# Patient Record
Sex: FEMALE | Race: WHITE | NOT HISPANIC OR LATINO | Employment: OTHER | URBAN - METROPOLITAN AREA
[De-identification: names, ages, dates, MRNs, and addresses within clinical notes are randomized per-mention and may not be internally consistent; named-entity substitution may affect disease eponyms.]

---

## 2017-01-16 ENCOUNTER — PATIENT MESSAGE (OUTPATIENT)
Dept: FAMILY MEDICINE | Facility: CLINIC | Age: 67
End: 2017-01-16

## 2017-01-17 ENCOUNTER — PATIENT MESSAGE (OUTPATIENT)
Dept: FAMILY MEDICINE | Facility: CLINIC | Age: 67
End: 2017-01-17

## 2017-01-17 RX ORDER — LOVASTATIN 20 MG/1
20 TABLET ORAL NIGHTLY
Qty: 90 TABLET | Refills: 3 | Status: SHIPPED | OUTPATIENT
Start: 2017-01-17 | End: 2018-01-07 | Stop reason: SDUPTHER

## 2017-02-05 DIAGNOSIS — F41.9 ANXIETY DISORDER: ICD-10-CM

## 2017-02-06 RX ORDER — CITALOPRAM 20 MG/1
TABLET, FILM COATED ORAL
Qty: 30 TABLET | Refills: 5 | Status: SHIPPED | OUTPATIENT
Start: 2017-02-06 | End: 2017-08-01 | Stop reason: SDUPTHER

## 2017-02-24 ENCOUNTER — OFFICE VISIT (OUTPATIENT)
Dept: NEUROLOGY | Facility: CLINIC | Age: 67
End: 2017-02-24
Payer: MEDICARE

## 2017-02-24 VITALS
BODY MASS INDEX: 47.13 KG/M2 | SYSTOLIC BLOOD PRESSURE: 122 MMHG | HEIGHT: 62 IN | HEART RATE: 89 BPM | WEIGHT: 256.13 LBS | DIASTOLIC BLOOD PRESSURE: 79 MMHG

## 2017-02-24 DIAGNOSIS — F41.9 ANXIETY DISORDER, UNSPECIFIED TYPE: ICD-10-CM

## 2017-02-24 DIAGNOSIS — M79.7 FIBROMYALGIA: ICD-10-CM

## 2017-02-24 DIAGNOSIS — G25.0 ESSENTIAL TREMOR: ICD-10-CM

## 2017-02-24 DIAGNOSIS — G25.81 RESTLESS LEG SYNDROME: Primary | ICD-10-CM

## 2017-02-24 PROCEDURE — 99214 OFFICE O/P EST MOD 30 MIN: CPT | Mod: S$PBB,,, | Performed by: PSYCHIATRY & NEUROLOGY

## 2017-02-24 PROCEDURE — 99213 OFFICE O/P EST LOW 20 MIN: CPT | Mod: PBBFAC,PO | Performed by: PSYCHIATRY & NEUROLOGY

## 2017-02-24 PROCEDURE — 99999 PR PBB SHADOW E&M-EST. PATIENT-LVL III: CPT | Mod: PBBFAC,,, | Performed by: PSYCHIATRY & NEUROLOGY

## 2017-02-24 RX ORDER — CYCLOBENZAPRINE HCL 10 MG
10 TABLET ORAL 3 TIMES DAILY PRN
Status: ON HOLD | COMMUNITY
Start: 2017-02-17 | End: 2022-02-16 | Stop reason: SDUPTHER

## 2017-02-24 RX ORDER — GABAPENTIN 600 MG/1
600 TABLET ORAL 3 TIMES DAILY
Qty: 90 TABLET | Refills: 11 | Status: SHIPPED | OUTPATIENT
Start: 2017-02-24 | End: 2018-03-14 | Stop reason: SDUPTHER

## 2017-02-24 RX ORDER — DICLOFENAC SODIUM 30 MG/G
GEL TOPICAL
Refills: 11 | COMMUNITY
Start: 2017-01-30 | End: 2019-07-08 | Stop reason: SDUPTHER

## 2017-02-24 RX ORDER — OXYCODONE AND ACETAMINOPHEN 10; 325 MG/1; MG/1
1 TABLET ORAL EVERY 8 HOURS PRN
Status: ON HOLD | COMMUNITY
Start: 2017-02-21 | End: 2022-02-16 | Stop reason: SDUPTHER

## 2017-02-24 RX ORDER — TIZANIDINE 4 MG/1
4 TABLET ORAL EVERY 8 HOURS
COMMUNITY
Start: 2017-01-24 | End: 2017-03-02

## 2017-02-24 NOTE — PROGRESS NOTES
Subjective:       Patient ID: Hannah Norman is a 67 y.o. female.    Chief Complaint:  Restless leg syndrome      History of Present Illness  HPI  This is a 67-year-old female who returns in follow up.  She was last seen by me 3 months ago.  Since history of chronic back problems and in the past had been seen by orthopedic surgery and neurosurgery at [facility], Dr. Bey, with MRI scans of the neck and back and advised conservative management and referral to pain management.  She is presently being followed by Dr. Wang for pain management.  She has history of restless leg syndrome with improvement with a combination of Neurontin 3 times a day and Mirapex at bedtime.  She also has an essential tremor and was recently seen by the movement disorders clinic.  She takes Xanax as needed and citalopram 20 mg daily for chronic anxiety which has helped.  In addition she is on Effexor for the fibromyalgia symptoms which has helped.       Review of Systems  Review of Systems   Constitutional: Negative.    HENT: Negative.  Negative for hearing loss.    Eyes: Negative.  Negative for visual disturbance.   Respiratory: Negative.    Cardiovascular: Negative.    Gastrointestinal: Negative.    Genitourinary: Negative.    Musculoskeletal: Positive for arthralgias (left knee pain, recent surgery), back pain and myalgias. Negative for gait problem.   Skin: Negative.    Neurological: Positive for tremors. Negative for seizures, speech difficulty and numbness.   Psychiatric/Behavioral: Negative.  Negative for decreased concentration.       Objective:      Neurologic Exam      Physical Exam   Constitutional: She appears well-developed and well-nourished.   HENT:   Head: Normocephalic and atraumatic.   Right Ear: Hearing normal.   Left Ear: Hearing normal.   Neck: Normal range of motion. Neck supple. Muscular tenderness (bilateral paraspinal muscle and trapezius muscle tenderness) present. Carotid bruit is not present.    Musculoskeletal:   Multiple areas of muscle tenderness in the trunk muscles as well as trapezius and proximal muscles of the upper and lower extremities.  Status post left knee replacement surgery.  Left ankle tenderness laterally with mild swelling, no ecchymosis or deformity.   Neurological: She is alert. She has normal reflexes. She displays atrophy (no obvious weakness however she has difficulty getting onto the step and she fatigues easily.) and tremor (a very minimal statokinetic tremor was noted affecting fingers). A cranial nerve deficit (VF at bedside testing essentially normal.  Minimal weakness left face LMN with good eye closure, and sensory exam being normal/symmetrical.  Corneals/gag reflexes normal.  Tongue & palate movements normal.  Shoulder shrug normal.) is present. No sensory deficit (Right Tinel's positive). She exhibits normal muscle tone. Coordination (mild clumsiness of fine motor movements.  Romberg is equivocal with eyes closed) and gait (her gait is slightly broad-based slowly because of clumsiness in the lower extremities and fatigue.) abnormal.   Mental status examination: Patient is fully oriented and able to give an adequate history.  Recall of recent and past information is good.  Immediate recall is normal.  Attention span and concentration was normal.  Judgment and insight is normal.  Language functions are intact with no evidence of aphasia or dysarthria.  Comprehension is unimpaired.  Affect is appropriate, mood was even.  No thought disorder is noted.   Vitals reviewed.        Assessment:        1. Restless leg syndrome    2. Essential tremor    3. Anxiety disorder, unspecified type    4. Fibromyalgia             Plan:       Continue follow-up with her pain management doctor, Dr Lara for the chronic back pain and fibromyalgia.  Continue Xanax 1 mg 3 times a day as needed in addition to citalopram 20 mg daily.  Continue Effexor for the fibromyalgia muscle symptoms.   Continue Neurontin and Mirapex for restless leg.  Follow-up in 6 months if stable.

## 2017-02-24 NOTE — PATIENT INSTRUCTIONS
Continue follow-up with her pain management doctor, Dr Lara for the chronic back pain and fibromyalgia.  Continue Xanax 1 mg 3 times a day as needed in addition to citalopram 20 mg daily.  Continue Effexor for the fibromyalgia muscle symptoms.  Continue Neurontin and Mirapex for restless leg.

## 2017-02-28 DIAGNOSIS — F41.9 ANXIETY DISORDER: ICD-10-CM

## 2017-03-01 RX ORDER — ALPRAZOLAM 1 MG/1
TABLET ORAL
Qty: 90 TABLET | Refills: 3 | OUTPATIENT
Start: 2017-03-01

## 2017-03-02 ENCOUNTER — OFFICE VISIT (OUTPATIENT)
Dept: FAMILY MEDICINE | Facility: CLINIC | Age: 67
End: 2017-03-02
Payer: MEDICARE

## 2017-03-02 ENCOUNTER — TELEPHONE (OUTPATIENT)
Dept: FAMILY MEDICINE | Facility: CLINIC | Age: 67
End: 2017-03-02

## 2017-03-02 ENCOUNTER — HOSPITAL ENCOUNTER (OUTPATIENT)
Dept: RADIOLOGY | Facility: HOSPITAL | Age: 67
Discharge: HOME | End: 2017-03-02
Attending: NURSE PRACTITIONER
Payer: MEDICARE

## 2017-03-02 VITALS
DIASTOLIC BLOOD PRESSURE: 82 MMHG | WEIGHT: 258.38 LBS | HEIGHT: 62 IN | SYSTOLIC BLOOD PRESSURE: 126 MMHG | OXYGEN SATURATION: 96 % | TEMPERATURE: 98 F | BODY MASS INDEX: 47.55 KG/M2 | HEART RATE: 95 BPM

## 2017-03-02 DIAGNOSIS — R06.02 SOB (SHORTNESS OF BREATH): ICD-10-CM

## 2017-03-02 DIAGNOSIS — R05.9 COUGH: ICD-10-CM

## 2017-03-02 DIAGNOSIS — R06.02 SOB (SHORTNESS OF BREATH): Primary | ICD-10-CM

## 2017-03-02 DIAGNOSIS — R06.2 WHEEZING: ICD-10-CM

## 2017-03-02 DIAGNOSIS — F41.9 ANXIETY DISORDER: ICD-10-CM

## 2017-03-02 PROCEDURE — 96372 THER/PROPH/DIAG INJ SC/IM: CPT | Mod: S$GLB,,, | Performed by: NURSE PRACTITIONER

## 2017-03-02 PROCEDURE — 71020 XR CHEST PA AND LATERAL: CPT | Mod: TC,PO

## 2017-03-02 PROCEDURE — 99213 OFFICE O/P EST LOW 20 MIN: CPT | Mod: 25,S$GLB,, | Performed by: NURSE PRACTITIONER

## 2017-03-02 RX ORDER — TRIAMCINOLONE ACETONIDE 40 MG/ML
80 INJECTION, SUSPENSION INTRA-ARTICULAR; INTRAMUSCULAR
Status: COMPLETED | OUTPATIENT
Start: 2017-03-02 | End: 2017-03-02

## 2017-03-02 RX ORDER — ALPRAZOLAM 1 MG/1
TABLET ORAL
Qty: 90 TABLET | Refills: 3 | OUTPATIENT
Start: 2017-03-02

## 2017-03-02 RX ORDER — ALBUTEROL SULFATE 90 UG/1
2 AEROSOL, METERED RESPIRATORY (INHALATION) EVERY 6 HOURS PRN
Qty: 6.7 G | Refills: 0 | Status: SHIPPED | OUTPATIENT
Start: 2017-03-02 | End: 2017-10-23 | Stop reason: SDUPTHER

## 2017-03-02 RX ADMIN — TRIAMCINOLONE ACETONIDE 80 MG: 40 INJECTION, SUSPENSION INTRA-ARTICULAR; INTRAMUSCULAR at 10:03

## 2017-03-02 NOTE — PROGRESS NOTES
Subjective:       Patient ID: Hannah Norman is a 67 y.o. female.    Chief Complaint: Cough and Wheezing    Cough   This is a new problem. The current episode started more than 1 month ago. The problem has been unchanged. The problem occurs every few minutes. The cough is non-productive. Associated symptoms include shortness of breath and wheezing. Pertinent negatives include no chest pain, chills, ear congestion, ear pain, fever, headaches, heartburn, hemoptysis, myalgias, nasal congestion, postnasal drip, rash, rhinorrhea, sore throat, sweats or weight loss. The symptoms are aggravated by lying down. Treatments tried: flonase. There is no history of asthma, bronchiectasis, bronchitis, COPD, emphysema, environmental allergies or pneumonia.     Review of Systems   Constitutional: Negative for chills, diaphoresis, fatigue, fever and weight loss.   HENT: Negative for congestion, ear pain, postnasal drip, rhinorrhea, sinus pressure, sore throat and voice change.    Eyes: Negative for visual disturbance.   Respiratory: Positive for cough, shortness of breath and wheezing. Negative for hemoptysis and chest tightness.    Cardiovascular: Negative for chest pain, palpitations and leg swelling.   Gastrointestinal: Negative for heartburn.   Musculoskeletal: Negative for myalgias.   Skin: Negative for color change, pallor, rash and wound.   Allergic/Immunologic: Negative for environmental allergies.   Neurological: Negative for headaches.       Objective:      Physical Exam   Constitutional: She appears well-developed and well-nourished. No distress.   HENT:   Right Ear: Tympanic membrane and external ear normal.   Left Ear: Tympanic membrane and external ear normal.   Nose: No mucosal edema or rhinorrhea. Right sinus exhibits no maxillary sinus tenderness and no frontal sinus tenderness. Left sinus exhibits no maxillary sinus tenderness and no frontal sinus tenderness.   Mouth/Throat: No oropharyngeal exudate, posterior  oropharyngeal edema or posterior oropharyngeal erythema.   Cardiovascular: Normal rate, regular rhythm and normal heart sounds.    Pulmonary/Chest: Effort normal. She has decreased breath sounds in the right middle field, the right lower field, the left middle field and the left lower field.   Skin: Skin is warm and dry. She is not diaphoretic.   Psychiatric: She has a normal mood and affect. Her behavior is normal. Judgment and thought content normal.   Vitals reviewed.      Assessment:       1. SOB (shortness of breath)    2. Wheezing    3. Cough        Plan:       SOB (shortness of breath)  -     X-Ray Chest PA And Lateral; Future    Wheezing  -     albuterol 90 mcg/actuation inhaler; Inhale 2 puffs into the lungs every 6 (six) hours as needed for Wheezing or Shortness of Breath. Rescue  Dispense: 6.7 g; Refill: 0  -     X-Ray Chest PA And Lateral; Future    Cough  -     albuterol 90 mcg/actuation inhaler; Inhale 2 puffs into the lungs every 6 (six) hours as needed for Wheezing or Shortness of Breath. Rescue  Dispense: 6.7 g; Refill: 0  -     X-Ray Chest PA And Lateral; Future    Other orders  -     triamcinolone acetonide injection 80 mg; Inject 2 mLs (80 mg total) into the muscle one time.      Start mucinex

## 2017-03-02 NOTE — TELEPHONE ENCOUNTER
Tried to call patient no answer left detailed message xray shows that she does not have pneumonia, mucinex albuterol and hopefully the shot will help her feel better if not give me a call

## 2017-03-03 ENCOUNTER — PATIENT MESSAGE (OUTPATIENT)
Dept: NEUROLOGY | Facility: CLINIC | Age: 67
End: 2017-03-03

## 2017-03-06 ENCOUNTER — TELEPHONE (OUTPATIENT)
Dept: NEUROLOGY | Facility: CLINIC | Age: 67
End: 2017-03-06

## 2017-03-06 DIAGNOSIS — F41.9 ANXIETY DISORDER, UNSPECIFIED TYPE: ICD-10-CM

## 2017-03-06 RX ORDER — ALPRAZOLAM 1 MG/1
1 TABLET ORAL 3 TIMES DAILY PRN
Qty: 90 TABLET | Refills: 3 | Status: SHIPPED | OUTPATIENT
Start: 2017-03-06 | End: 2017-07-02 | Stop reason: SDUPTHER

## 2017-03-06 NOTE — TELEPHONE ENCOUNTER
----- Message from Vishal Mukherjee MD sent at 3/1/2017  4:37 PM CST -----  ALPRAZOLAM 1 MG TABLET  In chart as: alprazolam (XANAX) 1 MG tablet  TAKE 1 TABLET BY MOUTH THREE TIMES A DAY AS NEEDED FOR ANXIETY  Disp: 90 tablet Refills: 3   Class: Normal Start: 2/28/2017  For: Anxiety disorder  Documented:4 years ago  Last refill: 2/8/2017  To be filled at: [id number] [address]: [phone]      Medication not be until 3/9/2017

## 2017-03-10 ENCOUNTER — PATIENT MESSAGE (OUTPATIENT)
Dept: FAMILY MEDICINE | Facility: CLINIC | Age: 67
End: 2017-03-10

## 2017-03-13 RX ORDER — LEVOTHYROXINE SODIUM 75 UG/1
75 TABLET ORAL
Qty: 90 TABLET | Refills: 3 | Status: SHIPPED | OUTPATIENT
Start: 2017-03-13 | End: 2018-03-14 | Stop reason: SDUPTHER

## 2017-03-28 ENCOUNTER — HOSPITAL ENCOUNTER (OUTPATIENT)
Dept: RADIOLOGY | Facility: HOSPITAL | Age: 67
Discharge: HOME | End: 2017-03-28
Attending: PHYSICAL MEDICINE & REHABILITATION
Payer: MEDICARE

## 2017-03-28 DIAGNOSIS — M54.2 CERVICALGIA: ICD-10-CM

## 2017-03-28 PROCEDURE — 72040 X-RAY EXAM NECK SPINE 2-3 VW: CPT | Mod: TC,PO

## 2017-03-29 RX ORDER — ROPINIROLE 0.5 MG/1
TABLET, FILM COATED ORAL
Qty: 90 TABLET | Refills: 6 | Status: SHIPPED | OUTPATIENT
Start: 2017-03-29 | End: 2017-06-22 | Stop reason: SDUPTHER

## 2017-05-08 ENCOUNTER — PATIENT MESSAGE (OUTPATIENT)
Dept: FAMILY MEDICINE | Facility: CLINIC | Age: 67
End: 2017-05-08

## 2017-05-10 RX ORDER — FUROSEMIDE 40 MG/1
40 TABLET ORAL DAILY
Qty: 60 TABLET | Refills: 2 | Status: SHIPPED | OUTPATIENT
Start: 2017-05-10 | End: 2017-11-13 | Stop reason: SDUPTHER

## 2017-06-02 DIAGNOSIS — K21.9 GASTROESOPHAGEAL REFLUX DISEASE, ESOPHAGITIS PRESENCE NOT SPECIFIED: ICD-10-CM

## 2017-06-05 RX ORDER — PANTOPRAZOLE SODIUM 40 MG/1
TABLET, DELAYED RELEASE ORAL
Qty: 90 TABLET | Refills: 1 | Status: SHIPPED | OUTPATIENT
Start: 2017-06-05 | End: 2017-06-22 | Stop reason: SDUPTHER

## 2017-06-06 DIAGNOSIS — K21.9 GASTROESOPHAGEAL REFLUX DISEASE, ESOPHAGITIS PRESENCE NOT SPECIFIED: ICD-10-CM

## 2017-06-06 DIAGNOSIS — M79.7 FIBROMYALGIA: ICD-10-CM

## 2017-06-06 RX ORDER — VENLAFAXINE HYDROCHLORIDE 150 MG/1
CAPSULE, EXTENDED RELEASE ORAL
Qty: 30 CAPSULE | Refills: 11 | Status: SHIPPED | OUTPATIENT
Start: 2017-06-06 | End: 2018-10-16 | Stop reason: SDUPTHER

## 2017-06-07 RX ORDER — PANTOPRAZOLE SODIUM 40 MG/1
TABLET, DELAYED RELEASE ORAL
Qty: 90 TABLET | Refills: 1 | Status: SHIPPED | OUTPATIENT
Start: 2017-06-07 | End: 2017-06-22 | Stop reason: SDUPTHER

## 2017-06-22 ENCOUNTER — OFFICE VISIT (OUTPATIENT)
Dept: FAMILY MEDICINE | Facility: CLINIC | Age: 67
End: 2017-06-22
Payer: MEDICARE

## 2017-06-22 VITALS
BODY MASS INDEX: 46.82 KG/M2 | HEIGHT: 62 IN | DIASTOLIC BLOOD PRESSURE: 76 MMHG | TEMPERATURE: 98 F | OXYGEN SATURATION: 97 % | SYSTOLIC BLOOD PRESSURE: 124 MMHG | HEART RATE: 91 BPM | WEIGHT: 254.44 LBS

## 2017-06-22 DIAGNOSIS — R06.02 SOB (SHORTNESS OF BREATH): Primary | ICD-10-CM

## 2017-06-22 DIAGNOSIS — R13.10 DYSPHAGIA, UNSPECIFIED TYPE: ICD-10-CM

## 2017-06-22 DIAGNOSIS — R06.2 WHEEZING: ICD-10-CM

## 2017-06-22 DIAGNOSIS — K21.9 GASTROESOPHAGEAL REFLUX DISEASE, ESOPHAGITIS PRESENCE NOT SPECIFIED: ICD-10-CM

## 2017-06-22 PROCEDURE — 1159F MED LIST DOCD IN RCRD: CPT | Mod: S$GLB,,, | Performed by: FAMILY MEDICINE

## 2017-06-22 PROCEDURE — 1126F AMNT PAIN NOTED NONE PRSNT: CPT | Mod: S$GLB,,, | Performed by: FAMILY MEDICINE

## 2017-06-22 PROCEDURE — 99214 OFFICE O/P EST MOD 30 MIN: CPT | Mod: S$GLB,,, | Performed by: FAMILY MEDICINE

## 2017-06-22 RX ORDER — PANTOPRAZOLE SODIUM 40 MG/1
40 TABLET, DELAYED RELEASE ORAL DAILY
Qty: 90 TABLET | Refills: 1 | Status: SHIPPED | OUTPATIENT
Start: 2017-06-22 | End: 2017-08-18 | Stop reason: SDUPTHER

## 2017-06-22 RX ORDER — FENTANYL 25 UG/1
1 PATCH TRANSDERMAL
COMMUNITY
Start: 2017-06-13 | End: 2017-10-19

## 2017-06-22 NOTE — PROGRESS NOTES
Patient ID: Hannah Norman is a 67 y.o. female.    Chief Complaint: Follow-up (check-up)    HPI       Hannah Norman is a 67 y.o. female here following up on chroninc problems.  Reflux controled with protonix  Wakes up at night with difficulty breathing which is releived with albuterol and cough drop. Lots of wheezing at night.  Snoring at night as well.   Am feels fatigued.  Sleep a lot during the day.  Falls asleep with riding in a car. Sleep apnea neg 2 years ago.      Sob with walking sometimes.  No chest pain with this.  No nausea or diaphoisis with this. At [facility] 4 mo ago and next week.     Swallowing often choking and feels food often goes down the wrong pipe              Review of Symptoms    Constitutional  Neg activity change, No chills/ fever   Resp  Neg hemoptysis, stridor, choking  CVS  Neg chest pain, palpitations    Physical Exam    Constitutional:   Oriented to person, place, and time.appears well-developed and well-nourished.   No distress.     HENT:   Head: Normocephalic and atraumatic.     Right Ear: Tympanic membrane, external ear and ear canal normal     Left Ear: Tympanic membrane, external ear and ear canal normal    Nose: External Normal. Normal turbinates, No rhinorrhea     Mouth/Throat: Uvula is midline, oropharynx is clear and moist and mucous membranes are normal.     Neck: supple no anterior cervical adenopathy    Carotid artery:  Bruit    NEG [x]   POS[]    Eyes:   Conjunctivae are normal. Right eye exhibits no discharge. Left eye exhibits no discharge. No scleral icterus. No periorbital edema       Cardiovascular:  Regular rate, Regular rhythm     No extrasystole  Normal S1-S2    Pulmonary/Chest:   Normal effort and breath sounds.  No wheezing, rhonchi or rales.      Musculoskeletal:  No edema. No obvious deformity No waisting     Neurological:   Alert and oriented to person, place, and time. Coordination normal.     Skin:   Skin is warm and dry.   No diaphoresis.   No rash  noted.    Psychiatric: Normal mood and affect. Behavior is normal. Judgment and thought content normal.       Assessment / Plan:      ICD-10-CM ICD-9-CM   1. SOB (shortness of breath) R06.02 786.05   2. Gastroesophageal reflux disease, esophagitis presence not specified K21.9 530.81   3. Wheezing R06.2 786.07   4. Dysphagia, unspecified type R13.10 787.20     SOB (shortness of breath)    Gastroesophageal reflux disease, esophagitis presence not specified  -     pantoprazole (PROTONIX) 40 MG tablet; Take 1 tablet (40 mg total) by mouth once daily.  Dispense: 90 tablet; Refill: 1    Wheezing  -     Spirometry without Bronchodilator    Dysphagia, unspecified type  -     Fl Modified Barium Swallow Speech; Future; Expected date: 06/22/2017  -     SLP video swallow; Future; Expected date: 06/22/2017    Other orders  -     umeclidinium-vilanterol (ANORO ELLIPTA) 62.5-25 mcg/actuation DsDv; Inhale 1 puff into the lungs once daily.  Dispense: 1 each; Refill: 11

## 2017-07-02 DIAGNOSIS — F41.9 ANXIETY DISORDER, UNSPECIFIED TYPE: ICD-10-CM

## 2017-07-03 RX ORDER — ALPRAZOLAM 1 MG/1
TABLET ORAL
Qty: 90 TABLET | Refills: 3 | Status: SHIPPED | OUTPATIENT
Start: 2017-07-03 | End: 2017-10-25 | Stop reason: SDUPTHER

## 2017-07-06 ENCOUNTER — CLINICAL SUPPORT (OUTPATIENT)
Dept: SPEECH THERAPY | Facility: HOSPITAL | Age: 67
End: 2017-07-06
Attending: FAMILY MEDICINE
Payer: MEDICARE

## 2017-07-06 ENCOUNTER — HOSPITAL ENCOUNTER (OUTPATIENT)
Dept: RADIOLOGY | Facility: HOSPITAL | Age: 67
Discharge: HOME | End: 2017-07-06
Attending: FAMILY MEDICINE
Payer: MEDICARE

## 2017-07-06 DIAGNOSIS — R13.10 DYSPHAGIA, UNSPECIFIED TYPE: ICD-10-CM

## 2017-07-06 PROCEDURE — G8996 SWALLOW CURRENT STATUS: HCPCS | Mod: CH

## 2017-07-06 PROCEDURE — G8998 SWALLOW D/C STATUS: HCPCS | Mod: CH

## 2017-07-06 PROCEDURE — 92611 MOTION FLUOROSCOPY/SWALLOW: CPT

## 2017-07-06 PROCEDURE — 74230 X-RAY XM SWLNG FUNCJ C+: CPT | Mod: TC

## 2017-07-06 PROCEDURE — G8997 SWALLOW GOAL STATUS: HCPCS | Mod: CH

## 2017-07-06 PROCEDURE — 74230 X-RAY XM SWLNG FUNCJ C+: CPT | Mod: 26,,, | Performed by: RADIOLOGY

## 2017-07-06 NOTE — PROGRESS NOTES
"TIME RECORD    Date: 07/06/2017    Start Time:  10:19  Stop Time:  10:39    PROCEDURES:    TIMED  Procedure Time Min.    Start:  Stop:     Start:  Stop:     Start:  Stop:     Start:  Stop:          UNTIMED  Procedure Time Min.   MBS  Start: 10:19  Stop: 10:39 20    Start:  Stop:      Total Timed Minutes:  20  Total Timed Units:  0  Total Untimed Units:  1  Charges Billed/# of units:  1      REHAB SERVICE VIDEO SWALLOW STUDY    HICN:  [id number]  Onset Date:  6/2017  SOC Date:  7/6/17  Certification Period:  7/6/17 to 7/6/17  Primary Diagnosis:  Dysphagia, globus sensation  Treatment Diagnosis:  n/a  Pertinent Medical History:    Past Medical History:   Diagnosis Date    Anxiety disorder     Bell's palsy     Chronic back pain     Chronic osteoarthritis     Essential tremor     Fibromyalgia     Hypertension     Mild acid reflux     Neck pain     Other and unspecified hyperlipidemia      Prior Therapy Dates/Results (same condition):  No history of therapy.   Prior Level of Function:  Pt is independent, referred by primary care MD  Functional Deficits Leading to Referral:  "occasional choking episodes"  Social Cultural:  Pt independent with ambulation  Nutrition:  Consumes regular diet and thin liquids  Signs of Abuse:  No  Medication:    Alertness/Attention:  Alert, attentive, follows commands  Oral Motor:  WFL, no upper dentition in place  Patient Position:  Sitting  View:  Lateral  Consistencies Administered:  5cc, 10cc, 20cc, and self regulated sips of thin liquid barium via cup, straw; tsp bites of barium pudding; tsp bites of diced peaches (drained) coated in barium powder; 1/2"  jing cracker with barium paste frosting.   MBSS completed in lateral view.     Oral Phase:  WFL, no oral residuals  Pharyngeal Phase:  Pt demonstrated timely pharyngeal swallow with adequate base of tongue retraction noted, timely laryngeal excursion/elevation noted, adequate glottic closure noted. Pt with trace vallecular " "residuals on pureed but able to clear on her own with spontaneous dry swallows. No pharyngeal residuals and NO evidence of aspiration or airway penetration noted.Pt with good oral and pharyngeal sensation.   Strategies/Compensatory Techniques Utilized:  Spontaneous dry swallows as needed  Impressions:  WFL oral and pharyngeal phase of the swallow, No pharyngeal residuals and NO evidence of aspiration or airway penetration noted.Pt with good oral and pharyngeal sensation.   Recommendations: Refer to ENT service to evaluate voice  Swallow Precautions:  1. UPRIGHT for all meals and for all PO solid intake   2. Regular diet Tray/ Thin Liquids  3. Straws ok/Cup swallows   4. Alternate sips/bites  5. Eat slowly  6. Whole medications one by one should follow with 3 - 6oz fluid, then STAY upright at least 30min      Plan:   1. SLP reviewed results, MBS video and swallow mechanism anatomy with pt. Educated pt on swallow guidelines and diet recs s/p MBS.   2. Report to be sent via [employer] to referring MD.   3. No additional ST needs indicated.   4. RECOMMEND ENT Consult to evaluate voice on this patient, she demonstrates "creaky voice" and reports change in voice over last few months.     Therapist's Name: ZAIDA Nichols, [facility]-SLP  Speech Pathologist 7/6/2017      Therapist's Signature: ___________________________________  Date: 07/06/2017    I CERTIFY THE NEED FOR THESE SERVICES FURNISHED UNDER THIS PLAN OF TREATMENT AND WHILE UNDER MY CARE    Physician's comments: ________________________________________________________________________________________________________________________________________________      Physician's Name: ___________________________________  "

## 2017-07-17 ENCOUNTER — TELEPHONE (OUTPATIENT)
Dept: FAMILY MEDICINE | Facility: CLINIC | Age: 67
End: 2017-07-17

## 2017-07-17 DIAGNOSIS — R49.0 DYSPHONIA: Primary | ICD-10-CM

## 2017-07-17 NOTE — TELEPHONE ENCOUNTER
Please call patient and refer fo ent as recommended by speech bc of dysphonia  Dr. Kemp-referral written &

## 2017-08-01 DIAGNOSIS — F41.9 ANXIETY DISORDER: ICD-10-CM

## 2017-08-02 RX ORDER — CITALOPRAM 20 MG/1
TABLET, FILM COATED ORAL
Qty: 30 TABLET | Refills: 5 | Status: SHIPPED | OUTPATIENT
Start: 2017-08-02 | End: 2018-02-09 | Stop reason: SDUPTHER

## 2017-08-18 ENCOUNTER — OFFICE VISIT (OUTPATIENT)
Dept: OTOLARYNGOLOGY | Facility: CLINIC | Age: 67
End: 2017-08-18
Payer: MEDICARE

## 2017-08-18 VITALS
DIASTOLIC BLOOD PRESSURE: 86 MMHG | HEIGHT: 62 IN | SYSTOLIC BLOOD PRESSURE: 124 MMHG | TEMPERATURE: 97 F | BODY MASS INDEX: 47.49 KG/M2 | WEIGHT: 258.06 LBS | HEART RATE: 92 BPM

## 2017-08-18 DIAGNOSIS — K21.9 LARYNGOPHARYNGEAL REFLUX (LPR): ICD-10-CM

## 2017-08-18 DIAGNOSIS — K21.9 GASTROESOPHAGEAL REFLUX DISEASE, ESOPHAGITIS PRESENCE NOT SPECIFIED: ICD-10-CM

## 2017-08-18 DIAGNOSIS — R13.10 DYSPHAGIA, UNSPECIFIED TYPE: ICD-10-CM

## 2017-08-18 DIAGNOSIS — J34.2 NASAL SEPTAL DEVIATION: ICD-10-CM

## 2017-08-18 DIAGNOSIS — R49.0 HOARSENESS OF VOICE: Primary | ICD-10-CM

## 2017-08-18 PROCEDURE — 99999 PR PBB SHADOW E&M-EST. PATIENT-LVL V: CPT | Mod: PBBFAC,,, | Performed by: OTOLARYNGOLOGY

## 2017-08-18 PROCEDURE — 99215 OFFICE O/P EST HI 40 MIN: CPT | Mod: PBBFAC | Performed by: OTOLARYNGOLOGY

## 2017-08-18 PROCEDURE — 31575 DIAGNOSTIC LARYNGOSCOPY: CPT | Mod: PBBFAC | Performed by: OTOLARYNGOLOGY

## 2017-08-18 PROCEDURE — 1126F AMNT PAIN NOTED NONE PRSNT: CPT | Mod: ,,, | Performed by: OTOLARYNGOLOGY

## 2017-08-18 PROCEDURE — 3079F DIAST BP 80-89 MM HG: CPT | Mod: ,,, | Performed by: OTOLARYNGOLOGY

## 2017-08-18 PROCEDURE — 99204 OFFICE O/P NEW MOD 45 MIN: CPT | Mod: 25,S$PBB,, | Performed by: OTOLARYNGOLOGY

## 2017-08-18 PROCEDURE — 1159F MED LIST DOCD IN RCRD: CPT | Mod: ,,, | Performed by: OTOLARYNGOLOGY

## 2017-08-18 PROCEDURE — 3074F SYST BP LT 130 MM HG: CPT | Mod: ,,, | Performed by: OTOLARYNGOLOGY

## 2017-08-18 RX ORDER — PANTOPRAZOLE SODIUM 40 MG/1
40 TABLET, DELAYED RELEASE ORAL 2 TIMES DAILY
Qty: 180 TABLET | Refills: 1 | Status: SHIPPED | OUTPATIENT
Start: 2017-08-18 | End: 2018-01-29 | Stop reason: SDUPTHER

## 2017-08-18 NOTE — PATIENT INSTRUCTIONS
Tips to Control Acid Reflux    To control acid reflux, youll need to make some basic diet and lifestyle changes. The simple steps outlined below may be all youll need to ease discomfort.  Watch what you eat  · Avoid fatty foods and spicy foods.  · Eat fewer acidic foods, such as citrus and tomato-based foods. These can increase symptoms.  · Limit drinking alcohol, caffeine, and fizzy beverages. All increase acid reflux.  · Try limiting chocolate, peppermint, and spearmint. These can worsen acid reflux in some people.  Watch when you eat  · Avoid lying down for 3 hours after eating.  · Do not snack before going to bed.  Raise your head  Raising your head and upper body by 4 to 6 inches helps limit reflux when youre lying down. Put blocks under the head of your bed frame to raise it.  Other changes  · Lose weight, if you need to  · Dont exercise near bedtime  · Avoid tight-fitting clothes  · Limit aspirin and ibuprofen  · Stop smoking   Date Last Reviewed: 7/1/2016  © 7734-8435 [employer]. [address] [place] [address], [place] [address]. All rights reserved. This information is not intended as a substitute for professional medical care. Always follow your healthcare professional's instructions.

## 2017-08-18 NOTE — PROCEDURES
Laryngoscopy  Date/Time: 8/18/2017 11:02 AM  Performed by: RENAY SOLIS  Authorized by: RENAY SOLIS     Consent Done?:  Yes (Verbal)    Due to indication in patient's history, presentation or risk factors,  a fiber optic exam was performed.    SEPARATE PROCEDURE NOTE:    ANESTHESIA:  Topical xylocaine with alexander-synephrine    FINDINGS:  Moderate to severe inaterarytenoid erythema and edema    PROCEDURE:  After verbal consent was obtained, the flexible scope was passed through the patient's nasal cavity without difficulty.  The nasopharynx (adenoid pad) and eustachian tube orifices were first visualized and were found to be normal, without masses or irregularity.  The posterior pharyngeal wall and base of tongue were then examined and no mass or irregular tissue was seen.  The scope was then advanced to the larynx, and the epiglottis, valleculae, and piriform sinuses were normal, without masses or mucosal irregularity.  The false vocal folds and true vocal folds were then examined and were found to have normal mobility (full abduction and adduction) and no masses or mucosal irregularity was seen.  The interartyenoid area had moderate to severe edema and erythema consistent with reflux.

## 2017-08-18 NOTE — LETTER
August 18, 2017      Raffi Garcia MD  [address] [facility] [address]           [facility] Otorhinolaryngology  [address] [place] [address], [address]  [facility] [address]  Phone: [phone]  Fax: [phone]          Patient: Hannah Norman   MR Number: [id number]   YOB: 1950   Date of Visit: 8/18/2017       Dear Dr. Raffi Garcia:    Thank you for referring Hannah Norman to me for evaluation. Attached you will find relevant portions of my assessment and plan of care.    If you have questions, please do not hesitate to call me. I look forward to following Hannah Norman along with you.    Sincerely,    Lupe Lopez MD    Enclosure  CC:  No Recipients    If you would like to receive this communication electronically, please contact [email or web address] or [phone] to request more information on [employer] access.    For providers and/or their staff who would like to refer a patient to [facility], please contact us through our one-stop-shop provider referral line, [facility], at [phone].    If you feel you have received this communication in error or would no longer like to receive these types of communications, please e-mail [email or web address]

## 2017-08-18 NOTE — PROGRESS NOTES
Chief Complaint   Patient presents with    Other     pt was referred by Dr Ari Masarse    Dysphagia    Gastroesophageal Reflux    Snoring   .     HPI:Hannah Norman is a 67 y.o. female who has been referred by Dr. Hauser for a one year history of hoarseness. She has been having dysphagia and recently underwent a MBSS which was normal but she was noted by the SLP to have dysphonia and ENT referral was recommended.  Her voice is progressively worsening over this time. There are pitch breaks or cracks. There is vocal fatigue. She admits to odynophagia, throat pain, and otalgia.  There is no hemoptysis or hematemesis. She is breathing well.     She admits to throat clearing and cough. She admits to heartburn and reflux. She is currently on Protonix 40mg daily. She notes that she has difficulty swallowing spicy foods which gives her the sensation that the food is going down the wrong way. She denies food sticking. She denies weight loss.  She does note certain foods trigger her GERD-tomatos.         Past Medical History:   Diagnosis Date    Anxiety disorder     Bell's palsy     Chronic back pain     Chronic osteoarthritis     Essential tremor     Fibromyalgia     Hypertension     Mild acid reflux     Neck pain     Other and unspecified hyperlipidemia      Social History     Social History    Marital status:      Spouse name: N/A    Number of children: N/A    Years of education: N/A     Occupational History    Not on file.     Social History Main Topics    Smoking status: Former Smoker     Quit date: 10/5/1982    Smokeless tobacco: Never Used    Alcohol use No    Drug use: No    Sexual activity: Not on file     Other Topics Concern    Not on file     Social History Narrative    No narrative on file     Past Surgical History:   Procedure Laterality Date    Breast reduction      CARPAL TUNNEL RELEASE      COLONOSCOPY  2008    HYSTERECTOMY      KNEE SURGERY      bilateral     left shoulder      Tonsillectomy       Family History   Problem Relation Age of Onset    Diabetes Mother     Tremor Mother     Liver disease Mother     Diabetes Father     Heart disease Father     Tremor Son     Diabetes Sister     Diabetes Brother            Review of Systems  General: negative for chills, fever or weight loss  Psychological: negative for mood changes or depression  Ophthalmic: negative for blurry vision, photophobia or eye pain  ENT: see HPI  Respiratory: no cough, shortness of breath, or wheezing  Cardiovascular: no chest pain or dyspnea on exertion  Gastrointestinal: no abdominal pain, change in bowel habits, or black/ bloody stools  Musculoskeletal: negative for gait disturbance or muscular weakness  Neurological: no syncope or seizures; no ataxia  Dermatological: negative for puritis,  rash and jaundice  Hematologic/lymphatic: no easy bruising, no new lumps or bumps      Physical Exam:    Vitals:    08/18/17 1042   BP: 124/86   Pulse: 92   Temp: 97.2 °F (36.2 °C)       Constitutional: Well appearing / communicating without difficutly.  NAD.  Eyes: EOM I Bilaterally  Head/Face: Normocephalic.  Negative paranasal sinus pressure/tenderness.  Salivary glands WNL.  House Brackmann I Bilaterally.    Right Ear: Auricle normal appearance. External Auditory Canal within normal limits no lesions or masses,TM w/o masses/lesions/perforations. TM mobility noted.   Left Ear: Auricle normal appearance. External Auditory Canal within normal limits no lesions or masses,TM w/o masses/lesions/perforations. TM mobility noted.  Nose: Mild nasal septal deviation to the right with an inferior spur.  Inferior Turbinates 3+ bilaterally. No septal perforation. No masses/lesions. External nasal skin appears normal without masses/lesions.  Oral Cavity: Gingiva/lips within normal limits.  Dentition/gingiva healthy appearing. Mucus membranes moist. Floor of mouth soft, no masses palpated. Oral Tongue mobile. Hard  Palate appears normal.    Oropharynx: Base of tongue appears normal. No masses/lesions noted. Tonsillar fossa/pharyngeal wall without lesions. Posterior oropharynx WNL.  Soft palate without masses. Midline uvula.   Neck/Lymphatic: No LAD I-VI bilaterally.  No thyromegaly.  No masses noted on exam.    Mirror laryngoscopy/nasopharyngoscopy: Active gag reflex.  Unable to perform.    Neuro/Psychiatric: AOx3.  Normal mood and affect.   Cardiovascular: Normal carotid pulses bilaterally, no increasing jugular venous distention noted at cervical region bilaterally.    Respiratory: Normal respiratory effort, no stridor, no retractions noted.      See separate procedure note for FFL.     Data Reviewed:  7/6/17 Modified barium swallow study shows WFL oral and pharyngeal phase of the swallow, No pharyngeal residuals and NO evidence of aspiration or airway penetration noted.Pt with good oral and pharyngeal sensation.  I have reviewed her MBSS results and agree with theses findings: no signs of laryngeal penetration or aspiration noted.     Assessment:    ICD-10-CM ICD-9-CM    1. Hoarseness of voice R49.0 784.42 Laryngoscopy   2. Laryngopharyngeal reflux (LPR) K21.9 478.79 Laryngoscopy   3. Dysphagia, unspecified type R13.10 787.20      The primary encounter diagnosis was Hoarseness of voice. Diagnoses of Laryngopharyngeal reflux (LPR) and Dysphagia, unspecified type were also pertinent to this visit.      Plan:  Orders Placed This Encounter   Procedures    Laryngoscopy           Mrs. Norman has the physical findings of chronic laryngopharyngeal reflux, which I believe is the cause of the hoarseness. I explained to the patient how it is common to experience throat discomfort, a foreign body sensation, problems swallowing, chronic cough and hoarseness among other things due to reflux, even in the absence of heartburn. Smaller volumes of gastric contents are required to irritate the pharynx than the lower esophagus. Often patients  with significant reflux and heartburn will seek medical treatment earlier.     I recommended that the patient start taking Protonix 40mg BID* and provided a prescription.     I also recommended that the patient refrain from eating within 3 hours of going to bed at night and that the patient place a 2 x 4 underneath the head board of the bed to elevate the head of bed very subtly and optimize the impact of gravity on the potential reflux. I recommended that the patient avoid alcohol, caffeine, tobacco, tomato sauce, spicy foods, fried food, and chocolate. I provided her with written instructions.     We discussed that she has a mild nasal septal deviation that is present. Options for this are observation versus septoplasty if symptomatic. She states that she is not feeling symptoms at this time; therefore, we will observe.      Follow up in 6 weeks to reassess progress with treatment regimen.     Thank you kindly for allowing me to participate in the patient's care.       Lupe Lopez MD

## 2017-08-22 ENCOUNTER — OFFICE VISIT (OUTPATIENT)
Dept: FAMILY MEDICINE | Facility: CLINIC | Age: 67
End: 2017-08-22
Payer: MEDICARE

## 2017-08-22 VITALS
BODY MASS INDEX: 47.55 KG/M2 | HEART RATE: 92 BPM | OXYGEN SATURATION: 97 % | WEIGHT: 258.38 LBS | DIASTOLIC BLOOD PRESSURE: 80 MMHG | HEIGHT: 62 IN | SYSTOLIC BLOOD PRESSURE: 126 MMHG | TEMPERATURE: 99 F

## 2017-08-22 DIAGNOSIS — R49.0 DYSPHONIA: ICD-10-CM

## 2017-08-22 DIAGNOSIS — K21.9 GASTROESOPHAGEAL REFLUX DISEASE, ESOPHAGITIS PRESENCE NOT SPECIFIED: ICD-10-CM

## 2017-08-22 DIAGNOSIS — R06.02 SOB (SHORTNESS OF BREATH): ICD-10-CM

## 2017-08-22 DIAGNOSIS — G25.0 ESSENTIAL TREMOR: Primary | ICD-10-CM

## 2017-08-22 PROCEDURE — 3074F SYST BP LT 130 MM HG: CPT | Mod: S$GLB,,, | Performed by: FAMILY MEDICINE

## 2017-08-22 PROCEDURE — 1159F MED LIST DOCD IN RCRD: CPT | Mod: S$GLB,,, | Performed by: FAMILY MEDICINE

## 2017-08-22 PROCEDURE — 99213 OFFICE O/P EST LOW 20 MIN: CPT | Mod: S$GLB,,, | Performed by: FAMILY MEDICINE

## 2017-08-22 PROCEDURE — 3079F DIAST BP 80-89 MM HG: CPT | Mod: S$GLB,,, | Performed by: FAMILY MEDICINE

## 2017-08-22 PROCEDURE — 1125F AMNT PAIN NOTED PAIN PRSNT: CPT | Mod: S$GLB,,, | Performed by: FAMILY MEDICINE

## 2017-08-22 NOTE — PROGRESS NOTES
Patient ID: Hannah Norman is a 67 y.o. female.    Chief Complaint: Follow-up (2 month f/u)    HPI      Hannah Norman is a 67 y.o. female here for two-month follow-up.  Patient with hypertension-currently under good control  Palpitations-stable  Lipidemia-on M[place]or no current myalgias  Chronic pain/fibromyalgia-stable this time without exacerbation.              Review of Symptoms    Constitutional  Neg activity change, No chills /fever   Resp  Neg hemoptysis, stridor, choking  CVS  Neg chest pain, palpitations    Physical Exam    Constitutional:  Oriented to person, place, and time.appears well-developed and well-nourished.  No distress.      HENT  Head: Normocephalic and atraumatic  Right Ear: External ear normal.   Left Ear: External ear normal.   Nose: External nose normal.   Mouth:  Moist mucus membranes.    Eyes:  Conjunctivae are normal. Right eye exhibits no discharge.  Left eye exhibits no discharge. No scleral icterus.  No periorbital edema    Cardiovascular:  Regular rate, Regular rhythm     No extrasystole  Normal S1-S2      Pulmonary/Chest:   Normal effort and breath sounds.  No wheezing, rhonchi or rales.    Musculoskeletal:  No edema. No obvious deformity No waisting       Neurological:  Alert and oriented to person, place, and time.   Coordination normal.     Skin:   Skin is warm and dry.  No diaphoresis.   No rash noted.     Psychiatric: Normal mood and affect. Behavior is normal.  Judgment and thought content normal.       Assessment / Plan:      ICD-10-CM ICD-9-CM   1. Essential tremor G25.0 333.1   2. Dysphonia R49.0 784.42   3. Gastroesophageal reflux disease, esophagitis presence not specified K21.9 530.81   4. SOB (shortness of breath) R06.02 786.05     Essential tremor    Dysphonia    Gastroesophageal reflux disease, esophagitis presence not specified    SOB (shortness of breath)

## 2017-08-25 ENCOUNTER — OFFICE VISIT (OUTPATIENT)
Dept: NEUROLOGY | Facility: CLINIC | Age: 67
End: 2017-08-25
Payer: MEDICARE

## 2017-08-25 VITALS
HEART RATE: 79 BPM | SYSTOLIC BLOOD PRESSURE: 123 MMHG | WEIGHT: 258 LBS | BODY MASS INDEX: 47.48 KG/M2 | DIASTOLIC BLOOD PRESSURE: 57 MMHG | HEIGHT: 62 IN

## 2017-08-25 DIAGNOSIS — M79.7 FIBROMYALGIA: ICD-10-CM

## 2017-08-25 DIAGNOSIS — I10 ESSENTIAL HYPERTENSION: ICD-10-CM

## 2017-08-25 DIAGNOSIS — G25.81 RESTLESS LEG SYNDROME: Primary | ICD-10-CM

## 2017-08-25 DIAGNOSIS — F41.9 ANXIETY DISORDER, UNSPECIFIED TYPE: ICD-10-CM

## 2017-08-25 DIAGNOSIS — G25.0 ESSENTIAL TREMOR: ICD-10-CM

## 2017-08-25 DIAGNOSIS — M19.90 OTHER TYPE OF OSTEOARTHRITIS, UNSPECIFIED SITE: ICD-10-CM

## 2017-08-25 PROCEDURE — 99214 OFFICE O/P EST MOD 30 MIN: CPT | Mod: S$PBB,,, | Performed by: PSYCHIATRY & NEUROLOGY

## 2017-08-25 PROCEDURE — 3078F DIAST BP <80 MM HG: CPT | Mod: ,,, | Performed by: PSYCHIATRY & NEUROLOGY

## 2017-08-25 PROCEDURE — 3074F SYST BP LT 130 MM HG: CPT | Mod: ,,, | Performed by: PSYCHIATRY & NEUROLOGY

## 2017-08-25 PROCEDURE — 99999 PR PBB SHADOW E&M-EST. PATIENT-LVL III: CPT | Mod: PBBFAC,,, | Performed by: PSYCHIATRY & NEUROLOGY

## 2017-08-25 PROCEDURE — 1125F AMNT PAIN NOTED PAIN PRSNT: CPT | Mod: ,,, | Performed by: PSYCHIATRY & NEUROLOGY

## 2017-08-25 PROCEDURE — 99213 OFFICE O/P EST LOW 20 MIN: CPT | Mod: PBBFAC,PO | Performed by: PSYCHIATRY & NEUROLOGY

## 2017-08-25 PROCEDURE — 1159F MED LIST DOCD IN RCRD: CPT | Mod: ,,, | Performed by: PSYCHIATRY & NEUROLOGY

## 2017-08-25 NOTE — PROGRESS NOTES
Subjective:       Patient ID: Hannah Norman is a 67 y.o. female.    Chief Complaint:  Restless leg      History of Present Illness  HPI  This is a 67-year-old female who returns in follow up.  She was last seen by me 6 months ago.  Since history of chronic back problems and in the past had been seen by orthopedic surgery and neurosurgery at [facility], Dr. Bey, with MRI scans of the neck and back and advised conservative management and referral to pain management.  She is presently being followed by Dr. Wang for pain management.  She has history of restless leg syndrome with improvement with a combination of Neurontin 3 times a day and Mirapex at bedtime.  She also has an essential tremor and was recently seen by the movement disorders clinic.  She takes Xanax as needed and citalopram 20 mg daily for chronic anxiety which has helped.  In addition she is on Effexor for the fibromyalgia symptoms which has helped.  She denies any new medical problems otherwise.       Review of Systems  Review of Systems   Constitutional: Negative.    HENT: Negative.  Negative for hearing loss.    Eyes: Negative.  Negative for visual disturbance.   Respiratory: Negative.    Cardiovascular: Negative.    Gastrointestinal: Negative.    Genitourinary: Negative.    Musculoskeletal: Positive for arthralgias (left knee pain, recent surgery), back pain and myalgias. Negative for gait problem.   Skin: Negative.    Neurological: Positive for tremors. Negative for seizures, speech difficulty and numbness.   Psychiatric/Behavioral: Negative.  Negative for decreased concentration.       Objective:      Neurologic Exam      Physical Exam   Constitutional: She appears well-developed and well-nourished.   HENT:   Head: Normocephalic and atraumatic.   Right Ear: Hearing normal.   Left Ear: Hearing normal.   Neck: Normal range of motion. Neck supple. Muscular tenderness (bilateral paraspinal muscle and trapezius muscle tenderness)  present. Carotid bruit is not present.   Musculoskeletal:   Multiple areas of muscle tenderness in the trunk muscles as well as trapezius and proximal muscles of the upper and lower extremities.  Status post left knee replacement surgery.  Left ankle tenderness laterally with mild swelling, no ecchymosis or deformity.   Neurological: She is alert. She has normal reflexes. She displays atrophy (no obvious weakness however she has difficulty getting onto the step and she fatigues easily.) and tremor (a very minimal statokinetic tremor was noted affecting fingers). A cranial nerve deficit (VF at bedside testing essentially normal.  Minimal weakness left face LMN with good eye closure, and sensory exam being normal/symmetrical.  Corneals/gag reflexes normal.  Tongue & palate movements normal.  Shoulder shrug normal.) is present. No sensory deficit (Right Tinel's positive). She exhibits normal muscle tone. Coordination (mild clumsiness of fine motor movements.  Romberg is equivocal with eyes closed) and gait (her gait is slightly broad-based slowly because of clumsiness in the lower extremities and fatigue.) abnormal.   Mental status examination: Patient is fully oriented and able to give an adequate history.  Recall of recent and past information is good.  Immediate recall is normal.  Attention span and concentration was normal.  Judgment and insight is normal.  Language functions are intact with no evidence of aphasia or dysarthria.  Comprehension is unimpaired.  Affect is appropriate, mood was even.  No thought disorder is noted.   Vitals reviewed.        Assessment:        1. Restless leg syndrome    2. Other type of osteoarthritis, unspecified site    3. Essential hypertension    4. Fibromyalgia    5. Essential tremor    6. Anxiety disorder, unspecified type             Plan:       Continue follow-up with her pain management doctor, Dr Lara for the chronic back pain and fibromyalgia.  Continue Xanax 1 mg 3  times a day as needed in addition to citalopram 20 mg daily.  Continue Effexor for the fibromyalgia muscle symptoms.  Continue Neurontin and Mirapex for restless leg.  Follow-up in 6 months if stable.

## 2017-09-28 ENCOUNTER — OFFICE VISIT (OUTPATIENT)
Dept: OTOLARYNGOLOGY | Facility: CLINIC | Age: 67
End: 2017-09-28
Payer: MEDICARE

## 2017-09-28 VITALS
BODY MASS INDEX: 48.53 KG/M2 | TEMPERATURE: 98 F | HEIGHT: 62 IN | HEART RATE: 85 BPM | SYSTOLIC BLOOD PRESSURE: 126 MMHG | WEIGHT: 263.75 LBS | DIASTOLIC BLOOD PRESSURE: 73 MMHG

## 2017-09-28 DIAGNOSIS — K21.9 LARYNGOPHARYNGEAL REFLUX (LPR): ICD-10-CM

## 2017-09-28 DIAGNOSIS — J38.2 VOCAL CORD NODULE: ICD-10-CM

## 2017-09-28 DIAGNOSIS — R49.0 HOARSENESS OF VOICE: Primary | ICD-10-CM

## 2017-09-28 DIAGNOSIS — K21.9 GASTROESOPHAGEAL REFLUX DISEASE, ESOPHAGITIS PRESENCE NOT SPECIFIED: ICD-10-CM

## 2017-09-28 PROCEDURE — 1159F MED LIST DOCD IN RCRD: CPT | Mod: ,,, | Performed by: OTOLARYNGOLOGY

## 2017-09-28 PROCEDURE — 3078F DIAST BP <80 MM HG: CPT | Mod: ,,, | Performed by: OTOLARYNGOLOGY

## 2017-09-28 PROCEDURE — 99999 PR PBB SHADOW E&M-EST. PATIENT-LVL V: CPT | Mod: PBBFAC,,, | Performed by: OTOLARYNGOLOGY

## 2017-09-28 PROCEDURE — 1125F AMNT PAIN NOTED PAIN PRSNT: CPT | Mod: ,,, | Performed by: OTOLARYNGOLOGY

## 2017-09-28 PROCEDURE — 99215 OFFICE O/P EST HI 40 MIN: CPT | Mod: PBBFAC | Performed by: OTOLARYNGOLOGY

## 2017-09-28 PROCEDURE — 99214 OFFICE O/P EST MOD 30 MIN: CPT | Mod: 25,S$PBB,, | Performed by: OTOLARYNGOLOGY

## 2017-09-28 PROCEDURE — 3074F SYST BP LT 130 MM HG: CPT | Mod: ,,, | Performed by: OTOLARYNGOLOGY

## 2017-09-28 NOTE — PROCEDURES
Procedures  Due to indication in patient's history, presentation or risk factors,  a fiber optic exam was performed.    SEPARATE PROCEDURE NOTE:    ANESTHESIA:  Topical xylocaine with alexander-synephrine    FINDINGS:  Right vocal fold nodule; moderate to severe inaterarytenoid erythema and edema    PROCEDURE:  After verbal consent was obtained, the flexible scope was passed through the patient's nasal cavity without difficulty.  The nasopharynx (adenoid pad) and eustachian tube orifices were first visualized and were found to be normal, without masses or irregularity.  The posterior pharyngeal wall and base of tongue were then examined and no mass or irregular tissue was seen.  The scope was then advanced to the larynx, and the epiglottis, valleculae, and piriform sinuses were normal, without masses or mucosal irregularity.  The false vocal folds and true vocal folds were then examined and were found to have normal mobility (full abduction and adduction). There was a small right vocal cord nodule at the anterior 1/3 portion on the vocal cord.  The interartyenoid area had moderate to severe edema and erythema consistent with reflux.

## 2017-09-28 NOTE — PROGRESS NOTES
Chief Complaint   Patient presents with    Follow-up     LPR, patient report since last visit hoareness is worse   .     HPI:Hannah Norman is a 67 y.o. female who has been referred by Dr. Hauser for a one year history of hoarseness. She has been having dysphagia and recently underwent a MBSS which was normal but she was noted by the SLP to have dysphonia and ENT referral was recommended.  Her voice is progressively worsening over this time. There are pitch breaks or cracks. There is vocal fatigue. She admits to odynophagia, throat pain, and otalgia.  There is no hemoptysis or hematemesis. She is breathing well.     She admits to throat clearing and cough. She admits to heartburn and reflux. She is currently on Protonix 40mg daily. She notes that she has difficulty swallowing spicy foods which gives her the sensation that the food is going down the wrong way. She denies food sticking. She denies weight loss.  She does note certain foods trigger her GERD-tomatos.     Interval HPI:  She reports that she is still having hoarseness and throat clearing and cough.  She states that she is still having heartburn symptoms. She notices the heartburn after certain foods particularly spicy foods. .  She states that she does not think that the reflux medication has helped at all.   She states that she has been noticing a squeaky wheezing nose at the end of exhalation.  She states that she feels short of breath and it is worse on exertion.         Past Medical History:   Diagnosis Date    Anxiety disorder     Bell's palsy     Chronic back pain     Chronic osteoarthritis     Essential tremor     Fibromyalgia     Hypertension     Mild acid reflux     Neck pain     Other and unspecified hyperlipidemia      Social History     Social History    Marital status:      Spouse name: N/A    Number of children: N/A    Years of education: N/A     Occupational History    Not on file.     Social History Main Topics     Smoking status: Former Smoker     Quit date: 10/5/1982    Smokeless tobacco: Never Used    Alcohol use No    Drug use: No    Sexual activity: Not on file     Other Topics Concern    Not on file     Social History Narrative    No narrative on file     Past Surgical History:   Procedure Laterality Date    Breast reduction      CARPAL TUNNEL RELEASE      COLONOSCOPY  2008    HYSTERECTOMY      KNEE SURGERY      bilateral    left shoulder      Tonsillectomy       Family History   Problem Relation Age of Onset    Diabetes Mother     Tremor Mother     Liver disease Mother     Diabetes Father     Heart disease Father     Tremor Son     Diabetes Sister     Diabetes Brother            Review of Systems  General: negative for chills, fever or weight loss  Psychological: negative for mood changes or depression  Ophthalmic: negative for blurry vision, photophobia or eye pain  ENT: see HPI  Respiratory: no cough, shortness of breath, or wheezing  Cardiovascular: no chest pain or dyspnea on exertion  Gastrointestinal: no abdominal pain, change in bowel habits, or black/ bloody stools  Musculoskeletal: negative for gait disturbance or muscular weakness  Neurological: no syncope or seizures; no ataxia  Dermatological: negative for puritis,  rash and jaundice  Hematologic/lymphatic: no easy bruising, no new lumps or bumps      Physical Exam:    Vitals:    09/28/17 1051   BP: 126/73   Pulse: 85   Temp: 97.8 °F (36.6 °C)       Constitutional: Well appearing / communicating without difficutly.  NAD.  Eyes: EOM I Bilaterally  Head/Face: Normocephalic.  Negative paranasal sinus pressure/tenderness.  Salivary glands WNL.  House Brackmann I Bilaterally.    Right Ear: Auricle normal appearance. External Auditory Canal within normal limits no lesions or masses,TM w/o masses/lesions/perforations. TM mobility noted.   Left Ear: Auricle normal appearance. External Auditory Canal within normal limits no lesions or masses,TM  w/o masses/lesions/perforations. TM mobility noted.  Nose: Mild nasal septal deviation to the right with an inferior spur.  Inferior Turbinates 3+ bilaterally. No septal perforation. No masses/lesions. External nasal skin appears normal without masses/lesions.  Oral Cavity: Gingiva/lips within normal limits.  Dentition/gingiva healthy appearing. Mucus membranes moist. Floor of mouth soft, no masses palpated. Oral Tongue mobile. Hard Palate appears normal.    Oropharynx: Base of tongue appears normal. No masses/lesions noted. Tonsillar fossa/pharyngeal wall without lesions. Posterior oropharynx WNL.  Soft palate without masses. Midline uvula.   Neck/Lymphatic: No LAD I-VI bilaterally.  No thyromegaly.  No masses noted on exam.    Mirror laryngoscopy/nasopharyngoscopy: Active gag reflex.  Unable to perform.    Neuro/Psychiatric: AOx3.  Normal mood and affect.   Cardiovascular: Normal carotid pulses bilaterally, no increasing jugular venous distention noted at cervical region bilaterally.    Respiratory: Normal respiratory effort, no stridor, no retractions noted.      See separate procedure note for FFL.     Data Reviewed:  7/6/17 Modified barium swallow study shows WFL oral and pharyngeal phase of the swallow, No pharyngeal residuals and NO evidence of aspiration or airway penetration noted.Pt with good oral and pharyngeal sensation.  I have reviewed her MBSS results and agree with theses findings: no signs of laryngeal penetration or aspiration noted.     Assessment:    ICD-10-CM ICD-9-CM    1. Hoarseness of voice R49.0 784.42 Ambulatory referral to Speech Therapy   2. Laryngopharyngeal reflux (LPR) K21.9 478.79    3. Gastroesophageal reflux disease, esophagitis presence not specified K21.9 530.81 Ambulatory consult to Gastroenterology   4. Vocal cord nodule J38.2 478.5      The primary encounter diagnosis was Hoarseness of voice. Diagnoses of Laryngopharyngeal reflux (LPR), Gastroesophageal reflux disease,  esophagitis presence not specified, and Vocal cord nodule were also pertinent to this visit.      Plan:  Orders Placed This Encounter   Procedures    Ambulatory referral to Speech Therapy    Ambulatory consult to Gastroenterology         LPR:  I recommended that she continue Protonix 40mg BID* and provided a prescription. I also recommended that the patient refrain from eating within 3 hours of going to bed at night and that the patient place a 2 x 4 underneath the head board of the bed to elevate the head of bed very subtly and optimize the impact of gravity on the potential reflux. I recommended that the patient avoid alcohol, caffeine, tobacco, tomato sauce, spicy foods, fried food, and chocolate. I provided her with written instructions.     I will refer to GI as she is having continued symptoms despite BID PPI therapy.     Vocal cord nodule: Since last visit she has developed a vocal cord nodule which I feel is related to the vocal strain secondary to the LPR. I discussed with the patient that their exam today shows a vocal cord nodule on the right vocal cord.  Vocal cord nodules are benign (noncancerous) growths on one or both vocal cords that are caused by vocal abuse. Over time, repeated abuse of the vocal cords results in soft, swollen spots on each vocal cord. These spots develop into harder, callous-like growths called nodules. The nodules will become larger and stiffer the longer the vocal abuse continues.  Nodules and polyps may be treated medically, surgically, and/or behaviorally. Surgical intervention involves removing the nodule or polyp from the vocal cord. This approach only occurs when the nodules or polyps are very large or have existed for a long time.  I would not recommend surgery as first course of treatment, as any surgery on the vocal cords can induce scarring which itself will cause hoarseness.  I would recommend voice therapy, from an SLP. Voice therapy involves teaching good vocal  hygiene, reducing/stopping vocal abusive behaviors, and direct voice treatment to alter pitch, loudness, or breath support for good voicing. Stress reduction techniques and relaxation exercises are often taught as well.  Medical problems may be treated to reduce their impact on the vocal cords. This includes treatment for other issues such as gastroesophageal reflux disease (GERD) and allergies.    SOB/wheeze: I have asked the patient to follow up with Dr. Hauser regarding these symptoms.      Follow up in 6 weeks to reassess progress with treatment regimen.     Thank you kindly for allowing me to participate in the patient's care.       Lupe Lopez MD

## 2017-10-19 ENCOUNTER — OFFICE VISIT (OUTPATIENT)
Dept: GASTROENTEROLOGY | Facility: CLINIC | Age: 67
End: 2017-10-19
Payer: MEDICARE

## 2017-10-19 VITALS
DIASTOLIC BLOOD PRESSURE: 57 MMHG | BODY MASS INDEX: 47.58 KG/M2 | WEIGHT: 260.13 LBS | HEART RATE: 80 BPM | SYSTOLIC BLOOD PRESSURE: 110 MMHG

## 2017-10-19 DIAGNOSIS — Z12.11 SCREENING FOR MALIGNANT NEOPLASM OF COLON: ICD-10-CM

## 2017-10-19 DIAGNOSIS — K21.9 MILD ACID REFLUX: Primary | ICD-10-CM

## 2017-10-19 DIAGNOSIS — K21.9 LARYNGOPHARYNGEAL REFLUX (LPR): ICD-10-CM

## 2017-10-19 PROCEDURE — 99999 PR PBB SHADOW E&M-EST. PATIENT-LVL IV: CPT | Mod: PBBFAC,,, | Performed by: INTERNAL MEDICINE

## 2017-10-19 PROCEDURE — 99204 OFFICE O/P NEW MOD 45 MIN: CPT | Mod: S$PBB,,, | Performed by: INTERNAL MEDICINE

## 2017-10-19 PROCEDURE — 99214 OFFICE O/P EST MOD 30 MIN: CPT | Mod: PBBFAC,PN | Performed by: INTERNAL MEDICINE

## 2017-10-19 RX ORDER — MORPHINE SULFATE 15 MG/1
1 TABLET, FILM COATED, EXTENDED RELEASE ORAL EVERY 12 HOURS
Status: ON HOLD | COMMUNITY
Start: 2017-10-03 | End: 2022-02-16 | Stop reason: HOSPADM

## 2017-10-19 NOTE — PATIENT INSTRUCTIONS
Patient scheduled for COLONOSCOPY with Dr. Powell on 11/10/2017.  Miralax Split Prep instructions explained to patient . Lab will call two days before with arrival time at [facility]. Make sure to have someone to drive you home after procedure.  Verbalized understanding.

## 2017-10-19 NOTE — PROGRESS NOTES
Subjective:       Patient ID: Hannah Norman is a 67 y.o. female.    Chief Complaint: Gastroesophageal Reflux    This is a 67-year-old female here for titration of esophageal reflux.  She has noted some reflux symptoms and some esophageal dysphagia over the past years.  Suspect worsened especially in terms of symptoms of LPR which she describes as a hoarseness and voice changes.  No particular dietary relation she has noted.  It is moderate in intensity and been occurring for months.  It is on a daily basis.  No other exacerbating or relieving factors or other associated symptoms.  She is also due for colon cancer screening.  No personal or family history of colon cancer or polyps.    The following portions of the patient's history were reviewed and updated as appropriate: allergies, current medications, past family history, past medical history, past social history, past surgical history and problem list.    (Portions of this note were dictated using voice recognition software and may contain dictation related errors in spelling/grammar/syntax not found on text review)    HPI  Review of Systems   Constitutional: Positive for fatigue. Negative for appetite change and fever.   HENT: Positive for sore throat, trouble swallowing and voice change.    Eyes: Negative for pain and redness.   Respiratory: Negative for chest tightness and shortness of breath.    Cardiovascular: Negative for chest pain and leg swelling.   Gastrointestinal: Positive for abdominal distention. Negative for abdominal pain, anal bleeding, blood in stool, constipation, diarrhea, nausea, rectal pain and vomiting.   Endocrine: Negative for cold intolerance and heat intolerance.   Genitourinary: Negative for difficulty urinating and hematuria.   Musculoskeletal: Positive for arthralgias and gait problem.   Skin: Negative for color change and pallor.   Allergic/Immunologic: Negative for environmental allergies and food allergies.   Neurological: Negative  for dizziness and light-headedness.   Hematological: Negative for adenopathy. Does not bruise/bleed easily.   Psychiatric/Behavioral: Negative for agitation and behavioral problems.       Objective:      Physical Exam   Constitutional: She is oriented to person, place, and time. She appears well-developed and well-nourished. No distress.   HENT:   Head: Normocephalic and atraumatic.   Eyes: Conjunctivae are normal. No scleral icterus.   Neck: Normal range of motion. Neck supple. No tracheal deviation present. No thyromegaly present.   Cardiovascular: Normal rate and regular rhythm.  Exam reveals no gallop and no friction rub.    No murmur heard.  Pulmonary/Chest: Effort normal and breath sounds normal. No respiratory distress. She has no wheezes.   Abdominal: Soft. Bowel sounds are normal. She exhibits no distension. There is no tenderness.   Musculoskeletal:        Right wrist: She exhibits normal range of motion and no tenderness.        Left wrist: She exhibits normal range of motion and no tenderness.   Lymphadenopathy:        Head (right side): No submental and no submandibular adenopathy present.        Head (left side): No submental and no submandibular adenopathy present.   Neurological: She is alert and oriented to person, place, and time.   Skin: Skin is warm and dry. No rash noted. She is not diaphoretic. No erythema.   Psychiatric: She has a normal mood and affect. Her behavior is normal.   Nursing note and vitals reviewed.      Labs/laryngoscopy reviewed  Assessment:       1. Mild acid reflux    2. Laryngopharyngeal reflux (LPR)        Plan:   1. May be a component of delayed gastric emptying from medications  2. EGD  3. Average risk screening colonoscopy

## 2017-10-19 NOTE — LETTER
October 19, 2017      Lupe Lopez MD  [address]  [address]  [facility] [address]           [facility]  [address] [address]  Phone: [phone]          Patient: Hannah Norman   MR Number: [id number]   YOB: 1950   Date of Visit: 10/19/2017       Dear Dr. Lupe Lopez:    Thank you for referring Hannah Norman to me for evaluation. Attached you will find relevant portions of my assessment and plan of care.    If you have questions, please do not hesitate to call me. I look forward to following Hannah Norman along with you.    Sincerely,    Annetta Powell MD    Enclosure  CC:  No Recipients    If you would like to receive this communication electronically, please contact [email or web address] or [phone] to request more information on [employer] access.    For providers and/or their staff who would like to refer a patient to [facility], please contact us through our one-stop-shop provider referral line, [facility], at [phone].    If you feel you have received this communication in error or would no longer like to receive these types of communications, please e-mail [email or web address]

## 2017-10-23 ENCOUNTER — OFFICE VISIT (OUTPATIENT)
Dept: FAMILY MEDICINE | Facility: CLINIC | Age: 67
End: 2017-10-23
Payer: MEDICARE

## 2017-10-23 VITALS
HEIGHT: 62 IN | HEART RATE: 83 BPM | OXYGEN SATURATION: 91 % | SYSTOLIC BLOOD PRESSURE: 138 MMHG | DIASTOLIC BLOOD PRESSURE: 80 MMHG | BODY MASS INDEX: 48.43 KG/M2 | TEMPERATURE: 98 F | WEIGHT: 263.19 LBS

## 2017-10-23 DIAGNOSIS — F41.9 ANXIETY DISORDER, UNSPECIFIED TYPE: ICD-10-CM

## 2017-10-23 DIAGNOSIS — K21.9 MILD ACID REFLUX: ICD-10-CM

## 2017-10-23 DIAGNOSIS — R06.2 WHEEZING: ICD-10-CM

## 2017-10-23 DIAGNOSIS — Z23 NEED FOR INFLUENZA VACCINATION: ICD-10-CM

## 2017-10-23 DIAGNOSIS — I10 ESSENTIAL HYPERTENSION: Primary | ICD-10-CM

## 2017-10-23 DIAGNOSIS — Z23 NEED FOR PNEUMOCOCCAL VACCINATION: ICD-10-CM

## 2017-10-23 DIAGNOSIS — R05.9 COUGH: ICD-10-CM

## 2017-10-23 DIAGNOSIS — M79.7 FIBROMYALGIA: ICD-10-CM

## 2017-10-23 DIAGNOSIS — M19.90 OTHER TYPE OF OSTEOARTHRITIS, UNSPECIFIED SITE: ICD-10-CM

## 2017-10-23 PROCEDURE — 90732 PPSV23 VACC 2 YRS+ SUBQ/IM: CPT | Mod: S$GLB,,, | Performed by: FAMILY MEDICINE

## 2017-10-23 PROCEDURE — G0008 ADMIN INFLUENZA VIRUS VAC: HCPCS | Mod: S$GLB,,, | Performed by: FAMILY MEDICINE

## 2017-10-23 PROCEDURE — G0009 ADMIN PNEUMOCOCCAL VACCINE: HCPCS | Mod: S$GLB,,, | Performed by: FAMILY MEDICINE

## 2017-10-23 PROCEDURE — 99214 OFFICE O/P EST MOD 30 MIN: CPT | Mod: S$GLB,,, | Performed by: FAMILY MEDICINE

## 2017-10-23 PROCEDURE — 90662 IIV NO PRSV INCREASED AG IM: CPT | Mod: S$GLB,,, | Performed by: FAMILY MEDICINE

## 2017-10-23 RX ORDER — ALBUTEROL SULFATE 90 UG/1
2 AEROSOL, METERED RESPIRATORY (INHALATION) EVERY 6 HOURS PRN
Qty: 18 G | Refills: 1 | Status: SHIPPED | OUTPATIENT
Start: 2017-10-23 | End: 2018-03-29 | Stop reason: SDUPTHER

## 2017-10-23 RX ORDER — ALBUTEROL SULFATE 90 UG/1
2 AEROSOL, METERED RESPIRATORY (INHALATION)
Status: DISCONTINUED | OUTPATIENT
Start: 2017-10-23 | End: 2017-10-23

## 2017-10-23 NOTE — PROGRESS NOTES
Patient ID: Hannah Norman is a 67 y.o. female.    Chief Complaint: Follow-up (2 month f/u )    HPI      Hannah Norman is a 67 y.o. female pt with co of tired all the time. Has been tested for sleep apnea. Gained wt since then.      ent - being sent to Dr. Johnson- for gi  Suspected reflux causing hoarsness.    Depression stable    Htn stable on meds        Review of Symptoms    Constitutional  Neg activity change, No chills /fever   Resp  Neg hemoptysis, stridor, choking  CVS  Neg chest pain, palpitations    Physical Exam    Constitutional:  Oriented to person, place, and time.appears well-developed and well-nourished.  No distress.      HENT  Head: Normocephalic and atraumatic  Right Ear: External ear normal.   Left Ear: External ear normal.   Nose: External nose normal.   Mouth:  Moist mucus membranes.    Eyes:  Conjunctivae are normal. Right eye exhibits no discharge.  Left eye exhibits no discharge. No scleral icterus.  No periorbital edema    Cardiovascular:  Regular rate, Regular rhythm     No extrasystole  Normal S1-S2      Pulmonary/Chest:   Normal effort and breath sounds.  No wheezing, rhonchi or rales.    Musculoskeletal:  No edema. No obvious deformity No wasting       Neurological:  Alert and oriented to person, place, and time.   Coordination normal.     Skin:   Skin is warm and dry.  No diaphoresis.   No rash noted.     Psychiatric: Normal mood and affect. Behavior is normal.  Judgment and thought content normal.       Assessment / Plan:      ICD-10-CM ICD-9-CM   1. Essential hypertension I10 401.9   2. Mild acid reflux K21.9 530.81   3. Fibromyalgia M79.7 729.1   4. Anxiety disorder, unspecified type F41.9 300.00   5. Other type of osteoarthritis, unspecified site M19.90 715.90   6. Need for influenza vaccination Z23 V04.81   7. Need for pneumococcal vaccination Z23 V03.82     Essential hypertension    Mild acid reflux    Fibromyalgia    Anxiety disorder, unspecified type    Other type of  osteoarthritis, unspecified site    Need for influenza vaccination    Need for pneumococcal vaccination    fatigue - wt loss and exercis    wheexing abluterol hfa prn

## 2017-10-25 DIAGNOSIS — F41.9 ANXIETY DISORDER, UNSPECIFIED TYPE: ICD-10-CM

## 2017-10-26 RX ORDER — ROPINIROLE 0.5 MG/1
TABLET, FILM COATED ORAL
Qty: 90 TABLET | Refills: 6 | Status: SHIPPED | OUTPATIENT
Start: 2017-10-26 | End: 2018-01-16 | Stop reason: SDUPTHER

## 2017-10-26 RX ORDER — ALPRAZOLAM 1 MG/1
TABLET ORAL
Qty: 90 TABLET | Refills: 3 | Status: SHIPPED | OUTPATIENT
Start: 2017-10-26 | End: 2018-02-20 | Stop reason: SDUPTHER

## 2017-11-01 RX ORDER — FLUTICASONE PROPIONATE 50 MCG
SPRAY, SUSPENSION (ML) NASAL
Qty: 16 G | Refills: 11 | Status: SHIPPED | OUTPATIENT
Start: 2017-11-01 | End: 2019-01-15

## 2017-11-10 ENCOUNTER — ANESTHESIA EVENT (OUTPATIENT)
Dept: ENDOSCOPY | Facility: HOSPITAL | Age: 67
End: 2017-11-10
Payer: MEDICARE

## 2017-11-10 ENCOUNTER — HOSPITAL ENCOUNTER (OUTPATIENT)
Facility: HOSPITAL | Age: 67
Discharge: STILL ADMITTED OR ADMINISTRATIVE | End: 2017-11-10
Attending: INTERNAL MEDICINE | Admitting: INTERNAL MEDICINE
Payer: MEDICARE

## 2017-11-10 ENCOUNTER — ANESTHESIA (OUTPATIENT)
Dept: ENDOSCOPY | Facility: HOSPITAL | Age: 67
End: 2017-11-10
Payer: MEDICARE

## 2017-11-10 VITALS
SYSTOLIC BLOOD PRESSURE: 137 MMHG | HEART RATE: 74 BPM | OXYGEN SATURATION: 99 % | DIASTOLIC BLOOD PRESSURE: 60 MMHG | BODY MASS INDEX: 48.4 KG/M2 | RESPIRATION RATE: 16 BRPM | TEMPERATURE: 98 F | HEIGHT: 62 IN | WEIGHT: 263 LBS

## 2017-11-10 DIAGNOSIS — Z12.11 SCREENING FOR MALIGNANT NEOPLASM OF COLON: ICD-10-CM

## 2017-11-10 PROCEDURE — 63600175 PHARM REV CODE 636 W HCPCS: Performed by: NURSE ANESTHETIST, CERTIFIED REGISTERED

## 2017-11-10 PROCEDURE — 37000008 HC ANESTHESIA 1ST 15 MINUTES: Performed by: INTERNAL MEDICINE

## 2017-11-10 PROCEDURE — G0121 COLON CA SCRN NOT HI RSK IND: HCPCS | Mod: ,,, | Performed by: INTERNAL MEDICINE

## 2017-11-10 PROCEDURE — 88305 TISSUE EXAM BY PATHOLOGIST: CPT | Mod: 26,,, | Performed by: PATHOLOGY

## 2017-11-10 PROCEDURE — 37000009 HC ANESTHESIA EA ADD 15 MINS: Performed by: INTERNAL MEDICINE

## 2017-11-10 PROCEDURE — 88305 TISSUE EXAM BY PATHOLOGIST: CPT | Performed by: PATHOLOGY

## 2017-11-10 PROCEDURE — 43239 EGD BIOPSY SINGLE/MULTIPLE: CPT | Mod: 51,,, | Performed by: INTERNAL MEDICINE

## 2017-11-10 PROCEDURE — 25000003 PHARM REV CODE 250: Performed by: NURSE ANESTHETIST, CERTIFIED REGISTERED

## 2017-11-10 PROCEDURE — 25000003 PHARM REV CODE 250: Performed by: INTERNAL MEDICINE

## 2017-11-10 PROCEDURE — 27201012 HC FORCEPS, HOT/COLD, DISP: Performed by: INTERNAL MEDICINE

## 2017-11-10 PROCEDURE — 43239 EGD BIOPSY SINGLE/MULTIPLE: CPT | Performed by: INTERNAL MEDICINE

## 2017-11-10 PROCEDURE — G0121 COLON CA SCRN NOT HI RSK IND: HCPCS

## 2017-11-10 RX ORDER — PROPOFOL 10 MG/ML
VIAL (ML) INTRAVENOUS CONTINUOUS PRN
Status: DISCONTINUED | OUTPATIENT
Start: 2017-11-10 | End: 2017-11-10

## 2017-11-10 RX ORDER — PHENYLEPHRINE HYDROCHLORIDE 10 MG/ML
INJECTION INTRAVENOUS
Status: DISCONTINUED | OUTPATIENT
Start: 2017-11-10 | End: 2017-11-10

## 2017-11-10 RX ORDER — LIDOCAINE HCL/PF 100 MG/5ML
SYRINGE (ML) INTRAVENOUS
Status: DISCONTINUED | OUTPATIENT
Start: 2017-11-10 | End: 2017-11-10

## 2017-11-10 RX ORDER — PROPOFOL 10 MG/ML
VIAL (ML) INTRAVENOUS
Status: DISCONTINUED | OUTPATIENT
Start: 2017-11-10 | End: 2017-11-10

## 2017-11-10 RX ORDER — SODIUM CHLORIDE 9 MG/ML
INJECTION, SOLUTION INTRAVENOUS CONTINUOUS
Status: DISCONTINUED | OUTPATIENT
Start: 2017-11-10 | End: 2017-11-10 | Stop reason: HOSPADM

## 2017-11-10 RX ADMIN — TOPICAL ANESTHETIC 1 EACH: 200 SPRAY DENTAL; PERIODONTAL at 09:11

## 2017-11-10 RX ADMIN — PROPOFOL 30 MG: 10 INJECTION, EMULSION INTRAVENOUS at 09:11

## 2017-11-10 RX ADMIN — SODIUM CHLORIDE: 0.9 INJECTION, SOLUTION INTRAVENOUS at 09:11

## 2017-11-10 RX ADMIN — LIDOCAINE HYDROCHLORIDE 60 MG: 20 INJECTION, SOLUTION INTRAVENOUS at 09:11

## 2017-11-10 RX ADMIN — PROPOFOL 150 MCG/KG/MIN: 10 INJECTION, EMULSION INTRAVENOUS at 09:11

## 2017-11-10 RX ADMIN — PHENYLEPHRINE HYDROCHLORIDE 100 MCG: 10 INJECTION INTRAVENOUS at 10:11

## 2017-11-10 NOTE — INTERVAL H&P NOTE
The patient has been examined and the H&P has been reviewed:    I concur with the findings and no changes have occurred since H&P was written.    Anesthesia/Surgery risks, benefits and alternative options discussed and understood by patient/family.          Active Hospital Problems    Diagnosis  POA    Screening for malignant neoplasm of colon [Z12.11]  Not Applicable      Resolved Hospital Problems    Diagnosis Date Resolved POA   No resolved problems to display.

## 2017-11-10 NOTE — TRANSFER OF CARE
"Anesthesia Transfer of Care Note    Patient: Hannah Norman    Procedure(s) Performed: Procedure(s) (LRB):  COLONOSCOPY - Miralax split prep (N/A)  ESOPHAGOGASTRODUODENOSCOPY (EGD) (N/A)    Patient location: GI    Anesthesia Type: MAC    Transport from OR: Transported from OR on room air with adequate spontaneous ventilation    Post pain: adequate analgesia    Post assessment: no apparent anesthetic complications and tolerated procedure well    Post vital signs: stable    Level of consciousness: awake, alert and oriented    Nausea/Vomiting: no nausea/vomiting    Complications: none    Transfer of care protocol was followed      Last vitals:   Visit Vitals  /68   Pulse 79   Temp 36.7 °C (98 °F) (Oral)   Resp 20   Ht 5' 2" (1.575 m)   Wt 119.3 kg (263 lb)   SpO2 97%   Breastfeeding? No   BMI 48.10 kg/m²     "

## 2017-11-10 NOTE — ANESTHESIA PREPROCEDURE EVALUATION
11/10/2017  Hannah Norman is a 67 y.o., female for EGD and colonoscopy under MAC. BMI 48.2    Anesthesia Evaluation     I have reviewed the Nursing Notes.   I have reviewed the Medications.     Review of Systems  Anesthesia Hx:  Personal Hx of Anesthesia complications Unintended Unpleasent Intraoperative Awareness and during MAC / sedation only   Social:  Former Smoker    Cardiovascular:   Exercise tolerance: poor Hypertension hyperlipidemia    Pulmonary:   Denies Sleep Apnea.    Hepatic/GI:   Bowel Prep. GERD    Musculoskeletal:   Arthritis   Spine Disorders: lumbar    Endocrine:   Hypothyroidism    Psych:   Psychiatric History anxiety fibromyalgis         Physical Exam  General:  Morbid Obesity       Chest/Lungs:  Chest/Lungs Clear    Heart/Vascular:  Heart Findings: Normal            Anesthesia Plan  Type of Anesthesia, risks & benefits discussed:  Anesthesia Type:  MAC  Patient's Preference:   Intra-op Monitoring Plan:   Intra-op Monitoring Plan Comments:   Post Op Pain Control Plan:   Post Op Pain Control Plan Comments:   Induction:    Beta Blocker:  Patient is not currently on a Beta-Blocker (No further documentation required).       Informed Consent: Patient understands risks and agrees with Anesthesia plan.  Questions answered. Anesthesia consent signed with patient.  ASA Score: 3     Day of Surgery Review of History & Physical:            Ready For Surgery From Anesthesia Perspective.

## 2017-11-10 NOTE — ANESTHESIA POSTPROCEDURE EVALUATION
"Anesthesia Post Evaluation    Patient: Hannah Norman    Procedure(s) Performed: Procedure(s) (LRB):  COLONOSCOPY - Miralax split prep (N/A)  ESOPHAGOGASTRODUODENOSCOPY (EGD) (N/A)    Final Anesthesia Type: MAC  Patient location during evaluation: GI PACU  Patient participation: Yes- Able to Participate  Level of consciousness: awake and alert and oriented  Post-procedure vital signs: reviewed and stable  Pain management: adequate  Airway patency: patent  PONV status at discharge: No PONV  Anesthetic complications: no      Cardiovascular status: blood pressure returned to baseline and hemodynamically stable  Respiratory status: unassisted, spontaneous ventilation and room air  Hydration status: euvolemic  Follow-up not needed.        Visit Vitals  /78 (BP Location: Left arm, Patient Position: Lying)   Pulse 85   Temp 36.6 °C (97.8 °F) (Oral)   Resp 16   Ht 5' 2" (1.575 m)   Wt 119.3 kg (263 lb)   SpO2 (!) 93%   Breastfeeding? No   BMI 48.10 kg/m²       Pain/Haim Score: Pain Assessment Performed: Yes (11/10/2017  9:01 AM)  Presence of Pain: complains of pain/discomfort (11/10/2017  9:01 AM)      "

## 2017-11-10 NOTE — DISCHARGE INSTRUCTIONS
Discharge Summary/Instructions for EGD and Colonoscopy  Hannah Norman  11/10/2017  Annetta Powell MD    Restrictions on Activity:    - Do not drive car or operate machinery, make critical decisions, or do activities that   require coordination or balance for 24 hours.  - The following day: return to full activity including work.  - For 3 days: No heavy lifting, straining or running.  - Diet: Eat and drink normally unless instructed otherwise.    Treatment for Common Side Effects:  - Mild abdominal pain and bloating or excessive gas: rest, eat lightly and use a heating pad.   -Sore Throat - treat with throat lozenges, gargle with warm salt water.    Symptoms to watch for and report to your physician:  1. Severe abdominal pain.  2. Fever within 24 hours after a procedure.  3. A large amount of rectal bleeding. (A small amount of blood from the rectum is not serious, especially if hemorrhoids are present.)  4. Because air was put into your colon during the procedure, expelling large amount of air from your rectum is normal.  5. You may not have a bowel movement for 1-3 days because of the colonoscopy prep. This is normal.  6. Go directly to the emergency room if you notice any of the following:     Chills and/orfever over 101   Persistent vomiting   Severe abdominal pain, other than gas cramps   Severe chest pain   Black, tarry stools   Any bleeding - exceeding one tablespoon    If you have any questions or problems, please call your Physician:    Annetta Powell MD    Lab Results: Contact Physician's Office    If a complication or emergency situation arises and you are unable to reach your Physician - GO TO THE EMERGENCY ROOM.

## 2017-11-13 ENCOUNTER — TELEPHONE (OUTPATIENT)
Dept: ENDOSCOPY | Facility: HOSPITAL | Age: 67
End: 2017-11-13

## 2017-11-13 RX ORDER — FUROSEMIDE 40 MG/1
TABLET ORAL
Qty: 60 TABLET | Refills: 2 | Status: SHIPPED | OUTPATIENT
Start: 2017-11-13 | End: 2018-05-14 | Stop reason: SDUPTHER

## 2017-11-16 ENCOUNTER — OFFICE VISIT (OUTPATIENT)
Dept: OTOLARYNGOLOGY | Facility: CLINIC | Age: 67
End: 2017-11-16
Payer: MEDICARE

## 2017-11-16 VITALS
HEART RATE: 70 BPM | TEMPERATURE: 97 F | BODY MASS INDEX: 50.53 KG/M2 | WEIGHT: 274.56 LBS | HEIGHT: 62 IN | DIASTOLIC BLOOD PRESSURE: 76 MMHG | SYSTOLIC BLOOD PRESSURE: 137 MMHG

## 2017-11-16 DIAGNOSIS — K21.9 LARYNGOPHARYNGEAL REFLUX (LPR): ICD-10-CM

## 2017-11-16 DIAGNOSIS — K21.9 GASTROESOPHAGEAL REFLUX DISEASE, ESOPHAGITIS PRESENCE NOT SPECIFIED: ICD-10-CM

## 2017-11-16 DIAGNOSIS — J34.2 NASAL SEPTAL DEVIATION: ICD-10-CM

## 2017-11-16 DIAGNOSIS — J38.2 VOCAL CORD NODULE: ICD-10-CM

## 2017-11-16 DIAGNOSIS — R49.0 HOARSENESS OF VOICE: Primary | ICD-10-CM

## 2017-11-16 PROCEDURE — 99999 PR PBB SHADOW E&M-EST. PATIENT-LVL V: CPT | Mod: PBBFAC,,, | Performed by: OTOLARYNGOLOGY

## 2017-11-16 PROCEDURE — 31575 DIAGNOSTIC LARYNGOSCOPY: CPT | Mod: PBBFAC | Performed by: OTOLARYNGOLOGY

## 2017-11-16 PROCEDURE — 99215 OFFICE O/P EST HI 40 MIN: CPT | Mod: PBBFAC | Performed by: OTOLARYNGOLOGY

## 2017-11-16 PROCEDURE — 99214 OFFICE O/P EST MOD 30 MIN: CPT | Mod: 25,S$PBB,, | Performed by: OTOLARYNGOLOGY

## 2017-11-16 NOTE — PROCEDURES
Laryngoscopy  Date/Time: 11/16/2017 2:38 PM  Performed by: RENAY SOLIS  Authorized by: RENAY SOLIS     Consent Done?:  Yes (Verbal)  Due to indication in patient's history, presentation or risk factors,  a fiber optic exam was performed.    SEPARATE PROCEDURE NOTE:    ANESTHESIA:  Topical xylocaine with alexander-synephrine    FINDINGS:  Right vocal fold nodule resolved; moderate to severe inaterarytenoid erythema and edema    PROCEDURE:  After verbal consent was obtained, the flexible scope was passed through the patient's nasal cavity without difficulty.  The nasopharynx (adenoid pad) and eustachian tube orifices were first visualized and were found to be normal, without masses or irregularity.  The posterior pharyngeal wall and base of tongue were then examined and no mass or irregular tissue was seen.  The scope was then advanced to the larynx, and the epiglottis, valleculae, and piriform sinuses were normal, without masses or mucosal irregularity.  The false vocal folds and true vocal folds were then examined and were found to have normal mobility (full abduction and adduction). There was a small right vocal cord nodule at the anterior 1/3 portion on the vocal cord. It is now resolved.   The interartyenoid area had moderate to severe edema and erythema consistent with reflux.

## 2017-11-16 NOTE — PROGRESS NOTES
"  Chief Complaint   Patient presents with    Follow-up     pt reports since last visit hoarseness comes and goes.     Cough    Heartburn    Otalgia     started a week ago   .     HPI:Hannah Norman is a 67 y.o. female who has been referred by Dr. Hauser for a one year history of hoarseness. She has been having dysphagia and recently underwent a MBSS which was normal but she was noted by the SLP to have dysphonia and ENT referral was recommended.  Her voice is progressively worsening over this time. There are pitch breaks or cracks. There is vocal fatigue. She admits to odynophagia, throat pain, and otalgia.  There is no hemoptysis or hematemesis. She is breathing well.     She admits to throat clearing and cough. She admits to heartburn and reflux. She is currently on Protonix 40mg daily. She notes that she has difficulty swallowing spicy foods which gives her the sensation that the food is going down the wrong way. She denies food sticking. She denies weight loss.  She does note certain foods trigger her GERD-tomatos.     Interval HPI:  She reports that the hoarseness and throat clearing and cough have been intermittent.  She states that she is still having heartburn symptoms that she describes a gas and chest burning. She notices the heartburn after certain foods particularly spicy foods.  She reports cough with a "sqeaky sound at the end." She also notes this same noise at the end of each breath.     Past Medical History:   Diagnosis Date    Anxiety disorder     Bell's palsy     Chronic back pain     Chronic osteoarthritis     Essential tremor     Fibromyalgia     Hypertension     Mild acid reflux     Neck pain     Other and unspecified hyperlipidemia      Social History     Social History    Marital status:      Spouse name: N/A    Number of children: N/A    Years of education: N/A     Occupational History    Not on file.     Social History Main Topics    Smoking status: Former Smoker "     Quit date: 10/5/1982    Smokeless tobacco: Never Used    Alcohol use No    Drug use: No    Sexual activity: Not on file     Other Topics Concern    Not on file     Social History Narrative    No narrative on file     Past Surgical History:   Procedure Laterality Date    Breast reduction      CARPAL TUNNEL RELEASE      COLONOSCOPY  2008    COLONOSCOPY N/A 11/10/2017    Procedure: COLONOSCOPY - Miralax split prep;  Surgeon: Annetta Powell MD;  Location: [facility];  Service: Endoscopy;  Laterality: N/A;    HYSTERECTOMY      KNEE SURGERY      bilateral    left shoulder      Tonsillectomy       Family History   Problem Relation Age of Onset    Diabetes Mother     Tremor Mother     Liver disease Mother     Diabetes Father     Heart disease Father     Tremor Son     Diabetes Sister     Diabetes Brother            Review of Systems  General: negative for chills, fever or weight loss  Psychological: negative for mood changes or depression  Ophthalmic: negative for blurry vision, photophobia or eye pain  ENT: see HPI  Respiratory: no cough, shortness of breath, or wheezing  Cardiovascular: no chest pain or dyspnea on exertion  Gastrointestinal: no abdominal pain, change in bowel habits, or black/ bloody stools  Musculoskeletal: negative for gait disturbance or muscular weakness  Neurological: no syncope or seizures; no ataxia  Dermatological: negative for puritis,  rash and jaundice  Hematologic/lymphatic: no easy bruising, no new lumps or bumps      Physical Exam:    Vitals:    11/16/17 1347   BP: 137/76   Pulse: 70   Temp: 97.3 °F (36.3 °C)       Constitutional: Well appearing / communicating without difficutly.  NAD.  Eyes: EOM I Bilaterally  Head/Face: Normocephalic.  Negative paranasal sinus pressure/tenderness.  Salivary glands WNL.  House Brackmann I Bilaterally.    Right Ear: Auricle normal appearance. External Auditory Canal within normal limits no lesions or masses,TM w/o  masses/lesions/perforations. TM mobility noted.   Left Ear: Auricle normal appearance. External Auditory Canal within normal limits no lesions or masses,TM w/o masses/lesions/perforations. TM mobility noted.  Nose: Mild nasal septal deviation to the right with an inferior spur.  Inferior Turbinates 3+ bilaterally. No septal perforation. No masses/lesions. External nasal skin appears normal without masses/lesions.  Oral Cavity: Gingiva/lips within normal limits.  Dentition/gingiva healthy appearing. Mucus membranes moist. Floor of mouth soft, no masses palpated. Oral Tongue mobile. Hard Palate appears normal.    Oropharynx: Base of tongue appears normal. No masses/lesions noted. Tonsillar fossa/pharyngeal wall without lesions. Posterior oropharynx WNL.  Soft palate without masses. Midline uvula.   Neck/Lymphatic: No LAD I-VI bilaterally.  No thyromegaly.  No masses noted on exam.    Mirror laryngoscopy/nasopharyngoscopy: Active gag reflex.  Unable to perform.    Neuro/Psychiatric: AOx3.  Normal mood and affect.   Cardiovascular: Normal carotid pulses bilaterally, no increasing jugular venous distention noted at cervical region bilaterally.    Respiratory: Normal respiratory effort, no stridor, no retractions noted.      See separate procedure note for FFL.     Assessment:    ICD-10-CM ICD-9-CM    1. Hoarseness of voice R49.0 784.42    2. Laryngopharyngeal reflux (LPR) K21.9 478.79    3. Gastroesophageal reflux disease, esophagitis presence not specified K21.9 530.81    4. Vocal cord nodule J38.2 478.5    5. Nasal septal deviation J34.2 470      The primary encounter diagnosis was Hoarseness of voice. Diagnoses of Laryngopharyngeal reflux (LPR), Gastroesophageal reflux disease, esophagitis presence not specified, Vocal cord nodule, and Nasal septal deviation were also pertinent to this visit.      Plan:  No orders of the defined types were placed in this encounter.      LPR:  Continue Protonix 40mg BID* and provided a  prescription. I also recommended that the patient refrain from eating within 3 hours of going to bed at night and that the patient place a 2 x 4 underneath the head board of the bed to elevate the head of bed very subtly and optimize the impact of gravity on the potential reflux. I recommended that the patient avoid alcohol, caffeine, tobacco, tomato sauce, spicy foods, fried food, and chocolate. I provided her with written instructions.     Vocal cord nodule: improved.     SOB/wheeze: I have asked the patient to follow up with Dr. Hauser regarding these symptoms.      Follow up in 6 weeks to reassess progress with treatment regimen.     Thank you kindly for allowing me to participate in the patient's care.       Lupe Lopez MD

## 2017-11-21 ENCOUNTER — TELEPHONE (OUTPATIENT)
Dept: GASTROENTEROLOGY | Facility: CLINIC | Age: 67
End: 2017-11-21

## 2017-11-21 NOTE — TELEPHONE ENCOUNTER
----- Message from Annetta Powell MD sent at 11/21/2017  9:29 AM CST -----  Esophageal bx with mild chronic inflammation, continue meds. Ok to f/u at her convenience in clinic if still with symptoms

## 2018-01-07 RX ORDER — LOVASTATIN 20 MG/1
TABLET ORAL
Qty: 90 TABLET | Refills: 3 | Status: SHIPPED | OUTPATIENT
Start: 2018-01-07 | End: 2019-01-17 | Stop reason: SDUPTHER

## 2018-01-16 ENCOUNTER — OFFICE VISIT (OUTPATIENT)
Dept: OTOLARYNGOLOGY | Facility: CLINIC | Age: 68
End: 2018-01-16
Payer: MEDICARE

## 2018-01-16 VITALS
HEIGHT: 62 IN | TEMPERATURE: 97 F | HEART RATE: 74 BPM | WEIGHT: 256.06 LBS | SYSTOLIC BLOOD PRESSURE: 114 MMHG | BODY MASS INDEX: 47.12 KG/M2 | DIASTOLIC BLOOD PRESSURE: 76 MMHG

## 2018-01-16 DIAGNOSIS — K21.9 GASTROESOPHAGEAL REFLUX DISEASE, ESOPHAGITIS PRESENCE NOT SPECIFIED: ICD-10-CM

## 2018-01-16 DIAGNOSIS — J30.0 CHRONIC VASOMOTOR RHINITIS: ICD-10-CM

## 2018-01-16 DIAGNOSIS — K21.9 LARYNGOPHARYNGEAL REFLUX (LPR): ICD-10-CM

## 2018-01-16 DIAGNOSIS — R49.0 HOARSENESS OF VOICE: ICD-10-CM

## 2018-01-16 DIAGNOSIS — G47.33 OSA (OBSTRUCTIVE SLEEP APNEA): Primary | ICD-10-CM

## 2018-01-16 PROCEDURE — 99214 OFFICE O/P EST MOD 30 MIN: CPT | Mod: PBBFAC,PO | Performed by: OTOLARYNGOLOGY

## 2018-01-16 PROCEDURE — 31575 DIAGNOSTIC LARYNGOSCOPY: CPT | Mod: S$PBB,,, | Performed by: OTOLARYNGOLOGY

## 2018-01-16 PROCEDURE — 99999 PR PBB SHADOW E&M-EST. PATIENT-LVL IV: CPT | Mod: PBBFAC,,, | Performed by: OTOLARYNGOLOGY

## 2018-01-16 PROCEDURE — 99214 OFFICE O/P EST MOD 30 MIN: CPT | Mod: 25,S$PBB,, | Performed by: OTOLARYNGOLOGY

## 2018-01-16 PROCEDURE — 31575 DIAGNOSTIC LARYNGOSCOPY: CPT | Mod: PBBFAC,PO | Performed by: OTOLARYNGOLOGY

## 2018-01-16 RX ORDER — IPRATROPIUM BROMIDE 21 UG/1
2 SPRAY, METERED NASAL 3 TIMES DAILY
Qty: 30 ML | Refills: 0 | Status: SHIPPED | OUTPATIENT
Start: 2018-01-16 | End: 2018-11-12

## 2018-01-16 NOTE — PROGRESS NOTES
"  Chief Complaint   Patient presents with    Follow-up     hoarseness has not improved since last visit, pt reports two days ago started with runny nose, sore throat, right ear pain and a cough   .     HPI:Hannah Norman is a 67 y.o. female who has been referred by Dr. Hauser for a one year history of hoarseness. She has been having dysphagia and recently underwent a MBSS which was normal but she was noted by the SLP to have dysphonia and ENT referral was recommended.  Her voice is progressively worsening over this time. There are pitch breaks or cracks. There is vocal fatigue. She admits to odynophagia, throat pain, and otalgia.  There is no hemoptysis or hematemesis. She is breathing well.     She admits to throat clearing and cough. She admits to heartburn and reflux. She is currently on Protonix 40mg daily. She notes that she has difficulty swallowing spicy foods which gives her the sensation that the food is going down the wrong way. She denies food sticking. She denies weight loss.  She does note certain foods trigger her GERD-tomatos.     Interval HPI: Since last visit she has had an EGD which showed mild inflammation and she was instructed to continue Protonix.  She states that her voice is still raspy.  She states that she is having "noisy breathing with her sleep." She states that she has restless sleep, minimal sleep latency, and frequent awakenings at nigh.  She notes daytime somnolence and difficulty awakening in the morning.  She notes that she has had rhinorrhea with eating certain foods.     Past Medical History:   Diagnosis Date    Anxiety disorder     Bell's palsy     Chronic back pain     Chronic osteoarthritis     Essential tremor     Fibromyalgia     Hypertension     Mild acid reflux     Neck pain     Other and unspecified hyperlipidemia      Social History     Social History    Marital status:      Spouse name: N/A    Number of children: N/A    Years of education: N/A "     Occupational History    Not on file.     Social History Main Topics    Smoking status: Former Smoker     Quit date: 10/5/1982    Smokeless tobacco: Never Used    Alcohol use No    Drug use: No    Sexual activity: Not on file     Other Topics Concern    Not on file     Social History Narrative    No narrative on file     Past Surgical History:   Procedure Laterality Date    Breast reduction      CARPAL TUNNEL RELEASE      COLONOSCOPY  2008    COLONOSCOPY N/A 11/10/2017    Procedure: COLONOSCOPY - Miralax split prep;  Surgeon: Annetta Powell MD;  Location: [facility];  Service: Endoscopy;  Laterality: N/A;    HYSTERECTOMY      KNEE SURGERY      bilateral    left shoulder      Tonsillectomy       Family History   Problem Relation Age of Onset    Diabetes Mother     Tremor Mother     Liver disease Mother     Diabetes Father     Heart disease Father     Tremor Son     Diabetes Sister     Diabetes Brother            Review of Systems  General: negative for chills, fever or weight loss  Psychological: negative for mood changes or depression  Ophthalmic: negative for blurry vision, photophobia or eye pain  ENT: see HPI  Respiratory: no cough, shortness of breath, or wheezing  Cardiovascular: no chest pain or dyspnea on exertion  Gastrointestinal: no abdominal pain, change in bowel habits, or black/ bloody stools  Musculoskeletal: negative for gait disturbance or muscular weakness  Neurological: no syncope or seizures; no ataxia  Dermatological: negative for puritis,  rash and jaundice  Hematologic/lymphatic: no easy bruising, no new lumps or bumps      Physical Exam:    Vitals:    01/16/18 1024   BP: 114/76   Pulse: 74   Temp: 97.4 °F (36.3 °C)       Constitutional: Well appearing / communicating without difficutly.  NAD.  Eyes: EOM I Bilaterally  Head/Face: Normocephalic.  Negative paranasal sinus pressure/tenderness.  Salivary glands WNL.  House Brackmann I Bilaterally.    Right Ear: Auricle  normal appearance. External Auditory Canal within normal limits no lesions or masses,TM w/o masses/lesions/perforations. TM mobility noted.   Left Ear: Auricle normal appearance. External Auditory Canal within normal limits no lesions or masses,TM w/o masses/lesions/perforations. TM mobility noted.  Nose: Mild nasal septal deviation to the right with an inferior spur.  Inferior Turbinates 3+ bilaterally. No septal perforation. No masses/lesions. External nasal skin appears normal without masses/lesions.  Oral Cavity: Gingiva/lips within normal limits.  Dentition/gingiva healthy appearing. Mucus membranes moist. Floor of mouth soft, no masses palpated. Oral Tongue mobile. Hard Palate appears normal.    Oropharynx: Base of tongue appears normal. No masses/lesions noted. Tonsillar fossa/pharyngeal wall without lesions. Posterior oropharynx WNL.  Soft palate without masses. Midline uvula.   Neck/Lymphatic: No LAD I-VI bilaterally.  No thyromegaly.  No masses noted on exam.    Mirror laryngoscopy/nasopharyngoscopy: Active gag reflex.  Unable to perform.    Neuro/Psychiatric: AOx3.  Normal mood and affect.   Cardiovascular: Normal carotid pulses bilaterally, no increasing jugular venous distention noted at cervical region bilaterally.    Respiratory: Normal respiratory effort, no stridor, no retractions noted.      See separate procedure note for FFL.     Assessment:    ICD-10-CM ICD-9-CM    1. MARY (obstructive sleep apnea) G47.33 327.23 Polysomnogram (CPAP will be added if patient meets diagnostic criteria.)   2. Laryngopharyngeal reflux (LPR) K21.9 478.79    3. Gastroesophageal reflux disease, esophagitis presence not specified K21.9 530.81    4. Hoarseness of voice R49.0 784.42    5. Chronic vasomotor rhinitis J30.0 477.9      The primary encounter diagnosis was MARY (obstructive sleep apnea). Diagnoses of Laryngopharyngeal reflux (LPR), Gastroesophageal reflux disease, esophagitis presence not specified, Hoarseness of  voice, and Chronic vasomotor rhinitis were also pertinent to this visit.      Plan:  Orders Placed This Encounter   Procedures    Polysomnogram (CPAP will be added if patient meets diagnostic criteria.)       LPR:  Continue Protonix 40mg BID* . Continue reflux precautions. Written handout given.     Possible MARY- Will obtain PSG to further evaluate.     I believe she has vasomotor rhinitis, which is characterized by intermittent symptoms of congestion (stuffiness) and/or watery nasal discharge, and an exaggerated reaction to nonspecific irritants, such as air pollution or temperature changes, especially exposure to cold, dry air and gustatory rhinitis, which is an episodic condition with prominent watery rhinorrhea triggered most often by hot or spicy foods and is caused by a vagally-mediated reflex.  Treatment options primarily involve intranasal medications.  Will start the patient on atrovent nasal.     Follow up in 6 weeks to reassess progress with treatment regimen.       Lupe Lopez MD

## 2018-01-17 NOTE — PROCEDURES
Procedures  Due to indication in patient's history, presentation or risk factors,  a fiber optic exam was performed.    SEPARATE PROCEDURE NOTE:    ANESTHESIA:  Topical xylocaine with alexander-synephrine    FINDINGS:  moderate  inaterarytenoid erythema and edema    PROCEDURE:  After verbal consent was obtained, the flexible scope was passed through the patient's nasal cavity without difficulty.  The nasopharynx (adenoid pad) and eustachian tube orifices were first visualized and were found to be normal, without masses or irregularity.  The posterior pharyngeal wall and base of tongue were then examined and no mass or irregular tissue was seen.  The scope was then advanced to the larynx, and the epiglottis, valleculae, and piriform sinuses were normal, without masses or mucosal irregularity.  The false vocal folds and true vocal folds were then examined and were found to have normal mobility (full abduction and adduction).  The interartyenoid area had moderateedema and erythema consistent with reflux.

## 2018-01-26 DIAGNOSIS — K21.9 GASTROESOPHAGEAL REFLUX DISEASE, ESOPHAGITIS PRESENCE NOT SPECIFIED: ICD-10-CM

## 2018-01-29 RX ORDER — PANTOPRAZOLE SODIUM 40 MG/1
40 TABLET, DELAYED RELEASE ORAL 2 TIMES DAILY
Qty: 180 TABLET | Refills: 1 | Status: SHIPPED | OUTPATIENT
Start: 2018-01-29 | End: 2018-03-16 | Stop reason: SDUPTHER

## 2018-02-05 ENCOUNTER — TELEPHONE (OUTPATIENT)
Dept: SLEEP MEDICINE | Facility: OTHER | Age: 68
End: 2018-02-05

## 2018-02-09 DIAGNOSIS — F41.9 ANXIETY DISORDER: ICD-10-CM

## 2018-02-09 RX ORDER — CITALOPRAM 20 MG/1
TABLET, FILM COATED ORAL
Qty: 30 TABLET | Refills: 5 | Status: SHIPPED | OUTPATIENT
Start: 2018-02-09 | End: 2018-08-16 | Stop reason: SDUPTHER

## 2018-02-16 ENCOUNTER — OFFICE VISIT (OUTPATIENT)
Dept: NEUROLOGY | Facility: CLINIC | Age: 68
End: 2018-02-16
Payer: MEDICARE

## 2018-02-16 VITALS
HEIGHT: 62 IN | BODY MASS INDEX: 46.13 KG/M2 | WEIGHT: 250.69 LBS | DIASTOLIC BLOOD PRESSURE: 70 MMHG | HEART RATE: 76 BPM | SYSTOLIC BLOOD PRESSURE: 115 MMHG

## 2018-02-16 DIAGNOSIS — G25.0 ESSENTIAL TREMOR: ICD-10-CM

## 2018-02-16 DIAGNOSIS — G25.81 RESTLESS LEG SYNDROME: Primary | ICD-10-CM

## 2018-02-16 DIAGNOSIS — F41.9 ANXIETY DISORDER, UNSPECIFIED TYPE: ICD-10-CM

## 2018-02-16 DIAGNOSIS — M79.7 FIBROMYALGIA: ICD-10-CM

## 2018-02-16 DIAGNOSIS — I10 ESSENTIAL HYPERTENSION: ICD-10-CM

## 2018-02-16 PROCEDURE — 1126F AMNT PAIN NOTED NONE PRSNT: CPT | Mod: ,,, | Performed by: PSYCHIATRY & NEUROLOGY

## 2018-02-16 PROCEDURE — 1159F MED LIST DOCD IN RCRD: CPT | Mod: ,,, | Performed by: PSYCHIATRY & NEUROLOGY

## 2018-02-16 PROCEDURE — 99999 PR PBB SHADOW E&M-EST. PATIENT-LVL III: CPT | Mod: PBBFAC,,, | Performed by: PSYCHIATRY & NEUROLOGY

## 2018-02-16 PROCEDURE — 99214 OFFICE O/P EST MOD 30 MIN: CPT | Mod: S$PBB,,, | Performed by: PSYCHIATRY & NEUROLOGY

## 2018-02-16 PROCEDURE — 99213 OFFICE O/P EST LOW 20 MIN: CPT | Mod: PBBFAC,PO | Performed by: PSYCHIATRY & NEUROLOGY

## 2018-02-16 NOTE — PROGRESS NOTES
Subjective:       Patient ID: Hannah Norman is a 68 y.o. female.    Chief Complaint:  Restless leg syndrome      History of Present Illness  HPI  This is a 68-year-old female who returns in follow up.  She was last seen by me 6 months ago.  Since history of chronic back problems and in the past had been seen by orthopedic surgery and neurosurgery at [facility], Dr. Bey, with MRI scans of the neck and back and advised conservative management and referral to pain management.  She is presently being followed by Dr. Wang for pain management.  She has history of restless leg syndrome with improvement with a combination of Neurontin 3 times a day and Mirapex at bedtime.  She also has an essential tremor and has been evaluated at the movement disorders clinic in the past.  Symptoms are minimal and do respond to Xanax.  She takes Xanax as needed and citalopram 20 mg daily for chronic anxiety which has helped.  In addition she is on Effexor for the fibromyalgia symptoms which has helped.  She denies any new medical problems otherwise.       Review of Systems  Review of Systems   Constitutional: Negative.    HENT: Negative.  Negative for hearing loss.    Eyes: Negative.  Negative for visual disturbance.   Respiratory: Negative.    Cardiovascular: Negative.    Gastrointestinal: Negative.    Genitourinary: Negative.    Musculoskeletal: Positive for arthralgias (left knee pain, recent surgery), back pain and myalgias. Negative for gait problem.   Skin: Negative.    Neurological: Positive for tremors. Negative for seizures, speech difficulty and numbness.   Psychiatric/Behavioral: Negative.  Negative for decreased concentration.       Objective:      Neurologic Exam      Physical Exam   Constitutional: She appears well-developed and well-nourished.   HENT:   Head: Normocephalic and atraumatic.   Right Ear: Hearing normal.   Left Ear: Hearing normal.   Neck: Normal range of motion. Neck supple. Muscular  tenderness (bilateral paraspinal muscle and trapezius muscle tenderness) present. Carotid bruit is not present.   Musculoskeletal:   Multiple areas of muscle tenderness in the trunk muscles as well as trapezius and proximal muscles of the upper and lower extremities.  Status post left knee replacement surgery.  Left ankle tenderness laterally with mild swelling, no ecchymosis or deformity.   Neurological: She is alert. She has normal reflexes. She displays atrophy (no obvious weakness however she has difficulty getting onto the step and she fatigues easily.) and tremor (a very minimal statokinetic tremor was noted affecting fingers). A cranial nerve deficit (VF at bedside testing essentially normal.  Minimal weakness left face LMN with good eye closure, and sensory exam being normal/symmetrical.  Corneals/gag reflexes normal.  Tongue & palate movements normal.  Shoulder shrug normal.) is present. No sensory deficit (Right Tinel's positive). She exhibits normal muscle tone. Coordination (mild clumsiness of fine motor movements.  Romberg is equivocal with eyes closed) and gait (her gait is slightly broad-based slowly because of clumsiness in the lower extremities and fatigue.) abnormal.   Mental status examination: Patient is fully oriented and able to give an adequate history.  Recall of recent and past information is good.  Immediate recall is normal.  Attention span and concentration was normal.  Judgment and insight is normal.  Language functions are intact with no evidence of aphasia or dysarthria.  Comprehension is unimpaired.  Affect is appropriate, mood was even.  No thought disorder is noted.   Vitals reviewed.        Assessment:        1. Restless leg syndrome    2. Essential tremor    3. Fibromyalgia    4. Anxiety disorder, unspecified type    5. Essential hypertension             Plan:       Continue follow-up with her pain management doctor, Dr Lara for the chronic back pain and fibromyalgia.   Continue Xanax 1 mg 3 times a day as needed in addition to citalopram 20 mg daily.  Continue Effexor for the fibromyalgia muscle symptoms.  Continue Neurontin and Mirapex for restless leg.  Follow-up in 6 months if stable.

## 2018-02-16 NOTE — PROGRESS NOTES
Patient, Hannah Norman (MRN #[id number]), presented with a recorded BMI of 45.85 kg/m^2 consistent with the definition of morbid obesity (ICD-10 E66.01). The patient's morbid obesity was monitored, evaluated, addressed and/or treated.  Discussed with patient regarding talked to her primary care physician about getting on to a weight management program.  This addendum to the medical record is made on 02/16/2018.

## 2018-02-19 ENCOUNTER — HOSPITAL ENCOUNTER (OUTPATIENT)
Dept: SLEEP MEDICINE | Facility: HOSPITAL | Age: 68
Discharge: HOME | End: 2018-02-19
Attending: OTOLARYNGOLOGY
Payer: MEDICARE

## 2018-02-19 DIAGNOSIS — G47.33 OSA (OBSTRUCTIVE SLEEP APNEA): ICD-10-CM

## 2018-02-19 PROCEDURE — 95810 POLYSOM 6/> YRS 4/> PARAM: CPT | Mod: 26,,, | Performed by: PSYCHIATRY & NEUROLOGY

## 2018-02-19 PROCEDURE — 95810 POLYSOM 6/> YRS 4/> PARAM: CPT

## 2018-02-19 PROCEDURE — 95810 PR POLYSOMNOGRAPHY, 4 OR MORE: ICD-10-PCS | Mod: 26,,, | Performed by: PSYCHIATRY & NEUROLOGY

## 2018-02-19 PROCEDURE — 95782 POLYSOM <6 YRS 4/> PARAMTRS: CPT

## 2018-02-20 DIAGNOSIS — F41.9 ANXIETY DISORDER, UNSPECIFIED TYPE: ICD-10-CM

## 2018-02-20 RX ORDER — ALPRAZOLAM 1 MG/1
TABLET ORAL
Qty: 90 TABLET | Refills: 3 | Status: SHIPPED | OUTPATIENT
Start: 2018-02-20 | End: 2018-06-06 | Stop reason: SDUPTHER

## 2018-02-20 NOTE — PROGRESS NOTES
Education was done via explanation of sleep study process and procedure. All questions were answered.    Pt. did not meet criteria for CPAP. Respiratory events were observed. Some events were observed without required. QuestionableREM sleep was obtained.    Low sat of 85% was observed in study. EKG revealed rare PAC. Soft to moderate snoring was heard.  Thank you letter was given in a.m.

## 2018-03-05 ENCOUNTER — OFFICE VISIT (OUTPATIENT)
Dept: FAMILY MEDICINE | Facility: CLINIC | Age: 68
End: 2018-03-05
Payer: MEDICARE

## 2018-03-05 VITALS
TEMPERATURE: 99 F | BODY MASS INDEX: 46.67 KG/M2 | HEIGHT: 62 IN | OXYGEN SATURATION: 97 % | HEART RATE: 96 BPM | DIASTOLIC BLOOD PRESSURE: 82 MMHG | WEIGHT: 253.63 LBS | SYSTOLIC BLOOD PRESSURE: 118 MMHG

## 2018-03-05 DIAGNOSIS — Z00.00 ROUTINE HEALTH MAINTENANCE: Primary | ICD-10-CM

## 2018-03-05 DIAGNOSIS — R26.9 GAIT DISTURBANCE: ICD-10-CM

## 2018-03-05 DIAGNOSIS — G47.33 OSA (OBSTRUCTIVE SLEEP APNEA): ICD-10-CM

## 2018-03-05 DIAGNOSIS — K21.9 MILD ACID REFLUX: ICD-10-CM

## 2018-03-05 DIAGNOSIS — F41.9 ANXIETY DISORDER, UNSPECIFIED TYPE: ICD-10-CM

## 2018-03-05 DIAGNOSIS — I10 ESSENTIAL HYPERTENSION: ICD-10-CM

## 2018-03-05 DIAGNOSIS — G25.81 RESTLESS LEG SYNDROME: ICD-10-CM

## 2018-03-05 DIAGNOSIS — G25.0 ESSENTIAL TREMOR: ICD-10-CM

## 2018-03-05 PROCEDURE — 99397 PER PM REEVAL EST PAT 65+ YR: CPT | Mod: S$GLB,,, | Performed by: FAMILY MEDICINE

## 2018-03-05 NOTE — PROGRESS NOTES
Patient ID: Hannah Norman is a 68 y.o. female.    Chief Complaint: Annual Exam    HPI     Hannah Norman is a 68 y.o. female. here for annual exam. Doing very well at this point. Concerned about wt and inquires about wt loss meds- can I use-.    Htn stable no co meds.  Mood / anxiety stable no new prob   Chronic back pain - stable   Behzad stable      Review of Symptoms    Constitutional: Negative.    HENT: Negative.    Eyes: Negative.    Respiratory: Negative.    Cardiovascular: Negative.    Gastrointestinal: Negative.    Endocrine: Negative.    Genitourinary: Negative.    Musculoskeletal: Negative.    Skin: Negative.    Allergic/Immunologic: Negative.    Neurological: Negative.    Hematological: Negative.    Psychiatric/Behavioral: Negative.      Except as above in HPI        Physical  Exam    Constitutional:  Oriented to person, place, and time. Appears well-developed and well-nourished.     HENT:   Head: Normocephalic and atraumatic.     Right Ear: Tympanic membrane, external ear and ear canal normal.     Left Ear: Tympanic membrane, external ear and ear canal normal.     Nose: Nose normal. No rhinorrhea or nasal deformity.     Mouth/Throat: Uvula is midline, oropharynx is clear and moist and mucous membranes are normal.     Eyes: Conjunctivae are normal. Right eye exhibits no discharge. Left eye exhibits no   discharge. No scleral icterus.     Neck:  No JVD present. No tracheal deviation present.  [x]  Neck supple.   Carotid Arteries  []  No Bruit    Cardiovascular: Normal rate, regular rhythm and normal heart sounds.      Pulmonary/Chest: Effort normal and breath sounds normal. No stridor. No respiratory distress. No wheezes. No rales.          Musculoskeletal: Normal range of motion. No edema or tenderness.   No deformity     Lymphadenopathy:  No cervical adenopathy.  []  No inguinal adenopathy    Neurological:  Alert and oriented to person, place, and time. Coordination normal.     Skin: Skin is warm and dry. No  rash noted. No bruising     Psychiatric: Normal mood and affect. Speech is normal and behavior is normal. Judgment and thought content normal.       Assessment / Plan:      ICD-10-CM ICD-9-CM   1. Routine health maintenance Z00.00 V70.0   2. MARY (obstructive sleep apnea) G47.33 327.23   3. Gait disturbance R26.9 781.2   4. Restless leg syndrome G25.81 333.94   5. Mild acid reflux K21.9 530.81   6. Essential hypertension I10 401.9   7. Essential tremor G25.0 333.1   8. Anxiety disorder, unspecified type F41.9 300.00     Routine health maintenance  -     Comprehensive metabolic panel; Future  -     CBC auto differential; Future  -     Lipid panel; Future  -     TSH; Future  -     T4, free; Future; Expected date: 03/05/2018    MARY (obstructive sleep apnea)  -     Comprehensive metabolic panel; Future  -     CBC auto differential; Future  -     Lipid panel; Future  -     TSH; Future  -     T4, free; Future; Expected date: 03/05/2018    Gait disturbance  -     Comprehensive metabolic panel; Future  -     CBC auto differential; Future  -     Lipid panel; Future  -     TSH; Future  -     T4, free; Future; Expected date: 03/05/2018    Restless leg syndrome  -     Comprehensive metabolic panel; Future  -     CBC auto differential; Future  -     Lipid panel; Future  -     TSH; Future  -     T4, free; Future; Expected date: 03/05/2018    Mild acid reflux  -     Comprehensive metabolic panel; Future  -     CBC auto differential; Future  -     Lipid panel; Future  -     TSH; Future  -     T4, free; Future; Expected date: 03/05/2018    Essential hypertension  -     Comprehensive metabolic panel; Future  -     CBC auto differential; Future  -     Lipid panel; Future  -     TSH; Future  -     T4, free; Future; Expected date: 03/05/2018    Essential tremor  -     Comprehensive metabolic panel; Future  -     CBC auto differential; Future  -     Lipid panel; Future  -     TSH; Future  -     T4, free; Future; Expected date:  03/05/2018    Anxiety disorder, unspecified type  -     Comprehensive metabolic panel; Future  -     CBC auto differential; Future  -     Lipid panel; Future  -     TSH; Future  -     T4, free; Future; Expected date: 03/05/2018        Wt discussed weight watchers      Discussed how to stay healthy including: diet, exercise, refraining from smoking and discussed screening exams / tests needed for age, sex and family Hx.

## 2018-03-06 ENCOUNTER — LAB VISIT (OUTPATIENT)
Dept: LAB | Facility: HOSPITAL | Age: 68
End: 2018-03-06
Attending: FAMILY MEDICINE
Payer: MEDICARE

## 2018-03-06 DIAGNOSIS — K21.9 MILD ACID REFLUX: ICD-10-CM

## 2018-03-06 DIAGNOSIS — G25.81 RESTLESS LEG SYNDROME: ICD-10-CM

## 2018-03-06 DIAGNOSIS — Z00.00 ROUTINE HEALTH MAINTENANCE: ICD-10-CM

## 2018-03-06 DIAGNOSIS — F41.9 ANXIETY DISORDER, UNSPECIFIED TYPE: ICD-10-CM

## 2018-03-06 DIAGNOSIS — G25.0 ESSENTIAL TREMOR: ICD-10-CM

## 2018-03-06 DIAGNOSIS — R26.9 GAIT DISTURBANCE: ICD-10-CM

## 2018-03-06 DIAGNOSIS — G47.33 OSA (OBSTRUCTIVE SLEEP APNEA): ICD-10-CM

## 2018-03-06 DIAGNOSIS — I10 ESSENTIAL HYPERTENSION: ICD-10-CM

## 2018-03-06 LAB
ALBUMIN SERPL BCP-MCNC: 4.1 G/DL
ALP SERPL-CCNC: 78 U/L
ALT SERPL W/O P-5'-P-CCNC: 29 U/L
ANION GAP SERPL CALC-SCNC: 10 MMOL/L
AST SERPL-CCNC: 21 U/L
BASOPHILS # BLD AUTO: 0.02 K/UL
BASOPHILS NFR BLD: 0.4 %
BILIRUB SERPL-MCNC: 0.3 MG/DL
BUN SERPL-MCNC: 17 MG/DL
CALCIUM SERPL-MCNC: 9.2 MG/DL
CHLORIDE SERPL-SCNC: 102 MMOL/L
CO2 SERPL-SCNC: 33 MMOL/L
CREAT SERPL-MCNC: 0.73 MG/DL
DIFFERENTIAL METHOD: ABNORMAL
EOSINOPHIL # BLD AUTO: 0.2 K/UL
EOSINOPHIL NFR BLD: 3.1 %
ERYTHROCYTE [DISTWIDTH] IN BLOOD BY AUTOMATED COUNT: 13.2 %
EST. GFR  (AFRICAN AMERICAN): >60 ML/MIN/1.73 M^2
EST. GFR  (NON AFRICAN AMERICAN): >60 ML/MIN/1.73 M^2
GLUCOSE SERPL-MCNC: 127 MG/DL
HCT VFR BLD AUTO: 44.8 %
HGB BLD-MCNC: 13.5 G/DL
LYMPHOCYTES # BLD AUTO: 2.4 K/UL
LYMPHOCYTES NFR BLD: 46 %
MCH RBC QN AUTO: 30.1 PG
MCHC RBC AUTO-ENTMCNC: 30.1 G/DL
MCV RBC AUTO: 100 FL
MONOCYTES # BLD AUTO: 0.5 K/UL
MONOCYTES NFR BLD: 9.9 %
NEUTROPHILS # BLD AUTO: 2.1 K/UL
NEUTROPHILS NFR BLD: 40.4 %
PLATELET # BLD AUTO: 267 K/UL
PMV BLD AUTO: 10.2 FL
POTASSIUM SERPL-SCNC: 4.9 MMOL/L
PROT SERPL-MCNC: 6.8 G/DL
RBC # BLD AUTO: 4.49 M/UL
SODIUM SERPL-SCNC: 145 MMOL/L
T4 FREE SERPL-MCNC: 1.07 NG/DL
TSH SERPL DL<=0.005 MIU/L-ACNC: 1.08 UIU/ML
WBC # BLD AUTO: 5.17 K/UL

## 2018-03-06 PROCEDURE — 84443 ASSAY THYROID STIM HORMONE: CPT | Mod: PO

## 2018-03-06 PROCEDURE — 36415 COLL VENOUS BLD VENIPUNCTURE: CPT | Mod: PO

## 2018-03-06 PROCEDURE — 84439 ASSAY OF FREE THYROXINE: CPT

## 2018-03-06 PROCEDURE — 85025 COMPLETE CBC W/AUTO DIFF WBC: CPT | Mod: PO

## 2018-03-06 PROCEDURE — 80053 COMPREHEN METABOLIC PANEL: CPT | Mod: PO

## 2018-03-06 PROCEDURE — 80061 LIPID PANEL: CPT

## 2018-03-07 LAB
CHOLEST SERPL-MCNC: 179 MG/DL
CHOLEST/HDLC SERPL: 3.6 {RATIO}
HDLC SERPL-MCNC: 50 MG/DL
HDLC SERPL: 27.9 %
LDLC SERPL CALC-MCNC: 90 MG/DL
NONHDLC SERPL-MCNC: 129 MG/DL
TRIGL SERPL-MCNC: 195 MG/DL

## 2018-03-08 ENCOUNTER — TELEPHONE (OUTPATIENT)
Dept: FAMILY MEDICINE | Facility: CLINIC | Age: 68
End: 2018-03-08

## 2018-03-08 NOTE — TELEPHONE ENCOUNTER
Patient was advised     ----- Message from Raffi Garcia MD sent at 3/7/2018 11:13 PM CST -----  Results are excellent.  No changes to any medicine regimen.  Keep up the great work

## 2018-03-11 ENCOUNTER — PATIENT MESSAGE (OUTPATIENT)
Dept: OTOLARYNGOLOGY | Facility: CLINIC | Age: 68
End: 2018-03-11

## 2018-03-14 DIAGNOSIS — G25.81 RESTLESS LEG SYNDROME: ICD-10-CM

## 2018-03-14 DIAGNOSIS — M79.7 FIBROMYALGIA: ICD-10-CM

## 2018-03-14 RX ORDER — LEVOTHYROXINE SODIUM 75 UG/1
TABLET ORAL
Qty: 90 TABLET | Refills: 3 | Status: SHIPPED | OUTPATIENT
Start: 2018-03-14 | End: 2018-03-16

## 2018-03-14 RX ORDER — GABAPENTIN 600 MG/1
TABLET ORAL
Qty: 90 TABLET | Refills: 11 | Status: SHIPPED | OUTPATIENT
Start: 2018-03-14 | End: 2019-03-21 | Stop reason: SDUPTHER

## 2018-03-16 ENCOUNTER — OFFICE VISIT (OUTPATIENT)
Dept: OTOLARYNGOLOGY | Facility: CLINIC | Age: 68
End: 2018-03-16
Payer: MEDICARE

## 2018-03-16 VITALS
SYSTOLIC BLOOD PRESSURE: 133 MMHG | HEART RATE: 67 BPM | DIASTOLIC BLOOD PRESSURE: 82 MMHG | BODY MASS INDEX: 45.1 KG/M2 | WEIGHT: 246.56 LBS | TEMPERATURE: 98 F

## 2018-03-16 DIAGNOSIS — K21.9 GASTROESOPHAGEAL REFLUX DISEASE, ESOPHAGITIS PRESENCE NOT SPECIFIED: ICD-10-CM

## 2018-03-16 DIAGNOSIS — G47.33 OSA (OBSTRUCTIVE SLEEP APNEA): Primary | ICD-10-CM

## 2018-03-16 DIAGNOSIS — J30.0 CHRONIC VASOMOTOR RHINITIS: ICD-10-CM

## 2018-03-16 DIAGNOSIS — K21.9 LARYNGOPHARYNGEAL REFLUX (LPR): ICD-10-CM

## 2018-03-16 PROCEDURE — 99215 OFFICE O/P EST HI 40 MIN: CPT | Mod: PBBFAC,PO,25 | Performed by: OTOLARYNGOLOGY

## 2018-03-16 PROCEDURE — 99213 OFFICE O/P EST LOW 20 MIN: CPT | Mod: 25,S$PBB,, | Performed by: OTOLARYNGOLOGY

## 2018-03-16 PROCEDURE — 31575 DIAGNOSTIC LARYNGOSCOPY: CPT | Mod: S$PBB,,, | Performed by: OTOLARYNGOLOGY

## 2018-03-16 PROCEDURE — 31575 DIAGNOSTIC LARYNGOSCOPY: CPT | Mod: PBBFAC,PO | Performed by: OTOLARYNGOLOGY

## 2018-03-16 PROCEDURE — 99999 PR PBB SHADOW E&M-EST. PATIENT-LVL V: CPT | Mod: PBBFAC,,, | Performed by: OTOLARYNGOLOGY

## 2018-03-16 RX ORDER — PANTOPRAZOLE SODIUM 40 MG/1
40 TABLET, DELAYED RELEASE ORAL DAILY
Qty: 90 TABLET | Refills: 1 | Status: SHIPPED | OUTPATIENT
Start: 2018-03-16 | End: 2018-06-19 | Stop reason: SDUPTHER

## 2018-03-16 RX ORDER — LEVOTHYROXINE SODIUM 75 UG/1
TABLET ORAL
Qty: 90 TABLET | Refills: 3 | Status: SHIPPED | OUTPATIENT
Start: 2018-03-16 | End: 2018-03-21 | Stop reason: SDUPTHER

## 2018-03-16 NOTE — PROCEDURES
Procedures  Due to indication in patient's history, presentation or risk factors,  a fiber optic exam was performed.    SEPARATE PROCEDURE NOTE:    ANESTHESIA:  Topical xylocaine with alexander-synephrine    FINDINGS:  Mild to moderate  inaterarytenoid erythema and edema    PROCEDURE:  After verbal consent was obtained, the flexible scope was passed through the patient's nasal cavity without difficulty.  The nasopharynx (adenoid pad) and eustachian tube orifices were first visualized and were found to be normal, without masses or irregularity.  The posterior pharyngeal wall and base of tongue were then examined and no mass or irregular tissue was seen.  The scope was then advanced to the larynx, and the epiglottis, valleculae, and piriform sinuses were normal, without masses or mucosal irregularity.  The false vocal folds and true vocal folds were then examined and were found to have normal mobility (full abduction and adduction).  The interartyenoid area had mild to moderate edema and erythema consistent with reflux.

## 2018-03-16 NOTE — PROGRESS NOTES
Chief Complaint   Patient presents with    Other     hoarseness   .     HPI:Hannah Nomran is a 68 y.o. female who has been referred by Dr. Hauser for a one year history of hoarseness. She has been having dysphagia and recently underwent a MBSS which was normal but she was noted by the SLP to have dysphonia and ENT referral was recommended.  Her voice is progressively worsening over this time. There are pitch breaks or cracks. There is vocal fatigue. She admits to odynophagia, throat pain, and otalgia.  There is no hemoptysis or hematemesis. She is breathing well.     She admits to throat clearing and cough. She admits to heartburn and reflux. She is currently on Protonix 40mg daily. She notes that she has difficulty swallowing spicy foods which gives her the sensation that the food is going down the wrong way. She denies food sticking. She denies weight loss.  She does note certain foods trigger her GERD-tomatos.     Interval HPI: Follow up LPR, hoarseness, and mild MARY. She states that she has had her sleep study and is here for results. She feels that her swallowing and hoarseness are improving.  She reports that she is noting less heartburn and reflux episodes.  She denies food sticking or weight loss.  She has been using atrovent nasal for nasal rhinorrhea on an as needed basis and feels this helps.     Past Medical History:   Diagnosis Date    Anxiety disorder     Bell's palsy     Chronic back pain     Chronic osteoarthritis     Essential tremor     Fibromyalgia     Hypertension     Mild acid reflux     Neck pain     Other and unspecified hyperlipidemia      Social History     Social History    Marital status:      Spouse name: N/A    Number of children: N/A    Years of education: N/A     Occupational History    Not on file.     Social History Main Topics    Smoking status: Former Smoker     Quit date: 10/5/1982    Smokeless tobacco: Never Used    Alcohol use No    Drug use: No     Sexual activity: Not on file     Other Topics Concern    Not on file     Social History Narrative    No narrative on file     Past Surgical History:   Procedure Laterality Date    Breast reduction      CARPAL TUNNEL RELEASE      COLONOSCOPY  2008    COLONOSCOPY N/A 11/10/2017    Procedure: COLONOSCOPY - Miralax split prep;  Surgeon: Annetta Powell MD;  Location: [facility];  Service: Endoscopy;  Laterality: N/A;    HYSTERECTOMY      KNEE SURGERY      bilateral    left shoulder      Tonsillectomy       Family History   Problem Relation Age of Onset    Diabetes Mother     Tremor Mother     Liver disease Mother     Diabetes Father     Heart disease Father     Tremor Son     Diabetes Sister     Diabetes Brother            Review of Systems  General: negative for chills, fever or weight loss  Psychological: negative for mood changes or depression  Ophthalmic: negative for blurry vision, photophobia or eye pain  ENT: see HPI  Respiratory: no cough, shortness of breath, or wheezing  Cardiovascular: no chest pain or dyspnea on exertion  Gastrointestinal: no abdominal pain, change in bowel habits, or black/ bloody stools  Musculoskeletal: negative for gait disturbance or muscular weakness  Neurological: no syncope or seizures; no ataxia  Dermatological: negative for puritis,  rash and jaundice  Hematologic/lymphatic: no easy bruising, no new lumps or bumps      Physical Exam:    Vitals:    03/16/18 1139   BP: 133/82   Pulse: 67   Temp: 98.3 °F (36.8 °C)       Constitutional: Well appearing / communicating without difficutly.  NAD.  Eyes: EOM I Bilaterally  Head/Face: Normocephalic.  Negative paranasal sinus pressure/tenderness.  Salivary glands WNL.  House Brackmann I Bilaterally.    Right Ear: Auricle normal appearance. External Auditory Canal within normal limits no lesions or masses,TM w/o masses/lesions/perforations. TM mobility noted.   Left Ear: Auricle normal appearance. External Auditory Canal  within normal limits no lesions or masses,TM w/o masses/lesions/perforations. TM mobility noted.  Nose: Mild nasal septal deviation to the right with an inferior spur.  Inferior Turbinates 3+ bilaterally. No septal perforation. No masses/lesions. External nasal skin appears normal without masses/lesions.  Oral Cavity: Gingiva/lips within normal limits.  Dentition/gingiva healthy appearing. Mucus membranes moist. Floor of mouth soft, no masses palpated. Oral Tongue mobile. Hard Palate appears normal.    Oropharynx: Base of tongue appears normal. No masses/lesions noted. Tonsillar fossa/pharyngeal wall without lesions. Posterior oropharynx WNL.  Soft palate without masses. Midline uvula.   Neck/Lymphatic: No LAD I-VI bilaterally.  No thyromegaly.  No masses noted on exam.    Mirror laryngoscopy/nasopharyngoscopy: Active gag reflex.  Unable to perform.    Neuro/Psychiatric: AOx3.  Normal mood and affect.   Cardiovascular: Normal carotid pulses bilaterally, no increasing jugular venous distention noted at cervical region bilaterally.    Respiratory: Normal respiratory effort, no stridor, no retractions noted.      See separate procedure note for FFL.     Assessment:    ICD-10-CM ICD-9-CM    1. MARY (obstructive sleep apnea) G47.33 327.23 Ambulatory consult to Sleep Disorders      CPAP titration (Must have diagnosis of MARY from previous sleep study.)   2. Laryngopharyngeal reflux (LPR) K21.9 478.79    3. Gastroesophageal reflux disease, esophagitis presence not specified K21.9 530.81 pantoprazole (PROTONIX) 40 MG tablet   4. Chronic vasomotor rhinitis J30.0 477.9      The primary encounter diagnosis was MARY (obstructive sleep apnea). Diagnoses of Laryngopharyngeal reflux (LPR), Gastroesophageal reflux disease, esophagitis presence not specified, and Chronic vasomotor rhinitis were also pertinent to this visit.      Plan:  Orders Placed This Encounter   Procedures    CPAP titration (Must have diagnosis of MARY from  previous sleep study.)    Ambulatory consult to Sleep Disorders       LPR:  Continue Protonix 40mg BID* . Continue reflux precautions. Written handout given.     Mild MARY: Arrange for CPAP titration. Referral to sleep medicine.     Vasomotor rhinitis: Continue atrovent nasal.     Follow up in 6 weeks to reassess progress with treatment regimen.       Lupe Lopez MD

## 2018-03-17 DIAGNOSIS — M79.7 FIBROMYALGIA: ICD-10-CM

## 2018-03-17 DIAGNOSIS — G25.81 RESTLESS LEG SYNDROME: ICD-10-CM

## 2018-03-19 RX ORDER — LEVOTHYROXINE SODIUM 75 UG/1
TABLET ORAL
Qty: 90 TABLET | Refills: 3 | Status: SHIPPED | OUTPATIENT
Start: 2018-03-19 | End: 2018-03-21

## 2018-03-19 RX ORDER — GABAPENTIN 600 MG/1
TABLET ORAL
Qty: 90 TABLET | Refills: 11 | Status: SHIPPED | OUTPATIENT
Start: 2018-03-19 | End: 2018-03-21

## 2018-03-20 ENCOUNTER — TELEPHONE (OUTPATIENT)
Dept: SLEEP MEDICINE | Facility: OTHER | Age: 68
End: 2018-03-20

## 2018-03-21 ENCOUNTER — OFFICE VISIT (OUTPATIENT)
Dept: FAMILY MEDICINE | Facility: CLINIC | Age: 68
End: 2018-03-21
Payer: MEDICARE

## 2018-03-21 VITALS
WEIGHT: 245.38 LBS | BODY MASS INDEX: 45.15 KG/M2 | DIASTOLIC BLOOD PRESSURE: 80 MMHG | HEART RATE: 75 BPM | SYSTOLIC BLOOD PRESSURE: 132 MMHG | TEMPERATURE: 99 F | HEIGHT: 62 IN | OXYGEN SATURATION: 97 %

## 2018-03-21 DIAGNOSIS — H65.93 FLUID LEVEL BEHIND TYMPANIC MEMBRANE OF BOTH EARS: ICD-10-CM

## 2018-03-21 DIAGNOSIS — J30.89 ACUTE NONSEASONAL ALLERGIC RHINITIS DUE TO OTHER ALLERGEN: Primary | ICD-10-CM

## 2018-03-21 DIAGNOSIS — E03.9 HYPOTHYROIDISM, UNSPECIFIED TYPE: ICD-10-CM

## 2018-03-21 PROCEDURE — 99213 OFFICE O/P EST LOW 20 MIN: CPT | Mod: S$GLB,,, | Performed by: NURSE PRACTITIONER

## 2018-03-21 RX ORDER — BENZONATATE 200 MG/1
200 CAPSULE ORAL 3 TIMES DAILY PRN
Qty: 30 CAPSULE | Refills: 0 | Status: SHIPPED | OUTPATIENT
Start: 2018-03-21 | End: 2018-03-31

## 2018-03-21 RX ORDER — FLUTICASONE PROPIONATE 50 MCG
1 SPRAY, SUSPENSION (ML) NASAL DAILY
Qty: 16 G | Refills: 0 | Status: SHIPPED | OUTPATIENT
Start: 2018-03-21 | End: 2020-10-02 | Stop reason: SDUPTHER

## 2018-03-21 RX ORDER — PREDNISONE 20 MG/1
20 TABLET ORAL DAILY
Qty: 5 TABLET | Refills: 0 | Status: SHIPPED | OUTPATIENT
Start: 2018-03-21 | End: 2018-03-29

## 2018-03-21 RX ORDER — LORATADINE 10 MG/1
10 TABLET ORAL DAILY
Qty: 30 TABLET | Refills: 0 | Status: SHIPPED | OUTPATIENT
Start: 2018-03-21 | End: 2018-08-03

## 2018-03-21 RX ORDER — LEVOTHYROXINE SODIUM 75 UG/1
75 TABLET ORAL
Qty: 90 TABLET | Refills: 0 | Status: SHIPPED | OUTPATIENT
Start: 2018-03-21 | End: 2019-03-07

## 2018-03-21 NOTE — PROGRESS NOTES
Subjective:       Patient ID: Hannah Norman is a 68 y.o. female.    Chief Complaint: Cough; Shortness of Breath; and Medication Refill    Cough   This is a new problem. The current episode started in the past 7 days. The problem has been unchanged. The problem occurs every few minutes. The cough is non-productive. Associated symptoms include ear congestion, ear pain, postnasal drip, a sore throat, shortness of breath and wheezing. Pertinent negatives include no chest pain, chills, fever, headaches, heartburn, hemoptysis, myalgias, nasal congestion, rash, rhinorrhea, sweats or weight loss. Nothing aggravates the symptoms. She has tried nothing for the symptoms. There is no history of asthma, bronchiectasis, bronchitis, COPD, emphysema, environmental allergies or pneumonia.     Review of Systems   Constitutional: Negative for chills, diaphoresis, fatigue, fever and weight loss.   HENT: Positive for congestion, ear pain, postnasal drip, sore throat and voice change. Negative for rhinorrhea.    Respiratory: Positive for cough, shortness of breath and wheezing. Negative for hemoptysis and chest tightness.    Cardiovascular: Negative for chest pain, palpitations and leg swelling.   Gastrointestinal: Negative for heartburn.   Musculoskeletal: Negative for myalgias.   Skin: Negative for rash.   Allergic/Immunologic: Negative for environmental allergies.   Neurological: Negative for headaches.       Objective:      Physical Exam   Constitutional: She is oriented to person, place, and time. She appears well-developed and well-nourished. No distress.   HENT:   Right Ear: External ear normal. Tympanic membrane is erythematous. A middle ear effusion is present.   Left Ear: External ear normal. A middle ear effusion is present.   Nose: No mucosal edema or rhinorrhea. Right sinus exhibits no maxillary sinus tenderness and no frontal sinus tenderness. Left sinus exhibits no maxillary sinus tenderness and no frontal sinus tenderness.    Mouth/Throat: No oropharyngeal exudate, posterior oropharyngeal edema or posterior oropharyngeal erythema.   Cardiovascular: Normal rate, regular rhythm and normal heart sounds.    Pulmonary/Chest: Effort normal and breath sounds normal.   Neurological: She is alert and oriented to person, place, and time.   Skin: Skin is warm and dry. She is not diaphoretic.   Psychiatric: She has a normal mood and affect. Her behavior is normal. Judgment and thought content normal.   Vitals reviewed.      Assessment:       1. Acute nonseasonal allergic rhinitis due to other allergen    2. Fluid level behind tympanic membrane of both ears    3. Hypothyroidism, unspecified type        Plan:       Acute nonseasonal allergic rhinitis due to other allergen  -     predniSONE (DELTASONE) 20 MG tablet; Take 1 tablet (20 mg total) by mouth once daily.  Dispense: 5 tablet; Refill: 0  -     loratadine (CLARITIN) 10 mg tablet; Take 1 tablet (10 mg total) by mouth once daily.  Dispense: 30 tablet; Refill: 0    Fluid level behind tympanic membrane of both ears  -     predniSONE (DELTASONE) 20 MG tablet; Take 1 tablet (20 mg total) by mouth once daily.  Dispense: 5 tablet; Refill: 0  -     loratadine (CLARITIN) 10 mg tablet; Take 1 tablet (10 mg total) by mouth once daily.  Dispense: 30 tablet; Refill: 0    Hypothyroidism, unspecified type    Other orders  -     levothyroxine (SYNTHROID) 75 MCG tablet; Take 1 tablet (75 mcg total) by mouth before breakfast.  Dispense: 90 tablet; Refill: 0  -     benzonatate (TESSALON) 200 MG capsule; Take 1 capsule (200 mg total) by mouth 3 (three) times daily as needed for Cough.  Dispense: 30 capsule; Refill: 0  -     fluticasone (FLONASE) 50 mcg/actuation nasal spray; 1 spray (50 mcg total) by Each Nare route once daily.  Dispense: 16 g; Refill: 0

## 2018-03-22 ENCOUNTER — HOSPITAL ENCOUNTER (OUTPATIENT)
Dept: SLEEP MEDICINE | Facility: HOSPITAL | Age: 68
Discharge: HOME | End: 2018-03-22
Attending: OTOLARYNGOLOGY
Payer: MEDICARE

## 2018-03-22 DIAGNOSIS — G47.33 OSA (OBSTRUCTIVE SLEEP APNEA): ICD-10-CM

## 2018-03-22 PROCEDURE — 95811 PR POLYSOMNOGRAPHY W/CPAP: ICD-10-PCS | Mod: 26,,, | Performed by: PSYCHIATRY & NEUROLOGY

## 2018-03-22 PROCEDURE — 95811 POLYSOM 6/>YRS CPAP 4/> PARM: CPT

## 2018-03-22 PROCEDURE — 95811 POLYSOM 6/>YRS CPAP 4/> PARM: CPT | Mod: 26,,, | Performed by: PSYCHIATRY & NEUROLOGY

## 2018-03-23 NOTE — PROGRESS NOTES
"Education was done via explanation of sleep study process and procedure. All questions were answered.    Pt. tolerated CPAP well. Insufficient REM sleep was obtained. REM not obtained until late in study with respiratory events still present. Unable to obtain an optimal pressure.     Low sat of 86% was observed in study. EKG revealed NSR. Small Simplus Full Face Mask was used during CPAP titration. Pt. responds to titration in a.m. "It was ok"     Thank you letter was given in a.m.  "

## 2018-03-28 ENCOUNTER — OFFICE VISIT (OUTPATIENT)
Dept: SLEEP MEDICINE | Facility: CLINIC | Age: 68
End: 2018-03-28
Payer: MEDICARE

## 2018-03-28 VITALS
BODY MASS INDEX: 45.08 KG/M2 | HEIGHT: 62 IN | SYSTOLIC BLOOD PRESSURE: 93 MMHG | WEIGHT: 245 LBS | HEART RATE: 72 BPM | DIASTOLIC BLOOD PRESSURE: 58 MMHG

## 2018-03-28 DIAGNOSIS — G47.33 OSA (OBSTRUCTIVE SLEEP APNEA): Primary | ICD-10-CM

## 2018-03-28 PROCEDURE — 99999 PR PBB SHADOW E&M-EST. PATIENT-LVL II: CPT | Mod: PBBFAC,,, | Performed by: PSYCHIATRY & NEUROLOGY

## 2018-03-28 PROCEDURE — 99215 OFFICE O/P EST HI 40 MIN: CPT | Mod: S$PBB,ICN,, | Performed by: PSYCHIATRY & NEUROLOGY

## 2018-03-28 PROCEDURE — 99212 OFFICE O/P EST SF 10 MIN: CPT | Mod: PBBFAC,PO | Performed by: PSYCHIATRY & NEUROLOGY

## 2018-03-28 NOTE — LETTER
March 28, 2018      Lupe Lopez MD  [address]  [address]  [facility] [address]           [facility]  [address] [facility] [address]  Phone: [phone]  Fax: [phone]          Patient: Hannah Norman   MR Number: [id number]   YOB: 1950   Date of Visit: 3/28/2018       Dear Dr. Lupe Lopez:    Thank you for referring Hannah Norman to me for evaluation. Attached you will find relevant portions of my assessment and plan of care.    If you have questions, please do not hesitate to call me. I look forward to following Hannah Norman along with you.    Sincerely,    Jaylyn Blackwell MD    Enclosure  CC:  No Recipients    If you would like to receive this communication electronically, please contact [email or web address] or [phone] to request more information on [facility] access.    For providers and/or their staff who would like to refer a patient to [facility], please contact us through our one-stop-shop provider referral line, [facility], at [phone].    If you feel you have received this communication in error or would no longer like to receive these types of communications, please e-mail [email or web address]

## 2018-03-28 NOTE — PROGRESS NOTES
Hannah Norman  was seen at the request of  Jay Chandra for sleep evaluation.    03/28/2018 INITIAL HISTORY OF PRESENT ILLNESS:  Hannah Norman is a 68 y.o. female is here to be evaluated for a sleep disorder.       CHIEF COMPLAINT:      The patient's complaints include excessive daytime sleepiness, excessive daytime fatigue, snoring,  witnessed breathing pauses,  gasping for air in sleep and interrupted sleep since  Several years ago.    She has had more nightmares lately.    Diagnosed with mild MARY in 2  Reports  dry mouth and sore throat  Reports occasional nasal congestion   Reports  morning headaches  Denies  interrupted sleep  Denies frequent leg movements  Denies symptoms concerning for parasomnia    The ESS ([place] Sleepiness Score) taken on initial visit is 14 /24    The patient never had tonsillectomy, adenoidectomy or UPPP      SLEEP ROUTINE AND LIFESTYLE 03/28/2018 :    Occupation:    Bed partner:      Time to bed - wake up time on a workday : 9 pm to 8 AM  Time to bed - wake up time on a day off: 9 pm to  8 Am  Sleep onset latency: 30 min  Disruptions or awakenings: 3-4  Time to fall back into sleep: 10-15 min   Perceived sleep quality: 2/5  Perceived total sleep time:  6  hours.  Daytime naps: 1    Exercise routine: no  Caffeine:       PREVIOUS SLEEP STUDIES:     PSG study  In 2/19/18 showed significant MARY with the AHI of 7.9- /hour and SaO2 minimum of 85%.  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     Screening for malignant neoplasm of colon 11/10/2017    MARY (obstructive sleep apnea)      Resolved Ambulatory Problems     Diagnosis Date Noted    No Resolved Ambulatory Problems     Past Medical History:   Diagnosis Date    Anxiety disorder     Bell's palsy     Chronic back pain     Chronic osteoarthritis     Essential tremor     Fibromyalgia     Hypertension     Mild acid reflux     Neck pain     Other and unspecified hyperlipidemia                 PAST SURGICAL HISTORY:    Past Surgical History:   Procedure Laterality Date    Breast reduction      CARPAL TUNNEL RELEASE      COLONOSCOPY  2008    COLONOSCOPY N/A 11/10/2017    Procedure: COLONOSCOPY - Miralax split prep;  Surgeon: Annetta Powell MD;  Location: [facility];  Service: Endoscopy;  Laterality: N/A;    HYSTERECTOMY      KNEE SURGERY      bilateral    left shoulder      Tonsillectomy           FAMILY HISTORY:                Family History   Problem Relation Age of Onset    Diabetes Mother     Tremor Mother     Liver disease Mother     Diabetes Father     Heart disease Father     Tremor Son     Diabetes Sister     Diabetes Brother        SOCIAL HISTORY:          Tobacco:   History   Smoking Status    Former Smoker    Quit date: 10/5/1982   Smokeless Tobacco    Never Used       alcohol use:    History   Alcohol Use No                   ALLERGIES:    Review of patient's allergies indicates:   Allergen Reactions    Hyoscyamine Rash       CURRENT MEDICATIONS:    Current Outpatient Prescriptions   Medication Sig Dispense Refill    albuterol 90 mcg/actuation inhaler Inhale 2 puffs into the lungs every 6 (six) hours as needed for Wheezing or Shortness of Breath. Rescue 18 g 1    ALPRAZolam (XANAX) 1 MG tablet TAKE 1 TABLET BY MOUTH THREE TIMES A DAY AS NEEDED FOR ANXIETY 90 tablet 3    benzonatate (TESSALON) 200 MG capsule Take 1 capsule (200 mg total) by mouth 3 (three) times daily as needed for Cough. 30 capsule 0     carvedilol (COREG) 25 MG tablet Take 25 mg by mouth 2 (two) times daily.       citalopram (CELEXA) 20 MG tablet TAKE 1 TABLET BY MOUTH EVERY DAY 30 tablet 5    cyclobenzaprine (FLEXERIL) 10 MG tablet Take 10 mg by mouth 3 (three) times daily as needed.       diclofenac sodium (SOLARAZE) 3 % gel APPLY 1-2 GRAMS TOPICALLY TO AFFECTED AREA 2-3 TIMES PER DAY. PRN  11    estradiol (ESTRACE) 1 MG tablet Take 1 mg by mouth once daily.       fluticasone (FLONASE) 50 mcg/actuation nasal spray USE 1 SPRAY EACH NOSTRIL EVERY DAY 16 g 11    fluticasone (FLONASE) 50 mcg/actuation nasal spray 1 spray (50 mcg total) by Each Nare route once daily. 16 g 0    furosemide (LASIX) 40 MG tablet TAKE 1 TABLET BY MOUTH TWO TIMES A DAY IF SWELLING OCCURS 60 tablet 2    gabapentin (NEURONTIN) 600 MG tablet TAKE 1 TABLET BY MOUTH THREE TIMES A DAY 90 tablet 11    hydrocortisone (WESTCORT) 0.2 % cream Apply topically daily as needed.       ipratropium (ATROVENT) 0.03 % nasal spray 2 sprays by Nasal route 3 (three) times daily. 30 mL 0    levothyroxine (SYNTHROID) 75 MCG tablet Take 1 tablet (75 mcg total) by mouth before breakfast. 90 tablet 0    loratadine (CLARITIN) 10 mg tablet Take 1 tablet (10 mg total) by mouth once daily. 30 tablet 0    losartan (COZAAR) 50 MG tablet Take 50 mg by mouth once daily.       lovastatin (MEVACOR) 20 MG tablet TAKE 1 TABLET BY MOUTH EVERY EVENING 90 tablet 3    metronidazole 1% (METROGEL) 1 % Gel Apply topically once daily. 50 g 0    morphine (MS CONTIN) 15 MG 12 hr tablet Take 1 tablet by mouth every 12 (twelve) hours.      oxycodone-acetaminophen (PERCOCET)  mg per tablet Take 1 tablet by mouth every 8 (eight) hours as needed.       pantoprazole (PROTONIX) 40 MG tablet Take 1 tablet (40 mg total) by mouth once daily. 90 tablet 1    potassium chloride SA (K-DUR,KLOR-CON) 20 MEQ tablet Take 20 mEq by mouth once daily.       predniSONE (DELTASONE) 20 MG tablet Take 1 tablet (20 mg  "total) by mouth once daily. 5 tablet 0    ropinirole (REQUIP) 0.5 MG tablet Take one tablet by mouth in the morning and 2 tablets at bedtime 90 tablet 6    venlafaxine (EFFEXOR-XR) 150 MG Cp24 TAKE 1 CAPSULE BY MOUTH TWO TIMES A DAY 30 capsule 11     No current facility-administered medications for this visit.                       REVIEW OF SYSTEMS:   Sleep related symptoms as per HPI    denies weight gain  Reports occasional dyspnea  Reports occasional palpitations  Reports occasional acid reflux   Reports polyuria x 2  Denies  mood diturbance  Denies  anemia  Denies  muscle pain  Denies  Gait imbalance    Otherwise, a balance of 10 systems reviewed is negative.    PHYSICAL EXAM:  BP (!) 93/58 (BP Location: Left arm, Patient Position: Sitting, BP Method: Large (Automatic))   Pulse 72   Ht 5' 2" (1.575 m)   Wt 111.1 kg (245 lb)   BMI 44.81 kg/m²   GENERAL: Overweight body habitus, well groomed.  HEENT:   HEENT:  Conjunctivae are non-erythematous; Pupils equal, round, and reactive to light; Nose is symmetrical; Nasal mucosa is pink and moist; Septum is midline; Inferior turbinates are normal; Nasal airflow is normal; Posterior pharynx is pink; Modified Mallampati:II; Posterior palate is low; Tonsils not visualized; Uvula is wide and elongated;Tongue is enlarged; Dentition is fair; No TMJ tenderness; Jaw opening and protrusion without click and without discomfort.  NECK: Supple. Neck circumference is 16 inches. No thyromegaly. No palpable nodes.     SKIN: On face and neck: No abrasions, no rashes, no lesions.  No subcutaneous nodules are palpable.  RESPIRATORY: Chest is clear to auscultation.  Normal chest expansion and non-labored breathing at rest.  CARDIOVASCULAR: Normal S1, S2.  No murmurs, gallops or rubs. No carotid bruits bilaterally.  No edema. No clubbing. No cyanosis.    NEURO: Oriented to time, place and person. Normal attention span and concentration. Gait normal.    PSYCH: Affect is full. Mood is " "normal  MUSCULOSKELETAL: Moves 4 extremities. Gait normal.         Using [facility]:         ASSESSMENT:    1. MARY (obstructive sleep apnea). The patient symptomatically has  excessive daytime sleepiness, snoring,  witnessed breathing pauses, excessive daytime fatigue, gasping for air in sleep and interrupted sleep  with exam findings of "a crowded oral airway and elevated body mass index. The patient has medical co-morbidities of hyperlipidemia, fibromyalgia and nightmares  which can be worsened by MARY. This warrants further investigation for possible obstructive sleep apnea.    2. RLS - controlled on Requip; recommended to switch her morning pill to 6 PM and take 2 pills 1 hr before bed.    3. Nightmare disorder      PLAN:    Will await for CPAP  Titration results -> order the machine-> will  Order follow up then   Will use [facility]  For RLS -> recommended to switch her morning pill to 6 PM and take 2 pills 1 hr before bed -> if no help-> consider Requip.     More than 25 minutes of this 45 minutes visit was spent in counseling: during our discussion today, we talked about the etiology of  MARY as well as the potential ramifications of untreated sleep apnea, which could include daytime sleepiness, hypertension, heart disease and/or stroke.  We discussed potential treatment options, which could include weight loss, body positioning, continuous positive airway pressure (CPAP), or referral for surgical consideration. Meanwhile, she  is urged to avoid supine sleep, weight gain and alcoholic beverages since all of these can worsen MARY.     Precautions: The patient was advised to abstain from driving should he feel sleepy or drowsy.    Follow up: MD/NP  after the sleep study has been completed.     Thank you for allowing me the opportunity to participate in the care of your patient.    This visit summary will be sent to referring provider via inbasket    "

## 2018-03-29 ENCOUNTER — OFFICE VISIT (OUTPATIENT)
Dept: FAMILY MEDICINE | Facility: CLINIC | Age: 68
End: 2018-03-29
Payer: MEDICARE

## 2018-03-29 VITALS
HEART RATE: 90 BPM | TEMPERATURE: 99 F | DIASTOLIC BLOOD PRESSURE: 72 MMHG | HEIGHT: 62 IN | BODY MASS INDEX: 44.38 KG/M2 | WEIGHT: 241.19 LBS | OXYGEN SATURATION: 96 % | SYSTOLIC BLOOD PRESSURE: 110 MMHG

## 2018-03-29 DIAGNOSIS — J40 BRONCHITIS: Primary | ICD-10-CM

## 2018-03-29 DIAGNOSIS — R05.9 COUGH: ICD-10-CM

## 2018-03-29 DIAGNOSIS — R06.2 WHEEZING: ICD-10-CM

## 2018-03-29 PROCEDURE — 99213 OFFICE O/P EST LOW 20 MIN: CPT | Mod: 25,S$GLB,, | Performed by: NURSE PRACTITIONER

## 2018-03-29 PROCEDURE — 96372 THER/PROPH/DIAG INJ SC/IM: CPT | Mod: S$GLB,,, | Performed by: FAMILY MEDICINE

## 2018-03-29 RX ORDER — PREDNISONE 20 MG/1
20 TABLET ORAL DAILY
Qty: 5 TABLET | Refills: 0 | Status: SHIPPED | OUTPATIENT
Start: 2018-03-29 | End: 2018-04-08

## 2018-03-29 RX ORDER — TRIAMCINOLONE ACETONIDE 40 MG/ML
80 INJECTION, SUSPENSION INTRA-ARTICULAR; INTRAMUSCULAR
Status: COMPLETED | OUTPATIENT
Start: 2018-03-29 | End: 2018-03-29

## 2018-03-29 RX ORDER — ALBUTEROL SULFATE 90 UG/1
2 AEROSOL, METERED RESPIRATORY (INHALATION) EVERY 6 HOURS PRN
Qty: 18 G | Refills: 0 | Status: SHIPPED | OUTPATIENT
Start: 2018-03-29 | End: 2021-04-06 | Stop reason: SDUPTHER

## 2018-03-29 RX ORDER — AZITHROMYCIN 250 MG/1
TABLET, FILM COATED ORAL
Qty: 6 TABLET | Refills: 0 | Status: SHIPPED | OUTPATIENT
Start: 2018-03-29 | End: 2018-04-03

## 2018-03-29 RX ADMIN — TRIAMCINOLONE ACETONIDE 80 MG: 40 INJECTION, SUSPENSION INTRA-ARTICULAR; INTRAMUSCULAR at 11:03

## 2018-03-29 NOTE — PROGRESS NOTES
Subjective:       Patient ID: Hannah Norman is a 68 y.o. female.    Chief Complaint: URI    Cough   This is a new problem. The current episode started in the past 7 days. The problem has been unchanged. The problem occurs every few minutes. The cough is productive of sputum. Associated symptoms include shortness of breath and wheezing. Pertinent negatives include no chest pain, chills, ear congestion, ear pain, fever, headaches, heartburn, hemoptysis, myalgias, nasal congestion, postnasal drip, rash, rhinorrhea, sore throat, sweats or weight loss. The symptoms are aggravated by lying down. Treatments tried: predinsone and claratin. The treatment provided no relief. There is no history of asthma, bronchiectasis, bronchitis, COPD, emphysema, environmental allergies or pneumonia.     Review of Systems   Constitutional: Negative for chills, diaphoresis, fatigue, fever and weight loss.   HENT: Negative for congestion, ear pain, postnasal drip, rhinorrhea and sore throat.    Respiratory: Positive for cough, shortness of breath and wheezing. Negative for hemoptysis and chest tightness.    Cardiovascular: Negative for chest pain, palpitations and leg swelling.   Gastrointestinal: Negative for heartburn.   Musculoskeletal: Negative for myalgias.   Skin: Negative for rash.   Allergic/Immunologic: Negative for environmental allergies.   Neurological: Negative for headaches.       Objective:      Physical Exam   Constitutional: She is oriented to person, place, and time. She appears well-developed and well-nourished. No distress.   HENT:   Right Ear: Tympanic membrane and external ear normal.   Left Ear: Tympanic membrane and external ear normal.   Nose: No mucosal edema or rhinorrhea. Right sinus exhibits no maxillary sinus tenderness and no frontal sinus tenderness. Left sinus exhibits no maxillary sinus tenderness and no frontal sinus tenderness.   Mouth/Throat: No oropharyngeal exudate, posterior oropharyngeal edema or  posterior oropharyngeal erythema.   Cardiovascular: Normal rate, regular rhythm and normal heart sounds.    Pulmonary/Chest: Effort normal. She has wheezes in the right upper field, the right middle field, the right lower field, the left upper field, the left middle field and the left lower field.   SOB   Neurological: She is alert and oriented to person, place, and time.   Skin: Skin is warm and dry. She is not diaphoretic.   Psychiatric: She has a normal mood and affect. Her behavior is normal. Judgment and thought content normal.   Vitals reviewed.      Assessment:       1. Bronchitis    2. Wheezing    3. Cough        Plan:       Bronchitis  -     triamcinolone acetonide injection 80 mg; Inject 2 mLs (80 mg total) into the muscle one time.  -     azithromycin (Z-GINNY) 250 MG tablet; Take 2 tablets by mouth on day 1; Take 1 tablet by mouth on days 2-5  Dispense: 6 tablet; Refill: 0  -     albuterol 90 mcg/actuation inhaler; Inhale 2 puffs into the lungs every 6 (six) hours as needed for Wheezing or Shortness of Breath. Rescue  Dispense: 18 g; Refill: 0  -     predniSONE (DELTASONE) 20 MG tablet; Take 1 tablet (20 mg total) by mouth once daily.  Dispense: 5 tablet; Refill: 0    Wheezing  -     albuterol 90 mcg/actuation inhaler; Inhale 2 puffs into the lungs every 6 (six) hours as needed for Wheezing or Shortness of Breath. Rescue  Dispense: 18 g; Refill: 0    Cough  -     albuterol 90 mcg/actuation inhaler; Inhale 2 puffs into the lungs every 6 (six) hours as needed for Wheezing or Shortness of Breath. Rescue  Dispense: 18 g; Refill: 0

## 2018-04-05 ENCOUNTER — TELEPHONE (OUTPATIENT)
Dept: SLEEP MEDICINE | Facility: CLINIC | Age: 68
End: 2018-04-05

## 2018-04-05 DIAGNOSIS — G47.33 OSA (OBSTRUCTIVE SLEEP APNEA): Primary | ICD-10-CM

## 2018-04-06 ENCOUNTER — PATIENT MESSAGE (OUTPATIENT)
Dept: OTOLARYNGOLOGY | Facility: CLINIC | Age: 68
End: 2018-04-06

## 2018-05-14 RX ORDER — FUROSEMIDE 40 MG/1
TABLET ORAL
Qty: 60 TABLET | Refills: 2 | Status: SHIPPED | OUTPATIENT
Start: 2018-05-14 | End: 2018-11-02 | Stop reason: SDUPTHER

## 2018-05-21 ENCOUNTER — TELEPHONE (OUTPATIENT)
Dept: SLEEP MEDICINE | Facility: CLINIC | Age: 68
End: 2018-05-21

## 2018-05-21 ENCOUNTER — PATIENT MESSAGE (OUTPATIENT)
Dept: SLEEP MEDICINE | Facility: CLINIC | Age: 68
End: 2018-05-21

## 2018-05-21 NOTE — TELEPHONE ENCOUNTER
----- Message from Tyra SEARS Ari sent at 5/19/2018  2:17 PM CDT -----  Contact: PT Portal Request  Appointment Request From: Hannah Norman    With Provider: Jaylyn Blackwell MD [[facility]]    Would Accept With:Only the person I've selected    Preferred Date Range: From 5/19/2018 To 6/14/2018    Preferred Times: Monday Morning, Tuesday Morning, Wednesday Morning, Thursday Morning, Friday Morning    Reason for visit: Request an Appt    Comments:  Received my Sleep Apnea Machine and Dr Blackwell, wanted to go over my case after a month on machine. I prefer morning appointments , however, I can do early afternoon.   Thanks for the help,  Hannah Norman

## 2018-05-21 NOTE — TELEPHONE ENCOUNTER
Spoke with patient in reference to having a follow up appt scheduled with Dr. Blackwell 30 days after she received her machine.   Left vm and Advised patient that her appointment had been scheduled for 07/18/18 @ 9am.  Also sent message thru [facility] account as well as mailed out a letter with appt. Date and time

## 2018-05-24 RX ORDER — ROPINIROLE 0.5 MG/1
TABLET, FILM COATED ORAL
Qty: 90 TABLET | Refills: 6 | Status: SHIPPED | OUTPATIENT
Start: 2018-05-24 | End: 2018-07-18 | Stop reason: ALTCHOICE

## 2018-06-06 ENCOUNTER — PATIENT MESSAGE (OUTPATIENT)
Dept: NEUROLOGY | Facility: CLINIC | Age: 68
End: 2018-06-06

## 2018-06-06 DIAGNOSIS — F41.9 ANXIETY DISORDER, UNSPECIFIED TYPE: ICD-10-CM

## 2018-06-06 RX ORDER — ALPRAZOLAM 1 MG/1
TABLET ORAL
Qty: 90 TABLET | Refills: 3 | Status: SHIPPED | OUTPATIENT
Start: 2018-06-06 | End: 2018-08-03

## 2018-06-06 RX ORDER — ALPRAZOLAM 1 MG/1
TABLET ORAL
Qty: 90 TABLET | Refills: 3 | Status: CANCELLED | OUTPATIENT
Start: 2018-06-06

## 2018-06-19 ENCOUNTER — OFFICE VISIT (OUTPATIENT)
Dept: OTOLARYNGOLOGY | Facility: CLINIC | Age: 68
End: 2018-06-19
Payer: MEDICARE

## 2018-06-19 VITALS
DIASTOLIC BLOOD PRESSURE: 63 MMHG | SYSTOLIC BLOOD PRESSURE: 112 MMHG | HEART RATE: 75 BPM | WEIGHT: 226.63 LBS | TEMPERATURE: 97 F | BODY MASS INDEX: 41.46 KG/M2

## 2018-06-19 DIAGNOSIS — G47.33 OSA (OBSTRUCTIVE SLEEP APNEA): ICD-10-CM

## 2018-06-19 DIAGNOSIS — K21.9 LARYNGOPHARYNGEAL REFLUX (LPR): Primary | ICD-10-CM

## 2018-06-19 DIAGNOSIS — K21.9 GASTROESOPHAGEAL REFLUX DISEASE, ESOPHAGITIS PRESENCE NOT SPECIFIED: ICD-10-CM

## 2018-06-19 DIAGNOSIS — J30.0 CHRONIC VASOMOTOR RHINITIS: ICD-10-CM

## 2018-06-19 PROCEDURE — 99214 OFFICE O/P EST MOD 30 MIN: CPT | Mod: PBBFAC,PO,25 | Performed by: OTOLARYNGOLOGY

## 2018-06-19 PROCEDURE — 99999 PR PBB SHADOW E&M-EST. PATIENT-LVL IV: CPT | Mod: PBBFAC,,, | Performed by: OTOLARYNGOLOGY

## 2018-06-19 PROCEDURE — 99213 OFFICE O/P EST LOW 20 MIN: CPT | Mod: 25,S$PBB,, | Performed by: OTOLARYNGOLOGY

## 2018-06-19 PROCEDURE — 31575 DIAGNOSTIC LARYNGOSCOPY: CPT | Mod: PBBFAC,PO | Performed by: OTOLARYNGOLOGY

## 2018-06-19 PROCEDURE — 31575 DIAGNOSTIC LARYNGOSCOPY: CPT | Mod: S$PBB,,, | Performed by: OTOLARYNGOLOGY

## 2018-06-19 RX ORDER — PANTOPRAZOLE SODIUM 40 MG/1
40 TABLET, DELAYED RELEASE ORAL 2 TIMES DAILY
Qty: 60 TABLET | Refills: 1 | Status: SHIPPED | OUTPATIENT
Start: 2018-06-19 | End: 2018-09-19

## 2018-06-19 NOTE — PROCEDURES
Procedures  Due to indication in patient's history, presentation or risk factors,  a fiber optic exam was performed.    SEPARATE PROCEDURE NOTE:    ANESTHESIA:  Topical xylocaine with alexander-synephrine    FINDINGS: moderate  inaterarytenoid erythema and edema    PROCEDURE:  After verbal consent was obtained, the flexible scope was passed through the patient's nasal cavity without difficulty.  The nasopharynx (adenoid pad) and eustachian tube orifices were first visualized and were found to be normal, without masses or irregularity.  The posterior pharyngeal wall and base of tongue were then examined and no mass or irregular tissue was seen.  The scope was then advanced to the larynx, and the epiglottis, valleculae, and piriform sinuses were normal, without masses or mucosal irregularity.  The false vocal folds and true vocal folds were then examined and were found to have normal mobility (full abduction and adduction).  The interartyenoid area had  moderate edema and erythema consistent with reflux.

## 2018-06-19 NOTE — PROGRESS NOTES
"  Chief Complaint   Patient presents with    Other     MARY follow up    Other     hoarseness   .     HPI:Hannah Norman is a 68 y.o. female who has been referred by Dr. Hauser for a one year history of hoarseness. She has been having dysphagia and recently underwent a MBSS which was normal but she was noted by the SLP to have dysphonia and ENT referral was recommended.  Her voice is progressively worsening over this time. There are pitch breaks or cracks. There is vocal fatigue. She admits to odynophagia, throat pain, and otalgia.  There is no hemoptysis or hematemesis. She is breathing well.     She admits to throat clearing and cough. She admits to heartburn and reflux. She is currently on Protonix 40mg daily. She notes that she has difficulty swallowing spicy foods which gives her the sensation that the food is going down the wrong way. She denies food sticking. She denies weight loss.  She does note certain foods trigger her GERD-tomatos.     Interval HPI: Follow up LPR, hoarseness, and MARY. She has see Dr. Blackwell and has been prescribed CPAP.  She states she has been using it nightly.  She feels that she is getting used to the machine. She states that she was unable to tolerate protonix 40mg daily and went back up to 40mg BID.  She feels that her swallowing and hoarseness are worsening.  She reports that she has had heartburn sensation even on the Protonix 40mg BID.  She notes the symptoms are worse with "red sauces/barbecue sauce."She denies food sticking or weight loss.  She has been using atrovent nasal for nasal rhinorrhea on an as needed basis and feels this helps. She states that she has intentionally lost weight since last visit.     Past Medical History:   Diagnosis Date    Anxiety disorder     Bell's palsy     Chronic back pain     Chronic osteoarthritis     Essential tremor     Fibromyalgia     Hypertension     Mild acid reflux     Neck pain     Other and unspecified hyperlipidemia  "     Social History     Social History    Marital status:      Spouse name: N/A    Number of children: N/A    Years of education: N/A     Occupational History    Not on file.     Social History Main Topics    Smoking status: Former Smoker     Quit date: 10/5/1982    Smokeless tobacco: Never Used    Alcohol use No    Drug use: No    Sexual activity: Not on file     Other Topics Concern    Not on file     Social History Narrative    No narrative on file     Past Surgical History:   Procedure Laterality Date    Breast reduction      CARPAL TUNNEL RELEASE      COLONOSCOPY  2008    COLONOSCOPY N/A 11/10/2017    Procedure: COLONOSCOPY - Miralax split prep;  Surgeon: Annetta Powell MD;  Location: [facility];  Service: Endoscopy;  Laterality: N/A;    HYSTERECTOMY      KNEE SURGERY      bilateral    left shoulder      Tonsillectomy       Family History   Problem Relation Age of Onset    Diabetes Mother     Tremor Mother     Liver disease Mother     Diabetes Father     Heart disease Father     Tremor Son     Diabetes Sister     Diabetes Brother            Review of Systems  General: negative for chills, fever or weight loss  Psychological: negative for mood changes or depression  Ophthalmic: negative for blurry vision, photophobia or eye pain  ENT: see HPI  Respiratory: no cough, shortness of breath, or wheezing  Cardiovascular: no chest pain or dyspnea on exertion  Gastrointestinal: no abdominal pain, change in bowel habits, or black/ bloody stools  Musculoskeletal: negative for gait disturbance or muscular weakness  Neurological: no syncope or seizures; no ataxia  Dermatological: negative for puritis,  rash and jaundice  Hematologic/lymphatic: no easy bruising, no new lumps or bumps      Physical Exam:    Vitals:    06/19/18 0958   BP: 112/63   Pulse: 75   Temp: 97.4 °F (36.3 °C)       Constitutional: Well appearing / communicating without difficutly.  NAD.  Eyes: EOM I  Bilaterally  Head/Face: Normocephalic.  Negative paranasal sinus pressure/tenderness.  Salivary glands WNL.  House Brackmann I Bilaterally.    Right Ear: Auricle normal appearance. External Auditory Canal within normal limits no lesions or masses,TM w/o masses/lesions/perforations. TM mobility noted.   Left Ear: Auricle normal appearance. External Auditory Canal within normal limits no lesions or masses,TM w/o masses/lesions/perforations. TM mobility noted.  Nose: Mild nasal septal deviation to the right with an inferior spur.  Inferior Turbinates 3+ bilaterally. No septal perforation. No masses/lesions. External nasal skin appears normal without masses/lesions.  Oral Cavity: Gingiva/lips within normal limits.  Dentition/gingiva healthy appearing. Mucus membranes moist. Floor of mouth soft, no masses palpated. Oral Tongue mobile. Hard Palate appears normal.    Oropharynx: Base of tongue appears normal. No masses/lesions noted. Tonsillar fossa/pharyngeal wall without lesions. Posterior oropharynx WNL.  Soft palate without masses. Midline uvula.   Neck/Lymphatic: No LAD I-VI bilaterally.  No thyromegaly.  No masses noted on exam.    Mirror laryngoscopy/nasopharyngoscopy: Active gag reflex.  Unable to perform.    Neuro/Psychiatric: AOx3.  Normal mood and affect.   Cardiovascular: Normal carotid pulses bilaterally, no increasing jugular venous distention noted at cervical region bilaterally.    Respiratory: Normal respiratory effort, no stridor, no retractions noted.      See separate procedure note for FFL.     Assessment:    ICD-10-CM ICD-9-CM    1. Laryngopharyngeal reflux (LPR) K21.9 478.79    2. Gastroesophageal reflux disease, esophagitis presence not specified K21.9 530.81    3. Chronic vasomotor rhinitis J30.0 477.9    4. MARY (obstructive sleep apnea) G47.33 327.23      The primary encounter diagnosis was Laryngopharyngeal reflux (LPR). Diagnoses of Gastroesophageal reflux disease, esophagitis presence not  specified, Chronic vasomotor rhinitis, and MARY (obstructive sleep apnea) were also pertinent to this visit.      Plan:  No orders of the defined types were placed in this encounter.      LPR:  Continue Protonix 40mg BID* . Continue reflux precautions. Written handout given. Refer to GI.     Mild MARY: Continue CPAP as prescribed. Continue follow up with Dr. Blackwell.     Vasomotor rhinitis: Continue atrovent nasal.     Follow up in 4 months to reassess progress with treatment regimen.     Lupe Lopez MD

## 2018-06-20 ENCOUNTER — OFFICE VISIT (OUTPATIENT)
Dept: GASTROENTEROLOGY | Facility: CLINIC | Age: 68
End: 2018-06-20
Payer: MEDICARE

## 2018-06-20 VITALS — BODY MASS INDEX: 40.6 KG/M2 | WEIGHT: 222 LBS

## 2018-06-20 DIAGNOSIS — K21.9 LARYNGOPHARYNGEAL REFLUX (LPR): ICD-10-CM

## 2018-06-20 DIAGNOSIS — K21.9 MILD ACID REFLUX: Primary | ICD-10-CM

## 2018-06-20 PROCEDURE — 99999 PR PBB SHADOW E&M-EST. PATIENT-LVL II: CPT | Mod: PBBFAC,,, | Performed by: INTERNAL MEDICINE

## 2018-06-20 PROCEDURE — 99214 OFFICE O/P EST MOD 30 MIN: CPT | Mod: S$PBB,,, | Performed by: INTERNAL MEDICINE

## 2018-06-20 PROCEDURE — 99212 OFFICE O/P EST SF 10 MIN: CPT | Mod: PBBFAC,PO | Performed by: INTERNAL MEDICINE

## 2018-06-20 NOTE — LETTER
June 28, 2018      Lupe Lopez MD  [address]  [address]  [place] [address]           [facility]  [address] [address]  Phone: [phone]          Patient: Hannah Norman   MR Number: [id number]   YOB: 1950   Date of Visit: 6/20/2018       Dear Dr. Lupe Lopez:    Thank you for referring Hannah Norman to me for evaluation. Attached you will find relevant portions of my assessment and plan of care.    If you have questions, please do not hesitate to call me. I look forward to following Hannah Norman along with you.    Sincerely,    Annetta Powell MD    Enclosure  CC:  No Recipients    If you would like to receive this communication electronically, please contact [email or web address] or [phone] to request more information on [employer] access.    For providers and/or their staff who would like to refer a patient to [facility], please contact us through our one-stop-shop provider referral line, [facility], at [phone].    If you feel you have received this communication in error or would no longer like to receive these types of communications, please e-mail [email or web address]

## 2018-06-20 NOTE — PROGRESS NOTES
Subjective:       Patient ID: Hannah Norman is a 68 y.o. female.    Chief Complaint: Gastroesophageal Reflux    This is a 67-year-old female here for a f/u regarding esophageal reflux.  She has noted reflux symptoms described as burning/bloating in addtiion to LPR in which she feels hoarseness and voice changes.  No particular dietary relation she has noted.  It is moderate in intensity and been occurring for over 6 months on a daily basis.  No other exacerbating or relieving factors or other associated symptoms.   EGD with mild esophagitis. Symptoms persist depsite medical therapy.      The following portions of the patient's history were reviewed and updated as appropriate: allergies, current medications, past family history, past medical history, past social history, past surgical history and problem list.     (Portions of this note were dictated using voice recognition software and may contain dictation related errors in spelling/grammar/syntax not found on text review)     HPI  Review of Systems   Constitutional: Negative for appetite change and unexpected weight change.   Cardiovascular: Negative for chest pain and palpitations.   Gastrointestinal: Positive for abdominal distention. Negative for anal bleeding.       Objective:      Physical Exam   Constitutional: She is oriented to person, place, and time. She appears well-developed and well-nourished. No distress.   HENT:   Head: Normocephalic and atraumatic.   Eyes: Conjunctivae are normal. No scleral icterus.   Neck: Normal range of motion. Neck supple.   Cardiovascular: Normal rate and regular rhythm.    Pulmonary/Chest: Effort normal. No respiratory distress.   Abdominal: Soft. Bowel sounds are normal. She exhibits no distension. There is no tenderness.   Neurological: She is alert and oriented to person, place, and time.   Skin: Skin is warm and dry. No rash noted. She is not diaphoretic. No erythema.   Psychiatric: She has a normal mood and affect. Her  behavior is normal.   Nursing note and vitals reviewed.      Assessment:       1. Mild acid reflux    2. Laryngopharyngeal reflux (LPR)        Plan:   1. Check GES

## 2018-06-29 ENCOUNTER — HOSPITAL ENCOUNTER (OUTPATIENT)
Dept: RADIOLOGY | Facility: HOSPITAL | Age: 68
Discharge: HOME | End: 2018-06-29
Attending: INTERNAL MEDICINE
Payer: MEDICARE

## 2018-06-29 DIAGNOSIS — K21.9 MILD ACID REFLUX: ICD-10-CM

## 2018-06-29 PROCEDURE — 78264 GASTRIC EMPTYING IMG STUDY: CPT | Mod: 26,,, | Performed by: RADIOLOGY

## 2018-06-29 PROCEDURE — A9541 TC99M SULFUR COLLOID: HCPCS

## 2018-06-29 PROCEDURE — 78264 GASTRIC EMPTYING IMG STUDY: CPT | Mod: TC

## 2018-07-06 ENCOUNTER — TELEPHONE (OUTPATIENT)
Dept: GASTROENTEROLOGY | Facility: CLINIC | Age: 68
End: 2018-07-06

## 2018-07-06 NOTE — TELEPHONE ENCOUNTER
----- Message from Annetta Powell MD sent at 7/5/2018  8:00 AM CDT -----  Gastric emptying time normal, ok to f/u with me

## 2018-07-18 ENCOUNTER — TELEPHONE (OUTPATIENT)
Dept: NEUROLOGY | Facility: CLINIC | Age: 68
End: 2018-07-18

## 2018-07-18 ENCOUNTER — OFFICE VISIT (OUTPATIENT)
Dept: SLEEP MEDICINE | Facility: CLINIC | Age: 68
End: 2018-07-18
Payer: MEDICARE

## 2018-07-18 VITALS
SYSTOLIC BLOOD PRESSURE: 106 MMHG | WEIGHT: 222 LBS | DIASTOLIC BLOOD PRESSURE: 66 MMHG | HEIGHT: 62 IN | BODY MASS INDEX: 40.85 KG/M2 | HEART RATE: 78 BPM

## 2018-07-18 DIAGNOSIS — G25.81 RLS (RESTLESS LEGS SYNDROME): ICD-10-CM

## 2018-07-18 DIAGNOSIS — G47.33 OSA (OBSTRUCTIVE SLEEP APNEA): Primary | ICD-10-CM

## 2018-07-18 PROCEDURE — 99213 OFFICE O/P EST LOW 20 MIN: CPT | Mod: PBBFAC,PO | Performed by: PSYCHIATRY & NEUROLOGY

## 2018-07-18 PROCEDURE — 99214 OFFICE O/P EST MOD 30 MIN: CPT | Mod: S$PBB,,, | Performed by: PSYCHIATRY & NEUROLOGY

## 2018-07-18 PROCEDURE — 99999 PR PBB SHADOW E&M-EST. PATIENT-LVL III: CPT | Mod: PBBFAC,,, | Performed by: PSYCHIATRY & NEUROLOGY

## 2018-07-18 RX ORDER — PRAMIPEXOLE DIHYDROCHLORIDE 0.12 MG/1
TABLET ORAL
Qty: 120 TABLET | Refills: 11 | Status: SHIPPED | OUTPATIENT
Start: 2018-07-18 | End: 2018-07-20 | Stop reason: SDUPTHER

## 2018-07-18 NOTE — PATIENT INSTRUCTIONS
Will order Pramipexol - 2 pills in the late afternoon to the evening, and 2 more pills at bedtime. Total: 4 pills a day for RLS. Stop Requip.  CPAP compliance reinforced - can use it for a nap or to watch TV  Continue 8-18 - same settings  I put your mask's hose in the back - will Recommend your supplier using [employer] mask next time.       [facility] ([facility]) - Medicare [address]  [place] [address]  [phone] ()  [phone] (fax)

## 2018-07-18 NOTE — PROGRESS NOTES
"   Hannah Norman  was seen at the request of  No ref. provider found for sleep evaluation.    03/28/2018 INITIAL HISTORY OF PRESENT ILLNESS:  Hannah Norman is a 68 y.o. female is here to be evaluated for a sleep disorder.       CHIEF COMPLAINT:      The patient's complaints include excessive daytime sleepiness, excessive daytime fatigue, snoring,  witnessed breathing pauses,  gasping for air in sleep and interrupted sleep since  Several years ago.    She has had more nightmares lately.    Diagnosed with mild MARY in 2  Reports  dry mouth and sore throat  Reports occasional nasal congestion   Reports  morning headaches  Denies  interrupted sleep  Denies frequent leg movements  Denies symptoms concerning for parasomnia    The ESS ([place] Sleepiness Score) taken on initial visit is 14 /24    The patient never had tonsillectomy, adenoidectomy or UPPP       INTERVAL HISTORY:    07/18/2018:  The patient has not presented any new complaints since the previous visit.   Still reports nightmares - at least once a week; at times enacting her dreams - was fighting with someone in her sleep, and was pushing her  away.  At night - taking Celexa, Xanax, Flexeril and Gabapentin 600 mg. She's been on this scheme for years.   APAP 8-18  - did not quite meet compliance - hose is "too long, gets in the way", so she removes it in the middle of the night.    90% - 9 cm H2O.    RLS - not quite well controlled with Requip.    Therapy Event Summary Date Range: 6/18/2018 - 7/17/2018     Hide      Compliance Summary  Apnea Indices  Ventilator Statistics    Days with Device Usage:  30 days  Average AHI:  7.1  Average Breath Rate:  9.8 bpm    Percentage of Days >=4 Hours:  66.7%  Average OA Index:  3.6  Average % Patient Triggered Breaths:  N/A    Average Usage (Days Used):  4 hrs. 22 mins. 16 secs.  Average CA Index:  0.8  Average Tidal Volume:  326.2 ml    Average Usage (All Days):  4 hrs. 22 mins. 16 secs.    Average Minute Vent:  N/A     "    Large Leak  Periodic Breathing     Average Time in Large Leak:  1 mins. 10 secs.  Average % of Night in PB:  0.2%     Average % of Night in Large Leak:  0.4%                        SLEEP ROUTINE AND LIFESTYLE 07/18/2018 :    Occupation:    Bed partner:      Time to bed - wake up time on a workday : 9 pm to 8 AM  Time to bed - wake up time on a day off: 9 pm to  8 Am  Sleep onset latency: 30 min  Disruptions or awakenings: 3-4  Time to fall back into sleep: 10-15 min   Perceived sleep quality: 2/5  Perceived total sleep time:  6  hours.  Daytime naps: 1    Exercise routine: no  Caffeine:       PREVIOUS SLEEP STUDIES:     PSG study  In 2/19/18 showed significant MARY with the AHI of 7.9- /hour and SaO2 minimum of 85%.  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    Screening for malignant neoplasm of colon 11/10/2017    MARY (obstructive sleep apnea)      Resolved Ambulatory Problems     Diagnosis Date Noted    No Resolved Ambulatory Problems     Past Medical History:   Diagnosis Date    Anxiety disorder     Bell's palsy     Chronic back pain     Chronic osteoarthritis     Essential tremor     Fibromyalgia     Hypertension     Mild acid reflux     Neck pain     Other and unspecified hyperlipidemia                 PAST SURGICAL HISTORY:    Past Surgical History:   Procedure Laterality Date    Breast reduction      CARPAL TUNNEL RELEASE      COLONOSCOPY  2008    COLONOSCOPY N/A 11/10/2017    Procedure: COLONOSCOPY -  Miralax split prep;  Surgeon: Annetta Pwoell MD;  Location: [facility];  Service: Endoscopy;  Laterality: N/A;    HYSTERECTOMY      KNEE SURGERY      bilateral    left shoulder      Tonsillectomy           FAMILY HISTORY:                Family History   Problem Relation Age of Onset    Diabetes Mother     Tremor Mother     Liver disease Mother     Diabetes Father     Heart disease Father     Tremor Son     Diabetes Sister     Diabetes Brother        SOCIAL HISTORY:          Tobacco:   History   Smoking Status    Former Smoker    Quit date: 10/5/1982   Smokeless Tobacco    Never Used       alcohol use:    History   Alcohol Use No                   ALLERGIES:    Review of patient's allergies indicates:   Allergen Reactions    Hyoscyamine Rash       CURRENT MEDICATIONS:    Current Outpatient Prescriptions   Medication Sig Dispense Refill    albuterol 90 mcg/actuation inhaler Inhale 2 puffs into the lungs every 6 (six) hours as needed for Wheezing or Shortness of Breath. Rescue 18 g 0    ALPRAZolam (XANAX) 1 MG tablet TAKE 1 TABLET BY MOUTH THREE TIMES A DAY AS NEEDED FOR ANXIETY 90 tablet 3    carvedilol (COREG) 25 MG tablet Take 25 mg by mouth 2 (two) times daily.       citalopram (CELEXA) 20 MG tablet TAKE 1 TABLET BY MOUTH EVERY DAY 30 tablet 5    cyclobenzaprine (FLEXERIL) 10 MG tablet Take 10 mg by mouth 3 (three) times daily as needed.       diclofenac sodium (SOLARAZE) 3 % gel APPLY 1-2 GRAMS TOPICALLY TO AFFECTED AREA 2-3 TIMES PER DAY. PRN  11    estradiol (ESTRACE) 1 MG tablet Take 1 mg by mouth once daily.       fluticasone (FLONASE) 50 mcg/actuation nasal spray USE 1 SPRAY EACH NOSTRIL EVERY DAY 16 g 11    fluticasone (FLONASE) 50 mcg/actuation nasal spray 1 spray (50 mcg total) by Each Nare route once daily. 16 g 0    furosemide (LASIX) 40 MG tablet TAKE 1 TABLET BY MOUTH TWO TIMES A DAY IF SWELLING OCCURS 60 tablet 2    gabapentin (NEURONTIN) 600 MG tablet TAKE 1 TABLET BY  MOUTH THREE TIMES A DAY 90 tablet 11    hydrocortisone (WESTCORT) 0.2 % cream Apply topically daily as needed.       ipratropium (ATROVENT) 0.03 % nasal spray 2 sprays by Nasal route 3 (three) times daily. 30 mL 0    levothyroxine (SYNTHROID) 75 MCG tablet Take 1 tablet (75 mcg total) by mouth before breakfast. 90 tablet 0    loratadine (CLARITIN) 10 mg tablet Take 1 tablet (10 mg total) by mouth once daily. 30 tablet 0    losartan (COZAAR) 50 MG tablet Take 50 mg by mouth once daily.       lovastatin (MEVACOR) 20 MG tablet TAKE 1 TABLET BY MOUTH EVERY EVENING 90 tablet 3    morphine (MS CONTIN) 15 MG 12 hr tablet Take 1 tablet by mouth every 12 (twelve) hours.      oxycodone-acetaminophen (PERCOCET)  mg per tablet Take 1 tablet by mouth every 8 (eight) hours as needed.       pantoprazole (PROTONIX) 40 MG tablet Take 1 tablet (40 mg total) by mouth 2 (two) times daily. 60 tablet 1    potassium chloride SA (K-DUR,KLOR-CON) 20 MEQ tablet Take 20 mEq by mouth once daily.       ropinirole (REQUIP) 0.5 MG tablet Take one tablet by mouth in the morning and 2 tablets at bedtime 90 tablet 6    rOPINIRole (REQUIP) 0.5 MG tablet TAKE 1 TABLET BY MOUTH IN THE MORNING AND TAKE 2 TABLETS AT BEDTIME 90 tablet 6    venlafaxine (EFFEXOR-XR) 150 MG Cp24 TAKE 1 CAPSULE BY MOUTH TWO TIMES A DAY 30 capsule 11    metronidazole 1% (METROGEL) 1 % Gel Apply topically once daily. 50 g 0     No current facility-administered medications for this visit.                       REVIEW OF SYSTEMS:   Sleep related symptoms as per HPI    denies weight gain  Reports occasional dyspnea  Reports occasional palpitations  Reports occasional acid reflux   Reports polyuria x 2  Denies  mood diturbance  Denies  anemia  Denies  muscle pain  Denies  Gait imbalance    Otherwise, a balance of 10 systems reviewed is negative.    PHYSICAL EXAM:  /66 (BP Location: Left arm, Patient Position: Sitting, BP Method: Large (Automatic))   Pulse  "78   Ht 5' 2" (1.575 m)   Wt 100.7 kg (222 lb)   BMI 40.60 kg/m²   GENERAL: Overweight body habitus, well groomed.  HEENT:   HEENT:  Conjunctivae are non-erythematous; Pupils equal, round, and reactive to light; Nose is symmetrical; Nasal mucosa is pink and moist; Septum is midline; Inferior turbinates are normal; Nasal airflow is normal; Posterior pharynx is pink; Modified Mallampati:II; Posterior palate is low; Tonsils not visualized; Uvula is wide and elongated;Tongue is enlarged; Dentition is fair; No TMJ tenderness; Jaw opening and protrusion without click and without discomfort.  NECK: Supple. Neck circumference is 16 inches. No thyromegaly. No palpable nodes.     SKIN: On face and neck: No abrasions, no rashes, no lesions.  No subcutaneous nodules are palpable.  RESPIRATORY: Chest is clear to auscultation.  Normal chest expansion and non-labored breathing at rest.  CARDIOVASCULAR: Normal S1, S2.  No murmurs, gallops or rubs. No carotid bruits bilaterally.  No edema. No clubbing. No cyanosis.    NEURO: Oriented to time, place and person. Normal attention span and concentration. Gait normal.    PSYCH: Affect is full. Mood is normal  MUSCULOSKELETAL: Moves 4 extremities. Gait normal.         Using [facility]:         ASSESSMENT:    1. MARY (obstructive sleep apnea). The patient symptomatically has  excessive daytime sleepiness, snoring,  witnessed breathing pauses, excessive daytime fatigue, gasping for air in sleep and interrupted sleep  with exam findings of "a crowded oral airway and elevated body mass index. The patient has medical co-morbidities of hyperlipidemia, fibromyalgia and nightmares  which can be worsened by MARY. This warrants treatment. Benefiting from CPAP use in terms of sleep continuity and daytime sleepiness. Has not quite met compliance due to discomfort.       2. RLS - not well controlled on Requip; switch to Pramipexole     3. Nightmare disorder; some dream enacting - will revisit her " sleep studies.      PLAN:    Will order Mirapex  CPAP compliance reinforced - can use it for a nap  Continue 8-18  I put her hose in the back  Recommend using Dreamwear mask next time.     More than 25 minutes of this 45 minutes visit was spent in counseling: during our discussion today, we talked about the etiology of  MARY as well as the potential ramifications of untreated sleep apnea, which could include daytime sleepiness, hypertension, heart disease and/or stroke.  We discussed potential treatment options, which could include weight loss, body positioning, continuous positive airway pressure (CPAP), or referral for surgical consideration. Meanwhile, she  is urged to avoid supine sleep, weight gain and alcoholic beverages since all of these can worsen MARY.     Precautions: The patient was advised to abstain from driving should he feel sleepy or drowsy.    Follow up: MD/NP  after the sleep study has been completed.     Thank you for allowing me the opportunity to participate in the care of your patient.    This visit summary will be sent to referring provider via inbasket

## 2018-07-20 ENCOUNTER — TELEPHONE (OUTPATIENT)
Dept: NEUROLOGY | Facility: CLINIC | Age: 68
End: 2018-07-20

## 2018-07-20 DIAGNOSIS — G25.81 RLS (RESTLESS LEGS SYNDROME): ICD-10-CM

## 2018-07-20 RX ORDER — PRAMIPEXOLE DIHYDROCHLORIDE 0.12 MG/1
TABLET ORAL
Qty: 120 TABLET | Refills: 11 | Status: SHIPPED | OUTPATIENT
Start: 2018-07-20 | End: 2018-09-19 | Stop reason: SDUPTHER

## 2018-07-20 NOTE — TELEPHONE ENCOUNTER
----- Message from Lori Glasgow sent at 7/20/2018 10:22 AM CDT -----  Patient Returning Call from [facility]    Who Left Message for Patient:Meli  Communication Preference:[email or web address]@ [phone]  Additional Information:

## 2018-07-20 NOTE — TELEPHONE ENCOUNTER
----- Message from Ale Chou sent at 7/20/2018 11:31 AM CDT -----  Contact: pt  Can the clinic reply in [facility]: no      Please refill the medication(s) listed below. The patient can be reached at this phone number (_[phone]____) once it is called into the pharmacy.      Medication #1pramipexole (MIRAPEX) 0.125 MG tablet       Medication #2      Preferred Pharmacy:[facility]

## 2018-07-20 NOTE — TELEPHONE ENCOUNTER
Patient would like to have a refill for the following medications:  'Medication #1pramipexole (MIRAPEX) 0.125 MG tablet

## 2018-08-03 ENCOUNTER — OFFICE VISIT (OUTPATIENT)
Dept: NEUROLOGY | Facility: CLINIC | Age: 68
End: 2018-08-03
Payer: MEDICARE

## 2018-08-03 VITALS
BODY MASS INDEX: 40.33 KG/M2 | WEIGHT: 219.19 LBS | DIASTOLIC BLOOD PRESSURE: 62 MMHG | HEART RATE: 74 BPM | SYSTOLIC BLOOD PRESSURE: 98 MMHG | HEIGHT: 62 IN

## 2018-08-03 DIAGNOSIS — G25.0 ESSENTIAL TREMOR: ICD-10-CM

## 2018-08-03 DIAGNOSIS — I10 ESSENTIAL HYPERTENSION: ICD-10-CM

## 2018-08-03 DIAGNOSIS — F41.9 ANXIETY DISORDER, UNSPECIFIED TYPE: Primary | ICD-10-CM

## 2018-08-03 DIAGNOSIS — M79.7 FIBROMYALGIA: ICD-10-CM

## 2018-08-03 DIAGNOSIS — G25.81 RESTLESS LEG SYNDROME: ICD-10-CM

## 2018-08-03 PROCEDURE — 99214 OFFICE O/P EST MOD 30 MIN: CPT | Mod: S$PBB,,, | Performed by: PSYCHIATRY & NEUROLOGY

## 2018-08-03 PROCEDURE — 99213 OFFICE O/P EST LOW 20 MIN: CPT | Mod: PBBFAC,PO | Performed by: PSYCHIATRY & NEUROLOGY

## 2018-08-03 PROCEDURE — 99999 PR PBB SHADOW E&M-EST. PATIENT-LVL III: CPT | Mod: PBBFAC,,, | Performed by: PSYCHIATRY & NEUROLOGY

## 2018-08-03 RX ORDER — BUSPIRONE HYDROCHLORIDE 7.5 MG/1
7.5 TABLET ORAL 2 TIMES DAILY
Qty: 60 TABLET | Refills: 5 | Status: SHIPPED | OUTPATIENT
Start: 2018-08-03 | End: 2018-10-12 | Stop reason: CLARIF

## 2018-08-03 NOTE — PATIENT INSTRUCTIONS
Continue follow-up with her pain management doctor, Dr Lara for the chronic back pain and fibromyalgia.  Gradually taper off the Xanax over a month.  Will initiate BuSpar 7.5 mg twice a day for anxiety symptoms.  Continue citalopram 20 mg daily.  Continue Effexor for the fibromyalgia muscle symptoms.  Continue Neurontin and Mirapex for restless leg and continue follow-up with the sleep clinic.

## 2018-08-03 NOTE — PROGRESS NOTES
Subjective:       Patient ID: Hannah Norman is a 68 y.o. female.    Chief Complaint:  Restless leg syndrome      History of Present Illness  HPI  This is a 68-year-old female who returns in follow up.  She was last seen by me 6 months ago.  Since history of chronic back problems and in the past had been seen by orthopedic surgery and neurosurgery at [facility], Dr. Bey, with MRI scans of the neck and back and advised conservative management and referral to pain management.  She is presently being followed by Dr. Wang for pain management.      She has history of restless leg syndrome with improvement with a combination of Neurontin 3 times a day and Mirapex which is now being prescribed by the Sleep Clinic.  She also has an essential tremor and has been evaluated at the movement disorders clinic in the past.  Symptoms are minimal.  She takes Xanax as needed however it was advised that she get off the Xanax because of its long-term use.  She is on citalopram 20 mg daily for chronic anxiety which has helped.  In addition she is on Effexor for the fibromyalgia symptoms which has helped.  She denies any new medical problems otherwise.       Review of Systems  Review of Systems   Constitutional: Negative.    HENT: Negative.  Negative for hearing loss.    Eyes: Negative.  Negative for visual disturbance.   Respiratory: Negative.    Cardiovascular: Negative.    Gastrointestinal: Negative.    Genitourinary: Negative.    Musculoskeletal: Positive for arthralgias (left knee pain, recent surgery), back pain and myalgias. Negative for gait problem.   Skin: Negative.    Neurological: Positive for tremors. Negative for seizures, speech difficulty and numbness.   Psychiatric/Behavioral: Negative.  Negative for decreased concentration.       Objective:      Neurologic Exam      Physical Exam   Constitutional: She appears well-developed and well-nourished.   HENT:   Head: Normocephalic and atraumatic.   Right  Ear: Hearing normal.   Left Ear: Hearing normal.   Neck: Normal range of motion. Neck supple. Muscular tenderness (bilateral paraspinal muscle and trapezius muscle tenderness) present. Carotid bruit is not present.   Musculoskeletal:   Multiple areas of muscle tenderness in the trunk muscles as well as trapezius and proximal muscles of the upper and lower extremities.  Status post left knee replacement surgery.  Left ankle tenderness laterally with mild swelling, no ecchymosis or deformity.   Neurological: She is alert. She has normal reflexes. She displays atrophy (no obvious weakness however she has difficulty getting onto the step and she fatigues easily.) and tremor (a very minimal statokinetic tremor was noted affecting fingers). A cranial nerve deficit (VF at bedside testing essentially normal.  Minimal weakness left face LMN with good eye closure, and sensory exam being normal/symmetrical.  Corneals/gag reflexes normal.  Tongue & palate movements normal.  Shoulder shrug normal.) is present. No sensory deficit (Right Tinel's positive). She exhibits normal muscle tone. Coordination (mild clumsiness of fine motor movements.  Romberg is equivocal with eyes closed) and gait (her gait is slightly broad-based slowly because of clumsiness in the lower extremities and fatigue.) abnormal.   Mental status examination: Patient is fully oriented and able to give an adequate history.  Recall of recent and past information is good.  Immediate recall is normal.  Attention span and concentration was normal.  Judgment and insight is normal.  Language functions are intact with no evidence of aphasia or dysarthria.  Comprehension is unimpaired.  Affect is appropriate, mood was even.  No thought disorder is noted.   Vitals reviewed.        Assessment:        1. Anxiety disorder, unspecified type    2. Restless leg syndrome    3. Essential tremor    4. Fibromyalgia    5. Essential hypertension             Plan:       Continue  follow-up with her pain management doctor, Dr Lara for the chronic back pain and fibromyalgia.  Gradually taper off the Xanax over a month.  Will initiate BuSpar 7.5 mg twice a day for anxiety symptoms.  Continue citalopram 20 mg daily.  Continue Effexor for the fibromyalgia muscle symptoms.  Continue Neurontin and Mirapex for restless leg and continue follow-up with the sleep clinic.  Follow-up in 6 months if stable.

## 2018-08-08 NOTE — PROGRESS NOTES
CHIEF COMPLAINT:    Mrs. Norman is a 68 y.o. female presenting as a self referred patient for urge incontinence.    PRESENTING ILLNESS:    Hannah Norman is a 68 y.o. female who is a dear friend of Deb Lucero who presents with a several year history of urgency and urge incontinence with large volume losses.  She states she has to wear 3-4 pull ups a day and goes through one at night.  She has frequency x 10, seldom has to wake up at night.  She has been tried on imipramine in the past but it did not work.  She has contributing factors of back pain with sciatica, neck pain and shoulder pain.  She has chronic constipation from narcotic use and was prescribed Relistor but could not afford it.  She denies any gross hematuria or recurrent UTI.      , hysterectomy for fibroids, not sexually active (male factor), constipation.     REVIEW OF SYSTEMS:    Review of Systems   Constitutional: Negative.    HENT: Negative.    Eyes: Negative.    Respiratory: Negative.    Cardiovascular: Negative.    Gastrointestinal: Positive for constipation.   Genitourinary: Positive for frequency and urgency.   Musculoskeletal: Positive for back pain, joint pain and neck pain.   Skin: Negative.    Neurological: Negative.    Endo/Heme/Allergies: Negative.    Psychiatric/Behavioral: Negative.          PATIENT HISTORY:    Past Medical History:   Diagnosis Date    Anxiety disorder     Bell's palsy     Chronic back pain     Chronic osteoarthritis     Essential tremor     Fibromyalgia     Hypertension     Mild acid reflux     Neck pain     Other and unspecified hyperlipidemia        Past Surgical History:   Procedure Laterality Date    Breast reduction      CARPAL TUNNEL RELEASE      COLONOSCOPY      COLONOSCOPY N/A 11/10/2017    Procedure: COLONOSCOPY - Miralax split prep;  Surgeon: Annetta Powell MD;  Location: [facility];  Service: Endoscopy;  Laterality: N/A;    HYSTERECTOMY      KNEE SURGERY      bilateral    left  shoulder      Tonsillectomy         Family History   Problem Relation Age of Onset    Diabetes Mother     Tremor Mother     Liver disease Mother     Diabetes Father     Heart disease Father     Tremor Son     Diabetes Sister     Diabetes Brother      Social History    Marital status:      Social History Main Topics    Smoking status: Former Smoker     Quit date: 10/5/1982    Smokeless tobacco: Never Used    Alcohol use No    Drug use: No    Sexual activity: Not on file       Allergies:  Hyoscyamine    Medications:  Outpatient Encounter Prescriptions as of 8/9/2018   Medication Sig Dispense Refill    albuterol 90 mcg/actuation inhaler Inhale 2 puffs into the lungs every 6 (six) hours as needed for Wheezing or Shortness of Breath. Rescue 18 g 0    busPIRone (BUSPAR) 7.5 MG tablet Take 1 tablet (7.5 mg total) by mouth 2 (two) times daily. 60 tablet 5    carvedilol (COREG) 25 MG tablet Take 25 mg by mouth 2 (two) times daily.       citalopram (CELEXA) 20 MG tablet TAKE 1 TABLET BY MOUTH EVERY DAY 30 tablet 5    cyclobenzaprine (FLEXERIL) 10 MG tablet Take 10 mg by mouth 3 (three) times daily as needed.       diclofenac sodium (SOLARAZE) 3 % gel APPLY 1-2 GRAMS TOPICALLY TO AFFECTED AREA 2-3 TIMES PER DAY. PRN  11    estradiol (ESTRACE) 1 MG tablet Take 1 mg by mouth once daily.       fluticasone (FLONASE) 50 mcg/actuation nasal spray USE 1 SPRAY EACH NOSTRIL EVERY DAY 16 g 11    fluticasone (FLONASE) 50 mcg/actuation nasal spray 1 spray (50 mcg total) by Each Nare route once daily. 16 g 0    furosemide (LASIX) 40 MG tablet TAKE 1 TABLET BY MOUTH TWO TIMES A DAY IF SWELLING OCCURS 60 tablet 2    gabapentin (NEURONTIN) 600 MG tablet TAKE 1 TABLET BY MOUTH THREE TIMES A DAY 90 tablet 11    hydrocortisone (WESTCORT) 0.2 % cream Apply topically daily as needed.       levothyroxine (SYNTHROID) 75 MCG tablet Take 1 tablet (75 mcg total) by mouth before breakfast. 90 tablet 0    losartan  (COZAAR) 50 MG tablet Take 50 mg by mouth once daily.       lovastatin (MEVACOR) 20 MG tablet TAKE 1 TABLET BY MOUTH EVERY EVENING 90 tablet 3    morphine (MS CONTIN) 15 MG 12 hr tablet Take 1 tablet by mouth every 12 (twelve) hours.      oxycodone-acetaminophen (PERCOCET)  mg per tablet Take 1 tablet by mouth every 8 (eight) hours as needed.       pantoprazole (PROTONIX) 40 MG tablet Take 1 tablet (40 mg total) by mouth 2 (two) times daily. 60 tablet 1    pramipexole (MIRAPEX) 0.125 MG tablet 2 pills PO in the late afternoon and 2 pills PO at bedtime 120 tablet 11    venlafaxine (EFFEXOR-XR) 150 MG Cp24 TAKE 1 CAPSULE BY MOUTH TWO TIMES A DAY 30 capsule 11    ipratropium (ATROVENT) 0.03 % nasal spray 2 sprays by Nasal route 3 (three) times daily. 30 mL 0    metronidazole 1% (METROGEL) 1 % Gel Apply topically once daily. 50 g 0     No facility-administered encounter medications on file as of 8/9/2018.          PHYSICAL EXAMINATION:    The patient generally appears in good health, is appropriately interactive, and is in no apparent distress.    Skin: No lesions.    Mental: Cooperative with normal affect.    Neuro: Grossly intact.    HEENT: Normal. No evidence of lymphadenopathy.    Chest:  normal inspiratory effort.    Abdomen:  Soft, non-tender. No masses or organomegaly. Bladder is not palpable. No evidence of flank discomfort. No evidence of inguinal hernia.    Extremities: No clubbing, cyanosis, or edema    Normal external female genitalia  Urethral meatus is normal  Urethra and bladder are nontender to bimanual exam  Well supported anteriorly and posteriorly   Uterus and cervix are surgically absent  No adnexal masses  PVR by catheterization was 110 ml  Urethral mobility from 0-80 degrees    LABS:    Lab Results   Component Value Date    BUN 17 03/06/2018    CREATININE 0.73 03/06/2018     UA 1.010, pH 7, otherwise, negative    IMPRESSION:    Encounter Diagnoses   Name Primary?    Mixed stress and  urge urinary incontinence Yes    Incomplete emptying of bladder        PLAN:    1.  The catheterized specimen was sent for culture  2.  Suds/cysto  3.  Bladder Matters book given for behavioral therapy.

## 2018-08-09 ENCOUNTER — OFFICE VISIT (OUTPATIENT)
Dept: UROLOGY | Facility: CLINIC | Age: 68
End: 2018-08-09
Payer: MEDICARE

## 2018-08-09 VITALS
WEIGHT: 222.25 LBS | BODY MASS INDEX: 40.9 KG/M2 | SYSTOLIC BLOOD PRESSURE: 113 MMHG | DIASTOLIC BLOOD PRESSURE: 68 MMHG | HEART RATE: 73 BPM | HEIGHT: 62 IN

## 2018-08-09 DIAGNOSIS — R33.9 INCOMPLETE EMPTYING OF BLADDER: ICD-10-CM

## 2018-08-09 DIAGNOSIS — N39.46 MIXED STRESS AND URGE URINARY INCONTINENCE: Primary | ICD-10-CM

## 2018-08-09 PROCEDURE — 87186 SC STD MICRODIL/AGAR DIL: CPT

## 2018-08-09 PROCEDURE — 99999 PR PBB SHADOW E&M-EST. PATIENT-LVL V: CPT | Mod: PBBFAC,,, | Performed by: UROLOGY

## 2018-08-09 PROCEDURE — 87086 URINE CULTURE/COLONY COUNT: CPT

## 2018-08-09 PROCEDURE — 51701 INSERT BLADDER CATHETER: CPT | Mod: PBBFAC | Performed by: UROLOGY

## 2018-08-09 PROCEDURE — 87077 CULTURE AEROBIC IDENTIFY: CPT

## 2018-08-09 PROCEDURE — 81002 URINALYSIS NONAUTO W/O SCOPE: CPT | Mod: PBBFAC | Performed by: UROLOGY

## 2018-08-09 PROCEDURE — 99215 OFFICE O/P EST HI 40 MIN: CPT | Mod: PBBFAC,25 | Performed by: UROLOGY

## 2018-08-09 PROCEDURE — 99205 OFFICE O/P NEW HI 60 MIN: CPT | Mod: S$PBB,,, | Performed by: UROLOGY

## 2018-08-09 PROCEDURE — 87088 URINE BACTERIA CULTURE: CPT

## 2018-08-09 RX ORDER — CIPROFLOXACIN 250 MG/1
500 TABLET, FILM COATED ORAL ONCE
Status: CANCELLED | OUTPATIENT
Start: 2018-08-09 | End: 2018-08-09

## 2018-08-09 RX ORDER — LIDOCAINE HYDROCHLORIDE 20 MG/ML
JELLY TOPICAL ONCE
Status: CANCELLED | OUTPATIENT
Start: 2018-08-09 | End: 2018-08-09

## 2018-08-09 NOTE — PATIENT INSTRUCTIONS
Urodynamics Studies     The bladder holds urine until it leaves the body through the urethra.     Urodynamics studies are a series of tests that give your doctor a close look at the working of your bladder and urethra. The tests can help your doctor learn about any problems storing urine or voiding (eliminating) urine from your body.  Understanding the lower urinary tract  The lower part of the urinary tract has several parts.  · The bladder stores urine until youre ready to release it.  · The urethra is the tube that carries urine from the bladder out of the body.  · The sphincter is made up of muscles around the opening of the bladder. The sphincter muscles tighten to hold urine in the bladder. They relax to let urine flow. Signals from the brain tell the sphincter when to tighten and relax. These signals also tell the bladder when to contract to let urine flow out of the body.  Why you need a urodynamics study  This test may be ordered if you:  · Are incontinent (leak urine)  · Have a bladder that does not empty all the way.  · Have symptoms such as the need to urinate often or a constant strong need to urinate  · Have intermittent or weak urine stream  · Have persistent urinary tract infections  Preparing for the study  · Tell your doctor about any medicine youre taking. Ask if you should stop them before the study.  · Keep a diary of your bathroom habits. Do this for a few days before the study. This diary can be a helpful part of the evaluation.  · Ask if you need to arrive for the study with a full bladder.  Date Last Reviewed: 1/1/2017  © 7006-9132 [employer]. [address] [place] [address], [place] [address]. All rights reserved. This information is not intended as a substitute for professional medical care. Always follow your healthcare professional's instructions.          Urodynamic Studies     The equipment used for the study varies depending upon the facility and what tests are done.      Urodynamic studies may be done in your doctors office, a clinic, or a hospital. The studies may take up to an hour or more. This depends on which tests your doctor does. The tests are generally painless. You wont need sedating medicine.  Tests that may be done  Uroflowmetry. This measures the amount and speed of urine you void from your bladder. You urinate into a funnel. Its attached to a computer that records your urine flow over time. The amount of urine left in your bladder after you void may also be measured right after this test.  Cystometry. This test evaluates how much your bladder can hold. It also measures how strong your bladder muscle is and how well the signals work that tell you when your bladder is full. Your healthcare provider fills your bladder with sterile water or saline solution, through a catheter. Your doctor will instruct you to report any sensations you feel. Mention if theyre similar to symptoms youve felt at home. Your doctor may ask you to cough, stand and walk, or bear down during this test.  Electromyogram. This helps evaluate the muscle contractions that control urination, such as sphincter muscle contractions. Your healthcare provider may place electrode patches or wires near your rectum or urethra to make the recording. He or she may ask you to try to tighten or relax your sphincter muscles during this test.  Pressure flow study. This test measures your detrusor, urethral, and abdominal pressures. Detrusor is the muscle surrounding the bladder walls that relaxes to allow your bladder to fill, and and contracts to squeeze out urine. A pressure flow study is often done after cystometry. Youre asked to urinate while a probe in your urethra measures pressures.  Video cystourethrography. This takes video pictures of urine flow through your urinary tract. It can help identify blockages or other problems. The bladder is filled with an X-ray contrast fluid. Then X-ray video  pictures are taken as the fluid is urinated out. Ultrasound imaging may also be combined with routine urodynamic studies.  Ambulatory urodynamics. This test can be used to evaluate you while doing usual activities.  Getting your results  After the study, youll get dressed and return to the consultation room. Test results may be ready soon after the study is finished. Or, you may return to your doctors office in a few days for your results. Your doctor can talk with you about the study report and your options.   Date Last Reviewed: 1/1/2017 © 2000-2017 [employer]. [address] [place] [address]. All rights reserved. This information is not intended as a substitute for professional medical care. Always follow your healthcare professional's instructions.          Cystoscopy    Cystoscopy is a procedure that lets your doctor look directly inside your urethra and bladder. It can be used to:  · Help diagnose a problem with your urethra, bladder, or kidneys.  · Take a sample (biopsy) of bladder or urethral tissue.  · Treat certain problems (such as removing kidney stones).  · Place a stent to bypass an obstruction.  · Take special X-rays of the kidneys.  Based on the findings, your doctor may recommend other tests or treatments.  What is a cystoscope?  A cystoscope is a telescope-like instrument that contains lenses and fiberoptics (small glass wires that make bright light). The cystoscope may be straight and rigid, or flexible to bend around curves in the urethra. The doctor may look directly into the cystoscope, or project the image onto a monitor.  Getting ready  · Ask your doctor if you should stop taking any medicines before the procedure.  · Ask whether you should avoid eating or drinking anything after midnight before the procedure.  · Follow any other instructions your doctor gives you.  Tell your doctor before the exam if you:  · Take any medicines, such as aspirin or blood  thinners  · Have allergies to any medicines  · Are pregnant   The procedure  Cystoscopy is done in the doctors office, surgery center, or hospital. The doctor and a nurse are present during the procedure. It takes only a few minutes, longer if a biopsy, X-ray, or treatment needs to be done.  During the procedure:  · You lie on an exam table on your back, knees bent and legs apart. You are covered with a drape.  · Your urethra and the area around it are washed. Anesthetic jelly may be applied to numb the urethra. Other pain medicine is usually not needed. In some cases, you may be offered a mild sedative to help you relax. If a more extensive procedure is to be done, such as a biopsy or kidney stone removal, general anesthesia may be needed.  · The cystoscope is inserted. A sterile fluid is put into the bladder to expand it. You may feel pressure from this fluid.  · When the procedure is done, the cystoscope is removed.  After the procedure  If you had a sedative, general anesthesia, or spinal anesthesia, you must have someone drive you home. Once youre home:  · Drink plenty of fluids.  · You may have burning or light bleeding when you urinate--this is normal.  · Medicines may be prescribed to ease any discomfort or prevent infection. Take these as directed.  · Call your doctor if you have heavy bleeding or blood clots, burning that lasts more than a day, a fever over 100°F  (38° C), or trouble urinating.  Date Last Reviewed: 1/1/2017  © 0210-4325 [employer]. [address] [place] [address], [place] [address]. All rights reserved. This information is not intended as a substitute for professional medical care. Always follow your healthcare professional's instructions.

## 2018-08-12 LAB — BACTERIA UR CULT: NORMAL

## 2018-08-13 ENCOUNTER — TELEPHONE (OUTPATIENT)
Dept: UROLOGY | Facility: CLINIC | Age: 68
End: 2018-08-13

## 2018-08-13 DIAGNOSIS — N30.90 BLADDER INFECTION: Primary | ICD-10-CM

## 2018-08-13 RX ORDER — AMOXICILLIN AND CLAVULANATE POTASSIUM 500; 125 MG/1; MG/1
1 TABLET, FILM COATED ORAL 3 TIMES DAILY
Qty: 21 TABLET | Refills: 0 | Status: SHIPPED | OUTPATIENT
Start: 2018-08-13 | End: 2018-08-20

## 2018-08-16 DIAGNOSIS — F41.9 ANXIETY DISORDER: ICD-10-CM

## 2018-08-16 RX ORDER — CITALOPRAM 20 MG/1
TABLET, FILM COATED ORAL
Qty: 30 TABLET | Refills: 5 | Status: SHIPPED | OUTPATIENT
Start: 2018-08-16 | End: 2019-02-24 | Stop reason: SDUPTHER

## 2018-09-05 ENCOUNTER — PROCEDURE VISIT (OUTPATIENT)
Dept: UROLOGY | Facility: CLINIC | Age: 68
End: 2018-09-05
Payer: MEDICARE

## 2018-09-05 VITALS
TEMPERATURE: 98 F | DIASTOLIC BLOOD PRESSURE: 80 MMHG | SYSTOLIC BLOOD PRESSURE: 140 MMHG | WEIGHT: 214.75 LBS | BODY MASS INDEX: 39.52 KG/M2 | HEART RATE: 98 BPM | HEIGHT: 62 IN | RESPIRATION RATE: 18 BRPM

## 2018-09-05 DIAGNOSIS — R33.9 INCOMPLETE EMPTYING OF BLADDER: ICD-10-CM

## 2018-09-05 DIAGNOSIS — N39.46 MIXED STRESS AND URGE URINARY INCONTINENCE: ICD-10-CM

## 2018-09-05 PROCEDURE — 51741 ELECTRO-UROFLOWMETRY FIRST: CPT | Mod: 26,S$PBB,51, | Performed by: UROLOGY

## 2018-09-05 PROCEDURE — 52000 CYSTOURETHROSCOPY: CPT | Mod: PBBFAC | Performed by: UROLOGY

## 2018-09-05 PROCEDURE — 52000 CYSTOURETHROSCOPY: CPT | Mod: S$PBB,59,, | Performed by: UROLOGY

## 2018-09-05 PROCEDURE — 51736 URINE FLOW MEASUREMENT: CPT | Mod: PBBFAC | Performed by: UROLOGY

## 2018-09-05 PROCEDURE — 51797 INTRAABDOMINAL PRESSURE TEST: CPT | Mod: PBBFAC | Performed by: UROLOGY

## 2018-09-05 PROCEDURE — 51784 ANAL/URINARY MUSCLE STUDY: CPT | Mod: PBBFAC | Performed by: UROLOGY

## 2018-09-05 PROCEDURE — 51728 CYSTOMETROGRAM W/VP: CPT | Mod: 26,S$PBB,, | Performed by: UROLOGY

## 2018-09-05 PROCEDURE — 51741 ELECTRO-UROFLOWMETRY FIRST: CPT | Mod: PBBFAC | Performed by: UROLOGY

## 2018-09-05 PROCEDURE — 51797 INTRAABDOMINAL PRESSURE TEST: CPT | Mod: 26,S$PBB,, | Performed by: UROLOGY

## 2018-09-05 PROCEDURE — 51728 CYSTOMETROGRAM W/VP: CPT | Mod: PBBFAC | Performed by: UROLOGY

## 2018-09-05 PROCEDURE — 51784 ANAL/URINARY MUSCLE STUDY: CPT | Mod: 26,S$PBB,51, | Performed by: UROLOGY

## 2018-09-05 RX ORDER — LIDOCAINE HYDROCHLORIDE 20 MG/ML
JELLY TOPICAL
Status: COMPLETED | OUTPATIENT
Start: 2018-09-05 | End: 2018-09-05

## 2018-09-05 RX ADMIN — LIDOCAINE HYDROCHLORIDE: 20 JELLY TOPICAL at 01:09

## 2018-09-05 NOTE — PATIENT INSTRUCTIONS
SIMPLE URODYNAMIC STUDY (SUDS) & CYSTOSCOPY  UROLOGY CLINIC DISCHARGE INSTRUCTIONS    You have had a procedure that will require time to properly heal. Follow the instructions you have been given on how to care for yourself once you are home. Below is additional information to help in your recovery.    ACTIVITY  · There are no restrictions in activity. Start doing again the things you did before the procedure.  · You may experience a slight burning sensation. You may notice a small amount of blood in your urine. This will clear up within a day. Call the clinic if this continues beyond 48 hours.    DIET  · Continue your normal diet. You may eat the same foods you ate before your procedure.  · Drink plenty of fluids during the first 24-48 hours following your procedure.    MEDICATIONS  · Resume all other previous medications from your prescribing physician.  · Continue any pre=procedure antibiotics until they are all gone.    SIGNS AND SYMPTOMS TO REPORT TO THE DOCTOR  · Chills or fever greater than 101° F within 24 hours of procedure.  · Changes in urination, such as increased bleeding, foul smell, cloudy urine, or painful urination.  · Call your doctor with any questions or concerns.    For any emergency situation, call [phone] immediately or go to your nearest emergency room.    [facility]  [phone]  After hours or on the weekends call [phone] and ask to speak with an urology resident on- call with any questions or concerns

## 2018-09-05 NOTE — PROCEDURES
Urodynamic Report    Indication:  Mixed urinary incontinence    Patient was taken to the Urodynamic Suite with a comfortably full bladder and asked to perform a free uroflow.  Next, the patient was prepped and the urinary residual was drained with a 14 Fr catheter.  A 7 Fr dual lumen catheter was placed to measure intravesical pressures.  A 10 Fr balloon manometer was placed into the rectum for abdominal pressure measurements.  Patch EMG electrodes were placed on the perineum.  The patient was connected to the [employer] Urodynamic machine, using a multichannel technique, the data were interpreted.  The bladder was filled with sterile water at room temperature at a rate of 30 ml/min.  Patient is filled to urgency.  Filling is performed with the patient in the seated position.  Abdominal leak point pressures are checked at 1st desire, then serially at 50cc increments first with Valsalva then with coughing.  The patient was then asked to sit and void for a pressure flow study.    The following are the results of the study:  1.  Uroflow       Q max:  39.2 ml/sec       Voided volume:  115.3 ml       Pattern of the curve:  continuous    2.  PVR:  5 ml    3.  CMG       Sensation:         First Desire:  115 ml         Normal Desire:  167.8 ml         Strong Desire:  249 ml         Urgency:  300 ml       Capacity:  372 ml       Abnormal Contractions:  none       Compliance:  normal    4.  Abdominal Leak Point Pressure:       Valsalva:  106.4 cm H2O at 250 ml infused       Cough:  148 cm H2O at 168 ml infused    5.  EMG:  Normal guarding reflex, increased with strain pattern during voiding    6.  Voiding phase       Q max:  62.3 ml/sec       P det at Q max:  40.3 cm H2O       Pattern of the curve:  continuous       Voided volume:  312.5 ml       PVR:  60 ml    7.  Analysis:  Normal sensation and capacity, stress incontinence demonstrated, empties by Valsalva well.      8.  Recommendations:       A.  Because she empties by  Valsalva, recommended TOS.  Risks and benefits were discussed including a trade off of being able empty and decreased efficacy in 5 years with the transobturator approach.  Card given to schedule with Philip.  When she is on the schedule will have her return for pre op s and to sign consent.  Discussed that the sling will treat the stress component but not the urge incontinence, may need to use medication or tertiary therapies.        B.  See cystoscopy          CYSTOSCOPY REPORT    Pre Procedure Diagnosis:  Mixed incontinence    Post Procedure Diagnosis: nodules consistent with recent history of UTI    Anesthesia: 10 cc 2% lidocaine jelly applied per urethra.    14 FR Flexible Olympus cystoscope used.    FINDINGS:  Dome, anterior, posterior, lateral walls and bladder base free of papillary abnormalities. There were smooth nodules mostly at the bladder neck, consistent with recent history of UTI.  Right and left ureteral orifices in the normal postion and configuration, both effluxed clear urine.  Bladder neck and urethra were normal.    Specimen:  none    The patient was taken to the cystoscopy suite and placed in dorsal lithotomy position.  The genitalia was prepped and draped  in the usual sterile fashion.  Two percent lidocaine jelly was inserted in the urethra.  After sufficent time had passed to allow good local anesthesia, the cystoscope was inserted in the urethra and passed into the bladder visualizing the urethra along its entire course.  The dome, anterior, posterior and lateral walls were examined systematically.  The ureteral orifices were in their usual position and configuration.  The cystoscope was turned upon itself 180 degrees to visualize the bladder neck.  The cystoscope was then brought to the level of the bladder neck, the water was turned on and the urethra was visualized.  The cystoscope was removed and the patient was instructed to urinate prior to leaving the office.     ASSESSMENT/PLAN:   68 year old woman status post flexible cystoscopy.  1. Push fluids for 24 hours.  2. May see blood in the urine, this should gradually improve over the next 2-3 days.  3. The patient was instructed to return to the office or go to the emergency should fever, chills, cloudy urine, or inability to urinate develop.  4. Follow up for transobturator sling.

## 2018-09-19 ENCOUNTER — OFFICE VISIT (OUTPATIENT)
Dept: SLEEP MEDICINE | Facility: CLINIC | Age: 68
End: 2018-09-19
Payer: MEDICARE

## 2018-09-19 ENCOUNTER — OFFICE VISIT (OUTPATIENT)
Dept: OTOLARYNGOLOGY | Facility: CLINIC | Age: 68
End: 2018-09-19
Payer: MEDICARE

## 2018-09-19 ENCOUNTER — LAB VISIT (OUTPATIENT)
Dept: LAB | Facility: HOSPITAL | Age: 68
End: 2018-09-19
Attending: PSYCHIATRY & NEUROLOGY
Payer: MEDICARE

## 2018-09-19 VITALS
HEIGHT: 62 IN | DIASTOLIC BLOOD PRESSURE: 66 MMHG | BODY MASS INDEX: 39.88 KG/M2 | HEART RATE: 71 BPM | WEIGHT: 216.69 LBS | SYSTOLIC BLOOD PRESSURE: 103 MMHG

## 2018-09-19 VITALS — WEIGHT: 217 LBS | BODY MASS INDEX: 39.93 KG/M2 | HEIGHT: 62 IN

## 2018-09-19 DIAGNOSIS — G25.81 RLS (RESTLESS LEGS SYNDROME): ICD-10-CM

## 2018-09-19 DIAGNOSIS — G47.33 OSA (OBSTRUCTIVE SLEEP APNEA): ICD-10-CM

## 2018-09-19 DIAGNOSIS — E61.1 IRON DEFICIENCY: ICD-10-CM

## 2018-09-19 DIAGNOSIS — J30.0 CHRONIC VASOMOTOR RHINITIS: ICD-10-CM

## 2018-09-19 DIAGNOSIS — G47.33 OSA (OBSTRUCTIVE SLEEP APNEA): Primary | ICD-10-CM

## 2018-09-19 DIAGNOSIS — F41.9 ANXIETY: ICD-10-CM

## 2018-09-19 DIAGNOSIS — K21.9 LARYNGOPHARYNGEAL REFLUX (LPR): Primary | ICD-10-CM

## 2018-09-19 DIAGNOSIS — K21.9 GASTROESOPHAGEAL REFLUX DISEASE, ESOPHAGITIS PRESENCE NOT SPECIFIED: ICD-10-CM

## 2018-09-19 DIAGNOSIS — G47.52 RBD (REM BEHAVIORAL DISORDER): ICD-10-CM

## 2018-09-19 DIAGNOSIS — G47.00 INSOMNIA, UNSPECIFIED TYPE: ICD-10-CM

## 2018-09-19 LAB
FERRITIN SERPL-MCNC: 139 NG/ML
IRON SERPL-MCNC: 60 UG/DL
SATURATED IRON: 17 %
TOTAL IRON BINDING CAPACITY: 351 UG/DL
TRANSFERRIN SERPL-MCNC: 237 MG/DL

## 2018-09-19 PROCEDURE — 36415 COLL VENOUS BLD VENIPUNCTURE: CPT | Mod: PO

## 2018-09-19 PROCEDURE — 31575 DIAGNOSTIC LARYNGOSCOPY: CPT | Mod: PBBFAC,PO | Performed by: OTOLARYNGOLOGY

## 2018-09-19 PROCEDURE — 99213 OFFICE O/P EST LOW 20 MIN: CPT | Mod: PBBFAC,25,27,PO | Performed by: PSYCHIATRY & NEUROLOGY

## 2018-09-19 PROCEDURE — 99214 OFFICE O/P EST MOD 30 MIN: CPT | Mod: 25,S$PBB,, | Performed by: OTOLARYNGOLOGY

## 2018-09-19 PROCEDURE — 99999 PR PBB SHADOW E&M-EST. PATIENT-LVL IV: CPT | Mod: PBBFAC,,, | Performed by: OTOLARYNGOLOGY

## 2018-09-19 PROCEDURE — 99214 OFFICE O/P EST MOD 30 MIN: CPT | Mod: PBBFAC,PO | Performed by: OTOLARYNGOLOGY

## 2018-09-19 PROCEDURE — 83540 ASSAY OF IRON: CPT

## 2018-09-19 PROCEDURE — 31575 DIAGNOSTIC LARYNGOSCOPY: CPT | Mod: S$PBB,,, | Performed by: OTOLARYNGOLOGY

## 2018-09-19 PROCEDURE — 99215 OFFICE O/P EST HI 40 MIN: CPT | Mod: S$PBB,,, | Performed by: PSYCHIATRY & NEUROLOGY

## 2018-09-19 PROCEDURE — 99999 PR PBB SHADOW E&M-EST. PATIENT-LVL III: CPT | Mod: PBBFAC,,, | Performed by: PSYCHIATRY & NEUROLOGY

## 2018-09-19 PROCEDURE — 82728 ASSAY OF FERRITIN: CPT

## 2018-09-19 RX ORDER — CLONAZEPAM 0.5 MG/1
TABLET ORAL
Qty: 30 TABLET | Refills: 1 | Status: SHIPPED | OUTPATIENT
Start: 2018-09-19 | End: 2018-11-06 | Stop reason: SDUPTHER

## 2018-09-19 RX ORDER — PANTOPRAZOLE SODIUM 40 MG/1
40 TABLET, DELAYED RELEASE ORAL DAILY
Qty: 30 TABLET | Refills: 3 | Status: SHIPPED | OUTPATIENT
Start: 2018-09-19 | End: 2018-12-19

## 2018-09-19 RX ORDER — ALPRAZOLAM 1 MG/1
TABLET ORAL
COMMUNITY
Start: 2018-08-23 | End: 2018-10-12 | Stop reason: CLARIF

## 2018-09-19 RX ORDER — METHYLNALTREXONE BROMIDE 150 MG/1
TABLET ORAL
COMMUNITY
Start: 2018-08-13 | End: 2018-11-12

## 2018-09-19 RX ORDER — PRAMIPEXOLE DIHYDROCHLORIDE 0.12 MG/1
TABLET ORAL
Qty: 150 TABLET | Refills: 11 | Status: SHIPPED | OUTPATIENT
Start: 2018-09-19 | End: 2018-11-28 | Stop reason: SDUPTHER

## 2018-09-19 NOTE — PROGRESS NOTES
Chief Complaint   Patient presents with    Follow-up    Cough     pt reports slightly improved    Hoarse     comes and goes   .     HPI:Hannah Norman is a 68 y.o. female who has been referred by Dr. Hauser for a one year history of hoarseness. She has been having dysphagia and recently underwent a MBSS which was normal but she was noted by the SLP to have dysphonia and ENT referral was recommended.  Her voice is progressively worsening over this time. There are pitch breaks or cracks. There is vocal fatigue. She admits to odynophagia, throat pain, and otalgia.  There is no hemoptysis or hematemesis. She is breathing well.     She admits to throat clearing and cough. She admits to heartburn and reflux. She is currently on Protonix 40mg daily. She notes that she has difficulty swallowing spicy foods which gives her the sensation that the food is going down the wrong way. She denies food sticking. She denies weight loss.  She does note certain foods trigger her GERD-tomatos.     Interval HPI: Follow up LPR, hoarseness, and MARY.   She reports that she has had been on Protonix 40mg BID.  She reports that she is having intermittent hoarseness. She reports that her cough is somewhat improved.  She notes that she has still a globus sensation and intermittent throat clearing. She feels the Protonix helps. She denies food sticking or weight loss.  She has been using atrovent nasal for nasal rhinorrhea on an as needed basis and feels this helps.     Past Medical History:   Diagnosis Date    Anxiety disorder     Bell's palsy     Chronic back pain     Chronic osteoarthritis     Essential tremor     Fibromyalgia     Hypertension     Mild acid reflux     Neck pain     Other and unspecified hyperlipidemia      Social History     Socioeconomic History    Marital status:      Spouse name: Not on file    Number of children: Not on file    Years of education: Not on file    Highest education level: Not on  file   Social Needs    Financial resource strain: Not on file    Food insecurity - worry: Not on file    Food insecurity - inability: Not on file    Transportation needs - medical: Not on file    Transportation needs - non-medical: Not on file   Occupational History    Not on file   Tobacco Use    Smoking status: Former Smoker     Last attempt to quit: 10/5/1982     Years since quittin.9    Smokeless tobacco: Never Used   Substance and Sexual Activity    Alcohol use: No    Drug use: No    Sexual activity: Not on file   Other Topics Concern    Not on file   Social History Narrative    Not on file     Past Surgical History:   Procedure Laterality Date    Breast reduction      CARPAL TUNNEL RELEASE      COLONOSCOPY      COLONOSCOPY N/A 11/10/2017    Procedure: COLONOSCOPY - Miralax split prep;  Surgeon: Annetta Powell MD;  Location: [facility] END[facility];  Service: Endoscopy;  Laterality: N/A;    COLONOSCOPY - Miralax split prep N/A 11/10/2017    Performed by Annetta Powell MD at [facility]    ESOPHAGOGASTRODUODENOSCOPY (EGD) N/A 11/10/2017    Performed by [facility] Powell [facility] at [facility]    HYSTERECTOMY      KNEE SURGERY      bilateral    left shoulder      Tonsillectomy       Family History   Problem Relation Age of Onset    Diabetes Mother     Tremor Mother     Liver disease Mother     Diabetes Father     Heart disease Father     Tremor Son     Diabetes Sister     Diabetes Brother            Review of Systems  General: negative for chills, fever or weight loss  Psychological: negative for mood changes or depression  Ophthalmic: negative for blurry vision, photophobia or eye pain  ENT: see HPI  Respiratory: no cough, shortness of breath, or wheezing  Cardiovascular: no chest pain or dyspnea on exertion  Gastrointestinal: no abdominal pain, change in bowel habits, or black/ bloody stools  Musculoskeletal: negative for gait disturbance or muscular weakness  Neurological: no syncope or  seizures; no ataxia  Dermatological: negative for puritis,  rash and jaundice  Hematologic/lymphatic: no easy bruising, no new lumps or bumps      Physical Exam:    Vitals:    09/19/18 1049   BP: 103/66   Pulse: 71       Constitutional: Well appearing / communicating without difficutly.  NAD.  Eyes: EOM I Bilaterally  Head/Face: Normocephalic.  Negative paranasal sinus pressure/tenderness.  Salivary glands WNL.  House Brackmann I Bilaterally.    Right Ear: Auricle normal appearance. External Auditory Canal within normal limits no lesions or masses,TM w/o masses/lesions/perforations. TM mobility noted.   Left Ear: Auricle normal appearance. External Auditory Canal within normal limits no lesions or masses,TM w/o masses/lesions/perforations. TM mobility noted.  Nose: Mild nasal septal deviation to the right with an inferior spur.  Inferior Turbinates 3+ bilaterally. No septal perforation. No masses/lesions. External nasal skin appears normal without masses/lesions.  Oral Cavity: Gingiva/lips within normal limits.  Dentition/gingiva healthy appearing. Mucus membranes moist. Floor of mouth soft, no masses palpated. Oral Tongue mobile. Hard Palate appears normal.    Oropharynx: Base of tongue appears normal. No masses/lesions noted. Tonsillar fossa/pharyngeal wall without lesions. Posterior oropharynx WNL.  Soft palate without masses. Midline uvula.   Neck/Lymphatic: No LAD I-VI bilaterally.  No thyromegaly.  No masses noted on exam.    Mirror laryngoscopy/nasopharyngoscopy: Active gag reflex.  Unable to perform.    Neuro/Psychiatric: AOx3.  Normal mood and affect.   Cardiovascular: Normal carotid pulses bilaterally, no increasing jugular venous distention noted at cervical region bilaterally.    Respiratory: Normal respiratory effort, no stridor, no retractions noted.      See separate procedure note for FFL.     Assessment:    ICD-10-CM ICD-9-CM    1. Laryngopharyngeal reflux (LPR) K21.9 478.79    2. Chronic vasomotor  rhinitis J30.0 477.9    3. Gastroesophageal reflux disease, esophagitis presence not specified K21.9 530.81 pantoprazole (PROTONIX) 40 MG tablet   4. MARY (obstructive sleep apnea) G47.33 327.23      The primary encounter diagnosis was Laryngopharyngeal reflux (LPR). Diagnoses of Chronic vasomotor rhinitis, Gastroesophageal reflux disease, esophagitis presence not specified, and MARY (obstructive sleep apnea) were also pertinent to this visit.      Plan:  No orders of the defined types were placed in this encounter.      LPR:  Decrease Protonix to 40mg daily; add ranitidine 300mg PO daily.  Continue reflux precautions. Written handout given. Continue GI follow up as directed.     Mild MARY: Continue CPAP as prescribed. Continue follow up with Dr. Blackwell.     Vasomotor rhinitis: Continue atrovent nasal.     Follow up in 4 months to reassess progress with treatment regimen.     Lupe Lopez MD

## 2018-09-19 NOTE — PROGRESS NOTES
"   Hannah Norman  was seen at the request of  No ref. provider found for sleep evaluation.    03/28/2018 INITIAL HISTORY OF PRESENT ILLNESS:  Hannah Norman is a 68 y.o. female is here to be evaluated for a sleep disorder.       CHIEF COMPLAINT:      The patient's complaints include excessive daytime sleepiness, excessive daytime fatigue, snoring,  witnessed breathing pauses,  gasping for air in sleep and interrupted sleep since  Several years ago.    She has had more nightmares lately.    Diagnosed with mild MARY in 2  Reports  dry mouth and sore throat  Reports occasional nasal congestion   Reports  morning headaches  Denies  interrupted sleep  Denies frequent leg movements  Denies symptoms concerning for parasomnia    The ESS ([place] Sleepiness Score) taken on initial visit is 14 /24    The patient never had tonsillectomy, adenoidectomy or UPPP       INTERVAL HISTORY:    07/18/2018:  The patient has not presented any new complaints since the previous visit.   Still reports nightmares - at least once a week; at times enacting her dreams - was fighting with someone in her sleep, and was pushing her  away.  At night - taking Celexa, Xanax, Flexeril and Gabapentin 600 mg. She's been on this scheme for years.   APAP 8-18  - did not quite meet compliance - hose is "too long, gets in the way", so she removes it in the middle of the night.    90% - 9 cm H2O.    RLS - not quite well controlled with Requip.    Therapy Event Summary Date Range: 6/18/2018 - 7/17/2018     Hide      Compliance Summary  Apnea Indices  Ventilator Statistics    Days with Device Usage:  30 days  Average AHI:  7.1  Average Breath Rate:  9.8 bpm    Percentage of Days >=4 Hours:  66.7%  Average OA Index:  3.6  Average % Patient Triggered Breaths:  N/A    Average Usage (Days Used):  4 hrs. 22 mins. 16 secs.  Average CA Index:  0.8  Average Tidal Volume:  326.2 ml    Average Usage (All Days):  4 hrs. 22 mins. 16 secs.    Average Minute Vent:  N/A     "    Large Leak  Periodic Breathing     Average Time in Large Leak:  1 mins. 10 secs.  Average % of Night in PB:  0.2%     Average % of Night in Large Leak:  0.4%                09/19/2018:     Benefiting from CPAP use in terms of sleep continuity and daytime sleepiness.   Still some dream enacting events - does not remember associated dreams; some times dreams of fight and fough actually fight in her sleep  APAP 8-18 cm H2O.  90% -14 (but sometimes 10)  Wisp - want to try Dreamwear  RLS 0.5 mg split in 2 doses - working no better than Requip.  Therapy Event Summary Date Range: 8/20/2018 - 9/18/2018     Hide      Compliance Summary  Apnea Indices  Ventilator Statistics    Days with Device Usage:  30 days  Average AHI:  7.5  Average Breath Rate:  10.6 bpm    Percentage of Days >=4 Hours:  100.0%  Average OA Index:  3.4  Average % Patient Triggered Breaths:  N/A    Average Usage (Days Used):  6 hrs. 55 mins. 52 secs.  Average CA Index:  0.7  Average Tidal Volume:  324.9 ml    Average Usage (All Days):  6 hrs. 55 mins. 52 secs.    Average Minute Vent:  N/A        Large Leak  Periodic Breathing     Average Time in Large Leak:  12 mins. 28 secs.  Average % of Night in PB:  0.0%     Average % of Night in Large Leak:  3.0%                    SLEEP ROUTINE AND LIFESTYLE 09/19/2018 :    Occupation:    Bed partner:      Time to bed - wake up time on a workday : 9 pm to 8 AM  Time to bed - wake up time on a day off: 9 pm to  8 Am  Sleep onset latency: 30 min  Disruptions or awakenings: 3-4  Time to fall back into sleep: 10-15 min   Perceived sleep quality: 2/5  Perceived total sleep time:  6  hours.  Daytime naps: 1    Exercise routine: no  Caffeine:       PREVIOUS SLEEP STUDIES:     PSG study  In 2/19/18 showed significant MARY with the AHI of 7.9- /hour and SaO2 minimum of 85%.  CPAP titration  - pending      DME:       PAST MEDICAL HISTORY:    Active Ambulatory Problems     Diagnosis Date Noted    Mild acid reflux      Essential hypertension     Essential tremor     Neck pain     Chronic back pain     Fibromyalgia     Anxiety disorder     Other and unspecified hyperlipidemia     Restless leg syndrome 10/22/2012    Osteoarthritis 2013    Gait disturbance 2013    Left-sided Bell's palsy 2015    CTS (carpal tunnel syndrome) 2015    Disorder of lumbar spine 2016    Screening for malignant neoplasm of colon 11/10/2017    MARY (obstructive sleep apnea)      Resolved Ambulatory Problems     Diagnosis Date Noted    No Resolved Ambulatory Problems     Past Medical History:   Diagnosis Date    Anxiety disorder     Bell's palsy     Chronic back pain     Chronic osteoarthritis     Essential tremor     Fibromyalgia     Hypertension     Mild acid reflux     Neck pain     Other and unspecified hyperlipidemia                 PAST SURGICAL HISTORY:    Past Surgical History:   Procedure Laterality Date    Breast reduction      CARPAL TUNNEL RELEASE      COLONOSCOPY      COLONOSCOPY N/A 11/10/2017    Procedure: COLONOSCOPY - Miralax split prep;  Surgeon: Annetta Powell MD;  Location: [facility];  Service: Endoscopy;  Laterality: N/A;    COLONOSCOPY - Miralax split prep N/A 11/10/2017    [facility]    ESOPHAGOGASTRODUODENOSCOPY (EGD) N/A 11/10/2017    Per[facility] at [facility] ENDO    HYSTERECTOMY      KNEE SURGERY      bilateral    left shoulder      Tonsillectomy           FAMILY HISTORY:                Family History   Problem Relation Age of Onset    Diabetes Mother     Tremor Mother     Liver disease Mother     Diabetes Father     Heart disease Father     Tremor Son     Diabetes Sister     Diabetes Brother        SOCIAL HISTORY:          Tobacco:   Social History     Tobacco Use   Smoking Status Former Smoker    Last attempt to quit: 10/5/1982    Years since quittin.9   Smokeless Tobacco Never Used       alcohol  use:    Social History     Substance and Sexual Activity   Alcohol Use No                   ALLERGIES:    Review of patient's allergies indicates:   Allergen Reactions    Hyoscyamine Rash       CURRENT MEDICATIONS:    Current Outpatient Medications   Medication Sig Dispense Refill    albuterol 90 mcg/actuation inhaler Inhale 2 puffs into the lungs every 6 (six) hours as needed for Wheezing or Shortness of Breath. Rescue 18 g 0    ALPRAZolam (XANAX) 1 MG tablet       busPIRone (BUSPAR) 7.5 MG tablet Take 1 tablet (7.5 mg total) by mouth 2 (two) times daily. 60 tablet 5    carvedilol (COREG) 25 MG tablet Take 25 mg by mouth 2 (two) times daily.       citalopram (CELEXA) 20 MG tablet TAKE 1 TABLET BY MOUTH EVERY DAY 30 tablet 5    cyclobenzaprine (FLEXERIL) 10 MG tablet Take 10 mg by mouth 3 (three) times daily as needed.       diclofenac sodium (SOLARAZE) 3 % gel APPLY 1-2 GRAMS TOPICALLY TO AFFECTED AREA 2-3 TIMES PER DAY. PRN  11    estradiol (ESTRACE) 1 MG tablet Take 1 mg by mouth once daily.       fluticasone (FLONASE) 50 mcg/actuation nasal spray USE 1 SPRAY EACH NOSTRIL EVERY DAY 16 g 11    fluticasone (FLONASE) 50 mcg/actuation nasal spray 1 spray (50 mcg total) by Each Nare route once daily. 16 g 0    furosemide (LASIX) 40 MG tablet TAKE 1 TABLET BY MOUTH TWO TIMES A DAY IF SWELLING OCCURS 60 tablet 2    gabapentin (NEURONTIN) 600 MG tablet TAKE 1 TABLET BY MOUTH THREE TIMES A DAY 90 tablet 11    hydrocortisone (WESTCORT) 0.2 % cream Apply topically daily as needed.       ipratropium (ATROVENT) 0.03 % nasal spray 2 sprays by Nasal route 3 (three) times daily. 30 mL 0    levothyroxine (SYNTHROID) 75 MCG tablet Take 1 tablet (75 mcg total) by mouth before breakfast. 90 tablet 0    losartan (COZAAR) 50 MG tablet Take 50 mg by mouth once daily.       lovastatin (MEVACOR) 20 MG tablet TAKE 1 TABLET BY MOUTH EVERY EVENING 90 tablet 3    metronidazole 1% (METROGEL) 1 % Gel Apply topically once  "daily. 50 g 0    morphine (MS CONTIN) 15 MG 12 hr tablet Take 1 tablet by mouth every 12 (twelve) hours.      oxycodone-acetaminophen (PERCOCET)  mg per tablet Take 1 tablet by mouth every 8 (eight) hours as needed.       pantoprazole (PROTONIX) 40 MG tablet Take 1 tablet (40 mg total) by mouth once daily. 30 tablet 3    pramipexole (MIRAPEX) 0.125 MG tablet 2 pills PO in the late afternoon and 2 pills PO at bedtime 120 tablet 11    ranitidine (ZANTAC) 300 MG tablet Take 1 tablet (300 mg total) by mouth every evening. 30 tablet 11    RELISTOR 150 mg Tab       venlafaxine (EFFEXOR-XR) 150 MG Cp24 TAKE 1 CAPSULE BY MOUTH TWO TIMES A DAY 30 capsule 11     No current facility-administered medications for this visit.                       REVIEW OF SYSTEMS:   Sleep related symptoms as per HPI    denies weight gain  Reports occasional dyspnea  Reports occasional palpitations  Reports occasional acid reflux   Reports polyuria x 2  Denies  mood diturbance  Denies  anemia  Denies  muscle pain  Denies  Gait imbalance    Otherwise, a balance of 10 systems reviewed is negative.    PHYSICAL EXAM:  Ht 5' 2" (1.575 m)   Wt 98.4 kg (217 lb)   BMI 39.69 kg/m²   GENERAL: Overweight body habitus, well groomed.  HEENT:   HEENT:  Conjunctivae are non-erythematous; Pupils equal, round, and reactive to light; Nose is symmetrical; Nasal mucosa is pink and moist; Septum is midline; Inferior turbinates are normal; Nasal airflow is normal; Posterior pharynx is pink; Modified Mallampati:II; Posterior palate is low; Tonsils not visualized; Uvula is wide and elongated;Tongue is enlarged; Dentition is fair; No TMJ tenderness; Jaw opening and protrusion without click and without discomfort.  NECK: Supple. Neck circumference is 16 inches. No thyromegaly. No palpable nodes.     SKIN: On face and neck: No abrasions, no rashes, no lesions.  No subcutaneous nodules are palpable.  RESPIRATORY: Chest is clear to auscultation.  Normal chest " "expansion and non-labored breathing at rest.  CARDIOVASCULAR: Normal S1, S2.  No murmurs, gallops or rubs. No carotid bruits bilaterally.  No edema. No clubbing. No cyanosis.    NEURO: Oriented to time, place and person. Normal attention span and concentration. Gait normal.    PSYCH: Affect is full. Mood is normal  MUSCULOSKELETAL: Moves 4 extremities. Gait normal.         Using [facility]:         ASSESSMENT:    1. MARY (obstructive sleep apnea). The patient symptomatically has  excessive daytime sleepiness, snoring,  witnessed breathing pauses, excessive daytime fatigue, gasping for air in sleep and interrupted sleep  with exam findings of "a crowded oral airway and elevated body mass index. The patient has medical co-morbidities of hyperlipidemia, fibromyalgia and nightmares  which can be worsened by MARY. This warrants treatment. Benefiting from CPAP use in terms of sleep continuity and daytime sleepiness. Has not quite met compliance due to discomfort.       2. RLS - not well controlled on Requip; switch to Pramipexole     3. RBD      PLAN:    Will increase Mirapex 0.125 TID  Drop morning dose Gabapentin (dizziness)  A week after if RLS no better -> add Clonazepam 0.5 mg at bedtime once a day (anxiety, insomnia, RLS and RBD)  Change 8-18-> 10-20 cm   Recommend using Dreamwear mask next time - I put it on rx for [employer].     More than 25 minutes of this 45 minutes visit was spent in counseling: during our discussion today, we talked about the etiology of  MARY as well as the potential ramifications of untreated sleep apnea, which could include daytime sleepiness, hypertension, heart disease and/or stroke.  We discussed potential treatment options, which could include weight loss, body positioning, continuous positive airway pressure (CPAP), or referral for surgical consideration. Meanwhile, she  is urged to avoid supine sleep, weight gain and alcoholic beverages since all of these can worsen MARY.     Precautions: " The patient was advised to abstain from driving should he feel sleepy or drowsy.    Follow up: MD/NP  after the sleep study has been completed.     Thank you for allowing me the opportunity to participate in the care of your patient.    This visit summary will be sent to referring provider via [employer]

## 2018-09-19 NOTE — PATIENT INSTRUCTIONS
PLAN:    Will increase Mirapex 0.125 TID  Drop morning dose Gabapentin (dizziness)  A week after if RLS no better -> add Clonazepam 0.5 mg at bedtime once a day (anxiety, insomnia, RLS and RBD)  Change 8-18-> 10-20 cm   Recommend using Dreamwear mask next time - I put it on rx for [employer].

## 2018-09-21 DIAGNOSIS — Z12.39 BREAST CANCER SCREENING: ICD-10-CM

## 2018-09-25 ENCOUNTER — TELEPHONE (OUTPATIENT)
Dept: UROLOGY | Facility: CLINIC | Age: 68
End: 2018-09-25

## 2018-09-25 DIAGNOSIS — N39.46 MIXED STRESS AND URGE URINARY INCONTINENCE: Primary | ICD-10-CM

## 2018-09-27 ENCOUNTER — HOSPITAL ENCOUNTER (OUTPATIENT)
Dept: RADIOLOGY | Facility: HOSPITAL | Age: 68
Discharge: HOME | End: 2018-09-27
Attending: FAMILY MEDICINE
Payer: MEDICARE

## 2018-09-27 DIAGNOSIS — Z12.39 BREAST CANCER SCREENING: ICD-10-CM

## 2018-09-27 PROCEDURE — 77067 SCR MAMMO BI INCL CAD: CPT | Mod: TC,PO

## 2018-09-28 ENCOUNTER — TELEPHONE (OUTPATIENT)
Dept: FAMILY MEDICINE | Facility: CLINIC | Age: 68
End: 2018-09-28

## 2018-09-28 NOTE — TELEPHONE ENCOUNTER
----- Message from Raffi Garcia MD sent at 9/28/2018 12:45 AM CDT -----  I notified the pt that here mole that was removed is benign follow-up in 1 year

## 2018-10-01 ENCOUNTER — PATIENT MESSAGE (OUTPATIENT)
Dept: SLEEP MEDICINE | Facility: CLINIC | Age: 68
End: 2018-10-01

## 2018-10-01 ENCOUNTER — PATIENT MESSAGE (OUTPATIENT)
Dept: NEUROLOGY | Facility: CLINIC | Age: 68
End: 2018-10-01

## 2018-10-01 DIAGNOSIS — F41.9 ANXIETY DISORDER, UNSPECIFIED TYPE: ICD-10-CM

## 2018-10-01 RX ORDER — ALPRAZOLAM 1 MG/1
TABLET ORAL
Qty: 90 TABLET | Refills: 3 | OUTPATIENT
Start: 2018-10-01

## 2018-10-08 ENCOUNTER — PATIENT MESSAGE (OUTPATIENT)
Dept: ADMINISTRATIVE | Facility: OTHER | Age: 68
End: 2018-10-08

## 2018-10-12 ENCOUNTER — ANESTHESIA EVENT (OUTPATIENT)
Dept: SURGERY | Facility: HOSPITAL | Age: 68
End: 2018-10-12
Payer: MEDICARE

## 2018-10-12 ENCOUNTER — TELEPHONE (OUTPATIENT)
Dept: PREADMISSION TESTING | Facility: HOSPITAL | Age: 68
End: 2018-10-12

## 2018-10-12 DIAGNOSIS — Z01.818 PREOPERATIVE TESTING: Primary | ICD-10-CM

## 2018-10-12 NOTE — ANESTHESIA PREPROCEDURE EVALUATION
Shanelle Gan, RN   Registered Nurse      Pre Admission Screening   Signed                             []Hide copied text    []Hover for details      Anesthesia Assessment: Preoperative EQUATION     Planned Procedure: Procedure(s) (LRB):  PLACEMENT, TRANSOBTURATOR TAPE (N/A)  CYSTOSCOPY (N/A)  Requested Anesthesia Type:General  Surgeon: Feli Garza MD  Service: Urology  Known or anticipated Date of Surgery:10/23/2018     Surgeon notes: reviewed     Electronic QUestionnaire Assessment completed via nurse interview with patient.         NO AQ           Triage considerations:      The patient has no apparent active cardiac condition (No unstable coronary Syndrome such as severe unstable angina or recent [<1 month] myocardial infarction, decompensated CHF, severe valvular   disease or significant arrhythmia)     Previous anesthesia records:MAC and No problems   11/10/2017 Colonoscopy/ EGD        Last PCP note: 6-12 months ago , within [facility]   Subspecialty notes: ENT, Gastroenterology, Neurology, Urology     Other important co-morbidities: MARY with CPAP, HTN/HLP, Fibromyalgia, Restless leg syndrome     Tests already available:  Available tests,  6-12 months ago , within [facility] .  3/6/18  TSH, Lipid, CBC, CMP                            Instructions given. (See in Nurse's note)     Optimization:  Anesthesia Preop Clinic Assessment  Indicated: Not required for this procedure                Plan:    Testing:  BMP and EKG                           Patient  has previously scheduled Medical Appointment: 10/17/2018     Navigation: Tests Scheduled.                         Results will be tracked by Preop Clinic.                                    10/17/2018 Lab and EKG results noted.                                                                                                                  10/12/2018  Hannah Norman is a 68 y.o., female.    Anesthesia Evaluation    I have reviewed the Patient Summary Reports.      I have reviewed the Medications.     Review of Systems  Anesthesia Hx:  No problems with previous Anesthesia  History of prior surgery of interest to airway management or planning: Previous anesthesia: MAC  11/10/2017 EGD, Colonoscopy with MAC.  Denies Family Hx of Anesthesia complications.   Denies Personal Hx of Anesthesia complications.   Social:  Non-Smoker, No Alcohol Use    Hematology/Oncology:  Hematology Normal   Oncology Normal     EENT/Dental:EENT/Dental Normal   Cardiovascular:   Hypertension  Denies Angina. hyperlipidemia  Functional Capacity 3.5 METS  Hypertension , Well Controlled on Rx , Recent typical home B/P of 115/70   Pulmonary:   Denies Recent URI. Sleep Apnea  Pulmonary Symptoms:  are shortness of breath with activity.  Obstructive Sleep Apnea (MARY), CPAP used.   Renal/:  Renal/ Normal   Renal Symptoms/Infections/Stones: urgency, incontinence.    Hepatic/GI:   GERD  Esophageal / Stomach Disorders Gerd Controlled by chronic antireflux medication.    Musculoskeletal:   Arthritis     Neurological:   Neuromuscular Disease, Munith Palsy    Fibromyalgia Neuro Symptoms Left sided Bell's PalsyPain Etiology/Diagnosis, Arthritis, Fibromayalgia  Movement Disorder Dx, Restless Leg Syndrome   Endocrine:   Hypothyroidism Stable synthroid dose Metabolic Disorders, Obesity / BMI > 30  Dermatological:  Skin Normal    Psych:   anxiety          Physical Exam  General:  Well nourished, Obesity    Airway/Jaw/Neck:  Airway Findings: Mouth Opening: Normal Tongue: Normal  General Airway Assessment: Adult  Mallampati: III  TM Distance: Normal, at least 6 cm  Jaw/Neck Findings:  Neck ROM: Normal ROM     Eyes/Ears/Nose:  EYES/EARS/NOSE FINDINGS: Normal   Dental:  Dental Findings: Upper Dentures, Lower Dentures   Chest/Lungs:  Chest/Lungs Findings: Normal Respiratory Rate     Heart/Vascular:  Heart Findings: Rate: Normal  Rhythm: Regular Rhythm     Abdomen:  Abdomen Findings: Normal     Musculoskeletal:  Musculoskeletal Findings: Normal   Skin:  Skin Findings: Normal    Mental Status:  Mental Status Findings:  Cooperative, Alert and Oriented         Anesthesia Plan  Type of Anesthesia, risks & benefits discussed:  Anesthesia Type:  general  Patient's Preference: General  Intra-op Monitoring Plan: standard ASA monitors  Intra-op Monitoring Plan Comments:   Post Op Pain Control Plan: per primary service following discharge from PACU and IV/PO Opioids PRN  Post Op Pain Control Plan Comments:   Induction:   IV  Beta Blocker:  Patient is on a Beta-Blocker and has received one dose within the past 24 hours (No further documentation required).       Informed Consent: Patient understands risks and agrees with Anesthesia plan.  Questions answered. Anesthesia consent signed with patient.  ASA Score: 3     Day of Surgery Review of History & Physical:    H&P update referred to the surgeon.         Ready For Surgery From Anesthesia Perspective.

## 2018-10-12 NOTE — PRE ADMISSION SCREENING
Anesthesia Assessment: Preoperative EQUATION    Planned Procedure: Procedure(s) (LRB):  PLACEMENT, TRANSOBTURATOR TAPE (N/A)  CYSTOSCOPY (N/A)  Requested Anesthesia Type:General  Surgeon: Feli Garza MD  Service: Urology  Known or anticipated Date of Surgery:10/23/2018    Surgeon notes: reviewed    Electronic QUestionnaire Assessment completed via nurse interview with patient.        NO AQ        Triage considerations:     The patient has no apparent active cardiac condition (No unstable coronary Syndrome such as severe unstable angina or recent [<1 month] myocardial infarction, decompensated CHF, severe valvular   disease or significant arrhythmia)    Previous anesthesia records:MAC and No problems   11/10/2017 Colonoscopy/ EGD      Last PCP note: 6-12 months ago , within [facility]   Subspecialty notes: ENT, Gastroenterology, Neurology, Urology    Other important co-morbidities: MARY with CPAP, HTN/HLP, Fibromyalgia, Restless leg syndrome     Tests already available:  Available tests,  6-12 months ago , within [facility] .  3/6/18  TSH, Lipid, CBC, CMP            Instructions given. (See in Nurse's note)    Optimization:  Anesthesia Preop Clinic Assessment  Indicated: Not required for this procedure        Plan:    Testing:  BMP and EKG      Patient  has previously scheduled Medical Appointment: 10/17/2018    Navigation: Tests Scheduled.              Results will be tracked by Preop Clinic.

## 2018-10-12 NOTE — PRE-PROCEDURE INSTRUCTIONS
Preop instructions given and reviewed.  Patient verbalized understanding.  Patient instructed to call POC with any questions or changes.

## 2018-10-16 DIAGNOSIS — M79.7 FIBROMYALGIA: ICD-10-CM

## 2018-10-16 RX ORDER — VENLAFAXINE HYDROCHLORIDE 150 MG/1
CAPSULE, EXTENDED RELEASE ORAL
Qty: 30 CAPSULE | Refills: 12 | Status: SHIPPED | OUTPATIENT
Start: 2018-10-16 | End: 2019-03-14 | Stop reason: SDUPTHER

## 2018-10-17 ENCOUNTER — OFFICE VISIT (OUTPATIENT)
Dept: UROLOGY | Facility: CLINIC | Age: 68
End: 2018-10-17
Payer: MEDICARE

## 2018-10-17 ENCOUNTER — HOSPITAL ENCOUNTER (OUTPATIENT)
Dept: CARDIOLOGY | Facility: CLINIC | Age: 68
Discharge: HOME | End: 2018-10-17
Attending: ANESTHESIOLOGY
Payer: MEDICARE

## 2018-10-17 VITALS
HEIGHT: 62 IN | DIASTOLIC BLOOD PRESSURE: 75 MMHG | BODY MASS INDEX: 39.07 KG/M2 | HEART RATE: 81 BPM | SYSTOLIC BLOOD PRESSURE: 121 MMHG | WEIGHT: 212.31 LBS

## 2018-10-17 DIAGNOSIS — N39.46 MIXED STRESS AND URGE URINARY INCONTINENCE: ICD-10-CM

## 2018-10-17 DIAGNOSIS — Z01.818 PREOPERATIVE TESTING: ICD-10-CM

## 2018-10-17 PROCEDURE — 99214 OFFICE O/P EST MOD 30 MIN: CPT | Mod: S$PBB,,, | Performed by: UROLOGY

## 2018-10-17 PROCEDURE — 99214 OFFICE O/P EST MOD 30 MIN: CPT | Mod: PBBFAC | Performed by: UROLOGY

## 2018-10-17 PROCEDURE — 99999 PR PBB SHADOW E&M-EST. PATIENT-LVL IV: CPT | Mod: PBBFAC,,, | Performed by: UROLOGY

## 2018-10-17 PROCEDURE — 93005 ELECTROCARDIOGRAM TRACING: CPT | Mod: PBBFAC | Performed by: INTERNAL MEDICINE

## 2018-10-17 PROCEDURE — 81002 URINALYSIS NONAUTO W/O SCOPE: CPT | Mod: PBBFAC | Performed by: UROLOGY

## 2018-10-17 PROCEDURE — 93010 ELECTROCARDIOGRAM REPORT: CPT | Mod: S$PBB,,, | Performed by: INTERNAL MEDICINE

## 2018-10-17 NOTE — PROGRESS NOTES
CHIEF COMPLAINT:    Mrs. Norman is a 68 y.o. female presenting to sign consents for transobturator sling    PRESENTING ILLNESS:    Hannah Norman is a 68 y.o. female who has a history of mixed urinary incontinence.  She goes through 3-4 pull ups a day and one at night, frequency, x 10.  She has tried imipramine in the past.  She has both stress and urge symptoms.  She underwent urodynamics and was found to have a stable bladder but stress incontinence and void by Valsalva.    , hysterectomy for fibroids, not sexually active (male factor), constipation.     REVIEW OF SYSTEMS:    Review of Systems   Constitutional: Negative.    HENT: Negative.    Eyes: Negative.    Respiratory: Negative.    Cardiovascular: Negative.    Gastrointestinal: Positive for constipation.   Genitourinary: Positive for frequency and urgency.        Mixed incontinence   Musculoskeletal: Positive for back pain and neck pain.   Skin: Negative.    Neurological: Negative.    Endo/Heme/Allergies: Negative.    Psychiatric/Behavioral: Negative.        PATIENT HISTORY:    Past Medical History:   Diagnosis Date    Anxiety disorder     Bell's palsy     Chronic back pain     Chronic osteoarthritis     Essential tremor     Fibromyalgia     Hypertension     Mild acid reflux     Neck pain     Other and unspecified hyperlipidemia        Past Surgical History:   Procedure Laterality Date    BREAST BIOPSY Left       negative    Breast reduction      CARPAL TUNNEL RELEASE      COLONOSCOPY      COLONOSCOPY N/A 11/10/2017    Procedure: COLONOSCOPY - Miralax split prep;  Surgeon: Annetta Powell MD;  Location: [facility];  Service: Endoscopy;  Laterality: N/A;    COLONOSCOPY - Miralax split prep N/A 11/10/2017    Performed by [facility] at [facility]O    ESOPHAGOGASTRODUODENOSCOPY (EGD) N/A 11/10/2017    Performed by An[facility] Powell [facility] at [facility]    HYSTERECTOMY      KNEE SURGERY      bilateral    left shoulder       Tonsillectomy         Family History   Problem Relation Age of Onset    Diabetes Mother     Tremor Mother     Liver disease Mother     Diabetes Father     Heart disease Father     Tremor Son     Diabetes Sister     Diabetes Brother      Socioeconomic History    Marital status:    Tobacco Use    Smoking status: Former Smoker     Last attempt to quit: 10/5/1982     Years since quittin.0    Smokeless tobacco: Never Used   Substance and Sexual Activity    Alcohol use: No    Drug use: No    Sexual activity: Not on file       Allergies:  Hyoscyamine    Medications:  Outpatient Encounter Medications as of 10/17/2018   Medication Sig Dispense Refill    albuterol 90 mcg/actuation inhaler Inhale 2 puffs into the lungs every 6 (six) hours as needed for Wheezing or Shortness of Breath. Rescue 18 g 0    carvedilol (COREG) 25 MG tablet Take 25 mg by mouth 2 (two) times daily.       citalopram (CELEXA) 20 MG tablet TAKE 1 TABLET BY MOUTH EVERY DAY 30 tablet 5    clonazePAM (KLONOPIN) 0.5 MG tablet 1 pill PO QHS bedtime 30 tablet 1    cyclobenzaprine (FLEXERIL) 10 MG tablet Take 10 mg by mouth 3 (three) times daily as needed.       diclofenac sodium (SOLARAZE) 3 % gel APPLY 1-2 GRAMS TOPICALLY TO AFFECTED AREA 2-3 TIMES PER DAY. PRN  11    estradiol (ESTRACE) 1 MG tablet Take 1 mg by mouth once daily.       fluticasone (FLONASE) 50 mcg/actuation nasal spray USE 1 SPRAY EACH NOSTRIL EVERY DAY 16 g 11    fluticasone (FLONASE) 50 mcg/actuation nasal spray 1 spray (50 mcg total) by Each Nare route once daily. 16 g 0    furosemide (LASIX) 40 MG tablet TAKE 1 TABLET BY MOUTH TWO TIMES A DAY IF SWELLING OCCURS 60 tablet 2    gabapentin (NEURONTIN) 600 MG tablet TAKE 1 TABLET BY MOUTH THREE TIMES A DAY 90 tablet 11    hydrocortisone (WESTCORT) 0.2 % cream Apply topically daily as needed.       levothyroxine (SYNTHROID) 75 MCG tablet Take 1 tablet (75 mcg total) by mouth before breakfast. 90 tablet 0     losartan (COZAAR) 50 MG tablet Take 50 mg by mouth once daily.       lovastatin (MEVACOR) 20 MG tablet TAKE 1 TABLET BY MOUTH EVERY EVENING 90 tablet 3    morphine (MS CONTIN) 15 MG 12 hr tablet Take 1 tablet by mouth every 12 (twelve) hours.      oxycodone-acetaminophen (PERCOCET)  mg per tablet Take 1 tablet by mouth every 8 (eight) hours as needed.       pantoprazole (PROTONIX) 40 MG tablet Take 1 tablet (40 mg total) by mouth once daily. 30 tablet 3    pramipexole (MIRAPEX) 0.125 MG tablet 2 pills PO in the late afternoon and 3 pills PO at bedtime 150 tablet 11    ranitidine (ZANTAC) 300 MG tablet Take 1 tablet (300 mg total) by mouth every evening. 30 tablet 11    RELISTOR 150 mg Tab       venlafaxine (EFFEXOR-XR) 150 MG Cp24 TAKE 1 CAPSULE BY MOUTH TWO TIMES A DAY 30 capsule 12    ipratropium (ATROVENT) 0.03 % nasal spray 2 sprays by Nasal route 3 (three) times daily. 30 mL 0    metronidazole 1% (METROGEL) 1 % Gel Apply topically once daily. 50 g 0     No facility-administered encounter medications on file as of 10/17/2018.          PHYSICAL EXAMINATION:    The patient generally appears in good health, is appropriately interactive, and is in no apparent distress.    Skin: No lesions.    Mental: Cooperative with normal affect.    Neuro: Grossly intact.    HEENT: Normal. No evidence of lymphadenopathy.    Chest:  normal inspiratory effort.    Abdomen:  Soft, non-tender. No masses or organomegaly. Bladder is not palpable. No evidence of flank discomfort. No evidence of inguinal hernia.    Extremities: No clubbing, cyanosis, or edema    From initial constultation:    Normal external female genitalia  Urethral meatus is normal  Urethra and bladder are nontender to bimanual exam  Well supported anteriorly and posteriorly   Uterus and cervix are surgically absent  No adnexal masses  Urethral mobility from 0-80 degrees    LABS:    Lab Results   Component Value Date    BUN 17 03/06/2018     CREATININE 0.73 03/06/2018     UA 1.020, pH 5, otherwise, negative    IMPRESSION:    Encounter Diagnoses   Name Primary?    Mixed stress and urge urinary incontinence        PLAN:    1.  For transobturator sling.  Discussed that this treats the stress component not the urge and we may need to treat with other therapies after surgery  2.  Consent signed

## 2018-10-17 NOTE — H&P (VIEW-ONLY)
CHIEF COMPLAINT:    Mrs. Norman is a 68 y.o. female presenting to sign consents for transobturator sling    PRESENTING ILLNESS:    Hannah Norman is a 68 y.o. female who has a history of mixed urinary incontinence.  She goes through 3-4 pull ups a day and one at night, frequency, x 10.  She has tried imipramine in the past.  She has both stress and urge symptoms.  She underwent urodynamics and was found to have a stable bladder but stress incontinence and void by Valsalva.    , hysterectomy for fibroids, not sexually active (male factor), constipation.     REVIEW OF SYSTEMS:    Review of Systems   Constitutional: Negative.    HENT: Negative.    Eyes: Negative.    Respiratory: Negative.    Cardiovascular: Negative.    Gastrointestinal: Positive for constipation.   Genitourinary: Positive for frequency and urgency.        Mixed incontinence   Musculoskeletal: Positive for back pain and neck pain.   Skin: Negative.    Neurological: Negative.    Endo/Heme/Allergies: Negative.    Psychiatric/Behavioral: Negative.        PATIENT HISTORY:    Past Medical History:   Diagnosis Date    Anxiety disorder     Bell's palsy     Chronic back pain     Chronic osteoarthritis     Essential tremor     Fibromyalgia     Hypertension     Mild acid reflux     Neck pain     Other and unspecified hyperlipidemia        Past Surgical History:   Procedure Laterality Date    BREAST BIOPSY Left       negative    Breast reduction      CARPAL TUNNEL RELEASE      COLONOSCOPY      COLONOSCOPY N/A 11/10/2017    Procedure: COLONOSCOPY - Miralax split prep;  Surgeon: Annetta Powell MD;  Location: [facility];  Service: Endoscopy;  Laterality: N/A;    COLONOSCOPY - Miralax split prep N/A 11/10/2017    Performed by [facility] MD at [facility] ENDO    ESOPHAGOGASTRODUODENOSCOPY (EGD) N/A 11/10/2017    [facility] at [facility]    HYSTERECTOMY      KNEE SURGERY      bilateral    left shoulder       Tonsillectomy         Family History   Problem Relation Age of Onset    Diabetes Mother     Tremor Mother     Liver disease Mother     Diabetes Father     Heart disease Father     Tremor Son     Diabetes Sister     Diabetes Brother      Socioeconomic History    Marital status:    Tobacco Use    Smoking status: Former Smoker     Last attempt to quit: 10/5/1982     Years since quittin.0    Smokeless tobacco: Never Used   Substance and Sexual Activity    Alcohol use: No    Drug use: No    Sexual activity: Not on file       Allergies:  Hyoscyamine    Medications:  Outpatient Encounter Medications as of 10/17/2018   Medication Sig Dispense Refill    albuterol 90 mcg/actuation inhaler Inhale 2 puffs into the lungs every 6 (six) hours as needed for Wheezing or Shortness of Breath. Rescue 18 g 0    carvedilol (COREG) 25 MG tablet Take 25 mg by mouth 2 (two) times daily.       citalopram (CELEXA) 20 MG tablet TAKE 1 TABLET BY MOUTH EVERY DAY 30 tablet 5    clonazePAM (KLONOPIN) 0.5 MG tablet 1 pill PO QHS bedtime 30 tablet 1    cyclobenzaprine (FLEXERIL) 10 MG tablet Take 10 mg by mouth 3 (three) times daily as needed.       diclofenac sodium (SOLARAZE) 3 % gel APPLY 1-2 GRAMS TOPICALLY TO AFFECTED AREA 2-3 TIMES PER DAY. PRN  11    estradiol (ESTRACE) 1 MG tablet Take 1 mg by mouth once daily.       fluticasone (FLONASE) 50 mcg/actuation nasal spray USE 1 SPRAY EACH NOSTRIL EVERY DAY 16 g 11    fluticasone (FLONASE) 50 mcg/actuation nasal spray 1 spray (50 mcg total) by Each Nare route once daily. 16 g 0    furosemide (LASIX) 40 MG tablet TAKE 1 TABLET BY MOUTH TWO TIMES A DAY IF SWELLING OCCURS 60 tablet 2    gabapentin (NEURONTIN) 600 MG tablet TAKE 1 TABLET BY MOUTH THREE TIMES A DAY 90 tablet 11    hydrocortisone (WESTCORT) 0.2 % cream Apply topically daily as needed.       levothyroxine (SYNTHROID) 75 MCG tablet Take 1 tablet (75 mcg total) by mouth before breakfast. 90 tablet 0     losartan (COZAAR) 50 MG tablet Take 50 mg by mouth once daily.       lovastatin (MEVACOR) 20 MG tablet TAKE 1 TABLET BY MOUTH EVERY EVENING 90 tablet 3    morphine (MS CONTIN) 15 MG 12 hr tablet Take 1 tablet by mouth every 12 (twelve) hours.      oxycodone-acetaminophen (PERCOCET)  mg per tablet Take 1 tablet by mouth every 8 (eight) hours as needed.       pantoprazole (PROTONIX) 40 MG tablet Take 1 tablet (40 mg total) by mouth once daily. 30 tablet 3    pramipexole (MIRAPEX) 0.125 MG tablet 2 pills PO in the late afternoon and 3 pills PO at bedtime 150 tablet 11    ranitidine (ZANTAC) 300 MG tablet Take 1 tablet (300 mg total) by mouth every evening. 30 tablet 11    RELISTOR 150 mg Tab       venlafaxine (EFFEXOR-XR) 150 MG Cp24 TAKE 1 CAPSULE BY MOUTH TWO TIMES A DAY 30 capsule 12    ipratropium (ATROVENT) 0.03 % nasal spray 2 sprays by Nasal route 3 (three) times daily. 30 mL 0    metronidazole 1% (METROGEL) 1 % Gel Apply topically once daily. 50 g 0     No facility-administered encounter medications on file as of 10/17/2018.          PHYSICAL EXAMINATION:    The patient generally appears in good health, is appropriately interactive, and is in no apparent distress.    Skin: No lesions.    Mental: Cooperative with normal affect.    Neuro: Grossly intact.    HEENT: Normal. No evidence of lymphadenopathy.    Chest:  normal inspiratory effort.    Abdomen:  Soft, non-tender. No masses or organomegaly. Bladder is not palpable. No evidence of flank discomfort. No evidence of inguinal hernia.    Extremities: No clubbing, cyanosis, or edema    From initial constultation:    Normal external female genitalia  Urethral meatus is normal  Urethra and bladder are nontender to bimanual exam  Well supported anteriorly and posteriorly   Uterus and cervix are surgically absent  No adnexal masses  Urethral mobility from 0-80 degrees    LABS:    Lab Results   Component Value Date    BUN 17 03/06/2018     CREATININE 0.73 03/06/2018     UA 1.020, pH 5, otherwise, negative    IMPRESSION:    Encounter Diagnoses   Name Primary?    Mixed stress and urge urinary incontinence        PLAN:    1.  For transobturator sling.  Discussed that this treats the stress component not the urge and we may need to treat with other therapies after surgery  2.  Consent signed

## 2018-10-22 ENCOUNTER — TELEPHONE (OUTPATIENT)
Dept: UROLOGY | Facility: CLINIC | Age: 68
End: 2018-10-22

## 2018-10-22 ENCOUNTER — PATIENT MESSAGE (OUTPATIENT)
Dept: ADMINISTRATIVE | Facility: OTHER | Age: 68
End: 2018-10-22

## 2018-10-23 ENCOUNTER — ANESTHESIA (OUTPATIENT)
Dept: SURGERY | Facility: HOSPITAL | Age: 68
End: 2018-10-23
Payer: MEDICARE

## 2018-10-23 ENCOUNTER — HOSPITAL ENCOUNTER (OUTPATIENT)
Facility: HOSPITAL | Age: 68
Discharge: HOME | End: 2018-10-24
Attending: UROLOGY | Admitting: UROLOGY
Payer: MEDICARE

## 2018-10-23 DIAGNOSIS — R33.9 INCOMPLETE EMPTYING OF BLADDER: ICD-10-CM

## 2018-10-23 DIAGNOSIS — N39.3 STRESS INCONTINENCE, FEMALE: ICD-10-CM

## 2018-10-23 DIAGNOSIS — N39.46 MIXED STRESS AND URGE URINARY INCONTINENCE: ICD-10-CM

## 2018-10-23 PROCEDURE — 25000003 PHARM REV CODE 250: Performed by: STUDENT IN AN ORGANIZED HEALTH CARE EDUCATION/TRAINING PROGRAM

## 2018-10-23 PROCEDURE — 63600175 PHARM REV CODE 636 W HCPCS: Performed by: NURSE ANESTHETIST, CERTIFIED REGISTERED

## 2018-10-23 PROCEDURE — 25000003 PHARM REV CODE 250: Performed by: UROLOGY

## 2018-10-23 PROCEDURE — 71000033 HC RECOVERY, INTIAL HOUR: Performed by: UROLOGY

## 2018-10-23 PROCEDURE — 37000008 HC ANESTHESIA 1ST 15 MINUTES: Performed by: UROLOGY

## 2018-10-23 PROCEDURE — C1771 REP DEV, URINARY, W/SLING: HCPCS | Performed by: UROLOGY

## 2018-10-23 PROCEDURE — 25000003 PHARM REV CODE 250: Performed by: NURSE ANESTHETIST, CERTIFIED REGISTERED

## 2018-10-23 PROCEDURE — 71000039 HC RECOVERY, EACH ADD'L HOUR: Performed by: UROLOGY

## 2018-10-23 PROCEDURE — D9220A PRA ANESTHESIA: Mod: ANES,,, | Performed by: ANESTHESIOLOGY

## 2018-10-23 PROCEDURE — 57288 REPAIR BLADDER DEFECT: CPT | Mod: GC,,, | Performed by: UROLOGY

## 2018-10-23 PROCEDURE — D9220A PRA ANESTHESIA: Mod: CRNA,,, | Performed by: NURSE ANESTHETIST, CERTIFIED REGISTERED

## 2018-10-23 PROCEDURE — 36000707: Performed by: UROLOGY

## 2018-10-23 PROCEDURE — 37000009 HC ANESTHESIA EA ADD 15 MINS: Performed by: UROLOGY

## 2018-10-23 PROCEDURE — 36000706: Performed by: UROLOGY

## 2018-10-23 PROCEDURE — 94761 N-INVAS EAR/PLS OXIMETRY MLT: CPT

## 2018-10-23 DEVICE — SLING HALO SHAPE OBTRYX II: Type: IMPLANTABLE DEVICE | Site: VAGINA | Status: FUNCTIONAL

## 2018-10-23 RX ORDER — CYCLOBENZAPRINE HCL 10 MG
10 TABLET ORAL 3 TIMES DAILY PRN
Status: DISCONTINUED | OUTPATIENT
Start: 2018-10-23 | End: 2018-10-24 | Stop reason: HOSPADM

## 2018-10-23 RX ORDER — CIPROFLOXACIN 500 MG/1
500 TABLET ORAL ONCE
Status: DISCONTINUED | OUTPATIENT
Start: 2018-10-23 | End: 2018-11-12

## 2018-10-23 RX ORDER — SODIUM CHLORIDE 9 MG/ML
INJECTION, SOLUTION INTRAVENOUS CONTINUOUS
Status: DISCONTINUED | OUTPATIENT
Start: 2018-10-23 | End: 2018-10-24

## 2018-10-23 RX ORDER — SODIUM CHLORIDE 9 MG/ML
INJECTION, SOLUTION INTRAVENOUS CONTINUOUS
Status: DISCONTINUED | OUTPATIENT
Start: 2018-10-23 | End: 2018-10-23

## 2018-10-23 RX ORDER — MORPHINE SULFATE 15 MG/1
15 TABLET, FILM COATED, EXTENDED RELEASE ORAL EVERY 12 HOURS
Status: DISCONTINUED | OUTPATIENT
Start: 2018-10-23 | End: 2018-10-24 | Stop reason: HOSPADM

## 2018-10-23 RX ORDER — CLONAZEPAM 0.5 MG/1
0.5 TABLET ORAL NIGHTLY
Status: DISCONTINUED | OUTPATIENT
Start: 2018-10-23 | End: 2018-10-24 | Stop reason: HOSPADM

## 2018-10-23 RX ORDER — ALBUTEROL SULFATE 90 UG/1
2 AEROSOL, METERED RESPIRATORY (INHALATION) EVERY 6 HOURS PRN
Status: DISCONTINUED | OUTPATIENT
Start: 2018-10-23 | End: 2018-10-24 | Stop reason: HOSPADM

## 2018-10-23 RX ORDER — GABAPENTIN 300 MG/1
600 CAPSULE ORAL 2 TIMES DAILY
Status: DISCONTINUED | OUTPATIENT
Start: 2018-10-23 | End: 2018-10-24 | Stop reason: HOSPADM

## 2018-10-23 RX ORDER — LOSARTAN POTASSIUM 50 MG/1
50 TABLET ORAL DAILY
Status: DISCONTINUED | OUTPATIENT
Start: 2018-10-23 | End: 2018-10-24 | Stop reason: HOSPADM

## 2018-10-23 RX ORDER — VENLAFAXINE HYDROCHLORIDE 75 MG/1
150 CAPSULE, EXTENDED RELEASE ORAL 2 TIMES DAILY
Status: DISCONTINUED | OUTPATIENT
Start: 2018-10-23 | End: 2018-10-24 | Stop reason: HOSPADM

## 2018-10-23 RX ORDER — NEOSTIGMINE METHYLSULFATE 1 MG/ML
INJECTION, SOLUTION INTRAVENOUS
Status: DISCONTINUED | OUTPATIENT
Start: 2018-10-23 | End: 2018-10-23

## 2018-10-23 RX ORDER — CEFAZOLIN SODIUM 1 G/3ML
INJECTION, POWDER, FOR SOLUTION INTRAMUSCULAR; INTRAVENOUS
Status: DISCONTINUED | OUTPATIENT
Start: 2018-10-23 | End: 2018-10-23

## 2018-10-23 RX ORDER — FENTANYL CITRATE 50 UG/ML
INJECTION, SOLUTION INTRAMUSCULAR; INTRAVENOUS
Status: DISCONTINUED | OUTPATIENT
Start: 2018-10-23 | End: 2018-10-23

## 2018-10-23 RX ORDER — LOVASTATIN 20 MG/1
20 TABLET ORAL NIGHTLY
Status: DISCONTINUED | OUTPATIENT
Start: 2018-10-23 | End: 2018-10-24 | Stop reason: HOSPADM

## 2018-10-23 RX ORDER — ROCURONIUM BROMIDE 10 MG/ML
INJECTION, SOLUTION INTRAVENOUS
Status: DISCONTINUED | OUTPATIENT
Start: 2018-10-23 | End: 2018-10-23

## 2018-10-23 RX ORDER — OXYCODONE AND ACETAMINOPHEN 5; 325 MG/1; MG/1
1 TABLET ORAL EVERY 4 HOURS PRN
Status: DISCONTINUED | OUTPATIENT
Start: 2018-10-23 | End: 2018-10-24 | Stop reason: HOSPADM

## 2018-10-23 RX ORDER — LIDOCAINE HYDROCHLORIDE 10 MG/ML
INJECTION INFILTRATION; PERINEURAL
Status: DISCONTINUED | OUTPATIENT
Start: 2018-10-23 | End: 2018-10-23 | Stop reason: HOSPADM

## 2018-10-23 RX ORDER — HYDROMORPHONE HYDROCHLORIDE 1 MG/ML
0.2 INJECTION, SOLUTION INTRAMUSCULAR; INTRAVENOUS; SUBCUTANEOUS EVERY 5 MIN PRN
Status: DISCONTINUED | OUTPATIENT
Start: 2018-10-23 | End: 2018-10-23 | Stop reason: HOSPADM

## 2018-10-23 RX ORDER — EPHEDRINE SULFATE 50 MG/ML
INJECTION, SOLUTION INTRAVENOUS
Status: DISCONTINUED | OUTPATIENT
Start: 2018-10-23 | End: 2018-10-23

## 2018-10-23 RX ORDER — LIDOCAINE HYDROCHLORIDE 10 MG/ML
1 INJECTION, SOLUTION EPIDURAL; INFILTRATION; INTRACAUDAL; PERINEURAL ONCE
Status: COMPLETED | OUTPATIENT
Start: 2018-10-23 | End: 2018-10-23

## 2018-10-23 RX ORDER — LIDOCAINE HCL/PF 100 MG/5ML
SYRINGE (ML) INTRAVENOUS
Status: DISCONTINUED | OUTPATIENT
Start: 2018-10-23 | End: 2018-10-23

## 2018-10-23 RX ORDER — PHENYLEPHRINE HYDROCHLORIDE 10 MG/ML
INJECTION INTRAVENOUS
Status: DISCONTINUED | OUTPATIENT
Start: 2018-10-23 | End: 2018-10-23

## 2018-10-23 RX ORDER — CITALOPRAM 20 MG/1
20 TABLET, FILM COATED ORAL NIGHTLY
Status: DISCONTINUED | OUTPATIENT
Start: 2018-10-23 | End: 2018-10-24 | Stop reason: HOSPADM

## 2018-10-23 RX ORDER — BACITRACIN 50000 [IU]/1
INJECTION, POWDER, FOR SOLUTION INTRAMUSCULAR
Status: DISCONTINUED | OUTPATIENT
Start: 2018-10-23 | End: 2018-10-23

## 2018-10-23 RX ORDER — BUPIVACAINE HYDROCHLORIDE 2.5 MG/ML
INJECTION, SOLUTION EPIDURAL; INFILTRATION; INTRACAUDAL
Status: DISCONTINUED | OUTPATIENT
Start: 2018-10-23 | End: 2018-10-23

## 2018-10-23 RX ORDER — CITALOPRAM 20 MG/1
20 TABLET, FILM COATED ORAL DAILY
Status: DISCONTINUED | OUTPATIENT
Start: 2018-10-23 | End: 2018-10-23

## 2018-10-23 RX ORDER — OXYCODONE AND ACETAMINOPHEN 10; 325 MG/1; MG/1
1 TABLET ORAL EVERY 4 HOURS PRN
Status: DISCONTINUED | OUTPATIENT
Start: 2018-10-23 | End: 2018-10-24 | Stop reason: HOSPADM

## 2018-10-23 RX ORDER — PANTOPRAZOLE SODIUM 40 MG/1
40 TABLET, DELAYED RELEASE ORAL DAILY
Status: DISCONTINUED | OUTPATIENT
Start: 2018-10-23 | End: 2018-10-24 | Stop reason: HOSPADM

## 2018-10-23 RX ORDER — SODIUM CHLORIDE 0.9 % (FLUSH) 0.9 %
3 SYRINGE (ML) INJECTION
Status: DISCONTINUED | OUTPATIENT
Start: 2018-10-23 | End: 2018-10-24 | Stop reason: HOSPADM

## 2018-10-23 RX ORDER — GLYCOPYRROLATE 0.2 MG/ML
INJECTION INTRAMUSCULAR; INTRAVENOUS
Status: DISCONTINUED | OUTPATIENT
Start: 2018-10-23 | End: 2018-10-23

## 2018-10-23 RX ORDER — CARVEDILOL 25 MG/1
25 TABLET ORAL 2 TIMES DAILY
Status: DISCONTINUED | OUTPATIENT
Start: 2018-10-23 | End: 2018-10-24 | Stop reason: HOSPADM

## 2018-10-23 RX ORDER — LEVOTHYROXINE SODIUM 75 UG/1
75 TABLET ORAL
Status: DISCONTINUED | OUTPATIENT
Start: 2018-10-24 | End: 2018-10-24 | Stop reason: HOSPADM

## 2018-10-23 RX ORDER — PROPOFOL 10 MG/ML
VIAL (ML) INTRAVENOUS
Status: DISCONTINUED | OUTPATIENT
Start: 2018-10-23 | End: 2018-10-23

## 2018-10-23 RX ORDER — ESTRADIOL 0.5 MG/1
1 TABLET ORAL DAILY
Status: DISCONTINUED | OUTPATIENT
Start: 2018-10-23 | End: 2018-10-24 | Stop reason: HOSPADM

## 2018-10-23 RX ORDER — MIDAZOLAM HYDROCHLORIDE 1 MG/ML
INJECTION, SOLUTION INTRAMUSCULAR; INTRAVENOUS
Status: DISCONTINUED | OUTPATIENT
Start: 2018-10-23 | End: 2018-10-23

## 2018-10-23 RX ORDER — ONDANSETRON 2 MG/ML
8 INJECTION INTRAMUSCULAR; INTRAVENOUS EVERY 6 HOURS PRN
Status: DISCONTINUED | OUTPATIENT
Start: 2018-10-23 | End: 2018-10-24 | Stop reason: HOSPADM

## 2018-10-23 RX ADMIN — CEFAZOLIN 2 G: 330 INJECTION, POWDER, FOR SOLUTION INTRAMUSCULAR; INTRAVENOUS at 11:10

## 2018-10-23 RX ADMIN — PHENYLEPHRINE HYDROCHLORIDE 100 MCG: 10 INJECTION INTRAVENOUS at 12:10

## 2018-10-23 RX ADMIN — LIDOCAINE HYDROCHLORIDE 50 MG: 10 INJECTION, SOLUTION EPIDURAL; INFILTRATION; INTRACAUDAL at 10:10

## 2018-10-23 RX ADMIN — SODIUM CHLORIDE, SODIUM GLUCONATE, SODIUM ACETATE, POTASSIUM CHLORIDE, MAGNESIUM CHLORIDE, SODIUM PHOSPHATE, DIBASIC, AND POTASSIUM PHOSPHATE: .53; .5; .37; .037; .03; .012; .00082 INJECTION, SOLUTION INTRAVENOUS at 12:10

## 2018-10-23 RX ADMIN — NEOSTIGMINE METHYLSULFATE 5 MG: 1 INJECTION INTRAVENOUS at 12:10

## 2018-10-23 RX ADMIN — EPHEDRINE SULFATE 15 MG: 50 INJECTION, SOLUTION INTRAMUSCULAR; INTRAVENOUS; SUBCUTANEOUS at 12:10

## 2018-10-23 RX ADMIN — INDIGO CARMINE 5 ML: 8 INJECTION, SOLUTION INTRAMUSCULAR; INTRAVENOUS at 12:10

## 2018-10-23 RX ADMIN — CITALOPRAM HYDROBROMIDE 20 MG: 20 TABLET ORAL at 09:10

## 2018-10-23 RX ADMIN — FENTANYL CITRATE 50 MCG: 50 INJECTION, SOLUTION INTRAMUSCULAR; INTRAVENOUS at 11:10

## 2018-10-23 RX ADMIN — PROPOFOL 170 MG: 10 INJECTION, EMULSION INTRAVENOUS at 11:10

## 2018-10-23 RX ADMIN — EPHEDRINE SULFATE 10 MG: 50 INJECTION, SOLUTION INTRAMUSCULAR; INTRAVENOUS; SUBCUTANEOUS at 12:10

## 2018-10-23 RX ADMIN — LIDOCAINE HYDROCHLORIDE 75 MG: 20 INJECTION, SOLUTION INTRAVENOUS at 11:10

## 2018-10-23 RX ADMIN — ROCURONIUM BROMIDE 40 MG: 10 INJECTION, SOLUTION INTRAVENOUS at 11:10

## 2018-10-23 RX ADMIN — SODIUM CHLORIDE: 0.9 INJECTION, SOLUTION INTRAVENOUS at 10:10

## 2018-10-23 RX ADMIN — GABAPENTIN 600 MG: 300 CAPSULE ORAL at 09:10

## 2018-10-23 RX ADMIN — LOSARTAN POTASSIUM 50 MG: 50 TABLET, FILM COATED ORAL at 01:10

## 2018-10-23 RX ADMIN — SODIUM CHLORIDE: 0.9 INJECTION, SOLUTION INTRAVENOUS at 09:10

## 2018-10-23 RX ADMIN — SODIUM CHLORIDE: 0.9 INJECTION, SOLUTION INTRAVENOUS at 01:10

## 2018-10-23 RX ADMIN — OXYCODONE HYDROCHLORIDE AND ACETAMINOPHEN 1 TABLET: 10; 325 TABLET ORAL at 01:10

## 2018-10-23 RX ADMIN — ESTRADIOL 1 MG: 0.5 TABLET ORAL at 02:10

## 2018-10-23 RX ADMIN — CARVEDILOL 25 MG: 25 TABLET, FILM COATED ORAL at 09:10

## 2018-10-23 RX ADMIN — GLYCOPYRROLATE 0.4 MG: 0.2 INJECTION, SOLUTION INTRAMUSCULAR; INTRAVENOUS at 12:10

## 2018-10-23 RX ADMIN — PROPOFOL 30 MG: 10 INJECTION, EMULSION INTRAVENOUS at 12:10

## 2018-10-23 RX ADMIN — MIDAZOLAM HYDROCHLORIDE 2 MG: 1 INJECTION, SOLUTION INTRAMUSCULAR; INTRAVENOUS at 11:10

## 2018-10-23 RX ADMIN — GLYCOPYRROLATE 0.2 MG: 0.2 INJECTION, SOLUTION INTRAMUSCULAR; INTRAVENOUS at 12:10

## 2018-10-23 RX ADMIN — VENLAFAXINE HYDROCHLORIDE 150 MG: 75 CAPSULE, EXTENDED RELEASE ORAL at 09:10

## 2018-10-23 RX ADMIN — LOVASTATIN 20 MG: 20 TABLET ORAL at 09:10

## 2018-10-23 RX ADMIN — CLONAZEPAM 0.5 MG: 0.5 TABLET ORAL at 09:10

## 2018-10-23 RX ADMIN — MORPHINE SULFATE 15 MG: 15 TABLET, EXTENDED RELEASE ORAL at 09:10

## 2018-10-23 RX ADMIN — OXYCODONE HYDROCHLORIDE AND ACETAMINOPHEN 1 TABLET: 10; 325 TABLET ORAL at 06:10

## 2018-10-23 NOTE — PLAN OF CARE
Pt AAOx4 Pt remained stable during pacu stay. VSS. Patient states pain is tolerable. Received po pain medication. No N&V. Tolerated sips of water. Vaginal packing with guaze in place. Vila in place. Teds/SCD's in place throughout duration in PACU. Transferred to the next Phase of Care.

## 2018-10-23 NOTE — PROGRESS NOTES
Dr Moreno called, ordering an in and out cath to be done to prior to patient going back to surgery.     In and out cath done by ANEUDY Mary.

## 2018-10-23 NOTE — INTERVAL H&P NOTE
The patient has been examined and the H&P has been reviewed:    I concur with the findings and no changes have occurred since H&P was written.    Anesthesia/Surgery risks, benefits and alternative options discussed and understood by patient/family.    UA today negative for leuks, nitrite, blood      Active Hospital Problems    Diagnosis  POA    Stress incontinence, female [N39.3]  Yes      Resolved Hospital Problems   No resolved problems to display.       Patient seen in holding.  No changes in clinical condition.  Proceed with planned procedure.

## 2018-10-23 NOTE — PLAN OF CARE
Problem: Patient Care Overview  Goal: Plan of Care Review  Outcome: Ongoing (interventions implemented as appropriate)  Pt Arrived to room AAO x 4 ambulated to bed. SCD's on Is instruction given. Pt agreed she would walk in the lee tonight. Whiteboard updated, Q 2 monitoring in place for safety and pain. Call light in reach

## 2018-10-23 NOTE — NURSING TRANSFER
Nursing Transfer Note      10/23/2018     Transfer To: 540A    Transfer via stretcher    Transfer with     Transported by PCT    Medicines sent: IVF's    Chart send with patient: Yes    Notified: spouse    Patient reassessed at: 10/23/18    Upon arrival to floor:

## 2018-10-23 NOTE — ANESTHESIA POSTPROCEDURE EVALUATION
"Anesthesia Post Evaluation    Patient: Hannah Norman    Procedure(s) Performed: Procedure(s) (LRB):  PLACEMENT, TRANSOBTURATOR TAPE (N/A)  CYSTOSCOPY (N/A)    Final Anesthesia Type: general  Patient location during evaluation: PACU  Patient participation: Yes- Able to Participate  Level of consciousness: awake and alert and oriented  Post-procedure vital signs: reviewed and stable  Pain management: adequate  Airway patency: patent  PONV status at discharge: No PONV  Anesthetic complications: no      Cardiovascular status: blood pressure returned to baseline and hemodynamically stable  Respiratory status: unassisted, spontaneous ventilation and room air  Hydration status: euvolemic  Follow-up not needed.        Visit Vitals  BP (!) 113/59 (BP Location: Left arm, Patient Position: Lying)   Pulse 63   Temp 36.5 °C (97.7 °F) (Oral)   Resp 15   Ht 5' 2" (1.575 m)   Wt 95.3 kg (210 lb)   SpO2 97%   Breastfeeding? No   BMI 38.41 kg/m²       Pain/Haim Score: Pain Assessment Performed: Yes (10/23/2018  1:15 PM)  Presence of Pain: complains of pain/discomfort (10/23/2018  1:15 PM)  Pain Rating Prior to Med Admin: 5 (10/23/2018  1:27 PM)  Haim Score: 10 (10/23/2018  1:15 PM)        "

## 2018-10-23 NOTE — TRANSFER OF CARE
"Anesthesia Transfer of Care Note    Patient: Hannah Norman    Procedure(s) Performed: Procedure(s) (LRB):  PLACEMENT, TRANSOBTURATOR TAPE (N/A)  CYSTOSCOPY (N/A)    Patient location: PACU    Anesthesia Type: general    Transport from OR: Transported from OR on 6-10 L/min O2 by face mask with adequate spontaneous ventilation    Post pain: adequate analgesia    Post assessment: no apparent anesthetic complications    Post vital signs: stable    Level of consciousness: awake and sedated    Nausea/Vomiting: no nausea/vomiting    Complications: none    Transfer of care protocol was followed      Last vitals:   Visit Vitals  BP (!) 120/55 (BP Location: Left arm, Patient Position: Lying)   Pulse 62   Temp 36.7 °C (98 °F) (Oral)   Resp 18   Ht 5' 2" (1.575 m)   Wt 95.3 kg (210 lb)   SpO2 95%   Breastfeeding? No   BMI 38.41 kg/m²     "

## 2018-10-23 NOTE — OP NOTE
[facility]  Operative Note    Date: 10/23/2018    Pre-Op Diagnosis:   1. Stress urinary incontinence  2. Urinary frequency and urgency    Patient Active Problem List   Diagnosis    Mild acid reflux    Essential hypertension    Essential tremor    Neck pain    Chronic back pain    Fibromyalgia    Anxiety disorder    Other and unspecified hyperlipidemia    Restless leg syndrome    Osteoarthritis    Gait disturbance    Left-sided Bell's palsy    CTS (carpal tunnel syndrome)    Disorder of lumbar spine    Screening for malignant neoplasm of colon    MARY (obstructive sleep apnea)    Mixed stress and urge urinary incontinence    Stress incontinence, female       Post-Op Diagnosis: same    Procedure(s) Performed:   1.  Trans-obturator mid-urethral sling placement  2.  Cystoscopy    Specimen(s): none    Staff Surgeon: Feli Garza MD    Assistant Surgeon: Aruna Moreno MD    Anesthesia: General endotracheal anesthesia    Indications:  Hannah Norman is a 68 y.o. female with stress urinary incontinence.  She presents for mid-urethral sling placement today.      Findings:   1.  No injury to the bladder or urethra with trocar placement  2.  No sling material seen in bladder or urethra  3.  The lateral sulci bilaterally were intact.  (no button holing)  4.  Straight Lauren scissors easily passed between urethra and sling after final placement    Estimated Blood Loss: min    Drains: 16 Fr gresham catheter    Procedure in Detail:  After informed consent was obtained the patient was brought to the operating suite and placed in the dorsal lithotomy position.  SCD's were placed and turned on prior to induction.   Anesthesia was administered.  The patient was then prepped and draped in the usual sterile fashion.  Time out was performed and pre-procedural antibiotics were confirmed.      An Allis clamp was placed on the vaginal epithelium just behind the urethral meatus.  The mid-urethra was  palpated with the gresham balloon in place in the bladder. Hydro-dissection was then performed with 1% lidocaine and 0.25%marcaine in a 1:1 solution.  An incision was made on vaginal epithelium using a 15 blade extending about 1.5 cm. Tenotomy scissors were used to lift flaps on the right and left side extending to the endopelvic fascia, so that the inner aspect of the inferior pubic ramus was palpable.  The bladder was then drained.        Next, stab incisions were made below the adductus longus tendon and over the obturator fascia at the level of the clitoris. We then placed the mesh into antibiotic irrigation. The trocar was placed into the obturator incision and the handle of the trocar was placed at a 45 degree angle. The trocar was advanced and rotated until the tip of the trocar was palpated at the level of the endopelvic fascia. The trocar point was passed through the vaginal incision. The lateral sulcus was inspected to be assured there was no button holing.  This was confirmed.  The mesh was attached and the trocar brought out.  The same was repeated for the contralateral side.    We then placed a single 3-0 vicryl suture to reapproximate the pubocervical fascia and correct a minor cystocele.     The gresham was removed and cystoscopy was performed. Using a 70 degree lens, inspection of the bladder, bladder neck and urethra showed no injury. Strong blue urine effluxed from each ureteral orifice. The scope was removed and the gresham was replaced. With Lauren scissors behind the mesh over the mid-urethra,  The mesh was tensioned appropriately.  The plastic sleeves of each mesh arm were cut and removed. The mesh was inspected and confirmed to be over the mid-urethra.The scissors were used to cut the excess mesh just below the skin of each obturator incision.     The vaginal epithelium was closed with 2-0 vicryl vertical mattress interrupted sutures. Vaginal packing was placed.   (four inch Kerlix soaked in  antibiotic solution, lined with water soluble jelly.)  Dermabond was used to close both obturator incisions.  The patient tolerated the procedure well and was transferred back to recovery in stable condition.     Disposition:    1.  Observe overnight   2.  Remove vaginal pack and catheter in the morning  3.  Voiding trial in the morning.        Aruna Moreno MD    I was present for the entire case and agree with the above note.

## 2018-10-23 NOTE — PLAN OF CARE
Pre-op questions answered. Pt oriented to room, call light within reach. Family at bedside. Informed of projected surgery start time. Verbalization of understanding. Instructed to call with questions or concerns. Will continue to monitor.

## 2018-10-24 VITALS
RESPIRATION RATE: 16 BRPM | BODY MASS INDEX: 38.64 KG/M2 | HEIGHT: 62 IN | WEIGHT: 210 LBS | SYSTOLIC BLOOD PRESSURE: 114 MMHG | DIASTOLIC BLOOD PRESSURE: 53 MMHG | OXYGEN SATURATION: 94 % | TEMPERATURE: 99 F | HEART RATE: 75 BPM

## 2018-10-24 PROCEDURE — 25000003 PHARM REV CODE 250: Performed by: STUDENT IN AN ORGANIZED HEALTH CARE EDUCATION/TRAINING PROGRAM

## 2018-10-24 RX ORDER — OXYCODONE AND ACETAMINOPHEN 5; 325 MG/1; MG/1
1 TABLET ORAL EVERY 4 HOURS PRN
Qty: 11 TABLET | Refills: 0 | Status: SHIPPED | OUTPATIENT
Start: 2018-10-24 | End: 2018-11-12

## 2018-10-24 RX ORDER — POLYETHYLENE GLYCOL 3350 17 G/17G
17 POWDER, FOR SOLUTION ORAL DAILY
Qty: 30 PACKET | Refills: 0 | Status: SHIPPED | OUTPATIENT
Start: 2018-10-24 | End: 2019-04-24

## 2018-10-24 RX ADMIN — LOSARTAN POTASSIUM 50 MG: 50 TABLET, FILM COATED ORAL at 08:10

## 2018-10-24 RX ADMIN — PANTOPRAZOLE SODIUM 40 MG: 40 TABLET, DELAYED RELEASE ORAL at 08:10

## 2018-10-24 RX ADMIN — ESTRADIOL 1 MG: 0.5 TABLET ORAL at 08:10

## 2018-10-24 RX ADMIN — OXYCODONE HYDROCHLORIDE AND ACETAMINOPHEN 1 TABLET: 10; 325 TABLET ORAL at 12:10

## 2018-10-24 RX ADMIN — SODIUM CHLORIDE: 0.9 INJECTION, SOLUTION INTRAVENOUS at 05:10

## 2018-10-24 RX ADMIN — OXYCODONE HYDROCHLORIDE AND ACETAMINOPHEN 1 TABLET: 10; 325 TABLET ORAL at 08:10

## 2018-10-24 RX ADMIN — VENLAFAXINE HYDROCHLORIDE 150 MG: 75 CAPSULE, EXTENDED RELEASE ORAL at 08:10

## 2018-10-24 RX ADMIN — CARVEDILOL 25 MG: 25 TABLET, FILM COATED ORAL at 08:10

## 2018-10-24 RX ADMIN — GABAPENTIN 600 MG: 300 CAPSULE ORAL at 08:10

## 2018-10-24 RX ADMIN — MORPHINE SULFATE 15 MG: 15 TABLET, EXTENDED RELEASE ORAL at 08:10

## 2018-10-24 RX ADMIN — LEVOTHYROXINE SODIUM 75 MCG: 0.07 TABLET ORAL at 05:10

## 2018-10-24 NOTE — SUBJECTIVE & OBJECTIVE
Interval History:   No acute events overnight  Pain is well controlled  Ambulating well  Tolerating diet  No nausea    Review of Systems  Objective:     Temp:  [96.3 °F (35.7 °C)-98 °F (36.7 °C)] 96.5 °F (35.8 °C)  Pulse:  [56-79] 75  Resp:  [11-20] 20  SpO2:  [94 %-99 %] 95 %  BP: (101-130)/(48-65) 119/56     Body mass index is 38.41 kg/m².            Drains     Drain                 Urethral Catheter 10/23/18 1210 Non-latex;Straight-tip 16 Fr. less than 1 day                Physical Exam   Constitutional: She is oriented to person, place, and time. She appears well-developed and well-nourished. No distress.   HENT:   Head: Normocephalic and atraumatic.   Eyes: Conjunctivae are normal.   Neck: Normal range of motion. No JVD present.   Cardiovascular: Normal rate and regular rhythm.    Pulmonary/Chest: Effort normal. No respiratory distress.   Abdominal: Soft. She exhibits no distension and no mass. There is no tenderness. There is no rebound and no guarding.   Genitourinary:   Genitourinary Comments: Vila draining clear yellow  Vaginal packing in place   Musculoskeletal: Normal range of motion.   SCDs in place   Neurological: She is alert and oriented to person, place, and time.   Skin: Skin is warm and dry. She is not diaphoretic.     Psychiatric: She has a normal mood and affect. Her behavior is normal.       Significant Labs:    BMP:  Recent Labs   Lab 10/17/18  1043      K 5.0      CO2 32*   BUN 19   CREATININE 0.8   CALCIUM 9.4       CBC:   No results for input(s): WBC, HGB, HCT, PLT in the last 168 hours.    All pertinent labs results from the past 24 hours have been reviewed.    Significant Imaging:  All pertinent imaging results/findings from the past 24 hours have been reviewed.

## 2018-10-24 NOTE — DISCHARGE SUMMARY
[facility]  Urology  Discharge Summary      Patient Name: Hannah Norman  MRN: [id number]  Admission Date: 10/23/2018  Hospital Length of Stay: 0 days  Discharge Date and Time: 10/24/2018 10:25 AM  Attending Physician: Feli Garza MD  Discharging Provider: Ralph Story MD  Primary Care Physician: Raffi Garcia MD    HPI:     Hannah Norman is a 68 y.o. female who has a history of mixed urinary incontinence.  She goes through 3-4 pull ups a day and one at night, frequency, x 10.  She has tried imipramine in the past.  She has both stress and urge symptoms.  She underwent urodynamics and was found to have a stable bladder but stress incontinence and void by Valsalva.     , hysterectomy for fibroids, not sexually active (male factor), constipation.     Procedure(s) (LRB):  PLACEMENT, TRANSOBTURATOR TAPE (N/A)  CYSTOSCOPY (N/A)     Indwelling Lines/Drains at time of discharge:   Lines/Drains/Airways          None          Hospital Course     Patient underwent the above described procedures (placement of transobturator tape and cystoscopy) and tolerated the surgery well. She ambulated on POD 0 and tolerated a regular diet. Her vaginal packing and gresham catheter were removed the morning on POD 1. She was stable for discharge on POD 1 ambulating in the halls, tolerating regular diet, passed a voiding trial with good urine output and pain well controlled on oral pain medications. She was discharged home with oral pain medications and stool softeners. She will follow up with Dr. Garza in 2 weeks for a post operative visit.    Consults: none    Significant Diagnostic Studies: none    Pending Diagnostic Studies:     None          Final Active Diagnoses:    Diagnosis Date Noted POA    Stress incontinence, female [N39.3] 10/23/2018 Yes      Problems Resolved During this Admission:       Discharged Condition: good    Disposition: Home or Self Care    Follow Up:  Follow-up Information     Feli CHAPMAN  MD Greg In 2 weeks.    Specialty:  Urology  Why:  post op  Contact information:  [address]  [facility] [address]  [phone]                 Patient Instructions:      Lifting restrictions     Notify your health care provider if you experience any of the following:  temperature >100.4     Notify your health care provider if you experience any of the following:  persistent nausea and vomiting or diarrhea     Notify your health care provider if you experience any of the following:  severe uncontrolled pain     Notify your health care provider if you experience any of the following:  redness, tenderness, or signs of infection (pain, swelling, redness, odor or green/yellow discharge around incision site)     Notify your health care provider if you experience any of the following:  difficulty breathing or increased cough     Notify your health care provider if you experience any of the following:  severe persistent headache     Notify your health care provider if you experience any of the following:  worsening rash     Notify your health care provider if you experience any of the following:  persistent dizziness, light-headedness, or visual disturbances     Notify your health care provider if you experience any of the following:  increased confusion or weakness     Activity as tolerated     Weight bearing restrictions (specify):     Medications:  Reconciled Home Medications:      Medication List      START taking these medications    polyethylene glycol 17 gram Pwpk  Commonly known as:  GLYCOLAX  Take 17 g by mouth once daily.        CHANGE how you take these medications    * oxyCODONE-acetaminophen  mg per tablet  Commonly known as:  PERCOCET  Take 1 tablet by mouth every 8 (eight) hours as needed.  What changed:  Another medication with the same name was added. Make sure you understand how and when to take each.     * oxyCODONE-acetaminophen 5-325 mg per tablet  Commonly known as:  PERCOCET  Take 1  tablet by mouth every 4 (four) hours as needed.  What changed:  You were already taking a medication with the same name, and this prescription was added. Make sure you understand how and when to take each.         * This list has 2 medication(s) that are the same as other medications prescribed for you. Read the directions carefully, and ask your doctor or other care provider to review them with you.            CONTINUE taking these medications    albuterol 90 mcg/actuation inhaler  Commonly known as:  PROVENTIL/VENTOLIN HFA  Inhale 2 puffs into the lungs every 6 (six) hours as needed for Wheezing or Shortness of Breath. Rescue     carvedilol 25 MG tablet  Commonly known as:  COREG  Take 25 mg by mouth 2 (two) times daily.     citalopram 20 MG tablet  Commonly known as:  CELEXA  TAKE 1 TABLET BY MOUTH EVERY DAY     clonazePAM 0.5 MG tablet  Commonly known as:  KLONOPIN  1 pill PO QHS bedtime     cyclobenzaprine 10 MG tablet  Commonly known as:  FLEXERIL  Take 10 mg by mouth 3 (three) times daily as needed.     diclofenac sodium 3 % gel  Commonly known as:  SOLARAZE  APPLY 1-2 GRAMS TOPICALLY TO AFFECTED AREA 2-3 TIMES PER DAY. PRN     estradiol 1 MG tablet  Commonly known as:  ESTRACE  Take 1 mg by mouth once daily.     * fluticasone 50 mcg/actuation nasal spray  Commonly known as:  FLONASE  USE 1 SPRAY EACH NOSTRIL EVERY DAY     * fluticasone 50 mcg/actuation nasal spray  Commonly known as:  FLONASE  1 spray (50 mcg total) by Each Nare route once daily.     furosemide 40 MG tablet  Commonly known as:  LASIX  TAKE 1 TABLET BY MOUTH TWO TIMES A DAY IF SWELLING OCCURS     gabapentin 600 MG tablet  Commonly known as:  NEURONTIN  TAKE 1 TABLET BY MOUTH THREE TIMES A DAY     hydrocortisone 0.2 % cream  Commonly known as:  WESTCORT  Apply topically daily as needed.     ipratropium 0.03 % nasal spray  Commonly known as:  ATROVENT  2 sprays by Nasal route 3 (three) times daily.     levothyroxine 75 MCG tablet  Commonly  known as:  SYNTHROID  Take 1 tablet (75 mcg total) by mouth before breakfast.     losartan 50 MG tablet  Commonly known as:  COZAAR  Take 50 mg by mouth once daily.     lovastatin 20 MG tablet  Commonly known as:  MEVACOR  TAKE 1 TABLET BY MOUTH EVERY EVENING     metronidazole 1% 1 % Gel  Commonly known as:  METROGEL  Apply topically once daily.     morphine 15 MG 12 hr tablet  Commonly known as:  MS CONTIN  Take 1 tablet by mouth every 12 (twelve) hours.     pantoprazole 40 MG tablet  Commonly known as:  PROTONIX  Take 1 tablet (40 mg total) by mouth once daily.     pramipexole 0.125 MG tablet  Commonly known as:  MIRAPEX  2 pills PO in the late afternoon and 3 pills PO at bedtime     ranitidine 300 MG tablet  Commonly known as:  ZANTAC  Take 1 tablet (300 mg total) by mouth every evening.     RELISTOR 150 mg Tab  Generic drug:  methylnaltrexone     venlafaxine 150 MG Cp24  Commonly known as:  EFFEXOR-XR  TAKE 1 CAPSULE BY MOUTH TWO TIMES A DAY         * This list has 2 medication(s) that are the same as other medications prescribed for you. Read the directions carefully, and ask your doctor or other care provider to review them with you.                Time spent on the discharge of patient: 25 minutes    Ralph Story MD  Urology  [facility]    Agree with the above.  Patient looked well on rounds.  She was comfortable. Incentive spirometer was encouraged.

## 2018-10-24 NOTE — PLAN OF CARE
Problem: Patient Care Overview  Goal: Plan of Care Review  Outcome: Ongoing (interventions implemented as appropriate)  Pt was able to void this am ready for DC White board update. Q 2 hour monitoring for pain and safety.

## 2018-10-24 NOTE — DISCHARGE INSTRUCTIONS
Post Op Instructions for Pelvic Surgery      ACTIVITY   The first six weeks is a critical time period for wound healing.  We depend on the tissue scarring to provide the necessary vaginal support.     After receiving an anesthetic you may feel sleepy or nauseated.  It is best to have little activity for the first 24-48 hours.  Gradually increase activity.   No heavy lifting (nothing greater than 10 pounds or a gallon of milk) for 6 weeks.   Pelvic rest (no sexual intercourse, douching or tampons) for 6 weeks.   It is OK to walk around the house.  Avoid riding a bicycle or putting similar pressure over this area.  No strenuous workouts.     Do not drive for the first 48 hours or if you are taking narcotic pain medication.  Before driving, be sure you can press the brakes without difficulty or pain.    WOUND CARE INSTRUCTIONS   You may have small incisions that were closed with a medical grade superglue.  You may wash these and pat them dry after 24 hours   Do not immerse yourself in water, (i.e. no tub baths, swimming or hot tubbing) for 6 weeks.  You may shower.     It is normal to have some light bleeding which should gradually resolve over the next week.  This will look like a light period.    DIET   In the first 24 hours, it is common to experience some nausea, so take clear liquids.  Once the nausea has resolved, a soft diet is recommended with gradual increase to your normal diet.    MEDICATIONS   Pain medication should be taken on an as needed basis.  If the narcotic is too much, over-the-counter pain relievers may be taken.  However, do not take additional Tylenol/acetaminophen while taking narcotic pain medication as this can lead to an overdose of the Tylenol/acetaminophen.   Antibiotics, if ordered, should be taken as directed until the prescription has been completed.     A stool softener (Colace or docusate sodium) is recommended to maintain soft stools after surgery.   If you do not have a  bowel movement within 36-48 hours of surgery, take 2 tablespoons of Milk of Magnesia.  If there is still no bowel movement by the next day, take ½ bottle of Magnesium Citrate (refrigerate and drink with a straw.)    WHEN TO CALL THE DOCTOR:   For heavy bleeding (vaginal discharge like a light period is expected for the 3rd week)   Redness or swelling around the incision   Fever over 101oF (38.5oC)   Significant vaginal drainage    Persistent pain unrelieved by the pain medication   Leg swelling   Shortness of breath   Burning with urination   Inability to urinate   Abdominal pain not improving day by day    FOR EMERGENCIES   If any unusual problems, questions or concerns, please contact your physician or the resident on call at [phone] after hours or during office hours, [phone]

## 2018-10-24 NOTE — PROGRESS NOTES
[facility]  Urology  Progress Note    Patient Name: Hannah Norman  MRN: [id number]  Admission Date: 10/23/2018  Hospital Length of Stay: 0 days  Code Status: Full Code   Attending Provider: Feli Garza MD   Primary Care Physician: Raffi Garcia MD    Subjective:     HPI:  Hannah Norman is a 68 y.o. female s/p TOT sling placement on 10/23/18    Interval History:   No acute events overnight  Pain is well controlled  Ambulating well  Tolerating diet  No nausea    Review of Systems  Objective:     Temp:  [96.3 °F (35.7 °C)-98 °F (36.7 °C)] 96.5 °F (35.8 °C)  Pulse:  [56-79] 75  Resp:  [11-20] 20  SpO2:  [94 %-99 %] 95 %  BP: (101-130)/(48-65) 119/56     Body mass index is 38.41 kg/m².            Drains     Drain                 Urethral Catheter 10/23/18 1210 Non-latex;Straight-tip 16 Fr. less than 1 day                Physical Exam   Constitutional: She is oriented to person, place, and time. She appears well-developed and well-nourished. No distress.   HENT:   Head: Normocephalic and atraumatic.   Eyes: Conjunctivae are normal.   Neck: Normal range of motion. No JVD present.   Cardiovascular: Normal rate and regular rhythm.    Pulmonary/Chest: Effort normal. No respiratory distress.   Abdominal: Soft. She exhibits no distension and no mass. There is no tenderness. There is no rebound and no guarding.   Genitourinary:   Genitourinary Comments: Vila draining clear yellow  Vaginal packing in place   Musculoskeletal: Normal range of motion.   SCDs in place   Neurological: She is alert and oriented to person, place, and time.   Skin: Skin is warm and dry. She is not diaphoretic.     Psychiatric: She has a normal mood and affect. Her behavior is normal.       Significant Labs:    BMP:  Recent Labs   Lab 10/17/18  1043      K 5.0      CO2 32*   BUN 19   CREATININE 0.8   CALCIUM 9.4       CBC:   No results for input(s): WBC, HGB, HCT, PLT in the last 168 hours.    All pertinent labs  results from the past 24 hours have been reviewed.    Significant Imaging:  All pertinent imaging results/findings from the past 24 hours have been reviewed.      Assessment/Plan:     Stress incontinence, female    Hannah Norman is a 68 y.o. female s/p TOT sling placement on 10/23/18    - regular diet  - sliv  - gresham removed  - voiding trial  - vaginal packing removed  - ambulate/oob  - restarted home meds  - percocet PRN  - KATERIN/SCDs    Plan for discharge this am         VTE Risk Mitigation (From admission, onward)        Ordered     Place KATERIN hose  Until discontinued      10/23/18 1012     Place sequential compression device  Until discontinued      10/23/18 1012          Aruna Moreno MD  Urology  [facility]

## 2018-10-30 ENCOUNTER — PATIENT OUTREACH (OUTPATIENT)
Dept: OTHER | Facility: OTHER | Age: 68
End: 2018-10-30

## 2018-10-30 NOTE — PROGRESS NOTES
Last 5 Patient Entered Readings                                      Current 30 Day Average: 123/72     Recent Readings 10/29/2018 10/28/2018 10/27/2018 10/27/2018 10/26/2018    SBP (mmHg) 104 114 124 113 125    DBP (mmHg) 57 63 73 75 67    Pulse 64 65 79 66 70          Digital Medicine: Health  Introduction    Introduced Mrs. Hannah Norman to Digital Medicine. Discussed health  role and recommended lifestyle modifications.    Lifestyle Assessment:  Current Dietary Habits(i.e. low sodium, food labels, dining out): Patient has been aware of her sodium intake and that she should be following a low sodium diet but she was unaware of what the restrictions are. I reviewed the sodium guidelines with her and emailed her some resources to get her started. Patient has also been doing Weight Watchers for the past few months and she has been able to loose about 50 lbs. She has no intentions of stopping that program anytime soon because of how successful she has been with it.   Exercise: Patient is limited when it comes to physical activity due to her fibromyalgia and feet issues. She does, however, like to do chair exercises that she finds online. I will send her some new chair exercises and encouraged her to try and do about 30 minutes, twice a week. She feels like this is achievable.   Medication Adherence: has been compliant with the medicaiton regimen    Patients cardiologist is at [facility]     Patient had surgery last week and so far, is doing well.     Reviewed AHA recommendations:  Limit sodium intake to <2000mg/day  Perform 150 minutes of physical activity per week    Reviewed the importance of self-monitoring, medication adherence, and that the health  can be used as a resource for lifestyle modifications to help reduce or maintain a healthy lifestyle.  Reviewed that the Digital Medicine team is not available for emergencies and instructed the patient to call 911 or [facility] On Call ([phone]  or [phone]) if one arises.

## 2018-11-02 RX ORDER — FUROSEMIDE 40 MG/1
TABLET ORAL
Qty: 60 TABLET | Refills: 2 | Status: SHIPPED | OUTPATIENT
Start: 2018-11-02 | End: 2019-03-14 | Stop reason: SDUPTHER

## 2018-11-05 ENCOUNTER — PATIENT MESSAGE (OUTPATIENT)
Dept: SLEEP MEDICINE | Facility: CLINIC | Age: 68
End: 2018-11-05

## 2018-11-05 DIAGNOSIS — G25.81 RLS (RESTLESS LEGS SYNDROME): ICD-10-CM

## 2018-11-05 NOTE — PROGRESS NOTES
CHIEF COMPLAINT:    Mrs. Norman is a 68 y.o. female presenting for a post op follow up after TOS and cystoscopy on 10/23/2018.    PRESENTING ILLNESS:    Hannah Norman is a 68 y.o. female who returns for for follow up.  She states that the stress component is markedly better, however, she still has urgency and urge incontinence.  She did not fill the pain medication prescription and has very little in the way of discharge but she wears the pad due to the urge incontinence.  She feels like she empties to completion.     Allergies:  Hyoscyamine    Medications:  Outpatient Encounter Medications as of 11/7/2018   Medication Sig Dispense Refill    albuterol 90 mcg/actuation inhaler Inhale 2 puffs into the lungs every 6 (six) hours as needed for Wheezing or Shortness of Breath. Rescue 18 g 0    carvedilol (COREG) 25 MG tablet Take 25 mg by mouth 2 (two) times daily.       citalopram (CELEXA) 20 MG tablet TAKE 1 TABLET BY MOUTH EVERY DAY 30 tablet 5    clonazePAM (KLONOPIN) 0.5 MG tablet 2 pills PO QHS bedtime 60 tablet 5    cyclobenzaprine (FLEXERIL) 10 MG tablet Take 10 mg by mouth 3 (three) times daily as needed.       diclofenac sodium (SOLARAZE) 3 % gel APPLY 1-2 GRAMS TOPICALLY TO AFFECTED AREA 2-3 TIMES PER DAY. PRN  11    estradiol (ESTRACE) 1 MG tablet Take 1 mg by mouth once daily.       fluticasone (FLONASE) 50 mcg/actuation nasal spray USE 1 SPRAY EACH NOSTRIL EVERY DAY 16 g 11    fluticasone (FLONASE) 50 mcg/actuation nasal spray 1 spray (50 mcg total) by Each Nare route once daily. 16 g 0    furosemide (LASIX) 40 MG tablet Up to 2 times a day if swelling occurs 60 tablet 2    gabapentin (NEURONTIN) 600 MG tablet TAKE 1 TABLET BY MOUTH THREE TIMES A DAY 90 tablet 11    hydrocortisone (WESTCORT) 0.2 % cream Apply topically daily as needed.       ipratropium (ATROVENT) 0.03 % nasal spray 2 sprays by Nasal route 3 (three) times daily. 30 mL 0    levothyroxine (SYNTHROID) 75 MCG tablet Take 1 tablet (75  mcg total) by mouth before breakfast. 90 tablet 0    losartan (COZAAR) 50 MG tablet Take 50 mg by mouth once daily.       lovastatin (MEVACOR) 20 MG tablet TAKE 1 TABLET BY MOUTH EVERY EVENING 90 tablet 3    morphine (MS CONTIN) 15 MG 12 hr tablet Take 1 tablet by mouth every 12 (twelve) hours.      oxycodone-acetaminophen (PERCOCET)  mg per tablet Take 1 tablet by mouth every 8 (eight) hours as needed.       pantoprazole (PROTONIX) 40 MG tablet Take 1 tablet (40 mg total) by mouth once daily. 30 tablet 3    polyethylene glycol (GLYCOLAX) 17 gram PwPk Take 17 g by mouth once daily. 30 packet 0    pramipexole (MIRAPEX) 0.125 MG tablet 2 pills PO in the late afternoon and 3 pills PO at bedtime 150 tablet 11    ranitidine (ZANTAC) 300 MG tablet Take 1 tablet (300 mg total) by mouth every evening. 30 tablet 11    RELISTOR 150 mg Tab       venlafaxine (EFFEXOR-XR) 150 MG Cp24 TAKE 1 CAPSULE BY MOUTH TWO TIMES A DAY 30 capsule 12    metronidazole 1% (METROGEL) 1 % Gel Apply topically once daily. 50 g 0    oxybutynin (DITROPAN-XL) 10 MG 24 hr tablet Take 1 tablet (10 mg total) by mouth once daily. 30 tablet 11    oxyCODONE-acetaminophen (PERCOCET) 5-325 mg per tablet Take 1 tablet by mouth every 4 (four) hours as needed. 11 tablet 0    [DISCONTINUED] clonazePAM (KLONOPIN) 0.5 MG tablet 1 pill PO QHS bedtime 30 tablet 1     Facility-Administered Encounter Medications as of 11/7/2018   Medication Dose Route Frequency Provider Last Rate Last Dose    ciprofloxacin HCl tablet 500 mg  500 mg Oral Once Feli Garza MD             PHYSICAL EXAMINATION:    The patient generally appears in good health, is appropriately interactive, and is in no apparent distress.    Skin: No lesions.    Mental: Cooperative with normal affect.    Neuro: Grossly intact.    HEENT: Normal. No evidence of lymphadenopathy.    Chest:  normal inspiratory effort.    Abdomen:  Soft, non-tender. No masses or organomegaly. Bladder is  not palpable. No evidence of flank discomfort. No evidence of inguinal hernia.    Extremities: No clubbing, cyanosis, or edema      LABS:    Lab Results   Component Value Date    BUN 19 10/17/2018    CREATININE 0.8 10/17/2018       IMPRESSION:    Encounter Diagnoses   Name Primary?    Urinary urgency Yes    Urge incontinence        PLAN:    1.  Oxybutynin   2.  Reiterated the post op limitations  3.  Return in 4 weeks.

## 2018-11-06 ENCOUNTER — PATIENT MESSAGE (OUTPATIENT)
Dept: ADMINISTRATIVE | Facility: OTHER | Age: 68
End: 2018-11-06

## 2018-11-06 RX ORDER — CLONAZEPAM 0.5 MG/1
TABLET ORAL
Qty: 60 TABLET | Refills: 5 | Status: SHIPPED | OUTPATIENT
Start: 2018-11-06 | End: 2019-04-23 | Stop reason: SDUPTHER

## 2018-11-06 NOTE — TELEPHONE ENCOUNTER
Will increase Clonazepam to 1 mg    ----------------------------    I need a new prescription for clonazepam my script reads 0.5 mg but I have taken 2 and it seems to work better. Can you raise the mg to a whole 1 mg I will be completely out by tomorrow. Thank you   Hannah Norman    This encounter is not signed. The conversation

## 2018-11-07 ENCOUNTER — OFFICE VISIT (OUTPATIENT)
Dept: UROLOGY | Facility: CLINIC | Age: 68
End: 2018-11-07
Payer: MEDICARE

## 2018-11-07 ENCOUNTER — PATIENT MESSAGE (OUTPATIENT)
Dept: ADMINISTRATIVE | Facility: OTHER | Age: 68
End: 2018-11-07

## 2018-11-07 VITALS
HEIGHT: 62 IN | BODY MASS INDEX: 38.7 KG/M2 | DIASTOLIC BLOOD PRESSURE: 62 MMHG | WEIGHT: 210.31 LBS | HEART RATE: 65 BPM | SYSTOLIC BLOOD PRESSURE: 97 MMHG

## 2018-11-07 DIAGNOSIS — N39.41 URGE INCONTINENCE: ICD-10-CM

## 2018-11-07 DIAGNOSIS — R39.15 URINARY URGENCY: Primary | ICD-10-CM

## 2018-11-07 PROCEDURE — 99024 POSTOP FOLLOW-UP VISIT: CPT | Mod: POP,,, | Performed by: UROLOGY

## 2018-11-07 PROCEDURE — 99999 PR PBB SHADOW E&M-EST. PATIENT-LVL V: CPT | Mod: PBBFAC,,, | Performed by: UROLOGY

## 2018-11-07 PROCEDURE — 99215 OFFICE O/P EST HI 40 MIN: CPT | Mod: PBBFAC | Performed by: UROLOGY

## 2018-11-07 RX ORDER — OXYBUTYNIN CHLORIDE 10 MG/1
10 TABLET, EXTENDED RELEASE ORAL DAILY
Qty: 30 TABLET | Refills: 11 | Status: SHIPPED | OUTPATIENT
Start: 2018-11-07 | End: 2019-11-25 | Stop reason: SDUPTHER

## 2018-11-08 ENCOUNTER — PATIENT MESSAGE (OUTPATIENT)
Dept: ADMINISTRATIVE | Facility: OTHER | Age: 68
End: 2018-11-08

## 2018-11-08 PROBLEM — N39.3 STRESS INCONTINENCE, FEMALE: Status: RESOLVED | Noted: 2018-10-23 | Resolved: 2018-11-08

## 2018-11-12 ENCOUNTER — PATIENT OUTREACH (OUTPATIENT)
Dept: OTHER | Facility: OTHER | Age: 68
End: 2018-11-12

## 2018-11-12 RX ORDER — ACETAMINOPHEN 500 MG
500 TABLET ORAL EVERY 6 HOURS PRN
COMMUNITY

## 2018-11-12 NOTE — PROGRESS NOTES
Last 5 Patient Entered Readings                                      Current 30 Day Average: 124/73     Recent Readings 11/11/2018 11/10/2018 11/10/2018 11/9/2018 11/8/2018    SBP (mmHg) 118 126 157 110 141    DBP (mmHg) 72 75 86 63 83    Pulse 91 69 59 86 72        Mrs. Hannah Norman is a 68 y.o. female who is newly enrolled in the [facility].     The following information was reviewed/updated:  Preferred pharmacy   [id number] - [place], [place] - [address] [place] [address]  Phone: [phone] Fax: [phone]      Patient prefers a 90 days supply    Review of patient's allergies indicates:   Allergen Reactions    Hyoscyamine Rash     Current Outpatient Medications on File Prior to Visit   Medication Sig Dispense Refill    acetaminophen (TYLENOL) 500 MG tablet Take 500 mg by mouth every 6 (six) hours as needed.      albuterol 90 mcg/actuation inhaler Inhale 2 puffs into the lungs every 6 (six) hours as needed for Wheezing or Shortness of Breath. Rescue 18 g 0    carvedilol (COREG) 25 MG tablet Take 25 mg by mouth 2 (two) times daily.       citalopram (CELEXA) 20 MG tablet TAKE 1 TABLET BY MOUTH EVERY DAY 30 tablet 5    clonazePAM (KLONOPIN) 0.5 MG tablet 2 pills PO QHS bedtime 60 tablet 5    cyclobenzaprine (FLEXERIL) 10 MG tablet Take 10 mg by mouth 3 (three) times daily as needed.       diclofenac sodium (SOLARAZE) 3 % gel APPLY 1-2 GRAMS TOPICALLY TO AFFECTED AREA 2-3 TIMES PER DAY. PRN  11    estradiol (ESTRACE) 1 MG tablet Take 1 mg by mouth once daily.       fluticasone (FLONASE) 50 mcg/actuation nasal spray USE 1 SPRAY EACH NOSTRIL EVERY DAY 16 g 11    fluticasone (FLONASE) 50 mcg/actuation nasal spray 1 spray (50 mcg total) by Each Nare route once daily. 16 g 0    furosemide (LASIX) 40 MG tablet Up to 2 times a day if swelling occurs 60 tablet 2    gabapentin (NEURONTIN) 600 MG tablet TAKE 1 TABLET BY MOUTH  THREE TIMES A DAY 90 tablet 11    hydrocortisone (WESTCORT) 0.2 % cream Apply topically daily as needed.       levothyroxine (SYNTHROID) 75 MCG tablet Take 1 tablet (75 mcg total) by mouth before breakfast. 90 tablet 0    losartan (COZAAR) 50 MG tablet Take 50 mg by mouth once daily.       lovastatin (MEVACOR) 20 MG tablet TAKE 1 TABLET BY MOUTH EVERY EVENING 90 tablet 3    morphine (MS CONTIN) 15 MG 12 hr tablet Take 1 tablet by mouth every 12 (twelve) hours.      oxybutynin (DITROPAN-XL) 10 MG 24 hr tablet Take 1 tablet (10 mg total) by mouth once daily. 30 tablet 11    oxycodone-acetaminophen (PERCOCET)  mg per tablet Take 1 tablet by mouth every 8 (eight) hours as needed.       pantoprazole (PROTONIX) 40 MG tablet Take 1 tablet (40 mg total) by mouth once daily. 30 tablet 3    polyethylene glycol (GLYCOLAX) 17 gram PwPk Take 17 g by mouth once daily. 30 packet 0    pramipexole (MIRAPEX) 0.125 MG tablet 2 pills PO in the late afternoon and 3 pills PO at bedtime 150 tablet 11    ranitidine (ZANTAC) 300 MG tablet Take 1 tablet (300 mg total) by mouth every evening. 30 tablet 11    venlafaxine (EFFEXOR-XR) 150 MG Cp24 TAKE 1 CAPSULE BY MOUTH TWO TIMES A DAY 30 capsule 12    [DISCONTINUED] ipratropium (ATROVENT) 0.03 % nasal spray 2 sprays by Nasal route 3 (three) times daily. 30 mL 0    [DISCONTINUED] metronidazole 1% (METROGEL) 1 % Gel Apply topically once daily. 50 g 0    [DISCONTINUED] oxyCODONE-acetaminophen (PERCOCET) 5-325 mg per tablet Take 1 tablet by mouth every 4 (four) hours as needed. 11 tablet 0    [DISCONTINUED] RELISTOR 150 mg Tab        Current Facility-Administered Medications on File Prior to Visit   Medication Dose Route Frequency Provider Last Rate Last Dose    [DISCONTINUED] ciprofloxacin HCl tablet 500 mg  500 mg Oral Once Feli Garza MD           IF MARY screen positive  Patient is diagnosed with sleep apnea and reports using CPAP every night for 4 - 5  hours.      HYPERTENSION  Explained that we expect patient to obtain several blood pressures per week at random times of day.   Our goal is to get  BP to consistently below 130/80mmHg and make the process convenient so patient can avoid extra trips to the office. Getting your blood pressure below 130/80mmHg (definition of control) will reduce your risk for heart attack, kidney failure, stroke and death (as well as kidney failure, eye disease, & dementia).     Patient is meeting the goal already.     Patient denies s/s of hypotension (lightheadedness, dizziness, nausea, fatigue) associated with low readings. Instructed patient to inform me if this occurs, patient confirms understanding. When asked, patient reported she does sometimes feel like fainting or dizzy and thought those were symptoms of high blood pressure instead of low blood pressure. She mentioned she has had low BP reading in the past at office visits but did not feel symptoms, even when the SBP was in the 90s.     Patient sees a cardiologist, who started her on current BP medications. Mentioned she previously had irregular heartbeats, but was never diagnosed with AFib when I asked.      Patient reported having issues with paying for medications; thinks the most expensive BP medication is the Coreg (may cost $30 for 90 day supply), although unsure of actual price breakdown between medications. Both she and  are on many medications, and patient reports paying $200 - $300 per month for both of their medications. Patient also reports being unable to pay for medications sometimes, but has not had issues with paying for her BP medications specifically.    Also spoke with patient regarding proper BP measuring techniques. Patient voiced issue with putting on large adult cuff on her own left arm and asked if she could place cuff on lower arm or crease of elbow. Recommended patient only use upper arm for BP readings.     Discussed appropriate BP measuring  technique:  Before taking your blood pressure  Find a quiet place. You will need to listen for your heartbeat.  Roll up the sleeve on your left arm or remove any tight-sleeved clothing, if needed. (It's best to take your blood pressure from your left arm if you are right-handed.You can use the other arm if you have been told by your health care provider to do so.)  Rest in a chair next to a table for 5 to 10 minutes. (Your left arm should rest comfortably at heart level.)  Sit up straight with your back against the chair, legs uncrossed and on the ground.  Rest your forearm on the table with the palm of your hand facing up.  You should not talk, read the newspaper, or watch television during this process.    Last 5 Patient Entered Readings                                      Current 30 Day Average: 124/73     Recent Readings 11/11/2018 11/10/2018 11/10/2018 11/9/2018 11/8/2018    SBP (mmHg) 118 126 157 110 141    DBP (mmHg) 72 75 86 63 83    Pulse 91 69 59 86 72     Instructed patient not to allow anyone else to use phone and BP cuff.   I'm not available for emergencies. Patient will call [facility] on-call ([phone] or [phone]) or [phone] if needed.     Patient and I agreed that she will continue to monitor blood pressure and sodium intake and continue to remain adherent to medications.     I will plan to follow-up with the patient in 1 week. If patient still having symptoms of hypotension, plan to reduce dose of carvedilol as there is no clear indication for its use. Will need to double check with patient indication for Lasix.

## 2018-11-12 NOTE — PROGRESS NOTES
Last 5 Patient Entered Readings                                      Current 30 Day Average: 124/73     Recent Readings 11/11/2018 11/10/2018 11/10/2018 11/9/2018 11/8/2018    SBP (mmHg) 118 126 157 110 141    DBP (mmHg) 72 75 86 63 83    Pulse 91 69 59 86 72      I agree with the plan of care given by VIVIAN Boyce.

## 2018-11-21 ENCOUNTER — PATIENT OUTREACH (OUTPATIENT)
Dept: OTHER | Facility: OTHER | Age: 68
End: 2018-11-21

## 2018-11-21 ENCOUNTER — CLINICAL SUPPORT (OUTPATIENT)
Dept: FAMILY MEDICINE | Facility: CLINIC | Age: 68
End: 2018-11-21
Payer: MEDICARE

## 2018-11-21 DIAGNOSIS — Z23 FLU VACCINE NEED: Primary | ICD-10-CM

## 2018-11-21 PROCEDURE — 90662 IIV NO PRSV INCREASED AG IM: CPT | Mod: S$GLB,,, | Performed by: FAMILY MEDICINE

## 2018-11-21 PROCEDURE — G0008 ADMIN INFLUENZA VIRUS VAC: HCPCS | Mod: S$GLB,,, | Performed by: FAMILY MEDICINE

## 2018-11-21 NOTE — PROGRESS NOTES
Last 5 Patient Entered Readings                                      Current 30 Day Average: 123/74     Recent Readings 11/20/2018 11/19/2018 11/19/2018 11/18/2018 11/17/2018    SBP (mmHg) 138 124 149 132 121    DBP (mmHg) 72 83 90 66 71    Pulse 64 58 61 72 78          Patient's BP average is controlled based on 2017 ACC/AHA HTN guidelines of goal BP <130/80.      Patient denies s/s of hypotension (lightheadedness, dizziness, nausea, fatigue) associated with low readings. Patient did note that BP is low at times but stated she feels fine at lower BP readings. No need to adjust medications at this time. Instructed patient to inform me if this occurs; patient confirms understanding.    Confirmed Lasix indication: patient stated it is for fluid control. Says she has been on it for a very long time, maybe since she was 18.     Patient denies s/s of hypertension (SOB, CP, severe headaches, changes in vision) associated with high readings. Instructed patient to go to the ED if BP > 180/110 and accompanied by hypertensive s/s, patient confirms understanding.     Patient's health , Fariba Jay, will be following up every 3-4 weeks. I will continue to monitor regularly and will follow up in 3-4 weeks, sooner if BP begins to trend upward or downward.    Patient has my contact information and knows to call with any concerns or clinical changes.     Current HTN regimen:  Hypertension Medications             carvedilol (COREG) 25 MG tablet Take 25 mg by mouth 2 (two) times daily.     furosemide (LASIX) 40 MG tablet Up to 2 times a day if swelling occurs    losartan (COZAAR) 50 MG tablet Take 50 mg by mouth once daily.

## 2018-11-21 NOTE — PROGRESS NOTES
Last 5 Patient Entered Readings                                      Current 30 Day Average: 123/74     Recent Readings 11/20/2018 11/19/2018 11/19/2018 11/18/2018 11/17/2018    SBP (mmHg) 138 124 149 132 121    DBP (mmHg) 72 83 90 66 71    Pulse 64 58 61 72 78        Patient answered but in the middle of an appointment; asked to call back later in the afternoon. Checking in the patient to see whether she is still having hypotensive symptoms. Average BP at goal, but last few readings slightly above. Will double check whether patient made any self-adjustments to medications.

## 2018-11-21 NOTE — PROGRESS NOTES
Patient here for flu vaccine, no pain noted. Injection given, tolerated well. Advised to wait in lobby 15 minutes. Report any adverse reactions. Given LD

## 2018-11-23 NOTE — PROGRESS NOTES
Last 5 Patient Entered Readings                                      Current 30 Day Average: 122/74     Recent Readings 11/20/2018 11/19/2018 11/19/2018 11/18/2018 11/17/2018    SBP (mmHg) 138 124 149 132 121    DBP (mmHg) 72 83 90 66 71    Pulse 64 58 61 72 78      I agree with the plan of care given by VIVIAN Boyce.

## 2018-11-26 ENCOUNTER — PATIENT OUTREACH (OUTPATIENT)
Dept: OTHER | Facility: OTHER | Age: 68
End: 2018-11-26

## 2018-11-26 NOTE — PROGRESS NOTES
Last 5 Patient Entered Readings                                      Current 30 Day Average: 122/75     Recent Readings 11/25/2018 11/24/2018 11/23/2018 11/20/2018 11/19/2018    SBP (mmHg) 112 118 121 138 124    DBP (mmHg) 62 82 74 72 83    Pulse 63 74 66 64 58            Digital Medicine: Health  Follow Up    Left voicemail to follow up with Rashida Hannah [facility].  Current BP average 122/75 mmHg is at goal, <130/80.         Statement Selected

## 2018-11-28 ENCOUNTER — OFFICE VISIT (OUTPATIENT)
Dept: SLEEP MEDICINE | Facility: CLINIC | Age: 68
End: 2018-11-28
Payer: MEDICARE

## 2018-11-28 DIAGNOSIS — G47.33 OSA (OBSTRUCTIVE SLEEP APNEA): Primary | ICD-10-CM

## 2018-11-28 DIAGNOSIS — G25.81 RLS (RESTLESS LEGS SYNDROME): ICD-10-CM

## 2018-11-28 PROCEDURE — 99999 PR PBB SHADOW E&M-EST. PATIENT-LVL II: CPT | Mod: PBBFAC,,, | Performed by: PSYCHIATRY & NEUROLOGY

## 2018-11-28 PROCEDURE — 99215 OFFICE O/P EST HI 40 MIN: CPT | Mod: S$PBB,,, | Performed by: PSYCHIATRY & NEUROLOGY

## 2018-11-28 PROCEDURE — 99212 OFFICE O/P EST SF 10 MIN: CPT | Mod: PBBFAC,PO | Performed by: PSYCHIATRY & NEUROLOGY

## 2018-11-28 RX ORDER — PRAMIPEXOLE DIHYDROCHLORIDE 0.12 MG/1
TABLET ORAL
Qty: 150 TABLET | Refills: 11 | Status: SHIPPED | OUTPATIENT
Start: 2018-11-28 | End: 2019-12-20 | Stop reason: SDUPTHER

## 2018-11-28 NOTE — PATIENT INSTRUCTIONS
Stop CYCLOBENZAPRINE  mORPHINE - AVOID TAKING AT NIGHT - GIVE IT 2-3 HRS HEAD START    eFFEXOR - DECREASE TO ONCE A DAY, IN THE MORNING  cONTINUE CELEXA AT NIGHT ONLY  KEEP CLONAZEPAM 0.5 MG AT NIGHT (DO NOT TAKE SAME TIME AS mORPHINE - THEY SUPPRESS  BREATHING!)    pRAMIPEXOL - 3 PILLS AT 3-4 pm AND 2 PILLS AT 8 PM

## 2018-11-28 NOTE — PROGRESS NOTES
"Last 5 Patient Entered Readings                                      Current 30 Day Average: 122/75     Recent Readings 11/26/2018 11/25/2018 11/24/2018 11/23/2018 11/20/2018    SBP (mmHg) 113 112 118 121 138    DBP (mmHg) 66 62 82 74 72    Pulse 73 63 74 66 64          Digital Medicine: Health  Follow Up    Lifestyle Modifications:    1.Dietary Modifications (Sodium intake <2,000mg/day, food labels, dining out): Patient continues monitoring her sodium intake. She denies any changes that would cause an increase in salt intake. She is reading food labels and she avoids extra salt/salted seasonings.     2.Physical Activity: Deferred    3.Medication Therapy: Patient has been compliant with the medication regimen. Patient is doing well on her current regimen. She denies symptoms/side effects.     4.Patient has the following medication side effects/concerns:   (Frequency/Alleviating factors/Precipitating factors, etc.)     Patient informed me that, since loosing weight, her upper arm is "shaped funny". This causes her to have trouble getting the cuff in the right spot unless she has help. She usually does receive help from someone in the house hold but on days when she does not have help, she would just put the cuff on the bottom part of her arm. I advised her against doing this because it will not provide her with accurate readings. I explained that I would rather her skip a day of readings over her having inaccurate readings. Patient expressed understanding.     Follow up with Mrs. Hannah Norman completed. No further questions or concerns. Will continue to follow up to achieve health goals.  "

## 2018-11-28 NOTE — PROGRESS NOTES
"   Hannah Norman  was seen at the request of  No ref. provider found for sleep evaluation.    03/28/2018 INITIAL HISTORY OF PRESENT ILLNESS:  Hannah Norman is a 68 y.o. female is here to be evaluated for a sleep disorder.       CHIEF COMPLAINT:      The patient's complaints include excessive daytime sleepiness, excessive daytime fatigue, snoring,  witnessed breathing pauses,  gasping for air in sleep and interrupted sleep since  Several years ago.    She has had more nightmares lately.    Diagnosed with mild MARY in 2  Reports  dry mouth and sore throat  Reports occasional nasal congestion   Reports  morning headaches  Denies  interrupted sleep  Denies frequent leg movements  Denies symptoms concerning for parasomnia    The ESS ([place] Sleepiness Score) taken on initial visit is 14 /24    The patient never had tonsillectomy, adenoidectomy or UPPP       INTERVAL HISTORY:    07/18/2018:  The patient has not presented any new complaints since the previous visit.   Still reports nightmares - at least once a week; at times enacting her dreams - was fighting with someone in her sleep, and was pushing her  away.  At night - taking Celexa, Xanax, Flexeril and Gabapentin 600 mg. She's been on this scheme for years.   APAP 8-18  - did not quite meet compliance - hose is "too long, gets in the way", so she removes it in the middle of the night.    90% - 9 cm H2O.    RLS - not quite well controlled with Requip.    Therapy Event Summary Date Range: 6/18/2018 - 7/17/2018     Hide      Compliance Summary  Apnea Indices  Ventilator Statistics    Days with Device Usage:  30 days  Average AHI:  7.1  Average Breath Rate:  9.8 bpm    Percentage of Days >=4 Hours:  66.7%  Average OA Index:  3.6  Average % Patient Triggered Breaths:  N/A    Average Usage (Days Used):  4 hrs. 22 mins. 16 secs.  Average CA Index:  0.8  Average Tidal Volume:  326.2 ml    Average Usage (All Days):  4 hrs. 22 mins. 16 secs.    Average Minute Vent:  N/A     "    Large Leak  Periodic Breathing     Average Time in Large Leak:  1 mins. 10 secs.  Average % of Night in PB:  0.2%     Average % of Night in Large Leak:  0.4%                09/19/2018:     Benefiting from CPAP use in terms of sleep continuity and daytime sleepiness.   Still some dream enacting events - does not remember associated dreams; some times dreams of fight and fough actually fight in her sleep  APAP 8-18 cm H2O.  90% -14 (but sometimes 10)  Wisp - want to try Dreamwear  RLS 0.5 mg split in 2 doses - working no better than Requip.  Therapy Event Summary Date Range: 8/20/2018 - 9/18/2018     Hide      Compliance Summary  Apnea Indices  Ventilator Statistics    Days with Device Usage:  30 days  Average AHI:  7.5  Average Breath Rate:  10.6 bpm    Percentage of Days >=4 Hours:  100.0%  Average OA Index:  3.4  Average % Patient Triggered Breaths:  N/A    Average Usage (Days Used):  6 hrs. 55 mins. 52 secs.  Average CA Index:  0.7  Average Tidal Volume:  324.9 ml    Average Usage (All Days):  6 hrs. 55 mins. 52 secs.    Average Minute Vent:  N/A        Large Leak  Periodic Breathing     Average Time in Large Leak:  12 mins. 28 secs.  Average % of Night in PB:  0.0%     Average % of Night in Large Leak:  3.0%              11/28/2018:    Still reports significant RLS - still poorly controlled. Worst early evening (6-7 PM)    Clonazepam 0.5 - two pills at night;  Celexa at night  Morphine 15 mg ER - BID  Flexeril 10 mg  Gabapentin - 600 mg TID  ()  Mirapex (3 PM and 8 PM)              SLEEP ROUTINE AND LIFESTYLE 11/28/2018 :    Occupation:    Bed partner:      Time to bed - wake up time on a workday : 9 pm to 8 AM  Time to bed - wake up time on a day off: 9 pm to  8 Am  Sleep onset latency: 30 min  Disruptions or awakenings: 3-4  Time to fall back into sleep: 10-15 min   Perceived sleep quality: 2/5  Perceived total sleep time:  6  hours.  Daytime naps: 1    Exercise routine: no  Caffeine:       PREVIOUS SLEEP  STUDIES:     PSG study  In 2/19/18 showed significant MARY with the AHI of 7.9- /hour and SaO2 minimum of 85%.  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    Screening for malignant neoplasm of colon 11/10/2017    MARY (obstructive sleep apnea)     Mixed stress and urge urinary incontinence 10/17/2018     Resolved Ambulatory Problems     Diagnosis Date Noted    Stress incontinence, female 10/23/2018     Past Medical History:   Diagnosis Date    Anxiety disorder     Bell's palsy     Chronic back pain     Chronic osteoarthritis     Essential tremor     Fibromyalgia     Hypertension     Mild acid reflux     Neck pain     Other and unspecified hyperlipidemia     Sleep apnea                 PAST SURGICAL HISTORY:    Past Surgical History:   Procedure Laterality Date    BREAST BIOPSY Left     1995  negative    Breast reduction      CARPAL TUNNEL RELEASE      COLONOSCOPY  2008    COLONOSCOPY N/A 11/10/2017    Procedure: COLONOSCOPY - Miralax split prep;  Surgeon: Annetta Powell MD;  Location: [facility];  Service: Endoscopy;  Laterality: N/A;    COLONOSCOPY - Miralax split prep N/A 11/10/2017    Performed by Annetta Powell MD at [facility]    CYSTOSCOPY N/A 10/23/2018    Procedure: CYSTOSCOPY;  Surgeon: Feli Garza MD;  Location: [facility] OR [address] [place] [address];  Service: Urology;  Laterality: N/A;    CYSTOSCOPY N/A 10/23/2018    Performed by Feli Garza MD at [facility] OR [address] [place] [address]    ESOPHAGOGASTRODUODENOSCOPY (EGD) N/A  11/10/2017    Performed by [facility] at [facility]    HYSTERECTOMY      JOINT REPLACEMENT      Left knee    KNEE SURGERY      bilateral    left shoulder      PLACEMENT OF TRANSOBTURATOR TAPE N/A 10/23/2018    Procedure: PLACEMENT, TRANSOBTURATOR TAPE;  Surgeon: Feli Garza MD;  Location: [facility] OR [address] [place] [address];  Service: Urology;  Laterality: N/A;  1.5 hours    PLACEMENT, TRANSOBTURATOR TAPE N/A 10/23/2018    Performed by Feli Garza MD at [facility] OR [address] [place] [address]    Tonsillectomy           FAMILY HISTORY:                Family History   Problem Relation Age of Onset    Diabetes Mother     Tremor Mother     Liver disease Mother     Diabetes Father     Heart disease Father     Tremor Son     Diabetes Sister     Diabetes Brother        SOCIAL HISTORY:          Tobacco:   Social History     Tobacco Use   Smoking Status Former Smoker    Last attempt to quit: 10/5/1982    Years since quittin.1   Smokeless Tobacco Never Used       alcohol use:    Social History     Substance and Sexual Activity   Alcohol Use No                   ALLERGIES:    Review of patient's allergies indicates:   Allergen Reactions    Hyoscyamine Rash       CURRENT MEDICATIONS:    Current Outpatient Medications   Medication Sig Dispense Refill    acetaminophen (TYLENOL) 500 MG tablet Take 500 mg by mouth every 6 (six) hours as needed.      albuterol 90 mcg/actuation inhaler Inhale 2 puffs into the lungs every 6 (six) hours as needed for Wheezing or Shortness of Breath. Rescue 18 g 0    carvedilol (COREG) 25 MG tablet Take 25 mg by mouth 2 (two) times daily.       citalopram (CELEXA) 20 MG tablet TAKE 1 TABLET BY MOUTH EVERY DAY 30 tablet 5    clonazePAM (KLONOPIN) 0.5 MG tablet 2 pills PO QHS bedtime 60 tablet 5    cyclobenzaprine (FLEXERIL) 10 MG tablet Take 10 mg by mouth 3 (three) times daily as needed.       diclofenac sodium (SOLARAZE) 3 % gel APPLY 1-2 GRAMS TOPICALLY TO AFFECTED AREA 2-3 TIMES PER DAY. PRN   11    estradiol (ESTRACE) 1 MG tablet Take 1 mg by mouth once daily.       fluticasone (FLONASE) 50 mcg/actuation nasal spray USE 1 SPRAY EACH NOSTRIL EVERY DAY 16 g 11    fluticasone (FLONASE) 50 mcg/actuation nasal spray 1 spray (50 mcg total) by Each Nare route once daily. 16 g 0    furosemide (LASIX) 40 MG tablet Up to 2 times a day if swelling occurs 60 tablet 2    gabapentin (NEURONTIN) 600 MG tablet TAKE 1 TABLET BY MOUTH THREE TIMES A DAY 90 tablet 11    hydrocortisone (WESTCORT) 0.2 % cream Apply topically daily as needed.       levothyroxine (SYNTHROID) 75 MCG tablet Take 1 tablet (75 mcg total) by mouth before breakfast. 90 tablet 0    losartan (COZAAR) 50 MG tablet Take 50 mg by mouth once daily.       lovastatin (MEVACOR) 20 MG tablet TAKE 1 TABLET BY MOUTH EVERY EVENING 90 tablet 3    morphine (MS CONTIN) 15 MG 12 hr tablet Take 1 tablet by mouth every 12 (twelve) hours.      oxybutynin (DITROPAN-XL) 10 MG 24 hr tablet Take 1 tablet (10 mg total) by mouth once daily. 30 tablet 11    oxycodone-acetaminophen (PERCOCET)  mg per tablet Take 1 tablet by mouth every 8 (eight) hours as needed.       pantoprazole (PROTONIX) 40 MG tablet Take 1 tablet (40 mg total) by mouth once daily. 30 tablet 3    polyethylene glycol (GLYCOLAX) 17 gram PwPk Take 17 g by mouth once daily. 30 packet 0    pramipexole (MIRAPEX) 0.125 MG tablet 2 pills PO in the late afternoon and 3 pills PO at bedtime 150 tablet 11    ranitidine (ZANTAC) 300 MG tablet Take 1 tablet (300 mg total) by mouth every evening. 30 tablet 11    venlafaxine (EFFEXOR-XR) 150 MG Cp24 TAKE 1 CAPSULE BY MOUTH TWO TIMES A DAY 30 capsule 12     No current facility-administered medications for this visit.                       REVIEW OF SYSTEMS:   Sleep related symptoms as per HPI    denies weight gain  Reports occasional dyspnea  Reports occasional palpitations  Reports occasional acid reflux   Reports polyuria x 2  Denies  mood  "diturbance  Denies  anemia  Denies  muscle pain  Denies  Gait imbalance    Otherwise, a balance of 10 systems reviewed is negative.    PHYSICAL EXAM:  There were no vitals taken for this visit.  GENERAL: Overweight body habitus, well groomed.  HEENT:   HEENT:  Conjunctivae are non-erythematous; Pupils equal, round, and reactive to light; Nose is symmetrical; Nasal mucosa is pink and moist; Septum is midline; Inferior turbinates are normal; Nasal airflow is normal; Posterior pharynx is pink; Modified Mallampati:II; Posterior palate is low; Tonsils not visualized; Uvula is wide and elongated;Tongue is enlarged; Dentition is fair; No TMJ tenderness; Jaw opening and protrusion without click and without discomfort.  NECK: Supple. Neck circumference is 16 inches. No thyromegaly. No palpable nodes.     SKIN: On face and neck: No abrasions, no rashes, no lesions.  No subcutaneous nodules are palpable.  RESPIRATORY: Chest is clear to auscultation.  Normal chest expansion and non-labored breathing at rest.  CARDIOVASCULAR: Normal S1, S2.  No murmurs, gallops or rubs. No carotid bruits bilaterally.  No edema. No clubbing. No cyanosis.    NEURO: Oriented to time, place and person. Normal attention span and concentration. Gait normal.    PSYCH: Affect is full. Mood is normal  MUSCULOSKELETAL: Moves 4 extremities. Gait normal.         Using [facility]:         ASSESSMENT:    1. MARY (obstructive sleep apnea). The patient symptomatically has  excessive daytime sleepiness, snoring,  witnessed breathing pauses, excessive daytime fatigue, gasping for air in sleep and interrupted sleep  with exam findings of "a crowded oral airway and elevated body mass index. The patient has medical co-morbidities of hyperlipidemia, fibromyalgia and nightmares  which can be worsened by MARY. This warrants treatment. Benefiting from CPAP use in terms of sleep continuity and daytime sleepiness. Has not quite met compliance due to discomfort.       2. RLS " - not well controlled on Requip; switch to Pramipexole     3. RBD      PLAN:    Stop CYCLOBENZAPRINE  mORPHINE - AVOID TAKING AT NIGHT - GIVE IT 2-3 HRS HEAD START    eFFEXOR - DECREASE TO ONCE A DAY, IN THE MORNING  cONTINUE CELEXA AT NIGHT ONLY  KEEP CLONAZEPAM 0.5 MG AT NIGHT (DO NOT TAKE SAME TIME AS mORPHINE - THEY SUPPRESS  BREATHING!)    pRAMIPEXOL - 3 PILLS AT 3-4 pm AND 2 PILLS AT 8 PM    Will refax rx to [employer] - I wont increase the pressure since it looks to me that mask leak is playing a big kareen in AHI.    Recommend using Dreamwear mask next time - I put it on rx for [employer].     More than 25 minutes of this 45 minutes visit was spent in counseling: during our discussion today, we talked about the etiology of  MARY as well as the potential ramifications of untreated sleep apnea, which could include daytime sleepiness, hypertension, heart disease and/or stroke.  We discussed potential treatment options, which could include weight loss, body positioning, continuous positive airway pressure (CPAP), or referral for surgical consideration. Meanwhile, she  is urged to avoid supine sleep, weight gain and alcoholic beverages since all of these can worsen MARY.     Precautions: The patient was advised to abstain from driving should he feel sleepy or drowsy.    Follow up: MD/NP  after the sleep study has been completed.     Thank you for allowing me the opportunity to participate in the care of your patient.    This visit summary will be sent to referring provider via inbasket

## 2018-12-07 ENCOUNTER — OFFICE VISIT (OUTPATIENT)
Dept: UROLOGY | Facility: CLINIC | Age: 68
End: 2018-12-07
Payer: MEDICARE

## 2018-12-07 VITALS
HEIGHT: 62 IN | WEIGHT: 211 LBS | SYSTOLIC BLOOD PRESSURE: 113 MMHG | HEART RATE: 60 BPM | DIASTOLIC BLOOD PRESSURE: 66 MMHG | BODY MASS INDEX: 38.83 KG/M2

## 2018-12-07 DIAGNOSIS — N39.41 URGE INCONTINENCE: ICD-10-CM

## 2018-12-07 DIAGNOSIS — Z98.890 POST-OPERATIVE STATE: Primary | ICD-10-CM

## 2018-12-07 PROBLEM — N39.46 MIXED STRESS AND URGE URINARY INCONTINENCE: Status: RESOLVED | Noted: 2018-10-17 | Resolved: 2018-12-07

## 2018-12-07 PROCEDURE — 99024 POSTOP FOLLOW-UP VISIT: CPT | Mod: POP,,, | Performed by: UROLOGY

## 2018-12-07 PROCEDURE — 99214 OFFICE O/P EST MOD 30 MIN: CPT | Mod: PBBFAC | Performed by: UROLOGY

## 2018-12-07 PROCEDURE — 99999 PR PBB SHADOW E&M-EST. PATIENT-LVL IV: CPT | Mod: PBBFAC,,, | Performed by: UROLOGY

## 2018-12-07 NOTE — PROGRESS NOTES
CHIEF COMPLAINT:    Mrs. Norman is a 68 y.o. female presenting for a follow up after TOS on 10/23/2018    PRESENTING ILLNESS:    Hannah Norman is a 68 y.o. female who returns for follow up .  She states that the only time she has incontinence is after she has taken her fluid pill and she diureses.  She then has urge incontinence and she has to wear a pad.  She denies any stress incontinence.  She has no pain, no vaginal discharge.  Last night she ate something that had a fair amount of cheese and she has ankle edema today.     Allergies:  Hyoscyamine    Medications:  Outpatient Encounter Medications as of 12/7/2018   Medication Sig Dispense Refill    acetaminophen (TYLENOL) 500 MG tablet Take 500 mg by mouth every 6 (six) hours as needed.      albuterol 90 mcg/actuation inhaler Inhale 2 puffs into the lungs every 6 (six) hours as needed for Wheezing or Shortness of Breath. Rescue 18 g 0    carvedilol (COREG) 25 MG tablet Take 25 mg by mouth 2 (two) times daily.       citalopram (CELEXA) 20 MG tablet TAKE 1 TABLET BY MOUTH EVERY DAY 30 tablet 5    clonazePAM (KLONOPIN) 0.5 MG tablet 2 pills PO QHS bedtime 60 tablet 5    cyclobenzaprine (FLEXERIL) 10 MG tablet Take 10 mg by mouth 3 (three) times daily as needed.       diclofenac sodium (SOLARAZE) 3 % gel APPLY 1-2 GRAMS TOPICALLY TO AFFECTED AREA 2-3 TIMES PER DAY. PRN  11    estradiol (ESTRACE) 1 MG tablet Take 1 mg by mouth once daily.       fluticasone (FLONASE) 50 mcg/actuation nasal spray USE 1 SPRAY EACH NOSTRIL EVERY DAY 16 g 11    fluticasone (FLONASE) 50 mcg/actuation nasal spray 1 spray (50 mcg total) by Each Nare route once daily. 16 g 0    furosemide (LASIX) 40 MG tablet Up to 2 times a day if swelling occurs 60 tablet 2    gabapentin (NEURONTIN) 600 MG tablet TAKE 1 TABLET BY MOUTH THREE TIMES A DAY 90 tablet 11    hydrocortisone (WESTCORT) 0.2 % cream Apply topically daily as needed.       levothyroxine (SYNTHROID) 75 MCG tablet Take 1 tablet  (75 mcg total) by mouth before breakfast. 90 tablet 0    losartan (COZAAR) 50 MG tablet Take 50 mg by mouth once daily.       lovastatin (MEVACOR) 20 MG tablet TAKE 1 TABLET BY MOUTH EVERY EVENING 90 tablet 3    morphine (MS CONTIN) 15 MG 12 hr tablet Take 1 tablet by mouth every 12 (twelve) hours.      oxybutynin (DITROPAN-XL) 10 MG 24 hr tablet Take 1 tablet (10 mg total) by mouth once daily. 30 tablet 11    oxycodone-acetaminophen (PERCOCET)  mg per tablet Take 1 tablet by mouth every 8 (eight) hours as needed.       pantoprazole (PROTONIX) 40 MG tablet Take 1 tablet (40 mg total) by mouth once daily. 30 tablet 3    polyethylene glycol (GLYCOLAX) 17 gram PwPk Take 17 g by mouth once daily. 30 packet 0    pramipexole (MIRAPEX) 0.125 MG tablet 2 pills PO in the late afternoon and 3 pills PO at bedtime 150 tablet 11    ranitidine (ZANTAC) 300 MG tablet Take 1 tablet (300 mg total) by mouth every evening. 30 tablet 11    venlafaxine (EFFEXOR-XR) 150 MG Cp24 TAKE 1 CAPSULE BY MOUTH TWO TIMES A DAY 30 capsule 12     No facility-administered encounter medications on file as of 12/7/2018.          PHYSICAL EXAMINATION:    The patient generally appears in good health, is appropriately interactive, and is in no apparent distress.    Skin: No lesions.    Mental: Cooperative with normal affect.    Neuro: Grossly intact.    HEENT: Normal. No evidence of lymphadenopathy.    Chest:  normal inspiratory effort.    Abdomen:  Soft, non-tender. No masses or organomegaly. Bladder is not palpable. No evidence of flank discomfort. No evidence of inguinal hernia.    Extremities: No clubbing, cyanosis.  Bilateral ankle edema 2+    Normal external female genitalia  Urethral meatus is normal  Urethra and bladder are nontender to bimanual exam  Well supported anteriorly and posteriorly   Uterus and cervix are surgically absent  No adnexal masses  PVR by catheterization was 40 ml      LABS:    Lab Results   Component Value  Date    BUN 19 10/17/2018    CREATININE 0.8 10/17/2018     UA 1.005, pH7, otherwise, negative.      IMPRESSION:    Encounter Diagnoses   Name Primary?    Post-operative state Yes    Urge incontinence        PLAN:    1.  OK to discontinue the oxybutynin to see how she does.  If she wants to resume it is OK to start and stop   2.  The catheterized specimen was sent for culture

## 2018-12-11 NOTE — PROGRESS NOTES
Last 5 Patient Entered Readings                                      Current 30 Day Average: 122/72     Recent Readings 12/11/2018 12/10/2018 12/6/2018 12/5/2018 12/4/2018    SBP (mmHg) 135 133 122 104 130    DBP (mmHg) 68 75 79 77 60    Pulse 60 56 67 68 59      Chart reviewed and no BP medications require adjustments at this time. She saw Dr. Blackewll for MARY and will be trying a new mask.

## 2018-12-19 ENCOUNTER — OFFICE VISIT (OUTPATIENT)
Dept: OTOLARYNGOLOGY | Facility: CLINIC | Age: 68
End: 2018-12-19
Payer: MEDICARE

## 2018-12-19 VITALS
DIASTOLIC BLOOD PRESSURE: 69 MMHG | BODY MASS INDEX: 38.14 KG/M2 | WEIGHT: 207.25 LBS | HEIGHT: 62 IN | TEMPERATURE: 98 F | HEART RATE: 71 BPM | SYSTOLIC BLOOD PRESSURE: 106 MMHG

## 2018-12-19 DIAGNOSIS — R49.0 HOARSENESS OF VOICE: ICD-10-CM

## 2018-12-19 DIAGNOSIS — J30.0 CHRONIC VASOMOTOR RHINITIS: ICD-10-CM

## 2018-12-19 DIAGNOSIS — K21.9 GASTROESOPHAGEAL REFLUX DISEASE, ESOPHAGITIS PRESENCE NOT SPECIFIED: ICD-10-CM

## 2018-12-19 DIAGNOSIS — G47.33 OSA (OBSTRUCTIVE SLEEP APNEA): ICD-10-CM

## 2018-12-19 DIAGNOSIS — K21.9 LARYNGOPHARYNGEAL REFLUX (LPR): Primary | ICD-10-CM

## 2018-12-19 PROCEDURE — 99999 PR PBB SHADOW E&M-EST. PATIENT-LVL IV: CPT | Mod: PBBFAC,,, | Performed by: OTOLARYNGOLOGY

## 2018-12-19 PROCEDURE — 31575 DIAGNOSTIC LARYNGOSCOPY: CPT | Mod: PBBFAC,PN | Performed by: OTOLARYNGOLOGY

## 2018-12-19 PROCEDURE — 99214 OFFICE O/P EST MOD 30 MIN: CPT | Mod: PBBFAC,PN,25 | Performed by: OTOLARYNGOLOGY

## 2018-12-19 PROCEDURE — 99214 OFFICE O/P EST MOD 30 MIN: CPT | Mod: 25,S$PBB,, | Performed by: OTOLARYNGOLOGY

## 2018-12-19 PROCEDURE — 31575 DIAGNOSTIC LARYNGOSCOPY: CPT | Mod: S$PBB,,, | Performed by: OTOLARYNGOLOGY

## 2018-12-19 RX ORDER — PANTOPRAZOLE SODIUM 40 MG/1
40 TABLET, DELAYED RELEASE ORAL 2 TIMES DAILY
Qty: 60 TABLET | Refills: 3 | Status: SHIPPED | OUTPATIENT
Start: 2018-12-19 | End: 2019-01-14

## 2018-12-19 NOTE — PROGRESS NOTES
Chief Complaint   Patient presents with    Follow-up    Gastroesophageal Reflux    Sore Throat     comes and goes   .     HPI:Hannah Norman is a 68 y.o. female who has been referred by Dr. Hauser for a one year history of hoarseness. She has been having dysphagia and recently underwent a MBSS which was normal but she was noted by the SLP to have dysphonia and ENT referral was recommended.  Her voice is progressively worsening over this time. There are pitch breaks or cracks. There is vocal fatigue. She admits to odynophagia, throat pain, and otalgia.  There is no hemoptysis or hematemesis. She is breathing well.     She admits to throat clearing and cough. She admits to heartburn and reflux. She is currently on Protonix 40mg daily. She notes that she has difficulty swallowing spicy foods which gives her the sensation that the food is going down the wrong way. She denies food sticking. She denies weight loss.  She does note certain foods trigger her GERD-tomatos.     Interval HPI 12/19/2018: Follow up LPR, hoarseness, and MARY.   She reports that she has had been on Protonix 40mg in AM and Ranitidine 300mg in PM.  She reports that she is feeling symptomatic heartburn/indigestion on this regimen.  She notes certain foods trigger this(ie marinara sauce).  She reports that she is having intermittent hoarseness.  She feels that her cough is improved.  She notes that she has still a globus sensation and intermittent throat clearing. She denies food sticking. She notes intentional weight loss with dieting.  She has been using atrovent nasal for nasal rhinorrhea on an as needed basis and feels this helps.     Past Medical History:   Diagnosis Date    Anxiety disorder     Bell's palsy     Chronic back pain     Chronic osteoarthritis     Essential tremor     Fibromyalgia     Hypertension     Mild acid reflux     Neck pain     Other and unspecified hyperlipidemia     Sleep apnea     wear c-pap at night      Social History     Socioeconomic History    Marital status:      Spouse name: Not on file    Number of children: Not on file    Years of education: Not on file    Highest education level: Not on file   Social Needs    Financial resource strain: Not on file    Food insecurity - worry: Not on file    Food insecurity - inability: Not on file    Transportation needs - medical: Not on file    Transportation needs - non-medical: Not on file   Occupational History    Not on file   Tobacco Use    Smoking status: Former Smoker     Last attempt to quit: 10/5/1982     Years since quittin.2    Smokeless tobacco: Never Used   Substance and Sexual Activity    Alcohol use: No    Drug use: No    Sexual activity: Not on file   Other Topics Concern    Not on file   Social History Narrative    Not on file     Past Surgical History:   Procedure Laterality Date    BREAST BIOPSY Left       negative    Breast reduction      CARPAL TUNNEL RELEASE      COLONOSCOPY      COLONOSCOPY - Miralax split prep N/A 11/10/2017    [facility] at [facility]    CYSTOSCOPY N/A 10/23/2018    Performed by [facility] at [facility] OR 2ND FLR    ESOPHAGOGASTRODUODENOSCOPY (EGD) N/A 11/10/2017    Performed by [facility] at [facility] ENDO    HYSTERECTOMY      JOINT REPLACEMENT      Left knee    KNEE SURGERY      bilateral    left shoulder      PLACEMENT, TRANSOBTURATOR TAPE N/A 10/23/2018    Performed by [facility] at [facility] OR 2ND FLR    Tonsillectomy       Family History   Problem Relation Age of Onset    Diabetes Mother     Tremor Mother     Liver disease Mother     Diabetes Father     Heart disease Father     Tremor Son     Diabetes Sister     Diabetes Brother            Review of Systems  General: negative for chills, fever or weight loss  Psychological: negative for mood changes or depression  Ophthalmic: negative for blurry vision, photophobia or eye  pain  ENT: see HPI  Respiratory: no cough, shortness of breath, or wheezing  Cardiovascular: no chest pain or dyspnea on exertion  Gastrointestinal: no abdominal pain, change in bowel habits, or black/ bloody stools  Musculoskeletal: negative for gait disturbance or muscular weakness  Neurological: no syncope or seizures; no ataxia  Dermatological: negative for puritis,  rash and jaundice  Hematologic/lymphatic: no easy bruising, no new lumps or bumps      Physical Exam:    Vitals:    12/19/18 1046   BP: 106/69   Pulse: 71   Temp: 98.3 °F (36.8 °C)       Constitutional: Well appearing / communicating without difficutly.  NAD.  Eyes: EOM I Bilaterally  Head/Face: Normocephalic.  Negative paranasal sinus pressure/tenderness.  Salivary glands WNL.  House Brackmann I Bilaterally.    Right Ear: Auricle normal appearance. External Auditory Canal within normal limits no lesions or masses,TM w/o masses/lesions/perforations. TM mobility noted.   Left Ear: Auricle normal appearance. External Auditory Canal within normal limits no lesions or masses,TM w/o masses/lesions/perforations. TM mobility noted.  Nose: Mild nasal septal deviation to the right with an inferior spur.  Inferior Turbinates 3+ bilaterally. No septal perforation. No masses/lesions. External nasal skin appears normal without masses/lesions.  Oral Cavity: Gingiva/lips within normal limits.  Dentition/gingiva healthy appearing. Mucus membranes moist. Floor of mouth soft, no masses palpated. Oral Tongue mobile. Hard Palate appears normal.    Oropharynx: Base of tongue appears normal. No masses/lesions noted. Tonsillar fossa/pharyngeal wall without lesions. Posterior oropharynx WNL.  Soft palate without masses. Midline uvula.   Neck/Lymphatic: No LAD I-VI bilaterally.  No thyromegaly.  No masses noted on exam.    Mirror laryngoscopy/nasopharyngoscopy: Active gag reflex.  Unable to perform.    Neuro/Psychiatric: AOx3.  Normal mood and affect.   Cardiovascular:  Normal carotid pulses bilaterally, no increasing jugular venous distention noted at cervical region bilaterally.    Respiratory: Normal respiratory effort, no stridor, no retractions noted.      See separate procedure note for FFL.     Assessment:    ICD-10-CM ICD-9-CM    1. Laryngopharyngeal reflux (LPR) K21.9 478.79    2. Gastroesophageal reflux disease, esophagitis presence not specified K21.9 530.81 pantoprazole (PROTONIX) 40 MG tablet   3. Hoarseness of voice R49.0 784.42    4. MARY (obstructive sleep apnea) G47.33 327.23    5. Chronic vasomotor rhinitis J30.0 477.9      The primary encounter diagnosis was Laryngopharyngeal reflux (LPR). Diagnoses of Gastroesophageal reflux disease, esophagitis presence not specified, Hoarseness of voice, MARY (obstructive sleep apnea), and Chronic vasomotor rhinitis were also pertinent to this visit.      Plan:  No orders of the defined types were placed in this encounter.      LPR:  Increase Protonix to 40mg BID; discontinue ranitidine 300mg PO daily.  Continue reflux precautions. Written handout given. Re-refer to GI for evaluation.      Mild MARY: Continue CPAP as prescribed. Continue follow up with Dr. Blackwell.     Vasomotor rhinitis: Continue atrovent nasal.     Follow up in 4 months to reassess progress with treatment regimen.     Lupe Lopez MD

## 2019-01-10 ENCOUNTER — PATIENT OUTREACH (OUTPATIENT)
Dept: OTHER | Facility: OTHER | Age: 69
End: 2019-01-10

## 2019-01-10 NOTE — PROGRESS NOTES
Last 5 Patient Entered Readings                                      Current 30 Day Average: 119/69     Recent Readings 1/7/2019 1/7/2019 1/5/2019 1/3/2019 1/2/2019    SBP (mmHg) 113 184 123 96 90    DBP (mmHg) 83 84 86 55 53    Pulse 66 62 59 62 59          Digital Medicine: Health  Follow Up    Lifestyle Modifications:    1.Dietary Modifications (Sodium intake <2,000mg/day, food labels, dining out): Deferred    2.Physical Activity: Deferred    3.Medication Therapy: Patient has been compliant with the medication regimen. Patient is doing well on her current regimen.     4.Patient has the following medication side effects/concerns:   (Frequency/Alleviating factors/Precipitating factors, etc.)     Patient and I did not get to speak too long because she was dealing with family issues at her home at the time. She will call me back with questions or concerns.     Follow up with Mrs. Hannah Norman completed. No further questions or concerns. Will continue to follow up to achieve health goals.

## 2019-01-14 ENCOUNTER — OFFICE VISIT (OUTPATIENT)
Dept: GASTROENTEROLOGY | Facility: CLINIC | Age: 69
End: 2019-01-14
Payer: MEDICARE

## 2019-01-14 VITALS — WEIGHT: 203.25 LBS | BODY MASS INDEX: 37.18 KG/M2

## 2019-01-14 DIAGNOSIS — K21.9 MILD ACID REFLUX: Primary | ICD-10-CM

## 2019-01-14 DIAGNOSIS — K21.9 LARYNGOPHARYNGEAL REFLUX (LPR): ICD-10-CM

## 2019-01-14 PROCEDURE — 99214 OFFICE O/P EST MOD 30 MIN: CPT | Mod: S$PBB,,, | Performed by: INTERNAL MEDICINE

## 2019-01-14 PROCEDURE — 99999 PR PBB SHADOW E&M-EST. PATIENT-LVL IV: CPT | Mod: PBBFAC,,, | Performed by: INTERNAL MEDICINE

## 2019-01-14 PROCEDURE — 99214 OFFICE O/P EST MOD 30 MIN: CPT | Mod: PBBFAC,PO | Performed by: INTERNAL MEDICINE

## 2019-01-14 PROCEDURE — 99214 PR OFFICE/OUTPT VISIT, EST, LEVL IV, 30-39 MIN: ICD-10-PCS | Mod: S$PBB,,, | Performed by: INTERNAL MEDICINE

## 2019-01-14 PROCEDURE — 99999 PR PBB SHADOW E&M-EST. PATIENT-LVL IV: ICD-10-PCS | Mod: PBBFAC,,, | Performed by: INTERNAL MEDICINE

## 2019-01-14 RX ORDER — DEXLANSOPRAZOLE 60 MG/1
60 CAPSULE, DELAYED RELEASE ORAL DAILY
Qty: 30 CAPSULE | Refills: 1 | Status: SHIPPED | OUTPATIENT
Start: 2019-01-14 | End: 2019-05-27

## 2019-01-14 NOTE — PROGRESS NOTES
Subjective:       Patient ID: Hannah Norman is a 68 y.o. female.    Chief Complaint: Dysphagia and Gastroesophageal Reflux    This is a 68-year-old female here for a follow-up visit.  She notes continued hoarseness and voice fatigue.  Follow-up laryngoscopy noted persistent evidence of LPR.  Symptoms have been occurring for about 9 months.  Upper endoscopy noted mild esophagitis.  She has tried a number of PPIs including pantoprazole and omeprazole with the addition of ranitidine and b.i.d. therapy.  Mild response.  Gastric emptying study was negative.  No other exacerbating or relieving factors or other associated symptoms.  It occurs mostly on a daily basis.  She notes continued globus sensation as well.    The following portions of the patient's history were reviewed and updated as appropriate: allergies, current medications, past family history, past medical history, past social history, past surgical history and problem list.    (Portions of this note were dictated using voice recognition software and may contain dictation related errors in spelling/grammar/syntax not found on text review)    HPI  Review of Systems   Constitutional: Negative for appetite change and unexpected weight change.   Cardiovascular: Negative for chest pain and palpitations.   Gastrointestinal: Positive for abdominal distention. Negative for anal bleeding.       Objective:      Physical Exam   Constitutional: She is oriented to person, place, and time. She appears well-developed and well-nourished. No distress.   HENT:   Head: Normocephalic and atraumatic.   Eyes: Conjunctivae are normal. No scleral icterus.   Neck: Normal range of motion. Neck supple.   Cardiovascular: Normal rate and regular rhythm.   Pulmonary/Chest: Effort normal. No respiratory distress.   Abdominal: Soft. Bowel sounds are normal. She exhibits no distension. There is no tenderness.   Neurological: She is alert and oriented to person, place, and time.   Skin: Skin is  warm and dry. No rash noted. She is not diaphoretic. No erythema.   Psychiatric: She has a normal mood and affect. Her behavior is normal.   Nursing note and vitals reviewed.      Assessment:       1. Mild acid reflux    2. Laryngopharyngeal reflux (LPR)        Plan:   1. Trial of alternative PPI  2. F/u in 6 weeks  3. If no considerable improvement we discussed EGD/Bravo, possible manometry

## 2019-01-15 ENCOUNTER — PATIENT MESSAGE (OUTPATIENT)
Dept: NEUROLOGY | Facility: CLINIC | Age: 69
End: 2019-01-15

## 2019-01-16 ENCOUNTER — PES CALL (OUTPATIENT)
Dept: ADMINISTRATIVE | Facility: CLINIC | Age: 69
End: 2019-01-16

## 2019-01-17 ENCOUNTER — LAB VISIT (OUTPATIENT)
Dept: LAB | Facility: HOSPITAL | Age: 69
End: 2019-01-17
Attending: FAMILY MEDICINE
Payer: MEDICARE

## 2019-01-17 ENCOUNTER — OFFICE VISIT (OUTPATIENT)
Dept: FAMILY MEDICINE | Facility: CLINIC | Age: 69
End: 2019-01-17
Payer: MEDICARE

## 2019-01-17 VITALS
HEIGHT: 62 IN | TEMPERATURE: 99 F | SYSTOLIC BLOOD PRESSURE: 100 MMHG | HEART RATE: 73 BPM | DIASTOLIC BLOOD PRESSURE: 62 MMHG | WEIGHT: 205.25 LBS | OXYGEN SATURATION: 96 % | BODY MASS INDEX: 37.77 KG/M2

## 2019-01-17 DIAGNOSIS — R26.9 GAIT DISTURBANCE: ICD-10-CM

## 2019-01-17 DIAGNOSIS — R73.9 HYPERGLYCEMIA: ICD-10-CM

## 2019-01-17 DIAGNOSIS — M79.7 FIBROMYALGIA: ICD-10-CM

## 2019-01-17 DIAGNOSIS — E03.9 HYPOTHYROIDISM, UNSPECIFIED TYPE: ICD-10-CM

## 2019-01-17 DIAGNOSIS — G51.0 LEFT-SIDED BELL'S PALSY: ICD-10-CM

## 2019-01-17 DIAGNOSIS — I10 ESSENTIAL HYPERTENSION: Primary | ICD-10-CM

## 2019-01-17 DIAGNOSIS — G25.0 ESSENTIAL TREMOR: ICD-10-CM

## 2019-01-17 DIAGNOSIS — G47.33 OSA (OBSTRUCTIVE SLEEP APNEA): ICD-10-CM

## 2019-01-17 DIAGNOSIS — I10 ESSENTIAL HYPERTENSION: ICD-10-CM

## 2019-01-17 LAB
ALBUMIN SERPL BCP-MCNC: 4.3 G/DL
ALP SERPL-CCNC: 62 U/L
ALT SERPL W/O P-5'-P-CCNC: 13 U/L
ANION GAP SERPL CALC-SCNC: 6 MMOL/L
AST SERPL-CCNC: 16 U/L
BASOPHILS # BLD AUTO: 0.02 K/UL
BASOPHILS NFR BLD: 0.3 %
BILIRUB SERPL-MCNC: 0.3 MG/DL
BUN SERPL-MCNC: 27 MG/DL
CALCIUM SERPL-MCNC: 9.4 MG/DL
CHLORIDE SERPL-SCNC: 101 MMOL/L
CHOLEST SERPL-MCNC: 163 MG/DL
CHOLEST/HDLC SERPL: 3.1 {RATIO}
CO2 SERPL-SCNC: 35 MMOL/L
CREAT SERPL-MCNC: 0.92 MG/DL
DIFFERENTIAL METHOD: ABNORMAL
EOSINOPHIL # BLD AUTO: 0.2 K/UL
EOSINOPHIL NFR BLD: 3.2 %
ERYTHROCYTE [DISTWIDTH] IN BLOOD BY AUTOMATED COUNT: 13.1 %
EST. GFR  (AFRICAN AMERICAN): >60 ML/MIN/1.73 M^2
EST. GFR  (NON AFRICAN AMERICAN): >60 ML/MIN/1.73 M^2
ESTIMATED AVG GLUCOSE: 117 MG/DL
GLUCOSE SERPL-MCNC: 116 MG/DL
HBA1C MFR BLD HPLC: 5.7 %
HCT VFR BLD AUTO: 44.1 %
HDLC SERPL-MCNC: 52 MG/DL
HDLC SERPL: 31.9 %
HGB BLD-MCNC: 13.6 G/DL
LDLC SERPL CALC-MCNC: 88.6 MG/DL
LYMPHOCYTES # BLD AUTO: 1.9 K/UL
LYMPHOCYTES NFR BLD: 31.4 %
MCH RBC QN AUTO: 30.2 PG
MCHC RBC AUTO-ENTMCNC: 30.8 G/DL
MCV RBC AUTO: 98 FL
MONOCYTES # BLD AUTO: 0.5 K/UL
MONOCYTES NFR BLD: 8.7 %
NEUTROPHILS # BLD AUTO: 3.4 K/UL
NEUTROPHILS NFR BLD: 56.2 %
NONHDLC SERPL-MCNC: 111 MG/DL
PLATELET # BLD AUTO: 268 K/UL
PMV BLD AUTO: 10.7 FL
POTASSIUM SERPL-SCNC: 4.7 MMOL/L
PROT SERPL-MCNC: 7.1 G/DL
RBC # BLD AUTO: 4.51 M/UL
SODIUM SERPL-SCNC: 142 MMOL/L
T4 FREE SERPL-MCNC: 0.99 NG/DL
TRIGL SERPL-MCNC: 112 MG/DL
TSH SERPL DL<=0.005 MIU/L-ACNC: 1.84 UIU/ML
WBC # BLD AUTO: 5.99 K/UL

## 2019-01-17 PROCEDURE — 99214 OFFICE O/P EST MOD 30 MIN: CPT | Mod: S$GLB,,, | Performed by: FAMILY MEDICINE

## 2019-01-17 PROCEDURE — 80061 LIPID PANEL: CPT

## 2019-01-17 PROCEDURE — 84443 ASSAY THYROID STIM HORMONE: CPT | Mod: PO,ER

## 2019-01-17 PROCEDURE — 36415 COLL VENOUS BLD VENIPUNCTURE: CPT | Mod: PO,ER

## 2019-01-17 PROCEDURE — 84439 ASSAY OF FREE THYROXINE: CPT

## 2019-01-17 PROCEDURE — 83036 HEMOGLOBIN GLYCOSYLATED A1C: CPT

## 2019-01-17 PROCEDURE — 99214 PR OFFICE/OUTPT VISIT, EST, LEVL IV, 30-39 MIN: ICD-10-PCS | Mod: S$GLB,,, | Performed by: FAMILY MEDICINE

## 2019-01-17 PROCEDURE — 85025 COMPLETE CBC W/AUTO DIFF WBC: CPT | Mod: PO,ER

## 2019-01-17 PROCEDURE — 80053 COMPREHEN METABOLIC PANEL: CPT | Mod: PO,ER

## 2019-01-17 RX ORDER — ESTRADIOL 1 MG/1
1 TABLET ORAL DAILY
Qty: 90 TABLET | Refills: 3 | Status: CANCELLED | OUTPATIENT
Start: 2019-01-17

## 2019-01-17 RX ORDER — LOVASTATIN 20 MG/1
20 TABLET ORAL NIGHTLY
Qty: 90 TABLET | Refills: 3 | Status: SHIPPED | OUTPATIENT
Start: 2019-01-17 | End: 2019-04-23 | Stop reason: SDUPTHER

## 2019-01-17 NOTE — PROGRESS NOTES
" Patient ID: Hannah Norman is a 68 y.o. female.    Chief Complaint: checkup; Shoulder Pain (right); and Medication Refill    HPI      Hannah Norman is a 68 y.o. female. here for annual exam.   No current complaints except right shoulder pain. Pain is anterior chest near insertion of pectoralis and biceps.  Pain especially with extension and abduction of arm.        Review of Symptoms    Constitutional: Negative.    HENT: Negative.    Eyes: Negative.    Respiratory: Negative.    Cardiovascular: Negative.    Gastrointestinal: Negative.    Endocrine: Negative.    Genitourinary: Negative.    Musculoskeletal: Negative.    Skin: Negative.    Allergic/Immunologic: Negative.    Neurological: Negative.    Hematological: Negative.    Psychiatric/Behavioral: Negative.      Except as above in HPI      /62   Pulse 73   Temp 98.6 °F (37 °C)   Ht 5' 2" (1.575 m)   Wt 93.1 kg (205 lb 4 oz)   SpO2 96%   BMI 37.54 kg/m²     Physical  Exam    Constitutional:  Oriented to person, place, and time. Appears well-developed and well-nourished.     HENT:   Head: Normocephalic and atraumatic.     Right Ear: Tympanic membrane, ear canal and External ear normal     Left Ear: Tympanic membrane, ear canal and External ear normal     Nose: Nose normal. No rhinorrhea or nasal deformity.     Mouth/Throat: Uvula is midline, oropharynx is clear and moist and mucous membranes are normal.      Eyes: Conjunctivae are normal. Right eye exhibits no discharge. Left eye exhibits no discharge. No scleral icterus.     Neck:  No JVD present. No tracheal deviation  []  Neck supple.   []  No Carotid bruit    Cardiovascular:  Regular rate and rhythm with normal S1 and S2     Pulmonary/Chest:   Clear to auscultation bilaterally without wheezes, rhonchi or rales    Musculoskeletal: Normal range of motion. No edema or tenderness.   No deformity   Testing of the rotator cuff revealed no injury.  Mild tenderness palpation along insertion of pectoralis and " biceps tendon.      Lymphadenopathy:  No cervical adenopathy.     Neurological:  Alert and oriented to person, place, and time. Coordination normal.     Skin: Skin is warm and dry. No rash noted.     Psychiatric: Normal mood and affect. Speech is normal and behavior is normal. Judgment and thought content normal.     Complete Blood Count  Lab Results   Component Value Date    RBC 4.51 01/17/2019    HGB 13.6 01/17/2019    HCT 44.1 01/17/2019    MCV 98 01/17/2019    MCH 30.2 01/17/2019    MCHC 30.8 (L) 01/17/2019    RDW 13.1 01/17/2019     01/17/2019    MPV 10.7 01/17/2019    GRAN 3.4 01/17/2019    GRAN 56.2 01/17/2019    LYMPH 1.9 01/17/2019    LYMPH 31.4 01/17/2019    MONO 0.5 01/17/2019    MONO 8.7 01/17/2019    EOS 0.2 01/17/2019    BASO 0.02 01/17/2019    EOSINOPHIL 3.2 01/17/2019    BASOPHIL 0.3 01/17/2019    DIFFMETHOD Automated 01/17/2019       Comprehensive Metabolic Panel  Lab Results   Component Value Date     (H) 01/17/2019    BUN 27 (H) 01/17/2019    CREATININE 0.92 01/17/2019     01/17/2019    K 4.7 01/17/2019     01/17/2019    PROT 7.1 01/17/2019    ALBUMIN 4.3 01/17/2019    BILITOT 0.3 01/17/2019    AST 16 01/17/2019    ALKPHOS 62 01/17/2019    CO2 35 (H) 01/17/2019    ALT 13 01/17/2019    ANIONGAP 6 (L) 01/17/2019    EGFRNONAA >60.0 01/17/2019    ESTGFRAFRICA >60.0 01/17/2019       TSH  Lab Results   Component Value Date    TSH 1.840 01/17/2019       Assessment / Plan:      ICD-10-CM ICD-9-CM   1. Essential hypertension I10 401.9   2. Hypothyroidism, unspecified type E03.9 244.9   3. Left-sided Bell's palsy G51.0 351.0   4. MARY (obstructive sleep apnea) G47.33 327.23   5. Gait disturbance R26.9 781.2   6. Essential tremor G25.0 333.1   7. Fibromyalgia M79.7 729.1   8. Hyperglycemia R73.9 790.29     Essential hypertension  -     Comprehensive metabolic panel; Future  -     CBC auto differential; Future  -     Lipid panel; Future  -     TSH; Future  -     Hemoglobin A1c;  Future  -     T4, free; Future; Expected date: 01/17/2019  -     Urinalysis; Future; Expected date: 01/17/2019    Hypothyroidism, unspecified type  -     Comprehensive metabolic panel; Future  -     CBC auto differential; Future  -     Lipid panel; Future  -     TSH; Future  -     Hemoglobin A1c; Future  -     T4, free; Future; Expected date: 01/17/2019  -     Urinalysis; Future; Expected date: 01/17/2019    Left-sided Bell's palsy  -     Comprehensive metabolic panel; Future  -     CBC auto differential; Future  -     Lipid panel; Future  -     TSH; Future  -     Hemoglobin A1c; Future  -     T4, free; Future; Expected date: 01/17/2019  -     Urinalysis; Future; Expected date: 01/17/2019    MARY (obstructive sleep apnea)  -     Comprehensive metabolic panel; Future  -     CBC auto differential; Future  -     Lipid panel; Future  -     TSH; Future  -     Hemoglobin A1c; Future  -     T4, free; Future; Expected date: 01/17/2019  -     Urinalysis; Future; Expected date: 01/17/2019    Gait disturbance  -     Comprehensive metabolic panel; Future  -     CBC auto differential; Future  -     Lipid panel; Future  -     TSH; Future  -     Hemoglobin A1c; Future  -     T4, free; Future; Expected date: 01/17/2019  -     Urinalysis; Future; Expected date: 01/17/2019    Essential tremor  -     Comprehensive metabolic panel; Future  -     CBC auto differential; Future  -     Lipid panel; Future  -     TSH; Future  -     Hemoglobin A1c; Future  -     T4, free; Future; Expected date: 01/17/2019  -     Urinalysis; Future; Expected date: 01/17/2019    Fibromyalgia  -     Comprehensive metabolic panel; Future  -     CBC auto differential; Future  -     Lipid panel; Future  -     TSH; Future  -     Hemoglobin A1c; Future  -     T4, free; Future; Expected date: 01/17/2019  -     Urinalysis; Future; Expected date: 01/17/2019    Hyperglycemia  -     Hemoglobin A1c; Future    Other orders  -     lovastatin (MEVACOR) 20 MG tablet; Take 1  tablet (20 mg total) by mouth every evening.  Dispense: 90 tablet; Refill: 3  -     Cancel: estradiol (ESTRACE) 1 MG tablet; Take 1 tablet (1 mg total) by mouth once daily.  Dispense: 90 tablet; Refill: 3          Discussed how to stay healthy including: diet, exercise, refraining from smoking and discussed screening exams / tests needed for age, sex and family Hx.  Answers for HPI/ROS submitted by the patient on 1/16/2019   activity change: No  unexpected weight change: No  neck pain: Yes  hearing loss: No  rhinorrhea: No  trouble swallowing: No  eye discharge: No  visual disturbance: Yes  chest tightness: Yes  wheezing: Yes  chest pain: Yes  palpitations: No  blood in stool: No  constipation: Yes  vomiting: No  diarrhea: No  polydipsia: No  polyuria: No  difficulty urinating: No  hematuria: No  menstrual problem: No  dysuria: No  joint swelling: Yes  arthralgias: Yes  headaches: Yes  weakness: Yes  confusion: No  dysphoric mood: No

## 2019-01-28 ENCOUNTER — TELEPHONE (OUTPATIENT)
Dept: GASTROENTEROLOGY | Facility: CLINIC | Age: 69
End: 2019-01-28

## 2019-01-28 NOTE — TELEPHONE ENCOUNTER
----- Message from Aura Alarcon sent at 1/28/2019  1:22 PM CST -----  Contact: [phone]  Patient requested to speak with the nurse about her medication. Please call.

## 2019-02-06 ENCOUNTER — PATIENT OUTREACH (OUTPATIENT)
Dept: OTHER | Facility: OTHER | Age: 69
End: 2019-02-06

## 2019-02-06 NOTE — PROGRESS NOTES
Last 5 Patient Entered Readings                                      Current 30 Day Average: 115/66     Recent Readings 2/2/2019 2/2/2019 1/29/2019 1/26/2019 1/24/2019    SBP (mmHg) 117 86 115 127 110    DBP (mmHg) 54 41 83 62 50    Pulse 59 62 61 58 66        HPI:  Called patient to follow up on her lower readings. Patient endorses adherence to medication regimen. Patient has hypotensive s/sx of fatigue. Patient denies lightheadedness, dizziness and nausea. Patient denies hypertensive s/sx (SOB, CP, severe headaches, changes in vision, dizziness, fatigue, confusion, anxiety, nosebleeds). Instructed patient to seek medical care if BP > 180/110 and is accompanied by hypertensive s/sx associated, patient confirms understanding.     Assessment:  Reviewed recent readings. Per 2017 ACC/ AHA HTN guidelines (goal of BP < 130/80), current 30-day average is well controlled. Patient has lost 68 pounds over the past year. Patient reduced carvedilol from 25mg BID to 12.5mg BID three days ago.    Plan:  Hold losartan until BP >120/80. I will continue to monitor regularly and will follow-up in 1-2 weeks, sooner if blood pressure begins to trend upward or downward.     Current medication regimen:  Hypertension Medications             carvedilol (COREG) 25 MG tablet Take 12.5 mg by mouth 2 (two) times daily.     furosemide (LASIX) 40 MG tablet Up to 2 times a day if swelling occurs    losartan (COZAAR) 50 MG tablet Take 50 mg by mouth once daily.         Patient denies having questions or concerns. Patient has my contact information and knows to call with any concerns or clinical changes.

## 2019-02-07 ENCOUNTER — PATIENT MESSAGE (OUTPATIENT)
Dept: NEUROLOGY | Facility: CLINIC | Age: 69
End: 2019-02-07

## 2019-02-08 ENCOUNTER — OFFICE VISIT (OUTPATIENT)
Dept: NEUROLOGY | Facility: CLINIC | Age: 69
End: 2019-02-08
Payer: MEDICARE

## 2019-02-08 VITALS
BODY MASS INDEX: 37.14 KG/M2 | HEART RATE: 79 BPM | SYSTOLIC BLOOD PRESSURE: 103 MMHG | DIASTOLIC BLOOD PRESSURE: 69 MMHG | WEIGHT: 201.81 LBS | HEIGHT: 62 IN

## 2019-02-08 DIAGNOSIS — M79.7 FIBROMYALGIA: ICD-10-CM

## 2019-02-08 DIAGNOSIS — G25.0 ESSENTIAL TREMOR: ICD-10-CM

## 2019-02-08 DIAGNOSIS — G25.81 RESTLESS LEG SYNDROME: Primary | ICD-10-CM

## 2019-02-08 DIAGNOSIS — F41.9 ANXIETY DISORDER, UNSPECIFIED TYPE: ICD-10-CM

## 2019-02-08 DIAGNOSIS — I10 ESSENTIAL HYPERTENSION: ICD-10-CM

## 2019-02-08 PROCEDURE — 99214 PR OFFICE/OUTPT VISIT, EST, LEVL IV, 30-39 MIN: ICD-10-PCS | Mod: S$PBB,,, | Performed by: PSYCHIATRY & NEUROLOGY

## 2019-02-08 PROCEDURE — 99999 PR PBB SHADOW E&M-EST. PATIENT-LVL III: CPT | Mod: PBBFAC,,, | Performed by: PSYCHIATRY & NEUROLOGY

## 2019-02-08 PROCEDURE — 99214 OFFICE O/P EST MOD 30 MIN: CPT | Mod: S$PBB,,, | Performed by: PSYCHIATRY & NEUROLOGY

## 2019-02-08 PROCEDURE — 99999 PR PBB SHADOW E&M-EST. PATIENT-LVL III: ICD-10-PCS | Mod: PBBFAC,,, | Performed by: PSYCHIATRY & NEUROLOGY

## 2019-02-08 PROCEDURE — 99213 OFFICE O/P EST LOW 20 MIN: CPT | Mod: PBBFAC,PO | Performed by: PSYCHIATRY & NEUROLOGY

## 2019-02-08 RX ORDER — LANOLIN ALCOHOL/MO/W.PET/CERES
400 CREAM (GRAM) TOPICAL DAILY
Refills: 0 | COMMUNITY
Start: 2019-02-08 | End: 2020-11-10

## 2019-02-08 NOTE — PROGRESS NOTES
Subjective:       Patient ID: Hannah Norman is a 68 y.o. female.    Chief Complaint:  Restless leg syndrome      History of Present Illness  HPI  This is a 68-year-old female who returns in follow up.  She was last seen by me 6 months ago.  Since history of chronic back problems and in the past had been seen by orthopedic surgery and neurosurgery at [facility] [place], Dr. Bey, with MRI scans of the neck and back and advised conservative management and referral to pain management.  She is presently being followed by Dr. Wang for pain management.      She has history of restless leg syndrome with improvement with a combination of Neurontin 600 mg 3 times a day and Mirapex which is now being prescribed by the [facility].  In addition she is also on clonazepam at bedtime for the restless leg prescribed by the [facility].  She also has an essential tremor and has been evaluated at the movement disorders clinic in the past.  Symptoms are minimal.  .  She is on citalopram 20 mg daily for chronic anxiety which has helped.  In addition she is on Effexor for the fibromyalgia symptoms which has helped.  She denies any new medical problems otherwise.       Review of Systems  Review of Systems   Constitutional: Negative.    HENT: Negative.  Negative for hearing loss.    Eyes: Negative.  Negative for visual disturbance.   Respiratory: Negative.    Cardiovascular: Negative.    Gastrointestinal: Negative.    Genitourinary: Negative.    Musculoskeletal: Positive for arthralgias (left knee pain, recent surgery), back pain and myalgias. Negative for gait problem.   Skin: Negative.    Neurological: Positive for tremors. Negative for seizures, speech difficulty and numbness.   Psychiatric/Behavioral: Negative.  Negative for decreased concentration.       Objective:      Neurologic Exam      Physical Exam   Constitutional: She appears well-developed and well-nourished.   HENT:   Head: Normocephalic and atraumatic.    Right Ear: Hearing normal.   Left Ear: Hearing normal.   Neck: Normal range of motion. Neck supple. Muscular tenderness (bilateral paraspinal muscle and trapezius muscle tenderness) present. Carotid bruit is not present.   Musculoskeletal:   Multiple areas of muscle tenderness in the trunk muscles as well as trapezius and proximal muscles of the upper and lower extremities.  Status post left knee replacement surgery.  Left ankle tenderness laterally with mild swelling, no ecchymosis or deformity.   Neurological: She is alert. She has normal reflexes. She displays atrophy (no obvious weakness however she has difficulty getting onto the step and she fatigues easily.) and tremor (a very minimal statokinetic tremor was noted affecting fingers). A cranial nerve deficit (VF at bedside testing essentially normal.  Minimal weakness left face LMN with good eye closure, and sensory exam being normal/symmetrical.  Corneals/gag reflexes normal.  Tongue & palate movements normal.  Shoulder shrug normal.) is present. No sensory deficit (Right Tinel's positive). She exhibits normal muscle tone. Coordination (mild clumsiness of fine motor movements.  Romberg is equivocal with eyes closed) and gait (her gait is slightly broad-based slowly because of clumsiness in the lower extremities and fatigue.) abnormal.   Mental status examination: Patient is fully oriented and able to give an adequate history.  Recall of recent and past information is good.  Immediate recall is normal.  Attention span and concentration was normal.  Judgment and insight is normal.  Language functions are intact with no evidence of aphasia or dysarthria.  Comprehension is unimpaired.  Affect is appropriate, mood was even.  No thought disorder is noted.   Vitals reviewed.        Assessment:        1. Restless leg syndrome    2. Anxiety disorder, unspecified type    3. Essential tremor    4. Fibromyalgia    5. Essential hypertension             Plan:        Continue follow-up with her pain management doctor, Dr Lara for the chronic back pain and fibromyalgia.  Continue citalopram 20 mg daily.  Continue Effexor for the fibromyalgia muscle symptoms.  Continue Neurontin and Mirapex for restless leg and continue follow-up with the sleep clinic.  Advised magnesium oxide supplementation 400 mg at bedtime daily as it will help her depression/anxiety.  Follow-up in 6 months if stable.

## 2019-02-08 NOTE — PATIENT INSTRUCTIONS
Continue follow-up with her pain management doctor, Dr Lara for the chronic back pain and fibromyalgia.  Continue citalopram 20 mg daily.  Continue Effexor for the fibromyalgia muscle symptoms.  Continue Neurontin and Mirapex for restless leg and continue follow-up with the sleep clinic.  Advised magnesium oxide supplementation 400 mg at bedtime daily as it will help her depression/anxiety.

## 2019-02-09 ENCOUNTER — HOSPITAL ENCOUNTER (EMERGENCY)
Facility: HOSPITAL | Age: 69
Discharge: HOME | End: 2019-02-09
Attending: EMERGENCY MEDICINE
Payer: MEDICARE

## 2019-02-09 VITALS
BODY MASS INDEX: 36.8 KG/M2 | DIASTOLIC BLOOD PRESSURE: 77 MMHG | TEMPERATURE: 98 F | WEIGHT: 200 LBS | OXYGEN SATURATION: 99 % | SYSTOLIC BLOOD PRESSURE: 139 MMHG | HEIGHT: 62 IN | RESPIRATION RATE: 18 BRPM | HEART RATE: 73 BPM

## 2019-02-09 DIAGNOSIS — S01.81XA FACIAL LACERATION, INITIAL ENCOUNTER: Primary | ICD-10-CM

## 2019-02-09 DIAGNOSIS — N30.00 ACUTE CYSTITIS WITHOUT HEMATURIA: ICD-10-CM

## 2019-02-09 DIAGNOSIS — R42 DIZZINESS: ICD-10-CM

## 2019-02-09 LAB
ALBUMIN SERPL BCP-MCNC: 4.3 G/DL
ALP SERPL-CCNC: 66 U/L
ALT SERPL W/O P-5'-P-CCNC: 14 U/L
ANION GAP SERPL CALC-SCNC: 6 MMOL/L
AST SERPL-CCNC: 17 U/L
BACTERIA #/AREA URNS AUTO: ABNORMAL /HPF
BASOPHILS # BLD AUTO: 0.01 K/UL
BASOPHILS NFR BLD: 0.2 %
BILIRUB SERPL-MCNC: 0.3 MG/DL
BILIRUB UR QL STRIP: NEGATIVE
BUN SERPL-MCNC: 18 MG/DL
CALCIUM SERPL-MCNC: 9 MG/DL
CHLORIDE SERPL-SCNC: 98 MMOL/L
CLARITY UR REFRACT.AUTO: CLEAR
CO2 SERPL-SCNC: 35 MMOL/L
COLOR UR AUTO: YELLOW
CREAT SERPL-MCNC: 0.65 MG/DL
DIFFERENTIAL METHOD: NORMAL
EOSINOPHIL # BLD AUTO: 0.2 K/UL
EOSINOPHIL NFR BLD: 2.5 %
ERYTHROCYTE [DISTWIDTH] IN BLOOD BY AUTOMATED COUNT: 12.7 %
EST. GFR  (AFRICAN AMERICAN): >60 ML/MIN/1.73 M^2
EST. GFR  (NON AFRICAN AMERICAN): >60 ML/MIN/1.73 M^2
GLUCOSE SERPL-MCNC: 126 MG/DL
GLUCOSE UR QL STRIP: NEGATIVE
HCT VFR BLD AUTO: 42.8 %
HGB BLD-MCNC: 13.8 G/DL
HGB UR QL STRIP: ABNORMAL
KETONES UR QL STRIP: NEGATIVE
LEUKOCYTE ESTERASE UR QL STRIP: ABNORMAL
LYMPHOCYTES # BLD AUTO: 1.8 K/UL
LYMPHOCYTES NFR BLD: 30.4 %
MCH RBC QN AUTO: 30.4 PG
MCHC RBC AUTO-ENTMCNC: 32.2 G/DL
MCV RBC AUTO: 94 FL
MICROSCOPIC COMMENT: ABNORMAL
MONOCYTES # BLD AUTO: 0.5 K/UL
MONOCYTES NFR BLD: 9.1 %
NEUTROPHILS # BLD AUTO: 3.4 K/UL
NEUTROPHILS NFR BLD: 57.6 %
NITRITE UR QL STRIP: POSITIVE
PH UR STRIP: 5 [PH] (ref 5–8)
PLATELET # BLD AUTO: 264 K/UL
PMV BLD AUTO: 10.4 FL
POCT GLUCOSE: 113 MG/DL (ref 70–110)
POTASSIUM SERPL-SCNC: 3.4 MMOL/L
PROT SERPL-MCNC: 6.9 G/DL
PROT UR QL STRIP: NEGATIVE
RBC # BLD AUTO: 4.54 M/UL
RBC #/AREA URNS AUTO: 1 /HPF (ref 0–4)
SODIUM SERPL-SCNC: 139 MMOL/L
SP GR UR STRIP: 1.02 (ref 1–1.03)
SQUAMOUS #/AREA URNS AUTO: 2 /HPF
TROPONIN I SERPL DL<=0.01 NG/ML-MCNC: <0.012 NG/ML
URN SPEC COLLECT METH UR: ABNORMAL
UROBILINOGEN UR STRIP-ACNC: NEGATIVE EU/DL
WBC # BLD AUTO: 5.96 K/UL
WBC #/AREA URNS AUTO: 20 /HPF (ref 0–5)

## 2019-02-09 PROCEDURE — 12052 INTMD RPR FACE/MM 2.6-5.0 CM: CPT

## 2019-02-09 PROCEDURE — 87186 SC STD MICRODIL/AGAR DIL: CPT | Mod: ER

## 2019-02-09 PROCEDURE — 87086 URINE CULTURE/COLONY COUNT: CPT | Mod: ER

## 2019-02-09 PROCEDURE — 87077 CULTURE AEROBIC IDENTIFY: CPT | Mod: ER

## 2019-02-09 PROCEDURE — 87088 URINE BACTERIA CULTURE: CPT | Mod: ER

## 2019-02-09 PROCEDURE — 81000 URINALYSIS NONAUTO W/SCOPE: CPT | Mod: ER

## 2019-02-09 PROCEDURE — 93010 EKG 12-LEAD: ICD-10-PCS | Mod: ,,, | Performed by: INTERNAL MEDICINE

## 2019-02-09 PROCEDURE — 12031 INTMD RPR S/A/T/EXT 2.5 CM/<: CPT

## 2019-02-09 PROCEDURE — 93005 ELECTROCARDIOGRAM TRACING: CPT | Mod: ER,59

## 2019-02-09 PROCEDURE — 63600175 PHARM REV CODE 636 W HCPCS: Mod: ER | Performed by: EMERGENCY MEDICINE

## 2019-02-09 PROCEDURE — 25000003 PHARM REV CODE 250: Mod: ER | Performed by: EMERGENCY MEDICINE

## 2019-02-09 PROCEDURE — 80053 COMPREHEN METABOLIC PANEL: CPT | Mod: ER

## 2019-02-09 PROCEDURE — 84484 ASSAY OF TROPONIN QUANT: CPT | Mod: ER

## 2019-02-09 PROCEDURE — 25000003 PHARM REV CODE 250: Mod: ER | Performed by: PHYSICIAN ASSISTANT

## 2019-02-09 PROCEDURE — 12013 RPR F/E/E/N/L/M 2.6-5.0 CM: CPT

## 2019-02-09 PROCEDURE — 93010 ELECTROCARDIOGRAM REPORT: CPT | Mod: ,,, | Performed by: INTERNAL MEDICINE

## 2019-02-09 PROCEDURE — 82962 GLUCOSE BLOOD TEST: CPT | Mod: ER

## 2019-02-09 PROCEDURE — 12053 INTMD RPR FACE/MM 5.1-7.5 CM: CPT | Mod: ER

## 2019-02-09 PROCEDURE — 96374 THER/PROPH/DIAG INJ IV PUSH: CPT | Mod: ER,59

## 2019-02-09 PROCEDURE — 99284 EMERGENCY DEPT VISIT MOD MDM: CPT | Mod: 25,ER

## 2019-02-09 PROCEDURE — 85025 COMPLETE CBC W/AUTO DIFF WBC: CPT | Mod: ER

## 2019-02-09 RX ORDER — POTASSIUM CHLORIDE 20 MEQ/1
40 TABLET, EXTENDED RELEASE ORAL
Status: COMPLETED | OUTPATIENT
Start: 2019-02-09 | End: 2019-02-09

## 2019-02-09 RX ORDER — SULFAMETHOXAZOLE AND TRIMETHOPRIM 800; 160 MG/1; MG/1
1 TABLET ORAL 2 TIMES DAILY
Qty: 10 TABLET | Refills: 0 | Status: SHIPPED | OUTPATIENT
Start: 2019-02-09 | End: 2019-02-14

## 2019-02-09 RX ORDER — ONDANSETRON 2 MG/ML
4 INJECTION INTRAMUSCULAR; INTRAVENOUS
Status: COMPLETED | OUTPATIENT
Start: 2019-02-09 | End: 2019-02-09

## 2019-02-09 RX ORDER — LIDOCAINE HYDROCHLORIDE 10 MG/ML
10 INJECTION, SOLUTION EPIDURAL; INFILTRATION; INTRACAUDAL; PERINEURAL
Status: COMPLETED | OUTPATIENT
Start: 2019-02-09 | End: 2019-02-09

## 2019-02-09 RX ADMIN — ONDANSETRON 4 MG: 2 INJECTION INTRAMUSCULAR; INTRAVENOUS at 03:02

## 2019-02-09 RX ADMIN — POTASSIUM CHLORIDE 40 MEQ: 1500 TABLET, EXTENDED RELEASE ORAL at 05:02

## 2019-02-09 RX ADMIN — BACITRACIN ZINC, NEOMYCIN SULFATE, POLYMYXIN B SULFATE 1 EACH: 3.5; 5000; 4 OINTMENT TOPICAL at 05:02

## 2019-02-09 RX ADMIN — LIDOCAINE HYDROCHLORIDE 100 MG: 10 INJECTION, SOLUTION EPIDURAL; INFILTRATION; INTRACAUDAL; PERINEURAL at 03:02

## 2019-02-09 NOTE — ED NOTES
Daughter assisted patient to the restroom, patient reported feeling nauseated. Medication ordered and given. Patient and family members are aware of pending results.

## 2019-02-09 NOTE — ED PROVIDER NOTES
"Encounter Date: 2/9/2019       History     Chief Complaint   Patient presents with    Fall     "I was walking into my house, realized her door was open and fell throught the door hit my head on the side" denies LOC    Laceration     left lateral of facial, above the left eyelid lac, #2 to the left lateral of eyelid side & abrasion to the left upper facial cheek      68-year-old female presents to the emergency department via EMS for evaluation of facial lacerations, head injury and face pain status post fall.  She reports that she was walking into her house this after an approximately 1:00 a.m. when she became slightly dizzy and fell forward hitting her face on the hardwood floor.  She reports that she did not lose consciousness when she hit her head.  She reports that she has a constant, throbbing generalized headache as well as face pain.  She reports that she has 3 lacerations on her forehead and face.  She denies any epistaxis.  She reports that she has mild associated nausea.  She she denies any vision changes, neck pain, chest pain, shortness of breath, abdominal pain, vomiting or dysuria.  No treatment was attempted prior to arrival.  She reports that she took her morphine this morning, which she takes for chronic pain.          Review of patient's allergies indicates:   Allergen Reactions    Hyoscyamine Rash     Past Medical History:   Diagnosis Date    Anxiety disorder     Bell's palsy     Chronic back pain     Chronic osteoarthritis     Essential tremor     Fibromyalgia     Hypertension     Mild acid reflux     Neck pain     Other and unspecified hyperlipidemia     Sleep apnea     wear c-pap at night     Past Surgical History:   Procedure Laterality Date    BREAST BIOPSY Left     1995  negative    Breast reduction      CARPAL TUNNEL RELEASE      COLONOSCOPY  2008    COLONOSCOPY - Miralax split prep N/A 11/10/2017    [facility]    CYSTOSCOPY N/A " 10/23/2018    Performed by [facility] at [facility] OR 2ND FLR    ESOPHAGOGASTRODUODENOSCOPY (EGD) N/A 11/10/2017    [facility] at [facility]    HYSTERECTOMY      JOINT REPLACEMENT      Left knee    KNEE SURGERY      bilateral    left shoulder      PLACEMENT, TRANSOBTURATOR TAPE N/A 10/23/2018    Performed by [facility] at [facility] OR 2ND FLR    Tonsillectomy       Family History   Problem Relation Age of Onset    Diabetes Mother     Tremor Mother     Liver disease Mother     Diabetes Father     Heart disease Father     Tremor Son     Diabetes Sister     Diabetes Brother      Social History     Tobacco Use    Smoking status: Former Smoker     Last attempt to quit: 10/5/1982     Years since quittin.3    Smokeless tobacco: Never Used   Substance Use Topics    Alcohol use: No    Drug use: No     Review of Systems   Constitutional: Negative for activity change, appetite change and fever.   HENT: Negative for congestion, postnasal drip, rhinorrhea, sinus pressure, sinus pain and voice change.    Eyes: Negative for photophobia and visual disturbance.   Respiratory: Negative for cough, chest tightness, shortness of breath and wheezing.    Cardiovascular: Negative for chest pain.   Gastrointestinal: Negative for abdominal pain, constipation, diarrhea, nausea and vomiting.   Genitourinary: Negative for decreased urine volume, dysuria and flank pain.   Musculoskeletal: Negative for back pain, joint swelling, neck pain and neck stiffness.   Skin: Positive for wound. Negative for rash.   Neurological: Positive for dizziness, light-headedness and headaches. Negative for syncope, weakness and numbness.       Physical Exam     Initial Vitals [19 1342]   BP Pulse Resp Temp SpO2   (!) 156/71 77 19 97.9 °F (36.6 °C) 95 %      MAP       --         Physical Exam    Nursing note and vitals reviewed.  Constitutional: She appears well-developed and well-nourished. She is not  diaphoretic. No distress.   HENT:   Head: Normocephalic. Head is with contusion and with laceration. Head is without raccoon's eyes and without Ramos's sign.       Right Ear: External ear normal.   Left Ear: External ear normal.   Nose: Nose normal.   Mouth/Throat: Oropharynx is clear and moist.   Eyes: Conjunctivae and EOM are normal. Pupils are equal, round, and reactive to light.   Neck: Normal range of motion. Neck supple.   Cardiovascular: Normal rate, regular rhythm and normal heart sounds.   Pulmonary/Chest: Breath sounds normal. No respiratory distress. She has no wheezes. She has no rhonchi. She has no rales. She exhibits no tenderness.   Abdominal: Soft. Bowel sounds are normal. She exhibits no distension. There is no tenderness. There is no rebound.   Lymphadenopathy:     She has no cervical adenopathy.   Neurological: She is alert and oriented to person, place, and time. No cranial nerve deficit.   Skin: Skin is warm and dry.   Psychiatric: She has a normal mood and affect.         ED Course   Lac Repair  Date/Time: 2/9/2019 4:10 PM  Performed by: Bronwyn Prasad PA-C  Authorized by: Wild Morales MD   Body area: head/neck  Location details: left eyebrow  Laceration length: 2 cm  Foreign bodies: no foreign bodies  Tendon involvement: none  Nerve involvement: none  Anesthesia: local infiltration    Anesthesia:  Local Anesthetic: lidocaine 1% without epinephrine  Anesthetic total: 2 mL  Preparation: Patient was prepped and draped in the usual sterile fashion.  Irrigation solution: saline  Irrigation method: syringe  Amount of cleaning: extensive  Skin closure: 5-0 nylon  Number of sutures: 4  Technique: simple  Approximation: close  Approximation difficulty: simple  Dressing: antibiotic ointment and dressing applied  Patient tolerance: Patient tolerated the procedure well with no immediate complications    Lac Repair  Date/Time: 2/9/2019 4:10 PM  Performed by: Bronwyn Prasad  REYNA  Authorized by: Wild Morales MD   Body area: head/neck  Location details: left eyebrow  Laceration length: 1 cm  Foreign bodies: no foreign bodies  Tendon involvement: none  Nerve involvement: none  Anesthesia: local infiltration    Anesthesia:  Local Anesthetic: lidocaine 1% without epinephrine  Anesthetic total: 1 mL  Preparation: Patient was prepped and draped in the usual sterile fashion.  Irrigation solution: saline  Irrigation method: syringe  Amount of cleaning: extensive  Skin closure: 5-0 nylon  Number of sutures: 1  Technique: simple  Approximation: close  Approximation difficulty: simple  Dressing: antibiotic ointment and dressing applied  Patient tolerance: Patient tolerated the procedure well with no immediate complications    Lac Repair  Date/Time: 2/9/2019 4:10 PM  Performed by: Bronwyn Prasad PA-C  Authorized by: Wild Morales MD   Body area: head/neck (left temple )  Laceration length: 2 cm  Foreign bodies: no foreign bodies  Tendon involvement: none  Nerve involvement: none  Anesthesia: local infiltration    Anesthesia:  Local Anesthetic: lidocaine 1% without epinephrine  Anesthetic total: 2 mL  Preparation: Patient was prepped and draped in the usual sterile fashion.  Irrigation solution: saline  Irrigation method: syringe  Amount of cleaning: extensive  Skin closure: 5-0 nylon  Number of sutures: 3  Technique: simple  Approximation: close  Approximation difficulty: simple  Dressing: antibiotic ointment and dressing applied  Patient tolerance: Patient tolerated the procedure well with no immediate complications    Lac Repair  Date/Time: 2/9/2019 4:10 PM  Performed by: Bronwyn Prasad PA-C  Authorized by: Wild Morales MD   Body area: head/neck  Location details: left cheek  Laceration length: 3 cm  Foreign bodies: no foreign bodies  Tendon involvement: none  Nerve involvement: none  Anesthesia: local infiltration    Anesthesia:  Local Anesthetic: lidocaine  1% without epinephrine  Anesthetic total: 3 mL  Preparation: Patient was prepped and draped in the usual sterile fashion.  Irrigation solution: saline  Irrigation method: syringe  Skin closure: 5-0 nylon  Number of sutures: 6  Technique: simple  Approximation: close  Approximation difficulty: complex  Dressing: antibiotic ointment and dressing applied  Patient tolerance: Patient tolerated the procedure well with no immediate complications        Labs Reviewed   CBC W/ AUTO DIFFERENTIAL   COMPREHENSIVE METABOLIC PANEL   TROPONIN I   URINALYSIS, REFLEX TO URINE CULTURE   POCT GLUCOSE MONITORING CONTINUOUS     EKG Readings: (Independently Interpreted)   Initial Reading: No STEMI. Rhythm: Normal Sinus Rhythm. Heart Rate: 71.       Imaging Results    None          Medical Decision Making:   Initial Assessment:   68-year-old female presents for evaluation of facial laceration mild headache status post dizziness and fall.  Physical exam reveals a nontoxic-appearing female in no acute distress. Patient is afebrile vital signs within normal limits.  Neurological exam reveals an alert and oriented patient.  No cranial nerve deficits noted. Four facial lacerations are noted with mild active bleeding.  No bony instability or crepitus noted.  APPLE and EOM intact. No Ramos signs noted. Contusion is and hematoma noted to the left forehead and periorbital region.  No tenderness to palpation noted over the paraspinal musculature of the spinous processes of the cervical, thoracic or lumbar spine.  Lungs clear to auscultation bilaterally.  No respiratory distress or accessory muscle use noted.  Abdominal exam reveals soft abdomen, nontender to palpation. No CVA tenderness noted.  Differential Diagnosis:   CT of the head ordered to assess for possible serious etiology including stroke, skull fracture or hemorrhage  CT of the maxillofacial region ordered to assess possible osseous injury or radiopaque foreign body.  CT of the cervical  spine ordered to assess possible fracture dislocation.  ED Management:  EKG revealed no acute ST changes.  Urinalysis reveals evidence of UTI.  CBC reveals no acute leukocytosis or anemia.  CMP results reveal potassium 3.4, glucose 126 and BUN 18.  Troponin less than 0.012.  CT of the maxillofacial region reveals a small amount of gas in the soft tissue of the left side of the face.  Characteristic of puncture wound.  No radiopaque foreign body noted. Small amount of left periorbital edema noted. There is a large ria bullosa in the left middle nasal terminates.  Optical globes are intact. CT of the neck reveals bony fusion between C5 and C6 vertebraes.  CT of the head reveals a small hematoma overlying the left side of the frontal bone.  No skull fracture.  These findings were discussed at length with the patient verbalizes understanding and agreement course of treatment.  Wounds were irrigated and sutured in the emergency department without immediate complication.  Instructed the patient to follow up with her primary care provider for re-evaluation and suture removal.  Instructed the patient to return to the emergency department immediately for any new or worsening symptoms.  evaluated this patient and is in agreement course of treatment.                      Clinical Impression:   The primary encounter diagnosis was Facial laceration, initial encounter. Diagnoses of Dizziness and Acute cystitis without hematuria were also pertinent to this visit.                             Bronwyn Prasad PA-C  02/10/19 [id number]

## 2019-02-09 NOTE — DISCHARGE INSTRUCTIONS
Your CT did not reveal any evidence of no fractures or severe intracranial etiology.  Your found to have a urinary tract infection.  You are advised to follow up with your primary care provider for re-evaluation  and suture removal in 5-7 days.  You are instructed to return to the emergency department immediately for any new or worsening symptoms.

## 2019-02-11 ENCOUNTER — TELEPHONE (OUTPATIENT)
Dept: GASTROENTEROLOGY | Facility: CLINIC | Age: 69
End: 2019-02-11

## 2019-02-11 ENCOUNTER — TELEPHONE (OUTPATIENT)
Dept: FAMILY MEDICINE | Facility: CLINIC | Age: 69
End: 2019-02-11

## 2019-02-11 NOTE — TELEPHONE ENCOUNTER
----- Message from Nisha Guthrie sent at 2/11/2019 11:50 AM CST -----  Contact: [phone]/jupv  Pt would like a callback concerning medication for  (UNKNOWN)  Needing a PA

## 2019-02-11 NOTE — TELEPHONE ENCOUNTER
I spoke with the pt and scheduled her to see Dr Garcia on Thursday for suture removal  She went to the Er Saturday for fall  Sutures placed 5 different places  Do you think you need to see her sooner?

## 2019-02-11 NOTE — TELEPHONE ENCOUNTER
----- Message from Etta Strauss sent at 2/11/2019  8:32 AM CST -----  Contact: [phone]/tjnr  Patient would like to be seen sooner for suture removal. Please advise.

## 2019-02-12 LAB — BACTERIA UR CULT: NORMAL

## 2019-02-13 ENCOUNTER — PATIENT OUTREACH (OUTPATIENT)
Dept: OTHER | Facility: OTHER | Age: 69
End: 2019-02-13

## 2019-02-13 DIAGNOSIS — I10 ESSENTIAL HYPERTENSION: Primary | ICD-10-CM

## 2019-02-13 RX ORDER — CARVEDILOL 25 MG/1
12.5 TABLET ORAL 2 TIMES DAILY
Start: 2019-02-13 | End: 2019-03-07

## 2019-02-13 NOTE — PROGRESS NOTES
Last 5 Patient Entered Readings                                      Current 30 Day Average: 117/63     Recent Readings 2/13/2019 2/12/2019 2/11/2019 2/10/2019 2/10/2019    SBP (mmHg) 128 132 111 99 114    DBP (mmHg) 74 74 56 67 46    Pulse 76 66 70 65 64      HPI:  Called patient to follow up on her low blood pressure symptoms. Patient fell and went to the ED for a laceration. She was found to have UTI and discharged home. She had low potassium found in the ED. She stated she has a history of hypokalemia and stopped her potassium supplements on her own. She is experiencing depression symptoms today and did not want a referral to psychiatry. She will be seen tomorrow by Dr. Hauser and will discuss this with him. Patient endorses adherence to medication regimen. Patient denies hypotensive s/sx (lightheadedness, dizziness, nausea, fatigue); patient denies hypertensive s/sx (SOB, CP, severe headaches, changes in vision). Instructed patient to seek medical care if BP > 180/110 and is accompanied by hypertensive s/sx associated, patient confirms understanding.     Assessment:  Reviewed recent readings. Per 2017 ACC/ AHA HTN guidelines (goal of BP < 130/80), current 30-day average is well controlled.     Plan:  Continue current medication regimen. I will continue to monitor regularly and will follow-up in 2 to 3 weeks, sooner if blood pressure begins to trend upward or downward.     Current medication regimen:  Hypertension Medications             carvedilol (COREG) 25 MG tablet Take 0.5 tablets (12.5 mg total) by mouth 2 (two) times daily.    furosemide (LASIX) 40 MG tablet Up to 2 times a day if swelling occurs    losartan (COZAAR) 50 MG tablet Take 50 mg by mouth once daily.           Patient denies having questions or concerns. Patient has my contact information and knows to call with any concerns or clinical changes.

## 2019-02-14 ENCOUNTER — OFFICE VISIT (OUTPATIENT)
Dept: FAMILY MEDICINE | Facility: CLINIC | Age: 69
End: 2019-02-14
Payer: MEDICARE

## 2019-02-14 VITALS
SYSTOLIC BLOOD PRESSURE: 100 MMHG | BODY MASS INDEX: 37.75 KG/M2 | WEIGHT: 205.13 LBS | TEMPERATURE: 99 F | OXYGEN SATURATION: 95 % | HEIGHT: 62 IN | HEART RATE: 95 BPM | DIASTOLIC BLOOD PRESSURE: 66 MMHG

## 2019-02-14 DIAGNOSIS — S01.81XD FACIAL LACERATION, SUBSEQUENT ENCOUNTER: Primary | ICD-10-CM

## 2019-02-14 DIAGNOSIS — N30.00 ACUTE CYSTITIS WITHOUT HEMATURIA: ICD-10-CM

## 2019-02-14 PROCEDURE — 99213 PR OFFICE/OUTPT VISIT, EST, LEVL III, 20-29 MIN: ICD-10-PCS | Mod: S$GLB,,, | Performed by: FAMILY MEDICINE

## 2019-02-14 PROCEDURE — 99213 OFFICE O/P EST LOW 20 MIN: CPT | Mod: S$GLB,,, | Performed by: FAMILY MEDICINE

## 2019-02-15 ENCOUNTER — PATIENT MESSAGE (OUTPATIENT)
Dept: NEUROLOGY | Facility: CLINIC | Age: 69
End: 2019-02-15

## 2019-02-15 DIAGNOSIS — G89.29 CHRONIC BILATERAL LOW BACK PAIN WITHOUT SCIATICA: ICD-10-CM

## 2019-02-15 DIAGNOSIS — M79.7 FIBROMYALGIA: ICD-10-CM

## 2019-02-15 DIAGNOSIS — M54.50 CHRONIC BILATERAL LOW BACK PAIN WITHOUT SCIATICA: ICD-10-CM

## 2019-02-15 DIAGNOSIS — M53.86 DISORDER OF LUMBAR SPINE: Primary | ICD-10-CM

## 2019-02-18 NOTE — PROGRESS NOTES
" Patient ID: Hannah Norman is a 69 y.o. female.    Chief Complaint: Fall (Lacerations to face )    HPI       Hannah Norman is a 69 y.o. female here for evaluation after fall lacerations on face and bruising.  No syncope fell hit the left side of her face CT scans were negative.  Multiple sutures upper eyelid lower eyelid.      Review of Symptoms    Constitutional  No change in activity, No chills fever   Resp  Neg hemoptysis, stridor, choking  CVS  Neg chest pain, palpitations    /66   Pulse 95   Temp 98.8 °F (37.1 °C)   Ht 5' 2" (1.575 m)   Wt 93.1 kg (205 lb 2.2 oz)   SpO2 95%   BMI 37.52 kg/m²     Physical Exam    Constitutional:   Oriented to person, place, and time.appears well-developed and well-nourished.   No distress.  he    HENT  Head: Normocephalic and atraumatic  Right Ear: External ear normal.   Left Ear: External ear normal.   Nose: External nose normal.   Mouth: Moist mucous membranes    Eyes:   Conjunctivae are normal. Right eye exhibits no discharge. Left eye exhibits no discharge. No scleral icterus. No periorbital edema    Musculoskeletal:  No edema. No obvious deformity No wasting     Neurological:  Alert and oriented to person, place, and time. Coordination normal.     Skin:   Skin is warm and dry.  No diaphoresis.   No rash noted.   Healing lacerations with about 20 interrupted sutures-removed easily from three wounds  Lower lid laceration some dehiscence-slight used Steri-Strips to reapproximate    Psychiatric: Normal mood and affect. Behavior is normal. Judgment and thought content normal.     Complete Blood Count  Lab Results   Component Value Date    RBC 4.54 02/09/2019    HGB 13.8 02/09/2019    HCT 42.8 02/09/2019    MCV 94 02/09/2019    MCH 30.4 02/09/2019    MCHC 32.2 02/09/2019    RDW 12.7 02/09/2019     02/09/2019    MPV 10.4 02/09/2019    GRAN 3.4 02/09/2019    GRAN 57.6 02/09/2019    LYMPH 1.8 02/09/2019    LYMPH 30.4 02/09/2019    MONO 0.5 02/09/2019    MONO 9.1 " 02/09/2019    EOS 0.2 02/09/2019    BASO 0.01 02/09/2019    EOSINOPHIL 2.5 02/09/2019    BASOPHIL 0.2 02/09/2019    DIFFMETHOD Automated 02/09/2019       Comprehensive Metabolic Panel  Lab Results   Component Value Date     (H) 02/09/2019    BUN 18 (H) 02/09/2019    CREATININE 0.65 02/09/2019     02/09/2019    K 3.4 (L) 02/09/2019    CL 98 02/09/2019    PROT 6.9 02/09/2019    ALBUMIN 4.3 02/09/2019    BILITOT 0.3 02/09/2019    AST 17 02/09/2019    ALKPHOS 66 02/09/2019    CO2 35 (H) 02/09/2019    ALT 14 02/09/2019    ANIONGAP 6 (L) 02/09/2019    EGFRNONAA >60.0 02/09/2019    ESTGFRAFRICA >60.0 02/09/2019       TSH  Lab Results   Component Value Date    TSH 1.840 01/17/2019       Assessment / Plan:      ICD-10-CM ICD-9-CM   1. Facial laceration, subsequent encounter S01.81XD V58.89     873.40   2. Acute cystitis without hematuria N30.00 595.0     Facial laceration, subsequent encounter    Acute cystitis without hematuria  -     Urinalysis; Future; Expected date: 02/14/2019      Recent cystitis-urinalysis ordered for the future to check to make sure UTI cleared

## 2019-02-24 DIAGNOSIS — F41.9 ANXIETY DISORDER: ICD-10-CM

## 2019-02-25 ENCOUNTER — OFFICE VISIT (OUTPATIENT)
Dept: GASTROENTEROLOGY | Facility: CLINIC | Age: 69
End: 2019-02-25
Payer: MEDICARE

## 2019-02-25 ENCOUNTER — PATIENT OUTREACH (OUTPATIENT)
Dept: OTHER | Facility: OTHER | Age: 69
End: 2019-02-25

## 2019-02-25 VITALS — WEIGHT: 201.94 LBS | BODY MASS INDEX: 36.94 KG/M2

## 2019-02-25 DIAGNOSIS — K21.9 MILD ACID REFLUX: Primary | ICD-10-CM

## 2019-02-25 DIAGNOSIS — K21.9 LARYNGOPHARYNGEAL REFLUX (LPR): ICD-10-CM

## 2019-02-25 PROCEDURE — 99999 PR PBB SHADOW E&M-EST. PATIENT-LVL III: CPT | Mod: PBBFAC,,, | Performed by: INTERNAL MEDICINE

## 2019-02-25 PROCEDURE — 99214 PR OFFICE/OUTPT VISIT, EST, LEVL IV, 30-39 MIN: ICD-10-PCS | Mod: S$PBB,,, | Performed by: INTERNAL MEDICINE

## 2019-02-25 PROCEDURE — 99214 OFFICE O/P EST MOD 30 MIN: CPT | Mod: S$PBB,,, | Performed by: INTERNAL MEDICINE

## 2019-02-25 PROCEDURE — 99213 OFFICE O/P EST LOW 20 MIN: CPT | Mod: PBBFAC,PO | Performed by: INTERNAL MEDICINE

## 2019-02-25 PROCEDURE — 99999 PR PBB SHADOW E&M-EST. PATIENT-LVL III: ICD-10-PCS | Mod: PBBFAC,,, | Performed by: INTERNAL MEDICINE

## 2019-02-25 RX ORDER — OMEPRAZOLE/SODIUM BICARBONATE 40; 1680 MG/1; MG/1
1 POWDER, FOR SUSPENSION ORAL DAILY
Qty: 30 EACH | Refills: 1 | Status: SHIPPED | OUTPATIENT
Start: 2019-02-25 | End: 2019-05-27

## 2019-02-25 RX ORDER — CITALOPRAM 20 MG/1
TABLET, FILM COATED ORAL
Qty: 30 TABLET | Refills: 5 | Status: SHIPPED | OUTPATIENT
Start: 2019-02-25 | End: 2019-10-08 | Stop reason: ALTCHOICE

## 2019-02-25 NOTE — PROGRESS NOTES
Subjective:       Patient ID: Hannah Norman is a 69 y.o. female.    Chief Complaint: Follow-up    This is a 69-year-old female here for a follow-up visit regarding hoarseness and voice fatigue. As previously noted a follow-up laryngoscopy noted persistent evidence of LPR with symptoms occurring for over 2 years now. Prior upper endoscopy noted mild esophagitis and she has tried a number of PPIs including pantoprazole and omeprazole with the addition of ranitidine and b.i.d. Therapy with mild response. We requested dexilant but it was denied.  Gastric emptying study was negative.  No other exacerbating or relieving factors or other associated symptoms.  It occurs mostly on a daily basis.  +globus     The following portions of the patient's history were reviewed and updated as appropriate: allergies, current medications, past family history, past medical history, past social history, past surgical history and problem list.     (Portions of this note were dictated using voice recognition software and may contain dictation related errors in spelling/grammar/syntax not found on text review)    HPI  Review of Systems   Constitutional: Negative for appetite change and unexpected weight change.   Cardiovascular: Negative for chest pain and palpitations.   Gastrointestinal: Positive for abdominal distention. Negative for anal bleeding.       Objective:      Physical Exam   Constitutional: She is oriented to person, place, and time. She appears well-developed and well-nourished. No distress.   HENT:   Head: Normocephalic and atraumatic.   Eyes: Conjunctivae are normal. No scleral icterus.   Neck: Normal range of motion. Neck supple.   Cardiovascular: Normal rate and regular rhythm.   Pulmonary/Chest: Effort normal. No respiratory distress.   Abdominal: Soft. Bowel sounds are normal. She exhibits no distension. There is no tenderness.   Neurological: She is alert and oriented to person, place, and time.   Skin: Skin is warm and  dry. No rash noted. She is not diaphoretic. No erythema.   Psychiatric: She has a normal mood and affect. Her behavior is normal.   Nursing note and vitals reviewed.      Labs; reviewed  Assessment:       1. Mild acid reflux    2. Laryngopharyngeal reflux (LPR)        Plan:   1. Trial of zegerid  2. Pt to message with symptom update through [facility] in 1 month

## 2019-02-25 NOTE — PROGRESS NOTES
Last 5 Patient Entered Readings                                      Current 30 Day Average: 122/67     Recent Readings 2/23/2019 2/22/2019 2/22/2019 2/19/2019 2/17/2019    SBP (mmHg) 115 124 162 116 123    DBP (mmHg) 58 73 80 74 73    Pulse 71 76 83 82 83          Digital Medicine: Health  Follow Up    Lifestyle Modifications:    1.Dietary Modifications (Sodium intake <2,000mg/day, food labels, dining out): Patient continues on the Weight Watcher diet. She has lost 58 lbs since starting it again. She hopes to maintain her weight this time around.     2.Physical Activity: Deferred    3.Medication Therapy: Patient has been compliant with the medication regimen. Her Coreg was recently decreased so she takes 6.25mg twice a day instead of 12.5mg twice a day.     4.Patient has the following medication side effects/concerns:   (Frequency/Alleviating factors/Precipitating factors, etc.)     Follow up with Mrs. Hannah SEARS Emerson completed. No further questions or concerns. Will continue to follow up to achieve health goals.

## 2019-02-26 ENCOUNTER — PATIENT MESSAGE (OUTPATIENT)
Dept: GASTROENTEROLOGY | Facility: CLINIC | Age: 69
End: 2019-02-26

## 2019-02-27 ENCOUNTER — PATIENT OUTREACH (OUTPATIENT)
Dept: OTHER | Facility: OTHER | Age: 69
End: 2019-02-27

## 2019-02-27 DIAGNOSIS — I10 ESSENTIAL HYPERTENSION: Primary | ICD-10-CM

## 2019-02-27 NOTE — PROGRESS NOTES
Last 5 Patient Entered Readings                                      Current 30 Day Average: 122/67     Recent Readings 2/23/2019 2/22/2019 2/22/2019 2/19/2019 2/17/2019    SBP (mmHg) 115 124 162 116 123    DBP (mmHg) 58 73 80 74 73    Pulse 71 76 83 82 83        HPI:  Called patient to follow up to see if she resumed her potassium supplement. Patient endorses adherence to medication regimen. Patient denies hypotensive s/sx (lightheadedness, dizziness, nausea, fatigue); patient denies hypertensive s/sx (SOB, CP, severe headaches, changes in vision). Instructed patient to seek medical care if BP > 180/110 and is accompanied by hypertensive s/sx associated, patient confirms understanding.     Assessment:  Reviewed recent readings. Per 2017 ACC/ AHA HTN guidelines (goal of BP < 130/80), current 30-day average is at goal and controlled.     Plan:  Continue current medication regimen and resume your potassium supplement. She will repeat a BMP 3/14 to assess if her hypokalemia resumed. I will continue to monitor regularly and will follow-up in 2 to 3 weeks, sooner if blood pressure begins to trend upward or downward.     Current medication regimen:  Hypertension Medications             carvedilol (COREG) 25 MG tablet Take 0.5 tablets (12.5 mg total) by mouth 2 (two) times daily.    furosemide (LASIX) 40 MG tablet Up to 2 times a day if swelling occurs    losartan (COZAAR) 50 MG tablet Take 50 mg by mouth once daily.         Patient denies having questions or concerns. Patient has my contact information and knows to call with any concerns or clinical changes.

## 2019-02-28 ENCOUNTER — CLINICAL SUPPORT (OUTPATIENT)
Dept: FAMILY MEDICINE | Facility: CLINIC | Age: 69
End: 2019-02-28
Payer: MEDICARE

## 2019-02-28 DIAGNOSIS — N39.0 URINARY TRACT INFECTION WITH HEMATURIA, SITE UNSPECIFIED: Primary | ICD-10-CM

## 2019-02-28 DIAGNOSIS — R31.9 URINARY TRACT INFECTION WITH HEMATURIA, SITE UNSPECIFIED: Primary | ICD-10-CM

## 2019-02-28 LAB
BACTERIA #/AREA URNS AUTO: ABNORMAL /HPF
BILIRUB UR QL STRIP: NEGATIVE
CLARITY UR REFRACT.AUTO: ABNORMAL
COLOR UR AUTO: YELLOW
GLUCOSE UR QL STRIP: NEGATIVE
HGB UR QL STRIP: NEGATIVE
KETONES UR QL STRIP: NEGATIVE
LEUKOCYTE ESTERASE UR QL STRIP: ABNORMAL
MICROSCOPIC COMMENT: ABNORMAL
NITRITE UR QL STRIP: NEGATIVE
PH UR STRIP: 5 [PH] (ref 5–8)
PROT UR QL STRIP: NEGATIVE
RBC #/AREA URNS AUTO: 1 /HPF (ref 0–4)
SP GR UR STRIP: 1 (ref 1–1.03)
SQUAMOUS #/AREA URNS AUTO: 1 /HPF
URN SPEC COLLECT METH UR: ABNORMAL
WBC #/AREA URNS AUTO: 10 /HPF (ref 0–5)
WBC CLUMPS UR QL AUTO: ABNORMAL

## 2019-02-28 PROCEDURE — 87186 SC STD MICRODIL/AGAR DIL: CPT

## 2019-02-28 PROCEDURE — 87086 URINE CULTURE/COLONY COUNT: CPT

## 2019-02-28 PROCEDURE — 87077 CULTURE AEROBIC IDENTIFY: CPT

## 2019-02-28 PROCEDURE — 87088 URINE BACTERIA CULTURE: CPT

## 2019-02-28 PROCEDURE — 81001 URINALYSIS AUTO W/SCOPE: CPT

## 2019-02-28 NOTE — PROGRESS NOTES
Pt here for ua and cns f/u abx treatment per dr deluna  Pt still c/o problems/ pt will see urology next week  Pt notiifed

## 2019-03-03 LAB — BACTERIA UR CULT: NORMAL

## 2019-03-04 ENCOUNTER — OFFICE VISIT (OUTPATIENT)
Dept: SLEEP MEDICINE | Facility: CLINIC | Age: 69
End: 2019-03-04
Payer: MEDICARE

## 2019-03-04 ENCOUNTER — TELEPHONE (OUTPATIENT)
Dept: FAMILY MEDICINE | Facility: CLINIC | Age: 69
End: 2019-03-04

## 2019-03-04 VITALS
HEIGHT: 62 IN | DIASTOLIC BLOOD PRESSURE: 65 MMHG | HEART RATE: 64 BPM | BODY MASS INDEX: 37.48 KG/M2 | WEIGHT: 203.69 LBS | SYSTOLIC BLOOD PRESSURE: 118 MMHG

## 2019-03-04 DIAGNOSIS — G47.33 OSA (OBSTRUCTIVE SLEEP APNEA): Primary | ICD-10-CM

## 2019-03-04 PROCEDURE — 99215 PR OFFICE/OUTPT VISIT, EST, LEVL V, 40-54 MIN: ICD-10-PCS | Mod: S$PBB,,, | Performed by: PSYCHIATRY & NEUROLOGY

## 2019-03-04 PROCEDURE — 99215 OFFICE O/P EST HI 40 MIN: CPT | Mod: S$PBB,,, | Performed by: PSYCHIATRY & NEUROLOGY

## 2019-03-04 PROCEDURE — 99999 PR PBB SHADOW E&M-EST. PATIENT-LVL III: ICD-10-PCS | Mod: PBBFAC,,, | Performed by: PSYCHIATRY & NEUROLOGY

## 2019-03-04 PROCEDURE — 99213 OFFICE O/P EST LOW 20 MIN: CPT | Mod: PBBFAC,PO | Performed by: PSYCHIATRY & NEUROLOGY

## 2019-03-04 PROCEDURE — 99999 PR PBB SHADOW E&M-EST. PATIENT-LVL III: CPT | Mod: PBBFAC,,, | Performed by: PSYCHIATRY & NEUROLOGY

## 2019-03-04 RX ORDER — AMOXICILLIN AND CLAVULANATE POTASSIUM 875; 125 MG/1; MG/1
1 TABLET, FILM COATED ORAL 2 TIMES DAILY
Qty: 14 TABLET | Refills: 0 | Status: SHIPPED | OUTPATIENT
Start: 2019-03-04 | End: 2019-03-14

## 2019-03-04 NOTE — PROGRESS NOTES
"   Hannah Norman  was seen at the request of  No ref. provider found for sleep evaluation.    03/28/2018 INITIAL HISTORY OF PRESENT ILLNESS:  Hannah Norman is a 69 y.o. female is here to be evaluated for a sleep disorder.       CHIEF COMPLAINT:      The patient's complaints include excessive daytime sleepiness, excessive daytime fatigue, snoring,  witnessed breathing pauses,  gasping for air in sleep and interrupted sleep since  Several years ago.    She has had more nightmares lately.    Diagnosed with mild MARY in 2  Reports  dry mouth and sore throat  Reports occasional nasal congestion   Reports  morning headaches  Denies  interrupted sleep  Denies frequent leg movements  Denies symptoms concerning for parasomnia    The ESS ([place] Sleepiness Score) taken on initial visit is 14 /24    The patient never had tonsillectomy, adenoidectomy or UPPP       INTERVAL HISTORY:    07/18/2018:  The patient has not presented any new complaints since the previous visit.   Still reports nightmares - at least once a week; at times enacting her dreams - was fighting with someone in her sleep, and was pushing her  away.  At night - taking Celexa, Xanax, Flexeril and Gabapentin 600 mg. She's been on this scheme for years.   APAP 8-18  - did not quite meet compliance - hose is "too long, gets in the way", so she removes it in the middle of the night.    90% - 9 cm H2O.    RLS - not quite well controlled with Requip.    Therapy Event Summary Date Range: 6/18/2018 - 7/17/2018     Hide      Compliance Summary  Apnea Indices  Ventilator Statistics    Days with Device Usage:  30 days  Average AHI:  7.1  Average Breath Rate:  9.8 bpm    Percentage of Days >=4 Hours:  66.7%  Average OA Index:  3.6  Average % Patient Triggered Breaths:  N/A    Average Usage (Days Used):  4 hrs. 22 mins. 16 secs.  Average CA Index:  0.8  Average Tidal Volume:  326.2 ml    Average Usage (All Days):  4 hrs. 22 mins. 16 secs.    Average Minute Vent:  N/A     "    Large Leak  Periodic Breathing     Average Time in Large Leak:  1 mins. 10 secs.  Average % of Night in PB:  0.2%     Average % of Night in Large Leak:  0.4%                09/19/2018:     Benefiting from CPAP use in terms of sleep continuity and daytime sleepiness.   Still some dream enacting events - does not remember associated dreams; some times dreams of fight and fough actually fight in her sleep  APAP 8-18 cm H2O.  90% -14 (but sometimes 10)  Wisp - want to try Dreamwear  RLS 0.5 mg split in 2 doses - working no better than Requip.  Therapy Event Summary Date Range: 8/20/2018 - 9/18/2018     Hide      Compliance Summary  Apnea Indices  Ventilator Statistics    Days with Device Usage:  30 days  Average AHI:  7.5  Average Breath Rate:  10.6 bpm    Percentage of Days >=4 Hours:  100.0%  Average OA Index:  3.4  Average % Patient Triggered Breaths:  N/A    Average Usage (Days Used):  6 hrs. 55 mins. 52 secs.  Average CA Index:  0.7  Average Tidal Volume:  324.9 ml    Average Usage (All Days):  6 hrs. 55 mins. 52 secs.    Average Minute Vent:  N/A        Large Leak  Periodic Breathing     Average Time in Large Leak:  12 mins. 28 secs.  Average % of Night in PB:  0.0%     Average % of Night in Large Leak:  3.0%              11/28/2018:      mORPHINE - at 8 AM and 8 PM    eFFEXOR - 150 mg MG - taking BID (did not decrease to QD)    RLS - Mirapex - 3 PM - 2 pills  And 3 at 8 PM - when she goes to bed to relax and watch TV - that's when symptoms are worse; better by 10 PM.  Turns light off at 10 PM    Clonazepam 0.5 - 2 pills 8 PM. Looks like dream enacting has significantly improved.    Had a fall since last time - required stiches. Reports no loss of balance, she did not have a cane that day, believe it was a pure accident.    Stopped using CPAP due to heavy condensation.     APAP 10-20 cm H2O  Significant condensation reported.  Likes her Wisp.         Compliance Summary  Apnea Indices  Ventilator Statistics     Days with Device Usage:  29 days  Average AHI:  7.4  Average Breath Rate:  10.4 bpm    Percentage of Days >=4 Hours:  66.7%  Average OA Index:  3.6  Average % Patient Triggered Breaths:  N/A    Average Usage (Days Used):  4 hrs. 6 mins. 1 secs.  Average CA Index:  0.8  Average Tidal Volume:  356.8 ml    Average Usage (All Days):  3 hrs. 57 mins. 49 secs.    Average Minute Vent:  N/A        Large Leak  Periodic Breathing     Average Time in Large Leak:  10 mins. 37 secs.  Average % of Night in PB:  0.2%    Average % of Night in Large Leak:  4.3%                      SLEEP ROUTINE AND LIFESTYLE 03/04/2019 :    Occupation:    Bed partner:      Time to bed - wake up time on a workday : 9 pm to 8 AM  Time to bed - wake up time on a day off: 9 pm to  8 Am  Sleep onset latency: 30 min  Disruptions or awakenings: 3-4  Time to fall back into sleep: 10-15 min   Perceived sleep quality: 2/5  Perceived total sleep time:  6  hours.  Daytime naps: 1    Exercise routine: no  Caffeine:       PREVIOUS SLEEP STUDIES:     PSG study  In 2/19/18 showed significant MARY with the AHI of 7.9- /hour and SaO2 minimum of 85%.  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    Screening for malignant neoplasm of colon 11/10/2017    MARY (obstructive sleep apnea)     Urge incontinence 12/07/2018    Laryngopharyngeal reflux (LPR) 02/25/2019     Resolved Ambulatory Problems     Diagnosis Date Noted    Mixed stress and urge urinary incontinence 10/17/2018    Stress  incontinence, female 10/23/2018     Past Medical History:   Diagnosis Date    Anxiety disorder     Bell's palsy     Chronic back pain     Chronic osteoarthritis     Essential tremor     Fibromyalgia     Hypertension     Mild acid reflux     Neck pain     Other and unspecified hyperlipidemia     Sleep apnea                 PAST SURGICAL HISTORY:    Past Surgical History:   Procedure Laterality Date    BREAST BIOPSY Left       negative    Breast reduction      CARPAL TUNNEL RELEASE      COLONOSCOPY      COLONOSCOPY - Miralax split prep N/A 11/10/2017    [facility]    CYSTOSCOPY N/A 10/23/2018    [facility]    ESOPHAGOGASTRODUODENOSCOPY (EGD) N/A 11/10/2017    [facility]    HYSTERECTOMY      JOINT REPLACEMENT      Left knee    KNEE SURGERY      bilateral    left shoulder      PLACEMENT, TRANSOBTURATOR TAPE N/A 10/23/2018    [facility]    Tonsillectomy           FAMILY HISTORY:                Family History   Problem Relation Age of Onset    Diabetes Mother     Tremor Mother     Liver disease Mother     Diabetes Father     Heart disease Father     Tremor Son     Diabetes Sister     Diabetes Brother        SOCIAL HISTORY:          Tobacco:   Social History     Tobacco Use   Smoking Status Former Smoker    Last attempt to quit: 10/5/1982    Years since quittin.4   Smokeless Tobacco Never Used       alcohol use:    Social History     Substance and Sexual Activity   Alcohol Use No                   ALLERGIES:    Review of patient's allergies indicates:   Allergen Reactions    Hyoscyamine Rash       CURRENT MEDICATIONS:    Current Outpatient Medications   Medication Sig Dispense Refill    acetaminophen (TYLENOL) 500 MG tablet Take 500 mg by mouth every 6 (six) hours as needed.      albuterol 90 mcg/actuation inhaler Inhale 2  puffs into the lungs every 6 (six) hours as needed for Wheezing or Shortness of Breath. Rescue 18 g 0    citalopram (CELEXA) 20 MG tablet TAKE 1 TABLET BY MOUTH EVERY DAY 30 tablet 5    clonazePAM (KLONOPIN) 0.5 MG tablet 2 pills PO QHS bedtime 60 tablet 5    cyclobenzaprine (FLEXERIL) 10 MG tablet Take 10 mg by mouth 3 (three) times daily as needed.       dexlansoprazole (DEXILANT) 60 mg capsule Take 1 capsule (60 mg total) by mouth once daily. 30 capsule 1    diclofenac sodium (SOLARAZE) 3 % gel APPLY 1-2 GRAMS TOPICALLY TO AFFECTED AREA 2-3 TIMES PER DAY. PRN  11    fluticasone (FLONASE) 50 mcg/actuation nasal spray 1 spray (50 mcg total) by Each Nare route once daily. 16 g 0    furosemide (LASIX) 40 MG tablet Up to 2 times a day if swelling occurs 60 tablet 2    gabapentin (NEURONTIN) 600 MG tablet TAKE 1 TABLET BY MOUTH THREE TIMES A DAY 90 tablet 11    losartan (COZAAR) 50 MG tablet Take 50 mg by mouth once daily.       lovastatin (MEVACOR) 20 MG tablet Take 1 tablet (20 mg total) by mouth every evening. 90 tablet 3    magnesium oxide (MAG-OX) 400 mg (241.3 mg magnesium) tablet Take 1 tablet (400 mg total) by mouth once daily.  0    morphine (MS CONTIN) 15 MG 12 hr tablet Take 1 tablet by mouth every 12 (twelve) hours.      oxybutynin (DITROPAN-XL) 10 MG 24 hr tablet Take 1 tablet (10 mg total) by mouth once daily. (Patient taking differently: Take 10 mg by mouth once daily. ) 30 tablet 11    oxycodone-acetaminophen (PERCOCET)  mg per tablet Take 1 tablet by mouth every 8 (eight) hours as needed.       pramipexole (MIRAPEX) 0.125 MG tablet 2 pills PO in the late afternoon and 3 pills PO at bedtime 150 tablet 11    venlafaxine (EFFEXOR-XR) 150 MG Cp24 TAKE 1 CAPSULE BY MOUTH TWO TIMES A DAY 30 capsule 12    amoxicillin-clavulanate 875-125mg (AUGMENTIN) 875-125 mg per tablet Take 1 tablet by mouth 2 (two) times daily. 14 tablet 0    carvedilol (COREG) 25 MG tablet Take 0.5 tablets (12.5  "mg total) by mouth 2 (two) times daily.      levothyroxine (SYNTHROID) 75 MCG tablet Take 1 tablet (75 mcg total) by mouth before breakfast. 90 tablet 0    omeprazole-sodium bicarbonate 40-1,680 mg Pack Take 1 tablet by mouth once daily. 30 each 1    polyethylene glycol (GLYCOLAX) 17 gram PwPk Take 17 g by mouth once daily. 30 packet 0     No current facility-administered medications for this visit.                       REVIEW OF SYSTEMS:   Sleep related symptoms as per HPI    denies weight gain  Reports occasional dyspnea  Reports occasional palpitations  Reports occasional acid reflux   Reports polyuria x 2  Denies  mood diturbance  Denies  anemia  Denies  muscle pain  Denies  Gait imbalance    Otherwise, a balance of 10 systems reviewed is negative.    PHYSICAL EXAM:  /65   Pulse 64   Ht 5' 2" (1.575 m)   Wt 92.4 kg (203 lb 11.2 oz)   BMI 37.26 kg/m²   GENERAL: Overweight body habitus, well groomed.  HEENT:   HEENT:  Conjunctivae are non-erythematous; Pupils equal, round, and reactive to light; Nose is symmetrical; Nasal mucosa is pink and moist; Septum is midline; Inferior turbinates are normal; Nasal airflow is normal; Posterior pharynx is pink; Modified Mallampati:II; Posterior palate is low; Tonsils not visualized; Uvula is wide and elongated;Tongue is enlarged; Dentition is fair; No TMJ tenderness; Jaw opening and protrusion without click and without discomfort.  NECK: Supple. Neck circumference is 16 inches. No thyromegaly. No palpable nodes.     SKIN: On face and neck: No abrasions, no rashes, no lesions.  No subcutaneous nodules are palpable.  RESPIRATORY: Chest is clear to auscultation.  Normal chest expansion and non-labored breathing at rest.  CARDIOVASCULAR: Normal S1, S2.  No murmurs, gallops or rubs. No carotid bruits bilaterally.  No edema. No clubbing. No cyanosis.    NEURO: Oriented to time, place and person. Normal attention span and concentration. Gait normal.    PSYCH: Affect is " "full. Mood is normal  MUSCULOSKELETAL: Moves 4 extremities. Gait normal.         Using [facility]:         ASSESSMENT:    1. MARY (obstructive sleep apnea). The patient symptomatically has  excessive daytime sleepiness, snoring,  witnessed breathing pauses, excessive daytime fatigue, gasping for air in sleep and interrupted sleep  with exam findings of "a crowded oral airway and elevated body mass index. The patient has medical co-morbidities of hyperlipidemia, fibromyalgia and nightmares  which can be worsened by MARY. This warrants treatment. Benefiting from CPAP use in terms of sleep continuity and daytime sleepiness. Has not quite met compliance due to discomfort.       2. RLS - not well controlled on Requip; switch to Pramipexole     3. RBD      PLAN:    CYCLOBENZAPRINE - decrease further  mORPHINE - try to take all pain medications earlier, ie 6 PM    eFFEXOR - DECREASE TO ONCE A DAY if possible, IN THE MORNING  cONTINUE CELEXA AT NIGHT ONLY - will check with Dr. Mukherjee if still needed,    KEEP CLONAZEPAM 0.5 MG AT NIGHT for RBD (DO NOT TAKE SAME TIME AS mORPHINE - THEY SUPPRESS  BREATHING!)    pRAMIPEXOL - - take 3 pills at 8 PM, 2 more pills of needed at bedtime.    Will order heated hose.   Showed how to change her humidity settings.    More than 25 minutes of this 45 minutes visit was spent in counseling: during our discussion today, we talked about the etiology of  MARY as well as the potential ramifications of untreated sleep apnea, which could include daytime sleepiness, hypertension, heart disease and/or stroke.  We discussed potential treatment options, which could include weight loss, body positioning, continuous positive airway pressure (CPAP), or referral for surgical consideration. Meanwhile, she  is urged to avoid supine sleep, weight gain and alcoholic beverages since all of these can worsen MARY.     Precautions: The patient was advised to abstain from driving should he feel sleepy or " drowsy.    Follow up: MD/NP  after the sleep study has been completed.     Thank you for allowing me the opportunity to participate in the care of your patient.    This visit summary will be sent to referring provider via inbasket

## 2019-03-04 NOTE — PATIENT INSTRUCTIONS
CYCLOBENZAPRINE - decrease further  mORPHINE - try to take all pain medications earlier, ie 6 PM    eFFEXOR - DECREASE TO ONCE A DAY if possible, IN THE MORNING  cONTINUE CELEXA AT NIGHT ONLY - will check with Dr. Mukherjee if still needed,    KEEP CLONAZEPAM 0.5 MG AT NIGHT for RBD (DO NOT TAKE SAME TIME AS mORPHINE - THEY SUPPRESS  BREATHING!)    pRAMIPEXOL - - take 3 pills at 8 PM, 2 more pills of needed at bedtime.    Will order heated hose.   Showed how to change her humidity settings.    ---------------------------------------------    Try calling [facility]:  [phone] - [place]:  or [phone] (ext [phone])- [place]

## 2019-03-04 NOTE — Clinical Note
Sergio Jackson,I'm concerned with her recent fall (required stitches to the left side of her face).She is now taking Klonopin (RBD 1 mg), Mirapex, Percocet, Morphine, Flexeril PRn.Also Celexa and Effexor.Most medications fall on 8 PM.Is there any way we could cut something down?Does she need Celexa and Effexor  at Night?

## 2019-03-04 NOTE — TELEPHONE ENCOUNTER
Your urine showed bacteria in it and we should treat you with medication    [facility]

## 2019-03-07 ENCOUNTER — OFFICE VISIT (OUTPATIENT)
Dept: UROLOGY | Facility: CLINIC | Age: 69
End: 2019-03-07
Payer: MEDICARE

## 2019-03-07 VITALS
HEART RATE: 79 BPM | DIASTOLIC BLOOD PRESSURE: 76 MMHG | WEIGHT: 202.38 LBS | SYSTOLIC BLOOD PRESSURE: 132 MMHG | BODY MASS INDEX: 37.24 KG/M2 | HEIGHT: 62 IN

## 2019-03-07 DIAGNOSIS — N39.41 URGE INCONTINENCE: ICD-10-CM

## 2019-03-07 DIAGNOSIS — Z09 FOLLOW-UP EXAMINATION AFTER UROLOGICAL SURGERY: Primary | ICD-10-CM

## 2019-03-07 PROCEDURE — 99214 OFFICE O/P EST MOD 30 MIN: CPT | Mod: PBBFAC | Performed by: UROLOGY

## 2019-03-07 PROCEDURE — 99213 PR OFFICE/OUTPT VISIT, EST, LEVL III, 20-29 MIN: ICD-10-PCS | Mod: S$PBB,,, | Performed by: UROLOGY

## 2019-03-07 PROCEDURE — 81002 URINALYSIS NONAUTO W/O SCOPE: CPT | Mod: PBBFAC | Performed by: UROLOGY

## 2019-03-07 PROCEDURE — 99999 PR PBB SHADOW E&M-EST. PATIENT-LVL IV: CPT | Mod: PBBFAC,,, | Performed by: UROLOGY

## 2019-03-07 PROCEDURE — 99213 OFFICE O/P EST LOW 20 MIN: CPT | Mod: S$PBB,,, | Performed by: UROLOGY

## 2019-03-07 PROCEDURE — 99999 PR PBB SHADOW E&M-EST. PATIENT-LVL IV: ICD-10-PCS | Mod: PBBFAC,,, | Performed by: UROLOGY

## 2019-03-07 RX ORDER — POTASSIUM CHLORIDE 20 MEQ/1
TABLET, EXTENDED RELEASE ORAL
COMMUNITY
Start: 2019-03-01 | End: 2020-11-10 | Stop reason: SDUPTHER

## 2019-03-07 RX ORDER — PANTOPRAZOLE SODIUM 40 MG/1
TABLET, DELAYED RELEASE ORAL
COMMUNITY
Start: 2019-02-27 | End: 2019-05-06 | Stop reason: SDUPTHER

## 2019-03-07 NOTE — PROGRESS NOTES
CHIEF COMPLAINT:    Mrs. Norman is a 69 y.o. female presenting for a follow up after TOS on 10/23/2018.    PRESENTING ILLNESS:    Hannah Norman is a 69 y.o. female who returns for follow up.  She states that she fell several weeks ago after she lost her balance when the door to her house gave way and she fell injuring her face.  She is much better.  Was found to have a UTI, but was treated with Bactrim, to which it was resistant.  She had another culture of the same organism, and just picked up a prescription for Augmentin which the organism is sensitive.      She has no stress incontinence, does have some urge incontinence.  She started taking oxybutynin again and has some relief of the urge component.  No vaginal discharge or pain.     She has lost 68 lbs on weight watchers and is feeling better.     REVIEW OF SYSTEMS:    Review of Systems   Constitutional: Positive for weight loss (on weight watchers).   HENT: Negative.    Eyes: Negative.    Respiratory: Negative.    Cardiovascular: Negative.    Gastrointestinal: Negative.    Genitourinary: Positive for urgency.   Musculoskeletal: Positive for falls.   Skin: Negative.    Neurological:        Balance issues, using a cane   Endo/Heme/Allergies: Negative.    Psychiatric/Behavioral: Negative.        PATIENT HISTORY:    Past Medical History:   Diagnosis Date    Anxiety disorder     Bell's palsy     Chronic back pain     Chronic osteoarthritis     Essential tremor     Fibromyalgia     Hypertension     Mild acid reflux     Neck pain     Other and unspecified hyperlipidemia     Sleep apnea     wear c-pap at night       Past Surgical History:   Procedure Laterality Date    BREAST BIOPSY Left     1995  negative    Breast reduction      CARPAL TUNNEL RELEASE      COLONOSCOPY  2008    COLONOSCOPY - Miralax split prep N/A 11/10/2017    [facility] ENDO    CYSTOSCOPY N/A 10/23/2018    [facility]    ESOPHAGOGASTRODUODENOSCOPY (EGD) N/A 11/10/2017    [facility]    HYSTERECTOMY      JOINT REPLACEMENT      Left knee    KNEE SURGERY      bilateral    left shoulder      PLACEMENT, TRANSOBTURATOR TAPE N/A 10/23/2018    [facility]    Tonsillectomy         Family History   Problem Relation Age of Onset    Diabetes Mother     Tremor Mother     Liver disease Mother     Diabetes Father     Heart disease Father     Tremor Son     Diabetes Sister     Diabetes Brother      Socioeconomic History    Marital status:    Tobacco Use    Smoking status: Former Smoker     Last attempt to quit: 10/5/1982     Years since quittin.4    Smokeless tobacco: Never Used   Substance and Sexual Activity    Alcohol use: No    Drug use: No    Sexual activity: Not on file       Allergies:  Hyoscyamine    Medications:  Outpatient Encounter Medications as of 3/7/2019   Medication Sig Dispense Refill    acetaminophen (TYLENOL) 500 MG tablet Take 500 mg by mouth every 6 (six) hours as needed.      albuterol 90 mcg/actuation inhaler Inhale 2 puffs into the lungs every 6 (six) hours as needed for Wheezing or Shortness of Breath. Rescue 18 g 0    amoxicillin-clavulanate 875-125mg (AUGMENTIN) 875-125 mg per tablet Take 1 tablet by mouth 2 (two) times daily. 14 tablet 0    citalopram (CELEXA) 20 MG tablet TAKE 1 TABLET BY MOUTH EVERY DAY 30 tablet 5    clonazePAM (KLONOPIN) 0.5 MG tablet 2 pills PO QHS bedtime 60 tablet 5    cyclobenzaprine (FLEXERIL) 10 MG tablet Take 10 mg by mouth 3 (three) times daily as needed.       dexlansoprazole (DEXILANT) 60 mg capsule Take 1 capsule (60 mg total) by mouth once daily. 30 capsule 1    diclofenac sodium (SOLARAZE) 3 % gel APPLY 1-2 GRAMS TOPICALLY TO AFFECTED AREA 2-3 TIMES PER DAY. PRN  11    fluticasone (FLONASE) 50 mcg/actuation nasal spray 1 spray (50 mcg total) by Each Nare route once  daily. 16 g 0    furosemide (LASIX) 40 MG tablet Up to 2 times a day if swelling occurs 60 tablet 2    gabapentin (NEURONTIN) 600 MG tablet TAKE 1 TABLET BY MOUTH THREE TIMES A DAY 90 tablet 11    losartan (COZAAR) 50 MG tablet Take 50 mg by mouth once daily.       lovastatin (MEVACOR) 20 MG tablet Take 1 tablet (20 mg total) by mouth every evening. 90 tablet 3    magnesium oxide (MAG-OX) 400 mg (241.3 mg magnesium) tablet Take 1 tablet (400 mg total) by mouth once daily.  0    morphine (MS CONTIN) 15 MG 12 hr tablet Take 1 tablet by mouth every 12 (twelve) hours.      omeprazole-sodium bicarbonate 40-1,680 mg Pack Take 1 tablet by mouth once daily. 30 each 1    oxybutynin (DITROPAN-XL) 10 MG 24 hr tablet Take 1 tablet (10 mg total) by mouth once daily. (Patient taking differently: Take 10 mg by mouth once daily. ) 30 tablet 11    oxycodone-acetaminophen (PERCOCET)  mg per tablet Take 1 tablet by mouth every 8 (eight) hours as needed.       polyethylene glycol (GLYCOLAX) 17 gram PwPk Take 17 g by mouth once daily. 30 packet 0    pramipexole (MIRAPEX) 0.125 MG tablet 2 pills PO in the late afternoon and 3 pills PO at bedtime 150 tablet 11    venlafaxine (EFFEXOR-XR) 150 MG Cp24 TAKE 1 CAPSULE BY MOUTH TWO TIMES A DAY 30 capsule 12    [DISCONTINUED] carvedilol (COREG) 25 MG tablet Take 0.5 tablets (12.5 mg total) by mouth 2 (two) times daily.      [DISCONTINUED] levothyroxine (SYNTHROID) 75 MCG tablet Take 1 tablet (75 mcg total) by mouth before breakfast. 90 tablet 0    pantoprazole (PROTONIX) 40 MG tablet       potassium chloride SA (K-DUR,KLOR-CON) 20 MEQ tablet        No facility-administered encounter medications on file as of 3/7/2019.          PHYSICAL EXAMINATION:    The patient generally appears in good health, is appropriately interactive, and is in no apparent distress.    Skin: No lesions.    Mental: Cooperative with normal affect.    Neuro: Grossly intact.    HEENT: Normal. No  evidence of lymphadenopathy.  The lacerations above and below her left eye have healed well.  Some residual edema.     Chest:  normal inspiratory effort.    Abdomen:  Soft, non-tender. No masses or organomegaly. Bladder is not palpable. No evidence of flank discomfort. No evidence of inguinal hernia.    Extremities: No clubbing, cyanosis, or edema    Normal external female genitalia  Urethral meatus is normal  Urethra and bladder are nontender to bimanual exam.  No tenderness or palpable mesh.   Well supported anteriorly and posteriorly   Uterus and cervix are surgically absent  No adnexal masses    LABS:    Lab Results   Component Value Date    BUN 18 (H) 02/09/2019    CREATININE 0.65 02/09/2019     UA 1.010, pH 5, + leuk, otherwise, negative    Urine culture from 2/9/2019 and 2/28/2019 same E coli when looking at the resistance pattern.  Reason she did not clear it is likely because it was resistant to the Bactrim she was originally prescribed.     IMPRESSION:    Encounter Diagnoses   Name Primary?    Follow-up examination after urological surgery Yes    Urge incontinence        PLAN:    1.  OK to continue oxybutynin  2.  Advised to take the Augmentin as the organism is sensitive  3.  Follow up in 6 months.

## 2019-03-13 ENCOUNTER — TELEPHONE (OUTPATIENT)
Dept: FAMILY MEDICINE | Facility: CLINIC | Age: 69
End: 2019-03-13

## 2019-03-13 ENCOUNTER — PATIENT MESSAGE (OUTPATIENT)
Dept: NEUROLOGY | Facility: CLINIC | Age: 69
End: 2019-03-13

## 2019-03-13 ENCOUNTER — PATIENT MESSAGE (OUTPATIENT)
Dept: GASTROENTEROLOGY | Facility: CLINIC | Age: 69
End: 2019-03-13

## 2019-03-14 ENCOUNTER — LAB VISIT (OUTPATIENT)
Dept: LAB | Facility: HOSPITAL | Age: 69
End: 2019-03-14
Attending: FAMILY MEDICINE
Payer: MEDICARE

## 2019-03-14 ENCOUNTER — OFFICE VISIT (OUTPATIENT)
Dept: FAMILY MEDICINE | Facility: CLINIC | Age: 69
End: 2019-03-14
Payer: MEDICARE

## 2019-03-14 VITALS
TEMPERATURE: 99 F | HEART RATE: 118 BPM | SYSTOLIC BLOOD PRESSURE: 130 MMHG | HEIGHT: 62 IN | DIASTOLIC BLOOD PRESSURE: 70 MMHG | BODY MASS INDEX: 37.08 KG/M2 | WEIGHT: 201.5 LBS | OXYGEN SATURATION: 94 %

## 2019-03-14 DIAGNOSIS — M19.91 PRIMARY OSTEOARTHRITIS, UNSPECIFIED SITE: ICD-10-CM

## 2019-03-14 DIAGNOSIS — M75.41 IMPINGEMENT SYNDROME OF RIGHT SHOULDER: ICD-10-CM

## 2019-03-14 DIAGNOSIS — M19.072 PRIMARY OSTEOARTHRITIS OF LEFT ANKLE: ICD-10-CM

## 2019-03-14 DIAGNOSIS — M19.071 PRIMARY OSTEOARTHRITIS OF RIGHT ANKLE: ICD-10-CM

## 2019-03-14 DIAGNOSIS — M54.30 SCIATICA, UNSPECIFIED LATERALITY: ICD-10-CM

## 2019-03-14 DIAGNOSIS — M25.372 ANKLE INSTABILITY, LEFT: ICD-10-CM

## 2019-03-14 DIAGNOSIS — I10 ESSENTIAL HYPERTENSION: ICD-10-CM

## 2019-03-14 DIAGNOSIS — M79.7 FIBROMYALGIA: ICD-10-CM

## 2019-03-14 DIAGNOSIS — M51.36 DEGENERATIVE DISC DISEASE, LUMBAR: ICD-10-CM

## 2019-03-14 DIAGNOSIS — M79.7 FIBROMYALGIA: Primary | ICD-10-CM

## 2019-03-14 DIAGNOSIS — M25.371 ANKLE INSTABILITY, RIGHT: ICD-10-CM

## 2019-03-14 LAB
ANION GAP SERPL CALC-SCNC: 6 MMOL/L
BUN SERPL-MCNC: 21 MG/DL
CALCIUM SERPL-MCNC: 9.3 MG/DL
CHLORIDE SERPL-SCNC: 100 MMOL/L
CO2 SERPL-SCNC: 33 MMOL/L
CREAT SERPL-MCNC: 0.82 MG/DL
EST. GFR  (AFRICAN AMERICAN): >60 ML/MIN/1.73 M^2
EST. GFR  (NON AFRICAN AMERICAN): >60 ML/MIN/1.73 M^2
GLUCOSE SERPL-MCNC: 124 MG/DL
POTASSIUM SERPL-SCNC: 4.5 MMOL/L
SODIUM SERPL-SCNC: 139 MMOL/L

## 2019-03-14 PROCEDURE — 80048 BASIC METABOLIC PNL TOTAL CA: CPT | Mod: PO

## 2019-03-14 PROCEDURE — 99214 PR OFFICE/OUTPT VISIT, EST, LEVL IV, 30-39 MIN: ICD-10-PCS | Mod: S$GLB,,, | Performed by: FAMILY MEDICINE

## 2019-03-14 PROCEDURE — 99214 OFFICE O/P EST MOD 30 MIN: CPT | Mod: S$GLB,,, | Performed by: FAMILY MEDICINE

## 2019-03-14 PROCEDURE — 36415 COLL VENOUS BLD VENIPUNCTURE: CPT | Mod: PO

## 2019-03-14 RX ORDER — VENLAFAXINE HYDROCHLORIDE 150 MG/1
150 CAPSULE, EXTENDED RELEASE ORAL DAILY
Qty: 30 CAPSULE | Refills: 5 | Status: SHIPPED | OUTPATIENT
Start: 2019-03-14 | End: 2019-05-27 | Stop reason: SDUPTHER

## 2019-03-14 RX ORDER — FUROSEMIDE 40 MG/1
TABLET ORAL
Qty: 60 TABLET | Refills: 2 | Status: SHIPPED | OUTPATIENT
Start: 2019-03-14 | End: 2019-03-15 | Stop reason: SDUPTHER

## 2019-03-14 NOTE — PROGRESS NOTES
Subjective:       Patient ID: Hannah Norman is a 69 y.o. female.    Chief Complaint:   Chief Complaint   Patient presents with    Low-back Pain    Shoulder Pain     rt shoulder pain     Ankle Pain     bilt ankle pain        Patient received a call offering her braces for her ankles, shoulder and back. She is here for documentation for this.       Ankle Pain    Incident onset: Frequently turns her ankles.  Ankles are weak. The injury mechanism was an inversion injury. The pain is present in the right ankle and left ankle. The pain has been constant since onset. Associated symptoms include a loss of motion and muscle weakness. Pertinent negatives include no inability to bear weight, loss of sensation, numbness or tingling. The symptoms are aggravated by movement and weight bearing.   Shoulder Pain    The pain is present in the right shoulder. This is a chronic (Patient was diagnosed with right shoulder impingment syndrome) problem. The current episode started more than 1 month ago. There has been no history of extremity trauma. The problem occurs intermittently. The problem has been unchanged. The pain is moderate. Associated symptoms include joint locking, a limited range of motion and stiffness. Pertinent negatives include no fever, headaches, inability to bear weight, itching, joint swelling, numbness or tingling. Exacerbated by: abduction of the shoulder. She has tried oral narcotics for the symptoms. The treatment provided mild relief. Family history includes arthritis.   Back Pain   This is a chronic (Long history of back pain.  Currently receiving steroid injections through pain management. ) problem. The current episode started more than 1 year ago (Last MRI showed degeneration of the discs and multiple areas of arthritis. ). The problem occurs constantly. The pain is present in the lumbar spine. The quality of the pain is described as shooting and aching. The pain radiates to the right foot and left foot.  The pain is moderate. The pain is the same all the time. The symptoms are aggravated by bending, position, standing and twisting. Pertinent negatives include no abdominal pain, bladder incontinence, bowel incontinence, chest pain, dysuria, fever, headaches, leg pain, numbness, pelvic pain, tingling or weakness. She has tried analgesics for the symptoms. The treatment provided significant relief.     Review of Systems   Constitutional: Negative for activity change, appetite change, chills and fever.   HENT: Negative for congestion, ear discharge, ear pain, rhinorrhea, sinus pain and trouble swallowing.    Eyes: Negative for photophobia, pain, redness, itching and visual disturbance.   Respiratory: Negative for chest tightness.    Cardiovascular: Negative for chest pain and leg swelling.   Gastrointestinal: Negative for abdominal distention, abdominal pain, blood in stool, bowel incontinence, diarrhea and vomiting.   Genitourinary: Negative for bladder incontinence, dysuria, pelvic pain, vaginal bleeding, vaginal discharge and vaginal pain.   Musculoskeletal: Positive for stiffness. Negative for arthralgias, back pain and gait problem.   Skin: Negative for color change, itching, pallor and rash.   Neurological: Negative for tingling, tremors, weakness, light-headedness, numbness and headaches.   Psychiatric/Behavioral: Negative for agitation, behavioral problems and sleep disturbance.       Past Medical History:   Diagnosis Date    Anxiety disorder     Bell's palsy     Chronic back pain     Chronic osteoarthritis     Essential tremor     Fibromyalgia     Hypertension     Mild acid reflux     Neck pain     Other and unspecified hyperlipidemia     Sleep apnea     wear c-pap at night     Social History     Socioeconomic History    Marital status:      Spouse name: Not on file    Number of children: Not on file    Years of education: Not on file    Highest education level: Not on file   Social Needs  "   Financial resource strain: Not on file    Food insecurity - worry: Not on file    Food insecurity - inability: Not on file    Transportation needs - medical: Not on file    Transportation needs - non-medical: Not on file   Occupational History    Not on file   Tobacco Use    Smoking status: Former Smoker     Last attempt to quit: 10/5/1982     Years since quittin.4    Smokeless tobacco: Never Used   Substance and Sexual Activity    Alcohol use: No    Drug use: No    Sexual activity: Not on file   Other Topics Concern    Not on file   Social History Narrative    Not on file       Objective:     /70 (BP Location: Left arm, Patient Position: Sitting)   Pulse (!) 118   Temp 98.6 °F (37 °C) (Oral)   Ht 5' 2" (1.575 m)   Wt 91.4 kg (201 lb 8 oz)   SpO2 (!) 94%   BMI 36.85 kg/m²     Physical Exam   Constitutional: She appears well-developed and well-nourished.   HENT:   Head: Normocephalic.   Eyes: Conjunctivae are normal.   Neck: Normal range of motion. Neck supple.   Cardiovascular: Normal rate, regular rhythm and normal heart sounds.   Pulmonary/Chest: Effort normal and breath sounds normal.   Musculoskeletal:        Right ankle: She exhibits decreased range of motion and swelling. She exhibits no deformity and no laceration. Tenderness. Lateral malleolus and posterior TFL tenderness found. Achilles tendon normal.        Left ankle: She exhibits decreased range of motion and swelling. She exhibits no ecchymosis. Tenderness. Posterior TFL tenderness found. Achilles tendon normal.        Lumbar back: She exhibits decreased range of motion, tenderness, pain and spasm. She exhibits no swelling, no edema and no deformity.   Tenderness in the paraspinal muscle at the lumbar spine.  Pain with flexion and sidebending.     Laxity of the tendons at the lateral right and left ankles.    Neurological: She is alert.   Skin: Skin is warm and dry.   Psychiatric: Her behavior is normal.     "   Assessment:       Fibromyalgia    Primary osteoarthritis, unspecified site    Primary osteoarthritis of left ankle    Primary osteoarthritis of right ankle    Degenerative disc disease, lumbar    Sciatica, unspecified laterality    Impingement syndrome of right shoulder    Ankle instability, left    Ankle instability, right       I prefer to use a local company for braces where the rep can make sure the braces fit and are appropriate for the patient.  Orders sent to [facility].

## 2019-03-15 RX ORDER — FUROSEMIDE 40 MG/1
TABLET ORAL
Qty: 60 TABLET | Refills: 2 | Status: SHIPPED | OUTPATIENT
Start: 2019-03-15 | End: 2019-07-08 | Stop reason: SDUPTHER

## 2019-03-15 NOTE — PROGRESS NOTES
Last 5 Patient Entered Readings                                      Current 30 Day Average: 119/73     Recent Readings 3/14/2019 3/14/2019 3/12/2019 3/8/2019 3/6/2019    SBP (mmHg) 105 148 124 105 120    DBP (mmHg) 67 93 64 56 84    Pulse 74 81 70 78 72      Labs returned WNL and her potassium improved. I messaged patient via [facility] regarding these results.

## 2019-03-18 ENCOUNTER — PATIENT MESSAGE (OUTPATIENT)
Dept: SLEEP MEDICINE | Facility: CLINIC | Age: 69
End: 2019-03-18

## 2019-03-21 DIAGNOSIS — G25.81 RESTLESS LEG SYNDROME: ICD-10-CM

## 2019-03-21 DIAGNOSIS — M79.7 FIBROMYALGIA: ICD-10-CM

## 2019-03-21 RX ORDER — GABAPENTIN 600 MG/1
TABLET ORAL
Qty: 90 TABLET | Refills: 11 | Status: SHIPPED | OUTPATIENT
Start: 2019-03-21 | End: 2020-04-13 | Stop reason: SDUPTHER

## 2019-03-28 ENCOUNTER — PATIENT OUTREACH (OUTPATIENT)
Dept: OTHER | Facility: OTHER | Age: 69
End: 2019-03-28

## 2019-03-28 NOTE — PROGRESS NOTES
Last 5 Patient Entered Readings                                      Current 30 Day Average: 117/74     Recent Readings 3/26/2019 3/23/2019 3/21/2019 3/19/2019 3/17/2019    SBP (mmHg) 114 117 130 143 100    DBP (mmHg) 74 67 81 87 70    Pulse 90 66 94 102 92          Digital Medicine: Health  Follow Up    Lifestyle Modifications:    1.Dietary Modifications (Sodium intake <2,000mg/day, food labels, dining out): Patient continues monitoring her sodium intake. She is still doing the weight watchers program and is still loosing weight slowly but is not putting any on. She is very pleased with her progress.     2.Physical Activity: Patient has been doing more stretching and walking more. She is limited due to back pain.     3.Medication Therapy: Patient has been compliant with the medication regimen. Patient is doing well on her current BP medication regimen. She denies symptoms or side effects.   Patients doctor informed her to stop taking the Coreg so she is no longer taking it.     4.Patient has the following medication side effects/concerns:   (Frequency/Alleviating factors/Precipitating factors, etc.)     Follow up with Mrs. Hnanah SEARS Emerson completed. No further questions or concerns. Will continue to follow up to achieve health goals.

## 2019-04-03 ENCOUNTER — TELEPHONE (OUTPATIENT)
Dept: NEUROLOGY | Facility: CLINIC | Age: 69
End: 2019-04-03

## 2019-04-17 ENCOUNTER — PATIENT MESSAGE (OUTPATIENT)
Dept: GASTROENTEROLOGY | Facility: CLINIC | Age: 69
End: 2019-04-17

## 2019-04-23 DIAGNOSIS — G25.81 RLS (RESTLESS LEGS SYNDROME): ICD-10-CM

## 2019-04-23 RX ORDER — CLONAZEPAM 0.5 MG/1
TABLET ORAL
Qty: 60 TABLET | Refills: 5 | Status: SHIPPED | OUTPATIENT
Start: 2019-04-23 | End: 2019-10-14 | Stop reason: SDUPTHER

## 2019-04-23 RX ORDER — LOVASTATIN 20 MG/1
20 TABLET ORAL NIGHTLY
Qty: 90 TABLET | Refills: 3 | Status: SHIPPED | OUTPATIENT
Start: 2019-04-23 | End: 2020-01-23

## 2019-04-24 ENCOUNTER — OFFICE VISIT (OUTPATIENT)
Dept: GASTROENTEROLOGY | Facility: CLINIC | Age: 69
End: 2019-04-24
Payer: MEDICARE

## 2019-04-24 VITALS
BODY MASS INDEX: 39.48 KG/M2 | HEART RATE: 86 BPM | WEIGHT: 215.81 LBS | DIASTOLIC BLOOD PRESSURE: 65 MMHG | SYSTOLIC BLOOD PRESSURE: 137 MMHG

## 2019-04-24 DIAGNOSIS — K21.9 LARYNGOPHARYNGEAL REFLUX (LPR): ICD-10-CM

## 2019-04-24 DIAGNOSIS — R49.0 HOARSENESS: ICD-10-CM

## 2019-04-24 DIAGNOSIS — K21.9 GASTROESOPHAGEAL REFLUX DISEASE, ESOPHAGITIS PRESENCE NOT SPECIFIED: Primary | ICD-10-CM

## 2019-04-24 PROCEDURE — 99999 PR PBB SHADOW E&M-EST. PATIENT-LVL III: CPT | Mod: PBBFAC,,, | Performed by: INTERNAL MEDICINE

## 2019-04-24 PROCEDURE — 99999 PR PBB SHADOW E&M-EST. PATIENT-LVL III: ICD-10-PCS | Mod: PBBFAC,,, | Performed by: INTERNAL MEDICINE

## 2019-04-24 PROCEDURE — 99214 OFFICE O/P EST MOD 30 MIN: CPT | Mod: S$PBB,,, | Performed by: INTERNAL MEDICINE

## 2019-04-24 PROCEDURE — 99214 PR OFFICE/OUTPT VISIT, EST, LEVL IV, 30-39 MIN: ICD-10-PCS | Mod: S$PBB,,, | Performed by: INTERNAL MEDICINE

## 2019-04-24 PROCEDURE — 99213 OFFICE O/P EST LOW 20 MIN: CPT | Mod: PBBFAC,PO | Performed by: INTERNAL MEDICINE

## 2019-04-24 NOTE — PROGRESS NOTES
Subjective:       Patient ID: Hannah Norman is a 69 y.o. female.    Chief Complaint: Follow-up    This is a 69-year-old female here for a follow-up visit regarding suspected reflux manifesting with hoarseness and voice fatigue.Multiple prior laryngoscopies noted persistent evidence of LPR with symptoms occurring for over 2 years. She has also had an EGD noted mild esophagitis. She has tried a number of PPIs including pantoprazole and omeprazole with the addition of ranitidine and b.i.d. therapy with incomplete response. We requested dexilant but it was denied as was zegerid. Gastric emptying study was negative.  No other exacerbating or relieving factors or other associated symptoms.  It occurs mostly on a daily basis.  +globus     The following portions of the patient's history were reviewed and updated as appropriate: allergies, current medications, past family history, past medical history, past social history, past surgical history and problem list.     (Portions of this note were dictated using voice recognition software and may contain dictation related errors in spelling/grammar/syntax not found on text review)    HPI  Review of Systems   Constitutional: Negative for appetite change and unexpected weight change.   Cardiovascular: Negative for chest pain and palpitations.   Gastrointestinal: Positive for abdominal distention. Negative for anal bleeding.       Objective:      Physical Exam   Constitutional: She is oriented to person, place, and time. She appears well-developed and well-nourished. No distress.   HENT:   Head: Normocephalic and atraumatic.   Eyes: Conjunctivae are normal. No scleral icterus.   Neck: Normal range of motion. Neck supple.   Cardiovascular: Normal rate and regular rhythm.   Pulmonary/Chest: Effort normal. No respiratory distress.   Abdominal: Soft. Bowel sounds are normal. She exhibits no distension. There is no tenderness.   Neurological: She is alert and oriented to person, place,  and time.   Skin: Skin is warm and dry. No rash noted. She is not diaphoretic. No erythema.   Psychiatric: She has a normal mood and affect. Her behavior is normal.   Nursing note and vitals reviewed.      Labs; reviewed  Assessment:       1. Gastroesophageal reflux disease, esophagitis presence not specified    2. Hoarseness    3. Laryngopharyngeal reflux (LPR)        Plan:   1. EGD with bravo   - ok to do off PPI x 7 days  2. Consider surgical referral if symptoms correlate to reflux given severity and lack of improvement with PPI

## 2019-04-30 ENCOUNTER — PATIENT OUTREACH (OUTPATIENT)
Dept: OTHER | Facility: OTHER | Age: 69
End: 2019-04-30

## 2019-04-30 NOTE — PROGRESS NOTES
Last 5 Patient Entered Readings                                      Current 30 Day Average: 122/65     Recent Readings 4/28/2019 4/25/2019 4/23/2019 4/20/2019 4/18/2019    SBP (mmHg) 110 132 120 122 129    DBP (mmHg) 53 70 59 61 74    Pulse 70 81 74 89 82          Digital Medicine: Health  Follow Up    Lifestyle Modifications:    1.Dietary Modifications (Sodium intake <2,000mg/day, food labels, dining out): Patient is still on weight watchers but she admits to dietary indiscretions over the past week or so (pizza, chips, etc). She is working on getting back on track.     2.Physical Activity: Patient walks in the store and around her home but she needs assistance due to being unsteady (uses a cane as well).     3.Medication Therapy: Patient has been compliant with the medication regimen. Patient is doing well on her current BP medication regimen. She denies symptoms/side effects.     4.Patient has the following medication side effects/concerns:   (Frequency/Alleviating factors/Precipitating factors, etc.)     Follow up with Mrs. Hannah SEARS Emerson completed. No further questions or concerns. Will continue to follow up to achieve health goals.

## 2019-05-01 ENCOUNTER — OFFICE VISIT (OUTPATIENT)
Dept: OTOLARYNGOLOGY | Facility: CLINIC | Age: 69
End: 2019-05-01
Payer: MEDICARE

## 2019-05-01 ENCOUNTER — PATIENT MESSAGE (OUTPATIENT)
Dept: OTOLARYNGOLOGY | Facility: CLINIC | Age: 69
End: 2019-05-01

## 2019-05-01 VITALS
DIASTOLIC BLOOD PRESSURE: 72 MMHG | TEMPERATURE: 98 F | WEIGHT: 199.88 LBS | HEART RATE: 67 BPM | SYSTOLIC BLOOD PRESSURE: 116 MMHG | BODY MASS INDEX: 36.55 KG/M2

## 2019-05-01 DIAGNOSIS — R49.0 HOARSENESS OF VOICE: ICD-10-CM

## 2019-05-01 DIAGNOSIS — K21.9 LARYNGOPHARYNGEAL REFLUX (LPR): Primary | ICD-10-CM

## 2019-05-01 DIAGNOSIS — J30.0 CHRONIC VASOMOTOR RHINITIS: ICD-10-CM

## 2019-05-01 DIAGNOSIS — G47.33 OSA (OBSTRUCTIVE SLEEP APNEA): ICD-10-CM

## 2019-05-01 PROCEDURE — 99213 OFFICE O/P EST LOW 20 MIN: CPT | Mod: 25,S$PBB,, | Performed by: OTOLARYNGOLOGY

## 2019-05-01 PROCEDURE — 99999 PR PBB SHADOW E&M-EST. PATIENT-LVL IV: CPT | Mod: PBBFAC,,, | Performed by: OTOLARYNGOLOGY

## 2019-05-01 PROCEDURE — 31575 DIAGNOSTIC LARYNGOSCOPY: CPT | Mod: S$PBB,,, | Performed by: OTOLARYNGOLOGY

## 2019-05-01 PROCEDURE — 99213 PR OFFICE/OUTPT VISIT, EST, LEVL III, 20-29 MIN: ICD-10-PCS | Mod: 25,S$PBB,, | Performed by: OTOLARYNGOLOGY

## 2019-05-01 PROCEDURE — 31575 DIAGNOSTIC LARYNGOSCOPY: CPT | Mod: PBBFAC,PN | Performed by: OTOLARYNGOLOGY

## 2019-05-01 PROCEDURE — 99999 PR PBB SHADOW E&M-EST. PATIENT-LVL IV: ICD-10-PCS | Mod: PBBFAC,,, | Performed by: OTOLARYNGOLOGY

## 2019-05-01 PROCEDURE — 99214 OFFICE O/P EST MOD 30 MIN: CPT | Mod: PBBFAC,PN | Performed by: OTOLARYNGOLOGY

## 2019-05-01 PROCEDURE — 31575 PR LARYNGOSCOPY, FLEXIBLE; DIAGNOSTIC: ICD-10-PCS | Mod: S$PBB,,, | Performed by: OTOLARYNGOLOGY

## 2019-05-01 NOTE — PROGRESS NOTES
Chief Complaint   Patient presents with    Follow-up     feels worse, tightness in the chest, acid reflux, hoarseness    .     HPI:Hannah Norman is a 69 y.o. female who has been referred by Dr. Hauser for a one year history of hoarseness. She has been having dysphagia and recently underwent a MBSS which was normal but she was noted by the SLP to have dysphonia and ENT referral was recommended.  Her voice is progressively worsening over this time. There are pitch breaks or cracks. There is vocal fatigue. She admits to odynophagia, throat pain, and otalgia.  There is no hemoptysis or hematemesis. She is breathing well.     She admits to throat clearing and cough. She admits to heartburn and reflux. She is currently on Protonix 40mg daily. She notes that she has difficulty swallowing spicy foods which gives her the sensation that the food is going down the wrong way. She denies food sticking. She denies weight loss.  She does note certain foods trigger her GERD-tomatos.     Interval HPI 5/1/2019: Follow up LPR, hoarseness, and MARY.   She reports that she has had been on Omeprazole 40mg PO BID without improvement of her symptoms.   She reports that she is feeling symptomatic heartburn/indigestion on this regimen. She has also had an EGD noted mild esophagitis. She has tried a number of PPIs including pantoprazole and omeprazole with the addition of ranitidine and b.i.d. therapy with incomplete response. Requests for Dexilant and Zegerid formulary exception by GI has been denied.  She is scheduled for Salazar Ph Probe at the end of the month.    She notes certain foods trigger this(ie marinara sauce) and has made dietary changes.  She reports that she is having intermittent hoarseness.  She feels that her cough is present intermittently. She notes that she has still a globus sensation and intermittent throat clearing. She denies food sticking.   She has been using atrovent nasal for nasal rhinorrhea on an as needed  basis and feels this helps.     Past Medical History:   Diagnosis Date    Anxiety disorder     Bell's palsy     Chronic back pain     Chronic osteoarthritis     Essential tremor     Fibromyalgia     Hypertension     Mild acid reflux     Neck pain     Other and unspecified hyperlipidemia     Sleep apnea     wear c-pap at night     Social History     Socioeconomic History    Marital status:      Spouse name: Not on file    Number of children: Not on file    Years of education: Not on file    Highest education level: Not on file   Occupational History    Not on file   Social Needs    Financial resource strain: Not on file    Food insecurity:     Worry: Not on file     Inability: Not on file    Transportation needs:     Medical: Not on file     Non-medical: Not on file   Tobacco Use    Smoking status: Former Smoker     Last attempt to quit: 10/5/1982     Years since quittin.5    Smokeless tobacco: Never Used   Substance and Sexual Activity    Alcohol use: No    Drug use: No    Sexual activity: Not on file   Lifestyle    Physical activity:     Days per week: Not on file     Minutes per session: Not on file    Stress: Not on file   Relationships    Social connections:     Talks on phone: Not on file     Gets together: Not on file     Attends Shinto service: Not on file     Active member of club or organization: Not on file     Attends meetings of clubs or organizations: Not on file     Relationship status: Not on file   Other Topics Concern    Not on file   Social History Narrative    Not on file     Past Surgical History:   Procedure Laterality Date    BREAST BIOPSY Left       negative    Breast reduction      CARPAL TUNNEL RELEASE      COLONOSCOPY  2008    COLONOSCOPY - Miralax split prep N/A 11/10/2017    [facility]    CYSTOSCOPY N/A 10/23/2018    [facility]    ESOPHAGOGASTRODUODENOSCOPY  (EGD) N/A 11/10/2017    [facility]    HYSTERECTOMY      JOINT REPLACEMENT      Left knee    KNEE SURGERY      bilateral    left shoulder      PLACEMENT, TRANSOBTURATOR TAPE N/A 10/23/2018    [facility]    Tonsillectomy       Family History   Problem Relation Age of Onset    Diabetes Mother     Tremor Mother     Liver disease Mother     Diabetes Father     Heart disease Father     Tremor Son     Diabetes Sister     Diabetes Brother            Review of Systems  General: negative for chills, fever or weight loss  Psychological: negative for mood changes or depression  Ophthalmic: negative for blurry vision, photophobia or eye pain  ENT: see HPI  Respiratory: no cough, shortness of breath, or wheezing  Cardiovascular: no chest pain or dyspnea on exertion  Gastrointestinal: no abdominal pain, change in bowel habits, or black/ bloody stools  Musculoskeletal: negative for gait disturbance or muscular weakness  Neurological: no syncope or seizures; no ataxia  Dermatological: negative for puritis,  rash and jaundice  Hematologic/lymphatic: no easy bruising, no new lumps or bumps      Physical Exam:    Vitals:    05/01/19 1429   BP: 116/72   Pulse: 67   Temp: 98.1 °F (36.7 °C)       Constitutional: Well appearing / communicating without difficutly.  NAD.  Eyes: EOM I Bilaterally  Head/Face: Normocephalic.  Negative paranasal sinus pressure/tenderness.  Salivary glands WNL.  House Brackmann I Bilaterally.    Right Ear: Auricle normal appearance. External Auditory Canal within normal limits no lesions or masses,TM w/o masses/lesions/perforations. TM mobility noted.   Left Ear: Auricle normal appearance. External Auditory Canal within normal limits no lesions or masses,TM w/o masses/lesions/perforations. TM mobility noted.  Nose: Mild nasal septal deviation to the right with an inferior spur.  Inferior Turbinates 3+ bilaterally. No septal  perforation. No masses/lesions. External nasal skin appears normal without masses/lesions.  Oral Cavity: Gingiva/lips within normal limits.  Dentition/gingiva healthy appearing. Mucus membranes moist. Floor of mouth soft, no masses palpated. Oral Tongue mobile. Hard Palate appears normal.    Oropharynx: Base of tongue appears normal. No masses/lesions noted. Tonsillar fossa/pharyngeal wall without lesions. Posterior oropharynx WNL.  Soft palate without masses. Midline uvula.   Neck/Lymphatic: No LAD I-VI bilaterally.  No thyromegaly.  No masses noted on exam.    Mirror laryngoscopy/nasopharyngoscopy: Active gag reflex.  Unable to perform.    Neuro/Psychiatric: AOx3.  Normal mood and affect.   Cardiovascular: Normal carotid pulses bilaterally, no increasing jugular venous distention noted at cervical region bilaterally.    Respiratory: Normal respiratory effort, no stridor, no retractions noted.      See separate procedure note for FFL.     Assessment:    ICD-10-CM ICD-9-CM    1. Laryngopharyngeal reflux (LPR) K21.9 478.79    2. Hoarseness of voice R49.0 784.42    3. MARY (obstructive sleep apnea) G47.33 327.23    4. Chronic vasomotor rhinitis J30.0 477.9      The primary encounter diagnosis was Laryngopharyngeal reflux (LPR). Diagnoses of Hoarseness of voice, MARY (obstructive sleep apnea), and Chronic vasomotor rhinitis were also pertinent to this visit.      Plan:  No orders of the defined types were placed in this encounter.      LPR:  Continue Omeprazole 40mg PO BID per GI.  Salazar Ph probe scheduled by GI.   Continue reflux precautions. Written handout given.  Keep GI follow up.      Mild MARY: Continue CPAP as prescribed. Continue follow up with Dr. Blackwell.     Vasomotor rhinitis: Continue atrovent nasal.     Follow up in 4 months to reassess progress with treatment regimen.     Lupe Lopez MD

## 2019-05-02 ENCOUNTER — PATIENT MESSAGE (OUTPATIENT)
Dept: ADMINISTRATIVE | Facility: OTHER | Age: 69
End: 2019-05-02

## 2019-05-06 ENCOUNTER — TELEPHONE (OUTPATIENT)
Dept: OTOLARYNGOLOGY | Facility: CLINIC | Age: 69
End: 2019-05-06

## 2019-05-06 NOTE — TELEPHONE ENCOUNTER
----- Message from Lyudmila Diaz sent at 5/6/2019  4:18 PM CDT -----  Contact: self, [phone] (M)  Patient called in returning your call. Please advise.

## 2019-05-06 NOTE — TELEPHONE ENCOUNTER
----- Message from Bebeto Conley sent at 5/6/2019 11:46 AM CDT -----  Contact: [phone]/self  Patient requesting to speak with you about medication.   Please call back to assist at [phone]

## 2019-05-10 RX ORDER — PANTOPRAZOLE SODIUM 40 MG/1
40 TABLET, DELAYED RELEASE ORAL DAILY
Qty: 30 TABLET | Refills: 11 | Status: SHIPPED | OUTPATIENT
Start: 2019-05-10 | End: 2019-05-27

## 2019-05-21 ENCOUNTER — TELEPHONE (OUTPATIENT)
Dept: NEUROLOGY | Facility: CLINIC | Age: 69
End: 2019-05-21

## 2019-05-21 NOTE — TELEPHONE ENCOUNTER
----- Message from Cassidy Briggs sent at 5/21/2019  9:56 AM CDT -----  Contact: Lu     Name of Who is Calling:Lu     What is the request in detail:  Lu Vargas is requesting a chart notes , states she already sent a formal request over by fax but no one from the staff have not return information. Fax [phone]    Can the clinic reply by [facility]: no    What Number [facility] Call Back [facility]: [phone]  Ext:[phone]

## 2019-05-27 ENCOUNTER — OFFICE VISIT (OUTPATIENT)
Dept: FAMILY MEDICINE | Facility: CLINIC | Age: 69
End: 2019-05-27
Payer: MEDICARE

## 2019-05-27 VITALS
WEIGHT: 201.75 LBS | TEMPERATURE: 98 F | DIASTOLIC BLOOD PRESSURE: 76 MMHG | OXYGEN SATURATION: 95 % | HEIGHT: 62 IN | HEART RATE: 84 BPM | SYSTOLIC BLOOD PRESSURE: 126 MMHG | BODY MASS INDEX: 37.13 KG/M2

## 2019-05-27 DIAGNOSIS — M54.50 CHRONIC BILATERAL LOW BACK PAIN WITHOUT SCIATICA: ICD-10-CM

## 2019-05-27 DIAGNOSIS — I10 ESSENTIAL HYPERTENSION: ICD-10-CM

## 2019-05-27 DIAGNOSIS — M79.7 FIBROMYALGIA: ICD-10-CM

## 2019-05-27 DIAGNOSIS — N39.0 URINARY TRACT INFECTION WITHOUT HEMATURIA, SITE UNSPECIFIED: Primary | ICD-10-CM

## 2019-05-27 DIAGNOSIS — G89.29 CHRONIC BILATERAL LOW BACK PAIN WITHOUT SCIATICA: ICD-10-CM

## 2019-05-27 LAB
BILIRUB SERPL-MCNC: ABNORMAL MG/DL
BLOOD URINE, POC: ABNORMAL
COLOR, POC UA: ABNORMAL
GLUCOSE UR QL STRIP: ABNORMAL
KETONES UR QL STRIP: ABNORMAL
LEUKOCYTE ESTERASE URINE, POC: ABNORMAL
NITRITE, POC UA: POSITIVE
PH, POC UA: 7
PROTEIN, POC: ABNORMAL
SPECIFIC GRAVITY, POC UA: 1
UROBILINOGEN, POC UA: ABNORMAL

## 2019-05-27 PROCEDURE — 99214 PR OFFICE/OUTPT VISIT, EST, LEVL IV, 30-39 MIN: ICD-10-PCS | Mod: 25,S$GLB,, | Performed by: FAMILY MEDICINE

## 2019-05-27 PROCEDURE — 81002 POCT URINE DIPSTICK WITHOUT MICROSCOPE: ICD-10-PCS | Mod: S$GLB,,, | Performed by: FAMILY MEDICINE

## 2019-05-27 PROCEDURE — 81002 URINALYSIS NONAUTO W/O SCOPE: CPT | Mod: S$GLB,,, | Performed by: FAMILY MEDICINE

## 2019-05-27 PROCEDURE — 99214 OFFICE O/P EST MOD 30 MIN: CPT | Mod: 25,S$GLB,, | Performed by: FAMILY MEDICINE

## 2019-05-27 RX ORDER — LUBIPROSTONE 24 UG/1
CAPSULE, GELATIN COATED ORAL
COMMUNITY
Start: 2019-05-07 | End: 2019-11-25

## 2019-05-27 RX ORDER — CIPROFLOXACIN 250 MG/1
250 TABLET, FILM COATED ORAL 2 TIMES DAILY
Qty: 10 TABLET | Refills: 0 | Status: SHIPPED | OUTPATIENT
Start: 2019-05-27 | End: 2019-11-25

## 2019-05-27 RX ORDER — VENLAFAXINE HYDROCHLORIDE 150 MG/1
300 CAPSULE, EXTENDED RELEASE ORAL DAILY
Qty: 60 CAPSULE | Refills: 5 | Status: SHIPPED | OUTPATIENT
Start: 2019-05-27 | End: 2019-05-28 | Stop reason: SDUPTHER

## 2019-05-27 NOTE — PROGRESS NOTES
Patient ID: Hannah Norman is a 69 y.o. female.    Chief Complaint: Urinary Tract Infection    HPI      Hannah Norman is a 69 y.o. female   uti symptoms with some pain change in color and odor of urine.  No fever chills nausea vomiting diarrhea  Ha daily and pain near where she hit head from fall  Mood - uses both still with problems  Did better with two effexor      Review of Symptoms    Constitutional  minor activity change, No chills /fever   Resp  Neg hemoptysis, stridor, choking  CVS  Neg chest pain, palpitations    Physical Exam    Constitutional:  Oriented to person, place, and time.appears well-developed and well-nourished.  No distress.      HENT  Head: Normocephalic and atraumatic  Right Ear: External ear normal.   Left Ear: External ear normal.   Nose: External nose normal.   Mouth:  Moist mucus membranes.  Palpation of forehead periorbital area-no fluid collections-no erythema-mild tenderness-no step offs      Eyes:  Conjunctivae are normal. Right eye exhibits no discharge.  Left eye exhibits no discharge. No scleral icterus.  No periorbital edema    Cardiovascular:  Regular rate and rhythm with normal S1 and S2     Pulmonary/Chest:   Clear to auscultation bilaterally without wheezes, rhonchi or rales      Musculoskeletal:  No edema. No obvious deformity No wasting       Neurological:  Alert and oriented to person, place, and time.   Coordination normal.     Skin:   Skin is warm and dry.  No diaphoresis.   No rash noted.     Psychiatric: Normal mood and affect. Behavior is normal.  Judgment and thought content normal.     Complete Blood Count  Lab Results   Component Value Date    RBC 4.54 02/09/2019    HGB 13.8 02/09/2019    HCT 42.8 02/09/2019    MCV 94 02/09/2019    MCH 30.4 02/09/2019    MCHC 32.2 02/09/2019    RDW 12.7 02/09/2019     02/09/2019    MPV 10.4 02/09/2019    GRAN 3.4 02/09/2019    GRAN 57.6 02/09/2019    LYMPH 1.8 02/09/2019    LYMPH 30.4 02/09/2019    MONO 0.5 02/09/2019    MONO  9.1 02/09/2019    EOS 0.2 02/09/2019    BASO 0.01 02/09/2019    EOSINOPHIL 2.5 02/09/2019    BASOPHIL 0.2 02/09/2019    DIFFMETHOD Automated 02/09/2019       Comprehensive Metabolic Panel  Lab Results   Component Value Date     (H) 03/14/2019    BUN 21 (H) 03/14/2019    CREATININE 0.82 03/14/2019     03/14/2019    K 4.5 03/14/2019     03/14/2019    PROT 6.9 02/09/2019    ALBUMIN 4.3 02/09/2019    BILITOT 0.3 02/09/2019    AST 17 02/09/2019    ALKPHOS 66 02/09/2019    CO2 33 (H) 03/14/2019    ALT 14 02/09/2019    ANIONGAP 6 (L) 03/14/2019    EGFRNONAA >60.0 03/14/2019    ESTGFRAFRICA >60.0 03/14/2019       TSH  Lab Results   Component Value Date    TSH 1.840 01/17/2019       Assessment / Plan:      ICD-10-CM ICD-9-CM   1. Urinary tract infection without hematuria, site unspecified N39.0 599.0   2. Fibromyalgia M79.7 729.1   3. Chronic bilateral low back pain without sciatica M54.5 724.2    G89.29 338.29   4. Essential hypertension I10 401.9     Urinary tract infection without hematuria, site unspecified  -     POCT URINE DIPSTICK WITHOUT MICROSCOPE    Fibromyalgia  -     venlafaxine (EFFEXOR-XR) 150 MG Cp24; Take 2 capsules (300 mg total) by mouth once daily.  Dispense: 60 capsule; Refill: 5    Chronic bilateral low back pain without sciatica    Essential hypertension    Other orders  -     ciprofloxacin HCl (CIPRO) 250 MG tablet; Take 1 tablet (250 mg total) by mouth 2 (two) times daily.  Dispense: 10 tablet; Refill: 0     UTI-Cipro  Fibromyalgia-increase Effexor to 150 mg 2 times daily-continue Celexa discussed that it is a larger dose    Insurance denied 300 mg dose-will use 225 mg dose

## 2019-05-28 RX ORDER — VENLAFAXINE HYDROCHLORIDE 75 MG/1
225 CAPSULE, EXTENDED RELEASE ORAL DAILY
Qty: 270 CAPSULE | Refills: 5 | Status: SHIPPED | OUTPATIENT
Start: 2019-05-28 | End: 2019-10-08 | Stop reason: SDUPTHER

## 2019-05-30 ENCOUNTER — ANESTHESIA (OUTPATIENT)
Dept: ENDOSCOPY | Facility: HOSPITAL | Age: 69
End: 2019-05-30
Payer: MEDICARE

## 2019-05-30 ENCOUNTER — HOSPITAL ENCOUNTER (OUTPATIENT)
Facility: HOSPITAL | Age: 69
Discharge: HOME | End: 2019-05-30
Attending: INTERNAL MEDICINE | Admitting: INTERNAL MEDICINE
Payer: MEDICARE

## 2019-05-30 ENCOUNTER — ANESTHESIA EVENT (OUTPATIENT)
Dept: ENDOSCOPY | Facility: HOSPITAL | Age: 69
End: 2019-05-30
Payer: MEDICARE

## 2019-05-30 VITALS
WEIGHT: 198 LBS | RESPIRATION RATE: 16 BRPM | OXYGEN SATURATION: 97 % | HEIGHT: 62 IN | BODY MASS INDEX: 36.44 KG/M2 | HEART RATE: 69 BPM | TEMPERATURE: 98 F | DIASTOLIC BLOOD PRESSURE: 55 MMHG | SYSTOLIC BLOOD PRESSURE: 109 MMHG

## 2019-05-30 DIAGNOSIS — K21.9 MILD ACID REFLUX: ICD-10-CM

## 2019-05-30 DIAGNOSIS — K21.9 LARYNGOPHARYNGEAL REFLUX (LPR): Primary | ICD-10-CM

## 2019-05-30 PROCEDURE — 91035 PR GERD TST W/ MUCOS PH ELECTROD: ICD-10-PCS | Mod: 26,GC,, | Performed by: INTERNAL MEDICINE

## 2019-05-30 PROCEDURE — 37000009 HC ANESTHESIA EA ADD 15 MINS: Performed by: INTERNAL MEDICINE

## 2019-05-30 PROCEDURE — 91035 G-ESOPH REFLX TST W/ELECTROD: CPT | Performed by: INTERNAL MEDICINE

## 2019-05-30 PROCEDURE — 25000003 PHARM REV CODE 250: Performed by: INTERNAL MEDICINE

## 2019-05-30 PROCEDURE — 27200942: Performed by: INTERNAL MEDICINE

## 2019-05-30 PROCEDURE — 63600175 PHARM REV CODE 636 W HCPCS: Performed by: NURSE ANESTHETIST, CERTIFIED REGISTERED

## 2019-05-30 PROCEDURE — E9220 PRA ENDO ANESTHESIA: ICD-10-PCS | Mod: ,,, | Performed by: NURSE ANESTHETIST, CERTIFIED REGISTERED

## 2019-05-30 PROCEDURE — 43235 PR EGD, FLEX, DIAGNOSTIC: ICD-10-PCS | Mod: ,,, | Performed by: INTERNAL MEDICINE

## 2019-05-30 PROCEDURE — 25000003 PHARM REV CODE 250: Performed by: NURSE ANESTHETIST, CERTIFIED REGISTERED

## 2019-05-30 PROCEDURE — E9220 PRA ENDO ANESTHESIA: HCPCS | Mod: ,,, | Performed by: NURSE ANESTHETIST, CERTIFIED REGISTERED

## 2019-05-30 PROCEDURE — 37000008 HC ANESTHESIA 1ST 15 MINUTES: Performed by: INTERNAL MEDICINE

## 2019-05-30 PROCEDURE — 43235 EGD DIAGNOSTIC BRUSH WASH: CPT | Performed by: INTERNAL MEDICINE

## 2019-05-30 PROCEDURE — 91035 G-ESOPH REFLX TST W/ELECTROD: CPT | Mod: 26,GC,, | Performed by: INTERNAL MEDICINE

## 2019-05-30 PROCEDURE — 43235 EGD DIAGNOSTIC BRUSH WASH: CPT | Mod: ,,, | Performed by: INTERNAL MEDICINE

## 2019-05-30 RX ORDER — LIDOCAINE HYDROCHLORIDE 20 MG/ML
INJECTION, SOLUTION INFILTRATION; PERINEURAL
Status: DISCONTINUED | OUTPATIENT
Start: 2019-05-30 | End: 2019-05-30

## 2019-05-30 RX ORDER — SODIUM CHLORIDE 9 MG/ML
INJECTION, SOLUTION INTRAVENOUS CONTINUOUS
Status: DISCONTINUED | OUTPATIENT
Start: 2019-05-30 | End: 2019-05-30 | Stop reason: HOSPADM

## 2019-05-30 RX ORDER — PROPOFOL 10 MG/ML
VIAL (ML) INTRAVENOUS CONTINUOUS PRN
Status: DISCONTINUED | OUTPATIENT
Start: 2019-05-30 | End: 2019-05-30

## 2019-05-30 RX ORDER — PROPOFOL 10 MG/ML
VIAL (ML) INTRAVENOUS
Status: DISCONTINUED | OUTPATIENT
Start: 2019-05-30 | End: 2019-05-30

## 2019-05-30 RX ORDER — SODIUM CHLORIDE 0.9 % (FLUSH) 0.9 %
10 SYRINGE (ML) INJECTION
Status: DISCONTINUED | OUTPATIENT
Start: 2019-05-30 | End: 2019-05-30 | Stop reason: HOSPADM

## 2019-05-30 RX ADMIN — PROPOFOL 20 MG: 10 INJECTION, EMULSION INTRAVENOUS at 07:05

## 2019-05-30 RX ADMIN — PROPOFOL 50 MG: 10 INJECTION, EMULSION INTRAVENOUS at 07:05

## 2019-05-30 RX ADMIN — LIDOCAINE HYDROCHLORIDE 60 MG: 20 INJECTION, SOLUTION INFILTRATION; PERINEURAL at 07:05

## 2019-05-30 RX ADMIN — SODIUM CHLORIDE: 0.9 INJECTION, SOLUTION INTRAVENOUS at 07:05

## 2019-05-30 RX ADMIN — PROPOFOL 200 MCG/KG/MIN: 10 INJECTION, EMULSION INTRAVENOUS at 07:05

## 2019-05-30 NOTE — TRANSFER OF CARE
"Anesthesia Transfer of Care Note    Patient: Hannah Norman    Procedure(s) Performed: Procedure(s) (LRB):  ESOPHAGOGASTRODUODENOSCOPY (EGD) (N/A)  PH MONITORING, ESOPHAGUS, WIRELESS, (OFF REFLUX MEDS) (N/A)    Patient location: PACU    Anesthesia Type: general    Transport from OR: Transported from OR on room air with adequate spontaneous ventilation    Post pain: adequate analgesia    Post assessment: no apparent anesthetic complications and tolerated procedure well    Post vital signs: stable    Level of consciousness: awake, alert and oriented    Nausea/Vomiting: no nausea/vomiting    Complications: none    Transfer of care protocol was followed      Last vitals:   Visit Vitals  /60 (BP Location: Left arm, Patient Position: Lying)   Pulse 70   Temp 36.9 °C (98.4 °F) (Temporal)   Resp 18   Ht 5' 2" (1.575 m)   Wt 89.8 kg (198 lb)   SpO2 97%   Breastfeeding? No   BMI 36.21 kg/m²     "

## 2019-05-30 NOTE — PROVATION PATIENT INSTRUCTIONS
Discharge Summary/Instructions after an Endoscopic Procedure  Patient Name: Hannah Norman  Patient MRN: [id number]  Patient YOB: 1950  Thursday, May 30, 2019  Oscar Wylie MD  RESTRICTIONS:  During your procedure today, you received medications for sedation.  These   medications may affect your judgment, balance and coordination.  Therefore,   for 24 hours, you have the following restrictions:   - DO NOT drive a car, operate machinery, make legal/financial decisions,   sign important papers or drink alcohol.    ACTIVITY:  Today: no heavy lifting, straining or running due to procedural   sedation/anesthesia.  The following day: return to full activity including work.  DIET:  Eat and drink normally unless instructed otherwise.     TREATMENT FOR COMMON SIDE EFFECTS:  - Mild abdominal pain, nausea, belching, bloating or excessive gas:  rest,   eat lightly and use a heating pad.  - Sore Throat: treat with throat lozenges and/or gargle with warm salt   water.  - Because air was used during the procedure, expelling large amounts of air   from your rectum or belching is normal.  - If a bowel prep was taken, you may not have a bowel movement for 1-3 days.    This is normal.  SYMPTOMS TO WATCH FOR AND REPORT TO YOUR PHYSICIAN:  1. Abdominal pain or bloating, other than gas cramps.  2. Chest pain.  3. Back pain.  4. Signs of infection such as: chills or fever occurring within 24 hours   after the procedure.  5. Rectal bleeding, which would show as bright red, maroon, or black stools.   (A tablespoon of blood from the rectum is not serious, especially if   hemorrhoids are present.)  6. Vomiting.  7. Weakness or dizziness.  GO DIRECTLY TO THE NEAREST EMERGENCY ROOM IF YOU HAVE ANY OF THE FOLLOWING:      Difficulty breathing              Chills and/or fever over 101 F   Persistent vomiting and/or vomiting blood   Severe abdominal pain   Severe chest pain   Black, tarry stools   Bleeding- more than one tablespoon   Any  other symptom or condition that you feel may need urgent attention  Your doctor recommends these additional instructions:  If any biopsies were taken, your doctors clinic will contact you in 1 to 2   weeks with any results.  - Patient has a contact number available for emergencies.  The signs and   symptoms of potential delayed complications were discussed with the   patient.  Return to normal activities tomorrow.  Written discharge   instructions were provided to the patient.   - Discharge patient to home (ambulatory).   - Resume previous diet.   - Continue present medications.   - Return to GI clinic in 2 days.  For questions, problems or results please call your physician - Oscar Wylie MD at Work:  [phone].  [facility], EMERGENCY ROOM PHONE NUMBER: [phone]  IF A COMPLICATION OR EMERGENCY SITUATION ARISES AND YOU ARE UNABLE TO REACH   YOUR PHYSICIAN - GO DIRECTLY TO THE EMERGENCY ROOM.  Oscar Wylie MD  5/30/2019 7:45:25 AM  This report has been verified and signed electronically.  [employer]

## 2019-05-30 NOTE — DISCHARGE INSTRUCTIONS

## 2019-05-30 NOTE — ANESTHESIA PREPROCEDURE EVALUATION
05/30/2019  Hannah Norman is a 69 y.o., female.    Anesthesia Evaluation    I have reviewed the Patient Summary Reports.        Review of Systems  Anesthesia Hx:  No problems with previous Anesthesia    Social:  Non-Smoker    Hematology/Oncology:  Hematology Normal   Oncology Normal     EENT/Dental:EENT/Dental Normal   Cardiovascular:   Hypertension    Pulmonary:   Sleep Apnea    Renal/:  Renal/ Normal     Hepatic/GI:   GERD    Musculoskeletal:  Musculoskeletal Normal    Neurological:   Neuromuscular Disease, (Fibromyalgia)    Endocrine:  Endocrine Normal    Dermatological:  Skin Normal    Psych:   anxiety          Physical Exam  General:  Well nourished    Airway/Jaw/Neck:  Airway Findings: Mouth Opening: Normal Tongue: Normal  General Airway Assessment: Adult  Mallampati: II  Improves to II with phonation.  TM Distance: Normal, at least 6 cm  Jaw/Neck Findings:  Neck ROM: Normal ROM      Dental:  Dental Findings: In tact   Chest/Lungs:  Chest/Lungs Findings: Clear to auscultation, Normal Respiratory Rate     Heart/Vascular:  Heart Findings: Rate: Normal  Rhythm: Regular Rhythm  Sounds: Normal             Anesthesia Plan  Type of Anesthesia, risks & benefits discussed:  Anesthesia Type:  general  Patient's Preference: General  Intra-op Monitoring Plan:   Intra-op Monitoring Plan Comments:   Post Op Pain Control Plan:   Post Op Pain Control Plan Comments:   Induction:   IV  Beta Blocker:  Patient is not currently on a Beta-Blocker (No further documentation required).       Informed Consent: Patient understands risks and agrees with Anesthesia plan.  Questions answered. Anesthesia consent signed with patient.  ASA Score: 3     Day of Surgery Review of History & Physical: I have interviewed and examined the patient. I have reviewed the patient's H&P dated:  There are no significant changes.          Ready  For Surgery From Anesthesia Perspective.

## 2019-05-30 NOTE — ANESTHESIA POSTPROCEDURE EVALUATION
Anesthesia Post Evaluation    Patient: Hannah Norman    Procedure(s) Performed: Procedure(s) (LRB):  ESOPHAGOGASTRODUODENOSCOPY (EGD) (N/A)  PH MONITORING, ESOPHAGUS, WIRELESS, (OFF REFLUX MEDS) (N/A)    Final Anesthesia Type: general  Patient location during evaluation: PACU  Patient participation: Yes- Able to Participate  Level of consciousness: awake and alert  Post-procedure vital signs: reviewed and stable  Pain management: adequate  Airway patency: patent  PONV status at discharge: No PONV  Anesthetic complications: no      Cardiovascular status: blood pressure returned to baseline and stable  Respiratory status: unassisted  Hydration status: euvolemic  Follow-up not needed.          Vitals Value Taken Time   /55 5/30/2019  8:11 AM   Temp 36.6 °C (97.9 °F) 5/30/2019  7:51 AM   Pulse 69 5/30/2019  8:11 AM   Resp 16 5/30/2019  8:11 AM   SpO2 97 % 5/30/2019  8:11 AM         Event Time     Out of Recovery 08:11:47          Pain/Haim Score: Haim Score: 10 (5/30/2019  8:01 AM)

## 2019-06-04 NOTE — PROVATION PATIENT INSTRUCTIONS
Discharge Summary/Instructions after an Endoscopic Procedure  Patient Name: Hannah Norman  Patient MRN: [id number]  Patient YOB: 1950  Thursday, May 30, 2019  Oscar Wylie MD  RESTRICTIONS:  During your procedure today, you received medications for sedation.  These   medications may affect your judgment, balance and coordination.  Therefore,   for 24 hours, you have the following restrictions:   - DO NOT drive a car, operate machinery, make legal/financial decisions,   sign important papers or drink alcohol.    ACTIVITY:  Today: no heavy lifting, straining or running due to procedural   sedation/anesthesia.  The following day: return to full activity including work.  DIET:  Eat and drink normally unless instructed otherwise.     TREATMENT FOR COMMON SIDE EFFECTS:  - Mild abdominal pain, nausea, belching, bloating or excessive gas:  rest,   eat lightly and use a heating pad.  - Sore Throat: treat with throat lozenges and/or gargle with warm salt   water.  - Because air was used during the procedure, expelling large amounts of air   from your rectum or belching is normal.  - If a bowel prep was taken, you may not have a bowel movement for 1-3 days.    This is normal.  SYMPTOMS TO WATCH FOR AND REPORT TO YOUR PHYSICIAN:  1. Abdominal pain or bloating, other than gas cramps.  2. Chest pain.  3. Back pain.  4. Signs of infection such as: chills or fever occurring within 24 hours   after the procedure.  5. Rectal bleeding, which would show as bright red, maroon, or black stools.   (A tablespoon of blood from the rectum is not serious, especially if   hemorrhoids are present.)  6. Vomiting.  7. Weakness or dizziness.  GO DIRECTLY TO THE NEAREST EMERGENCY ROOM IF YOU HAVE ANY OF THE FOLLOWING:      Difficulty breathing              Chills and/or fever over 101 F   Persistent vomiting and/or vomiting blood   Severe abdominal pain   Severe chest pain   Black, tarry stools   Bleeding- more than one tablespoon   Any  other symptom or condition that you feel may need urgent attention  Your doctor recommends these additional instructions:  If any biopsies were taken, your doctors clinic will contact you in 1 to 2   weeks with any results.  - Discharge patient to home.   - Patient has a contact number available for emergencies.  The signs and   symptoms of potential delayed complications were discussed with the   patient.  Return to normal activities tomorrow.  Written discharge   instructions were provided to the patient.   - Continue present medications.   - Return to referring physician as previously scheduled.  For questions, problems or results please call your physician - Oscar Wylie MD at Work:  [phone].  [facility], EMERGENCY ROOM PHONE NUMBER: [phone]  IF A COMPLICATION OR EMERGENCY SITUATION ARISES AND YOU ARE UNABLE TO REACH   YOUR PHYSICIAN - GO DIRECTLY TO THE EMERGENCY ROOM.  Oscar Wylie MD  6/4/2019 12:16:54 PM  This report has been verified and signed electronically.  [employer]

## 2019-06-06 ENCOUNTER — TELEPHONE (OUTPATIENT)
Dept: ENDOSCOPY | Facility: HOSPITAL | Age: 69
End: 2019-06-06

## 2019-06-10 ENCOUNTER — TELEPHONE (OUTPATIENT)
Dept: GASTROENTEROLOGY | Facility: CLINIC | Age: 69
End: 2019-06-10

## 2019-06-10 PROBLEM — Z09 FOLLOW-UP EXAMINATION AFTER UROLOGICAL SURGERY: Status: RESOLVED | Noted: 2019-03-07 | Resolved: 2019-06-10

## 2019-06-10 NOTE — TELEPHONE ENCOUNTER
----- Message from Annetta Powell MD sent at 6/7/2019 12:23 PM CDT -----  No evidence of significant acid reflux as the etiology of her symptoms, happy to f/u with her in clinic as well

## 2019-06-17 ENCOUNTER — CLINICAL SUPPORT (OUTPATIENT)
Dept: FAMILY MEDICINE | Facility: CLINIC | Age: 69
End: 2019-06-17
Payer: MEDICARE

## 2019-06-17 ENCOUNTER — TELEPHONE (OUTPATIENT)
Dept: FAMILY MEDICINE | Facility: CLINIC | Age: 69
End: 2019-06-17

## 2019-06-17 DIAGNOSIS — R30.0 DYSURIA: Primary | ICD-10-CM

## 2019-06-17 LAB
BILIRUB SERPL-MCNC: ABNORMAL MG/DL
BLOOD URINE, POC: ABNORMAL
COLOR, POC UA: YELLOW
GLUCOSE UR QL STRIP: ABNORMAL
KETONES UR QL STRIP: ABNORMAL
LEUKOCYTE ESTERASE URINE, POC: ABNORMAL
NITRITE, POC UA: ABNORMAL
PH, POC UA: 5
PROTEIN, POC: ABNORMAL
SPECIFIC GRAVITY, POC UA: 1.01
UROBILINOGEN, POC UA: ABNORMAL

## 2019-06-17 PROCEDURE — 81002 POCT URINE DIPSTICK WITHOUT MICROSCOPE: ICD-10-PCS | Mod: S$GLB,,, | Performed by: FAMILY MEDICINE

## 2019-06-17 PROCEDURE — 81002 URINALYSIS NONAUTO W/O SCOPE: CPT | Mod: S$GLB,,, | Performed by: FAMILY MEDICINE

## 2019-06-17 PROCEDURE — 87086 URINE CULTURE/COLONY COUNT: CPT

## 2019-06-17 RX ORDER — CEPHALEXIN 500 MG/1
1000 CAPSULE ORAL EVERY 12 HOURS
Qty: 28 CAPSULE | Refills: 0 | Status: SHIPPED | OUTPATIENT
Start: 2019-06-17 | End: 2019-07-08

## 2019-06-17 NOTE — TELEPHONE ENCOUNTER
----- Message from Dede Briggs sent at 6/17/2019  9:23 AM CDT -----  Contact: [phone] /self  Type:  Needs Medical Advice    Who Called: self  Symptoms (please be specific): Another UTI   How long has patient had these symptoms:  same  Pharmacy name and phone #:  [id number] - [place] - [address]  Would the patient rather a call back or a response via [facility]? call  Best Call Back Number:  Additional Information: It is back again.     Back pain  Lower abd pain started today  Painful urination started Friday    Urine poct shows  Moderate leukocytes only - urine is clear in clarity  Please advise   Treat? Culture?

## 2019-06-19 ENCOUNTER — TELEPHONE (OUTPATIENT)
Dept: FAMILY MEDICINE | Facility: CLINIC | Age: 69
End: 2019-06-19

## 2019-06-19 LAB — BACTERIA UR CULT: NORMAL

## 2019-06-19 NOTE — TELEPHONE ENCOUNTER
----- Message from Raffi Garcia MD sent at 6/19/2019  7:03 AM CDT -----  Culture does not show bacteria in your urine-no further treatment needed

## 2019-06-20 ENCOUNTER — PATIENT MESSAGE (OUTPATIENT)
Dept: NEUROLOGY | Facility: CLINIC | Age: 69
End: 2019-06-20

## 2019-06-21 ENCOUNTER — OFFICE VISIT (OUTPATIENT)
Dept: NEUROLOGY | Facility: CLINIC | Age: 69
End: 2019-06-21
Payer: MEDICARE

## 2019-06-21 VITALS
HEART RATE: 76 BPM | DIASTOLIC BLOOD PRESSURE: 70 MMHG | BODY MASS INDEX: 36.12 KG/M2 | HEIGHT: 62 IN | SYSTOLIC BLOOD PRESSURE: 107 MMHG | WEIGHT: 196.31 LBS

## 2019-06-21 DIAGNOSIS — F41.9 ANXIETY DISORDER, UNSPECIFIED TYPE: ICD-10-CM

## 2019-06-21 DIAGNOSIS — I10 ESSENTIAL HYPERTENSION: ICD-10-CM

## 2019-06-21 DIAGNOSIS — G89.29 CHRONIC BILATERAL LOW BACK PAIN WITHOUT SCIATICA: ICD-10-CM

## 2019-06-21 DIAGNOSIS — R26.9 GAIT DISTURBANCE: Primary | ICD-10-CM

## 2019-06-21 DIAGNOSIS — G25.81 RESTLESS LEG SYNDROME: ICD-10-CM

## 2019-06-21 DIAGNOSIS — M53.86 DISORDER OF LUMBAR SPINE: ICD-10-CM

## 2019-06-21 DIAGNOSIS — M54.50 CHRONIC BILATERAL LOW BACK PAIN WITHOUT SCIATICA: ICD-10-CM

## 2019-06-21 DIAGNOSIS — M79.7 FIBROMYALGIA: ICD-10-CM

## 2019-06-21 DIAGNOSIS — G25.0 ESSENTIAL TREMOR: ICD-10-CM

## 2019-06-21 PROCEDURE — 99214 OFFICE O/P EST MOD 30 MIN: CPT | Mod: S$PBB,,, | Performed by: PSYCHIATRY & NEUROLOGY

## 2019-06-21 PROCEDURE — 99213 OFFICE O/P EST LOW 20 MIN: CPT | Mod: PBBFAC,PO | Performed by: PSYCHIATRY & NEUROLOGY

## 2019-06-21 PROCEDURE — 99999 PR PBB SHADOW E&M-EST. PATIENT-LVL III: ICD-10-PCS | Mod: PBBFAC,,, | Performed by: PSYCHIATRY & NEUROLOGY

## 2019-06-21 PROCEDURE — 99999 PR PBB SHADOW E&M-EST. PATIENT-LVL III: CPT | Mod: PBBFAC,,, | Performed by: PSYCHIATRY & NEUROLOGY

## 2019-06-21 PROCEDURE — 99214 PR OFFICE/OUTPT VISIT, EST, LEVL IV, 30-39 MIN: ICD-10-PCS | Mod: S$PBB,,, | Performed by: PSYCHIATRY & NEUROLOGY

## 2019-06-21 NOTE — PATIENT INSTRUCTIONS
Continue follow-up with her pain management doctor, Dr Lara for the chronic back pain and fibromyalgia.  Taper off the Citalopram.  Continue Effexor for the fibromyalgia muscle symptoms.  Continue Neurontin and Mirapex for restless leg and continue follow-up with the sleep clinic.  Advised magnesium oxide supplementation 400 mg at bedtime daily as it will help her depression/anxiety.  Patient has high fall risk and uses a scooter for help with her mobility.  She has been using her scooter that has been having problems and this needs to be fixed or replaced.

## 2019-06-21 NOTE — PROGRESS NOTES
Subjective:       Patient ID: Hannah Norman is a 69 y.o. female.    Chief Complaint:  Gait Problem      History of Present Illness  HPI  This is a 69-year-old female who returns in follow up.  She has a history of chronic back problems and in the past had been seen by orthopedic surgery and neurosurgery at [facility], Dr. Bey, with MRI scans of the neck and back and advised conservative management and referral to pain management.  She is presently being followed by Dr. Wang for pain management.      She has history of restless leg syndrome with improvement with a combination of Neurontin 600 mg 3 times a day and Mirapex which is now being prescribed by the Sleep Clinic.  In addition she is also on clonazepam at bedtime for the restless leg prescribed by the Sleep Clinic.  She also has an essential tremor and has been evaluated at the movement disorders clinic in the past.  Symptoms are minimal.  .  She is on citalopram 20 mg daily for chronic anxiety which has helped.  In addition she is on Effexor for the fibromyalgia symptoms which has helped.  She denies any new medical problems otherwise.  She continues to have gait instability and presently uses a motorized scooter because of the high fall risk, however her scooter has been having mechanical problems.       Review of Systems  Review of Systems   Constitutional: Negative.    HENT: Negative.  Negative for hearing loss.    Eyes: Negative.  Negative for visual disturbance.   Respiratory: Negative.    Cardiovascular: Negative.    Gastrointestinal: Negative.    Genitourinary: Negative.    Musculoskeletal: Positive for arthralgias (left knee pain, recent surgery), back pain and myalgias. Negative for gait problem.   Skin: Negative.    Neurological: Positive for tremors. Negative for seizures, speech difficulty and numbness.   Psychiatric/Behavioral: Negative.  Negative for decreased concentration.       Objective:      Neurologic  Exam      Physical Exam   Constitutional: She appears well-developed and well-nourished.   HENT:   Head: Normocephalic and atraumatic.   Right Ear: Hearing normal.   Left Ear: Hearing normal.   Neck: Normal range of motion. Neck supple. Muscular tenderness (bilateral paraspinal muscle and trapezius muscle tenderness) present. Carotid bruit is not present.   Musculoskeletal:   Multiple areas of muscle tenderness in the trunk muscles as well as trapezius and proximal muscles of the upper and lower extremities.  Status post left knee replacement surgery.  Left ankle tenderness laterally with mild swelling, no ecchymosis or deformity.   Neurological: She is alert. She has normal reflexes. She displays atrophy (no obvious weakness however she has difficulty getting onto the step and she fatigues easily.) and tremor (a very minimal statokinetic tremor was noted affecting fingers). A cranial nerve deficit (VF at bedside testing essentially normal.  Minimal weakness left face LMN with good eye closure, and sensory exam being normal/symmetrical.  Corneals/gag reflexes normal.  Tongue & palate movements normal.  Shoulder shrug normal.) is present. No sensory deficit (Right Tinel's positive). She exhibits normal muscle tone. Coordination (mild clumsiness of fine motor movements.  Romberg is equivocal with eyes closed) and gait (her gait is slightly broad-based slowly because of clumsiness in the lower extremities and fatigue.) abnormal.   Mental status examination: Patient is fully oriented and able to give an adequate history.  Recall of recent and past information is good.  Immediate recall is normal.  Attention span and concentration was normal.  Judgment and insight is normal.  Language functions are intact with no evidence of aphasia or dysarthria.  Comprehension is unimpaired.  Affect is appropriate, mood was even.  No thought disorder is noted.   Vitals reviewed.        Assessment:        1. Gait disturbance    2.  Chronic bilateral low back pain without sciatica    3. Anxiety disorder, unspecified type    4. Restless leg syndrome    5. Disorder of lumbar spine    6. Essential tremor    7. Fibromyalgia    8. Essential hypertension             Plan:       Continue follow-up with her pain management doctor, Dr Lara for the chronic back pain and fibromyalgia.  Taper off the Citalopram.  Continue Effexor for the fibromyalgia muscle symptoms.  Continue Neurontin and Mirapex for restless leg and continue follow-up with the sleep clinic.  Advised magnesium oxide supplementation 400 mg at bedtime daily as it will help her depression/anxiety.  Patient has high fall risk and uses a scooter for help with her mobility.  She has been using her scooter that has been having problems and this needs to be fixed or replaced.   Follow-up in 6 months if stable.

## 2019-06-24 ENCOUNTER — TELEPHONE (OUTPATIENT)
Dept: NEUROLOGY | Facility: CLINIC | Age: 69
End: 2019-06-24

## 2019-06-24 NOTE — TELEPHONE ENCOUNTER
Patient seen in the office on 6-, and [employer] paper work has been filled out, and office notes of today has been faxed to them.

## 2019-07-02 ENCOUNTER — PATIENT OUTREACH (OUTPATIENT)
Dept: OTHER | Facility: OTHER | Age: 69
End: 2019-07-02

## 2019-07-02 NOTE — PROGRESS NOTES
Last 5 Patient Entered Readings                                      Current 30 Day Average: 110/59     Recent Readings 6/25/2019 6/22/2019 6/19/2019 6/17/2019 6/11/2019    SBP (mmHg) 94 116 102 122 110    DBP (mmHg) 64 50 49 69 62    Pulse 66 103 78 72 70          Digital Medicine: Health  Follow Up    Lifestyle Modifications:    1.Dietary Modifications (Sodium intake <2,000mg/day, food labels, dining out): Patient continues monitoring her sodium intake. She denies any major changes that would cause an increase in salt intake.     2.Physical Activity: Deferred    3.Medication Therapy: Patient has been compliant with the medication regimen. Patient is doing well on her current BP medication regimen. She denies symptoms/side effects.     4.Patient has the following medication side effects/concerns: None  (Frequency/Alleviating factors/Precipitating factors, etc.)     Patient did mention some episodes of fatigue, dizziness and lightheadedness. This happens a few times per week and its worse when she is about to stand up, etc. She stated that this has been happening for some time now. She is going to start taking more readings especially on days when she is having these symptoms so we can see how her BP is running during those episodes. If symptoms continue to happen, I will consult with her PharmD to see if they think any adjustments to her BP medication need to be made.     Follow up with Mrs. Hannah SEARS Emerson completed. No further questions or concerns. Will continue to follow up to achieve health goals.

## 2019-07-07 ENCOUNTER — PATIENT MESSAGE (OUTPATIENT)
Dept: NEUROLOGY | Facility: CLINIC | Age: 69
End: 2019-07-07

## 2019-07-08 DIAGNOSIS — M19.90 OTHER TYPE OF OSTEOARTHRITIS, UNSPECIFIED SITE: Primary | ICD-10-CM

## 2019-07-08 RX ORDER — FUROSEMIDE 40 MG/1
TABLET ORAL
Qty: 60 TABLET | Refills: 2 | Status: SHIPPED | OUTPATIENT
Start: 2019-07-08 | End: 2020-04-07

## 2019-07-08 RX ORDER — DICLOFENAC SODIUM 30 MG/G
GEL TOPICAL
Qty: 100 G | Refills: 1 | Status: SHIPPED | OUTPATIENT
Start: 2019-07-08 | End: 2019-11-14 | Stop reason: SDUPTHER

## 2019-07-23 ENCOUNTER — PES CALL (OUTPATIENT)
Dept: ADMINISTRATIVE | Facility: CLINIC | Age: 69
End: 2019-07-23

## 2019-08-09 ENCOUNTER — OFFICE VISIT (OUTPATIENT)
Dept: INTERNAL MEDICINE | Facility: CLINIC | Age: 69
End: 2019-08-09
Payer: MEDICARE

## 2019-08-09 VITALS
WEIGHT: 200.19 LBS | DIASTOLIC BLOOD PRESSURE: 68 MMHG | BODY MASS INDEX: 36.84 KG/M2 | HEIGHT: 62 IN | TEMPERATURE: 99 F | SYSTOLIC BLOOD PRESSURE: 122 MMHG | OXYGEN SATURATION: 98 % | HEART RATE: 98 BPM

## 2019-08-09 DIAGNOSIS — M79.7 FIBROMYALGIA: ICD-10-CM

## 2019-08-09 DIAGNOSIS — G25.0 ESSENTIAL TREMOR: ICD-10-CM

## 2019-08-09 DIAGNOSIS — M19.90 OTHER TYPE OF OSTEOARTHRITIS, UNSPECIFIED SITE: ICD-10-CM

## 2019-08-09 DIAGNOSIS — R26.9 GAIT DISTURBANCE: ICD-10-CM

## 2019-08-09 DIAGNOSIS — G47.33 OSA (OBSTRUCTIVE SLEEP APNEA): ICD-10-CM

## 2019-08-09 DIAGNOSIS — E78.49 OTHER HYPERLIPIDEMIA: ICD-10-CM

## 2019-08-09 DIAGNOSIS — G89.29 CHRONIC BILATERAL LOW BACK PAIN WITHOUT SCIATICA: ICD-10-CM

## 2019-08-09 DIAGNOSIS — R73.03 PREDIABETES: ICD-10-CM

## 2019-08-09 DIAGNOSIS — G25.81 RESTLESS LEG SYNDROME: ICD-10-CM

## 2019-08-09 DIAGNOSIS — E66.01 SEVERE OBESITY: ICD-10-CM

## 2019-08-09 DIAGNOSIS — F41.9 ANXIETY DISORDER, UNSPECIFIED TYPE: ICD-10-CM

## 2019-08-09 DIAGNOSIS — M53.86 DISORDER OF LUMBAR SPINE: ICD-10-CM

## 2019-08-09 DIAGNOSIS — Z00.00 ENCOUNTER FOR PREVENTIVE HEALTH EXAMINATION: Primary | ICD-10-CM

## 2019-08-09 DIAGNOSIS — M54.50 CHRONIC BILATERAL LOW BACK PAIN WITHOUT SCIATICA: ICD-10-CM

## 2019-08-09 DIAGNOSIS — K21.9 LARYNGOPHARYNGEAL REFLUX (LPR): ICD-10-CM

## 2019-08-09 DIAGNOSIS — I10 ESSENTIAL HYPERTENSION: ICD-10-CM

## 2019-08-09 DIAGNOSIS — N39.41 URGE INCONTINENCE: ICD-10-CM

## 2019-08-09 DIAGNOSIS — I77.1 TORTUOUS AORTA: ICD-10-CM

## 2019-08-09 PROCEDURE — G0439 PPPS, SUBSEQ VISIT: HCPCS | Mod: S$GLB,,, | Performed by: NURSE PRACTITIONER

## 2019-08-09 PROCEDURE — 99215 OFFICE O/P EST HI 40 MIN: CPT | Mod: PBBFAC,PN | Performed by: NURSE PRACTITIONER

## 2019-08-09 PROCEDURE — 99999 PR PBB SHADOW E&M-EST. PATIENT-LVL V: CPT | Mod: PBBFAC,,, | Performed by: NURSE PRACTITIONER

## 2019-08-09 PROCEDURE — 99999 PR PBB SHADOW E&M-EST. PATIENT-LVL V: ICD-10-PCS | Mod: PBBFAC,,, | Performed by: NURSE PRACTITIONER

## 2019-08-09 PROCEDURE — G0439 PR MEDICARE ANNUAL WELLNESS SUBSEQUENT VISIT: ICD-10-PCS | Mod: S$GLB,,, | Performed by: NURSE PRACTITIONER

## 2019-08-09 NOTE — PATIENT INSTRUCTIONS
Counseling and Referral of Other Preventative  (Italic type indicates deductible and co-insurance are waived)    Patient Name: Hannah Norman  Today's Date: 8/9/2019    Health Maintenance       Date Due Completion Date    Shingles Vaccine (1 of 2) 02/13/1999 ---    Influenza Vaccine (1) 08/01/2019 11/21/2018    DEXA SCAN 08/29/2019 8/29/2016    Mammogram 09/27/2020 9/27/2018    Lipid Panel 01/17/2024 1/17/2019    TETANUS VACCINE 08/23/2026 8/23/2016    Colonoscopy 11/10/2027 11/10/2017    Override on 1/1/2008: Done        No orders of the defined types were placed in this encounter.    The following information is provided to all patients.  This information is to help you find resources for any of the problems found today that may be affecting your health:                Living healthy guide: www.[facility].[place].gov      Understanding Diabetes: www.diabetes.org      Eating healthy: www.cdc.gov/healthyweight      [facility] home safety checklist: www.cdc.gov/steadi/patient.html      Agency on Aging: www.goea.[place].[facility]      Alcoholics anonymous (AA): www.aa.org      Physical Activity: www.yari.nih.gov/jy5sber      Tobacco use: www.quitwithusla.org

## 2019-08-09 NOTE — PROGRESS NOTES
"Hannah Norman presented for a  Medicare AWV and comprehensive Health Risk Assessment today. Patient's granddaughter Bella present during HRA visit. The following components were reviewed and updated:    · Medical history  · Family History  · Social history  · Allergies and Current Medications  · Health Risk Assessment  · Health Maintenance  · Care Team     ** See Completed Assessments for Annual Wellness Visit within the encounter summary.**       The following assessments were completed:  · Living Situation  · CAGE  · Depression Screening  · Timed Get Up and Go  · Whisper Test  · Cognitive Function Screening  · Nutrition Screening  · ADL Screening  · PAQ Screening    Vitals:    08/09/19 1424   BP: 122/68   BP Location: Right arm   Patient Position: Sitting   BP Method: Large (Manual)   Pulse: 98   Temp: 98.7 °F (37.1 °C)   TempSrc: Oral   SpO2: 98%   Weight: 90.8 kg (200 lb 2.8 oz)   Height: 5' 2" (1.575 m)     Body mass index is 36.61 kg/m².  Physical Exam   Constitutional: She is oriented to person, place, and time. No distress.   Obese   HENT:   Head: Normocephalic and atraumatic.   Eyes: Pupils are equal, round, and reactive to light. EOM are normal.   Neck: Normal range of motion.   Cardiovascular: Normal rate, regular rhythm and normal heart sounds.   Pulmonary/Chest: Effort normal and breath sounds normal. No respiratory distress.   Musculoskeletal: Normal range of motion. She exhibits edema (BLE).   Neurological: She is alert and oriented to person, place, and time. Coordination normal.   Skin: Skin is warm and dry.   Psychiatric: She has a normal mood and affect. Her behavior is normal. Judgment and thought content normal.   Nursing note and vitals reviewed.        Diagnoses and health risks identified today and associated recommendations/orders:    1. Encounter for preventive health examination    2. Severe obesity  Current BMI 36.61. Lifestyle modifications discussed with patient.     3. Tortuous " aorta  Noted on chest xray dated 5/8/2015. Patient followed by PCP.     4. Essential hypertension  Chronic; stable. Continue current treatment plan as previously prescribed by PCP.     5. Other hyperlipidemia  Chronic; stable. Continue current treatment plan as previously prescribed by PCP.    6. Prediabetes  Patient's hgba1c was 5.7 on most recent a1c dated 1/17/2019. Patient followed by PCP.     7. Restless leg syndrome  Chronic; stable. Continue current treatment plan as previously prescribed by PCP.    8. Essential tremor  Chronic; stable. Patient was followed by Neurology (Dr. Mukherjee).     9. Disorder of lumbar spine  Chronic; stable. Patient followed by PCP.    10. Other type of osteoarthritis, unspecified site  Chronic; stable. Patient followed by PCP.    11. Chronic bilateral low back pain without sciatica  Chronic; stable. Patient followed by PCP.    12. Fibromyalgia  Chronic; stable. Continue current treatment plan as previously prescribed by PCP.    13. Gait disturbance  Chronic; stable. Patient followed by PCP.    14. Urge incontinence  Chronic; stable. Continue current treatment plan as previously prescribed by Urology (Dr. Garza).     15. MARY (obstructive sleep apnea)  Chronic; stable. CPatient followed by PCP.    16. Laryngopharyngeal reflux (LPR)  Chronic; stable. Continue current treatment plan as previously prescribed by Gastro (Dr. Powell).     17. Anxiety disorder, unspecified type  Chronic; stable. Continue current treatment plan as previously prescribed by PCP.      Provided Hannah with a 5-10 year written screening schedule and personal prevention plan. Recommendations were developed using the USPSTF age appropriate recommendations. Education, counseling, and referrals were provided as needed. After Visit Summary printed and given to patient which includes a list of additional screenings\tests needed.    Follow up for HRA visit in 1 year.    Ese Toscano NP  I offered to discuss end of  life issues, including information on how to make advance directives that the patient could use to name someone who would make medical decisions on their behalf if they became too ill to make themselves.    ___Patient declined  _X_Patient is interested, I provided paper work and offered to discuss.

## 2019-08-20 ENCOUNTER — PATIENT OUTREACH (OUTPATIENT)
Dept: OTHER | Facility: OTHER | Age: 69
End: 2019-08-20

## 2019-08-20 NOTE — PROGRESS NOTES
Last 5 Patient Entered Readings                                      Current 30 Day Average: 124/69     Recent Readings 8/18/2019 8/14/2019 8/9/2019 8/8/2019 7/31/2019    SBP (mmHg) 137 114 123 118 122    DBP (mmHg) 86 61 65 60 66    Pulse 79 66 91 78 75        HPI:  Called patient to follow up on her at goal readings.   Patient endorses adherence to medication regimen.   Patient reports pain in her right side coming from her back and she has a heavy right leg. She stated it's like the flu. She said she's experienced this past for several weeks without relief.   Patient denies hypotensive s/sx (lightheadedness, dizziness, nausea, fatigue); patient denies hypertensive s/sx (SOB, CP, severe headaches, changes in vision). Instructed patient to seek medical care if BP > 180/110 and is accompanied by hypertensive s/sx associated, patient confirms understanding.     Assessment:  Reviewed recent readings. Per 2017 ACC/ AHA HTN guidelines (goal of BP < 130/80), current 30-day average is well controlled.     Plan:  Continue current medication regimen.   Encouraged patient to reach out her her PCP to assess her pain. She has a pain management appointment in a couple weeks.  I will continue to monitor regularly and will follow-up in 12 weeks, sooner if blood pressure begins to trend upward or downward.     Current medication regimen:  Hypertension Medications             furosemide (LASIX) 40 MG tablet TAKE 1 TABLET BY MOUTH UP TO TWO TIMES A DAY IF SWELLING OCCURS    losartan (COZAAR) 50 MG tablet Take 50 mg by mouth once daily.         Patient denies having questions or concerns. Patient has my contact information and knows to call with any concerns or clinical changes.

## 2019-08-29 ENCOUNTER — PATIENT MESSAGE (OUTPATIENT)
Dept: FAMILY MEDICINE | Facility: CLINIC | Age: 69
End: 2019-08-29

## 2019-08-30 RX ORDER — CIPROFLOXACIN 250 MG/1
250 TABLET, FILM COATED ORAL 2 TIMES DAILY
Qty: 10 TABLET | Refills: 0 | Status: SHIPPED | OUTPATIENT
Start: 2019-08-30 | End: 2019-11-25

## 2019-09-09 ENCOUNTER — TELEPHONE (OUTPATIENT)
Dept: FAMILY MEDICINE | Facility: CLINIC | Age: 69
End: 2019-09-09

## 2019-09-09 NOTE — TELEPHONE ENCOUNTER
----- Message from Nisha Guthrie sent at 9/9/2019 11:08 AM CDT -----  Contact: [phone]  Patient is requesting a callback concerning a referral to Orthopedics. Please call and advise.

## 2019-09-09 NOTE — TELEPHONE ENCOUNTER
C/o right knee/hip/ankle pain - joint pain  Ongoing pain  For about over 6 months. Getting worse recently   Do you think she can come in and see you and you could injection them or should she see othro  She is already    taking tylenol, morphine and percocet  This only takes the edge off    Please advise

## 2019-09-10 ENCOUNTER — IMMUNIZATION (OUTPATIENT)
Dept: FAMILY MEDICINE | Facility: CLINIC | Age: 69
End: 2019-09-10
Payer: MEDICARE

## 2019-09-10 PROCEDURE — G0008 ADMIN INFLUENZA VIRUS VAC: HCPCS | Mod: S$GLB,,, | Performed by: FAMILY MEDICINE

## 2019-09-10 PROCEDURE — G0008 FLU VACCINE - HIGH DOSE (65+) PRESERVATIVE FREE IM: ICD-10-PCS | Mod: S$GLB,,, | Performed by: FAMILY MEDICINE

## 2019-09-10 PROCEDURE — 90662 IIV NO PRSV INCREASED AG IM: CPT | Mod: S$GLB,,, | Performed by: FAMILY MEDICINE

## 2019-09-10 PROCEDURE — 90662 FLU VACCINE - HIGH DOSE (65+) PRESERVATIVE FREE IM: ICD-10-PCS | Mod: S$GLB,,, | Performed by: FAMILY MEDICINE

## 2019-09-12 ENCOUNTER — PATIENT OUTREACH (OUTPATIENT)
Dept: OTHER | Facility: OTHER | Age: 69
End: 2019-09-12

## 2019-09-12 NOTE — PROGRESS NOTES
[facility]: Health  Follow-Up    The history is provided by the patient.     Follow Up  Follow-up reason(s): goal follow-up    Patient stated that she is doing well overall but that she has been experiencing more lightheadedness lately. It usually happens almost everyday but it does not last the whole day. She did mention that she continues cutting back on the sodium intake but that she is also not drinking as much water/fluids as she likely should. We discussed having her increase her water intake little by little to see if this helps prevent the symptoms. I also requested that she focus more (and make notes) on what she is doing when the dizzy symptoms occur so we can see if we can pin point a cause. She will reach out if symptoms worsen.       Assessment/Plan    SDOH    Intervention/Plan    There are no preventive care reminders to display for this patient.    Last 5 Patient Entered Readings                                      Current 30 Day Average: 116/69     Recent Readings 9/7/2019 9/1/2019 8/27/2019 8/20/2019 8/18/2019    SBP (mmHg) 105 113 107 118 137    DBP (mmHg) 70 60 64 75 86    Pulse 73 72 80 87 79

## 2019-09-20 ENCOUNTER — HOSPITAL ENCOUNTER (OUTPATIENT)
Dept: RADIOLOGY | Facility: HOSPITAL | Age: 69
Discharge: HOME | End: 2019-09-20
Attending: ORTHOPAEDIC SURGERY
Payer: MEDICARE

## 2019-09-20 ENCOUNTER — TELEPHONE (OUTPATIENT)
Dept: ORTHOPEDICS | Facility: CLINIC | Age: 69
End: 2019-09-20

## 2019-09-20 ENCOUNTER — OFFICE VISIT (OUTPATIENT)
Dept: ORTHOPEDICS | Facility: CLINIC | Age: 69
End: 2019-09-20
Payer: MEDICARE

## 2019-09-20 VITALS — HEIGHT: 62 IN | BODY MASS INDEX: 36.8 KG/M2 | WEIGHT: 200 LBS

## 2019-09-20 DIAGNOSIS — M25.551 BILATERAL HIP PAIN: ICD-10-CM

## 2019-09-20 DIAGNOSIS — M25.552 BILATERAL HIP PAIN: Primary | ICD-10-CM

## 2019-09-20 DIAGNOSIS — M25.552 BILATERAL HIP PAIN: ICD-10-CM

## 2019-09-20 DIAGNOSIS — M54.30 SCIATICA, UNSPECIFIED LATERALITY: Primary | ICD-10-CM

## 2019-09-20 DIAGNOSIS — M25.551 BILATERAL HIP PAIN: Primary | ICD-10-CM

## 2019-09-20 PROCEDURE — 73521 XR HIPS BILATERAL 2 VIEW INCL AP PELVIS: ICD-10-PCS | Mod: 26,,, | Performed by: RADIOLOGY

## 2019-09-20 PROCEDURE — 99999 PR PBB SHADOW E&M-EST. PATIENT-LVL III: ICD-10-PCS | Mod: PBBFAC,,, | Performed by: ORTHOPAEDIC SURGERY

## 2019-09-20 PROCEDURE — 99999 PR PBB SHADOW E&M-EST. PATIENT-LVL III: CPT | Mod: PBBFAC,,, | Performed by: ORTHOPAEDIC SURGERY

## 2019-09-20 PROCEDURE — 99202 OFFICE O/P NEW SF 15 MIN: CPT | Mod: S$PBB,,, | Performed by: ORTHOPAEDIC SURGERY

## 2019-09-20 PROCEDURE — 99213 OFFICE O/P EST LOW 20 MIN: CPT | Mod: PBBFAC,25,PN | Performed by: ORTHOPAEDIC SURGERY

## 2019-09-20 PROCEDURE — 99202 PR OFFICE/OUTPT VISIT, NEW, LEVL II, 15-29 MIN: ICD-10-PCS | Mod: S$PBB,,, | Performed by: ORTHOPAEDIC SURGERY

## 2019-09-20 PROCEDURE — 73521 X-RAY EXAM HIPS BI 2 VIEWS: CPT | Mod: 26,,, | Performed by: RADIOLOGY

## 2019-09-20 PROCEDURE — 73521 X-RAY EXAM HIPS BI 2 VIEWS: CPT | Mod: TC,PN

## 2019-09-20 NOTE — PROGRESS NOTES
Subjective:      Patient ID: Hannah Norman is a 69 y.o. female.    Chief Complaint:  Right hip pain  HPI      Hannah Norman is seen for evaluation and treatment of hip pain.  They have experienced problems with their right hip over the past 3 months Pain is located In the lower back radiating down the sciatic distribution of the right lower extremity.. They have been treated with NA and cane/walker.   Symptoms have recently stayed the same. Ambulation reportedly has been impaired. Self care ADLs are painful.  The patient denies focal neurologic complaints.  Her history is relevant for fibromyalgia according to her report.    Review of Systems   Constitution: Negative for fever and weight loss.   HENT: Negative for congestion.    Eyes: Negative for visual disturbance.   Cardiovascular: Negative for chest pain.   Respiratory: Negative for shortness of breath.    Hematologic/Lymphatic: Negative for bleeding problem. Does not bruise/bleed easily.   Skin: Negative for poor wound healing.   Musculoskeletal: Positive for arthritis, back pain and joint pain.   Gastrointestinal: Negative for abdominal pain.   Genitourinary: Negative for dysuria.   Neurological: Negative for seizures.   Psychiatric/Behavioral: Negative for altered mental status.   Allergic/Immunologic: Negative for persistent infections.         Objective:            Ortho/SPM Exam    Right hip    The patient is not in acute distress.   Body habitus is:normal.   Sclerae normal  The patient walks with a limp.   Respiratory distress:  none  The skin over the hip is:intact.   There is:no local tenderness.   Range of motion- Flexion 90, External rotation 30, internal rotation 25.  Resisted SLR negative.  Pain with rotation positive  Sciatic tension findings positive.  Shortening/lengthening compared to the contralateral side absent.  Pulses DP present, PT present.  Motor normal 5/5 strength in all tested muscle groups.   Sensory normal.      I reviewed the relevant  radiographic images for the patient's condition:  Recent films of the pelvis of both hips show minimal degenerative change.  Bone quality is relatively preserved      Assessment:       Encounter Diagnosis   Name Primary?    Sciatica, unspecified laterality Yes          The patient has sciatica which is likely due to spinal stenosis and/or degenerative disc disease. The presentation is subacute.  Although I cannot rule out some degree of intra-articular hip pathology, the patient is objective findings do not really supported strongly.  Plan:       Hannah was seen today for pain and pain.    Diagnoses and all orders for this visit:    Sciatica, unspecified laterality      I explained my diagnostic impression and the reasoning behind it in detail, using layman's terms.  Models and/or pictures were used to help in the explanation.    The patient already has pain clinic injection scheduled I encouraged her to follow through with this.    Activity modification discussed    Physical therapy    Would consider further imaging, probably lumbar MRI should she fail to improve by follow-up.

## 2019-09-20 NOTE — LETTER
September 20, 2019      Raffi Garcia MD  [address] [facility] [address]           [facility]  [address]  [facility] [address]  Phone: [phone]          Patient: Hannah Norman   MR Number: [id number]   YOB: 1950   Date of Visit: 9/20/2019       Dear Dr. Raffi Garcia:    Thank you for referring Hannah Norman to me for evaluation. Attached you will find relevant portions of my assessment and plan of care.    If you have questions, please do not hesitate to call me. I look forward to following Hannah Norman along with you.    Sincerely,    Eliezer Parmar MD    Enclosure  CC:  No Recipients    If you would like to receive this communication electronically, please contact [email or web address] or [phone] to request more information on [employer] access.    For providers and/or their staff who would like to refer a patient to [facility], please contact us through our one-stop-shop provider referral line, [facility], at [phone].    If you feel you have received this communication in error or would no longer like to receive these types of communications, please e-mail [email or web address]

## 2019-10-02 ENCOUNTER — OFFICE VISIT (OUTPATIENT)
Dept: OTOLARYNGOLOGY | Facility: CLINIC | Age: 69
End: 2019-10-02
Payer: MEDICARE

## 2019-10-02 VITALS
WEIGHT: 196.19 LBS | DIASTOLIC BLOOD PRESSURE: 70 MMHG | BODY MASS INDEX: 36.1 KG/M2 | HEIGHT: 62 IN | TEMPERATURE: 98 F | SYSTOLIC BLOOD PRESSURE: 112 MMHG | HEART RATE: 78 BPM

## 2019-10-02 DIAGNOSIS — G47.33 OSA (OBSTRUCTIVE SLEEP APNEA): ICD-10-CM

## 2019-10-02 DIAGNOSIS — R49.0 MUSCLE TENSION DYSPHONIA: Primary | ICD-10-CM

## 2019-10-02 DIAGNOSIS — J30.0 CHRONIC VASOMOTOR RHINITIS: ICD-10-CM

## 2019-10-02 PROCEDURE — 99215 OFFICE O/P EST HI 40 MIN: CPT | Mod: PBBFAC,PN,25 | Performed by: OTOLARYNGOLOGY

## 2019-10-02 PROCEDURE — 99999 PR PBB SHADOW E&M-EST. PATIENT-LVL V: CPT | Mod: PBBFAC,,, | Performed by: OTOLARYNGOLOGY

## 2019-10-02 PROCEDURE — 31575 DIAGNOSTIC LARYNGOSCOPY: CPT | Mod: S$PBB,,, | Performed by: OTOLARYNGOLOGY

## 2019-10-02 PROCEDURE — 31575 PR LARYNGOSCOPY, FLEXIBLE; DIAGNOSTIC: ICD-10-PCS | Mod: S$PBB,,, | Performed by: OTOLARYNGOLOGY

## 2019-10-02 PROCEDURE — 99214 PR OFFICE/OUTPT VISIT, EST, LEVL IV, 30-39 MIN: ICD-10-PCS | Mod: 25,S$PBB,, | Performed by: OTOLARYNGOLOGY

## 2019-10-02 PROCEDURE — 31575 DIAGNOSTIC LARYNGOSCOPY: CPT | Mod: PBBFAC,PN | Performed by: OTOLARYNGOLOGY

## 2019-10-02 PROCEDURE — 99214 OFFICE O/P EST MOD 30 MIN: CPT | Mod: 25,S$PBB,, | Performed by: OTOLARYNGOLOGY

## 2019-10-02 PROCEDURE — 99999 PR PBB SHADOW E&M-EST. PATIENT-LVL V: ICD-10-PCS | Mod: PBBFAC,,, | Performed by: OTOLARYNGOLOGY

## 2019-10-02 RX ORDER — DUREZOL 0.5 MG/ML
EMULSION OPHTHALMIC
COMMUNITY
Start: 2019-10-01 | End: 2020-07-08

## 2019-10-02 RX ORDER — NEPAFENAC 3 MG/ML
SUSPENSION/ DROPS OPHTHALMIC
COMMUNITY
Start: 2019-10-01 | End: 2020-07-08

## 2019-10-02 RX ORDER — PANTOPRAZOLE SODIUM 40 MG/1
TABLET, DELAYED RELEASE ORAL
COMMUNITY
Start: 2019-09-25 | End: 2020-04-13 | Stop reason: SDUPTHER

## 2019-10-02 RX ORDER — METHYLNALTREXONE BROMIDE 150 MG/1
150 TABLET ORAL DAILY PRN
COMMUNITY
Start: 2019-10-01 | End: 2021-05-10

## 2019-10-02 RX ORDER — OFLOXACIN 3 MG/ML
SOLUTION/ DROPS OPHTHALMIC
COMMUNITY
Start: 2019-10-01 | End: 2019-11-25

## 2019-10-02 NOTE — PATIENT INSTRUCTIONS
VOCAL HYGIENE AND HYDRATION RECOMMENDATIONS     DO   · Drink plenty of waterabout  oz a day! If your urine is pale, you should be well-hydrated.  Try some of these tips to increase hydration as well.  · Eat wet snacks such as grapes, melon, cucumbers, apple slices   · Reduce intake coffee and other caffeinated and carbonated beverages   · Suck on pastille lozenges or sugar free candies (not mentholated lozenges)   · Use a room or personal humidifier   · Use sinus rinses/irrigations or nasal saline spray   · Inhale steam for a few minutes before speaking or singing   · Pay attention to how, and how much, you use your voice.   · Give yourself vocal breaks throughout the day.   · Breathe when talking, take frequent pauses for breath.   · Use a microphone when speaking in front of a group of 15 or more to reduce voice strain.   · Learn how to use your voice correctly to get loud with voice therapy techniques.  · Stay healthy. Eat right and get plenty of rest. Follow a reflux precaution diet if indicated.   ____________________________________________________________________    REDUCE   · Loud talking, yelling, cheering, or screaming. Voice injury can take place over a long time, or all at once.  If you need to talk loud or yell, be sure you are doing it properly.   · Clearing your throat and forceful coughing. The vocal folds collect mucus when irritated or inflamed and this can cause the urge to clear your throat. The trauma of clearing the throat creates more inflammation and mucus. See tips above for keeping hydrated to assist with cutting back on throat clearing.  Instead of clearing your throat, try these behaviors until the sensation passes:   · Take a sip of water   · Silently clear with a hard exhale making an hhh sound   · Swallow hard   · Drying agents such as anti-histamines or decongestant medications. You should always take medications as prescribed, but keep in mind these will dry you  out, so increase your hydration!   ____________________________________________________________________    AVOID   · Inhalant irritants such as smoke, strong fumes, and allergens. Take advantage of 1-800-QUIT-NOW if you are ready to stop smoking.   · Unnecessary non-speech noises, such as grunting during exercise, loud noises, or excessively high- or low-pitched sounds. Save your voice for talking and singing!   · Whispering. Whispering places your vocal folds in a tenser position than usual.

## 2019-10-02 NOTE — PROGRESS NOTES
Chief Complaint   Patient presents with    Other     Patient states no improvement with LPR.   .     HPI:Hannah Norman is a 69 y.o. female who has been referred by Dr. Hauser for a one year history of hoarseness. She has been having dysphagia and recently underwent a MBSS which was normal but she was noted by the SLP to have dysphonia and ENT referral was recommended.  Her voice is progressively worsening over this time. There are pitch breaks or cracks. There is vocal fatigue. She admits to odynophagia, throat pain, and otalgia.  There is no hemoptysis or hematemesis. She is breathing well.     She admits to throat clearing and cough. She admits to heartburn and reflux. She is currently on Protonix 40mg daily. She notes that she has difficulty swallowing spicy foods which gives her the sensation that the food is going down the wrong way. She denies food sticking. She denies weight loss.  She does note certain foods trigger her GERD-tomatos.     Interval HPI 10/2/2019: Follow up LPR, hoarseness, and MARY.   She reports that she has had been on PPI as prescribed by GI.    She has also had an EGD noted mild esophagitis but most recent  Bravo ph probe study in May was normal. She reports that she is feeling symptomatic heartburn/indigestion on this regimen.    She describes continued hoarseness. She feels her voice has a rough quality.  She notes a new symptoms of vocal fatigue and feels that her voice is tired with slight pain in the laryngeal region if she has been speaking for 30 minutes or more.  She feels that her cough is present intermittently. She notes intermittent throat clearing. She denies food sticking.   She has been using atrovent nasal for nasal rhinorrhea on an as needed basis and feels this helps.     Past Medical History:   Diagnosis Date    Anxiety disorder     Bell's palsy     Chronic back pain     Chronic osteoarthritis     Essential tremor     Fibromyalgia     Hypertension     Mild  acid reflux     Neck pain     Other and unspecified hyperlipidemia     Sleep apnea     wear c-pap at night; does not use regularly     Social History     Socioeconomic History    Marital status:      Spouse name: Not on file    Number of children: Not on file    Years of education: Not on file    Highest education level: Not on file   Occupational History    Not on file   Social Needs    Financial resource strain: Not on file    Food insecurity:     Worry: Not on file     Inability: Not on file    Transportation needs:     Medical: Not on file     Non-medical: Not on file   Tobacco Use    Smoking status: Former Smoker     Years: 5.00     Last attempt to quit: 10/5/1982     Years since quittin.0    Smokeless tobacco: Never Used   Substance and Sexual Activity    Alcohol use: No    Drug use: No    Sexual activity: Not Currently     Partners: Male   Lifestyle    Physical activity:     Days per week: Not on file     Minutes per session: Not on file    Stress: Not on file   Relationships    Social connections:     Talks on phone: Not on file     Gets together: Not on file     Attends Buddhism service: Not on file     Active member of club or organization: Not on file     Attends meetings of clubs or organizations: Not on file     Relationship status: Not on file   Other Topics Concern    Not on file   Social History Narrative    Not on file     Past Surgical History:   Procedure Laterality Date    ADENOIDECTOMY      APPENDECTOMY      BREAST BIOPSY Left       negative    Breast reduction      BREAST SURGERY Bilateral     breast reduction    CARPAL TUNNEL RELEASE Right     COLONOSCOPY      COLONOSCOPY N/A 11/10/2017    Procedure: COLONOSCOPY - Miralax split prep;  Surgeon: Annetta Powell MD;  Location: [facility];  Service: Endoscopy;  Laterality: N/A;    COSMETIC SURGERY Bilateral     breast reduction    CYSTOSCOPY N/A 10/23/2018    Procedure: CYSTOSCOPY;  Surgeon:  Feli Garza MD;  [facility];  Service: Urology;  Laterality: N/A;    ESOPHAGOGASTRODUODENOSCOPY N/A 5/30/2019    Procedure: ESOPHAGOGASTRODUODENOSCOPY (EGD);  Surgeon: Oscar Wylie MD;  Location: [facility] (4TH FLR);  Service: Endoscopy;  Laterality: N/A;  Off PPI/H2 x 7 days prior- ERW    HYSTERECTOMY      JOINT REPLACEMENT Left     knee    KNEE SURGERY      bilateral    left shoulder Left     rotator cuff    PLACEMENT OF TRANSOBTURATOR TAPE N/A 10/23/2018    Procedure: PLACEMENT, TRANSOBTURATOR TAPE;  Surgeon: Feli Garza MD;  [facility];  Service: Urology;  Laterality: N/A;  1.5 hours    Tonsillectomy      TONSILLECTOMY      TUBAL LIGATION       Family History   Problem Relation Age of Onset    Diabetes Mother     Tremor Mother     Liver disease Mother     Diabetes Father     Heart disease Father     Tremor Son     Diabetes Sister     Diabetes Brother     Depression Sister            Review of Systems  General: negative for chills, fever or weight loss  Psychological: negative for mood changes or depression  Ophthalmic: negative for blurry vision, photophobia or eye pain  ENT: see HPI  Respiratory: no cough, shortness of breath, or wheezing  Cardiovascular: no chest pain or dyspnea on exertion  Gastrointestinal: no abdominal pain, change in bowel habits, or black/ bloody stools  Musculoskeletal: negative for gait disturbance or muscular weakness  Neurological: no syncope or seizures; no ataxia  Dermatological: negative for puritis,  rash and jaundice  Hematologic/lymphatic: no easy bruising, no new lumps or bumps      Physical Exam:    Vitals:    10/02/19 1432   BP: 112/70   Pulse: 78   Temp: 98.1 °F (36.7 °C)       Constitutional: Well appearing / communicating without difficutly.  NAD.  Eyes: EOM I Bilaterally  Head/Face: Normocephalic.  Negative paranasal sinus pressure/tenderness.  Salivary glands WNL.  House Brackmann I Bilaterally.    Right Ear:  Auricle normal appearance. External Auditory Canal within normal limits no lesions or masses,TM w/o masses/lesions/perforations. TM mobility noted.   Left Ear: Auricle normal appearance. External Auditory Canal within normal limits no lesions or masses,TM w/o masses/lesions/perforations. TM mobility noted.  Nose: Mild nasal septal deviation to the right with an inferior spur.  Inferior Turbinates 3+ bilaterally. No septal perforation. No masses/lesions. External nasal skin appears normal without masses/lesions.  Oral Cavity: Gingiva/lips within normal limits.  Dentition/gingiva healthy appearing. Mucus membranes moist. Floor of mouth soft, no masses palpated. Oral Tongue mobile. Hard Palate appears normal.    Oropharynx: Base of tongue appears normal. No masses/lesions noted. Tonsillar fossa/pharyngeal wall without lesions. Posterior oropharynx WNL.  Soft palate without masses. Midline uvula.   Neck/Lymphatic: No LAD I-VI bilaterally.  No thyromegaly.  No masses noted on exam.    Mirror laryngoscopy/nasopharyngoscopy: Active gag reflex.  Unable to perform.    Neuro/Psychiatric: AOx3.  Normal mood and affect.   Cardiovascular: Normal carotid pulses bilaterally, no increasing jugular venous distention noted at cervical region bilaterally.    Respiratory: Normal respiratory effort, no stridor, no retractions noted.      See separate procedure note for FFL.     Assessment:    ICD-10-CM ICD-9-CM    1. Muscle tension dysphonia R49.0 784.42 Ambulatory referral to Speech Therapy   2. Chronic vasomotor rhinitis J30.0 477.9    3. MARY (obstructive sleep apnea) G47.33 327.23      The primary encounter diagnosis was Muscle tension dysphonia. Diagnoses of Chronic vasomotor rhinitis and MARY (obstructive sleep apnea) were also pertinent to this visit.      Plan:  Orders Placed This Encounter   Procedures    Ambulatory referral to Speech Therapy       LPR:  Continue Omeprazole 40mg PO daily per GI.     The larynx can be affected by  abnormal hyperfunctional mechanisms and imbalance of tension in muscles involved in voice production leading to dysphonia.  It is unclear whether MTD represents a disorder primarily of incoordination (mis-timing) of muscles, or excessive muscle contraction. In either case, it can result in impaired vocal fold vibration and the sensation of extra effort when talking.  The most common treatment for MTD is voice therapy.  I would recommend a course of speech therapy, and the patient is interested in pursuing at this time.  A referral will be sent to speech if the patient is interested.    Mild MARY: Continue CPAP as prescribed. Continue follow up with Dr. Blackwell.     Vasomotor rhinitis: Continue atrovent nasal.     Follow up in 3-4 months to reassess progress with treatment regimen.     Lupe Lopez MD

## 2019-10-08 ENCOUNTER — OFFICE VISIT (OUTPATIENT)
Dept: NEUROLOGY | Facility: CLINIC | Age: 69
End: 2019-10-08
Payer: MEDICARE

## 2019-10-08 VITALS
HEART RATE: 77 BPM | WEIGHT: 197.06 LBS | DIASTOLIC BLOOD PRESSURE: 75 MMHG | BODY MASS INDEX: 36.26 KG/M2 | HEIGHT: 62 IN | SYSTOLIC BLOOD PRESSURE: 136 MMHG

## 2019-10-08 DIAGNOSIS — M54.42 CHRONIC LOW BACK PAIN WITH BILATERAL SCIATICA, UNSPECIFIED BACK PAIN LATERALITY: ICD-10-CM

## 2019-10-08 DIAGNOSIS — G89.29 CHRONIC LOW BACK PAIN WITH BILATERAL SCIATICA, UNSPECIFIED BACK PAIN LATERALITY: ICD-10-CM

## 2019-10-08 DIAGNOSIS — F32.A ANXIETY AND DEPRESSION: Primary | ICD-10-CM

## 2019-10-08 DIAGNOSIS — F41.9 ANXIETY AND DEPRESSION: Primary | ICD-10-CM

## 2019-10-08 DIAGNOSIS — M79.7 FIBROMYALGIA: ICD-10-CM

## 2019-10-08 DIAGNOSIS — M54.41 CHRONIC LOW BACK PAIN WITH BILATERAL SCIATICA, UNSPECIFIED BACK PAIN LATERALITY: ICD-10-CM

## 2019-10-08 DIAGNOSIS — R26.81 UNSTEADY GAIT: ICD-10-CM

## 2019-10-08 DIAGNOSIS — G25.81 RESTLESS LEG SYNDROME: ICD-10-CM

## 2019-10-08 PROCEDURE — 99999 PR PBB SHADOW E&M-EST. PATIENT-LVL IV: ICD-10-PCS | Mod: PBBFAC,,, | Performed by: PSYCHIATRY & NEUROLOGY

## 2019-10-08 PROCEDURE — 99999 PR PBB SHADOW E&M-EST. PATIENT-LVL IV: CPT | Mod: PBBFAC,,, | Performed by: PSYCHIATRY & NEUROLOGY

## 2019-10-08 PROCEDURE — 99214 OFFICE O/P EST MOD 30 MIN: CPT | Mod: PBBFAC,PO | Performed by: PSYCHIATRY & NEUROLOGY

## 2019-10-08 PROCEDURE — 99214 PR OFFICE/OUTPT VISIT, EST, LEVL IV, 30-39 MIN: ICD-10-PCS | Mod: S$PBB,,, | Performed by: PSYCHIATRY & NEUROLOGY

## 2019-10-08 PROCEDURE — 99214 OFFICE O/P EST MOD 30 MIN: CPT | Mod: S$PBB,,, | Performed by: PSYCHIATRY & NEUROLOGY

## 2019-10-08 RX ORDER — VENLAFAXINE HYDROCHLORIDE 75 MG/1
CAPSULE, EXTENDED RELEASE ORAL
Qty: 270 CAPSULE | Refills: 1 | Status: SHIPPED | OUTPATIENT
Start: 2019-10-08 | End: 2020-05-29 | Stop reason: SDUPTHER

## 2019-10-08 NOTE — PROGRESS NOTES
"Neurology Clinic Visit  Primary Care Provider: Raffi Garcia MD   Referring Provider: Self, Aaareferral   Date of Visit: 10/08/2019       chief complaint:   Chief Complaint   Patient presents with    Peripheral Neuropathy       History of Present Illness  This is a 69-year-old lady who is here to establish care for chronic lower back pain with sciatica, fibromyalgia, unsteady gait, and restless legs syndrome.  She was previously seen by Dr. yost who is the who was managing her care.  She states she was diagnosed with fibromyalgia in the late 1990s.  She has also had symptoms of restless leg since the early 2000s.  She is currently on Mirapex 0.125 mg tablets 2 in the afternoon and 3 at bedtime as well as gabapentin 600 mg t.i.d..  The gabapentin also helps with her chronic lower back pain and fibromyalgia.  She also sees pain management for which prescribed Percocet morphine and Flexeril.  She also has anxiety and depression for which she states she was originally on 150 mg of Effexor twice a day but she had decreased the dose to 150 in the evening and 75 at night because she felt sedated.  She was also on Celexa 20 mg daily for anxiety and depression but she has not been taking the medication.  Overall she feels that her symptoms of pain and restless leg or control but she feels a little sedated at times.  She requests if any medications could be decreased.  She does recall being on Lyrica in the past but unsure why this medication was switched to gabapentin.  She also uses a cane for her unsteady gait.  She reports sometimes when she is on her feet, she loses her balance because her legs give out and because she sometimes gets tremor in her legs.  She also has limited gait due to her chronic pain as well as sciatica.    Per D per'yung's note 6/21/2019  "HPI  This is a 69-year-old female who returns in follow up.  She has a history of chronic back problems and in the past had been seen by orthopedic surgery " "and neurosurgery at [facility], Dr. Bey, with MRI scans of the neck and back and advised conservative management and referral to pain management.  She is presently being followed by Dr. Wang for pain management.       She has history of restless leg syndrome with improvement with a combination of Neurontin 600 mg 3 times a day and Mirapex which is now being prescribed by the [facility].  In addition she is also on clonazepam at bedtime for the restless leg prescribed by the [facility].  She also has an essential tremor and has been evaluated at the movement disorders clinic in the past.  Symptoms are minimal.  .  She is on citalopram 20 mg daily for chronic anxiety which has helped.  In addition she is on Effexor for the fibromyalgia symptoms which has helped.  She denies any new medical problems otherwise.  She continues to have gait instability and presently uses a motorized scooter because of the high fall risk, however her scooter has been having mechanical problems."       Patient Active Problem List    Diagnosis Date Noted    Severe obesity 08/09/2019    Tortuous aorta 08/09/2019    Prediabetes 08/09/2019    Laryngopharyngeal reflux (LPR) 02/25/2019    Urge incontinence 12/07/2018    MARY (obstructive sleep apnea)     Screening for malignant neoplasm of colon 11/10/2017    Disorder of lumbar spine 05/20/2016    CTS (carpal tunnel syndrome) 11/20/2015    Left-sided Bell's palsy 05/29/2015    Osteoarthritis 12/27/2013    Gait disturbance 12/27/2013    Restless leg syndrome 10/22/2012    Mild acid reflux     Essential hypertension     Essential tremor     Neck pain     Chronic back pain     Fibromyalgia     Anxiety disorder     Other hyperlipidemia      Past Medical History:   Diagnosis Date    Anxiety disorder     Bell's palsy     Chronic back pain     Chronic osteoarthritis     Essential tremor     Fibromyalgia     Hypertension     Mild acid reflux     " Neck pain     Other and unspecified hyperlipidemia     Sleep apnea     wear c-pap at night; does not use regularly     Past Surgical History:   Procedure Laterality Date    ADENOIDECTOMY      APPENDECTOMY      BREAST BIOPSY Left     1995  negative    Breast reduction      BREAST SURGERY Bilateral     breast reduction    CARPAL TUNNEL RELEASE Right     COLONOSCOPY  2008    COLONOSCOPY N/A 11/10/2017    Procedure: COLONOSCOPY - Miralax split prep;  Surgeon: Annetta Powell MD;  Location: [facility];  Service: Endoscopy;  Laterality: N/A;    COSMETIC SURGERY Bilateral     breast reduction    CYSTOSCOPY N/A 10/23/2018    Procedure: CYSTOSCOPY;  Surgeon: Feli Garza MD;  Location: [facility] OR [address] [place] [address];  Service: Urology;  Laterality: N/A;    ESOPHAGOGASTRODUODENOSCOPY N/A 5/30/2019    Procedure: ESOPHAGOGASTRODUODENOSCOPY (EGD);  Surgeon: Oscar Wylie MD;  Location: [facility] ([facility]);  Service: Endoscopy;  Laterality: N/A;  Off PPI/H2 x 7 days prior- ERW    HYSTERECTOMY      JOINT REPLACEMENT Left     knee    KNEE SURGERY      bilateral    left shoulder Left     rotator cuff    PLACEMENT OF TRANSOBTURATOR TAPE N/A 10/23/2018    Procedure: PLACEMENT, TRANSOBTURATOR TAPE;  Surgeon: Feli Garza MD;  Location: [facility] OR [address] [place] [address];  Service: Urology;  Laterality: N/A;  1.5 hours    Tonsillectomy      TONSILLECTOMY      TUBAL LIGATION       Family History   Problem Relation Age of Onset    Diabetes Mother     Tremor Mother     Liver disease Mother     Diabetes Father     Heart disease Father     Tremor Son     Diabetes Sister     Diabetes Brother     Depression Sister          Current Outpatient Medications   Medication Sig    acetaminophen (TYLENOL) 500 MG tablet Take 500 mg by mouth every 6 (six) hours as needed.    albuterol 90 mcg/actuation inhaler Inhale 2 puffs into the lungs every 6 (six) hours as needed for Wheezing or Shortness of Breath. Rescue    AMITIZA 24 mcg Cap      ciprofloxacin HCl (CIPRO) 250 MG tablet Take 1 tablet (250 mg total) by mouth 2 (two) times daily.    ciprofloxacin HCl (CIPRO) 250 MG tablet Take 1 tablet (250 mg total) by mouth 2 (two) times daily.    clonazePAM (KLONOPIN) 0.5 MG tablet 2 pills PO QHS bedtime    cyclobenzaprine (FLEXERIL) 10 MG tablet Take 10 mg by mouth 3 (three) times daily as needed.     diclofenac sodium (SOLARAZE) 3 % gel APPLY 1-2 GRAMS TOPICALLY TO AFFECTED AREA 2-3 TIMES PER DAY. PRN    DUREZOL 0.05 % Drop ophthalmic solution     fluticasone (FLONASE) 50 mcg/actuation nasal spray 1 spray (50 mcg total) by Each Nare route once daily.    furosemide (LASIX) 40 MG tablet TAKE 1 TABLET BY MOUTH UP TO TWO TIMES A DAY IF SWELLING OCCURS    gabapentin (NEURONTIN) 600 MG tablet TAKE 1 TABLET BY MOUTH THREE TIMES A DAY    ILEVRO 0.3 % DrpS     losartan (COZAAR) 50 MG tablet Take 50 mg by mouth once daily.     lovastatin (MEVACOR) 20 MG tablet Take 1 tablet (20 mg total) by mouth every evening.    magnesium oxide (MAG-OX) 400 mg (241.3 mg magnesium) tablet Take 1 tablet (400 mg total) by mouth once daily.    morphine (MS CONTIN) 15 MG 12 hr tablet Take 1 tablet by mouth every 12 (twelve) hours.    ofloxacin (OCUFLOX) 0.3 % ophthalmic solution     oxybutynin (DITROPAN-XL) 10 MG 24 hr tablet Take 1 tablet (10 mg total) by mouth once daily. (Patient taking differently: Take 10 mg by mouth once daily. )    oxycodone-acetaminophen (PERCOCET)  mg per tablet Take 1 tablet by mouth every 8 (eight) hours as needed.     pantoprazole (PROTONIX) 40 MG tablet     potassium chloride SA (K-DUR,KLOR-CON) 20 MEQ tablet     pramipexole (MIRAPEX) 0.125 MG tablet 2 pills PO in the late afternoon and 3 pills PO at bedtime    RELISTOR 150 mg Tab     venlafaxine (EFFEXOR-XR) 75 MG 24 hr capsule Take 1 tablet by mouth in morning and 2 tablets by mouth at night     No current facility-administered medications for this visit.        Review of  "patient's allergies indicates:   Allergen Reactions    Hyoscyamine Rash     Social History     Socioeconomic History    Marital status:      Spouse name: Not on file    Number of children: Not on file    Years of education: Not on file    Highest education level: Not on file   Occupational History    Not on file   Social Needs    Financial resource strain: Not on file    Food insecurity:     Worry: Not on file     Inability: Not on file    Transportation needs:     Medical: Not on file     Non-medical: Not on file   Tobacco Use    Smoking status: Former Smoker     Years: 5.00     Last attempt to quit: 10/5/1982     Years since quittin.0    Smokeless tobacco: Never Used   Substance and Sexual Activity    Alcohol use: No    Drug use: No    Sexual activity: Not Currently     Partners: Male   Lifestyle    Physical activity:     Days per week: Not on file     Minutes per session: Not on file    Stress: Not on file   Relationships    Social connections:     Talks on phone: Not on file     Gets together: Not on file     Attends Anabaptism service: Not on file     Active member of club or organization: Not on file     Attends meetings of clubs or organizations: Not on file     Relationship status: Not on file   Other Topics Concern    Not on file   Social History Narrative    Not on file       Review of Systems      Constitutional: negative  Eyes: negative  Ears, nose, mouth, throat, and face: negative  Respiratory: negative  Cardiovascular: negative  Gastrointestinal: negative  Genitourinary:negative  Integument/breast: negative  Hematologic/lymphatic: negative  Musculoskeletal:negative  Neurological: negative  Behavioral/Psych: negative  Endocrine: negative  Allergic/Immunologic: negative    Objective:  Vital signs in last 24 hours:    Vitals:    10/08/19 1425   BP: 136/75   Pulse: 77   Weight: 89.4 kg (197 lb 1.5 oz)   Height: 5' 2" (1.575 m)       Body mass index is 36.05 kg/m². "     General: no acute distress, well nourished, well-groomed  CVS: RRR, no murmur, gallops or rubs  Respiratory: Clear to ausculation  Extremities:  Positive edema in lower extremities    Neurological Examination:    HIGHER INTEGRATIVE FUNCTIONS:  -Normal attention span and concentration; immediately responds to questions and commands  -Oriented to time, place and person  -Recent and remote memory intact  -Language normal (no aphasia or dysarthria)  -Normal fund of knowledge    CN:  -right eye reactive, left eye dilated about 6 mm and nonreactive (she states she had cataract surgery today)  , visual fields full, unable to visualize optic discs due to small pupils on fundus exam   -EOMI with normal saccades and smooth pursuit  -Facial sensation intact bilaterally  -Facial strength/movement intact bilaterally  -Hearing intact to voice  -Palate elevates symmetrically  -Normal shoulder shrug and head turn  -Tongue protrudes midline    MOTOR: (left/right graded 1-5)  -UE: 5/5 deltoids; 5/5 biceps, triceps; 5/5 wrist flexors, extensors; 5/5 interosseous; 5/5   -LEs: 5/5 hip flexion, extension; 5/5 knee flexion, extension; 5/5 ankle flexion, extension  -Tone: normal  -No pronator drift, no orbiting    SENSORY:  -Light touch sensation intact bilaterally    REFLEXES:  -2+ upper and lower bilaterally  -Flexor plantar reflex bilaterally  -No clonus    COORDINATION:  -FNF, HKS, SARAH intact bilaterally    GAIT:  -uses a cane in, unsteady.  Cautious gait.      Assessment/Plan:    1.  Chronic lower back pain with sciatica  2.  Restless leg syndrome  3.  Fibromyalgia  4.  Unsteady gait  5.  Anxiety and depression    Plan:  Continue Effexor 75 mg in the morning and 150 at night as she decreased herself because of increased sedation.  She is no longer taking Celexa therefore we will discontinue this from her medication list.  We discussed possibly decreasing gabapentin to 300 in the morning 300 afternoon and 600 at night if her  symptoms of fibromyalgia, sciatica with lower back pain and restless leg do not worsen with reducing gabapentin.  We also discussed the possibility of transitioning to Lyrica.  She will provide paperwork that Dr. Mukherjee was working on to get her a new scooter.  Continue follow-up with sleep medicine for restless leg and pain management for chronic pain syndrome.    Side effects discussed with patient. SHe understood.  I discussed assessment and plan with patient and answered the questions that she had.     Part of patient note might have been created using Dragon Dictation.  Any errors in syntax or even information may not have been identified and edited on initial review prior to signing this note.

## 2019-10-08 NOTE — PROCEDURES
Procedures  Due to indication in patient's history, presentation or risk factors,  a fiber optic exam was performed.    SEPARATE PROCEDURE NOTE:    ANESTHESIA:  Topical xylocaine with alexander-synephrine    FINDINGS:  Increased FVC squeeze on adduction; Mild  inaterarytenoid erythema and edema    PROCEDURE:  After verbal consent was obtained, the flexible scope was passed through the patient's nasal cavity without difficulty.  The nasopharynx (adenoid pad) and eustachian tube orifices were first visualized and were found to be normal, without masses or irregularity.  The posterior pharyngeal wall and base of tongue were then examined and no mass or irregular tissue was seen.  The scope was then advanced to the larynx, and the epiglottis, valleculae, and piriform sinuses were normal, without masses or mucosal irregularity.  The false vocal folds and true vocal folds were then examined and were found to have normal mobility (full abduction and adduction).   Increased FVC squeeze on adduction.The interartyenoid area had  mild edema and erythema.

## 2019-10-14 DIAGNOSIS — G25.81 RLS (RESTLESS LEGS SYNDROME): ICD-10-CM

## 2019-10-14 RX ORDER — CLONAZEPAM 0.5 MG/1
TABLET ORAL
Qty: 60 TABLET | Refills: 5 | Status: SHIPPED | OUTPATIENT
Start: 2019-10-14 | End: 2020-03-18 | Stop reason: SDUPTHER

## 2019-10-14 NOTE — TELEPHONE ENCOUNTER
LOV 03/04/19    Last filled clonazePAM (KLONOPIN) 0.5 MG tablet 60 tablet w/ 5 refills on 4/23/2019.

## 2019-10-18 ENCOUNTER — PATIENT MESSAGE (OUTPATIENT)
Dept: NEUROLOGY | Facility: CLINIC | Age: 69
End: 2019-10-18

## 2019-10-22 ENCOUNTER — TELEPHONE (OUTPATIENT)
Dept: NEUROLOGY | Facility: CLINIC | Age: 69
End: 2019-10-22

## 2019-10-22 NOTE — TELEPHONE ENCOUNTER
I returned a call to Yovani Lopez in regards to Dr. Goss being faxed over on [facility]. I informed her that we need a sign medical release from the patient to send records. Israel understood

## 2019-10-22 NOTE — TELEPHONE ENCOUNTER
----- Message from Ale Alexandre sent at 10/22/2019 11:00 AM CDT -----  Contact: Hugo Lopez - [phone]  Needs chart notes back. Please advise     Fax # [phone]

## 2019-10-28 ENCOUNTER — TELEPHONE (OUTPATIENT)
Dept: NEUROLOGY | Facility: CLINIC | Age: 69
End: 2019-10-28

## 2019-10-28 NOTE — TELEPHONE ENCOUNTER
Israel with [employer] called in regards to medical release. She says the form we received is the release they use. I will show this to Jennifer, my lead and give Israel a call back.

## 2019-10-28 NOTE — TELEPHONE ENCOUNTER
I returned Israel at [facility] [place], in regards to a signed medical release from patient to send Dr. Goss from patient's last visit with Dr. Stringer. I received a check list of the thing needed for the patient.  says she was on the other line. And I left a message on her voicemail

## 2019-10-28 NOTE — TELEPHONE ENCOUNTER
----- Message from Carmela Torres sent at 10/28/2019  8:20 AM CDT -----  Contact: Israel ahumada/ [employer] [phone]  Israel is requesting to speak with you regarding getting chart notes from October 8. She states patient already sign the medical release form to them. Please advise.

## 2019-10-29 ENCOUNTER — PATIENT OUTREACH (OUTPATIENT)
Dept: OTHER | Facility: OTHER | Age: 69
End: 2019-10-29

## 2019-10-29 ENCOUNTER — TELEPHONE (OUTPATIENT)
Dept: NEUROLOGY | Facility: CLINIC | Age: 69
End: 2019-10-29

## 2019-10-29 NOTE — PROGRESS NOTES
[facility]: Health  Follow-Up    The history is provided by the patient.     Follow Up  Follow-up reason(s): reading review      Readings are missing.   [facility] support needed.Patient has not made it to the [facility] yet because she was recovering from surgery. She hopes to make it there within the next 2-4 weeks. She is hoping that she will not have to purchase a new cuff but I did explain that it could be a possibility to prepare her for that. Patient expressed understanding.       Intervention/Plan    There are no preventive care reminders to display for this patient.    Last 5 Patient Entered Readings                                      Current 30 Day Average: 116/70     Recent Readings 10/3/2019 9/30/2019 9/23/2019 9/19/2019 9/18/2019    SBP (mmHg) 103 129 121 103 120    DBP (mmHg) 62 77 64 56 63    Pulse 71 100 64 72 64                  Screenings    SDOH

## 2019-10-29 NOTE — TELEPHONE ENCOUNTER
----- Message from Dahlia Velázquez sent at 10/29/2019 10:28 AM CDT -----  Contact: self  Patient is requesting a call back concerning some forms she needs. Please call

## 2019-10-29 NOTE — TELEPHONE ENCOUNTER
I returned patient's call in regards to some forms she needs. I spoke to the patient and she called to find out what is needed to send medical records to [facility]. I stated to her that I was familiar with the form that was faxed over. It was not the normal medical release form that I was uses to seeing. The physician name was not on it etc. And I told this to Israel at [facility] and she stated that this is the standard form that they send for release of records. I told her that I did not feel comfortable with sending the records and Per my lead we decided to send the form to the medical records department to let them handle it. Patient understood.

## 2019-10-31 ENCOUNTER — CLINICAL SUPPORT (OUTPATIENT)
Dept: REHABILITATION | Facility: HOSPITAL | Age: 69
End: 2019-10-31
Attending: OTOLARYNGOLOGY
Payer: MEDICARE

## 2019-10-31 ENCOUNTER — CLINICAL SUPPORT (OUTPATIENT)
Dept: REHABILITATION | Facility: HOSPITAL | Age: 69
End: 2019-10-31
Attending: ORTHOPAEDIC SURGERY
Payer: MEDICARE

## 2019-10-31 DIAGNOSIS — M53.3 SI (SACROILIAC) PAIN: ICD-10-CM

## 2019-10-31 DIAGNOSIS — R26.89 ANTALGIC GAIT: ICD-10-CM

## 2019-10-31 DIAGNOSIS — R49.0 DYSPHONIA: ICD-10-CM

## 2019-10-31 DIAGNOSIS — M62.9 HAMSTRING TIGHTNESS OF BOTH LOWER EXTREMITIES: ICD-10-CM

## 2019-10-31 DIAGNOSIS — R29.898 WEAKNESS OF BOTH LOWER EXTREMITIES: ICD-10-CM

## 2019-10-31 PROCEDURE — 97110 THERAPEUTIC EXERCISES: CPT | Mod: PO,59

## 2019-10-31 PROCEDURE — 97162 PT EVAL MOD COMPLEX 30 MIN: CPT | Mod: PO

## 2019-10-31 PROCEDURE — 92524 BEHAVRAL QUALIT ANALYS VOICE: CPT | Mod: PO | Performed by: SPEECH-LANGUAGE PATHOLOGIST

## 2019-10-31 PROCEDURE — 92507 TX SP LANG VOICE COMM INDIV: CPT | Mod: PO | Performed by: SPEECH-LANGUAGE PATHOLOGIST

## 2019-10-31 NOTE — PLAN OF CARE
"Outpatient Neurological Rehabilitation   Voice Evaluation    Date: 10/31/2019     Name: Hannah Norman   MRN: [id number]    Therapy Diagnosis:   Encounter Diagnosis   Name Primary?   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   Qualitative Analysis of Voice and Resonance  25 min   Total Untimed Units: 1  Charges Billed/# of units: 1    Precautions: Fall  Subjective    Date of Onset: over 3 years ago  Current Medical History: Hannah Norman is a 69 y.o. female referred for voice evaluation (CPT 85101) by Dr. Viridiana Lopez.  She presents with complaints of hoarseness which began over three years ago.  The patient also reported the following complaints: voice worse in afternoon/evening, fatigue with use, difficulty with singing and pain with use.  Pt stated "I seem to be having a lot of problems with my vocals, they are red and flamed and they've been like that for three years". Pt reports that she does have reflux. Pt reports that her voice sounds fine during the day but it becomes very hoarse and raspy at night to the point where her  cannot understand her. Pt stated she notices a problem with her voice after she yells or is very stressed. Pt believes her voice problem is the result of a case of bronchitis 3 years ago.   Past Medical History:   Past Medical History:   Diagnosis Date    Anxiety disorder     Bell's palsy     Chronic back pain     Chronic osteoarthritis     Essential tremor     Fibromyalgia     Hypertension     Mild acid reflux     Neck pain     Other and unspecified hyperlipidemia     Sleep " "apnea     wear c-pap at night; does not use regularly    Hannah Norman  has a past surgical history that includes Hysterectomy; Tonsillectomy; Breast reduction; Carpal tunnel release (Right); Knee surgery; Colonoscopy (2008); left shoulder (Left); Colonoscopy (N/A, 11/10/2017); Breast biopsy (Left); Placement of transobturator tape (N/A, 10/23/2018); Cystoscopy (N/A, 10/23/2018); Esophagogastroduodenoscopy (N/A, 5/30/2019); Tonsillectomy; Adenoidectomy; Appendectomy; Breast surgery (Bilateral); Cosmetic surgery (Bilateral); Tubal ligation; and Joint replacement (Left).  Medical Hx and Allergies: Hannah has a current medication list which includes the following prescription(s): acetaminophen, albuterol, amitiza, ciprofloxacin hcl, ciprofloxacin hcl, clonazepam, cyclobenzaprine, diclofenac sodium, durezol, fluticasone propionate, furosemide, gabapentin, ilevro, losartan, lovastatin, magnesium oxide, morphine, ofloxacin, oxybutynin, oxycodone-acetaminophen, pantoprazole, potassium chloride sa, pramipexole, relistor, and venlafaxine.   Review of patient's allergies indicates:   Allergen Reactions    Hyoscyamine Rash   Prior Level of Function: No difficulty with voice production    Current Level of Function:  Mild dysphonia which worsens throughout the day   Prior Therapy: Home Health speech therapy when pt had [place] Palsy about 3-4 years ago   Pain:   0/10  Pain Location / Description: n/a  Nutrition: Soft foods/thin liquids   Social History: Pt lives with her . She is a retired . Pt's daughter lives behind her and her .   Patient Therapy Goals: "to fix my voice"     Swallowing:  "When I swallow I feel like theres a lot of air in my chest." Pt reported no changes in swallowing since 2017 MBSS.   MBSS completed 7/6/17, revealed the following: "Impressions: WFL oral and pharyngeal phase of the swallow, No pharyngeal residuals and NO evidence of aspiration or airway penetration noted. Pt with " "good oral and pharyngeal sensation."   "When I swallow I feel like theres a lot of air in my chest."   Breathing: throat clearing    Smokin packs/day   EtOH: 0 drinks/week   Caffeine: 3 cups/day   Reflux: yes  Water: 48 oz/day     Laryngeal endoscopy OR stroboscopy findings per Dr. Kemp on 10/7/19:  "FINDINGS:  Increased FVC squeeze on adduction; Mild  inaterarytenoid erythema and edema  PROCEDURE:  After verbal consent was obtained, the flexible scope was passed through the patient's nasal cavity without difficulty.  The nasopharynx (adenoid pad) and eustachian tube orifices were first visualized and were found to be normal, without masses or irregularity.  The posterior pharyngeal wall and base of tongue were then examined and no mass or irregular tissue was seen.  The scope was then advanced to the larynx, and the epiglottis, valleculae, and piriform sinuses were normal, without masses or mucosal irregularity.  The false vocal folds and true vocal folds were then examined and were found to have normal mobility (full abduction and adduction).   Increased FVC squeeze on adduction.The interartyenoid area had  mild edema and erythema."  Objective      Perceptual assessment:    -Quality: rough, yen/pulse mode phonation and tremulous  -Volume: appropriate for age and gender  -Pitch: appropriate for age and gender  -Flexibility: appropriate for age and gender    Habitual respiratory pattern: diaphragmatic.  CAPE-V Overall Score: 11  MPT (ah > 18s WFL): 17 seconds  S/Z ratio (ratio of 1.4+ may indicate a degree of vocal fold dysfunction): 1:1    VHI-10 (completed to assess self-perceived handicap associated with dysphonia; >11 considered abnormal): 68; severe   RSI (completed to assess the possible presence and/or severity of LPR symptoms and any relationship between this condition and the pt's dysphagia; score of ~15 may indicate LPR): 20    Education / Stimulability Trials  Discussed importance of vocal hygiene " "including: hydration, conservation, reducing caffeine / drying agents and reducing throat clearing, coughing, or other phonotraumatic behaviors.  Patient was stimulable for improved voice using straw phonation with cup bubbles.  Improvement also noted with resonant /m/ ("mhm") in addition to cues to bring voice more forward.  Encouraged practicing exercises several times daily in isolation and into short phrases to solidify muscle memory patterns and reduce extralaryngeal tension during speech tasks.     JANEL NOMS (National Outcome Measure System):  Voice  LEVEL 6: Voice sounds normal most of the time across all settings and situations. Selfmonitoring is consistent when needed. The individual¢s ability to participate in vocational, avocational, and social activities requiring voice is not affected in low vocal demand activities, but is rarely affected in high-vocal demand activities.     Treatment   Treatment Time In: 3:45  Treatment Time Out: 4:00  Total billable time: 15 minutes    Education: Plan of Care, role of SLP in care, vocal hygeine, scheduling/ cancellation policy and insurance limitations / visit limit  was discussed with pt. Patient verbalized understanding of all discussed.     Home Program: provided pt with home program   Assessment     Patient presents with muscle tension dysphonia secondary to "increased FVC squeeze on adduction; Mild inaterarytenoid erythema and edema" as diagnosed by Dr. Kemp. Demonstrates impairments including limitations as described in the problem list. Positive prognostic factors include pt motivation. Negative prognostic factors include time since onset. She presents with mild dysphonia c/b hoarse vocal quality, pitch breaks, and vocal yen. No barriers to therapy identified.  Prognosis for continued improvement is good.     Rehab Potential: good  Pt's spiritual, cultural and educational needs considered and pt agreeable to plan of care and goals.    Short Term Goals: (4 " weeks)    1. Pt will complete neck relaxation exercises 10x each per session/at home ind'ly.             2. Patient will identify the sensations associated with muscle relaxation in the abdominal, thoracic, neck and facial areas during efficient phonation with minimal clinician cue.     3. Patient will discriminate between easy and strained phonation with 80% accuracy.     4.  Patient will complete SOVT exercises and/or resonant-focused exercises 3-5x daily to strengthen and balance the intrinsic laryngeal musculature and maximize glottic closure without medial hyperfunction.      5.  Patient will improve the quality, efficiency, and ease of phonation through resonant and/or airflow exercises from the syllable to conversation level with 80% accuracy.    6. Pt will reduce/eliminate/substitute throat clearing ind'ly.       Long Term Goals (8 weeks):   1. Patient and clinician will facilitate changes in vocal function in order to restore functional use of voice for daily occupational, social, and emotional demands.  2. Patient will re-establish phonation with adequate balance of airflow and resonance with decreased muscle tension.   3. Pt will demonstrate improved vocal quality/loudness/intonantion for sustained vocalization/speech at the word/phrase/sentence/conversational level to communicate basic medical and social needs in functional living environment.      Plan     Plan of Care Certification Period: 10/31/2019  to 12/31/19    Recommended Treatment Plan:  Patient will participate in the [facility] neurological rehabilitation program for 3-5 sessopns of voice/speech therapy over 4-8 weeks with a speech-language pathologist to optimize glottal postures for improved vocal function, vocal efficiency, and ease of phonation. She should continue the exercises as discussed in session and should contact me with any further questions.      Other Recommendations:   -Continue exercises as discussed in session  -Contact  clinician with any further questions      ZAIDA Bolaños, CF-SLP  Speech Language Pathologist   10/31/2019

## 2019-10-31 NOTE — PATIENT INSTRUCTIONS
SI joint Muscle energy for L Posterior/R anterior rotation    Bring both knees up towards your chest as shown in the picture.  Place your Left hand on top of your Left thigh, and your Right hand on the back of your Right thigh.  Push your legs into each hand with about 50% effort.  Hold for 5 seconds. Repeat 3 times. Can be done in a seated position, pushing R foot into the floor.        Si Joint Muscle Energy Symphysis Pubis Reset    Bring both knees up towards your chest as shown in the picture.  Place your hands between your knees.  Squeeze your legs together and hold for 5 seconds.  Relax and then repeat 3 times.

## 2019-10-31 NOTE — PATIENT INSTRUCTIONS
"Straw Phonation: create light seal around straw with lips. Place straw in front of teeth not between teeth. Maintain a low, open vowel sound. Complete 5 times. Try to maintain this "buzzing resonance" while speaking immediately after using straw.      Maintaining a Healthy Voice    Protecting Your Voice:  Consistent misuse and abuse of your voice can lead to changes of your vocal mechanism, such as swelling of the vocal cords and growths (i.e. Polyps, nodes, nodules, and contact ulcers). These physical changes often lead to unpleasant changes in vocal quality, such as persistent hoarseness.     Sources of Vocal Misuse:  · Speaking too loudly  · Speaking at improper pitch, either too high or too low  · Speaking with tension in throat    Sources of Vocal Abuse:  · Shouting, cheering, and screaming  · Talking too much  · Constant coughing / throat clearing  · Speaking with excessive force, tension, or "pushing"  · Smoking and alcohol consumption     Tips for Healthy Vocal Hygeine:  · Speak only to people who are within a reasonable speaking distance so you are heard easily and avoid vocal strain. Do not try to carry on a conversation between rooms or over loud noises  · Use a megaphone, bullhorn, or microphone if you have to be heard. A cordless microphone gives you mobility.   · Speak at sufficient loudness, at a comfortable pitch level and with adequate breath supply.  · Talk without tension. Do not use a forced whisper. Keep your throat and neck relaxed.   · Avoid shouting, screaming, cheering, and excessive amounts of loud laughing.   · Avoid excessive throat clearing and coughing. Use a "silent cough" or "sniff then swallow" to clears mucous. Try taking 6 continuous sips of water if other strategies are ineffective.  · Drink several (8-10) glasses of water a day, especially when taking decongestants.   · Avoid talking in noisy situations where you must raise the loudness of your voice to be heard.  · Do not smoke. "   · Drink alcohol only in moderation.  · Avoid smoky or chuyita places.       Neck Relaxation Exercise:  · Chin to ceiling: inhale through nose, look up to count of 5 while exhaling through mouth  · Elevated head turn: sitting up straight, inhale through nose, turn head towards shoulder and lift chin up and stretch neck while exhaling through the mouth. Repeat on other side.   · Ear to shoulder: inhale through nose, tilt head/ ear toward shoulder while exhaling through mouth. Keep shoulders relaxed.   · Half Council neck rolls: drop head to chest, inhale through nose, rotate head to a count of 4 in a half Council while exhaling through mouth. Rotate side to side.  · Neck extension: inhale through nose, extend chin and head forward while exhaling through mouth. Relax. Inhale through nose and pull head back as if pushing against an imaginary stiff collar while exhaling through mouth.

## 2019-10-31 NOTE — PLAN OF CARE
[facility]  Physical Therapy Initial Evaluation    Name: Hannah Norman  Clinic Number: [id number]    Therapy Diagnosis:   Encounter Diagnoses   Name Primary?    Weakness of both lower extremities     Hamstring tightness of both lower extremities     SI (sacroiliac) pain     Antalgic gait      Physician: Eliezer Parmar MD    Physician Orders: PT Eval and Treat   Medical Diagnosis from Referral: M54.30 (ICD-10-CM) - Sciatica, unspecified laterality  Evaluation Date: 10/31/2019  Authorization Period Expiration: 09/19/2020  Plan of Care Expiration: 12/31/2019  Visit # / Visits authorized: 1/ 1    Time In: 3:00 pm  Time Out: 4:00 pm  Total Billable Time: 60 minutes    Precautions: Standard and Fall    Subjective   Date of onset: exacerbation of symptoms 9/20/2019  History of current condition - Hannah reports: having back pain for several years, but now her sciatic is constant and she has to get injections on a regular basis. Due to her pain she now has trouble with everything and the pain goes down to her ankle on the left. She can stand about 10 miuntes before increase pain so has difficulty doing her dishes, can walk 1 lap around her yard(1 acre) before back pain increases, she is unable to work in her garden, difficulty with sweeping, difficulty with stepping over the side of her clawfoot tub, transfers in/out of a high vehicle are difficult, and ascending her stairs is difficulty.      Medical History:   Past Medical History:   Diagnosis Date    Anxiety disorder     Bell's palsy     Chronic back pain     Chronic osteoarthritis     Essential tremor     Fibromyalgia     Hypertension     Mild acid reflux     Neck pain     Other and unspecified hyperlipidemia     Sleep apnea     wear c-pap at night; does not use regularly       Surgical History:   Hannah Norman  has a past surgical history that includes Hysterectomy; Tonsillectomy; Breast reduction; Carpal tunnel release  (Right); Knee surgery; Colonoscopy (2008); left shoulder (Left); Colonoscopy (N/A, 11/10/2017); Breast biopsy (Left); Placement of transobturator tape (N/A, 10/23/2018); Cystoscopy (N/A, 10/23/2018); Esophagogastroduodenoscopy (N/A, 5/30/2019); Tonsillectomy; Adenoidectomy; Appendectomy; Breast surgery (Bilateral); Cosmetic surgery (Bilateral); Tubal ligation; and Joint replacement (Left).    Medications:   Hannah has a current medication list which includes the following prescription(s): acetaminophen, albuterol, amitiza, ciprofloxacin hcl, ciprofloxacin hcl, clonazepam, cyclobenzaprine, diclofenac sodium, durezol, fluticasone propionate, furosemide, gabapentin, ilevro, losartan, lovastatin, magnesium oxide, morphine, ofloxacin, oxybutynin, oxycodone-acetaminophen, pantoprazole, potassium chloride sa, pramipexole, relistor, and venlafaxine.    Allergies:   Review of patient's allergies indicates:   Allergen Reactions    Hyoscyamine Rash        Imaging, x-rays: B hips 9/20/2019  FINDINGS:  There is no evidence for acute fracture or dislocation of the hips bilaterally.  Bony pelvis is grossly intact.  No focal bony erosion.  Several scattered probable calcifications project over the right lateral gluteal soft tissues.  Further evaluation as warranted clinically.      Impression       Please see above     Prior Therapy: PT previously for TKA, RTC   Social History: single story home, 2 steps into the [facility] with rails, lives with their family  Occupation: retired  Prior Level of Function: independent  Current Level of Function: mod I    Pain:  Current 4/10, worst 9/10, best 3/10   Location: bilateral back and L LE to ankle  Description: Aching  Aggravating Factors: Standing, Bending, Walking and Lifting  Easing Factors: pain medication, ice, heating pad, injection, hot bath and rest    Pts goals: To deal with the pain better when it flares up.    Objective     Observation: Patient is a 69 year old female, who presents to  the clinic in no apparent distress. She is ambulating with a slight antalgic gait.     Posture:  sitting legs crossed Right over Left, RLE longer in supine    Lumbar Range of Motion:    Degrees Pain   Flexion 60 Pain across sacrum   Extension 30    Left Side Bending 20    Right Side Bending 20         Lower Extremity Strength  Right LE  Left LE    Knee extension: 5/5 Knee extension: 4/5   Knee flexion: 5/5 Knee flexion: 5/5   Hip flexion: 4-/5 Hip flexion: 4/5   Hip extension:  4/5 Hip extension: 3+/5   Hip abduction: 4/5 Hip abduction: 4-/5   Hip adduction: 5/5 Hip adduction 5/5   Ankle dorsiflexion: 4+/5 Ankle dorsiflexion: 4+/5   Ankle plantarflexion: 5/5 Ankle plantarflexion: 5/5       Special Tests:  -Repeated Flexion: no change  -Repeated Ext: no change  - JESSICA positive R    DTR:   Right Left Comment   Patellar (L3-4) 1+ 1+    Achilles (S1) 1+ 1+        Neuro Dynamic Testing:    Sciatic nerve:      SLR: R = 45 degrees     L = 45 degrees     Joint Mobility: Lumbar PIVM WNL    Palpation: tender to palpation over R SI joint, R piriformis    Sensation: LT intact reports decreased over medial L calf    Flexibility:    Popliteal Angle: R = 40 degrees ; L = 45 degrees       CMS Impairment/Limitation/Restriction for FOTO Lumbar Spine Survey    Therapist reviewed FOTO scores for Hannah Norman on 10/31/2019.   FOTO documents entered into [employer] - see Media section.    Limitation Score: 60%  Category: Carrying    Current : CL = least 60% but < 80% impaired, limited or restricted  Goal: CK = at least 40% but < 60% impaired, limited or restricted  Discharge:N/A at this time         TREATMENT   Treatment Time In: 3:50 pm  Treatment Time Out: 4:00 pm  Total Treatment time separate from Evaluation: 10 minutes    Hannah received therapeutic exercises to develop ROM for 10 minutes including:  Push/pull for left posterior rotation of her pelvis    Home Exercises and Patient Education Provided    Education provided:   - compliance  with HEP  - role of PT and goals for PT     Written Home Exercises Provided: yes.  Exercises were reviewed and Hannah was able to demonstrate them prior to the end of the session.  Hannah demonstrated good  understanding of the education provided.     See EMR under Patient Instructions for exercises provided 10/31/2019.    Assessment   Hannah is a 69 y.o. female referred to outpatient Physical Therapy with a medical diagnosis of Sciatica, unspecified laterality. Pt presents with weakness of both lower extremities, hamstring tightness of both lower extremities, SI pain, and antalgic gait. Due to her weakness and muscle tightness she has difficulty using her correct body mechanics with ADLs, has difficulty with prolonged standing, prolonged walking, transfers, and recreational activities. She tested positive for SI pain with FABERS and her R LE was longer in supine. Her current score on FOTO Lumbar Spine places her in the 60%<80% impaired, limited, or restricted category.     Pt prognosis is Fair.   Pt will benefit from skilled outpatient Physical Therapy to address the deficits stated above and in the chart below, provide pt/family education, and to maximize pt's level of independence.     Plan of care discussed with patient: Yes  Pt's spiritual, cultural and educational needs considered and patient is agreeable to the plan of care and goals as stated below:     Anticipated Barriers for therapy: Fibromyalgia    Medical Necessity is demonstrated by the following  History  Co-morbidities and personal factors that may impact the plan of care Co-morbidities:   anxiety, high BMI and Fibromyalgia, OA    Personal Factors:   no deficits     high   Examination  Body Structures and Functions, activity limitations and participation restrictions that may impact the plan of care Body Regions:   back  lower extremities    Body Systems:    strength  gait  transfers  motor control  flexibility    Participation Restrictions:    Difficulty with prolonged standing, prolonged walking, performing transfers, performing her usual household duties, performing yard work, and ascending stairs.     Activity limitations:   Learning and applying knowledge  no deficits    General Tasks and Commands  no deficits    Communication  no deficits    Mobility  lifting and carrying objects  walking  using transportation (bus, train, plane, car)  ascending stairs    Self care  no deficits    Domestic Life  doing house work (cleaning house, washing dishes, laundry)  yard work    Interactions/Relationships  no deficits    Life Areas  no deficits    Community and Social Life  community life  recreation and leisure         high   Clinical Presentation evolving clinical presentation with changing clinical characteristics moderate   Decision Making/ Complexity Score: moderate     Goals:  Short Term Goals: 4 weeks   1. This patient will be independent with a basic HEP.  2. This patient will have lumbar AROM WFL and pain free in order to be able to perform vehicle transfers with no increase in symptoms.   3. This patient will increase B LE strength by 1 grade in order to wash her dishes with no increase in symptoms.   4. This patient will have a pain rating of 4/10 at worst with ADLs.   5. Patient able to score greater than or equal to 50 on the FOTO Lumbar Spine Survey placing patient in 40%<60% impaired, limited, or restricted category demonstrating overall decreased low back pain with functional activities.     Long Term Goals: 8 weeks   1. This patient will be independent with an updated HEP.  2. This patient will increase B LE strength to 5/5 in order to ascend/descend stairs with no increase in symptoms.   3. This patient will have a pain rating of 2/10 at worst with ADLs.   4. Patient able to score greater than or equal to 60 on the FOTO Lumbar Spine Survey placing patient in 40%<60% impaired, limited, or restricted category demonstrating overall decreased low  back pain with functional activities.     Plan   Plan of care Certification: 10/31/2019 to 12/31/2019.    Outpatient Physical Therapy 2 times weekly for 8 weeks to include the following interventions: Gait Training, Manual Therapy, Moist Heat/ Ice, Neuromuscular Re-ed, Patient Education, Therapeutic Activites, Therapeutic Exercise and IASTM. Dry needling with manual therapy techniques to decrease pain, inflammation and swelling, increase circulation and promote healing process.      Saida Herrera, PT

## 2019-11-06 PROBLEM — R29.898 WEAKNESS OF BOTH LOWER EXTREMITIES: Status: ACTIVE | Noted: 2019-11-06

## 2019-11-06 PROBLEM — M62.9 HAMSTRING TIGHTNESS OF BOTH LOWER EXTREMITIES: Status: ACTIVE | Noted: 2019-11-06

## 2019-11-06 PROBLEM — M53.3 SI (SACROILIAC) PAIN: Status: ACTIVE | Noted: 2019-11-06

## 2019-11-06 PROBLEM — R26.89 ANTALGIC GAIT: Status: ACTIVE | Noted: 2019-11-06

## 2019-11-07 ENCOUNTER — CLINICAL SUPPORT (OUTPATIENT)
Dept: REHABILITATION | Facility: HOSPITAL | Age: 69
End: 2019-11-07
Attending: ORTHOPAEDIC SURGERY
Payer: MEDICARE

## 2019-11-07 DIAGNOSIS — M62.9 HAMSTRING TIGHTNESS OF BOTH LOWER EXTREMITIES: ICD-10-CM

## 2019-11-07 DIAGNOSIS — R49.0 DYSPHONIA: ICD-10-CM

## 2019-11-07 DIAGNOSIS — R26.89 ANTALGIC GAIT: ICD-10-CM

## 2019-11-07 DIAGNOSIS — M53.3 SI (SACROILIAC) PAIN: ICD-10-CM

## 2019-11-07 DIAGNOSIS — R29.898 WEAKNESS OF BOTH LOWER EXTREMITIES: ICD-10-CM

## 2019-11-07 PROCEDURE — 97110 THERAPEUTIC EXERCISES: CPT | Mod: PO,59

## 2019-11-07 PROCEDURE — 92507 TX SP LANG VOICE COMM INDIV: CPT | Mod: PO | Performed by: SPEECH-LANGUAGE PATHOLOGIST

## 2019-11-07 NOTE — PROGRESS NOTES
Outpatient Neurological Rehabilitation   Speech and Language Therapy Daily Note  Date:  11/7/2019     Name: Hannah Norman   MRN: [id number]   Therapy Diagnosis:   Encounter Diagnosis   Name Primary?   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 She states that she has not been compliant with exercise program. Pt wishes to discontinue skilled speech therapy services as her voice is unpredictable and she is unsure when she will experience problems with her voice. She states that she will complete exercise program daily to solidify muscle memory patterns.   Response to previous treatment: positive   Pain Scale:  8/10 on VAS currently.   Pain Location: lower back and legs   Objective:        UNTIMED  Procedure Min.   Speech- Language- Voice Therapy  40   Total Timed Units: 0  Total Untimed Units: 1  Charges Billed/# of units: 1     Short Term Goals: (4 weeks)     1. Pt will complete neck relaxation exercises 10x each per session/at home ind'ly.           Progressing/ Not Met 11/7/2019  Patient completed the following given a model:  -neck rolls x 10   -shoulder rolls x 10  -yawn signs x 10   2. Patient will identify the sensations associated with muscle relaxation in the abdominal, thoracic, neck and facial areas during efficient phonation with minimal clinician cue.   Progressing/ Not Met 11/7/2019   Not formally addressed     3. Patient will  "discriminate between easy and strained phonation with 80% accuracy.   Progressing/ Not Met 11/7/2019   Not formally addressed     4.  Patient will complete SOVT exercises and/or resonant-focused exercises 3-5x daily to strengthen and balance the intrinsic laryngeal musculature and maximize glottic closure without medial hyperfunction.   Progressing/ Not Met 11/7/2019   - lip trills x 10  - /mhmm/: x10  - /m/  Words x 5  - resonant thepapy exercises /m/, /b/, and /p/ x 75 each  Improved vocal quality noted    5.  Patient will improve the quality, efficiency, and ease of phonation through resonant and/or airflow exercises from the syllable to conversation level with 80% accuracy.  Progressing/ Not Met 11/7/2019    -lax vox:   -blow bubbles without phonation x 10   -blow bubbles with phonation x 10    -blow bubbles ascending /m/ x 10   -blow bubbles descending /m/ x 10     Sustain /m/ for 3 seconds x 10    Glides ascending x 5 (pt with difficulty completing these; pt reports throat feeling dry)  Glides descending x 10  One step up the scale /m/ x 5   One step down the scale /m/ x 5     -Easy onset to assist with reducing vocal tension     - h initial syllables x10     - h initial words x10  Improved vocal quality noted    6. Pt will reduce/eliminate/substitute throat clearing ind'ly.   Progressing/ Not Met 11/7/2019  Mild throat clearing x 3  Moderate throat clearing x 4   Harsh throat clearing x 0        Patient Education/Response:   Ongoing education regarding vocal hygiene behaviors (reducing throat clearing by swallowing "hard" and/or drinking sips of water; effects of phonotraumatic behaviors on vocal folds).  Performance of therapy tasks discussed throughout session. Patient verbalized understanding to all discussed.     Written Home Exercises Provided: Patient instructed to cont prior HEP.  Exercises were reviewed and Hannah was able to demonstrate them prior to the end of the session.  Hannah demonstrated good  " understanding of the education provided.     See EMR under Patient Instructions for exercises provided prior visit.  Assessment:   Hannah is progressing well towards her goals. Pt with good understanding of voice exercises and demonstrated ability to perform them appropriately. Pt most stimulable for improved vocal quality using resonant therapy exercise and cup bubbles without straw phonation. Pt requesting discharge from ST services as she feels that she can perform these exercises independently at home and does not need to continue receiving ongoing speech therapy services due to her voice being unpredictable.     Barriers to Therapy: none  Pt's spiritual, cultural and educational needs considered and pt agreeable to plan of care and goals.  Plan:   Pt to be discharged from skilled speech therapy services at pt's request.     ZAIDA Bolaños, CF-SLP  Speech Language Pathologist   11/7/2019

## 2019-11-07 NOTE — PATIENT INSTRUCTIONS
Supine Hamstring Stretch    Raise your leg with belt around heel of foot. Raise leg until a stretch is felt in the back of the thigh. Keep knee straight. Hold 10 seconds.   Repeat 10 times each side per set. Do 1 sets per session. Do 2 sessions per day.    Stretching: Piriformis (Supine)        Pull right knee toward opposite shoulder. Hold 10 seconds. Relax.  Repeat 10 times per set. Do 1 sets per session. Do 2 sessions per day.     [email or web address]       Isometric Abdominal        Lying on back with knees bent, tighten stomach by pressing elbows down. Hold 5 seconds.  Repeat 10 times per set. Do 1 sets per session. Do 2 sessions per day.     [email or web address]     Copyright © [employer]. All rights reserved.         Bridging    Flatten back against floor then slowly raise buttocks from floor, keeping stomach tight. Hold 1 seconds.  Repeat 10 times per set. Do 1 sets per session. Do 2 sessions per day.      Bent Leg Lift (Hook-Lying)    Tighten stomach and slowly raise right leg from floor. Keep trunk rigid. Slowly lower and switch sides.  Repeat 10 times per set. Do 1 sets per session. Do 2 sessions per day.    Strengthening: Straight Leg Raise (Phase 1)        Tighten muscles on front of right thigh, then lift leg from surface, keeping knee locked.   Repeat 10 times per set. Do 1 sets per session. Do 2 sessions per day.     [email or web address]     Copyright © [employer]. All rights reserved.        Strengthening: Hip Abduction (Side-Lying)        Tighten muscles on side of left thigh, then lift leg from surface, keeping knee locked.   Repeat 10 times per set. Do 1 sets per session. Do 2 sessions per day.     [email or web address]         Strengthening: Hip Extension (Prone)        Tighten muscles in left glut and on back of left thigh, then lift leg from surface, keeping knee locked.  Repeat 10 times per set. Do 1 sets per session. Do 2 sessions per day.     [email or web address]        Seated  Marching    Sit, both feet flat, tighten abdominals. Lift right knee toward ceiling. Lower and lift left knee toward the ceiling. Repeat, alternating sides.  Complete 1 sets of 10 repetitions. Perform 2 sessions per day.

## 2019-11-07 NOTE — PLAN OF CARE
Outpatient Therapy Discharge Summary     Discharge Date: 11/7/2019   Name: Hannah Norman  Clinic Number: [id number]  Therapy Diagnosis:   Encounter Diagnosis   Name Primary?   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 Pt most stimulable for improved vocal quality using resonant therapy exercise and cup bubbles without straw phonation. Pt requesting discharge from ST services as she feels that her voice is unpredictable and she is unsure when she will experience voice problems again, she stated that she can perform these exercises independently at home and does not need to continue receiving ongoing speech therapy services.    Goals:   Short Term Goals: (4 weeks)     1. Pt will complete neck relaxation exercises 10x each per session/at home ind'ly.            Goal Met 11/7/19 / Discontinue      2. Patient will identify the sensations associated with muscle relaxation in the abdominal, thoracic, neck and facial areas during efficient phonation with minimal clinician cue.    Goal Not Met / Discontinue      3. Patient will discriminate between easy and strained phonation with 80% accuracy.      Goal Not Met / Discontinue    4.  Patient will complete SOVT exercises and/or resonant-focused exercises 3-5x daily to strengthen and balance the intrinsic laryngeal musculature and maximize glottic closure without medial hyperfunction.    Goal Not Met / Discontinue    5.  Patient will improve the quality, efficiency, and ease of phonation through resonant and/or airflow exercises from the syllable to conversation level with  80% accuracy.      Goal Met 11/7/19 / Discontinue      6. Pt will reduce/eliminate/substitute throat clearing ind'ly.     Goal Not Met / Discontinue            Long Term Goals (8 weeks):   1. Patient and clinician will facilitate changes in vocal function in order to restore functional use of voice for daily occupational, social, and emotional demands. Goal Met 11/7/19 / Discontinue   2. Patient will re-establish phonation with adequate balance of airflow and resonance with decreased muscle tension. Goal Met 11/7/19 / Discontinue   3. Pt will demonstrate improved vocal quality/loudness/intonantion for sustained vocalization/speech at the word/phrase/sentence/conversational level to communicate basic medical and social needs in functional living environment. Goal Met 11/7/19 / Discontinue     JANEL NOMS (National Outcome Measure System)  Voice  LEVEL 7: The individual¢s ability to successfully and independently participate in vocational, avocational, and social activities requiring high-or low-vocal demands is not limited by voice. Self-monitoring is effectively used, but only occasionally needed.    Discharge reason: Patient requested discharge    Plan   This patient is discharged from Speech Therapy    ZAIDA Bolaños, CF-SLP  Speech Language Pathologist   11/7/2019

## 2019-11-07 NOTE — PROGRESS NOTES
"  Physical Therapy Daily Treatment Note     Name: Hannah Norman  Clinic Number: [id number]    Therapy Diagnosis:   Encounter Diagnoses   Name Primary?    Weakness of both lower extremities     Hamstring tightness of both lower extremities     SI (sacroiliac) pain     Antalgic gait      Physician: Eliezer Parmar MD    Visit Date: 11/7/2019     Physician Orders: PT Eval and Treat   Medical Diagnosis from Referral: M54.30 (ICD-10-CM) - Sciatica, unspecified laterality  Evaluation Date: 10/31/2019  Authorization Period Expiration: 09/19/2020  Plan of Care Expiration: 12/31/2019  Visit #/Visits authorized: 2/ 20     Time In: 10:00 am  Time Out: 10:48 am  Total Billable Time: 48 minutes    Precautions: Standard and Fall    Subjective     Pt reports: having a lot of pain in her low back, going down her right leg..  She was compliant with home exercise program.  Response to previous treatment: sore.  Functional change: not at this time.    Pain: 8/10  Location: bilateral back      Objective     Patient presented to therapy with a right anterior rotation of ilium, performed push/pull with patient to achieve a level pelvis.     Hannah received therapeutic exercises to develop strength, ROM, flexibility and core stabilization for 48 minutes 1:1 with PTA including:      Date  11/07/19   VISIT 2/20   G CODE VISIT 2/10  11/31/19   POC EXP. DATE 12/31/19   VISIT AMOUNT  MEDICARE TOTAL 90.96  204.16   FACE-TO-FACE 11/31/19       TABLE:    HSS w/ strap 10 x 10"   Piriformis stretch 5 x 10"   Push/ pull 3 x 3"   TA's 10 x 5"   Bridge w/ TA 1 x 10   Marching  w/ TA 1 x 10   SLR w/ TA 1 x 10   Hip abduction - SL 1 x 10   Hip adduction ---   Hip extension 1 x 10   SEATED:    LAQs ---   Seated Hip Flex w/ TA 1 x 10   HS curls ---   STANDING:    Mini squats ---   Step Ups ---           Initials GWA 1/6       Home Exercises Provided and Patient Education Provided     Education provided:   - keep exercises in a pain free " range.    Written Home Exercises Provided: yes.  Exercises were reviewed and Hannah was able to demonstrate them prior to the end of the session.  Hannah demonstrated fair  understanding of the education provided.     See EMR under Patient Instructions for exercises provided 11/7/2019.    Assessment     Patient performed above exercise program with verbal cueing for proper technique and cues to keep all exercises in a pain free range.    Hannah is progressing well towards her goals.   Pt prognosis is Fair.     Pt will continue to benefit from skilled outpatient physical therapy to address the deficits listed in the problem list box on initial evaluation, provide pt/family education and to maximize pt's level of independence in the home and community environment.     Pt's spiritual, cultural and educational needs considered and pt agreeable to plan of care and goals.    Anticipated barriers to physical therapy:  Fibromyalgia    Goals:  Short Term Goals: 4 weeks   1. This patient will be independent with a basic HEP.  IN PROGRESS  2. This patient will have lumbar AROM WFL and pain free in order to be able to perform vehicle transfers with no increase in symptoms.  IN PROGRESS  3. This patient will increase B LE strength by 1 grade in order to wash her dishes with no increase in symptoms.  IN PROGRESS  4. This patient will have a pain rating of 4/10 at worst with ADLs.  IN PROGRESS  5. Patient able to score greater than or equal to 50 on the FOTO Lumbar Spine Survey placing patient in 40%<60% impaired, limited, or restricted category demonstrating overall decreased low back pain with functional activities.  IN PROGRESS     Long Term Goals: 8 weeks   1. This patient will be independent with an updated HEP. IN PROGRESS  2. This patient will increase B LE strength to 5/5 in order to ascend/descend stairs with no increase in symptoms.  IN PROGRESS  3. This patient will have a pain rating of 2/10 at worst with ADLs.  IN  PROGRESS  4. Patient able to score greater than or equal to 60 on the FOTO Lumbar Spine Survey placing patient in 40%<60% impaired, limited, or restricted category demonstrating overall decreased low back pain with functional activities.  IN PROGRESS       Plan     Continue with Plan Of Care and progress toward PT goals.     Faheem Morrissey, PTA

## 2019-11-12 ENCOUNTER — CLINICAL SUPPORT (OUTPATIENT)
Dept: REHABILITATION | Facility: HOSPITAL | Age: 69
End: 2019-11-12
Attending: ORTHOPAEDIC SURGERY
Payer: MEDICARE

## 2019-11-12 DIAGNOSIS — R26.89 ANTALGIC GAIT: ICD-10-CM

## 2019-11-12 DIAGNOSIS — M53.3 SI (SACROILIAC) PAIN: ICD-10-CM

## 2019-11-12 DIAGNOSIS — R29.898 WEAKNESS OF BOTH LOWER EXTREMITIES: ICD-10-CM

## 2019-11-12 DIAGNOSIS — M62.9 HAMSTRING TIGHTNESS OF BOTH LOWER EXTREMITIES: ICD-10-CM

## 2019-11-12 PROCEDURE — 97110 THERAPEUTIC EXERCISES: CPT | Mod: PO

## 2019-11-12 NOTE — PROGRESS NOTES
"  Physical Therapy Daily Treatment Note     Name: Hannah Norman  Clinic Number: [id number]    Therapy Diagnosis:   Encounter Diagnoses   Name Primary?    Weakness of both lower extremities     Hamstring tightness of both lower extremities     SI (sacroiliac) pain     Antalgic gait      Physician: Eliezer Parmar MD    Visit Date: 11/12/2019     Physician Orders: PT Eval and Treat   Medical Diagnosis from Referral: M54.30 (ICD-10-CM) - Sciatica, unspecified laterality  Evaluation Date: 10/31/2019  Authorization Period Expiration: 09/19/2020  Plan of Care Expiration: 12/31/2019  Visit #/Visits authorized: 3/ 20     Time In: 10:15 am  Time Out: 10:52 am  Total Billable Time: 25 minutes    Precautions: Standard and Fall    Subjective     Pt reports: she is having less pain today because she took a pain-pill this morning   She was compliant with home exercise program.  Response to previous treatment: sore.  Functional change: not at this time.    Pain: 4/10  Location: bilateral back      Objective     Patient presented to therapy with a right anterior rotation of ilium, performed push/pull with patient to achieve a level pelvis.     Hannah received therapeutic exercises to develop strength, ROM, flexibility and core stabilization for 25 minutes 1:1 with PT including:      Date  11/12/19 11/07/19   VISIT 3/20 2/20   G CODE VISIT 3/10  11/31/19 2/10  11/31/19   POC EXP. DATE 12/31/19 12/31/19   VISIT AMOUNT  MEDICARE TOTAL 60.64  264.80 90.96  204.16   FACE-TO-FACE 11/31/19 11/31/19        TABLE:     HSS w/ strap 10 x 10" 10 x 10"   Piriformis stretch 5 x 10" 5 x 10"   Push/ pull 3 x 3" 3 x 3"   TA's 15 x 5" 10 x 5"   Bridge w/ TA 1 x 15  1 x 10   Marching  w/ TA 1 x 15  1 x 10   SLR w/ TA 1 x 15  1 x 10   Hip abduction - SL 1 x 15  1 x 10   Hip adduction -- ---   Hip extension NT 1 x 10   SEATED:     LAQs  ---   Seated Hip Flex w/ TA NT 1 x 10   HS curls  ---   STANDING:     Mini squats -- ---   Step Ups -- ---          "    Initials BJ GWA 1/6       Home Exercises Provided and Patient Education Provided     Education provided:   - keep exercises in a pain free range.  -stretching per HEP     Written Home Exercises Provided: yes.  Exercises were reviewed and Hannah was able to demonstrate them prior to the end of the session.  Hannah demonstrated fair  understanding of the education provided.     See EMR under Patient Instructions for exercises provided 11/7/2019.    Assessment     Patient arrived 15 minutes late for therapy appointment thus all planned therex was not achieved. Patient performed above exercise program with verbal cueing for proper technique and cues to keep all exercises in a pain free range. Patient experienced no increase symptoms prior to leaving the clinic.     Hannah is progressing well towards her goals.   Pt prognosis is Fair.     Pt will continue to benefit from skilled outpatient physical therapy to address the deficits listed in the problem list box on initial evaluation, provide pt/family education and to maximize pt's level of independence in the home and community environment.     Pt's spiritual, cultural and educational needs considered and pt agreeable to plan of care and goals.    Anticipated barriers to physical therapy:  Fibromyalgia    Goals:  Short Term Goals: 4 weeks   1. This patient will be independent with a basic HEP.  IN PROGRESS  2. This patient will have lumbar AROM WFL and pain free in order to be able to perform vehicle transfers with no increase in symptoms.  IN PROGRESS  3. This patient will increase B LE strength by 1 grade in order to wash her dishes with no increase in symptoms.  IN PROGRESS  4. This patient will have a pain rating of 4/10 at worst with ADLs.  IN PROGRESS  5. Patient able to score greater than or equal to 50 on the FOTO Lumbar Spine Survey placing patient in 40%<60% impaired, limited, or restricted category demonstrating overall decreased low back pain with functional  activities.  IN PROGRESS     Long Term Goals: 8 weeks   1. This patient will be independent with an updated HEP. IN PROGRESS  2. This patient will increase B LE strength to 5/5 in order to ascend/descend stairs with no increase in symptoms.  IN PROGRESS  3. This patient will have a pain rating of 2/10 at worst with ADLs.  IN PROGRESS  4. Patient able to score greater than or equal to 60 on the FOTO Lumbar Spine Survey placing patient in 40%<60% impaired, limited, or restricted category demonstrating overall decreased low back pain with functional activities.  IN PROGRESS       Plan     Continue with Plan Of Care and progress toward PT goals. Perform LAQ next visit, resume full POC pending time limit.     Linda Correa, PT, DPT

## 2019-11-13 ENCOUNTER — TELEPHONE (OUTPATIENT)
Dept: NEUROLOGY | Facility: CLINIC | Age: 69
End: 2019-11-13

## 2019-11-14 ENCOUNTER — TELEPHONE (OUTPATIENT)
Dept: FAMILY MEDICINE | Facility: CLINIC | Age: 69
End: 2019-11-14

## 2019-11-14 DIAGNOSIS — M19.90 OTHER TYPE OF OSTEOARTHRITIS, UNSPECIFIED SITE: ICD-10-CM

## 2019-11-14 DIAGNOSIS — Z12.31 ENCOUNTER FOR SCREENING MAMMOGRAM FOR BREAST CANCER: Primary | ICD-10-CM

## 2019-11-14 NOTE — TELEPHONE ENCOUNTER
----- Message from Etta Strauss sent at 11/14/2019  1:19 PM CST -----  Contact: [phone]/tfcn  Patient requesting orders for a mammogram. Please advise.

## 2019-11-15 ENCOUNTER — TELEPHONE (OUTPATIENT)
Dept: NEUROLOGY | Facility: CLINIC | Age: 69
End: 2019-11-15

## 2019-11-15 ENCOUNTER — TELEPHONE (OUTPATIENT)
Dept: PHARMACY | Facility: CLINIC | Age: 69
End: 2019-11-15

## 2019-11-15 RX ORDER — DICLOFENAC SODIUM 30 MG/G
GEL TOPICAL
Qty: 100 G | Refills: 1 | Status: SHIPPED | OUTPATIENT
Start: 2019-11-15 | End: 2019-11-25

## 2019-11-15 NOTE — TELEPHONE ENCOUNTER
I returned Heather at [employer] call I regards to etta's scooter. She is inquiring about the paperwork faxed over. She is refaxing.

## 2019-11-19 ENCOUNTER — TELEPHONE (OUTPATIENT)
Dept: NEUROLOGY | Facility: CLINIC | Age: 69
End: 2019-11-19

## 2019-11-19 ENCOUNTER — HOSPITAL ENCOUNTER (OUTPATIENT)
Dept: RADIOLOGY | Facility: HOSPITAL | Age: 69
Discharge: HOME | End: 2019-11-19
Attending: FAMILY MEDICINE
Payer: MEDICARE

## 2019-11-19 ENCOUNTER — CLINICAL SUPPORT (OUTPATIENT)
Dept: REHABILITATION | Facility: HOSPITAL | Age: 69
End: 2019-11-19
Attending: ORTHOPAEDIC SURGERY
Payer: MEDICARE

## 2019-11-19 DIAGNOSIS — R29.898 WEAKNESS OF BOTH LOWER EXTREMITIES: ICD-10-CM

## 2019-11-19 DIAGNOSIS — Z12.31 ENCOUNTER FOR SCREENING MAMMOGRAM FOR BREAST CANCER: ICD-10-CM

## 2019-11-19 DIAGNOSIS — M62.9 HAMSTRING TIGHTNESS OF BOTH LOWER EXTREMITIES: ICD-10-CM

## 2019-11-19 DIAGNOSIS — R26.89 ANTALGIC GAIT: ICD-10-CM

## 2019-11-19 DIAGNOSIS — M53.3 SI (SACROILIAC) PAIN: ICD-10-CM

## 2019-11-19 PROCEDURE — 97110 THERAPEUTIC EXERCISES: CPT | Mod: PO

## 2019-11-19 PROCEDURE — 77067 SCR MAMMO BI INCL CAD: CPT | Mod: TC,PO

## 2019-11-19 NOTE — TELEPHONE ENCOUNTER
----- Message from Elizabeth Sung sent at 11/19/2019  8:25 AM CST -----  Contact: Jayant Romero MsRashida Heather is calling to speak with Staff regarding the pt's paperwork for his scooter; status.  She says it was sent over on 11/3/19, but hasn't heard anything back.    She can be reached at [phone], [phone].    Thank you.

## 2019-11-19 NOTE — PROGRESS NOTES
"  Physical Therapy Daily Treatment Note     Name: Hannah Norman  Clinic Number: [id number]    Therapy Diagnosis:   Encounter Diagnoses   Name Primary?    Weakness of both lower extremities     Hamstring tightness of both lower extremities     SI (sacroiliac) pain     Antalgic gait      Physician: Eliezer Parmar MD    Visit Date: 11/19/2019     Physician Orders: PT Eval and Treat   Medical Diagnosis from Referral: M54.30 (ICD-10-CM) - Sciatica, unspecified laterality  Evaluation Date: 10/31/2019  Authorization Period Expiration: 09/19/2020  Plan of Care Expiration: 12/31/2019  Visit #/Visits authorized: 4/ 20     Time In: 9:15 am  Time Out: 10:00 am  Total Billable Time: 45 minutes    Precautions: Standard and Fall    Subjective     Pt reports: she continues to have increased pain following prolonged sitting and walking and upon waking in the mornings.     She was compliant with home exercise program.  Response to previous treatment: sore.  Functional change: not at this time.    Pain: 5/10  Location: bilateral back      Objective     Patient presented to therapy with a right anterior rotation of ilium, performed push/pull with patient to achieve a level pelvis.  NOT TODAY    Hannah received therapeutic exercises to develop strength, ROM, flexibility and core stabilization for 45 minutes 1:1 with PT including:      Date  11/19/19 11/12/19 11/07/19   VISIT 4/20 3/20 2/20   G CODE VISIT 4/10  11/31/19 3/10  11/31/19 2/10  11/31/19   POC EXP. DATE 12/31/19 12/31/19 12/31/19   VISIT AMOUNT  MEDICARE TOTAL 90.96  355.76 60.64  264.80 90.96  204.16   FACE-TO-FACE 11/31/19 11/31/19 11/31/19         TABLE:      HSS w/ strap 10 x 10" 10 x 10" 10 x 10"   Piriformis stretch 5 x 10" 5 x 10" 5 x 10"   Push/ pull -- 3 x 3" 3 x 3"   TA's 20 x 5" 15 x 5" 10 x 5"   Bridge w/ TA 2 x 10 1 x 15  1 x 10   Marching  w/ TA 2 x 10 1 x 15  1 x 10   SLR w/ TA 2 x 10 1 x 15  1 x 10   Hip abduction - SL 1 x 15 1 x 15  1 x 10   Hip adduction " "15 x 3" w/ball -- ---   Hip extension 1 x 10 NT 1 x 10   SEATED:      LAQs --  ---   Seated Hip Flex w/ TA 1 x 15 NT 1 x 10   HS curls --  ---   STANDING:      Mini squats -- -- ---   Step Ups -- -- ---               Initials JUD BOOTH 1/6       Home Exercises Provided and Patient Education Provided     Education provided:   - keep exercises in a pain free range.  -stretching per HEP     Written Home Exercises Provided: yes.  Exercises were reviewed and Hannah was able to demonstrate them prior to the end of the session.  Hannah demonstrated fair  understanding of the education provided.     See EMR under Patient Instructions for exercises provided 11/7/2019.    Assessment     Hannah is noted to have level pelvis on this date.  She is progressed with exercise protocol on this date without exacerbation of symptoms.  Pt will require continued progression of therapy in order to improve core stability and decrease intensity and frequency of radicular symptoms.        Hannah is progressing well towards her goals.   Pt prognosis is Fair.     Pt will continue to benefit from skilled outpatient physical therapy to address the deficits listed in the problem list box on initial evaluation, provide pt/family education and to maximize pt's level of independence in the home and community environment.     Pt's spiritual, cultural and educational needs considered and pt agreeable to plan of care and goals.    Anticipated barriers to physical therapy:  Fibromyalgia    Goals:  Short Term Goals: 4 weeks   1. This patient will be independent with a basic HEP.  IN PROGRESS  2. This patient will have lumbar AROM WFL and pain free in order to be able to perform vehicle transfers with no increase in symptoms.  IN PROGRESS  3. This patient will increase B LE strength by 1 grade in order to wash her dishes with no increase in symptoms.  IN PROGRESS  4. This patient will have a pain rating of 4/10 at worst with ADLs.  IN PROGRESS  5. Patient " able to score greater than or equal to 50 on the FOTO Lumbar Spine Survey placing patient in 40%<60% impaired, limited, or restricted category demonstrating overall decreased low back pain with functional activities.  IN PROGRESS     Long Term Goals: 8 weeks   1. This patient will be independent with an updated HEP. IN PROGRESS  2. This patient will increase B LE strength to 5/5 in order to ascend/descend stairs with no increase in symptoms.  IN PROGRESS  3. This patient will have a pain rating of 2/10 at worst with ADLs.  IN PROGRESS  4. Patient able to score greater than or equal to 60 on the FOTO Lumbar Spine Survey placing patient in 40%<60% impaired, limited, or restricted category demonstrating overall decreased low back pain with functional activities.  IN PROGRESS       Plan     Continue with Plan Of Care and progress toward PT goals. Perform LAQ next visit.     Aj Solorio, PT, DPT

## 2019-11-19 NOTE — TELEPHONE ENCOUNTER
I called patient to inform her that the prior authorization for DICLOFENAC GEL 3% prescription has been DENIED for the following reason(s): Patient's diagnosis is not FDA approved for this medication.  Patient does not have diagnosis of actinic keratosis. There was no answer and I left a message.

## 2019-11-19 NOTE — TELEPHONE ENCOUNTER
I returned Heather at [employer] call in regards to paperwork for the patient's scooter. I spoke to Dr. Stringer and he stated that in regards to page 5 of the paperwork, He can not concur to an evaluation that was done on 03/28/2019 and he has not treating the patient at that time. Since the patient is having therapy at this time. Have the physical therapist do another evaluation and he will look at the evaluation and revisit the paperwork. There was no answer and I left a message

## 2019-11-19 NOTE — PROGRESS NOTES
"  Physical Therapy Daily Treatment Note     Name: Hannah Norman  Clinic Number: [id number]    Therapy Diagnosis:   No diagnosis found.  Physician: Eliezer Parmar MD    Visit Date: 11/19/2019     Physician Orders: PT Eval and Treat   Medical Diagnosis from Referral: M54.30 (ICD-10-CM) - Sciatica, unspecified laterality  Evaluation Date: 10/31/2019  Authorization Period Expiration: 09/19/2020  Plan of Care Expiration: 12/31/2019  Visit #/Visits authorized: 4/ 20     Time In:  Time Out:  Total Billable Time: *** minutes    Precautions: Standard and Fall    Subjective     Pt reports: she is having less pain today because she took a pain-pill this morning   She was compliant with home exercise program.  Response to previous treatment: sore.  Functional change: not at this time.    Pain: 4/10  Location: bilateral back      Objective     Patient presented to therapy with a right anterior rotation of ilium, performed push/pull with patient to achieve a level pelvis.     Hannah received therapeutic exercises to develop strength, ROM, flexibility and core stabilization for 25 minutes 1:1 with PT including:      Date  11/19/19 11/12/19 11/07/19   VISIT 4/20 3/20 2/20   POC EXP. DATE 12/31/19 12/31/19 12/31/19   VISIT AMOUNT  MEDICARE TOTAL  60.64  264.80 90.96  204.16   FACE-TO-FACE 11/31/19 11/31/19 11/31/19         TABLE:      HSS w/ strap  10 x 10" 10 x 10"   Piriformis stretch  5 x 10" 5 x 10"   Push/ pull  3 x 3" 3 x 3"   TA's  15 x 5" 10 x 5"   Bridge w/ TA  1 x 15  1 x 10   Marching  w/ TA  1 x 15  1 x 10   SLR w/ TA  1 x 15  1 x 10   Hip abduction - SL  1 x 15  1 x 10   Hip adduction  -- ---   Hip extension  NT 1 x 10   SEATED:      LAQs   ---   Seated Hip Flex w/ TA  NT 1 x 10   HS curls   ---   STANDING:      Mini squats  -- ---   Step Ups  -- ---               Initials DG BJ GWA 1/6       Home Exercises Provided and Patient Education Provided     Education provided:   - keep exercises in a pain free " range.  -stretching per HEP     Written Home Exercises Provided: yes.  Exercises were reviewed and Hannah was able to demonstrate them prior to the end of the session.  Hannah demonstrated fair  understanding of the education provided.     See EMR under Patient Instructions for exercises provided 11/7/2019.    Assessment     Patient arrived 15 minutes late for therapy appointment thus all planned therex was not achieved. Patient performed above exercise program with verbal cueing for proper technique and cues to keep all exercises in a pain free range. Patient experienced no increase symptoms prior to leaving the clinic.     Hannah is progressing well towards her goals.   Pt prognosis is Fair.     Pt will continue to benefit from skilled outpatient physical therapy to address the deficits listed in the problem list box on initial evaluation, provide pt/family education and to maximize pt's level of independence in the home and community environment.     Pt's spiritual, cultural and educational needs considered and pt agreeable to plan of care and goals.    Anticipated barriers to physical therapy:  Fibromyalgia    Goals:  Short Term Goals: 4 weeks   1. This patient will be independent with a basic HEP.  IN PROGRESS  2. This patient will have lumbar AROM WFL and pain free in order to be able to perform vehicle transfers with no increase in symptoms.  IN PROGRESS  3. This patient will increase B LE strength by 1 grade in order to wash her dishes with no increase in symptoms.  IN PROGRESS  4. This patient will have a pain rating of 4/10 at worst with ADLs.  IN PROGRESS  5. Patient able to score greater than or equal to 50 on the FOTO Lumbar Spine Survey placing patient in 40%<60% impaired, limited, or restricted category demonstrating overall decreased low back pain with functional activities.  IN PROGRESS     Long Term Goals: 8 weeks   1. This patient will be independent with an updated HEP. IN PROGRESS  2. This patient  will increase B LE strength to 5/5 in order to ascend/descend stairs with no increase in symptoms.  IN PROGRESS  3. This patient will have a pain rating of 2/10 at worst with ADLs.  IN PROGRESS  4. Patient able to score greater than or equal to 60 on the FOTO Lumbar Spine Survey placing patient in 40%<60% impaired, limited, or restricted category demonstrating overall decreased low back pain with functional activities.  IN PROGRESS       Plan     Continue with Plan Of Care and progress toward PT goals. Perform LAQ next visit, resume full POC pending time limit.     Saida Herrera, PT, DPT

## 2019-11-21 ENCOUNTER — CLINICAL SUPPORT (OUTPATIENT)
Dept: REHABILITATION | Facility: HOSPITAL | Age: 69
End: 2019-11-21
Attending: ORTHOPAEDIC SURGERY
Payer: MEDICARE

## 2019-11-21 ENCOUNTER — OFFICE VISIT (OUTPATIENT)
Dept: ORTHOPEDICS | Facility: CLINIC | Age: 69
End: 2019-11-21
Payer: MEDICARE

## 2019-11-21 DIAGNOSIS — M62.9 HAMSTRING TIGHTNESS OF BOTH LOWER EXTREMITIES: ICD-10-CM

## 2019-11-21 DIAGNOSIS — R29.898 WEAKNESS OF BOTH LOWER EXTREMITIES: ICD-10-CM

## 2019-11-21 DIAGNOSIS — R26.89 ANTALGIC GAIT: ICD-10-CM

## 2019-11-21 DIAGNOSIS — M53.3 SI (SACROILIAC) PAIN: ICD-10-CM

## 2019-11-21 DIAGNOSIS — M54.30 SCIATICA, UNSPECIFIED LATERALITY: Primary | ICD-10-CM

## 2019-11-21 PROCEDURE — 99212 OFFICE O/P EST SF 10 MIN: CPT | Mod: S$PBB,,, | Performed by: ORTHOPAEDIC SURGERY

## 2019-11-21 PROCEDURE — 99999 PR PBB SHADOW E&M-EST. PATIENT-LVL II: ICD-10-PCS | Mod: PBBFAC,,, | Performed by: ORTHOPAEDIC SURGERY

## 2019-11-21 PROCEDURE — 99212 OFFICE O/P EST SF 10 MIN: CPT | Mod: PBBFAC,PN | Performed by: ORTHOPAEDIC SURGERY

## 2019-11-21 PROCEDURE — 1125F PR PAIN SEVERITY QUANTIFIED, PAIN PRESENT: ICD-10-PCS | Mod: ,,, | Performed by: ORTHOPAEDIC SURGERY

## 2019-11-21 PROCEDURE — 1125F AMNT PAIN NOTED PAIN PRSNT: CPT | Mod: ,,, | Performed by: ORTHOPAEDIC SURGERY

## 2019-11-21 PROCEDURE — 99999 PR PBB SHADOW E&M-EST. PATIENT-LVL II: CPT | Mod: PBBFAC,,, | Performed by: ORTHOPAEDIC SURGERY

## 2019-11-21 PROCEDURE — 1159F MED LIST DOCD IN RCRD: CPT | Mod: ,,, | Performed by: ORTHOPAEDIC SURGERY

## 2019-11-21 PROCEDURE — 1159F PR MEDICATION LIST DOCUMENTED IN MEDICAL RECORD: ICD-10-PCS | Mod: ,,, | Performed by: ORTHOPAEDIC SURGERY

## 2019-11-21 PROCEDURE — 97110 THERAPEUTIC EXERCISES: CPT | Mod: PO

## 2019-11-21 PROCEDURE — 99212 PR OFFICE/OUTPT VISIT, EST, LEVL II, 10-19 MIN: ICD-10-PCS | Mod: S$PBB,,, | Performed by: ORTHOPAEDIC SURGERY

## 2019-11-21 NOTE — PROGRESS NOTES
"  Physical Therapy Daily Treatment Note     Name: Hannah Norman  Clinic Number: [id number]    Therapy Diagnosis:   Encounter Diagnoses   Name Primary?    Weakness of both lower extremities     Hamstring tightness of both lower extremities     SI (sacroiliac) pain     Antalgic gait      Physician: Eliezer Parmar MD    Visit Date: 11/21/2019     Physician Orders: PT Eval and Treat   Medical Diagnosis from Referral: M54.30 (ICD-10-CM) - Sciatica, unspecified laterality  Evaluation Date: 10/31/2019  Authorization Period Expiration: 09/19/2020  Plan of Care Expiration: 12/31/2019  Visit #/Visits authorized: 5/ 20     Time In: 9:00 am  Time Out: 9:55 am  Total Billable Time: 55 minutes    Precautions: Standard and Fall    Subjective     Pt reports: having pain in both legs down to her toes today. She is still hurting from the last visit, it may have to do with her fibromyalgia flare up with the weather    She was compliant with home exercise program.  Response to previous treatment: sore.  Functional change: not at this time.    Pain: 5/10  Location: bilateral back      Objective     Patient presented to therapy with a level pelvis.    Hannah received therapeutic exercises to develop strength, ROM, flexibility and core stabilization for 55 minutes 1:1 with PT including:      Date  11/21/19 11/19/19 11/12/19 11/07/19   VISIT 5/20 4/20 3/20 2/20   POC EXP. DATE 12/31/19 12/31/19 12/31/19 12/31/19   VISIT AMOUNT  MEDICARE TOTAL 121.28  477.04 90.96  355.76 60.64  264.80 90.96  204.16   FACE-TO-FACE 11/30/19 11/31/19 11/31/19 11/31/19          TABLE:       HSS w/ strap 10 x 10" 10 x 10" 10 x 10" 10 x 10"   Piriformis stretch 5 x 10" 5 x 10" 5 x 10" 5 x 10"   Push/ pull -- -- 3 x 3" 3 x 3"   TA's 20 x 5" 20 x 5" 15 x 5" 10 x 5"   Bridge w/ TA 2 x 10 2 x 10 1 x 15  1 x 10   Marching  w/ TA 2 x 10 2 x 10 1 x 15  1 x 10   SLR w/ TA 2 x 10 2 x 10 1 x 15  1 x 10   Hip abduction - SL 1 x 15 1 x 15 1 x 15  1 x 10   Hip adduction " "15 x 3" w/ ball 15 x 3" w/ball -- ---   Hip extension 1 x 10 1 x 10 NT 1 x 10   SEATED:       LAQs Next? --  ---   Seated Hip Flex w/ TA Not today 1 x 15 NT 1 x 10   HS curls -- --  ---   STANDING:       Mini squats -- -- -- ---   Step Ups -- -- -- ---   Hip ext 1 x 5             Initials DG MA BJ GWA 1/6     FOTO Lumbar Spine 51% impaired, limited, or restricted    Home Exercises Provided and Patient Education Provided     Education provided:   - keep exercises in a pain free range.  -stretching per HEP   - avoiding flexing her back with standing hip extension at her kitchen sink    Written Home Exercises Provided: yes.  Exercises were reviewed and Hannah was able to demonstrate them prior to the end of the session.  Hannah demonstrated fair  understanding of the education provided.     See EMR under Patient Instructions for exercises provided 11/7/2019.    Assessment     Hannah current score on FOTO Lumbar Spine Survey places her in the  40%<60% impaired, limited, or restricted category. She required frequent verbal cues to keep her exercises in a pain free range and to maintain her core engagement with all exercises. She completed all of today's activities with no increase in symptoms prior to leaving the clinic.      Hannah is progressing well towards her goals.   Pt prognosis is Fair.     Pt will continue to benefit from skilled outpatient physical therapy to address the deficits listed in the problem list box on initial evaluation, provide pt/family education and to maximize pt's level of independence in the home and community environment.     Pt's spiritual, cultural and educational needs considered and pt agreeable to plan of care and goals.    Anticipated barriers to physical therapy:  Fibromyalgia    Goals:  Short Term Goals: 4 weeks   1. This patient will be independent with a basic HEP.  IN PROGRESS  2. This patient will have lumbar AROM WFL and pain free in order to be able to perform vehicle transfers with " no increase in symptoms.  IN PROGRESS  3. This patient will increase B LE strength by 1 grade in order to wash her dishes with no increase in symptoms.  IN PROGRESS  4. This patient will have a pain rating of 4/10 at worst with ADLs.  IN PROGRESS  5. Patient able to score greater than or equal to 50 on the FOTO Lumbar Spine Survey placing patient in 40%<60% impaired, limited, or restricted category demonstrating overall decreased low back pain with functional activities.  IN PROGRESS     Long Term Goals: 8 weeks   1. This patient will be independent with an updated HEP. IN PROGRESS  2. This patient will increase B LE strength to 5/5 in order to ascend/descend stairs with no increase in symptoms.  IN PROGRESS  3. This patient will have a pain rating of 2/10 at worst with ADLs.  IN PROGRESS  4. Patient able to score greater than or equal to 60 on the FOTO Lumbar Spine Survey placing patient in 40%<60% impaired, limited, or restricted category demonstrating overall decreased low back pain with functional activities.  IN PROGRESS       Plan     Continue with Plan Of Care and progress toward PT goals. Resume seated hip flexion and begin LAQs next visit. .     Saida Herrera, PT, DPT

## 2019-11-21 NOTE — PROGRESS NOTES
Subjective:      Patient ID: Hannah Norman is a 69 y.o. female.    Chief Complaint: Pain of the Right Hip      HPI: Hannah Norman is here in follow-up of right hip pain.  Patient was last seen approximately 2 months ago by Dr. Parmar and was diagnosed with sciatica.  She was treated with physical therapy and pain management injection. Patient reports injection was very helpful and her symptoms have improved 70%.  Patient has follow-up pain management for quite some time for the symptoms and has been receiving long-term Percocet.  Patient finds physical therapy helpful.  However, she continues to have bilateral L>>R lower back pain that radiates to the lateral hip and down her leg. Patient reports pain causes difficult ambulation.  She denies any bladder or bowel incontinence.  She is requesting a motorized scooter.     Past Medical History:   Diagnosis Date    Anxiety disorder     Bell's palsy     Chronic back pain     Chronic osteoarthritis     Essential tremor     Fibromyalgia     Hypertension     Mild acid reflux     Neck pain     Other and unspecified hyperlipidemia     Sleep apnea     wear c-pap at night; does not use regularly       Current Outpatient Medications:     acetaminophen (TYLENOL) 500 MG tablet, Take 500 mg by mouth every 6 (six) hours as needed., Disp: , Rfl:     albuterol 90 mcg/actuation inhaler, Inhale 2 puffs into the lungs every 6 (six) hours as needed for Wheezing or Shortness of Breath. Rescue, Disp: 18 g, Rfl: 0    clonazePAM (KLONOPIN) 0.5 MG tablet, 2 pills PO QHS bedtime, Disp: 60 tablet, Rfl: 5    cyclobenzaprine (FLEXERIL) 10 MG tablet, Take 10 mg by mouth 3 (three) times daily as needed. , Disp: , Rfl:     DUREZOL 0.05 % Drop ophthalmic solution, , Disp: , Rfl:     fluticasone (FLONASE) 50 mcg/actuation nasal spray, 1 spray (50 mcg total) by Each Nare route once daily., Disp: 16 g, Rfl: 0    furosemide (LASIX) 40 MG tablet, TAKE 1 TABLET BY MOUTH UP TO TWO TIMES A DAY  IF SWELLING OCCURS, Disp: 60 tablet, Rfl: 2    gabapentin (NEURONTIN) 600 MG tablet, TAKE 1 TABLET BY MOUTH THREE TIMES A DAY, Disp: 90 tablet, Rfl: 11    ILEVRO 0.3 % DrpS, , Disp: , Rfl:     lovastatin (MEVACOR) 20 MG tablet, Take 1 tablet (20 mg total) by mouth every evening., Disp: 90 tablet, Rfl: 3    magnesium oxide (MAG-OX) 400 mg (241.3 mg magnesium) tablet, Take 1 tablet (400 mg total) by mouth once daily., Disp: , Rfl: 0    morphine (MS CONTIN) 15 MG 12 hr tablet, Take 1 tablet by mouth every 12 (twelve) hours., Disp: , Rfl:     oxycodone-acetaminophen (PERCOCET)  mg per tablet, Take 1 tablet by mouth every 8 (eight) hours as needed. , Disp: , Rfl:     pantoprazole (PROTONIX) 40 MG tablet, , Disp: , Rfl:     potassium chloride SA (K-DUR,KLOR-CON) 20 MEQ tablet, , Disp: , Rfl:     pramipexole (MIRAPEX) 0.125 MG tablet, 2 pills PO in the late afternoon and 3 pills PO at bedtime (Patient not taking: Reported on 11/25/2019), Disp: 150 tablet, Rfl: 11    RELISTOR 150 mg Tab, , Disp: , Rfl:     venlafaxine (EFFEXOR-XR) 75 MG 24 hr capsule, Take 1 tablet by mouth in morning and 2 tablets by mouth at night, Disp: 270 capsule, Rfl: 1    losartan (COZAAR) 25 MG tablet, Take 1 tablet (25 mg total) by mouth once daily. For blood pressure   ( dc 50 mg dose), Disp: 90 tablet, Rfl: 3    oxybutynin (DITROPAN-XL) 10 MG 24 hr tablet, Take 1 tablet (10 mg total) by mouth once daily., Disp: 90 tablet, Rfl: 1  Review of patient's allergies indicates:   Allergen Reactions    Hyoscyamine Rash       There were no vitals taken for this visit.    Review of Systems   Constitution: Negative for chills and fever.   Cardiovascular: Negative for chest pain and palpitations.   Respiratory: Negative for shortness of breath and wheezing.    Skin: Negative for poor wound healing and rash.   Musculoskeletal: Positive for back pain and joint pain. Negative for joint swelling.   Gastrointestinal: Negative for nausea and  vomiting.   Genitourinary: Negative for dysuria and hematuria.   Neurological: Negative for seizures and tremors.   Psychiatric/Behavioral: Negative for altered mental status.   Allergic/Immunologic: Negative for environmental allergies and persistent infections.         Objective:    Ortho Exam       Bilateral HIP EXAM  The patient is not in acute distress.   Body habitus is::obese.   The patient walks with a limp.   The skin over the hip is::intact.   There is::Local tenderness over the lumbar spine and lateral hip.  Range of motion- no hip irritability.  Range of motion full and fluid.  Resisted SLR positive.  Pain with rotation negative  Sciatic tension findings positive.  Shortening/lengthening compared to the contralateral side exam deferred.  Pulses DP present, PT present.  Motor normal resting muscle tone.   Sensory normal.  GEN: Well developed, well nourished female. AAOX3. No acute distress.   Normocephalic, atraumatic.   MARLON  Breathing unlabored.  Mood and affect appropriate.       Assessment:     Imaging:  No new imaging        1. Sciatica, unspecified laterality          Plan:       Orders Placed This Encounter    MOTORIZED SCOOTER FOR HOME USE      Sciatic symptoms seem to be improving with previous injection and physical therapy.  Recommend the patient continue with treatment with outside pain management and physical therapy.  Patient finds ambulation difficult secondary to sciatica.  Patient's request for a scooter is reasonable.  Follow up if symptoms worsen or fail to improve.

## 2019-11-25 ENCOUNTER — PATIENT MESSAGE (OUTPATIENT)
Dept: ORTHOPEDICS | Facility: CLINIC | Age: 69
End: 2019-11-25

## 2019-11-25 ENCOUNTER — OFFICE VISIT (OUTPATIENT)
Dept: FAMILY MEDICINE | Facility: CLINIC | Age: 69
End: 2019-11-25
Payer: MEDICARE

## 2019-11-25 VITALS
WEIGHT: 198.06 LBS | OXYGEN SATURATION: 96 % | HEIGHT: 62 IN | TEMPERATURE: 98 F | SYSTOLIC BLOOD PRESSURE: 106 MMHG | HEART RATE: 106 BPM | DIASTOLIC BLOOD PRESSURE: 82 MMHG | BODY MASS INDEX: 36.45 KG/M2

## 2019-11-25 DIAGNOSIS — Z00.00 ROUTINE HEALTH MAINTENANCE: Primary | ICD-10-CM

## 2019-11-25 DIAGNOSIS — M79.7 FIBROMYALGIA: ICD-10-CM

## 2019-11-25 DIAGNOSIS — I10 ESSENTIAL HYPERTENSION: ICD-10-CM

## 2019-11-25 DIAGNOSIS — R26.9 GAIT DISTURBANCE: ICD-10-CM

## 2019-11-25 DIAGNOSIS — E03.9 HYPOTHYROIDISM, UNSPECIFIED TYPE: ICD-10-CM

## 2019-11-25 DIAGNOSIS — G25.0 ESSENTIAL TREMOR: ICD-10-CM

## 2019-11-25 DIAGNOSIS — N39.41 URGE INCONTINENCE: ICD-10-CM

## 2019-11-25 DIAGNOSIS — N95.9 MENOPAUSAL AND POSTMENOPAUSAL DISORDER: ICD-10-CM

## 2019-11-25 DIAGNOSIS — R73.9 HYPERGLYCEMIA: ICD-10-CM

## 2019-11-25 DIAGNOSIS — R39.15 URINARY URGENCY: ICD-10-CM

## 2019-11-25 DIAGNOSIS — G47.33 OSA (OBSTRUCTIVE SLEEP APNEA): ICD-10-CM

## 2019-11-25 PROCEDURE — 1159F MED LIST DOCD IN RCRD: CPT | Mod: S$GLB,,, | Performed by: FAMILY MEDICINE

## 2019-11-25 PROCEDURE — 1159F PR MEDICATION LIST DOCUMENTED IN MEDICAL RECORD: ICD-10-PCS | Mod: S$GLB,,, | Performed by: FAMILY MEDICINE

## 2019-11-25 PROCEDURE — 99214 PR OFFICE/OUTPT VISIT, EST, LEVL IV, 30-39 MIN: ICD-10-PCS | Mod: S$GLB,,, | Performed by: FAMILY MEDICINE

## 2019-11-25 PROCEDURE — 1125F AMNT PAIN NOTED PAIN PRSNT: CPT | Mod: S$GLB,,, | Performed by: FAMILY MEDICINE

## 2019-11-25 PROCEDURE — 99214 OFFICE O/P EST MOD 30 MIN: CPT | Mod: S$GLB,,, | Performed by: FAMILY MEDICINE

## 2019-11-25 PROCEDURE — 1125F PR PAIN SEVERITY QUANTIFIED, PAIN PRESENT: ICD-10-PCS | Mod: S$GLB,,, | Performed by: FAMILY MEDICINE

## 2019-11-25 RX ORDER — LOSARTAN POTASSIUM 25 MG/1
25 TABLET ORAL DAILY
Qty: 90 TABLET | Refills: 3 | Status: SHIPPED | OUTPATIENT
Start: 2019-11-25 | End: 2020-11-06

## 2019-11-25 RX ORDER — OXYBUTYNIN CHLORIDE 10 MG/1
10 TABLET, EXTENDED RELEASE ORAL DAILY
Qty: 90 TABLET | Refills: 1 | Status: SHIPPED | OUTPATIENT
Start: 2019-11-25 | End: 2020-01-22 | Stop reason: SDUPTHER

## 2019-11-25 NOTE — PROGRESS NOTES
" Patient ID: Hannah Norman is a 69 y.o. female.    Chief Complaint: Annual Exam (Headaches, shoulder pain)    HPI      Hannah Norman is a 69 y.o. female. here for annual exam.   Fall from earlier last year left her with HA. lefst side and tender to palpation herself  Sleeping more,   Not using sleep apnea machine.        Review of Symptoms    Constitutional: Negative.    HENT: Negative.    Eyes: Negative.    Respiratory: Negative.    Cardiovascular: Negative.    Gastrointestinal: Negative.    Endocrine: Negative.    Genitourinary: Negative.    Musculoskeletal: Negative.    Skin: Negative.    Allergic/Immunologic: Negative.    Neurological: Negative.    Hematological: Negative.    Psychiatric/Behavioral: Negative.      Except as above in HPI      Vitals:    11/25/19 1433   BP: 106/82   BP Location: Right arm   Patient Position: Sitting   BP Method: Large (Manual)   Pulse: 106   Temp: 98.2 °F (36.8 °C)   TempSrc: Oral   SpO2: 96%   Weight: 89.8 kg (198 lb 1.3 oz)   Height: 5' 2" (1.575 m)        Physical  Exam      Constitutional:  Oriented to person, place, and time. Appears well-developed and well-nourished.     HENT:   Head: Normocephalic and atraumatic.     Right Ear: Tympanic membrane, ear canal and External ear normal     Left Ear: Tympanic membrane, ear canal and External ear normal     Nose: Nose normal. No rhinorrhea or nasal deformity.     Mouth/Throat: Uvula is midline, oropharynx is clear and moist and mucous membranes are normal.      Eyes: Conjunctivae are normal. Right eye exhibits no discharge. Left eye exhibits no discharge. No scleral icterus.     Neck:  No JVD present. No tracheal deviation  []  Neck supple.   [x]  No Carotid bruit    Cardiovascular:  Regular rate and rhythm with normal S1 and S2     Pulmonary/Chest:   Clear to auscultation bilaterally without wheezes, rhonchi or rales    Musculoskeletal: Normal range of motion. No edema or tenderness.   No deformity     Lymphadenopathy:  No cervical " adenopathy.     Neurological:  Alert and oriented to person, place, and time. Coordination normal.     Skin: Skin is warm and dry. No rash noted.     Psychiatric: Normal mood and affect. Speech is normal and behavior is normal. Judgment and thought content normal.     Complete Blood Count  Lab Results   Component Value Date    RBC 4.54 02/09/2019    HGB 13.8 02/09/2019    HCT 42.8 02/09/2019    MCV 94 02/09/2019    MCH 30.4 02/09/2019    MCHC 32.2 02/09/2019    RDW 12.7 02/09/2019     02/09/2019    MPV 10.4 02/09/2019    GRAN 3.4 02/09/2019    GRAN 57.6 02/09/2019    LYMPH 1.8 02/09/2019    LYMPH 30.4 02/09/2019    MONO 0.5 02/09/2019    MONO 9.1 02/09/2019    EOS 0.2 02/09/2019    BASO 0.01 02/09/2019    EOSINOPHIL 2.5 02/09/2019    BASOPHIL 0.2 02/09/2019    DIFFMETHOD Automated 02/09/2019       Comprehensive Metabolic Panel  No results found for: GLU, BUN, CREATININE, NA, K, CL, PROT, ALBUMIN, BILITOT, AST, ALKPHOS, CO2, ALT, ANIONGAP, EGFRNONAA, ESTGFRAFRICA    TSH  No results found for: TSH    Assessment / Plan:      ICD-10-CM ICD-9-CM   1. Routine health maintenance Z00.00 V70.0   2. Urinary urgency R39.15 788.63   3. Urge incontinence N39.41 788.31   4. Essential hypertension I10 401.9   5. Fibromyalgia M79.7 729.1   6. Hypothyroidism, unspecified type E03.9 244.9   7. Gait disturbance R26.9 781.2   8. Hyperglycemia R73.9 790.29   9. Essential tremor G25.0 333.1   10. MARY (obstructive sleep apnea) G47.33 327.23   11. Menopausal and postmenopausal disorder N95.9 627.9     Routine health maintenance  -     Comprehensive metabolic panel; Future; Expected date: 11/25/2019  -     CBC auto differential; Future; Expected date: 11/25/2019  -     Lipid panel; Future; Expected date: 11/25/2019  -     TSH; Future; Expected date: 11/25/2019  -     T4, free; Future; Expected date: 11/25/2019    Urinary urgency  -     oxybutynin (DITROPAN-XL) 10 MG 24 hr tablet; Take 1 tablet (10 mg total) by mouth once daily.   Dispense: 90 tablet; Refill: 1    Urge incontinence  -     oxybutynin (DITROPAN-XL) 10 MG 24 hr tablet; Take 1 tablet (10 mg total) by mouth once daily.  Dispense: 90 tablet; Refill: 1    Essential hypertension  -     Comprehensive metabolic panel; Future; Expected date: 11/25/2019  -     CBC auto differential; Future; Expected date: 11/25/2019  -     Lipid panel; Future; Expected date: 11/25/2019  -     TSH; Future; Expected date: 11/25/2019  -     T4, free; Future; Expected date: 11/25/2019    Fibromyalgia  -     Comprehensive metabolic panel; Future; Expected date: 11/25/2019  -     CBC auto differential; Future; Expected date: 11/25/2019  -     Lipid panel; Future; Expected date: 11/25/2019  -     TSH; Future; Expected date: 11/25/2019  -     T4, free; Future; Expected date: 11/25/2019    Hypothyroidism, unspecified type  -     Comprehensive metabolic panel; Future; Expected date: 11/25/2019  -     CBC auto differential; Future; Expected date: 11/25/2019  -     Lipid panel; Future; Expected date: 11/25/2019  -     TSH; Future; Expected date: 11/25/2019  -     T4, free; Future; Expected date: 11/25/2019    Gait disturbance  -     Comprehensive metabolic panel; Future; Expected date: 11/25/2019  -     CBC auto differential; Future; Expected date: 11/25/2019  -     Lipid panel; Future; Expected date: 11/25/2019  -     TSH; Future; Expected date: 11/25/2019  -     T4, free; Future; Expected date: 11/25/2019    Hyperglycemia  -     Comprehensive metabolic panel; Future; Expected date: 11/25/2019  -     CBC auto differential; Future; Expected date: 11/25/2019  -     Lipid panel; Future; Expected date: 11/25/2019  -     TSH; Future; Expected date: 11/25/2019  -     T4, free; Future; Expected date: 11/25/2019    Essential tremor  -     Comprehensive metabolic panel; Future; Expected date: 11/25/2019  -     CBC auto differential; Future; Expected date: 11/25/2019  -     Lipid panel; Future; Expected date: 11/25/2019  -      TSH; Future; Expected date: 11/25/2019  -     T4, free; Future; Expected date: 11/25/2019    MARY (obstructive sleep apnea)    Menopausal and postmenopausal disorder  -     DXA Bone Density Spine And Hip; Future; Expected date: 11/25/2019    Other orders  -     losartan (COZAAR) 25 MG tablet; Take 1 tablet (25 mg total) by mouth once daily. For blood pressure   ( dc 50 mg dose)  Dispense: 90 tablet; Refill: 3          Discussed how to stay healthy including: diet, exercise, refraining from smoking and discussed screening exams / tests needed for age, sex and family Hx.

## 2019-11-29 ENCOUNTER — TELEPHONE (OUTPATIENT)
Dept: ORTHOPEDICS | Facility: CLINIC | Age: 69
End: 2019-11-29

## 2019-12-03 ENCOUNTER — DOCUMENTATION ONLY (OUTPATIENT)
Dept: REHABILITATION | Facility: HOSPITAL | Age: 69
End: 2019-12-03

## 2019-12-03 ENCOUNTER — CLINICAL SUPPORT (OUTPATIENT)
Dept: REHABILITATION | Facility: HOSPITAL | Age: 69
End: 2019-12-03
Attending: ORTHOPAEDIC SURGERY
Payer: MEDICARE

## 2019-12-03 DIAGNOSIS — M62.9 HAMSTRING TIGHTNESS OF BOTH LOWER EXTREMITIES: ICD-10-CM

## 2019-12-03 DIAGNOSIS — R26.89 ANTALGIC GAIT: ICD-10-CM

## 2019-12-03 DIAGNOSIS — R29.898 WEAKNESS OF BOTH LOWER EXTREMITIES: ICD-10-CM

## 2019-12-03 DIAGNOSIS — M53.3 SI (SACROILIAC) PAIN: ICD-10-CM

## 2019-12-03 PROCEDURE — 97110 THERAPEUTIC EXERCISES: CPT | Mod: PO

## 2019-12-03 NOTE — PROGRESS NOTES
"  Physical Therapy Daily Treatment Note     Name: Hannah Norman  Clinic Number: [id number]    Therapy Diagnosis:   Encounter Diagnoses   Name Primary?    Weakness of both lower extremities     Hamstring tightness of both lower extremities     SI (sacroiliac) pain     Antalgic gait      Physician: Eliezer Parmar MD    Visit Date: 12/3/2019     Physician Orders: PT Eval and Treat   Medical Diagnosis from Referral: M54.30 (ICD-10-CM) - Sciatica, unspecified laterality  Evaluation Date: 10/31/2019  Authorization Period Expiration: 09/19/2020  Plan of Care Expiration: 12/31/2019  Visit #/Visits authorized: 6/ 20     Time In: 9:00 am  Time Out: 10:00 am  Total Billable Time: 60 minutes    Precautions: Standard and Fall    Subjective     Pt reports: having pain in her back and both legs with R>L.    She was compliant with home exercise program.  Response to previous treatment: sore.  Functional change: not at this time.    Pain: 5/10  Location: bilateral back      Objective     Patient presented to therapy with a level pelvis.    Hannah received therapeutic exercises to develop strength, ROM, flexibility and core stabilization for 60 minutes 1:1 with PTA including:      Date  12/03/19 11/21/19 11/19/19 11/12/19 11/07/19   VISIT 6/20 5/20 4/20 3/20 2/20   POC EXP. DATE 12/31/19 12/31/19 12/31/19 12/31/19 12/31/19   VISIT AMOUNT  MEDICARE TOTAL 121.28  598.29 121.28  477.04 90.96  355.76 60.64  264.80 90.96  204.16   FACE-TO-FACE 01/03/20 11/30/19 11/31/19 11/31/19 11/31/19           TABLE:        HSS w/ strap 10 x 10" 10 x 10" 10 x 10" 10 x 10" 10 x 10"   Piriformis stretch 5 x 10" 5 x 10" 5 x 10" 5 x 10" 5 x 10"   Push/ pull --- -- -- 3 x 3" 3 x 3"   TA's 20 x 5" 20 x 5" 20 x 5" 15 x 5" 10 x 5"   Bridge w/ TA 2 x 10 2 x 10 2 x 10 1 x 15  1 x 10   Marching  w/ TA 2 x 10 2 x 10 2 x 10 1 x 15  1 x 10   SLR w/ TA 2 x 10 2 x 10 2 x 10 1 x 15  1 x 10   Hip abduction - SL 2 x 10 1 x 15 1 x 15 1 x 15  1 x 10   Hip adduction 15 " "x 3" w/ ball 15 x 3" w/ ball 15 x 3" w/ball -- ---   Hip extension 1 x 10 1 x 10 1 x 10 NT 1 x 10   SEATED:        LAQs 1 x 10 Next? --  ---   Seated Hip Flex w/ TA 1 x 15 Not today 1 x 15 NT 1 x 10   HS curls --- -- --  ---   STANDING:        Mini squats --- -- -- -- ---   Step Ups --- -- -- -- ---   Hip ext 1 x 5 1 x 5              Initials GWA 1/6 DG MA BJ GWA 1/6       Home Exercises Provided and Patient Education Provided     Education provided:   - keep exercises in a pain free range.  -stretching per HEP   - avoiding flexing her back with standing hip extension at her kitchen sink    Written Home Exercises Provided: yes.  Exercises were reviewed and Hannah was able to demonstrate them prior to the end of the session.  Hannah demonstrated fair  understanding of the education provided.     See EMR under Patient Instructions for exercises provided 11/7/2019.    Assessment     Hannah performed above exercise program with verbal cueing to keep her exercises in a pain free range, cues to maintain her core engagement with all exercises and had no difficulty with new exercise added along  with today's progressions with no increase in symptoms prior to leaving the clinic.      Hannah is progressing well towards her goals.   Pt prognosis is Fair.     Pt will continue to benefit from skilled outpatient physical therapy to address the deficits listed in the problem list box on initial evaluation, provide pt/family education and to maximize pt's level of independence in the home and community environment.     Pt's spiritual, cultural and educational needs considered and pt agreeable to plan of care and goals.    Anticipated barriers to physical therapy:  Fibromyalgia    Goals:  Short Term Goals: 4 weeks   1. This patient will be independent with a basic HEP.  IN PROGRESS  2. This patient will have lumbar AROM WFL and pain free in order to be able to perform vehicle transfers with no increase in symptoms.  IN PROGRESS  3. This " patient will increase B LE strength by 1 grade in order to wash her dishes with no increase in symptoms.  IN PROGRESS  4. This patient will have a pain rating of 4/10 at worst with ADLs.  IN PROGRESS  5. Patient able to score greater than or equal to 50 on the FOTO Lumbar Spine Survey placing patient in 40%<60% impaired, limited, or restricted category demonstrating overall decreased low back pain with functional activities.  IN PROGRESS     Long Term Goals: 8 weeks   1. This patient will be independent with an updated HEP. IN PROGRESS  2. This patient will increase B LE strength to 5/5 in order to ascend/descend stairs with no increase in symptoms.  IN PROGRESS  3. This patient will have a pain rating of 2/10 at worst with ADLs.  IN PROGRESS  4. Patient able to score greater than or equal to 60 on the FOTO Lumbar Spine Survey placing patient in 40%<60% impaired, limited, or restricted category demonstrating overall decreased low back pain with functional activities.  IN PROGRESS    Plan     Continue with Plan Of Care and progress toward PT goals. Increase reps with seated hip flexion and LAQs next visit. .     Faheem Morrissey PTA,

## 2019-12-03 NOTE — PROGRESS NOTES
Face to Face PTA Conference performed with aFheem Morrissey, PTA regarding patient's current status, overall progress, and plan of care    Face to Face PTA Conference performed with Saida Herrera, PT regarding patient's current status, overall progress, and plan of care    Faheem Morrissey  12/3/2019

## 2019-12-04 ENCOUNTER — TELEPHONE (OUTPATIENT)
Dept: FAMILY MEDICINE | Facility: CLINIC | Age: 69
End: 2019-12-04

## 2019-12-04 ENCOUNTER — HOSPITAL ENCOUNTER (OUTPATIENT)
Dept: RADIOLOGY | Facility: HOSPITAL | Age: 69
Discharge: HOME | End: 2019-12-04
Attending: FAMILY MEDICINE
Payer: MEDICARE

## 2019-12-04 DIAGNOSIS — N95.9 MENOPAUSAL AND POSTMENOPAUSAL DISORDER: ICD-10-CM

## 2019-12-04 PROCEDURE — 77080 DXA BONE DENSITY AXIAL: CPT | Mod: TC,PO

## 2019-12-04 NOTE — TELEPHONE ENCOUNTER
Patient advised of results and recommendations.  No further action needed at this time.    ----- Message from Raffi Garcia MD sent at 12/4/2019  2:09 PM CST -----  Results are normal    Make sure you get enough vitamin D and calcium on a daily basis consider supplementation of 600-1000 international units of vitamin-D and 1000 milligrams of calcium

## 2019-12-05 ENCOUNTER — CLINICAL SUPPORT (OUTPATIENT)
Dept: REHABILITATION | Facility: HOSPITAL | Age: 69
End: 2019-12-05
Attending: ORTHOPAEDIC SURGERY
Payer: MEDICARE

## 2019-12-05 DIAGNOSIS — R26.89 ANTALGIC GAIT: ICD-10-CM

## 2019-12-05 DIAGNOSIS — R29.898 WEAKNESS OF BOTH LOWER EXTREMITIES: ICD-10-CM

## 2019-12-05 DIAGNOSIS — M53.3 SI (SACROILIAC) PAIN: ICD-10-CM

## 2019-12-05 DIAGNOSIS — M62.9 HAMSTRING TIGHTNESS OF BOTH LOWER EXTREMITIES: ICD-10-CM

## 2019-12-05 PROCEDURE — 97110 THERAPEUTIC EXERCISES: CPT | Mod: PO

## 2019-12-05 NOTE — PROGRESS NOTES
"  Physical Therapy Daily Treatment Note     Name: Hannah Norman  Clinic Number: [id number]    Therapy Diagnosis:   Encounter Diagnoses   Name Primary?    Weakness of both lower extremities     Hamstring tightness of both lower extremities     SI (sacroiliac) pain     Antalgic gait      Physician: Eliezer Parmar MD    Visit Date: 12/5/2019     Physician Orders: PT Eval and Treat   Medical Diagnosis from Referral: M54.30 (ICD-10-CM) - Sciatica, unspecified laterality  Evaluation Date: 10/31/2019  Authorization Period Expiration: 09/19/2020  Plan of Care Expiration: 12/31/2019  Visit #/Visits authorized: 6/ 20     Time In: 9:00 am  Time Out: 9:55 am  Total Billable Time: 55 minutes    Precautions: Standard and Fall    Subjective     Pt reports: having pain from her waist down, but right knee is hurting most  She was compliant with home exercise program.  Response to previous treatment: sore.  Functional change: not at this time.    Pain: 5/10  Location: B LEs, R knee most    Objective     Patient presented to therapy with a level pelvis.    Hannah received therapeutic exercises to develop strength, ROM, flexibility and core stabilization for 55 minutes 1:1 with PT including:      Date  12/05/19 12/03/19 11/21/19 11/19/19 11/12/19 11/07/19   VISIT 7/20 6/20 5/20 4/20 3/20 2/20   POC EXP. DATE 12/31/19 12/31/19 12/31/19 12/31/19 12/31/19 12/31/19   VISIT AMOUNT  MEDICARE TOTAL 121.28  719.57 121.28  598.29 121.28  477.04 90.96  355.76 60.64  264.80 90.96  204.16   FACE-TO-FACE 01/03/20 01/03/20 11/30/19 11/31/19 11/31/19 11/31/19            TABLE:         HSS w/ strap 10 x 10" 10 x 10" 10 x 10" 10 x 10" 10 x 10" 10 x 10"   Piriformis stretch 5 x 10" 5 x 10" 5 x 10" 5 x 10" 5 x 10" 5 x 10"   Push/ pull -- --- -- -- 3 x 3" 3 x 3"   TA's 20 x 5" 20 x 5" 20 x 5" 20 x 5" 15 x 5" 10 x 5"   Bridge w/ TA 1 x 25 2 x 10 2 x 10 2 x 10 1 x 15  1 x 10   Marching  w/ TA 1 x 25 2 x 10 2 x 10 2 x 10 1 x 15  1 x 10   SLR w/ TA 2 x 10 " "x 1# 2 x 10 2 x 10 2 x 10 1 x 15  1 x 10   Hip abduction - SL 1 x 25 2 x 10 1 x 15 1 x 15 1 x 15  1 x 10   Hip adduction 20 x 3" w/ ball 15 x 3" w/ ball 15 x 3" w/ ball 15 x 3" w/ball -- ---   Hip extension 1 x 15 1 x 10 1 x 10 1 x 10 NT 1 x 10   SEATED:         LAQs 2 x 10 1 x 10 Next? --  ---   Seated Hip Flex w/ TA 2 x 10 1 x 15 Not today 1 x 15 NT 1 x 10   STANDING:         Mini squats Next? --- -- -- -- ---   Step Ups -- --- -- -- -- ---   Hip ext 1 x 10 1 x 5 1 x 5               Initials DG GWA 1/6 DG MA BJ GWA 1/6       Home Exercises Provided and Patient Education Provided     Education provided:   - keep exercises in a pain free range.  -stretching per HEP     Written Home Exercises Provided: yes.  Exercises were reviewed and Hannah was able to demonstrate them prior to the end of the session.  Hannah demonstrated fair  understanding of the education provided.     See EMR under Patient Instructions for exercises provided 11/7/2019.    Assessment     Hannah was able to complete all of today's progressions with no increase in symptoms prior to leaving the clinic. She required occasional tactile cues with standing hip extension to avoid substitutions with extending her lumbar spine.    Hannah is progressing well towards her goals.   Pt prognosis is Fair.     Pt will continue to benefit from skilled outpatient physical therapy to address the deficits listed in the problem list box on initial evaluation, provide pt/family education and to maximize pt's level of independence in the home and community environment.     Pt's spiritual, cultural and educational needs considered and pt agreeable to plan of care and goals.    Anticipated barriers to physical therapy:  Fibromyalgia    Goals:  Short Term Goals: 4 weeks   1. This patient will be independent with a basic HEP.  Met  2. This patient will have lumbar AROM WFL and pain free in order to be able to perform vehicle transfers with no increase in symptoms.  IN " PROGRESS  3. This patient will increase B LE strength by 1 grade in order to wash her dishes with no increase in symptoms.  IN PROGRESS  4. This patient will have a pain rating of 4/10 at worst with ADLs.  IN PROGRESS  5. Patient able to score greater than or equal to 50 on the FOTO Lumbar Spine Survey placing patient in 40%<60% impaired, limited, or restricted category demonstrating overall decreased low back pain with functional activities.  IN PROGRESS     Long Term Goals: 8 weeks   1. This patient will be independent with an updated HEP. IN PROGRESS  2. This patient will increase B LE strength to 5/5 in order to ascend/descend stairs with no increase in symptoms.  IN PROGRESS  3. This patient will have a pain rating of 2/10 at worst with ADLs.  IN PROGRESS  4. Patient able to score greater than or equal to 60 on the FOTO Lumbar Spine Survey placing patient in 40%<60% impaired, limited, or restricted category demonstrating overall decreased low back pain with functional activities.  IN PROGRESS    Plan     Continue with Plan Of Care and progress toward PT goals. Increase reps with all mat exercises next visit. Begin side lying hip adduction next visit.     Saida Herrera, PT,

## 2019-12-10 ENCOUNTER — CLINICAL SUPPORT (OUTPATIENT)
Dept: REHABILITATION | Facility: HOSPITAL | Age: 69
End: 2019-12-10
Attending: ORTHOPAEDIC SURGERY
Payer: MEDICARE

## 2019-12-10 DIAGNOSIS — M62.9 HAMSTRING TIGHTNESS OF BOTH LOWER EXTREMITIES: ICD-10-CM

## 2019-12-10 DIAGNOSIS — R26.89 ANTALGIC GAIT: ICD-10-CM

## 2019-12-10 DIAGNOSIS — M53.3 SI (SACROILIAC) PAIN: ICD-10-CM

## 2019-12-10 DIAGNOSIS — R29.898 WEAKNESS OF BOTH LOWER EXTREMITIES: ICD-10-CM

## 2019-12-10 PROCEDURE — 97110 THERAPEUTIC EXERCISES: CPT | Mod: PO

## 2019-12-10 NOTE — PROGRESS NOTES
"  Physical Therapy Daily Treatment Note     Name: Hannah Norman  Clinic Number: [id number]    Therapy Diagnosis:   Encounter Diagnoses   Name Primary?    Weakness of both lower extremities     Hamstring tightness of both lower extremities     SI (sacroiliac) pain     Antalgic gait      Physician: Eliezer Parmar MD    Visit Date: 12/10/2019     Physician Orders: PT Eval and Treat   Medical Diagnosis from Referral: M54.30 (ICD-10-CM) - Sciatica, unspecified laterality  Evaluation Date: 10/31/2019  Authorization Period Expiration: 09/19/2020  Plan of Care Expiration: 12/31/2019  Visit #/Visits authorized: 8/ 20     Time In: 9:17 am  Time Out: 10:00 am  Total Billable Time: 25 minutes    Precautions: Standard and Fall    Subjective     Pt reports: hurting in both thighs since Sunday, doesn't know what she did but she is walking better today. She is running late today due to having to feed her pets because her spouse didn't feel good and has been spending all day in bed.    She was compliant with home exercise program.  Response to previous treatment: sore.  Functional change: not at this time.    Pain: 8/10  Location: B thighs    Objective     Patient presented to therapy with a level pelvis.    Hannah received therapeutic exercises to develop strength, ROM, flexibility and core stabilization for 25 minutes 1:1 with PT including:      Date  12/10/19 12/05/19 12/03/19 11/21/19 11/19/19 11/12/19 11/07/19   VISIT 8/20 7/20 6/20 5/20 4/20 3/20 2/20   POC EXP. DATE 12/31/19 12/31/19 12/31/19 12/31/19 12/31/19 12/31/19 12/31/19   VISIT AMOUNT  MEDICARE TOTAL 60.64  780.21 121.28  719.57 121.28  598.29 121.28  477.04 90.96  355.76 60.64  264.80 90.96  204.16   FACE-TO-FACE 01/03/19 01/03/20 01/03/20 11/30/19 11/31/19 11/31/19 11/31/19             TABLE:          HSS w/ strap 10 x 10" 10 x 10" 10 x 10" 10 x 10" 10 x 10" 10 x 10" 10 x 10"   Piriformis stretch 5 x 10" 5 x 10" 5 x 10" 5 x 10" 5 x 10" 5 x 10" 5 x 10"   Push/ " "pull -- -- --- -- -- 3 x 3" 3 x 3"   TA's 20 x 5" 20 x 5" 20 x 5" 20 x 5" 20 x 5" 15 x 5" 10 x 5"   Bridge w/ TA 3 x 10 1 x 25 2 x 10 2 x 10 2 x 10 1 x 15  1 x 10   Marching  w/ TA 3 x 10  1 x 25 2 x 10 2 x 10 2 x 10 1 x 15  1 x 10   SLR w/ TA 3 x 10 x 1# 2 x 10 x 1# 2 x 10 2 x 10 2 x 10 1 x 15  1 x 10   Hip abduction - SL 3 x 10 1 x 25 2 x 10 1 x 15 1 x 15 1 x 15  1 x 10   Hip adduction 1 x 10 SL 20 x 3" w/ ball 15 x 3" w/ ball 15 x 3" w/ ball 15 x 3" w/ball -- ---   Hip extension 2 x 10 1 x 15 1 x 10 1 x 10 1 x 10 NT 1 x 10   SEATED:          LAQs Not today 2 x 10 1 x 10 Next? --  ---   Seated Hip Flex w/ TA Not today 2 x 10 1 x 15 Not today 1 x 15 NT 1 x 10   STANDING:          Mini squats Next? Next? --- -- -- -- ---   Step Ups -- -- --- -- -- -- ---   Hip ext Not today 1 x 10 1 x 5 1 x 5                Initials DG DG GWA 1/6 DG MA BJ GWA 1/6       Home Exercises Provided and Patient Education Provided     Education provided:   - keep exercises in a pain free range.  -stretching per HEP     Written Home Exercises Provided: yes.  Exercises were reviewed and Hannah was able to demonstrate them prior to the end of the session.  Hannah demonstrated fair  understanding of the education provided.     See EMR under Patient Instructions for exercises provided 11/7/2019.    Assessment     Hannah did not complete all of her usual exercises due to arriving 17 minutes late for her scheduled appointment. She required verbal and tactile cues for correct positioning with side lying exercises and to avoid excessive lumbar extension with prone hip extension. She was able to perform all of today's progressions with no increase in symptoms prior to leaving the clinic. She rated her pain 6/10 at the end of the session    Hannah is progressing well towards her goals.   Pt prognosis is Fair.     Pt will continue to benefit from skilled outpatient physical therapy to address the deficits listed in the problem list box on initial " evaluation, provide pt/family education and to maximize pt's level of independence in the home and community environment.     Pt's spiritual, cultural and educational needs considered and pt agreeable to plan of care and goals.    Anticipated barriers to physical therapy:  Fibromyalgia    Goals:  Short Term Goals: 4 weeks   1. This patient will be independent with a basic HEP.  Met  2. This patient will have lumbar AROM WFL and pain free in order to be able to perform vehicle transfers with no increase in symptoms.  IN PROGRESS  3. This patient will increase B LE strength by 1 grade in order to wash her dishes with no increase in symptoms.  IN PROGRESS  4. This patient will have a pain rating of 4/10 at worst with ADLs.  IN PROGRESS  5. Patient able to score greater than or equal to 50 on the FOTO Lumbar Spine Survey placing patient in 40%<60% impaired, limited, or restricted category demonstrating overall decreased low back pain with functional activities.  IN PROGRESS     Long Term Goals: 8 weeks   1. This patient will be independent with an updated HEP. IN PROGRESS  2. This patient will increase B LE strength to 5/5 in order to ascend/descend stairs with no increase in symptoms.  IN PROGRESS  3. This patient will have a pain rating of 2/10 at worst with ADLs.  IN PROGRESS  4. Patient able to score greater than or equal to 60 on the FOTO Lumbar Spine Survey placing patient in 40%<60% impaired, limited, or restricted category demonstrating overall decreased low back pain with functional activities.  IN PROGRESS    Plan     Continue with Plan Of Care and progress toward PT goals. Resume all of her usual exercises next visit.     Saida Herrera, PT,

## 2019-12-12 ENCOUNTER — CLINICAL SUPPORT (OUTPATIENT)
Dept: REHABILITATION | Facility: HOSPITAL | Age: 69
End: 2019-12-12
Attending: ORTHOPAEDIC SURGERY
Payer: MEDICARE

## 2019-12-12 DIAGNOSIS — M53.3 SI (SACROILIAC) PAIN: ICD-10-CM

## 2019-12-12 DIAGNOSIS — M62.9 HAMSTRING TIGHTNESS OF BOTH LOWER EXTREMITIES: ICD-10-CM

## 2019-12-12 DIAGNOSIS — R26.89 ANTALGIC GAIT: ICD-10-CM

## 2019-12-12 DIAGNOSIS — R29.898 WEAKNESS OF BOTH LOWER EXTREMITIES: ICD-10-CM

## 2019-12-12 PROCEDURE — 97110 THERAPEUTIC EXERCISES: CPT | Mod: PO

## 2019-12-12 NOTE — PROGRESS NOTES
"  Physical Therapy Daily Treatment Note     Name: Hannah Norman  Clinic Number: [id number]    Therapy Diagnosis:   Encounter Diagnoses   Name Primary?    Weakness of both lower extremities     Hamstring tightness of both lower extremities     SI (sacroiliac) pain     Antalgic gait      Physician: Eliezer Parmar MD    Visit Date: 12/12/2019     Physician Orders: PT Eval and Treat   Medical Diagnosis from Referral: M54.30 (ICD-10-CM) - Sciatica, unspecified laterality  Evaluation Date: 10/31/2019  Authorization Period Expiration: 09/19/2020  Plan of Care Expiration: 12/31/2019  Visit #/Visits authorized: 9/ 20     Time In: 9:07 am  Time Out: 10:00 am  Total Billable Time: 30 minutes    Precautions: Standard and Fall    Subjective     Pt reports:feeling better today  She was compliant with home exercise program.  Response to previous treatment: no increase soreness.  Functional change: not at this time.    Pain: 3/10  Location: B thighs    Objective     Patient presented to therapy with a level pelvis.    Hannah received therapeutic exercises to develop strength, ROM, flexibility and core stabilization for 30 minutes 1:1 with PT including:      Date  12/12/19 12/10/19 12/05/19 12/03/19 11/21/19 11/19/19 11/12/19 11/07/19   VISIT 9/20 8/20 7/20 6/20 5/20 4/20 3/20 2/20   POC EXP. DATE 12/31/19 12/31/19 12/31/19 12/31/19 12/31/19 12/31/19 12/31/19 12/31/19   VISIT AMOUNT  MEDICARE TOTAL 60.64  840.85 60.64  780.21 121.28  719.57 121.28  598.29 121.28  477.04 90.96  355.76 60.64  264.80 90.96  204.16   FACE-TO-FACE 01/03/19 01/03/19 01/03/20 01/03/20 11/30/19 11/31/19 11/31/19 11/31/19              TABLE:           HSS w/ strap 10 x 10" 10 x 10" 10 x 10" 10 x 10" 10 x 10" 10 x 10" 10 x 10" 10 x 10"   Piriformis stretch 5 x 10" 5 x 10" 5 x 10" 5 x 10" 5 x 10" 5 x 10" 5 x 10" 5 x 10"   Push/ pull 3 x 3" -- -- --- -- -- 3 x 3" 3 x 3"   TA's 20 x 5" 20 x 5" 20 x 5" 20 x 5" 20 x 5" 20 x 5" 15 x 5" 10 x 5"   Bridge w/ TA 3 x " "10 3 x 10 1 x 25 2 x 10 2 x 10 2 x 10 1 x 15  1 x 10   Marching  w/ TA D/C 3 x 10  1 x 25 2 x 10 2 x 10 2 x 10 1 x 15  1 x 10   Dying bug 1 x 10          SLR w/ TA 3 x 10 x 1# 3 x 10 x 1# 2 x 10 x 1# 2 x 10 2 x 10 2 x 10 1 x 15  1 x 10   Hip abduction - SL 3 x 10 3 x 10 1 x 25 2 x 10 1 x 15 1 x 15 1 x 15  1 x 10   Hip adduction 2 x 10 1 x 10 SL 20 x 3" w/ ball 15 x 3" w/ ball 15 x 3" w/ ball 15 x 3" w/ball -- ---   Hip extension 2 x 10 2 x 10 1 x 15 1 x 10 1 x 10 1 x 10 NT 1 x 10   SEATED:           LAQs 2 x 10 x 1# Not today 2 x 10 1 x 10 Next? --  ---   Seated Hip Flex w/ TA 2 x 10 x 1# Not today 2 x 10 1 x 15 Not today 1 x 15 NT 1 x 10   STANDING:           Mini squats Next? Next? Next? --- -- -- -- ---   Step Ups -- -- -- --- -- -- -- ---   Hip ext Not today Not today 1 x 10 1 x 5 1 x 5                 Initials DG DG DG GWA 1/6 DG MA BJ GWA 1/6       Home Exercises Provided and Patient Education Provided     Education provided:   - keep exercises in a pain free range.  -stretching per HEP     Written Home Exercises Provided: yes.  Exercises were reviewed and Hannah was able to demonstrate them prior to the end of the session.  Hannah demonstrated fair  understanding of the education provided.     See EMR under Patient Instructions for exercises provided 11/7/2019.    Assessment     Hannah was able to complete all of today's progressions and new exercises with no increase in symptoms prior to leaving the clinic. She continues to require occasional verbal cues to maintain her core engagement with all exercises and for posture with seated exercises.     Hannah is progressing well towards her goals.   Pt prognosis is Fair.     Pt will continue to benefit from skilled outpatient physical therapy to address the deficits listed in the problem list box on initial evaluation, provide pt/family education and to maximize pt's level of independence in the home and community environment.     Pt's spiritual, cultural and " educational needs considered and pt agreeable to plan of care and goals.    Anticipated barriers to physical therapy:  Fibromyalgia    Goals:  Short Term Goals: 4 weeks   1. This patient will be independent with a basic HEP.  Met  2. This patient will have lumbar AROM WFL and pain free in order to be able to perform vehicle transfers with no increase in symptoms.  IN PROGRESS  3. This patient will increase B LE strength by 1 grade in order to wash her dishes with no increase in symptoms.  IN PROGRESS  4. This patient will have a pain rating of 4/10 at worst with ADLs.  IN PROGRESS  5. Patient able to score greater than or equal to 50 on the FOTO Lumbar Spine Survey placing patient in 40%<60% impaired, limited, or restricted category demonstrating overall decreased low back pain with functional activities.  IN PROGRESS     Long Term Goals: 8 weeks   1. This patient will be independent with an updated HEP. IN PROGRESS  2. This patient will increase B LE strength to 5/5 in order to ascend/descend stairs with no increase in symptoms.  IN PROGRESS  3. This patient will have a pain rating of 2/10 at worst with ADLs.  IN PROGRESS  4. Patient able to score greater than or equal to 60 on the FOTO Lumbar Spine Survey placing patient in 40%<60% impaired, limited, or restricted category demonstrating overall decreased low back pain with functional activities.  IN PROGRESS    Plan     Continue with Plan Of Care and progress toward PT goals. Begin bridging with legs on ball and increase reps with dying bug next visit.     Saida Herrera, PT,

## 2019-12-16 ENCOUNTER — PATIENT OUTREACH (OUTPATIENT)
Dept: OTHER | Facility: OTHER | Age: 69
End: 2019-12-16

## 2019-12-17 ENCOUNTER — CLINICAL SUPPORT (OUTPATIENT)
Dept: REHABILITATION | Facility: HOSPITAL | Age: 69
End: 2019-12-17
Attending: ORTHOPAEDIC SURGERY
Payer: MEDICARE

## 2019-12-17 DIAGNOSIS — M62.9 HAMSTRING TIGHTNESS OF BOTH LOWER EXTREMITIES: ICD-10-CM

## 2019-12-17 DIAGNOSIS — R26.89 DECREASED FUNCTIONAL MOBILITY: ICD-10-CM

## 2019-12-17 DIAGNOSIS — M53.3 SI (SACROILIAC) PAIN: ICD-10-CM

## 2019-12-17 DIAGNOSIS — R29.898 WEAKNESS OF BOTH LOWER EXTREMITIES: ICD-10-CM

## 2019-12-17 DIAGNOSIS — R29.898 WEAKNESS OF BOTH UPPER EXTREMITIES: ICD-10-CM

## 2019-12-17 DIAGNOSIS — R26.89 ANTALGIC GAIT: ICD-10-CM

## 2019-12-17 NOTE — PLAN OF CARE
[facility]  Physical Therapy Wheelchair Evaluation    Name: Hannah Norman  Clinic Number: [id number]    Therapy Diagnosis: --  Encounter Diagnoses   Name Primary?    Weakness of both lower extremities     Hamstring tightness of both lower extremities     SI (sacroiliac) pain     Antalgic gait     Weakness of both upper extremities     Decreased functional mobility      Physician: Sandro Stringer MD     Physician Orders: Wheelchair evaluation  Medical Diagnosis from Referral: --  Evaluation Date: 12/17/2019  Authorization Period Expiration: --  Plan of Care Expiration: 12/17/2019  Visit # / Visits authorized: 1/ 1    Time In: 9:00 am  Time Out: 9:40 am  Total Billable Time: 40 minutes    Precautions: Standard and Fall    Subjective   Date of onset: chronic  History of current condition - Hannah reports: having difficulty performing her usual household duties and maneuvering in her community due to weakness and decreased endurance. Her current scooter is is an older model that is beyond repair. She requires a scooter in order to maneuver in the community and at home.      Medical History:   Past Medical History:   Diagnosis Date    Anxiety disorder     Bell's palsy     Chronic back pain     Chronic osteoarthritis     Essential tremor     Fibromyalgia     Hypertension     Mild acid reflux     Neck pain     Other and unspecified hyperlipidemia     Sleep apnea     wear c-pap at night; does not use regularly       Surgical History:   Hannah Norman  has a past surgical history that includes Hysterectomy; Tonsillectomy; Breast reduction; Carpal tunnel release (Right); Knee surgery; Colonoscopy (2008); left shoulder (Left); Colonoscopy (N/A, 11/10/2017); Placement of transobturator tape (N/A, 10/23/2018); Cystoscopy (N/A, 10/23/2018); Esophagogastroduodenoscopy (N/A, 5/30/2019); Tonsillectomy; Adenoidectomy; Appendectomy; Breast surgery (Bilateral); Cosmetic surgery (Bilateral);  Tubal ligation; Joint replacement (Left); Breast biopsy (Left); and Total Reduction Mammoplasty.    Medications:   Hannah has a current medication list which includes the following prescription(s): acetaminophen, albuterol, clonazepam, cyclobenzaprine, durezol, fluticasone propionate, furosemide, gabapentin, ilevro, losartan, lovastatin, magnesium oxide, morphine, oxybutynin, oxycodone-acetaminophen, pantoprazole, potassium chloride sa, pramipexole, relistor, and venlafaxine.    Allergies:   Review of patient's allergies indicates:   Allergen Reactions    Hyoscyamine Rash        Imaging, x-rays: B hips 9/20/2019       FINDINGS:  There is no evidence for acute fracture or dislocation of the hips bilaterally.  Bony pelvis is grossly intact.  No focal bony erosion.  Several scattered probable calcifications project over the right lateral gluteal soft tissues.  Further evaluation as warranted clinically.       Impression         Please see above       Prior Therapy: PT previously for TKA, RTC   Social History: single story home, 2 steps into the [facility] with rails, lives with their family  Occupation: retired  Prior Level of Function: independent  Current Level of Function: mod I    Pain:  Current 4/10, worst 9/10, best 3/10   Location:  B arms, back , shoulder  and B LEs   Description: Aching and Sharp  Aggravating Factors: Standing, Bending, Walking and Lifting  Easing Factors: pain medication, ice, heating pad, injection, hot bath and rest    Pts goals: To be able to get a new scooter so I can go back to doing things for myself    Objective     Observation: Patient is a 69 year old female, who presents to the clinic in no apparent distress. She is ambulating with a slight antalgic gait without an AD.        Posture:  slumped sitting with forward shoulders and head, no pelvic obliquity noted, no side bending of the trunk      Lumbar Range of Motion:     Degrees Pain   Flexion 60 Pain across sacrum   Extension 30     Left  Side Bending 20     Right Side Bending 20           Lower Extremity Strength  Right LE   Left LE     Knee extension: 5/5 Knee extension: 4/5   Knee flexion: 5/5 Knee flexion: 5/5   Hip flexion: 4-/5 Hip flexion: 4/5   Hip extension:  4/5 Hip extension: 3+/5   Hip abduction: 4/5 Hip abduction: 4-/5   Hip adduction: 5/5 Hip adduction 5/5   Ankle dorsiflexion: 4+/5 Ankle dorsiflexion: 4+/5   Ankle plantarflexion: 5/5 Ankle plantarflexion: 5/5       Active Range of Motion: Measured in degrees    Elbow Left Right   Flexion WFL WFL   Extension WFL WFL     Shoulder Left Right   Flexion WFL WFL   Abduction WFL WFL   ER at 90 WFL WFL   IR WFL WFL       Upper Extremity Strength  Elbow Left Right   Biceops 4/5 4/5   Triceps 4/5 4/5       Shoulder Left Right   Shoulder flexion: 3/5 3/5   Shoulder Abduction: 3/5 3/5   Shoulder ER 4/5 4/5   Shoulder IR 4/5 4/5     Functional Mobility (Bed mobility, transfers)  Bed mobility: I  Supine to sit: I  Sit to supine: I  Sit to stand:  I  Car transfers: Mod I    ADL's:  Feeding: I  Grooming: I  Hygiene: I  UB Dressing: I  LB Dressing: Mod I  Toileting: I  Bathing: I    Sitting balance static: good  Sitting balance dynamic: good  Standing balance static: good  Standing balance dynamic:  fair    Sensation: light touch intact      TREATMENT     Home Exercises and Patient Education Provided    Education provided:   - role of PT in the process of acquiring a new scooter      Assessment   Hannah is a 69 y.o. female referred to outpatient Physical Therapy for a scooter evalution. Pt presents with weakness of both lower and upper extremities, decreased balance, poor posture, and decreased endurance which limits her ability to perform her usual household duties, ambulate for a prolonged period of time, stand for a prolonged period of time, and maneuver in her community. In the past she has used a scooter but her current scooter is beyond repair for mechanical issues.     Pt prognosis is Fair.   Pt  will benefit from skilled outpatient Physical Therapy to address the deficits stated above and in the chart below, provide pt/family education, and to maximize pt's level of independence.     Plan of care discussed with patient: Yes  Pt's spiritual, cultural and educational needs considered and patient is agreeable to the plan of care and goals as stated below:     Anticipated Barriers for therapy: none    Medical Necessity is demonstrated by the following  History  Co-morbidities and personal factors that may impact the plan of care Co-morbidities:   anxiety, difficulty sleeping, high BMI and history of chronic back pain, Fibromyalgia, OA, neck pain    Personal Factors:   no deficits     high   Examination  Body Structures and Functions, activity limitations and participation restrictions that may impact the plan of care Body Regions:   back  lower extremities  upper extremities    Body Systems:    ROM  strength  gait  transfers  motor control    Participation Restrictions:   Difficulty with prolonged ambulation, prolonged standing, performing usual household duties, shopping, community activities.     Activity limitations:   Learning and applying knowledge  no deficits    General Tasks and Commands  no deficits    Communication  no deficits    Mobility  lifting and carrying objects  walking  using transportation (bus, train, plane, car)    Self care  no deficits    Domestic Life  shopping  cooking  doing house work (cleaning house, washing dishes, laundry)    Interactions/Relationships  no deficits    Life Areas  no deficits    Community and Social Life  community life  recreation and leisure         high   Clinical Presentation evolving clinical presentation with changing clinical characteristics moderate   Decision Making/ Complexity Score: moderate     Goals:   1. Patient to obtain a scooter in ot[place] to maneuver in her community and home environments.       Plan   Plan of care Certification: 12/17/2019 to  12/17/19.    Outpatient Physical Therapy evaluation only for scooter assessment, she would benefit from a scooter to improve her functional mobility inside and outside the home.   Saida Herrera, PT

## 2019-12-19 ENCOUNTER — TELEPHONE (OUTPATIENT)
Dept: REHABILITATION | Facility: HOSPITAL | Age: 69
End: 2019-12-19

## 2019-12-19 NOTE — TELEPHONE ENCOUNTER
Called regarding today's No Show, spoke with patient, she stated that she called the , spoke with Rosio and told her that she had fallen asleep and woke up late.  Patient states she offered to come in later but was told that she couldn't.  This message was not relayed to me.  Reminded patient her next appointment is on 12/24 @ 9:00.  Patient verbally acknowledged.

## 2019-12-20 ENCOUNTER — PATIENT MESSAGE (OUTPATIENT)
Dept: FAMILY MEDICINE | Facility: CLINIC | Age: 69
End: 2019-12-20

## 2019-12-20 DIAGNOSIS — G25.81 RLS (RESTLESS LEGS SYNDROME): ICD-10-CM

## 2019-12-20 PROBLEM — R26.89 DECREASED FUNCTIONAL MOBILITY: Status: ACTIVE | Noted: 2019-12-20

## 2019-12-20 PROBLEM — R29.898 WEAKNESS OF BOTH UPPER EXTREMITIES: Status: ACTIVE | Noted: 2019-12-20

## 2019-12-20 RX ORDER — PRAMIPEXOLE DIHYDROCHLORIDE 0.12 MG/1
TABLET ORAL
Qty: 150 TABLET | Refills: 11 | Status: CANCELLED | OUTPATIENT
Start: 2019-12-20

## 2019-12-20 RX ORDER — PRAMIPEXOLE DIHYDROCHLORIDE 0.12 MG/1
TABLET ORAL
Qty: 150 TABLET | Refills: 2 | Status: SHIPPED | OUTPATIENT
Start: 2019-12-20 | End: 2020-03-30

## 2019-12-20 NOTE — TELEPHONE ENCOUNTER
LOV 03/04/19    Last filled pramipexole (MIRAPEX) 0.125 MG tablet 150 tablet w/ 11 refills on 11/28/2018.

## 2019-12-24 ENCOUNTER — CLINICAL SUPPORT (OUTPATIENT)
Dept: REHABILITATION | Facility: HOSPITAL | Age: 69
End: 2019-12-24
Attending: ORTHOPAEDIC SURGERY
Payer: MEDICARE

## 2019-12-24 DIAGNOSIS — M62.9 HAMSTRING TIGHTNESS OF BOTH LOWER EXTREMITIES: ICD-10-CM

## 2019-12-24 DIAGNOSIS — R29.898 WEAKNESS OF BOTH LOWER EXTREMITIES: ICD-10-CM

## 2019-12-24 DIAGNOSIS — M53.3 SI (SACROILIAC) PAIN: ICD-10-CM

## 2019-12-24 DIAGNOSIS — R26.89 ANTALGIC GAIT: ICD-10-CM

## 2019-12-24 PROCEDURE — 97110 THERAPEUTIC EXERCISES: CPT | Mod: PO

## 2019-12-24 NOTE — PROGRESS NOTES
"  Physical Therapy Daily Treatment Note     Name: Hannah Norman  Clinic Number: [id number]    Therapy Diagnosis:   Encounter Diagnoses   Name Primary?    Weakness of both lower extremities     Hamstring tightness of both lower extremities     SI (sacroiliac) pain     Antalgic gait      Physician: Eliezer Parmar MD    Visit Date: 12/24/2019     Physician Orders: PT Eval and Treat   Medical Diagnosis from Referral: M54.30 (ICD-10-CM) - Sciatica, unspecified laterality  Evaluation Date: 10/31/2019  Authorization Period Expiration: 09/19/2020  Plan of Care Expiration: 12/31/2019  Visit #/Visits authorized: 10/ 20     Time In: 9:00 am  Time Out: 9:35 am   Total Billable Time: 35 minutes    Precautions: Standard and Fall    Subjective     Pt reports feeling shaky today but has been feeling that way for the past few days, generalized pain from fibromyalgia  She was compliant with home exercise program.  Response to previous treatment: no increase soreness.  Functional change: not at this time.    Pain: 5/10  Location: generalized     Objective     Patient presented to therapy with a level pelvis.    Hannah received therapeutic exercises to develop strength, ROM, flexibility and core stabilization, along with reassessment for 35 minutes 1:1 with PT including:      Date  12/24/19 12/12/19 12/10/19 12/05/19 12/03/19 11/21/19 11/19/19 11/12/19 11/07/19   VISIT 10/20 9/20 8/20 7/20 6/20 5/20 4/20 3/20 2/20   POC EXP. DATE 12/31/19 12/31/19 12/31/19 12/31/19 12/31/19 12/31/19 12/31/19 12/31/19 12/31/19   VISIT AMOUNT  MEDICARE TOTAL 60.64  901.49 60.64  840.85 60.64  780.21 121.28  719.57 121.28  598.29 121.28  477.04 90.96  355.76 60.64  264.80 90.96  204.16   FACE-TO-FACE 01/03/20 01/03/19 01/03/19 01/03/20 01/03/20 11/30/19 11/31/19 11/31/19 11/31/19               TABLE:            HSS w/ strap 10 x 10" 10 x 10" 10 x 10" 10 x 10" 10 x 10" 10 x 10" 10 x 10" 10 x 10" 10 x 10"   Piriformis stretch 5 x 10" 5 x 10" 5 x 10" 5 x " "10" 5 x 10" 5 x 10" 5 x 10" 5 x 10" 5 x 10"   Push/ pull Not today 3 x 3" -- -- --- -- -- 3 x 3" 3 x 3"   TA's  20 x 5" 20 x 5" 20 x 5" 20 x 5" 20 x 5" 20 x 5" 15 x 5" 10 x 5"   Bridge w/ TA  3 x 10 3 x 10 1 x 25 2 x 10 2 x 10 2 x 10 1 x 15  1 x 10   Marching  w/ TA 3 x 10 D/C 3 x 10  1 x 25 2 x 10 2 x 10 2 x 10 1 x 15  1 x 10   Dying bug  1 x 10          SLR w/ TA  3 x 10 x 1# 3 x 10 x 1# 2 x 10 x 1# 2 x 10 2 x 10 2 x 10 1 x 15  1 x 10   Hip abduction - SL  3 x 10 3 x 10 1 x 25 2 x 10 1 x 15 1 x 15 1 x 15  1 x 10   Hip adduction  2 x 10 1 x 10 SL 20 x 3" w/ ball 15 x 3" w/ ball 15 x 3" w/ ball 15 x 3" w/ball -- ---   Hip extension  2 x 10 2 x 10 1 x 15 1 x 10 1 x 10 1 x 10 NT 1 x 10   SEATED:            LAQs  2 x 10 x 1# Not today 2 x 10 1 x 10 Next? --  ---   Seated Hip Flex w/ TA  2 x 10 x 1# Not today 2 x 10 1 x 15 Not today 1 x 15 NT 1 x 10   STANDING:            Mini squats  Next? Next? Next? --- -- -- -- ---   Step Ups  -- -- -- --- -- -- -- ---   Hip ext  Not today Not today 1 x 10 1 x 5 1 x 5                  Initials DG DG DG DG GWA 1/6 DG MA BJ GWA 1/6     Lumbar Range of Motion:     Degrees Pain   Flexion 70 Pain across sacrum   Extension 30     Left Side Bending 20     Right Side Bending 20           Lower Extremity Strength  Right LE   Left LE     Knee extension: 5/5 Knee extension: 5/5   Knee flexion: 5/5 Knee flexion: 5/5   Hip flexion: 4+/5 Hip flexion: 4+/5   Hip extension:  5/5 Hip extension: 4+/5   Hip abduction: 5/5 Hip abduction: 4+/5   Hip adduction: 5/5 Hip adduction 5/5   Ankle dorsiflexion: 5/5 Ankle dorsiflexion: 5/5   Ankle plantarflexion: 5/5 Ankle plantarflexion: 5/5     FOTO Lumbar Spine 40% impaired, limited, or restricted    Home Exercises Provided and Patient Education Provided     Education provided:   - continue with HEP on a regular basis to maintain her strength and flexibility.    Written Home Exercises Provided: yes.  Exercises were reviewed and Hannah was able to demonstrate " them prior to the end of the session.  Hannah demonstrated fair  understanding of the education provided.     See EMR under Patient Instructions for exercises provided 11/7/2019.    Assessment     Hannah is able to demonstrate an increase in pain free AROM of her lumbar spine and is no longer limited in her usual ADLs by her ROM. Her B LE strength has improved compared to her initial evaluation. Her functional mobility inside the home has improved due to her improvement with her LE strength and core stability. Her current score on FOTO Lumbar Spine places her in the 40%<60% impaired, limited, or restricted category. She is independent with her HEP and has met all goals set for her to her max rehab potential at this time. She is ready for discharge to her HEP.    Hannah is progressing well towards her goals.   Pt prognosis is Fair.     Pt will continue to benefit from skilled outpatient physical therapy to address the deficits listed in the problem list box on initial evaluation, provide pt/family education and to maximize pt's level of independence in the home and community environment.     Pt's spiritual, cultural and educational needs considered and pt agreeable to plan of care and goals.    Anticipated barriers to physical therapy:  Fibromyalgia    Goals:  Short Term Goals: 4 weeks   1. This patient will be independent with a basic HEP.  Met  2. This patient will have lumbar AROM WFL and pain free in order to be able to perform vehicle transfers with no increase in symptoms. Met  3. This patient will increase B LE strength by 1 grade in order to wash her dishes with no increase in symptoms.  Met  4. This patient will have a pain rating of 4/10 at worst with ADLs.  Met  5. Patient able to score greater than or equal to 50 on the FOTO Lumbar Spine Survey placing patient in 40%<60% impaired, limited, or restricted category demonstrating overall decreased low back pain with functional activities. Met     Long Term  Goals: 8 weeks   1. This patient will be independent with an updated HEP. Met  2. This patient will increase B LE strength to 5/5 in order to ascend/descend stairs with no increase in symptoms.  Met  3. This patient will have a pain rating of 2/10 at worst with ADLs. Partially met  4. Patient able to score greater than or equal to 60 on the FOTO Lumbar Spine Survey placing patient in 40%<60% impaired, limited, or restricted category demonstrating overall decreased low back pain with functional activities.  Met    Plan     Discharged to HEP.    Saida Herrera, PT,

## 2019-12-27 PROBLEM — M53.3 SI (SACROILIAC) PAIN: Status: RESOLVED | Noted: 2019-11-06 | Resolved: 2019-12-27

## 2019-12-27 PROBLEM — R26.89 ANTALGIC GAIT: Status: RESOLVED | Noted: 2019-11-06 | Resolved: 2019-12-27

## 2019-12-27 PROBLEM — R29.898 WEAKNESS OF BOTH LOWER EXTREMITIES: Status: RESOLVED | Noted: 2019-11-06 | Resolved: 2019-12-27

## 2019-12-27 PROBLEM — R26.89 DECREASED FUNCTIONAL MOBILITY: Status: RESOLVED | Noted: 2019-12-20 | Resolved: 2019-12-27

## 2019-12-27 PROBLEM — R29.898 WEAKNESS OF BOTH UPPER EXTREMITIES: Status: RESOLVED | Noted: 2019-12-20 | Resolved: 2019-12-27

## 2019-12-27 PROBLEM — M62.9 HAMSTRING TIGHTNESS OF BOTH LOWER EXTREMITIES: Status: RESOLVED | Noted: 2019-11-06 | Resolved: 2019-12-27

## 2019-12-27 NOTE — PLAN OF CARE
Outpatient Therapy Discharge Summary     Name: Hannah Norman  [facility] Number: [id number]    Therapy Diagnosis:   Encounter Diagnoses   Name Primary?    Weakness of both lower extremities     Hamstring tightness of both lower extremities     SI (sacroiliac) pain     Antalgic gait      Physician: Eliezer Parmar MD    Physician Orders: PT Eval and Treat   Medical Diagnosis from Referral: M54.30 (ICD-10-CM) - Sciatica, unspecified laterality  Evaluation Date: 10/31/2019    Date of Last visit: 12/24/2019  Total Visits Received: 10  Cancelled Visits: 1  No Show Visits: 2    Assessment    Goals:   Short Term Goals: 4 weeks   1. This patient will be independent with a basic HEP.  Met  2. This patient will have lumbar AROM WFL and pain free in order to be able to perform vehicle transfers with no increase in symptoms. Met  3. This patient will increase B LE strength by 1 grade in order to wash her dishes with no increase in symptoms.  Met  4. This patient will have a pain rating of 4/10 at worst with ADLs.  Met  5. Patient able to score greater than or equal to 50 on the FOTO Lumbar Spine Survey placing patient in 40%<60% impaired, limited, or restricted category demonstrating overall decreased low back pain with functional activities. Met     Long Term Goals: 8 weeks   1. This patient will be independent with an updated HEP. Met  2. This patient will increase B LE strength to 5/5 in order to ascend/descend stairs with no increase in symptoms.  Met  3. This patient will have a pain rating of 2/10 at worst with ADLs. Partially met  4. Patient able to score greater than or equal to 60 on the FOTO Lumbar Spine Survey placing patient in 40%<60% impaired, limited, or restricted category demonstrating overall decreased low back pain with functional activities.  Met    Discharge reason: Patient has reached the maximum rehab potential for the present time    Plan   This patient is discharged from Physical Therapy

## 2020-01-22 ENCOUNTER — OFFICE VISIT (OUTPATIENT)
Dept: UROLOGY | Facility: CLINIC | Age: 70
End: 2020-01-22
Payer: MEDICARE

## 2020-01-22 VITALS
WEIGHT: 200 LBS | BODY MASS INDEX: 36.8 KG/M2 | DIASTOLIC BLOOD PRESSURE: 74 MMHG | HEART RATE: 98 BPM | SYSTOLIC BLOOD PRESSURE: 129 MMHG | HEIGHT: 62 IN

## 2020-01-22 DIAGNOSIS — N39.41 URGE INCONTINENCE: Primary | ICD-10-CM

## 2020-01-22 DIAGNOSIS — R39.15 URINARY URGENCY: ICD-10-CM

## 2020-01-22 PROCEDURE — 1159F MED LIST DOCD IN RCRD: CPT | Mod: ,,, | Performed by: UROLOGY

## 2020-01-22 PROCEDURE — 99213 OFFICE O/P EST LOW 20 MIN: CPT | Mod: S$PBB,25,, | Performed by: UROLOGY

## 2020-01-22 PROCEDURE — 51701 PR INSERTION OF NON-INDWELLING BLADDER CATHETERIZATION FOR RESIDUAL UR: ICD-10-PCS | Mod: S$PBB,,, | Performed by: UROLOGY

## 2020-01-22 PROCEDURE — 99999 PR PBB SHADOW E&M-EST. PATIENT-LVL V: CPT | Mod: PBBFAC,,, | Performed by: UROLOGY

## 2020-01-22 PROCEDURE — 1126F PR PAIN SEVERITY QUANTIFIED, NO PAIN PRESENT: ICD-10-PCS | Mod: ,,, | Performed by: UROLOGY

## 2020-01-22 PROCEDURE — 51701 INSERT BLADDER CATHETER: CPT | Mod: PBBFAC | Performed by: UROLOGY

## 2020-01-22 PROCEDURE — 1159F PR MEDICATION LIST DOCUMENTED IN MEDICAL RECORD: ICD-10-PCS | Mod: ,,, | Performed by: UROLOGY

## 2020-01-22 PROCEDURE — 99215 OFFICE O/P EST HI 40 MIN: CPT | Mod: PBBFAC | Performed by: UROLOGY

## 2020-01-22 PROCEDURE — 99213 PR OFFICE/OUTPT VISIT, EST, LEVL III, 20-29 MIN: ICD-10-PCS | Mod: S$PBB,25,, | Performed by: UROLOGY

## 2020-01-22 PROCEDURE — 81002 URINALYSIS NONAUTO W/O SCOPE: CPT | Mod: PBBFAC | Performed by: UROLOGY

## 2020-01-22 PROCEDURE — 1126F AMNT PAIN NOTED NONE PRSNT: CPT | Mod: ,,, | Performed by: UROLOGY

## 2020-01-22 PROCEDURE — 51701 INSERT BLADDER CATHETER: CPT | Mod: S$PBB,,, | Performed by: UROLOGY

## 2020-01-22 PROCEDURE — 99999 PR PBB SHADOW E&M-EST. PATIENT-LVL V: ICD-10-PCS | Mod: PBBFAC,,, | Performed by: UROLOGY

## 2020-01-22 RX ORDER — OXYBUTYNIN CHLORIDE 10 MG/1
10 TABLET, EXTENDED RELEASE ORAL DAILY
Qty: 90 TABLET | Refills: 3 | Status: SHIPPED | OUTPATIENT
Start: 2020-01-22 | End: 2020-11-10

## 2020-01-22 NOTE — PROGRESS NOTES
CHIEF COMPLAINT:    Mrs. Norman is a 69 y.o. female presenting for a follow up on mixed incontience    PRESENTING ILLNESS:    Hannah Norman is a 69 y.o. female who returns to accompany Mrs. Lucero and while she was here with Mrs. Lucero, mentioned that she has ongoing urgency and urge incontinence so she was added to the schedule.      She presented to me originally for a complaint of urgency and urge incontinence.  However, when she underwent urodynamics, she had significant demonstrable stress incontinence and underwent a TOS.  She then improved but states that the urgency is back.  The stress incontinence is better.  Her primary, Dr. Hauser prescribed oxybutynin but when her family member went to pick it up was informed it would be $300.  (?!!!)  This was for the 10 mg XL.  She is not sexually active.      REVIEW OF SYSTEMS:    Review of Systems   Constitutional: Negative.    HENT: Negative.    Eyes: Negative.    Respiratory: Negative.    Cardiovascular: Negative.    Gastrointestinal: Negative.    Genitourinary: Positive for frequency and urgency.   Musculoskeletal: Negative.    Skin: Negative.    Neurological: Negative.    Endo/Heme/Allergies: Negative.    Psychiatric/Behavioral: Negative.        PATIENT HISTORY:    Past Medical History:   Diagnosis Date    Anxiety disorder     Bell's palsy     Chronic back pain     Chronic osteoarthritis     Essential tremor     Fibromyalgia     Hypertension     Mild acid reflux     Neck pain     Other and unspecified hyperlipidemia     Sleep apnea     wear c-pap at night; does not use regularly       Past Surgical History:   Procedure Laterality Date    ADENOIDECTOMY      APPENDECTOMY      BREAST BIOPSY Left     1995  negative    Breast reduction      BREAST SURGERY Bilateral     breast reduction    CARPAL TUNNEL RELEASE Right     COLONOSCOPY  2008    COLONOSCOPY N/A 11/10/2017    Procedure: COLONOSCOPY - Miralax split prep;  Surgeon: Annetta Powell,  MD;  Location: [facility] ENDO;  Service: Endoscopy;  Laterality: N/A;    COSMETIC SURGERY Bilateral     breast reduction    CYSTOSCOPY N/A 10/23/2018    Procedure: CYSTOSCOPY;  Surgeon: Feli Garza MD;  Location: [facility] OR [address] [place] [address];  Service: Urology;  Laterality: N/A;    ESOPHAGOGASTRODUODENOSCOPY N/A 2019    Procedure: ESOPHAGOGASTRODUODENOSCOPY (EGD);  Surgeon: Oscar Wylie MD;  Location: [facility] (4TH FLR);  Service: Endoscopy;  Laterality: N/A;  Off PPI/H2 x 7 days prior- ERW    HYSTERECTOMY      JOINT REPLACEMENT Left     knee    KNEE SURGERY      bilateral    left shoulder Left     rotator cuff    PLACEMENT OF TRANSOBTURATOR TAPE N/A 10/23/2018    Procedure: PLACEMENT, TRANSOBTURATOR TAPE;  Surgeon: Feli Garza MD;  Location: [facility] OR [address] [place] [address];  Service: Urology;  Laterality: N/A;  1.5 hours    Tonsillectomy      TONSILLECTOMY      TOTAL REDUCTION MAMMOPLASTY          TUBAL LIGATION         Family History   Problem Relation Age of Onset    Diabetes Mother     Tremor Mother     Liver disease Mother     Diabetes Father     Heart disease Father     Tremor Son     Diabetes Sister     Diabetes Brother     Depression Sister      Socioeconomic History    Marital status:    Tobacco Use    Smoking status: Former Smoker     Years: 5.00     Last attempt to quit: 10/5/1982     Years since quittin.3    Smokeless tobacco: Never Used   Substance and Sexual Activity    Alcohol use: No    Drug use: No    Sexual activity: Not Currently     Partners: Male       Allergies:  Hyoscyamine    Medications:  Outpatient Encounter Medications as of 2020   Medication Sig Dispense Refill    acetaminophen (TYLENOL) 500 MG tablet Take 500 mg by mouth every 6 (six) hours as needed.      albuterol 90 mcg/actuation inhaler Inhale 2 puffs into the lungs every 6 (six) hours as needed for Wheezing or Shortness of Breath. Rescue 18 g 0    clonazePAM (KLONOPIN) 0.5 MG tablet 2 pills PO  QHS bedtime 60 tablet 5    cyclobenzaprine (FLEXERIL) 10 MG tablet Take 10 mg by mouth 3 (three) times daily as needed.       DUREZOL 0.05 % Drop ophthalmic solution       fluticasone (FLONASE) 50 mcg/actuation nasal spray 1 spray (50 mcg total) by Each Nare route once daily. 16 g 0    furosemide (LASIX) 40 MG tablet TAKE 1 TABLET BY MOUTH UP TO TWO TIMES A DAY IF SWELLING OCCURS 60 tablet 2    gabapentin (NEURONTIN) 600 MG tablet TAKE 1 TABLET BY MOUTH THREE TIMES A DAY 90 tablet 11    ILEVRO 0.3 % DrpS       losartan (COZAAR) 25 MG tablet Take 1 tablet (25 mg total) by mouth once daily. For blood pressure   ( dc 50 mg dose) 90 tablet 3    lovastatin (MEVACOR) 20 MG tablet Take 1 tablet (20 mg total) by mouth every evening. 90 tablet 3    magnesium oxide (MAG-OX) 400 mg (241.3 mg magnesium) tablet Take 1 tablet (400 mg total) by mouth once daily.  0    morphine (MS CONTIN) 15 MG 12 hr tablet Take 1 tablet by mouth every 12 (twelve) hours.      oxycodone-acetaminophen (PERCOCET)  mg per tablet Take 1 tablet by mouth every 8 (eight) hours as needed.       pantoprazole (PROTONIX) 40 MG tablet       potassium chloride SA (K-DUR,KLOR-CON) 20 MEQ tablet       pramipexole (MIRAPEX) 0.125 MG tablet 2 pills PO in the late afternoon and 3 pills PO at bedtime 150 tablet 2    venlafaxine (EFFEXOR-XR) 75 MG 24 hr capsule Take 1 tablet by mouth in morning and 2 tablets by mouth at night 270 capsule 1    oxybutynin (DITROPAN-XL) 10 MG 24 hr tablet Take 1 tablet (10 mg total) by mouth once daily. 90 tablet 3    RELISTOR 150 mg Tab       [DISCONTINUED] oxybutynin (DITROPAN-XL) 10 MG 24 hr tablet Take 1 tablet (10 mg total) by mouth once daily. (Patient not taking: Reported on 1/22/2020) 90 tablet 1     No facility-administered encounter medications on file as of 1/22/2020.          PHYSICAL EXAMINATION:    The patient generally appears in good health, is appropriately interactive, and is in no apparent  distress.    Skin: No lesions.    Mental: Cooperative with normal affect.    Neuro: Grossly intact.    HEENT: Normal. No evidence of lymphadenopathy.    Chest:  normal inspiratory effort.    Abdomen:  Soft, non-tender. No masses or organomegaly. Bladder is not palpable. No evidence of flank discomfort. No evidence of inguinal hernia.    Extremities: No clubbing, cyanosis, or edema    Normal external female genitalia  Urethral meatus is normal  Urethra and bladder are nontender to bimanual exam  Well supported anteriorly and posteriorly   Uterus and cervix are surgically absent  No adnexal masses  PVR by catheterization was 60 ml    LABS:    Lab Results   Component Value Date    BUN 18 (H) 12/04/2019    CREATININE 0.74 12/04/2019     UA 1.010, pH 6, otherwise, negative    IMPRESSION:    Encounter Diagnoses   Name Primary?    Urge incontinence Yes    Urinary urgency        PLAN:    1.  She is not retaining urine  2.  Discussed that she originally had urge incontinence and the treatment is different from the stress component but understands that the stress component has been treated.   3.  Oxybutynin was sent to [facility] in [place] to get the pre auth which is required.  Also suggested Good Rx or requesting the cash price if the pre auth does not work.  I am shocked because it is available generically.   4.  Follow up in 3 months.

## 2020-01-22 NOTE — LETTER
January 22, 2020      Raffi Garcia MD  [address] [facility] [address]           [facility] - Urology 4th Floor  [address]  [place] [address]  Phone: [phone]          Patient: Hannah Norman   MR Number: [id number]   YOB: 1950   Date of Visit: 1/22/2020       Dear Dr. Raffi Garcia:    Thank you for referring Hannah Norman to me for evaluation. Attached you will find relevant portions of my assessment and plan of care.    If you have questions, please do not hesitate to call me. I look forward to following Hannah Norman along with you.    Sincerely,    Feli Garza MD    Enclosure  CC:  No Recipients    If you would like to receive this communication electronically, please contact [email or web address] or [phone] to request more information on [employer] access.    For providers and/or their staff who would like to refer a patient to [facility], please contact us through our one-stop-shop provider referral line, [facility], at [phone].    If you feel you have received this communication in error or would no longer like to receive these types of communications, please e-mail [email or web address]

## 2020-01-23 RX ORDER — LOVASTATIN 20 MG/1
TABLET ORAL
Qty: 90 TABLET | Refills: 3 | Status: SHIPPED | OUTPATIENT
Start: 2020-01-23 | End: 2021-03-02

## 2020-01-24 ENCOUNTER — OFFICE VISIT (OUTPATIENT)
Dept: OTOLARYNGOLOGY | Facility: CLINIC | Age: 70
End: 2020-01-24
Payer: MEDICARE

## 2020-01-24 VITALS
WEIGHT: 205.13 LBS | HEIGHT: 62 IN | TEMPERATURE: 98 F | SYSTOLIC BLOOD PRESSURE: 116 MMHG | DIASTOLIC BLOOD PRESSURE: 71 MMHG | BODY MASS INDEX: 37.75 KG/M2 | HEART RATE: 80 BPM

## 2020-01-24 DIAGNOSIS — R49.0 HOARSENESS OF VOICE: ICD-10-CM

## 2020-01-24 DIAGNOSIS — J30.0 CHRONIC VASOMOTOR RHINITIS: ICD-10-CM

## 2020-01-24 DIAGNOSIS — G47.33 OSA (OBSTRUCTIVE SLEEP APNEA): ICD-10-CM

## 2020-01-24 DIAGNOSIS — K21.9 LARYNGOPHARYNGEAL REFLUX (LPR): Primary | ICD-10-CM

## 2020-01-24 PROCEDURE — 31575 PR LARYNGOSCOPY, FLEXIBLE; DIAGNOSTIC: ICD-10-PCS | Mod: S$PBB,,, | Performed by: OTOLARYNGOLOGY

## 2020-01-24 PROCEDURE — 99213 PR OFFICE/OUTPT VISIT, EST, LEVL III, 20-29 MIN: ICD-10-PCS | Mod: 25,S$PBB,, | Performed by: OTOLARYNGOLOGY

## 2020-01-24 PROCEDURE — 31575 DIAGNOSTIC LARYNGOSCOPY: CPT | Mod: S$PBB,,, | Performed by: OTOLARYNGOLOGY

## 2020-01-24 PROCEDURE — 99213 OFFICE O/P EST LOW 20 MIN: CPT | Mod: PBBFAC,PN | Performed by: OTOLARYNGOLOGY

## 2020-01-24 PROCEDURE — 99999 PR PBB SHADOW E&M-EST. PATIENT-LVL III: ICD-10-PCS | Mod: PBBFAC,,, | Performed by: OTOLARYNGOLOGY

## 2020-01-24 PROCEDURE — 1126F PR PAIN SEVERITY QUANTIFIED, NO PAIN PRESENT: ICD-10-PCS | Mod: ,,, | Performed by: OTOLARYNGOLOGY

## 2020-01-24 PROCEDURE — 1159F MED LIST DOCD IN RCRD: CPT | Mod: ,,, | Performed by: OTOLARYNGOLOGY

## 2020-01-24 PROCEDURE — 1126F AMNT PAIN NOTED NONE PRSNT: CPT | Mod: ,,, | Performed by: OTOLARYNGOLOGY

## 2020-01-24 PROCEDURE — 31575 DIAGNOSTIC LARYNGOSCOPY: CPT | Mod: PBBFAC,PN | Performed by: OTOLARYNGOLOGY

## 2020-01-24 PROCEDURE — 1159F PR MEDICATION LIST DOCUMENTED IN MEDICAL RECORD: ICD-10-PCS | Mod: ,,, | Performed by: OTOLARYNGOLOGY

## 2020-01-24 PROCEDURE — 99213 OFFICE O/P EST LOW 20 MIN: CPT | Mod: 25,S$PBB,, | Performed by: OTOLARYNGOLOGY

## 2020-01-24 PROCEDURE — 99999 PR PBB SHADOW E&M-EST. PATIENT-LVL III: CPT | Mod: PBBFAC,,, | Performed by: OTOLARYNGOLOGY

## 2020-01-24 RX ORDER — IPRATROPIUM BROMIDE 21 UG/1
2 SPRAY, METERED NASAL 2 TIMES DAILY
Qty: 30 ML | Refills: 0 | Status: CANCELLED | OUTPATIENT
Start: 2020-01-24

## 2020-01-24 RX ORDER — LEVOCETIRIZINE DIHYDROCHLORIDE 5 MG/1
5 TABLET, FILM COATED ORAL NIGHTLY
Qty: 30 TABLET | Refills: 11 | Status: SHIPPED | OUTPATIENT
Start: 2020-01-24 | End: 2020-10-26 | Stop reason: SDUPTHER

## 2020-01-24 NOTE — PROGRESS NOTES
Chief Complaint   Patient presents with    Follow-up     Patient states still has hoarseness,No improvement with LPR   .     HPI:Hannah Norman is a 69 y.o. female who has been referred by Dr. Hauser for a one year history of hoarseness. She has been having dysphagia and recently underwent a MBSS which was normal but she was noted by the SLP to have dysphonia and ENT referral was recommended.  Her voice is progressively worsening over this time. There are pitch breaks or cracks. There is vocal fatigue. She admits to odynophagia, throat pain, and otalgia.  There is no hemoptysis or hematemesis. She is breathing well.     She admits to throat clearing and cough. She admits to heartburn and reflux. She is currently on Protonix 40mg daily. She notes that she has difficulty swallowing spicy foods which gives her the sensation that the food is going down the wrong way. She denies food sticking. She denies weight loss.  She does note certain foods trigger her GERD-tomatos.     Interval HPI 1/30/2020: Follow up LPR, hoarseness, and MARY.   She reports that she has had been on PPI as prescribed by GI.    Work-up has included an EGD showing mild esophagitis and Bravo ph probe study with normal results.  She reports that she is feeling symptomatic heartburn/indigestion on this regimen. She describes continued hoarseness. She feels her voice has a rough quality. She attended one speech therapy session and was discharged. She notes intermittent throat clearing. She denies food sticking.   She has been using atrovent nasal for nasal rhinorrhea on an as needed basis and feels this helps.     Past Medical History:   Diagnosis Date    Anxiety disorder     Bell's palsy     Chronic back pain     Chronic osteoarthritis     Essential tremor     Fibromyalgia     Hypertension     Mild acid reflux     Neck pain     Other and unspecified hyperlipidemia     Sleep apnea     wear c-pap at night; does not use regularly     Social  History     Socioeconomic History    Marital status:      Spouse name: Not on file    Number of children: Not on file    Years of education: Not on file    Highest education level: Not on file   Occupational History    Not on file   Social Needs    Financial resource strain: Not on file    Food insecurity:     Worry: Not on file     Inability: Not on file    Transportation needs:     Medical: Not on file     Non-medical: Not on file   Tobacco Use    Smoking status: Former Smoker     Years: 5.00     Last attempt to quit: 10/5/1982     Years since quittin.3    Smokeless tobacco: Never Used   Substance and Sexual Activity    Alcohol use: No    Drug use: No    Sexual activity: Not Currently     Partners: Male   Lifestyle    Physical activity:     Days per week: Not on file     Minutes per session: Not on file    Stress: Not at all   Relationships    Social connections:     Talks on phone: Not on file     Gets together: Not on file     Attends Hindu service: Not on file     Active member of club or organization: Not on file     Attends meetings of clubs or organizations: Not on file     Relationship status: Not on file   Other Topics Concern    Not on file   Social History Narrative    Not on file     Past Surgical History:   Procedure Laterality Date    ADENOIDECTOMY      APPENDECTOMY      BREAST BIOPSY Left       negative    Breast reduction      BREAST SURGERY Bilateral     breast reduction    CARPAL TUNNEL RELEASE Right     COLONOSCOPY      COLONOSCOPY N/A 11/10/2017    Procedure: COLONOSCOPY - Miralax split prep;  Surgeon: Annetta Powell MD;  Location: [facility];  Service: Endoscopy;  Laterality: N/A;    COSMETIC SURGERY Bilateral     breast reduction    CYSTOSCOPY N/A 10/23/2018    Procedure: CYSTOSCOPY;  Surgeon: Feli Garza MD;  Location: [address];  Service: Urology;  Laterality: N/A;    ESOPHAGOGASTRODUODENOSCOPY N/A 2019    Procedure:  ESOPHAGOGASTRODUODENOSCOPY (EGD);  Surgeon: Oscar Wylie MD;  Location: [facility] (4TH FLR);  Service: Endoscopy;  Laterality: N/A;  Off PPI/H2 x 7 days prior- ERW    HYSTERECTOMY      JOINT REPLACEMENT Left     knee    KNEE SURGERY      bilateral    left shoulder Left     rotator cuff    PLACEMENT OF TRANSOBTURATOR TAPE N/A 10/23/2018    Procedure: PLACEMENT, TRANSOBTURATOR TAPE;  Surgeon: Feli Garza MD;  Location: [facility] OR 2ND FLR;  Service: Urology;  Laterality: N/A;  1.5 hours    Tonsillectomy      TONSILLECTOMY      TOTAL REDUCTION MAMMOPLASTY      1995    TUBAL LIGATION       Family History   Problem Relation Age of Onset    Diabetes Mother     Tremor Mother     Liver disease Mother     Diabetes Father     Heart disease Father     Tremor Son     Diabetes Sister     Diabetes Brother     Depression Sister            Review of Systems  General: negative for chills, fever or weight loss  Psychological: negative for mood changes or depression  Ophthalmic: negative for blurry vision, photophobia or eye pain  ENT: see HPI  Respiratory: no cough, shortness of breath, or wheezing  Cardiovascular: no chest pain or dyspnea on exertion  Gastrointestinal: no abdominal pain, change in bowel habits, or black/ bloody stools  Musculoskeletal: negative for gait disturbance or muscular weakness  Neurological: no syncope or seizures; no ataxia  Dermatological: negative for puritis,  rash and jaundice  Hematologic/lymphatic: no easy bruising, no new lumps or bumps      Physical Exam:    Vitals:    01/24/20 0846   BP: 116/71   Pulse: 80   Temp: 97.6 °F (36.4 °C)       Constitutional: Well appearing / communicating without difficutly.  NAD.  Eyes: EOM I Bilaterally  Head/Face: Normocephalic.  Negative paranasal sinus pressure/tenderness.  Salivary glands WNL.  House Brackmann I Bilaterally.    Right Ear: Auricle normal appearance. External Auditory Canal within normal limits no lesions or masses,TM w/o  masses/lesions/perforations. TM mobility noted.   Left Ear: Auricle normal appearance. External Auditory Canal within normal limits no lesions or masses,TM w/o masses/lesions/perforations. TM mobility noted.  Nose: Mild nasal septal deviation to the right with an inferior spur.  Inferior Turbinates 3+ bilaterally. No septal perforation. No masses/lesions. External nasal skin appears normal without masses/lesions.  Oral Cavity: Gingiva/lips within normal limits.  Dentition/gingiva healthy appearing. Mucus membranes moist. Floor of mouth soft, no masses palpated. Oral Tongue mobile. Hard Palate appears normal.    Oropharynx: Base of tongue appears normal. No masses/lesions noted. Tonsillar fossa/pharyngeal wall without lesions. Posterior oropharynx WNL.  Soft palate without masses. Midline uvula.   Neck/Lymphatic: No LAD I-VI bilaterally.  No thyromegaly.  No masses noted on exam.    Mirror laryngoscopy/nasopharyngoscopy: Active gag reflex.  Unable to perform.    Neuro/Psychiatric: AOx3.  Normal mood and affect.   Cardiovascular: Normal carotid pulses bilaterally, no increasing jugular venous distention noted at cervical region bilaterally.    Respiratory: Normal respiratory effort, no stridor, no retractions noted.      See separate procedure note for FFL.     Assessment:    ICD-10-CM ICD-9-CM    1. Laryngopharyngeal reflux (LPR) K21.9 478.79    2. Hoarseness of voice R49.0 784.42    3. Chronic vasomotor rhinitis J30.0 477.9    4. MARY (obstructive sleep apnea) G47.33 327.23      The primary encounter diagnosis was Laryngopharyngeal reflux (LPR). Diagnoses of Hoarseness of voice, Chronic vasomotor rhinitis, and MARY (obstructive sleep apnea) were also pertinent to this visit.      Plan:  No orders of the defined types were placed in this encounter.      LPR:  Continue Omeprazole 40mg PO daily per GI.   Continue reflux precautions.     Mild MARY: Continue CPAP as prescribed. Continue follow up with Dr. Blackwell.      Vasomotor rhinitis: Continue atrovent nasal.     Follow up in 3-4 months to reassess progress with treatment regimen.     Lupe Lopez MD

## 2020-01-29 ENCOUNTER — OFFICE VISIT (OUTPATIENT)
Dept: FAMILY MEDICINE | Facility: CLINIC | Age: 70
End: 2020-01-29
Payer: MEDICARE

## 2020-01-29 VITALS
HEART RATE: 124 BPM | SYSTOLIC BLOOD PRESSURE: 110 MMHG | OXYGEN SATURATION: 97 % | HEIGHT: 62 IN | WEIGHT: 209 LBS | TEMPERATURE: 99 F | BODY MASS INDEX: 38.46 KG/M2 | DIASTOLIC BLOOD PRESSURE: 70 MMHG

## 2020-01-29 DIAGNOSIS — R13.10 DYSPHAGIA, UNSPECIFIED TYPE: Primary | ICD-10-CM

## 2020-01-29 DIAGNOSIS — K21.9 GASTROESOPHAGEAL REFLUX DISEASE, ESOPHAGITIS PRESENCE NOT SPECIFIED: ICD-10-CM

## 2020-01-29 PROCEDURE — 99214 PR OFFICE/OUTPT VISIT, EST, LEVL IV, 30-39 MIN: ICD-10-PCS | Mod: S$GLB,,, | Performed by: FAMILY MEDICINE

## 2020-01-29 PROCEDURE — 1159F MED LIST DOCD IN RCRD: CPT | Mod: S$GLB,,, | Performed by: FAMILY MEDICINE

## 2020-01-29 PROCEDURE — 1159F PR MEDICATION LIST DOCUMENTED IN MEDICAL RECORD: ICD-10-PCS | Mod: S$GLB,,, | Performed by: FAMILY MEDICINE

## 2020-01-29 PROCEDURE — 99214 OFFICE O/P EST MOD 30 MIN: CPT | Mod: S$GLB,,, | Performed by: FAMILY MEDICINE

## 2020-01-29 PROCEDURE — 1125F PR PAIN SEVERITY QUANTIFIED, PAIN PRESENT: ICD-10-PCS | Mod: S$GLB,,, | Performed by: FAMILY MEDICINE

## 2020-01-29 PROCEDURE — 1125F AMNT PAIN NOTED PAIN PRSNT: CPT | Mod: S$GLB,,, | Performed by: FAMILY MEDICINE

## 2020-01-29 RX ORDER — SUCRALFATE 1 G/10ML
1 SUSPENSION ORAL
Qty: 414 ML | Refills: 3 | Status: SHIPPED | OUTPATIENT
Start: 2020-01-29 | End: 2020-02-21 | Stop reason: ALTCHOICE

## 2020-01-29 NOTE — PROGRESS NOTES
Subjective:       Patient ID: Hannah Norman is a 69 y.o. female.    Chief Complaint: Choking (having trouble swallowing ) and Cough    Patient presents with pain with swallowing.  This has been a problem intermittently for at least 2 years.  Swallow study done in 2017 was normal.  She has seen GI for it and just recently saw ENT.  However when she saw ENT she was doing better.  The pain with swallowing has worsened significantly over the last 2-3 days.  Pain is epigastric.  It occures with everything she swallows.  Cool liquids give mild relief.  No fevers.  Some nausea and constipation.  No vomiting.      Review of Systems   Constitutional: Negative for activity change, appetite change, chills and fatigue.   HENT: Negative for congestion, ear discharge, ear pain, rhinorrhea, sinus pain, sore throat and trouble swallowing.    Eyes: Negative for photophobia, pain, redness, itching and visual disturbance.   Respiratory: Negative for cough, chest tightness, shortness of breath and wheezing.    Cardiovascular: Negative for chest pain, palpitations and leg swelling.   Gastrointestinal: Negative for abdominal distention, abdominal pain, blood in stool, diarrhea, nausea and vomiting.   Genitourinary: Negative for dysuria, pelvic pain, vaginal bleeding, vaginal discharge and vaginal pain.   Musculoskeletal: Negative for arthralgias, back pain, gait problem and neck pain.   Skin: Negative for color change, pallor and rash.   Neurological: Negative for dizziness, tremors, weakness, light-headedness, numbness and headaches.   Psychiatric/Behavioral: Negative for agitation, behavioral problems, confusion and sleep disturbance.       Past Medical History:   Diagnosis Date    Anxiety disorder     Bell's palsy     Chronic back pain     Chronic osteoarthritis     Essential tremor     Fibromyalgia     Hypertension     Mild acid reflux     Neck pain     Other and unspecified hyperlipidemia     Sleep apnea     wear c-pap at  "night; does not use regularly     Social History     Socioeconomic History    Marital status:      Spouse name: Not on file    Number of children: Not on file    Years of education: Not on file    Highest education level: Not on file   Occupational History    Not on file   Social Needs    Financial resource strain: Not on file    Food insecurity:     Worry: Not on file     Inability: Not on file    Transportation needs:     Medical: Not on file     Non-medical: Not on file   Tobacco Use    Smoking status: Former Smoker     Years: 5.00     Last attempt to quit: 10/5/1982     Years since quittin.3    Smokeless tobacco: Never Used   Substance and Sexual Activity    Alcohol use: No    Drug use: No    Sexual activity: Not Currently     Partners: Male   Lifestyle    Physical activity:     Days per week: Not on file     Minutes per session: Not on file    Stress: Not at all   Relationships    Social connections:     Talks on phone: Not on file     Gets together: Not on file     Attends Adventism service: Not on file     Active member of club or organization: Not on file     Attends meetings of clubs or organizations: Not on file     Relationship status: Not on file   Other Topics Concern    Not on file   Social History Narrative    Not on file     .  Objective:     /70 (BP Location: Right arm, Patient Position: Sitting)   Pulse (!) 124   Temp 99.2 °F (37.3 °C) (Oral)   Ht 5' 2" (1.575 m)   Wt 94.8 kg (208 lb 15.9 oz)   SpO2 97%   BMI 38.23 kg/m²     Physical Exam   Constitutional: She is oriented to person, place, and time. She appears well-developed and well-nourished. No distress.   HENT:   Head: Normocephalic and atraumatic.   Right Ear: External ear normal.   Left Ear: External ear normal.   Mouth/Throat: Oropharynx is clear and moist. No oropharyngeal exudate.   Eyes: Pupils are equal, round, and reactive to light. Conjunctivae and EOM are normal. Right eye exhibits no " discharge. Left eye exhibits no discharge.   Neck: Normal range of motion. Neck supple. No tracheal deviation present. No thyromegaly present.   Cardiovascular: Normal rate, regular rhythm, normal heart sounds and intact distal pulses. Exam reveals no gallop and no friction rub.   No murmur heard.  Pulmonary/Chest: Effort normal and breath sounds normal. No respiratory distress. She has no wheezes. She has no rales. She exhibits no tenderness.   Abdominal: Soft. Bowel sounds are normal. She exhibits no distension. There is no tenderness. There is no guarding.   Musculoskeletal: Normal range of motion. She exhibits no edema, tenderness or deformity.   Neurological: She is alert and oriented to person, place, and time. She displays normal reflexes. No cranial nerve deficit. She exhibits normal muscle tone. Coordination normal.   Skin: Skin is warm and dry. Capillary refill takes less than 2 seconds. No rash noted. She is not diaphoretic. No erythema. No pallor.   Psychiatric: She has a normal mood and affect. Her behavior is normal. Judgment normal.       Assessment:       1. Dysphagia, unspecified type    2. Gastroesophageal reflux disease, esophagitis presence not specified        Plan:       Dysphagia, unspecified type  -     sucralfate (CARAFATE) 100 mg/mL suspension; Take 10 mLs (1 g total) by mouth 4 (four) times daily before meals and nightly.  Dispense: 414 mL; Refill: 3  - I don't really have much to offer her today.  We can try the carafate and see if it helps to soothe her discomfort at least temporarily.  She has a f/u with GI in a few weeks.     Gastroesophageal reflux disease, esophagitis presence not specified

## 2020-01-30 NOTE — PROCEDURES
Procedures  Due to indication in patient's history, presentation or risk factors,  a fiber optic exam was performed.    SEPARATE PROCEDURE NOTE:    ANESTHESIA:  Topical xylocaine with alexander-synephrine    FINDINGS: Mild  inaterarytenoid erythema and edema    PROCEDURE:  After verbal consent was obtained, the flexible scope was passed through the patient's nasal cavity without difficulty.  The nasopharynx (adenoid pad) and eustachian tube orifices were first visualized and were found to be normal, without masses or irregularity.  The posterior pharyngeal wall and base of tongue were then examined and no mass or irregular tissue was seen.  The scope was then advanced to the larynx, and the epiglottis, valleculae, and piriform sinuses were normal, without masses or mucosal irregularity.  The false vocal folds and true vocal folds were then examined and were found to have normal mobility (full abduction and adduction).  The interartyenoid area had  mild edema and erythema.

## 2020-01-31 NOTE — PROGRESS NOTES
[facility]: Health  Follow-Up    The history is provided by the patient.     Follow Up  Follow-up reason(s): reading review      Readings are missing.   [facility] support needed.Patient has not had a chance to go to the [facility] yet. She had some other things to take care of but plans to go in February. She will reach out if anything changes with this timeline. I will continue to check in with her.       Intervention/Plan    There are no preventive care reminders to display for this patient.    Last 5 Patient Entered Readings                                      Current 30 Day Average:      Recent Readings 10/3/2019 9/30/2019 9/23/2019 9/19/2019 9/18/2019    SBP (mmHg) 103 129 121 103 120    DBP (mmHg) 62 77 64 56 63    Pulse 71 100 64 72 64                  Screenings    SDOH

## 2020-02-11 ENCOUNTER — PATIENT MESSAGE (OUTPATIENT)
Dept: ORTHOPEDICS | Facility: CLINIC | Age: 70
End: 2020-02-11

## 2020-02-11 ENCOUNTER — TELEPHONE (OUTPATIENT)
Dept: ORTHOPEDICS | Facility: CLINIC | Age: 70
End: 2020-02-11

## 2020-02-11 NOTE — TELEPHONE ENCOUNTER
----- Message from Cuca Templeton sent at 2/11/2020  8:28 AM CST -----  Contact: Elda ahumada/ [employer]  CALL TYPE: PATIENT REQUEST    Contact: [employer]  Name of Who is Calling: Elda  What is the request in detail: status of paperwork sent on 2/5 and 2/6  Can the clinic reply by [facility]: no   What Number to Call Back [facility]: [phone] ext [phone]

## 2020-02-14 ENCOUNTER — TELEPHONE (OUTPATIENT)
Dept: ORTHOPEDICS | Facility: CLINIC | Age: 70
End: 2020-02-14

## 2020-02-14 NOTE — TELEPHONE ENCOUNTER
----- Message from Carmela Torres sent at 2/14/2020  9:09 AM CST -----  Contact: Elda ahumada/ Jayant Lopez [phone] ext [phone]   Elda is requesting to speak with nurse regarding paperwork she faxed again due to missing pt's name. Please call and advise.

## 2020-02-19 ENCOUNTER — OFFICE VISIT (OUTPATIENT)
Dept: GASTROENTEROLOGY | Facility: CLINIC | Age: 70
End: 2020-02-19
Payer: MEDICARE

## 2020-02-19 VITALS
SYSTOLIC BLOOD PRESSURE: 153 MMHG | HEART RATE: 86 BPM | WEIGHT: 207.44 LBS | BODY MASS INDEX: 37.94 KG/M2 | DIASTOLIC BLOOD PRESSURE: 69 MMHG

## 2020-02-19 DIAGNOSIS — R13.10 ODYNOPHAGIA: Primary | ICD-10-CM

## 2020-02-19 DIAGNOSIS — R07.9 CHEST PAIN, UNSPECIFIED TYPE: ICD-10-CM

## 2020-02-19 PROCEDURE — 99214 OFFICE O/P EST MOD 30 MIN: CPT | Mod: S$PBB,,, | Performed by: INTERNAL MEDICINE

## 2020-02-19 PROCEDURE — 99213 OFFICE O/P EST LOW 20 MIN: CPT | Mod: PBBFAC,PO | Performed by: INTERNAL MEDICINE

## 2020-02-19 PROCEDURE — 99999 PR PBB SHADOW E&M-EST. PATIENT-LVL III: ICD-10-PCS | Mod: PBBFAC,,, | Performed by: INTERNAL MEDICINE

## 2020-02-19 PROCEDURE — 99214 PR OFFICE/OUTPT VISIT, EST, LEVL IV, 30-39 MIN: ICD-10-PCS | Mod: S$PBB,,, | Performed by: INTERNAL MEDICINE

## 2020-02-19 PROCEDURE — 99999 PR PBB SHADOW E&M-EST. PATIENT-LVL III: CPT | Mod: PBBFAC,,, | Performed by: INTERNAL MEDICINE

## 2020-02-21 ENCOUNTER — TELEPHONE (OUTPATIENT)
Dept: ORTHOPEDICS | Facility: CLINIC | Age: 70
End: 2020-02-21

## 2020-02-21 ENCOUNTER — OFFICE VISIT (OUTPATIENT)
Dept: FAMILY MEDICINE | Facility: CLINIC | Age: 70
End: 2020-02-21
Payer: MEDICARE

## 2020-02-21 ENCOUNTER — OFFICE VISIT (OUTPATIENT)
Dept: ORTHOPEDICS | Facility: CLINIC | Age: 70
End: 2020-02-21
Payer: MEDICARE

## 2020-02-21 VITALS
HEIGHT: 62 IN | DIASTOLIC BLOOD PRESSURE: 80 MMHG | WEIGHT: 212.75 LBS | TEMPERATURE: 99 F | HEART RATE: 95 BPM | SYSTOLIC BLOOD PRESSURE: 120 MMHG | OXYGEN SATURATION: 95 % | BODY MASS INDEX: 39.15 KG/M2

## 2020-02-21 DIAGNOSIS — J02.9 PHARYNGITIS, UNSPECIFIED ETIOLOGY: ICD-10-CM

## 2020-02-21 DIAGNOSIS — R49.0 HOARSENESS: Primary | ICD-10-CM

## 2020-02-21 DIAGNOSIS — M51.36 LUMBAR DEGENERATIVE DISC DISEASE: ICD-10-CM

## 2020-02-21 DIAGNOSIS — M54.30 SCIATICA, UNSPECIFIED LATERALITY: Primary | ICD-10-CM

## 2020-02-21 DIAGNOSIS — M25.559 ARTHRALGIA OF HIP, UNSPECIFIED LATERALITY: ICD-10-CM

## 2020-02-21 PROCEDURE — 99999 PR PBB SHADOW E&M-EST. PATIENT-LVL III: CPT | Mod: PBBFAC,,, | Performed by: ORTHOPAEDIC SURGERY

## 2020-02-21 PROCEDURE — 99999 PR PBB SHADOW E&M-EST. PATIENT-LVL III: ICD-10-PCS | Mod: PBBFAC,,, | Performed by: ORTHOPAEDIC SURGERY

## 2020-02-21 PROCEDURE — 99213 OFFICE O/P EST LOW 20 MIN: CPT | Mod: PBBFAC,PN | Performed by: ORTHOPAEDIC SURGERY

## 2020-02-21 PROCEDURE — 99213 PR OFFICE/OUTPT VISIT, EST, LEVL III, 20-29 MIN: ICD-10-PCS | Mod: S$PBB,,, | Performed by: ORTHOPAEDIC SURGERY

## 2020-02-21 PROCEDURE — 99213 OFFICE O/P EST LOW 20 MIN: CPT | Mod: S$PBB,,, | Performed by: ORTHOPAEDIC SURGERY

## 2020-02-21 PROCEDURE — 99213 OFFICE O/P EST LOW 20 MIN: CPT | Mod: S$GLB,,, | Performed by: FAMILY MEDICINE

## 2020-02-21 PROCEDURE — 99213 PR OFFICE/OUTPT VISIT, EST, LEVL III, 20-29 MIN: ICD-10-PCS | Mod: S$GLB,,, | Performed by: FAMILY MEDICINE

## 2020-02-21 NOTE — PROGRESS NOTES
Subjective:      Patient ID: Hannah Norman is a 70 y.o. female.    Chief Complaint: Pain of the Left Hip and Pain of the Right Hip    HPI    Follow-up for lumbar degenerative disc disease.  The patient reports that she has had pain clinic management with injections and supervised narcotics.  She feels her symptoms are adequately controlled.          Review of Systems   Constitution: Negative for fever and weight loss.   HENT: Negative for congestion.    Eyes: Negative for visual disturbance.   Cardiovascular: Negative for chest pain.   Respiratory: Negative for shortness of breath.    Hematologic/Lymphatic: Negative for bleeding problem. Does not bruise/bleed easily.   Skin: Negative for poor wound healing.   Musculoskeletal: Positive for back pain.   Gastrointestinal: Negative for abdominal pain.   Genitourinary: Negative for dysuria.   Neurological: Negative for seizures.   Psychiatric/Behavioral: Negative for altered mental status.   Allergic/Immunologic: Negative for persistent infections.         Objective:      Ortho/SPM Exam      The patient is not in acute distress.   Body habitus is:normal.   Sclerae normal  The patient walks with  no definite limp   Respiratory distress:  none  The skin over the hip is:intact.   There is:no local tenderness.   Range of motion- Flexion 90, External rotation 30, internal rotation 25.  Resisted SLR negative.  Pain with rotation positive  Sciatic tension findings positive.  Shortening/lengthening compared to the contralateral side absent.  Pulses DP present, PT present.  Motor normal 5/5 strength in all tested muscle groups.   Sensory normal.              Assessment:       Encounter Diagnoses   Name Primary?    Sciatica, unspecified laterality Yes    Lumbar degenerative disc disease     Arthralgia of hip, unspecified laterality         Given the patient's baseline, her response to treatment is encouraging.  As far as I can see she has reached maximum medical improvement  other than consideration of surgery.          Plan:       Hannah was seen today for pain and pain.    Diagnoses and all orders for this visit:    Sciatica, unspecified laterality    Lumbar degenerative disc disease    Arthralgia of hip, unspecified laterality          I explained my diagnostic impression and the reasoning behind it in detail, using layman's terms.  Models and/or pictures were used to help in the explanation.    I recommend continuation of intermittent pain clinic management and activity modification which we discussed. I explained that neurosurgical referral is possible but the patient I agreed that this is not medically necessary at this time.

## 2020-02-21 NOTE — PROGRESS NOTES
Subjective:       Patient ID: Hannah Norman is a 70 y.o. female.    Chief Complaint: Cough    Cough   This is a chronic problem. The current episode started more than 1 year ago. The problem has been unchanged. The problem occurs every few minutes. The cough is non-productive. Pertinent negatives include no chest pain, chills, ear congestion, ear pain, eye redness, fever, headaches, heartburn, hemoptysis, myalgias, nasal congestion, postnasal drip, rash, rhinorrhea, sore throat, shortness of breath, sweats, weight loss or wheezing. Associated symptoms comments: Chronic hoarseness.  Has seen ENT thought it was reflux.  GI did EEG which didn't show reflux.  . Nothing aggravates the symptoms. She has tried leukotriene antagonists and steroid inhaler for the symptoms.     Review of Systems   Constitutional: Negative for activity change, appetite change, chills, fatigue, fever and weight loss.   HENT: Negative for congestion, ear discharge, ear pain, postnasal drip, rhinorrhea, sinus pain, sore throat and trouble swallowing.    Eyes: Negative for photophobia, pain, redness, itching and visual disturbance.   Respiratory: Positive for cough. Negative for hemoptysis, chest tightness, shortness of breath and wheezing.    Cardiovascular: Negative for chest pain, palpitations and leg swelling.   Gastrointestinal: Negative for abdominal distention, abdominal pain, blood in stool, diarrhea, heartburn, nausea and vomiting.   Genitourinary: Negative for dysuria, pelvic pain, vaginal bleeding, vaginal discharge and vaginal pain.   Musculoskeletal: Negative for arthralgias, back pain, gait problem, myalgias and neck pain.   Skin: Negative for color change, pallor and rash.   Neurological: Negative for dizziness, tremors, weakness, light-headedness, numbness and headaches.   Psychiatric/Behavioral: Negative for agitation, behavioral problems, confusion and sleep disturbance.       Past Medical History:   Diagnosis Date    Anxiety  "disorder     Bell's palsy     Chronic back pain     Chronic osteoarthritis     Essential tremor     Fibromyalgia     Hypertension     Mild acid reflux     Neck pain     Other and unspecified hyperlipidemia     Sleep apnea     wear c-pap at night; does not use regularly     Social History     Socioeconomic History    Marital status:      Spouse name: Not on file    Number of children: Not on file    Years of education: Not on file    Highest education level: Not on file   Occupational History    Not on file   Social Needs    Financial resource strain: Not on file    Food insecurity:     Worry: Not on file     Inability: Not on file    Transportation needs:     Medical: Not on file     Non-medical: Not on file   Tobacco Use    Smoking status: Former Smoker     Years: 5.00     Last attempt to quit: 10/5/1982     Years since quittin.4    Smokeless tobacco: Never Used   Substance and Sexual Activity    Alcohol use: No    Drug use: No    Sexual activity: Not Currently     Partners: Male   Lifestyle    Physical activity:     Days per week: Not on file     Minutes per session: Not on file    Stress: Not at all   Relationships    Social connections:     Talks on phone: Not on file     Gets together: Not on file     Attends [employer] service: Not on file     Active member of club or organization: Not on file     Attends meetings of clubs or organizations: Not on file     Relationship status: Not on file   Other Topics Concern    Not on file   Social History Narrative    Not on file     .  Objective:     /80 (BP Location: Right arm, Patient Position: Sitting)   Pulse 95   Temp 98.5 °F (36.9 °C) (Oral)   Ht 5' 2" (1.575 m)   Wt 96.5 kg (212 lb 11.9 oz)   SpO2 95%   BMI 38.91 kg/m²     Physical Exam   Constitutional: She appears well-developed and well-nourished.   HENT:   Head: Normocephalic.   Mouth/Throat: Posterior oropharyngeal erythema present.   Eyes: Conjunctivae are " normal.   Neck: Normal range of motion. Neck supple.   Cardiovascular: Normal rate, regular rhythm and normal heart sounds.   Pulmonary/Chest: Effort normal and breath sounds normal.   Neurological: She is alert.   Skin: Skin is warm and dry.   Psychiatric: Her behavior is normal.       Assessment:       1. Hoarseness    2. Pharyngitis, unspecified etiology        Plan:       Hoarseness  -     diphenhydrAMINE-aluminum-magnesium hydroxide-simethicone-lidocaine HCl 2%; Swish and spit 15 mLs every 4 (four) hours as needed.  Dispense: 1 Bottle; Refill: 0    Pharyngitis, unspecified etiology  -     diphenhydrAMINE-aluminum-magnesium hydroxide-simethicone-lidocaine HCl 2%; Swish and spit 15 mLs every 4 (four) hours as needed.  Dispense: 1 Bottle; Refill: 0

## 2020-02-21 NOTE — TELEPHONE ENCOUNTER
----- Message from Yamilet Olivia sent at 2/21/2020  8:56 AM CST -----  Contact: Self [phone]  Patient is running 25-30 minutes late due to traffic.

## 2020-02-21 NOTE — TELEPHONE ENCOUNTER
Returned call to pt she is running late to her appt. She is by [address] coming up [facility]. Told her she can

## 2020-02-22 DIAGNOSIS — J02.9 PHARYNGITIS, UNSPECIFIED ETIOLOGY: ICD-10-CM

## 2020-02-22 DIAGNOSIS — R49.0 HOARSENESS: ICD-10-CM

## 2020-03-18 ENCOUNTER — PATIENT OUTREACH (OUTPATIENT)
Dept: OTHER | Facility: OTHER | Age: 70
End: 2020-03-18

## 2020-03-18 ENCOUNTER — TELEPHONE (OUTPATIENT)
Dept: SLEEP MEDICINE | Facility: CLINIC | Age: 70
End: 2020-03-18

## 2020-03-18 DIAGNOSIS — G25.81 RLS (RESTLESS LEGS SYNDROME): ICD-10-CM

## 2020-03-18 RX ORDER — CLONAZEPAM 0.5 MG/1
TABLET ORAL
Qty: 60 TABLET | Refills: 5 | Status: SHIPPED | OUTPATIENT
Start: 2020-03-18 | End: 2020-09-08 | Stop reason: SDUPTHER

## 2020-03-20 NOTE — PROGRESS NOTES
Digital Medicine: Health  Follow-Up    The history is provided by the patient.     Follow Up  Follow-up reason(s): reading review    Patient was able to connect her ihealth cuff to her phone and she was able to submit a BP reading last night. The reading was 171/57 so she was concerned. She did inform me that she was laying in her bed when she took it and she was was experience aches and pains yesterday. I reviewed proper technique and cuff placement and requested that she retake a reading today at the kitchen table. Patient expressed understanding.    Her dog is in labor right now so she is assisting with that so she will take her reading later today once this is over.     She denies any symptoms related to her BP readings.      Intervention/Plan    There are no preventive care reminders to display for this patient.    Last 5 Patient Entered Readings                                      Current 30 Day Average: 137/60     Recent Readings 3/19/2020 3/19/2020 3/18/2020 10/3/2019 9/30/2019    SBP (mmHg) 171 103 103 103 129    DBP (mmHg) 57 62 62 62 77    Pulse 91 71 71 71 100                  Screenings    SDOH

## 2020-03-23 ENCOUNTER — PATIENT OUTREACH (OUTPATIENT)
Dept: OTHER | Facility: OTHER | Age: 70
End: 2020-03-23

## 2020-03-23 NOTE — PROGRESS NOTES
Digital Medicine: Clinician Follow-Up    Called patient to discuss her at goal readings.    The history is provided by the patient.     Follow Up  Follow-up reason(s): reading review    Patient missed readings due to issues with her cuff.    Patient denies hypotension symptoms.       INTERVENTION(S)  reviewed appropriate dose schedule and encouragement/support    PLAN  patient verbalizes understanding and continue monitoring    Patient will continue with her current hypertension medication regimen.    Patient is aware to contact us with any changes or problems especially hypotension symptoms prior to our next outreach.       There are no preventive care reminders to display for this patient.    Last 5 Patient Entered Readings                                      Current 30 Day Average: 134/58     Recent Readings 3/20/2020 3/19/2020 3/19/2020 3/18/2020 10/3/2019    SBP (mmHg) 129 171 103 103 103    DBP (mmHg) 52 57 62 62 62    Pulse 103 91 71 71 71             Hypertension Medications             furosemide (LASIX) 40 MG tablet TAKE 1 TABLET BY MOUTH UP TO TWO TIMES A DAY IF SWELLING OCCURS    losartan (COZAAR) 25 MG tablet Take 1 tablet (25 mg total) by mouth once daily. For blood pressure   ( dc 50 mg dose)                             Medication Adherence Screening   She did not miss a dose this month.

## 2020-03-29 DIAGNOSIS — G25.81 RLS (RESTLESS LEGS SYNDROME): ICD-10-CM

## 2020-03-30 RX ORDER — PRAMIPEXOLE DIHYDROCHLORIDE 0.12 MG/1
TABLET ORAL
Qty: 150 TABLET | Refills: 2 | Status: SHIPPED | OUTPATIENT
Start: 2020-03-30 | End: 2020-03-31

## 2020-03-31 DIAGNOSIS — G25.81 RLS (RESTLESS LEGS SYNDROME): ICD-10-CM

## 2020-03-31 RX ORDER — PRAMIPEXOLE DIHYDROCHLORIDE 0.12 MG/1
TABLET ORAL
Qty: 150 TABLET | Refills: 2 | Status: SHIPPED | OUTPATIENT
Start: 2020-03-31 | End: 2020-07-27

## 2020-04-07 RX ORDER — FUROSEMIDE 40 MG/1
TABLET ORAL
Qty: 60 TABLET | Refills: 2 | Status: SHIPPED | OUTPATIENT
Start: 2020-04-07 | End: 2020-08-21

## 2020-04-13 ENCOUNTER — PATIENT MESSAGE (OUTPATIENT)
Dept: OTOLARYNGOLOGY | Facility: CLINIC | Age: 70
End: 2020-04-13

## 2020-04-13 ENCOUNTER — PATIENT MESSAGE (OUTPATIENT)
Dept: FAMILY MEDICINE | Facility: CLINIC | Age: 70
End: 2020-04-13

## 2020-04-13 DIAGNOSIS — G25.81 RESTLESS LEG SYNDROME: ICD-10-CM

## 2020-04-13 DIAGNOSIS — M79.7 FIBROMYALGIA: ICD-10-CM

## 2020-04-13 RX ORDER — GABAPENTIN 600 MG/1
600 TABLET ORAL 3 TIMES DAILY
Qty: 90 TABLET | Refills: 1 | Status: SHIPPED | OUTPATIENT
Start: 2020-04-13 | End: 2020-06-09

## 2020-04-20 NOTE — PROGRESS NOTES
04/20/2020 - chart review completed for patient. Most recent labs were December 2019. Patient's BP has trended downward since last encounter; BP average today is 121/64 mmHg

## 2020-04-21 ENCOUNTER — TELEPHONE (OUTPATIENT)
Dept: OTOLARYNGOLOGY | Facility: CLINIC | Age: 70
End: 2020-04-21

## 2020-04-21 NOTE — TELEPHONE ENCOUNTER
Attempted to contact the patient to reschedule apt to a virtual visit. No answer, left voicemail to return my call.

## 2020-04-22 ENCOUNTER — TELEPHONE (OUTPATIENT)
Dept: OTOLARYNGOLOGY | Facility: CLINIC | Age: 70
End: 2020-04-22

## 2020-04-22 NOTE — TELEPHONE ENCOUNTER
----- Message from Chel Antonio sent at 4/22/2020  9:46 AM CDT -----  Contact: Self [phone]  Patient is returning your call.  Please advise.

## 2020-04-22 NOTE — TELEPHONE ENCOUNTER
Spoke with pt. She preferred to reschedule her apt to a later date. I rescheduled her apt to 06/26 at 10:40. Pt verbalized understanding.

## 2020-04-24 RX ORDER — PANTOPRAZOLE SODIUM 40 MG/1
40 TABLET, DELAYED RELEASE ORAL DAILY
Qty: 30 TABLET | Refills: 3 | Status: SHIPPED | OUTPATIENT
Start: 2020-04-24 | End: 2020-10-26 | Stop reason: SDUPTHER

## 2020-05-06 ENCOUNTER — PATIENT OUTREACH (OUTPATIENT)
Dept: OTHER | Facility: OTHER | Age: 70
End: 2020-05-06

## 2020-05-06 NOTE — PROGRESS NOTES
Digital Medicine: Health  Follow-Up    The history is provided by the patient.     Follow Up  Follow-up reason(s): routine education      Routine Education Topics: eating patterns  Patient stated that she is doing well overall but that she is struggling with eating more each day since she is stuck inside. She attributes that and weight gain to her fluctuating BP readings.       Intervention/Plan    There are no preventive care reminders to display for this patient.    Last 5 Patient Entered Readings                                      Current 30 Day Average: 130/71     Recent Readings 4/28/2020 4/27/2020 4/26/2020 4/26/2020 4/21/2020    SBP (mmHg) 122 143 143 148 116    DBP (mmHg) 75 70 79 73 56    Pulse 84 78 68 69 82                      Diet Screening   She cooks for self.    Patient does the shopping for groceries.  She gets groceries from the grocery store.      Patient stated that she has been eating more cereal and sweets lately. She also stated that her food intake it more then usual due to being stuck at home. We discussed healthier food options and portion control (and the importance they both have on her BP). Patient stated that she is doing to work on making changes because she does not want to continue gaining weight. She will reach out with specific questions.     Medication Adherence Screening   She did not miss a dose this month.    Patient identified the following reasons for non-compliance: None      SDOH

## 2020-05-13 ENCOUNTER — TELEPHONE (OUTPATIENT)
Dept: FAMILY MEDICINE | Facility: CLINIC | Age: 70
End: 2020-05-13

## 2020-05-13 DIAGNOSIS — R05.9 COUGH: Primary | ICD-10-CM

## 2020-05-13 RX ORDER — AZITHROMYCIN 250 MG/1
TABLET, FILM COATED ORAL
Qty: 6 TABLET | Refills: 0 | Status: SHIPPED | OUTPATIENT
Start: 2020-05-13 | End: 2020-05-18

## 2020-05-13 NOTE — TELEPHONE ENCOUNTER
----- Message from Dede Briggs sent at 5/13/2020  9:43 AM CDT -----  Contact: [phone] /self   Type:  Needs Medical Advice    Who Called:  self  Symptoms (please be specific):  Bad cough and very tired   How long has patient had these symptoms:   1 week but worse since Sunday  Pharmacy name and phone #:    [id number] - [place], [place] - [address] [place] [address];  Would the patient rather a call back or a response via [facility]?  call  Best Call Back Number:  [phone] /self   Additional Information:  Does she need to be tested for Covid 19?

## 2020-05-13 NOTE — TELEPHONE ENCOUNTER
Sent in a prescription for azithromycin antibiotic -also please schedule for COVID testing -order place

## 2020-05-13 NOTE — TELEPHONE ENCOUNTER
Spoke with pt regarding message. Informed pt that rx has been sent to pharmacy and that Dr. Hauser would like for her to be tested for covid. Pt has been scheduled.

## 2020-05-14 ENCOUNTER — LAB VISIT (OUTPATIENT)
Dept: FAMILY MEDICINE | Facility: CLINIC | Age: 70
End: 2020-05-14
Payer: MEDICARE

## 2020-05-14 DIAGNOSIS — R05.9 COUGH: ICD-10-CM

## 2020-05-14 PROCEDURE — U0002 COVID-19 LAB TEST NON-CDC: HCPCS

## 2020-05-15 LAB — SARS-COV-2 RNA RESP QL NAA+PROBE: NOT DETECTED

## 2020-05-28 ENCOUNTER — PATIENT MESSAGE (OUTPATIENT)
Dept: FAMILY MEDICINE | Facility: CLINIC | Age: 70
End: 2020-05-28

## 2020-05-28 DIAGNOSIS — M79.7 FIBROMYALGIA: ICD-10-CM

## 2020-05-29 RX ORDER — VENLAFAXINE HYDROCHLORIDE 75 MG/1
CAPSULE, EXTENDED RELEASE ORAL
Qty: 270 CAPSULE | Refills: 3 | Status: SHIPPED | OUTPATIENT
Start: 2020-05-29 | End: 2021-04-06

## 2020-06-05 ENCOUNTER — TELEPHONE (OUTPATIENT)
Dept: GASTROENTEROLOGY | Facility: CLINIC | Age: 70
End: 2020-06-05

## 2020-06-09 ENCOUNTER — HOSPITAL ENCOUNTER (OUTPATIENT)
Dept: RADIOLOGY | Facility: HOSPITAL | Age: 70
Discharge: HOME | End: 2020-06-09
Attending: INTERNAL MEDICINE
Payer: MEDICARE

## 2020-06-09 DIAGNOSIS — R13.10 ODYNOPHAGIA: ICD-10-CM

## 2020-06-09 PROCEDURE — A9698 NON-RAD CONTRAST MATERIALNOC: HCPCS | Performed by: INTERNAL MEDICINE

## 2020-06-09 PROCEDURE — 74220 X-RAY XM ESOPHAGUS 1CNTRST: CPT | Mod: 26,,, | Performed by: RADIOLOGY

## 2020-06-09 PROCEDURE — 74220 X-RAY XM ESOPHAGUS 1CNTRST: CPT | Mod: TC

## 2020-06-09 PROCEDURE — 74220 FL ESOPHAGRAM WITH BARIUM TABLET: ICD-10-PCS | Mod: 26,,, | Performed by: RADIOLOGY

## 2020-06-09 PROCEDURE — 25500020 PHARM REV CODE 255: Performed by: INTERNAL MEDICINE

## 2020-06-09 RX ADMIN — BARIUM SULFATE 355 ML: 0.6 SUSPENSION ORAL at 11:06

## 2020-06-09 RX ADMIN — BARIUM SULFATE 340 ML: 980 POWDER, FOR SUSPENSION ORAL at 11:06

## 2020-06-17 ENCOUNTER — PATIENT OUTREACH (OUTPATIENT)
Dept: ADMINISTRATIVE | Facility: OTHER | Age: 70
End: 2020-06-17

## 2020-06-18 ENCOUNTER — OFFICE VISIT (OUTPATIENT)
Dept: GASTROENTEROLOGY | Facility: CLINIC | Age: 70
End: 2020-06-18
Payer: MEDICARE

## 2020-06-18 ENCOUNTER — TELEPHONE (OUTPATIENT)
Dept: OTOLARYNGOLOGY | Facility: CLINIC | Age: 70
End: 2020-06-18

## 2020-06-18 DIAGNOSIS — K22.4 ESOPHAGEAL SPASM: ICD-10-CM

## 2020-06-18 DIAGNOSIS — Z01.812 PRE-PROCEDURE LAB EXAM: ICD-10-CM

## 2020-06-18 DIAGNOSIS — R13.10 DYSPHAGIA, UNSPECIFIED TYPE: Primary | ICD-10-CM

## 2020-06-18 PROCEDURE — 99214 PR OFFICE/OUTPT VISIT, EST, LEVL IV, 30-39 MIN: ICD-10-PCS | Mod: 95,,, | Performed by: INTERNAL MEDICINE

## 2020-06-18 PROCEDURE — 99214 OFFICE O/P EST MOD 30 MIN: CPT | Mod: 95,,, | Performed by: INTERNAL MEDICINE

## 2020-06-18 NOTE — PROGRESS NOTES
Subjective:       Patient ID: Hannah Norman is a 70 y.o. female.    Chief Complaint: Follow up  This is a 70-year-old female for a follow-up visit regarding her esophageal dysphagia.  She has been seen previously for suspected reflux and heartburn symptoms and underwent an upper endoscopy with Bravo capsule revealing no evidence of significant reflux.  She notes mild improvement with her symptoms of Gaviscon.  We obtained an esophagram with barium tablet noting mild esophageal dysmotility with a small hiatal hernia which was previously noted and trace esophageal reflux.  There is also some delayed transit the barium tablet at the GE junction with a smooth short segment of narrowing suspected esophageal spasm.  She believes that she was told in the past she had esophageal spasm does note some discomfort with swallowing.  No other exacerbating or relieving factors or other associated symptoms.      The following portions of the patient's history were reviewed and updated as appropriate: allergies, current medications, past family history, past medical history, past social history, past surgical history and problem list.    (Portions of this note were dictated using voice recognition software and may contain dictation related errors in spelling/grammar/syntax not found on text review)  HPI  Review of Systems   Constitutional: Negative for diaphoresis and unexpected weight change.   HENT: Positive for trouble swallowing. Negative for tinnitus.    Respiratory: Negative for apnea and chest tightness.    Cardiovascular: Positive for chest pain.   Gastrointestinal: Negative for abdominal distention and abdominal pain.         Objective:      Physical Exam  Constitutional:       Appearance: Normal appearance.   HENT:      Head: Normocephalic and atraumatic.   Neurological:      Mental Status: She is alert.           Labs/imaging; reviewed  Assessment:       1. Dysphagia, unspecified type    2. Esophageal spasm        Plan:    1. Will message PCP regarding possible ca channel blockers for esophageal spasm  2. Reviewed imaging in detail with her; she notes being told she likely had esophageal spasm many years ago as well    The patient location is: home  The chief complaint leading to consultation is: follow up    Visit type: audiovisual    22 minutes of total time spent on the encounter, which includes face to face time and non-face to face time preparing to see the patient (eg, review of tests), Obtaining and/or reviewing separately obtained history, Documenting clinical information in the electronic or other health record, Independently interpreting results (not separately reported) and communicating results to the patient/family/caregiver, or Care coordination (not separately reported).         Each patient to whom he or she provides medical services by telemedicine is:  (1) informed of the relationship between the physician and patient and the respective role of any other health care provider with respect to management of the patient; and (2) notified that he or she may decline to receive medical services by telemedicine and may withdraw from such care at any time.

## 2020-06-18 NOTE — TELEPHONE ENCOUNTER
Spoke with patient she was notified will need covid test 72-48hrs prior to their appointment with Dr Kemp. patient states will go to [facility] on 6/22. Notified patient if failure to perform this test will need to reschedule appointment with Dr Kemp. Patient verbalized understanding

## 2020-06-22 ENCOUNTER — LAB VISIT (OUTPATIENT)
Dept: FAMILY MEDICINE | Facility: CLINIC | Age: 70
End: 2020-06-22
Payer: MEDICARE

## 2020-06-22 DIAGNOSIS — Z01.812 PRE-PROCEDURE LAB EXAM: ICD-10-CM

## 2020-06-22 PROCEDURE — U0003 INFECTIOUS AGENT DETECTION BY NUCLEIC ACID (DNA OR RNA); SEVERE ACUTE RESPIRATORY SYNDROME CORONAVIRUS 2 (SARS-COV-2) (CORONAVIRUS DISEASE [COVID-19]), AMPLIFIED PROBE TECHNIQUE, MAKING USE OF HIGH THROUGHPUT TECHNOLOGIES AS DESCRIBED BY CMS-2020-01-R: HCPCS

## 2020-06-23 LAB — SARS-COV-2 RNA RESP QL NAA+PROBE: NOT DETECTED

## 2020-06-24 ENCOUNTER — PATIENT OUTREACH (OUTPATIENT)
Dept: OTHER | Facility: OTHER | Age: 70
End: 2020-06-24

## 2020-06-24 ENCOUNTER — OFFICE VISIT (OUTPATIENT)
Dept: OTOLARYNGOLOGY | Facility: CLINIC | Age: 70
End: 2020-06-24
Payer: MEDICARE

## 2020-06-24 ENCOUNTER — TELEPHONE (OUTPATIENT)
Dept: OTOLARYNGOLOGY | Facility: CLINIC | Age: 70
End: 2020-06-24

## 2020-06-24 VITALS
HEIGHT: 62 IN | WEIGHT: 226.06 LBS | BODY MASS INDEX: 41.6 KG/M2 | DIASTOLIC BLOOD PRESSURE: 75 MMHG | SYSTOLIC BLOOD PRESSURE: 128 MMHG | HEART RATE: 77 BPM

## 2020-06-24 DIAGNOSIS — R49.0 HOARSENESS OF VOICE: ICD-10-CM

## 2020-06-24 DIAGNOSIS — K21.9 LARYNGOPHARYNGEAL REFLUX (LPR): Primary | ICD-10-CM

## 2020-06-24 DIAGNOSIS — J30.0 CHRONIC VASOMOTOR RHINITIS: ICD-10-CM

## 2020-06-24 DIAGNOSIS — G47.33 OSA (OBSTRUCTIVE SLEEP APNEA): ICD-10-CM

## 2020-06-24 DIAGNOSIS — K22.4 ESOPHAGEAL SPASM: ICD-10-CM

## 2020-06-24 PROCEDURE — 31575 PR LARYNGOSCOPY, FLEXIBLE; DIAGNOSTIC: ICD-10-PCS | Mod: S$PBB,,, | Performed by: OTOLARYNGOLOGY

## 2020-06-24 PROCEDURE — 99213 OFFICE O/P EST LOW 20 MIN: CPT | Mod: 25,S$PBB,, | Performed by: OTOLARYNGOLOGY

## 2020-06-24 PROCEDURE — 99999 PR PBB SHADOW E&M-EST. PATIENT-LVL V: CPT | Mod: PBBFAC,,, | Performed by: OTOLARYNGOLOGY

## 2020-06-24 PROCEDURE — 99999 PR PBB SHADOW E&M-EST. PATIENT-LVL V: ICD-10-PCS | Mod: PBBFAC,,, | Performed by: OTOLARYNGOLOGY

## 2020-06-24 PROCEDURE — 31575 DIAGNOSTIC LARYNGOSCOPY: CPT | Mod: PBBFAC,PN | Performed by: OTOLARYNGOLOGY

## 2020-06-24 PROCEDURE — 31575 DIAGNOSTIC LARYNGOSCOPY: CPT | Mod: S$PBB,,, | Performed by: OTOLARYNGOLOGY

## 2020-06-24 PROCEDURE — 99213 PR OFFICE/OUTPT VISIT, EST, LEVL III, 20-29 MIN: ICD-10-PCS | Mod: 25,S$PBB,, | Performed by: OTOLARYNGOLOGY

## 2020-06-24 PROCEDURE — 99215 OFFICE O/P EST HI 40 MIN: CPT | Mod: PBBFAC,PN | Performed by: OTOLARYNGOLOGY

## 2020-06-24 NOTE — PROGRESS NOTES
Chief Complaint   Patient presents with    Follow-up    Cough    Hoarse   .     HPI:Hannah Norman is a 70 y.o. female who has been referred by Dr. Hauser for a one year history of hoarseness. She has been having dysphagia and recently underwent a MBSS which was normal but she was noted by the SLP to have dysphonia and ENT referral was recommended.  Her voice is progressively worsening over this time. There are pitch breaks or cracks. There is vocal fatigue. She admits to odynophagia, throat pain, and otalgia.  There is no hemoptysis or hematemesis. She is breathing well.     She admits to throat clearing and cough. She admits to heartburn and reflux. She is currently on Protonix 40mg daily. She notes that she has difficulty swallowing spicy foods which gives her the sensation that the food is going down the wrong way. She denies food sticking. She denies weight loss.  She does note certain foods trigger her GERD-tomatos.     Interval HPI 6/26/2020: Follow up LPR, hoarseness, and MARY.   She reports that she has had been on omeprazole 40mg daily as prescribed by GI.     She reports that she is feeling symptomatic heartburn/indigestion on this regimen. Work-up has included an EGD showing mild esophagitis and Bravo ph probe study with normal results. Most recent esophagram shows trace esophageal reflux, mild hiatal hernia, and esophageal spasms.  She describes continued hoarseness. Notes intermittent rough quality to her voice. Has attended speech therapy in the past. She notes intermittent throat clearing. She denies food sticking.   Feels that nasal rhinorrhea and post nasal drip are controlled with atrovent.     Past Medical History:   Diagnosis Date    Anxiety disorder     Bell's palsy     Chronic back pain     Chronic osteoarthritis     Essential tremor     Fibromyalgia     Hypertension     Mild acid reflux     Neck pain     Other and unspecified hyperlipidemia     Sleep apnea     wear c-pap at  night; does not use regularly     Social History     Socioeconomic History    Marital status:      Spouse name: Not on file    Number of children: Not on file    Years of education: Not on file    Highest education level: Not on file   Occupational History    Not on file   Social Needs    Financial resource strain: Not on file    Food insecurity     Worry: Not on file     Inability: Not on file    Transportation needs     Medical: Not on file     Non-medical: Not on file   Tobacco Use    Smoking status: Former Smoker     Years: 5.00     Quit date: 10/5/1982     Years since quittin.7    Smokeless tobacco: Never Used   Substance and Sexual Activity    Alcohol use: No    Drug use: No    Sexual activity: Not Currently     Partners: Male   Lifestyle    Physical activity     Days per week: Not on file     Minutes per session: Not on file    Stress: Not at all   Relationships    Social connections     Talks on phone: Not on file     Gets together: Not on file     Attends Confucianist service: Not on file     Active member of club or organization: Not on file     Attends meetings of clubs or organizations: Not on file     Relationship status: Not on file   Other Topics Concern    Not on file   Social History Narrative    Not on file     Past Surgical History:   Procedure Laterality Date    ADENOIDECTOMY      APPENDECTOMY      BREAST BIOPSY Left     1995  negative    Breast reduction      BREAST SURGERY Bilateral     breast reduction    CARPAL TUNNEL RELEASE Right     COLONOSCOPY      COLONOSCOPY N/A 11/10/2017    Procedure: COLONOSCOPY - Miralax split prep;  Surgeon: Annetta Powell MD;  Location: [facility];  Service: Endoscopy;  Laterality: N/A;    COSMETIC SURGERY Bilateral     breast reduction    CYSTOSCOPY N/A 10/23/2018    Procedure: CYSTOSCOPY;  Surgeon: Feli Garza MD;  Location: [address];  Service: Urology;  Laterality: N/A;    ESOPHAGOGASTRODUODENOSCOPY N/A  5/30/2019    Procedure: ESOPHAGOGASTRODUODENOSCOPY (EGD);  Surgeon: Oscar Wylie MD;  Location: [facility] (4TH FLR);  Service: Endoscopy;  Laterality: N/A;  Off PPI/H2 x 7 days prior- ERW    HYSTERECTOMY      JOINT REPLACEMENT Left     knee    KNEE SURGERY      bilateral    left shoulder Left     rotator cuff    PLACEMENT OF TRANSOBTURATOR TAPE N/A 10/23/2018    Procedure: PLACEMENT, TRANSOBTURATOR TAPE;  Surgeon: Feli Garza MD;  Location: [facility] OR 2ND FLR;  Service: Urology;  Laterality: N/A;  1.5 hours    Tonsillectomy      TONSILLECTOMY      TOTAL REDUCTION MAMMOPLASTY      1995    TUBAL LIGATION       Family History   Problem Relation Age of Onset    Diabetes Mother     Tremor Mother     Liver disease Mother     Diabetes Father     Heart disease Father     Tremor Son     Diabetes Sister     Diabetes Brother     Depression Sister            Review of Systems  General: negative for chills, fever or weight loss  Psychological: negative for mood changes or depression  Ophthalmic: negative for blurry vision, photophobia or eye pain  ENT: see HPI  Respiratory: no cough, shortness of breath, or wheezing  Cardiovascular: no chest pain or dyspnea on exertion  Gastrointestinal: no abdominal pain, change in bowel habits, or black/ bloody stools  Musculoskeletal: negative for gait disturbance or muscular weakness  Neurological: no syncope or seizures; no ataxia  Dermatological: negative for puritis,  rash and jaundice  Hematologic/lymphatic: no easy bruising, no new lumps or bumps      Physical Exam:    Vitals:    06/24/20 0819   BP: 128/75   Pulse: 77       Constitutional: Well appearing / communicating without difficutly.  NAD.  Eyes: EOM I Bilaterally  Head/Face: Normocephalic.  Negative paranasal sinus pressure/tenderness.  Salivary glands WNL.  House Brackmann I Bilaterally.    Right Ear: Auricle normal appearance. External Auditory Canal within normal limits no lesions or masses,TM w/o  masses/lesions/perforations. TM mobility noted.   Left Ear: Auricle normal appearance. External Auditory Canal within normal limits no lesions or masses,TM w/o masses/lesions/perforations. TM mobility noted.  Nose: Mild nasal septal deviation to the right with an inferior spur.  Inferior Turbinates 3+ bilaterally. No septal perforation. No masses/lesions. External nasal skin appears normal without masses/lesions.  Oral Cavity: Gingiva/lips within normal limits.  Dentition/gingiva healthy appearing. Mucus membranes moist. Floor of mouth soft, no masses palpated. Oral Tongue mobile. Hard Palate appears normal.    Oropharynx: Base of tongue appears normal. No masses/lesions noted. Tonsillar fossa/pharyngeal wall without lesions. Posterior oropharynx WNL.  Soft palate without masses. Midline uvula.   Neck/Lymphatic: No LAD I-VI bilaterally.  No thyromegaly.  No masses noted on exam.    Mirror laryngoscopy/nasopharyngoscopy: Active gag reflex.  Unable to perform.    Neuro/Psychiatric: AOx3.  Normal mood and affect.   Cardiovascular: Normal carotid pulses bilaterally, no increasing jugular venous distention noted at cervical region bilaterally.    Respiratory: Normal respiratory effort, no stridor, no retractions noted.      See separate procedure note for FFL.     Diagnostic studies:  Mild esophageal dysmotility.     Trace esophageal reflux with small hiatal hernia.     Delayed transit of the barium tablet beyond the gastroesophageal junction (with eventual passage into the stomach) with smooth short-segment narrowing at this level.  Component of underlying spasm versus fixed narrowing possible.  Consider correlation with dedicated endoscopic evaluation.    Assessment:    ICD-10-CM ICD-9-CM    1. Laryngopharyngeal reflux (LPR)  K21.9 478.79    2. Hoarseness of voice  R49.0 784.42    3. Chronic vasomotor rhinitis  J30.0 477.9    4. MARY (obstructive sleep apnea)  G47.33 327.23    5. Esophageal spasm  K22.4 530.5      The  primary encounter diagnosis was Laryngopharyngeal reflux (LPR). Diagnoses of Hoarseness of voice, Chronic vasomotor rhinitis, MARY (obstructive sleep apnea), and Esophageal spasm were also pertinent to this visit.      Plan:  No orders of the defined types were placed in this encounter.      LPR:  Continue Protonix 40mg PO daily per GI.   Continue reflux precautions.   Esophageal spasms-management per GI.     Mild MARY: Continue CPAP as prescribed.     Vasomotor rhinitis: Continue atrovent nasal.     Follow up in 4-6 months to reassess progress with treatment regimen.     Lupe Lopez MD

## 2020-06-24 NOTE — PROGRESS NOTES
Digital Medicine: Health  Follow-Up    The history is provided by the patient.   Follow Up  Follow-up reason(s): routine education      Routine Education Topics: eating patterns  Patient stated that she is doing well overall. She is unsure why her pressure was higher last week. She denies any major changes in her diet, etc. She did mention that her whole body was swollen last week (not just her feet/ankles/etc) so she thinks she may have had an allergic reaction to something. She stated that the swelling has gone away.     She will be following up with her physician because they mentioned that they may adjust her BP medication due to other medical conditions (esophageal issues, etc). She will keep us updated if a change is made.       Intervention/Plan    There are no preventive care reminders to display for this patient.    Last 5 Patient Entered Readings                                      Current 30 Day Average: 129/71     Recent Readings 6/21/2020 6/18/2020 6/16/2020 6/16/2020 6/16/2020    SBP (mmHg) 120 151 156 166 160    DBP (mmHg) 68 78 82 82 73    Pulse 80 94 68 71 76                      Diet Screening   She has the following dietary restrictions: low sodium dietShe cooks for self.    Patient does the shopping for groceries.  She gets groceries from the grocery store.      Medication Adherence Screening   She did not miss a dose this month.    Patient identified the following reasons for non-compliance: None      SDOH

## 2020-07-06 ENCOUNTER — TELEPHONE (OUTPATIENT)
Dept: GASTROENTEROLOGY | Facility: HOSPITAL | Age: 70
End: 2020-07-06

## 2020-07-07 RX ORDER — DILTIAZEM HYDROCHLORIDE 30 MG/1
30 TABLET, FILM COATED ORAL EVERY 8 HOURS
Qty: 90 TABLET | Refills: 11 | Status: SHIPPED | OUTPATIENT
Start: 2020-07-07 | End: 2020-08-05

## 2020-07-08 ENCOUNTER — OFFICE VISIT (OUTPATIENT)
Dept: FAMILY MEDICINE | Facility: CLINIC | Age: 70
End: 2020-07-08
Payer: MEDICARE

## 2020-07-08 VITALS
WEIGHT: 227.94 LBS | SYSTOLIC BLOOD PRESSURE: 124 MMHG | OXYGEN SATURATION: 96 % | HEART RATE: 85 BPM | HEIGHT: 62 IN | DIASTOLIC BLOOD PRESSURE: 80 MMHG | TEMPERATURE: 97 F | BODY MASS INDEX: 41.94 KG/M2

## 2020-07-08 DIAGNOSIS — I77.1 TORTUOUS AORTA: ICD-10-CM

## 2020-07-08 DIAGNOSIS — E66.01 MORBID OBESITY WITH BMI OF 40.0-44.9, ADULT: ICD-10-CM

## 2020-07-08 DIAGNOSIS — Z00.00 ENCOUNTER FOR PREVENTIVE HEALTH EXAMINATION: Primary | ICD-10-CM

## 2020-07-08 DIAGNOSIS — N39.41 URGE INCONTINENCE: ICD-10-CM

## 2020-07-08 DIAGNOSIS — R26.9 ABNORMALITY OF GAIT AND MOBILITY: ICD-10-CM

## 2020-07-08 DIAGNOSIS — E78.49 OTHER HYPERLIPIDEMIA: ICD-10-CM

## 2020-07-08 DIAGNOSIS — I10 ESSENTIAL HYPERTENSION: ICD-10-CM

## 2020-07-08 DIAGNOSIS — Z74.09 OTHER REDUCED MOBILITY: ICD-10-CM

## 2020-07-08 DIAGNOSIS — G25.81 RESTLESS LEG SYNDROME: ICD-10-CM

## 2020-07-08 DIAGNOSIS — Z99.89 DEPENDENCE ON OTHER ENABLING MACHINES AND DEVICES: ICD-10-CM

## 2020-07-08 DIAGNOSIS — M79.7 FIBROMYALGIA: ICD-10-CM

## 2020-07-08 DIAGNOSIS — K21.9 MILD ACID REFLUX: ICD-10-CM

## 2020-07-08 PROBLEM — Z12.11 SCREENING FOR MALIGNANT NEOPLASM OF COLON: Status: RESOLVED | Noted: 2017-11-10 | Resolved: 2020-07-08

## 2020-07-08 PROCEDURE — 99215 OFFICE O/P EST HI 40 MIN: CPT | Mod: PBBFAC,PO | Performed by: NURSE PRACTITIONER

## 2020-07-08 PROCEDURE — 99213 OFFICE O/P EST LOW 20 MIN: CPT | Mod: S$GLB,ICN,, | Performed by: NURSE PRACTITIONER

## 2020-07-08 PROCEDURE — 99999 PR PBB SHADOW E&M-EST. PATIENT-LVL V: CPT | Mod: PBBFAC,,, | Performed by: NURSE PRACTITIONER

## 2020-07-08 PROCEDURE — 99213 PR OFFICE/OUTPT VISIT, EST, LEVL III, 20-29 MIN: ICD-10-PCS | Mod: S$GLB,ICN,, | Performed by: NURSE PRACTITIONER

## 2020-07-08 PROCEDURE — 99999 PR PBB SHADOW E&M-EST. PATIENT-LVL V: ICD-10-PCS | Mod: PBBFAC,,, | Performed by: NURSE PRACTITIONER

## 2020-07-08 NOTE — PATIENT INSTRUCTIONS
Counseling and Referral of Other Preventative  (Italic type indicates deductible and co-insurance are waived)    Patient Name: Hannah Norman  Today's Date: 7/8/2020    Health Maintenance       Date Due Completion Date    Shingles Vaccine (1 of 2) 09/10/2020 (Originally [birth year]/1999) ---    Influenza Vaccine (1) 09/01/2020 9/10/2019    Mammogram 11/19/2021 11/19/2019    Colorectal Cancer Screening 11/10/2022 11/10/2017    Override on 1/1/2008: Done    DEXA SCAN 12/04/2022 12/4/2019    Lipid Panel 12/04/2024 12/4/2019    TETANUS VACCINE 08/23/2026 8/23/2016        No orders of the defined types were placed in this encounter.    The following information is provided to all patients.  This information is to help you find resources for any of the problems found today that may be affecting your health:                Living healthy guide: www.[facility].[place].[facility]      Understanding Diabetes: www.diabetes.org      Eating healthy: www.cdc.gov/healthyweight      CDC home safety checklist: www.cdc.gov/steadi/patient.html      Agency on Aging: www.goea.[place]      Alcoholics anonymous (AA): www.aa.org      Physical Activity: www.yari.nih.gov/ib7nphe      Tobacco use: www.quitwithusla.org

## 2020-07-08 NOTE — PROGRESS NOTES
"  Hannah Norman presented for a  Medicare AWV and comprehensive Health Risk Assessment today. The following components were reviewed and updated:    · Medical history  · Family History  · Social history  · Allergies and Current Medications  · Health Risk Assessment  · Health Maintenance  · Care Team     ** See Completed Assessments for Annual Wellness Visit within the encounter summary.**       The following assessments were completed:  · Living Situation  · CAGE  · Depression Screening  · Timed Get Up and Go  · Whisper Test  · Cognitive Function Screening      · Nutrition Screening  · ADL Screening  · PAQ Screening    Vitals:    07/08/20 1310   BP: 124/80   BP Location: Right arm   Patient Position: Sitting   BP Method: Large (Manual)   Pulse: 85   Temp: 97.1 °F (36.2 °C)   SpO2: 96%   Weight: 103.4 kg (227 lb 15.3 oz)   Height: 5' 2" (1.575 m)     Body mass index is 41.69 kg/m².     Physical Exam  Vitals signs reviewed.   Constitutional:       General: She is not in acute distress.     Appearance: Normal appearance. She is well-developed and well-groomed. She is morbidly obese.   HENT:      Head: Normocephalic and atraumatic.      Right Ear: Hearing and external ear normal. No drainage.      Left Ear: Hearing and external ear normal. No drainage.      Nose: Nose normal. No septal deviation.      Mouth/Throat:      Mouth: Mucous membranes are not pale. No oral lesions.      Pharynx: Uvula midline. No oropharyngeal exudate.   Eyes:      General: Lids are normal.         Right eye: No discharge.         Left eye: No discharge.      Conjunctiva/sclera: Conjunctivae normal.      Comments: Wears glasses   Neck:      Musculoskeletal: Full passive range of motion without pain and neck supple. No neck rigidity.      Vascular: No JVD.      Trachea: Trachea normal. No tracheal deviation.   Cardiovascular:      Rate and Rhythm: Normal rate and regular rhythm.      Pulses: Normal pulses.           Radial pulses are 2+ on the " right side and 2+ on the left side.        Dorsalis pedis pulses are 2+ on the right side and 2+ on the left side.        Posterior tibial pulses are 2+ on the right side and 2+ on the left side.      Heart sounds: Normal heart sounds, S1 normal and S2 normal. No murmur.   Pulmonary:      Effort: Pulmonary effort is normal. No respiratory distress.   Abdominal:      General: There is no distension.      Palpations: Abdomen is soft.      Tenderness: There is no abdominal tenderness.   Musculoskeletal:      Right lower le+ Pitting Edema present.      Left lower le+ Pitting Edema present.   Skin:     General: Skin is warm and dry.      Capillary Refill: Capillary refill takes less than 2 seconds.      Coloration: Skin is not pale.      Findings: No rash.   Neurological:      Mental Status: She is alert and oriented to person, place, and time. She is not disoriented.      Motor: No abnormal muscle tone.      Coordination: Coordination abnormal.      Gait: Gait abnormal.      Comments: Uses a cane to ambulate   Psychiatric:         Speech: Speech normal.         Behavior: Behavior normal. Behavior is cooperative.         Thought Content: Thought content normal.         Cognition and Memory: Memory normal.           Diagnoses and health risks identified today and associated recommendations/orders:    1. Encounter for preventive health examination    2. Essential hypertension  Chronic; stable on medication. Follow up with PCP.    3. Tortuous aorta  Chronic; stable. Followed by Cardiology.    4. Other hyperlipidemia  Chronic; stable on medication. Followed by Cardiology.    5. Urge incontinence  Chronic; stable on medication. Followed by Urology.    6. Fibromyalgia  Chronic; stable on medication. Follow up with PCP.    7. Restless leg syndrome  Chronic; stable on medication. Follow up with PCP.    8. Mild acid reflux  Chronic; stable on medication. Follow up with PCP.    9. Abnormality of gait and mobility  Chronic;  stable. Uses a cane to ambulate and a scooter for longer distances. Follow up with PCP.    10. Other reduced mobility  Chronic; stable. Uses a cane to ambulate and a scooter for longer distances. Follow up with PCP.    11. Dependence on other enabling machines and devices  Chronic; stable. Uses a cane to ambulate and a scooter for longer distances. Follow up with PCP.    12. Morbid obesity with BMI of 40.0-44.9, adult  Encouraged patient to continue to eat a low salt/low fat ADA diet and discussed importance of engaging in physical activity at least 5x/week for a minimum of 30 min/day.      Provided Hannah with a 5-10 year written screening schedule and personal prevention plan. Recommendations were developed using the USPSTF age appropriate recommendations. Education, counseling, and referrals were provided as needed. After Visit Summary printed and given to patient which includes a list of additional screenings/tests needed.    I offered to discuss end of life issues, including information on how to make advance directives that the patient could use to name someone who would make medical decisions on their behalf if they became too ill to make themselves.    ___Patient declined  _X_Patient is interested, I provided paper work and offered to discuss.      Follow up for your next annual wellness visit.      Pauline Celis NP

## 2020-08-05 ENCOUNTER — OFFICE VISIT (OUTPATIENT)
Dept: GASTROENTEROLOGY | Facility: CLINIC | Age: 70
End: 2020-08-05
Payer: MEDICARE

## 2020-08-05 DIAGNOSIS — K22.4 ESOPHAGEAL SPASM: ICD-10-CM

## 2020-08-05 DIAGNOSIS — R13.10 DYSPHAGIA, UNSPECIFIED TYPE: Primary | ICD-10-CM

## 2020-08-05 PROCEDURE — 99214 PR OFFICE/OUTPT VISIT, EST, LEVL IV, 30-39 MIN: ICD-10-PCS | Mod: 95,,, | Performed by: INTERNAL MEDICINE

## 2020-08-05 PROCEDURE — 99214 OFFICE O/P EST MOD 30 MIN: CPT | Mod: 95,,, | Performed by: INTERNAL MEDICINE

## 2020-08-05 RX ORDER — IMIPRAMINE HYDROCHLORIDE 25 MG/1
25 TABLET, FILM COATED ORAL NIGHTLY
Qty: 60 TABLET | Refills: 0 | Status: SHIPPED | OUTPATIENT
Start: 2020-08-05 | End: 2020-11-10 | Stop reason: SDUPTHER

## 2020-08-05 NOTE — PROGRESS NOTES
Subjective:       Patient ID: Hannah Norman is a 70 y.o. female.    Chief Complaint: No chief complaint on file.    This is a 70-year-old female for a follow-up visit regarding esophageal dysphagia.  She has been seen previously for suspected reflux and heartburn symptoms and underwent an upper endoscopy with Bravo capsule revealing no evidence of significant reflux done last year.  She notes mild improvement with her symptoms of Gaviscon, but incomplete relief with this and PPI.  We obtained an esophagram with barium tablet noting mild esophageal dysmotility with a small hiatal hernia which was previously noted and trace esophageal reflux.  There is also some delayed transit the barium tablet at the GE junction with a smooth short segment of narrowing suspected esophageal spasm.  She believes that she was told in the past she had esophageal spasm does note some discomfort with swallowing.  No other exacerbating or relieving factors or other associated symptoms. Trial of diltiazem with minimal benefit.       The following portions of the patient's history were reviewed and updated as appropriate: allergies, current medications, past family history, past medical history, past social history, past surgical history and problem list.     (Portions of this note were dictated using voice recognition software and may contain dictation related errors in spelling/grammar/syntax not found on text review)    The following portions of the patient's history were reviewed and updated as appropriate: allergies, current medications, past family history, past medical history, past social history, past surgical history and problem list.    (Portions of this note were dictated using voice recognition software and may contain dictation related errors in spelling/grammar/syntax not found on text review)  HPI  Review of Systems   Constitutional: Negative for diaphoresis and unexpected weight change.   HENT: Positive for trouble  swallowing. Negative for tinnitus.    Respiratory: Negative for apnea and chest tightness.    Cardiovascular: Positive for chest pain. Negative for palpitations.   Gastrointestinal: Negative for abdominal distention and abdominal pain.         Objective:      Physical Exam  Vitals signs and nursing note reviewed.   Constitutional:       Appearance: Normal appearance.   HENT:      Head: Normocephalic and atraumatic.   Pulmonary:      Effort: Pulmonary effort is normal. No respiratory distress.   Abdominal:      General: Abdomen is flat. Bowel sounds are normal. There is no distension.   Skin:     Coloration: Skin is not jaundiced or pale.   Neurological:      General: No focal deficit present.      Mental Status: She is alert and oriented to person, place, and time.   Psychiatric:         Mood and Affect: Mood normal.         Behavior: Behavior normal.           Labs/imaging; reviewed  Assessment:       1. Dysphagia, unspecified type    2. Esophageal spasm        Plan:   1. D/c diltiazem  2. Trial of imipramine qhs 25mg    The patient location is: out of the office  The chief complaint leading to consultation is: follow up    Visit type: audiovisual    Each patient to whom he or she provides medical services by telemedicine is:  (1) informed of the relationship between the physician and patient and the respective role of any other health care provider with respect to management of the patient; and (2) notified that he or she may decline to receive medical services by telemedicine and may withdraw from such care at any time.

## 2020-08-12 ENCOUNTER — PATIENT OUTREACH (OUTPATIENT)
Dept: OTHER | Facility: OTHER | Age: 70
End: 2020-08-12

## 2020-08-12 NOTE — PROGRESS NOTES
Digital Medicine: Health  Follow-Up    The history is provided by the patient.             Reason for review: Blood pressure at goal      Additional Follow-up details: Brief follow up completed with the patient. She stated that she just finished having a stress test and she is awaiting the results. She denies any major issues with her pressure at this time and she stated that she will reach out if any arise.         Diet-Not assessed          Physical Activity-Not assessed    Medication Adherence-Medication adherence was assessed.      Substance, Sleep, Stress-Not assessed      Continue current diet/physical activity routine.       Addressed any questions or concerns and patient has my contact information if needed prior to next outreach.   Explained the importance of self-monitoring and medication adherence. Encouraged the patient to communicate with their health  for lifestyle modifications to help improve or maintain a healthy lifestyle.            There are no preventive care reminders to display for this patient.    Last 5 Patient Entered Readings                                      Current 30 Day Average: 124/68     Recent Readings 8/11/2020 8/9/2020 7/30/2020 7/27/2020 7/18/2020    SBP (mmHg) 115 136 130 124 130    DBP (mmHg) 58 77 74 59 82    Pulse 87 71 73 78 84

## 2020-08-20 ENCOUNTER — TELEPHONE (OUTPATIENT)
Dept: ORTHOPEDICS | Facility: CLINIC | Age: 70
End: 2020-08-20

## 2020-08-20 DIAGNOSIS — M25.569 KNEE PAIN, UNSPECIFIED CHRONICITY, UNSPECIFIED LATERALITY: Primary | ICD-10-CM

## 2020-08-21 ENCOUNTER — HOSPITAL ENCOUNTER (OUTPATIENT)
Dept: RADIOLOGY | Facility: HOSPITAL | Age: 70
Discharge: HOME | End: 2020-08-21
Attending: ORTHOPAEDIC SURGERY
Payer: MEDICARE

## 2020-08-21 ENCOUNTER — OFFICE VISIT (OUTPATIENT)
Dept: ORTHOPEDICS | Facility: CLINIC | Age: 70
End: 2020-08-21
Payer: MEDICARE

## 2020-08-21 VITALS — HEIGHT: 62 IN | WEIGHT: 227.94 LBS | BODY MASS INDEX: 41.94 KG/M2

## 2020-08-21 DIAGNOSIS — M25.569 KNEE PAIN, UNSPECIFIED CHRONICITY, UNSPECIFIED LATERALITY: ICD-10-CM

## 2020-08-21 DIAGNOSIS — M17.11 PRIMARY OSTEOARTHRITIS OF RIGHT KNEE: Primary | ICD-10-CM

## 2020-08-21 PROCEDURE — 73564 X-RAY EXAM KNEE 4 OR MORE: CPT | Mod: TC,50,PN

## 2020-08-21 PROCEDURE — 99999 PR PBB SHADOW E&M-EST. PATIENT-LVL IV: CPT | Mod: PBBFAC,,, | Performed by: ORTHOPAEDIC SURGERY

## 2020-08-21 PROCEDURE — 99214 OFFICE O/P EST MOD 30 MIN: CPT | Mod: PBBFAC,25,PN | Performed by: ORTHOPAEDIC SURGERY

## 2020-08-21 PROCEDURE — 20610 DRAIN/INJ JOINT/BURSA W/O US: CPT | Mod: S$PBB,RT,, | Performed by: ORTHOPAEDIC SURGERY

## 2020-08-21 PROCEDURE — 99999 PR PBB SHADOW E&M-EST. PATIENT-LVL IV: ICD-10-PCS | Mod: PBBFAC,,, | Performed by: ORTHOPAEDIC SURGERY

## 2020-08-21 PROCEDURE — 73564 XR KNEE ORTHO BILAT WITH FLEXION: ICD-10-PCS | Mod: 26,50,, | Performed by: RADIOLOGY

## 2020-08-21 PROCEDURE — 73564 X-RAY EXAM KNEE 4 OR MORE: CPT | Mod: 26,50,, | Performed by: RADIOLOGY

## 2020-08-21 PROCEDURE — 20610 PR DRAIN/INJECT LARGE JOINT/BURSA: ICD-10-PCS | Mod: S$PBB,RT,, | Performed by: ORTHOPAEDIC SURGERY

## 2020-08-21 PROCEDURE — 99214 PR OFFICE/OUTPT VISIT, EST, LEVL IV, 30-39 MIN: ICD-10-PCS | Mod: S$PBB,25,, | Performed by: ORTHOPAEDIC SURGERY

## 2020-08-21 PROCEDURE — 99214 OFFICE O/P EST MOD 30 MIN: CPT | Mod: S$PBB,25,, | Performed by: ORTHOPAEDIC SURGERY

## 2020-08-21 RX ORDER — DILTIAZEM HYDROCHLORIDE 30 MG/1
30 TABLET, FILM COATED ORAL
COMMUNITY
Start: 2020-08-03 | End: 2020-09-25 | Stop reason: ALTCHOICE

## 2020-08-21 RX ORDER — FUROSEMIDE 40 MG/1
TABLET ORAL
Qty: 60 TABLET | Refills: 2 | Status: SHIPPED | OUTPATIENT
Start: 2020-08-21 | End: 2020-12-10

## 2020-08-21 RX ORDER — TRIAMCINOLONE ACETONIDE 40 MG/ML
40 INJECTION, SUSPENSION INTRA-ARTICULAR; INTRAMUSCULAR
Status: DISCONTINUED | OUTPATIENT
Start: 2020-08-21 | End: 2020-08-21 | Stop reason: HOSPADM

## 2020-08-21 RX ADMIN — TRIAMCINOLONE ACETONIDE 40 MG: 40 INJECTION, SUSPENSION INTRA-ARTICULAR; INTRAMUSCULAR at 08:08

## 2020-08-21 NOTE — PROCEDURES
Large Joint Aspiration/Injection    Date/Time: 8/21/2020 8:30 AM  Performed by: Eliezer Parmar MD  Authorized by: Eliezer Parmar MD     Medications:  40 mg triamcinolone acetonide 40 mg/mL     After obtaining verbal informed consent the patient's right knee was prepped aseptically and injected through an inferior lateral approach using 40 mg of triamcinolone and 1 cc of 1% plain Xylocaine.  The patient was warned about postinjection flare and how to manage it with ice, rest and over-the-counter analgesics.  They're advised to contact me for any severe, uncontrolled pain.

## 2020-08-21 NOTE — PROGRESS NOTES
Subjective:      Patient ID: Hannah Norman is a 70 y.o. female.    Chief Complaint: Consult and Knee Pain (right  )    HPI     They have experienced problems with their right knee over the past Many years. The patient denies relevant history of injury/aggravation.  Symptoms have worsened a lot over the past year.  Pain is located laterally and  anteriorly Associated symptoms include swelling, pseudolocking and gelling.  Symptoms are aggravated by all walking. They have been treated with over the counter analgesics, NSAIDS, cane/walker, steroid injection(s), hyaluronidase injection(s) and activity modification.   Symptoms have recently worsened. Ambulation reportedly has been impaired. Self care ADLs are painful.     Review of Systems   Constitution: Negative for fever and weight loss.   HENT: Negative for congestion.    Eyes: Negative for visual disturbance.   Cardiovascular: Negative for chest pain.   Respiratory: Negative for shortness of breath.    Hematologic/Lymphatic: Negative for bleeding problem. Does not bruise/bleed easily.   Skin: Negative for poor wound healing.   Musculoskeletal: Positive for joint pain and joint swelling.   Gastrointestinal: Negative for abdominal pain.   Genitourinary: Negative for dysuria.   Neurological: Negative for seizures.   Psychiatric/Behavioral: Negative for altered mental status.   Allergic/Immunologic: Negative for persistent infections.         Objective:      Ortho/SPM Exam        Right knee    [unfilled]    The patient is not in acute distress.   Sclerae normal  Body habitus is obese.  Respiratory distress:  none   The patient walks with a limp.  Hip irritability  negative.   The skin over the knee is intact.  Knee effusion 1+  Tendernes is located medially  Range of motion- Flexion 120 deg, Extension 5 deg,   Ligament laxity exam:   MCL 2+   Lachman 0   Post sag  0    LCL 0  Patellar apprehension negative.  Popliteal cyst positive  Patellar crepitation  present.  Flexion/pinch negative  Pulses DP present, PT present.  Motor normal 5/5 strength in all tested muscle groups.   Sensory normal.    I reviewed the relevant imaging for the patient's condition:  Knee films show complete loss of medial joint space with osteophytes and extensive patellofemoral degeneration    Assessment:       Encounter Diagnosis   Name Primary?    Primary osteoarthritis of right knee Yes            The condition is structurally very advanced with significant pain and impairment of ADLs.  Plan:       Hannah was seen today for consult and knee pain.    Diagnoses and all orders for this visit:    Primary osteoarthritis of right knee      I explained my diagnostic impression and the reasoning behind it in detail, using layman's terms.  Models and/or pictures were used to help in the explanation.    Injection requested and consent given    Use of ice and over-the-counter analgesics recommended    Weight loss recommended    I explained the potential role of surgery in the treatment of this condition to the patient.  They understand that if nonsurgical measures do not adequately control symptoms, surgery will be considered in the future.

## 2020-09-04 ENCOUNTER — PATIENT OUTREACH (OUTPATIENT)
Dept: OTHER | Facility: OTHER | Age: 70
End: 2020-09-04

## 2020-09-04 NOTE — PROGRESS NOTES
BEATRICE to discuss her lack of readings and higher readings. She was well controlled in the past. She is due for a BMP in December. Confirm if she's taking diltiazem.

## 2020-09-08 DIAGNOSIS — G25.81 RLS (RESTLESS LEGS SYNDROME): ICD-10-CM

## 2020-09-08 RX ORDER — CLONAZEPAM 0.5 MG/1
TABLET ORAL
Qty: 60 TABLET | Refills: 0 | Status: SHIPPED | OUTPATIENT
Start: 2020-09-08 | End: 2020-10-05 | Stop reason: SDUPTHER

## 2020-09-08 NOTE — TELEPHONE ENCOUNTER
Please add this patient to the cancellation list - I'm trying to see her sooner than her current October appt to be able to continue Clonazepam.  Will allow one refill till then for RLS

## 2020-09-08 NOTE — TELEPHONE ENCOUNTER
LOV 03/04/2019    Last filled clonazePAM (KLONOPIN) 0.5 MG tablet 60 tablet w/ 5 refills on 3/18/2020.    Schedule pt on 10/12/2020 @ 2:00p with Dr. Blackwell.     Pt is also requesting to up the dosage on her clonazepam.

## 2020-09-25 NOTE — PROGRESS NOTES
Digital Medicine: Clinician Follow-Up    Called patient to follow-up on her at goal readings.    The history is provided by the patient.   Follow-up reason(s): routine follow up.     Hypertension    Readings are trending down   Patient is not experiencing signs/symptoms of hypotension.  Patient is not experiencing signs/symptoms of hypertension.          Last 5 Patient Entered Readings                                      Current 30 Day Average: 132/67     Recent Readings 9/24/2020 9/24/2020 9/24/2020 9/21/2020 9/21/2020    SBP (mmHg) 115 153 114 137 150    DBP (mmHg) 63 62 65 73 66    Pulse 91 86 96 80 80                             Medication Adherence-Medication adherence was assessed.        Patient is not taking diltiazem.       ASSESSMENT(S)  Patients BP average is 132/67 mmHg, which is above goal. Patient's BP goal is less than or equal to 130/80 per 2017 ACC/AHA Hypertension Guidelines.     Her blood pressure is trending down nicely. She is close to goal and will reach goal if she continues on a downward trend.      Hypertension Plan  Continue current therapy.       Addressed patient questions and patient has my contact information if needed prior to next outreach. Patient verbalizes understanding.            There are no preventive care reminders to display for this patient.  There are no preventive care reminders to display for this patient.    Hypertension Medications             furosemide (LASIX) 40 MG tablet TAKE 1 TABLET BY MOUTH UP TO TWO TIMES A DAY IF SWELLING OCCURS    losartan (COZAAR) 25 MG tablet Take 1 tablet (25 mg total) by mouth once daily. For blood pressure   ( dc 50 mg dose)

## 2020-09-29 ENCOUNTER — PATIENT OUTREACH (OUTPATIENT)
Dept: ADMINISTRATIVE | Facility: OTHER | Age: 70
End: 2020-09-29

## 2020-09-29 NOTE — PROGRESS NOTES
Health Maintenance Due   Topic Date Due    Shingles Vaccine (1 of 2) 02/13/1999    Influenza Vaccine (1) 08/01/2020     Updates were requested from care everywhere.  Chart was reviewed for overdue Proactive [facility] Encounters (VAL) topics (CRS, Breast Cancer Screening, Eye exam)  Health Maintenance has been updated.  LINKS immunization registry triggered.  Immunizations were reconciled.

## 2020-10-02 ENCOUNTER — OFFICE VISIT (OUTPATIENT)
Dept: FAMILY MEDICINE | Facility: CLINIC | Age: 70
End: 2020-10-02
Payer: MEDICARE

## 2020-10-02 VITALS
WEIGHT: 227.94 LBS | BODY MASS INDEX: 41.94 KG/M2 | TEMPERATURE: 97 F | HEART RATE: 111 BPM | OXYGEN SATURATION: 96 % | SYSTOLIC BLOOD PRESSURE: 142 MMHG | DIASTOLIC BLOOD PRESSURE: 84 MMHG | HEIGHT: 62 IN

## 2020-10-02 DIAGNOSIS — S00.83XA BRUISE OF FACE, INITIAL ENCOUNTER: Primary | ICD-10-CM

## 2020-10-02 PROCEDURE — 99213 PR OFFICE/OUTPT VISIT, EST, LEVL III, 20-29 MIN: ICD-10-PCS | Mod: S$GLB,,, | Performed by: FAMILY MEDICINE

## 2020-10-02 PROCEDURE — 99213 OFFICE O/P EST LOW 20 MIN: CPT | Mod: S$GLB,,, | Performed by: FAMILY MEDICINE

## 2020-10-02 RX ORDER — FLUTICASONE PROPIONATE 50 MCG
1 SPRAY, SUSPENSION (ML) NASAL DAILY
Qty: 16 G | Refills: 11 | Status: SHIPPED | OUTPATIENT
Start: 2020-10-02 | End: 2021-11-15 | Stop reason: SDUPTHER
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
## 2020-10-05 ENCOUNTER — PATIENT MESSAGE (OUTPATIENT)
Dept: ADMINISTRATIVE | Facility: OTHER | Age: 70
End: 2020-10-05

## 2020-10-05 DIAGNOSIS — G25.81 RLS (RESTLESS LEGS SYNDROME): ICD-10-CM

## 2020-10-05 RX ORDER — CLONAZEPAM 0.5 MG/1
TABLET ORAL
Qty: 60 TABLET | Refills: 0 | Status: SHIPPED | OUTPATIENT
Start: 2020-10-05 | End: 2020-10-12 | Stop reason: SDUPTHER

## 2020-10-05 NOTE — TELEPHONE ENCOUNTER
LOV 03/04/2019    Last filled clonazePAM (KLONOPIN) 0.5 MG tablet 60 tablet w/ 0 refills on 9/8/2020.

## 2020-10-06 ENCOUNTER — PATIENT OUTREACH (OUTPATIENT)
Dept: OTHER | Facility: OTHER | Age: 70
End: 2020-10-06
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
## 2020-10-12 ENCOUNTER — OFFICE VISIT (OUTPATIENT)
Dept: SLEEP MEDICINE | Facility: CLINIC | Age: 70
End: 2020-10-12
Payer: MEDICARE

## 2020-10-12 DIAGNOSIS — G25.81 RLS (RESTLESS LEGS SYNDROME): Primary | ICD-10-CM

## 2020-10-12 PROCEDURE — 99214 PR OFFICE/OUTPT VISIT, EST, LEVL IV, 30-39 MIN: ICD-10-PCS | Mod: 95,,, | Performed by: PSYCHIATRY & NEUROLOGY

## 2020-10-12 PROCEDURE — 99214 OFFICE O/P EST MOD 30 MIN: CPT | Mod: 95,,, | Performed by: PSYCHIATRY & NEUROLOGY

## 2020-10-12 RX ORDER — CLONAZEPAM 0.5 MG/1
TABLET ORAL
Qty: 60 TABLET | Refills: 0 | Status: SHIPPED | OUTPATIENT
Start: 2020-10-12 | End: 2020-11-30 | Stop reason: SDUPTHER

## 2020-10-12 RX ORDER — AZELASTINE 1 MG/ML
SPRAY, METERED NASAL
Qty: 30 ML | Refills: 3 | Status: SHIPPED | OUTPATIENT
Start: 2020-10-12 | End: 2020-11-10

## 2020-10-12 NOTE — PROGRESS NOTES
The patient location is: home  The chief complaint leading to consultation is: sleep disorder  Visit type: audiovisual  Total time spent with patient: 30 min  Each patient to whom he or she provides medical services by telemedicine is:  (1) informed of the relationship between the physician and patient and the respective role of any other health care provider with respect to management of the patient; and (2) notified that he or she may decline to receive medical services by telemedicine and may withdraw from such care at any time.      Hannah Norman is a 70 y.o. female seen today for sleep apnea and RLS follow up. Last seen on 3/4/19.    Since then she tried  Could not get the mask she wanted, and she has stopped using the CPAP  Machine.      For RLS, she is getting 0.125 mg Mirapex - 5 pills a day. She feels they make a difference. Still taking Clonazepam 0.5 mg at night; she would also use it for anxiety.    Takes Flexeril at least 2 times a week. Neurontin 600 mg TID - taking for RLS and for fibromyalgia.     Also taking Xyzal at night which aggravate RLS. Also taking Effexor - currently decreasing the dose.        EPWORTH SLEEPINESS SCALE TOTAL SCORE  10/12/2020 3/4/2019 11/28/2018 9/19/2018 7/18/2018   Total score 9 6 8 9 8     Most recent CPAP use:    APAP 10-20 cm H2O  Therapy Event Summary (Last 14 Days)        Compliance Summary  Apnea Indices           Days with Device Usage:  7 days  Average AHI:  6.4      Percentage of Days >=4 Hours:  28.6%  Average OA Index:  2.4      Average Usage (Days Used):  4 hrs. 3 mins. 41 secs.  Average CA Index:  0.3      Average Usage (All Days):  2 hrs. 1 mins. 50 secs.            Large Leak  Periodic Breathing     Average Time in Large Leak:  21 mins. 9 secs.  Average % of Night in PB:      Average % of Night in Large Leak:  8.7%                 PREVIOUS SLEEP STUDIES:     PSG study  In 2/19/18 showed significant MARY with the AHI of 7.9- /hour and SaO2 minimum of 85%.  CPAP  "titration  - pending      DME: [facility]      REVIEW OF SYSTEMS:   Sleep related symptoms as per HPI    denies weight gain  Reports occasional dyspnea  Reports occasional palpitations  Reports occasional acid reflux   Reports polyuria x 2  Denies  mood diturbance  Denies  anemia  Denies  muscle pain  Denies  Gait imbalance    Otherwise, a balance of 10 systems reviewed is negative.    PHYSICAL EXAM:  There were no vitals taken for this visit.        ASSESSMENT:    1. MARY (obstructive sleep apnea). The patient symptomatically has  excessive daytime sleepiness, snoring,  witnessed breathing pauses, excessive daytime fatigue, gasping for air in sleep and interrupted sleep  with exam findings of "a crowded oral airway and elevated body mass index. The patient has medical co-morbidities of hyperlipidemia, fibromyalgia and nightmares  which can be worsened by MARY. This warrants treatment. Benefiting from CPAP use in terms of sleep continuity and daytime sleepiness. Has not quite met compliance due to discomfort.       2. RLS -Continue Mirapex 0/125 mg - 5 mills a day; Neurontin 600 mg ;  Clonazepam - 0.5 mg - 1-2 pills at bedtime      Try to stop Xyzal and replace it with Asteline nasal spray in addition to her Flonase splay. Keep derceasing Effexor if possible    3. RBD - stable       PLAN:    CYCLOBENZAPRINE - decrease further  MORPHINE - try to take all pain medications earlier, ie 6 PM    eFFEXOR - DECREASE TO ONCE A DAY if possible, IN THE MORNING  cONTINUE CELEXA AT NIGHT ONLY - will check with Dr. Mukherjee if still needed,    KEEP CLONAZEPAM 0.5 MG AT NIGHT for RBD (DO NOT TAKE SAME TIME AS mORPHINE - THEY SUPPRESS  BREATHING!)    pRAMIPEXOL - - take 3 pills at 8 PM, 2 more pills of needed at bedtime.    Will order heated hose.   Showed how to change her humidity settings.    More than 25 minutes of this 45 minutes visit was spent in counseling: during our discussion today, we talked about the etiology of  MARY as well " as the potential ramifications of untreated sleep apnea, which could include daytime sleepiness, hypertension, heart disease and/or stroke.  We discussed potential treatment options, which could include weight loss, body positioning, continuous positive airway pressure (CPAP), or referral for surgical consideration. Meanwhile, she  is urged to avoid supine sleep, weight gain and alcoholic beverages since all of these can worsen MARY.     Precautions: The patient was advised to abstain from driving should he feel sleepy or drowsy.    Follow up: MD in 3 months    Thank you for allowing me the opportunity to participate in the care of your patient.    This visit summary will be sent to referring provider via inbasket

## 2020-10-13 DIAGNOSIS — G25.81 RESTLESS LEG SYNDROME: ICD-10-CM

## 2020-10-13 DIAGNOSIS — M79.7 FIBROMYALGIA: ICD-10-CM

## 2020-10-13 RX ORDER — GABAPENTIN 600 MG/1
TABLET ORAL
Qty: 90 TABLET | Refills: 3 | Status: SHIPPED | OUTPATIENT
Start: 2020-10-13 | End: 2021-02-10

## 2020-10-23 ENCOUNTER — LAB VISIT (OUTPATIENT)
Dept: FAMILY MEDICINE | Facility: CLINIC | Age: 70
End: 2020-10-23
Payer: MEDICARE

## 2020-10-23 PROCEDURE — U0003 INFECTIOUS AGENT DETECTION BY NUCLEIC ACID (DNA OR RNA); SEVERE ACUTE RESPIRATORY SYNDROME CORONAVIRUS 2 (SARS-COV-2) (CORONAVIRUS DISEASE [COVID-19]), AMPLIFIED PROBE TECHNIQUE, MAKING USE OF HIGH THROUGHPUT TECHNOLOGIES AS DESCRIBED BY CMS-2020-01-R: HCPCS

## 2020-10-24 LAB — SARS-COV-2 RNA RESP QL NAA+PROBE: NOT DETECTED

## 2020-10-26 ENCOUNTER — OFFICE VISIT (OUTPATIENT)
Dept: OTOLARYNGOLOGY | Facility: CLINIC | Age: 70
End: 2020-10-26
Payer: MEDICARE

## 2020-10-26 VITALS
HEIGHT: 62 IN | DIASTOLIC BLOOD PRESSURE: 84 MMHG | HEART RATE: 83 BPM | WEIGHT: 237 LBS | SYSTOLIC BLOOD PRESSURE: 148 MMHG | BODY MASS INDEX: 43.61 KG/M2

## 2020-10-26 DIAGNOSIS — K21.9 LARYNGOPHARYNGEAL REFLUX (LPR): Primary | ICD-10-CM

## 2020-10-26 DIAGNOSIS — G47.33 OSA (OBSTRUCTIVE SLEEP APNEA): ICD-10-CM

## 2020-10-26 DIAGNOSIS — R49.0 HOARSENESS OF VOICE: ICD-10-CM

## 2020-10-26 DIAGNOSIS — J30.0 CHRONIC VASOMOTOR RHINITIS: ICD-10-CM

## 2020-10-26 PROCEDURE — 99213 PR OFFICE/OUTPT VISIT, EST, LEVL III, 20-29 MIN: ICD-10-PCS | Mod: 25,S$PBB,, | Performed by: OTOLARYNGOLOGY

## 2020-10-26 PROCEDURE — 31575 DIAGNOSTIC LARYNGOSCOPY: CPT | Mod: PBBFAC,PN | Performed by: OTOLARYNGOLOGY

## 2020-10-26 PROCEDURE — 31575 PR LARYNGOSCOPY, FLEXIBLE; DIAGNOSTIC: ICD-10-PCS | Mod: S$PBB,,, | Performed by: OTOLARYNGOLOGY

## 2020-10-26 PROCEDURE — 96372 THER/PROPH/DIAG INJ SC/IM: CPT | Mod: PBBFAC,PN,59

## 2020-10-26 PROCEDURE — 99214 OFFICE O/P EST MOD 30 MIN: CPT | Mod: PBBFAC,PN,25 | Performed by: OTOLARYNGOLOGY

## 2020-10-26 PROCEDURE — 99213 OFFICE O/P EST LOW 20 MIN: CPT | Mod: 25,S$PBB,, | Performed by: OTOLARYNGOLOGY

## 2020-10-26 PROCEDURE — 99999 PR PBB SHADOW E&M-EST. PATIENT-LVL IV: ICD-10-PCS | Mod: PBBFAC,,, | Performed by: OTOLARYNGOLOGY

## 2020-10-26 PROCEDURE — 99999 PR PBB SHADOW E&M-EST. PATIENT-LVL IV: CPT | Mod: PBBFAC,,, | Performed by: OTOLARYNGOLOGY

## 2020-10-26 PROCEDURE — 31575 DIAGNOSTIC LARYNGOSCOPY: CPT | Mod: S$PBB,,, | Performed by: OTOLARYNGOLOGY

## 2020-10-26 RX ORDER — LEVOCETIRIZINE DIHYDROCHLORIDE 5 MG/1
5 TABLET, FILM COATED ORAL NIGHTLY
Qty: 30 TABLET | Refills: 11 | Status: SHIPPED | OUTPATIENT
Start: 2020-10-26 | End: 2021-11-15

## 2020-10-26 RX ORDER — METHYLPREDNISOLONE ACETATE 40 MG/ML
40 INJECTION, SUSPENSION INTRA-ARTICULAR; INTRALESIONAL; INTRAMUSCULAR; SOFT TISSUE
Status: COMPLETED | OUTPATIENT
Start: 2020-10-26 | End: 2020-10-26

## 2020-10-26 RX ORDER — PANTOPRAZOLE SODIUM 40 MG/1
40 TABLET, DELAYED RELEASE ORAL DAILY
Qty: 30 TABLET | Refills: 3 | Status: SHIPPED | OUTPATIENT
Start: 2020-10-26 | End: 2020-12-17 | Stop reason: SDUPTHER

## 2020-10-26 RX ADMIN — METHYLPREDNISOLONE ACETATE 40 MG: 40 INJECTION, SUSPENSION INTRA-ARTICULAR; INTRALESIONAL; INTRAMUSCULAR; SOFT TISSUE at 10:10

## 2020-10-26 NOTE — PROGRESS NOTES
Chief Complaint   Patient presents with    Follow-up     patient is still suffering from MARY    Hoarse     hoarsness is intermittent    .     HPI:Hannah Norman is a 70 y.o. female who has been referred by Dr. Hauser for a one year history of hoarseness. She has been having dysphagia and recently underwent a MBSS which was normal but she was noted by the SLP to have dysphonia and ENT referral was recommended.  Her voice is progressively worsening over this time. There are pitch breaks or cracks. There is vocal fatigue. She admits to odynophagia, throat pain, and otalgia.  There is no hemoptysis or hematemesis. She is breathing well.     She admits to throat clearing and cough. She admits to heartburn and reflux. She is currently on Protonix 40mg daily. She notes that she has difficulty swallowing spicy foods which gives her the sensation that the food is going down the wrong way. She denies food sticking. She denies weight loss.  She does note certain foods trigger her GERD-tomatos.     Interval HPI 6/26/2020: Follow up LPR, hoarseness, and MARY.   She reports that she has had been on omeprazole 40mg daily as prescribed by GI.     She reports that she is feeling symptomatic heartburn/indigestion on this regimen. Work-up has included an EGD showing mild esophagitis and Bravo ph probe study with normal results. Most recent esophagram shows trace esophageal reflux, mild hiatal hernia, and esophageal spasms.  She describes continued hoarseness. Notes intermittent rough quality to her voice. Has attended speech therapy in the past. She notes intermittent throat clearing. She denies food sticking.   Feels that nasal rhinorrhea and post nasal drip are controlled with atrovent.     Interval HPI 10/26/2020:  Follow up visit. Has been on Protonix 40 mg PO daily but ran out 2 weeks ago.  Since she has been out of the medication she has noticed increased coughing and globus sensation.. Notes that when she takes the  medication it mostly controls her symptoms but does not breakthrough heartburn/indigestion.  She has had work-up for GERD(see 2020 note for summary).  She has had continued intermittent hoarseness, intermittent throat clearing. She denies dysphagia or odynophagia.  She is taking Atrovent as described. She feels that this helps control nasal congestion and rhinorrhea. Still notes issues with MARY.  Recently saw sleep medicine approximately 2 weeks ago who counseled and showed her how to adjust humidity settings on her CPAP.     Past Medical History:   Diagnosis Date    Anxiety disorder     Bell's palsy     Chronic back pain     Chronic osteoarthritis     Essential tremor     Fibromyalgia     Hypertension     Mild acid reflux     Neck pain     Other and unspecified hyperlipidemia     Sleep apnea     wear c-pap at night; does not use regularly     Social History     Socioeconomic History    Marital status:      Spouse name: Not on file    Number of children: Not on file    Years of education: Not on file    Highest education level: Not on file   Occupational History    Not on file   Social Needs    Financial resource strain: Not on file    Food insecurity     Worry: Not on file     Inability: Not on file    Transportation needs     Medical: Not on file     Non-medical: Not on file   Tobacco Use    Smoking status: Former Smoker     Packs/day: 1.50     Years: 18.00     Pack years: 27.00     Types: Cigarettes     Start date:      Quit date: 10/5/1982     Years since quittin.0    Smokeless tobacco: Never Used   Substance and Sexual Activity    Alcohol use: No    Drug use: Never    Sexual activity: Not Currently     Partners: Male   Lifestyle    Physical activity     Days per week: Not on file     Minutes per session: Not on file    Stress: Not at all   Relationships    Social connections     Talks on phone: Not on file     Gets together: Not on file     Attends Latter-day  service: Not on file     Active member of club or organization: Not on file     Attends meetings of clubs or organizations: Not on file     Relationship status: Not on file   Other Topics Concern    Not on file   Social History Narrative    Not on file     Past Surgical History:   Procedure Laterality Date    ADENOIDECTOMY      APPENDECTOMY      BREAST BIOPSY Left     1995  negative    Breast reduction      BREAST SURGERY Bilateral     breast reduction    CARPAL TUNNEL RELEASE Right     COLONOSCOPY  2008    COLONOSCOPY N/A 11/10/2017    Procedure: COLONOSCOPY - Miralax split prep;  Surgeon: Annetta Powell MD;  Location: [facility];  Service: Endoscopy;  Laterality: N/A;    COSMETIC SURGERY Bilateral     breast reduction    CYSTOSCOPY N/A 10/23/2018    Procedure: CYSTOSCOPY;  Surgeon: Feli Garza MD;  Location: [facility] OR [address] [place] [address];  Service: Urology;  Laterality: N/A;    ESOPHAGOGASTRODUODENOSCOPY N/A 5/30/2019    Procedure: ESOPHAGOGASTRODUODENOSCOPY (EGD);  Surgeon: Oscar Wylie MD;  Location: [facility] (4TH FLR);  Service: Endoscopy;  Laterality: N/A;  Off PPI/H2 x 7 days prior- ERW    HYSTERECTOMY      JOINT REPLACEMENT Left     knee    KNEE SURGERY      bilateral    left shoulder Left     rotator cuff    PLACEMENT OF TRANSOBTURATOR TAPE N/A 10/23/2018    Procedure: PLACEMENT, TRANSOBTURATOR TAPE;  Surgeon: Feli Garza MD;  [facility];  Service: Urology;  Laterality: N/A;  1.5 hours    Tonsillectomy      TONSILLECTOMY      TOTAL REDUCTION MAMMOPLASTY      1995    TUBAL LIGATION       Family History   Problem Relation Age of Onset    Diabetes Mother     Tremor Mother     Liver disease Mother     Diabetes Father     Heart disease Father     Tremor Son     Kidney disease Son     Diabetes Sister     Diabetes Brother     No Known Problems Daughter     Depression Sister            Review of Systems  General: negative for chills, fever or weight  loss  Psychological: negative for mood changes or depression  Ophthalmic: negative for blurry vision, photophobia or eye pain  ENT: see HPI  Respiratory: no cough, shortness of breath, or wheezing  Cardiovascular: no chest pain or dyspnea on exertion  Gastrointestinal: no abdominal pain, change in bowel habits, or black/ bloody stools  Musculoskeletal: negative for gait disturbance or muscular weakness  Neurological: no syncope or seizures; no ataxia  Dermatological: negative for puritis,  rash and jaundice  Hematologic/lymphatic: no easy bruising, no new lumps or bumps      Physical Exam:    Vitals:    10/26/20 0910   BP: (!) 148/84   Pulse: 83       Constitutional: Well appearing / communicating without difficutly.  NAD.  Eyes: EOM I Bilaterally  Head/Face: Normocephalic.  Negative paranasal sinus pressure/tenderness.  Salivary glands WNL.  House Brackmann I Bilaterally.    Right Ear: Auricle normal appearance. External Auditory Canal within normal limits no lesions or masses,TM w/o masses/lesions/perforations. TM mobility noted.   Left Ear: Auricle normal appearance. External Auditory Canal within normal limits no lesions or masses,TM w/o masses/lesions/perforations. TM mobility noted.  Nose: Mild nasal septal deviation to the right with an inferior spur.  Inferior Turbinates 3+ bilaterally. No septal perforation. No masses/lesions. External nasal skin appears normal without masses/lesions.  Oral Cavity: Gingiva/lips within normal limits.  Dentition/gingiva healthy appearing. Mucus membranes moist. Floor of mouth soft, no masses palpated. Oral Tongue mobile. Hard Palate appears normal.    Oropharynx: Base of tongue appears normal. No masses/lesions noted. Tonsillar fossa/pharyngeal wall without lesions. Posterior oropharynx WNL.  Soft palate without masses. Midline uvula.   Neck/Lymphatic: No LAD I-VI bilaterally.  No thyromegaly.  No masses noted on exam.    Mirror laryngoscopy/nasopharyngoscopy: Active gag  reflex.  Unable to perform.    Neuro/Psychiatric: AOx3.  Normal mood and affect.   Cardiovascular: Normal carotid pulses bilaterally, no increasing jugular venous distention noted at cervical region bilaterally.    Respiratory: Normal respiratory effort, no stridor, no retractions noted.      See separate procedure note for FFL.     Diagnostic studies:  Mild esophageal dysmotility.     Trace esophageal reflux with small hiatal hernia.     Delayed transit of the barium tablet beyond the gastroesophageal junction (with eventual passage into the stomach) with smooth short-segment narrowing at this level.  Component of underlying spasm versus fixed narrowing possible.  Consider correlation with dedicated endoscopic evaluation.    Assessment:    ICD-10-CM ICD-9-CM    1. Laryngopharyngeal reflux (LPR)  K21.9 478.79    2. Hoarseness of voice  R49.0 784.42    3. MARY (obstructive sleep apnea)  G47.33 327.23    4. Chronic vasomotor rhinitis  J30.0 477.9      The primary encounter diagnosis was Laryngopharyngeal reflux (LPR). Diagnoses of Hoarseness of voice, MARY (obstructive sleep apnea), and Chronic vasomotor rhinitis were also pertinent to this visit.      Plan:  No orders of the defined types were placed in this encounter.      LPR:  Continue Protonix 40mg PO daily. Refills provided.   Continue reflux precautions.   Esophageal spasms-management per GI.     Mild MARY: Continue CPAP as prescribed.     Vasomotor rhinitis: Continue atrovent nasal.  Refill xyzal 5mg PO qhs  Depo-medrol IM injection give.      Follow up in 4-6 months to reassess progress with treatment regimen.     Lupe Lopez MD

## 2020-10-26 NOTE — PROCEDURES
Procedures  Due to indication in patient's history, presentation or risk factors,  a fiber optic exam was performed.    SEPARATE PROCEDURE NOTE:    ANESTHESIA:  Topical xylocaine with alexander-synephrine    FINDINGS: Mild inaterarytenoid erythema and edema    PROCEDURE:  After verbal consent was obtained, the flexible scope was passed through the patient's nasal cavity without difficulty.  The nasopharynx (adenoid pad) and eustachian tube orifices were first visualized and were found to be normal, without masses or irregularity.  The posterior pharyngeal wall and base of tongue were then examined and no mass or irregular tissue was seen.  The scope was then advanced to the larynx, and the epiglottis, valleculae, and piriform sinuses were normal, without masses or mucosal irregularity.  The false vocal folds and true vocal folds were then examined and were found to have normal mobility (full abduction and adduction).  The interartyenoid area had  mild edema and erythema. Unchanged since last visit.

## 2020-10-27 ENCOUNTER — IMMUNIZATION (OUTPATIENT)
Dept: PHARMACY | Facility: CLINIC | Age: 70
End: 2020-10-27
Payer: MEDICARE

## 2020-11-06 ENCOUNTER — TELEPHONE (OUTPATIENT)
Dept: FAMILY MEDICINE | Facility: CLINIC | Age: 70
End: 2020-11-06

## 2020-11-06 ENCOUNTER — PATIENT OUTREACH (OUTPATIENT)
Dept: OTHER | Facility: OTHER | Age: 70
End: 2020-11-06

## 2020-11-06 DIAGNOSIS — I10 ESSENTIAL HYPERTENSION: Primary | ICD-10-CM

## 2020-11-06 RX ORDER — LOSARTAN POTASSIUM 50 MG/1
TABLET ORAL
Qty: 90 TABLET | Refills: 1 | Status: SHIPPED | OUTPATIENT
Start: 2020-11-06 | End: 2021-01-08 | Stop reason: SDUPTHER

## 2020-11-06 NOTE — TELEPHONE ENCOUNTER
Lm advising patient to contact office to schedule a appointment for next week with Dr. Hauser if she's experiencing acute chest pain go to the ER as soon as possible.

## 2020-11-06 NOTE — TELEPHONE ENCOUNTER
Bebeto PAGE [facility] Staff   Caller: [phone]/ patient (Today,  2:30 PM)             Patient returning your call   Please advise      I spoke with the pt and scheduled an appt for Tuesday nov 10

## 2020-11-06 NOTE — TELEPHONE ENCOUNTER
Please call and schedule her an appointment next week because she is having chest pain.  If she has acute chest pain suggest emergency room.

## 2020-11-06 NOTE — PROGRESS NOTES
Digital Medicine: Clinician Follow-Up    Called patient to discuss her elevated readings.    The history is provided by the patient.   Follow-up reason(s): routine follow up.     Hypertension    Readings are trending up   Patient is not experiencing signs/symptoms of hypotension.  Patient is experiencing signs/symptoms of hypertension. Chest pain from acid reflux.      Additional Follow-up details: Her chest pain goes away after a couple of hours. It is not upon exertion and at rest. She has suffered with acid reflux. She was on Protonix and stopped. The chest pain started when it was stopped. She resumed Protonix a week ago and she has experienced chest pain since starting.           Last 5 Patient Entered Readings                                      Current 30 Day Average: 138/75     Recent Readings 11/4/2020 10/27/2020 10/27/2020 10/19/2020 10/18/2020    SBP (mmHg) 136 142 157 133 154    DBP (mmHg) 62 74 81 76 88    Pulse 91 79 82 75 78                 Depression Screening  Did not address depression screening.    Sleep Apnea Screening    Did not address sleep apnea screening.     Medication Affordability Screening  Did not address medication affordability screening.     Medication Adherence-Medication adherence was assessed.          ASSESSMENT(S)  Patients BP average is 138/75 mmHg, which is above goal. Patient's BP goal is less than or equal to 130/80.     BP could be elevated due to pain and stress. She has gained twenty to thirty pounds due to COVID.      Hypertension Plan  Hypertension Medication Change. Increase losartan to 50mg daily.  Await MD intervention. Sent PCP a message regarding her chest pain.       Addressed patient questions and patient has my contact information if needed prior to next outreach. Patient verbalizes understanding.             There are no preventive care reminders to display for this patient.  There are no preventive care reminders to display for this  patient.      Hypertension Medications             furosemide (LASIX) 40 MG tablet TAKE 1 TABLET BY MOUTH UP TO TWO TIMES A DAY IF SWELLING OCCURS    losartan (COZAAR) 25 MG tablet Take 1 tablet (25 mg total) by mouth once daily. For blood pressure   ( dc 50 mg dose)

## 2020-11-10 ENCOUNTER — LAB VISIT (OUTPATIENT)
Dept: LAB | Facility: HOSPITAL | Age: 70
End: 2020-11-10
Attending: FAMILY MEDICINE
Payer: MEDICARE

## 2020-11-10 ENCOUNTER — OFFICE VISIT (OUTPATIENT)
Dept: FAMILY MEDICINE | Facility: CLINIC | Age: 70
End: 2020-11-10
Payer: MEDICARE

## 2020-11-10 VITALS
HEIGHT: 62 IN | BODY MASS INDEX: 42.82 KG/M2 | HEART RATE: 109 BPM | OXYGEN SATURATION: 96 % | SYSTOLIC BLOOD PRESSURE: 124 MMHG | DIASTOLIC BLOOD PRESSURE: 74 MMHG | WEIGHT: 232.69 LBS | TEMPERATURE: 98 F

## 2020-11-10 DIAGNOSIS — R73.9 HYPERGLYCEMIA: ICD-10-CM

## 2020-11-10 DIAGNOSIS — K21.9 MILD ACID REFLUX: ICD-10-CM

## 2020-11-10 DIAGNOSIS — E78.49 OTHER HYPERLIPIDEMIA: ICD-10-CM

## 2020-11-10 DIAGNOSIS — R60.0 BILATERAL LEG EDEMA: ICD-10-CM

## 2020-11-10 DIAGNOSIS — I10 ESSENTIAL HYPERTENSION: ICD-10-CM

## 2020-11-10 DIAGNOSIS — E03.9 HYPOTHYROIDISM, UNSPECIFIED TYPE: ICD-10-CM

## 2020-11-10 DIAGNOSIS — E78.2 MIXED HYPERLIPIDEMIA: ICD-10-CM

## 2020-11-10 DIAGNOSIS — R73.9 HYPERGLYCEMIA: Primary | ICD-10-CM

## 2020-11-10 LAB
ALBUMIN SERPL BCP-MCNC: 4.2 G/DL (ref 3.5–5.2)
ALP SERPL-CCNC: 78 U/L (ref 38–126)
ALT SERPL W/O P-5'-P-CCNC: 23 U/L (ref 10–44)
ANION GAP SERPL CALC-SCNC: 5 MMOL/L (ref 8–16)
AST SERPL-CCNC: 27 U/L (ref 15–46)
BASOPHILS # BLD AUTO: 0.04 K/UL (ref 0–0.2)
BASOPHILS NFR BLD: 0.7 % (ref 0–1.9)
BILIRUB SERPL-MCNC: 0.3 MG/DL (ref 0.1–1)
BUN SERPL-MCNC: 23 MG/DL (ref 7–17)
CALCIUM SERPL-MCNC: 9.2 MG/DL (ref 8.7–10.5)
CHLORIDE SERPL-SCNC: 101 MMOL/L (ref 95–110)
CHOLEST SERPL-MCNC: 158 MG/DL (ref 120–199)
CHOLEST/HDLC SERPL: 2.6 {RATIO} (ref 2–5)
CO2 SERPL-SCNC: 36 MMOL/L (ref 23–29)
CREAT SERPL-MCNC: 0.74 MG/DL (ref 0.5–1.4)
DIFFERENTIAL METHOD: ABNORMAL
EOSINOPHIL # BLD AUTO: 0.2 K/UL (ref 0–0.5)
EOSINOPHIL NFR BLD: 4 % (ref 0–8)
ERYTHROCYTE [DISTWIDTH] IN BLOOD BY AUTOMATED COUNT: 12.6 % (ref 11.5–14.5)
EST. GFR  (AFRICAN AMERICAN): >60 ML/MIN/1.73 M^2
EST. GFR  (NON AFRICAN AMERICAN): >60 ML/MIN/1.73 M^2
ESTIMATED AVG GLUCOSE: 126 MG/DL (ref 68–131)
GLUCOSE SERPL-MCNC: 115 MG/DL (ref 70–110)
HBA1C MFR BLD HPLC: 6 % (ref 4–5.6)
HCT VFR BLD AUTO: 43.1 % (ref 37–48.5)
HDLC SERPL-MCNC: 61 MG/DL (ref 40–75)
HDLC SERPL: 38.6 % (ref 20–50)
HGB BLD-MCNC: 13.4 G/DL (ref 12–16)
IMM GRANULOCYTES # BLD AUTO: 0.02 K/UL (ref 0–0.04)
IMM GRANULOCYTES NFR BLD AUTO: 0.3 % (ref 0–0.5)
LDLC SERPL CALC-MCNC: 69.2 MG/DL (ref 63–159)
LYMPHOCYTES # BLD AUTO: 2.4 K/UL (ref 1–4.8)
LYMPHOCYTES NFR BLD: 40.7 % (ref 18–48)
MCH RBC QN AUTO: 30.2 PG (ref 27–31)
MCHC RBC AUTO-ENTMCNC: 31.1 G/DL (ref 32–36)
MCV RBC AUTO: 97 FL (ref 82–98)
MONOCYTES # BLD AUTO: 0.5 K/UL (ref 0.3–1)
MONOCYTES NFR BLD: 7.7 % (ref 4–15)
NEUTROPHILS # BLD AUTO: 2.8 K/UL (ref 1.8–7.7)
NEUTROPHILS NFR BLD: 46.6 % (ref 38–73)
NONHDLC SERPL-MCNC: 97 MG/DL
NRBC BLD-RTO: 0 /100 WBC
NT-PROBNP: 44 PG/ML (ref 5–900)
PLATELET # BLD AUTO: 319 K/UL (ref 150–350)
PMV BLD AUTO: 10.2 FL (ref 9.2–12.9)
POTASSIUM SERPL-SCNC: 4.2 MMOL/L (ref 3.5–5.1)
PROT SERPL-MCNC: 6.9 G/DL (ref 6–8.4)
RBC # BLD AUTO: 4.43 M/UL (ref 4–5.4)
SODIUM SERPL-SCNC: 142 MMOL/L (ref 136–145)
T4 FREE SERPL-MCNC: 0.86 NG/DL (ref 0.71–1.51)
TRIGL SERPL-MCNC: 139 MG/DL (ref 30–150)
TSH SERPL DL<=0.005 MIU/L-ACNC: 2.11 UIU/ML (ref 0.4–4)
WBC # BLD AUTO: 5.99 K/UL (ref 3.9–12.7)

## 2020-11-10 PROCEDURE — 80053 COMPREHEN METABOLIC PANEL: CPT | Mod: PO

## 2020-11-10 PROCEDURE — 80061 LIPID PANEL: CPT

## 2020-11-10 PROCEDURE — 99214 PR OFFICE/OUTPT VISIT, EST, LEVL IV, 30-39 MIN: ICD-10-PCS | Mod: S$GLB,,, | Performed by: FAMILY MEDICINE

## 2020-11-10 PROCEDURE — 99214 OFFICE O/P EST MOD 30 MIN: CPT | Mod: S$GLB,,, | Performed by: FAMILY MEDICINE

## 2020-11-10 PROCEDURE — 84439 ASSAY OF FREE THYROXINE: CPT

## 2020-11-10 PROCEDURE — 85025 COMPLETE CBC W/AUTO DIFF WBC: CPT | Mod: PO

## 2020-11-10 PROCEDURE — 83880 ASSAY OF NATRIURETIC PEPTIDE: CPT | Mod: PO

## 2020-11-10 PROCEDURE — 83036 HEMOGLOBIN GLYCOSYLATED A1C: CPT

## 2020-11-10 PROCEDURE — 36415 COLL VENOUS BLD VENIPUNCTURE: CPT | Mod: PO

## 2020-11-10 PROCEDURE — 84443 ASSAY THYROID STIM HORMONE: CPT | Mod: PO

## 2020-11-10 RX ORDER — IMIPRAMINE HYDROCHLORIDE 25 MG/1
25 TABLET, FILM COATED ORAL NIGHTLY
Qty: 30 TABLET | Refills: 2 | Status: SHIPPED | OUTPATIENT
Start: 2020-11-10 | End: 2021-02-05

## 2020-11-10 RX ORDER — POTASSIUM CHLORIDE 20 MEQ/1
20 TABLET, EXTENDED RELEASE ORAL DAILY
Qty: 90 TABLET | Refills: 3 | Status: SHIPPED | OUTPATIENT
Start: 2020-11-10 | End: 2021-11-08

## 2020-11-10 NOTE — PROGRESS NOTES
" Patient ID: Hannah Norman is a 70 y.o. female.    Chief Complaint: Hypertension and Abdominal Pain    HPI      Hannah Norman is a 70 y.o. female for follow-up visit.  Patient follow-up for hyperglycemia, hypertension, lipidemia, reflux, hypothyroidism bilateral leg edema.  Previous conditions stable this time.  Patient does complain of some mild intermittent abdominal pain not associated with meals.  No fever chills.    Vitals:    11/10/20 0801   BP: 124/74   Pulse: 109   Temp: 98.3 °F (36.8 °C)   SpO2: 96%   Weight: 105.6 kg (232 lb 11.1 oz)   Height: 5' 2" (1.575 m)            Review of Symptoms    Constitutional  Neg activity change, No chills /fever   Resp  Neg hemoptysis, stridor, choking  CVS  Neg chest pain, palpitations    Physical Exam    Constitutional:  Oriented to person, place, and time.appears well-developed and well-nourished.  No distress.      HENT  Head: Normocephalic and atraumatic  Right Ear: External ear normal.   Left Ear: External ear normal.   Nose: External nose normal.   Mouth:  Moist mucus membranes.    Eyes:  Conjunctivae are normal. Right eye exhibits no discharge.  Left eye exhibits no discharge. No scleral icterus.  No periorbital edema    Cardiovascular:  Regular rate and rhythm with normal S1 and S2     Pulmonary/Chest:   Clear to auscultation bilaterally without wheezes, rhonchi or rales      Musculoskeletal:   2+ leg edema. No obvious deformity No wasting       Neurological:  Alert and oriented to person, place, and time.   Coordination normal.     Skin:   Skin is warm and dry.  No diaphoresis.   No rash noted.     Psychiatric: Normal mood and affect. Behavior is normal.  Judgment and thought content normal.     Complete Blood Count  Lab Results   Component Value Date    RBC 4.47 12/04/2019    HGB 13.6 12/04/2019    HCT 43.8 12/04/2019    MCV 98 12/04/2019    MCH 30.4 12/04/2019    MCHC 31.1 (L) 12/04/2019    RDW 13.6 12/04/2019     (H) 12/04/2019    MPV 9.9 12/04/2019    " GRAN 2.2 12/04/2019    GRAN 39.9 12/04/2019    LYMPH 2.6 12/04/2019    LYMPH 46.1 12/04/2019    MONO 0.5 12/04/2019    MONO 8.7 12/04/2019    EOS 0.3 12/04/2019    BASO 0.03 12/04/2019    EOSINOPHIL 4.8 12/04/2019    BASOPHIL 0.5 12/04/2019    DIFFMETHOD Automated 12/04/2019       Comprehensive Metabolic Panel  No results found for: GLU, BUN, CREATININE, NA, K, CL, PROT, ALBUMIN, BILITOT, AST, ALKPHOS, CO2, ALT, ANIONGAP, EGFRNONAA, ESTGFRAFRICA    TSH  No results found for: TSH    Assessment / Plan:      ICD-10-CM ICD-9-CM   1. Hyperglycemia  R73.9 790.29   2. Essential hypertension  I10 401.9   3. Other hyperlipidemia  E78.49 272.4   4. Mild acid reflux  K21.9 530.81   5. Hypothyroidism, unspecified type  E03.9 244.9   6. Mixed hyperlipidemia  E78.2 272.2   7. Bilateral leg edema  R60.0 782.3     Hyperglycemia  -     Comprehensive Metabolic Panel; Future  -     CBC Auto Differential; Future  -     Lipid Panel; Future  -     TSH; Future  -     T4, Free; Future; Expected date: 11/10/2020  -     Hemoglobin A1C; Future; Expected date: 11/10/2020    Essential hypertension  -     Comprehensive Metabolic Panel; Future  -     CBC Auto Differential; Future  -     Lipid Panel; Future  -     TSH; Future  -     T4, Free; Future; Expected date: 11/10/2020  -     BNP; Future; Expected date: 11/10/2020    Other hyperlipidemia  -     Comprehensive Metabolic Panel; Future  -     CBC Auto Differential; Future  -     Lipid Panel; Future  -     TSH; Future  -     T4, Free; Future; Expected date: 11/10/2020  -     BNP; Future; Expected date: 11/10/2020    Mild acid reflux  -     Comprehensive Metabolic Panel; Future  -     CBC Auto Differential; Future  -     Lipid Panel; Future  -     TSH; Future  -     T4, Free; Future; Expected date: 11/10/2020    Hypothyroidism, unspecified type  -     Comprehensive Metabolic Panel; Future  -     CBC Auto Differential; Future  -     Lipid Panel; Future  -     TSH; Future  -     T4, Free; Future;  Expected date: 11/10/2020    Mixed hyperlipidemia  -     Comprehensive Metabolic Panel; Future  -     CBC Auto Differential; Future  -     Lipid Panel; Future  -     TSH; Future  -     T4, Free; Future; Expected date: 11/10/2020    Bilateral leg edema  -     BNP; Future; Expected date: 11/10/2020    Other orders  -     imipramine (TOFRANIL) 25 MG tablet; Take 1 tablet (25 mg total) by mouth every evening. To help calm stomach  Dispense: 30 tablet; Refill: 2  -     potassium chloride SA (K-DUR,KLOR-CON) 20 MEQ tablet; Take 1 tablet (20 mEq total) by mouth once daily.  Dispense: 90 tablet; Refill: 3

## 2020-11-17 ENCOUNTER — PATIENT OUTREACH (OUTPATIENT)
Dept: OTHER | Facility: OTHER | Age: 70
End: 2020-11-17

## 2020-11-17 NOTE — PROGRESS NOTES
Digital Medicine: Health  Follow-Up    The history is provided by the patient.             Reason for review: Blood pressure not at goal        Topics Covered on Call: physical activity and Diet    Additional Follow-up details: Follow up completed with the patient. She stated that she is doing well overall. She has experience recent issues with fluid retention and weight gain which she attributes to her fluctuating BP readings. She informed me that her Losartan was increased back on 11/6 by her physician and that she is doing well on the change so far.             Diet-Change        Intervention(s): DASH diet/sodium reduction education  Additional diet details: Patient stated that she has not had any changes in her diet that would cause her sodium intake to increase. She has always been very careful with the things she eats. Her physician did suggest that she keep a close eye on the carb intake and to increase her protein intake. She mentioned that she eats fish, chicken and pork as her source of protein. She did mention that she is aware that pork has a higher sodium content and we reviewed the recommendations for low sodium options. I also encouraged her to reach out to me if she has questions about any items she is eating if she is unsure if it is a good, low sodium option. Patient was very appreciative of this assistance and she stated that she will reach out as needed.     Physical Activity-no change to routine  No change to exercise routine.       Additional physical activity details: Patient is still unable to walk and exercise due to mobility issues. I suggested that she try chair exercises to help keep her moving and she stated that she does do some type of chair exercises and that she will look into increasing this within her limits.       Medication Adherence-Medication adherence was assessed.      Substance, Sleep, Stress-Not assessed      Continue current diet/physical activity routine.  Provided  patient education.       Addressed patient questions and patient has my contact information if needed prior to next outreach. Patient verbalizes understanding.      Explained the importance of self-monitoring and medication adherence. Encouraged the patient to communicate with their health  for lifestyle modifications to help improve or maintain a healthy lifestyle.               There are no preventive care reminders to display for this patient.      Last 5 Patient Entered Readings                                      Current 30 Day Average: 140/77     Recent Readings 11/15/2020 11/6/2020 11/4/2020 10/27/2020 10/27/2020    SBP (mmHg) 134 141 136 142 157    DBP (mmHg) 78 75 62 74 81    Pulse 92 93 91 79 82

## 2020-11-20 ENCOUNTER — PATIENT OUTREACH (OUTPATIENT)
Dept: OTHER | Facility: OTHER | Age: 70
End: 2020-11-20

## 2020-11-20 NOTE — PROGRESS NOTES
Digital Medicine: Clinician Follow-Up    Called patient to discuss how she is tolerating the higher losartan dose.    The history is provided by the patient.   Follow-up reason(s): medication change follow-up.     Hypertension    Readings are trending down due to medication adherence.       Patient is not experiencing signs/symptoms of hypotension.  Patient is not experiencing signs/symptoms of hypertension.      Additional Follow-up details: Patient still has check pain but she feels it is related to acid reflux.    Patient did make medication change.    Is patient tolerating med change? yes            Last 5 Patient Entered Readings                                      Current 30 Day Average: 138/74     Recent Readings 11/19/2020 11/15/2020 11/6/2020 11/4/2020 10/27/2020    SBP (mmHg) 135 134 141 136 142    DBP (mmHg) 74 78 75 62 74    Pulse 97 92 93 91 79                 Depression Screening  Did not address depression screening.    Sleep Apnea Screening    Did not address sleep apnea screening.     Medication Affordability Screening  Did not address medication affordability screening.     Medication Adherence-Medication adherence was assessed.          ASSESSMENT(S)  Patients BP average is 138/74 mmHg, which is above goal. Patient's BP goal is less than or equal to 130/80.     BP is looking much improved since increasing losartan.      Hypertension Plan  Continue current therapy.       Addressed patient questions and patient has my contact information if needed prior to next outreach. Patient verbalizes understanding.             There are no preventive care reminders to display for this patient.  There are no preventive care reminders to display for this patient.      Hypertension Medications             furosemide (LASIX) 40 MG tablet TAKE 1 TABLET BY MOUTH UP TO TWO TIMES A DAY IF SWELLING OCCURS    losartan (COZAAR) 50 MG tablet For blood pressure

## 2020-11-30 DIAGNOSIS — G25.81 RLS (RESTLESS LEGS SYNDROME): ICD-10-CM

## 2020-11-30 RX ORDER — CLONAZEPAM 0.5 MG/1
TABLET ORAL
Qty: 60 TABLET | Refills: 5 | Status: SHIPPED | OUTPATIENT
Start: 2020-11-30 | End: 2021-05-27 | Stop reason: SDUPTHER

## 2020-12-01 ENCOUNTER — PATIENT MESSAGE (OUTPATIENT)
Dept: ADMINISTRATIVE | Facility: OTHER | Age: 70
End: 2020-12-01

## 2020-12-10 RX ORDER — FUROSEMIDE 40 MG/1
TABLET ORAL
Qty: 60 TABLET | Refills: 2 | Status: SHIPPED | OUTPATIENT
Start: 2020-12-10 | End: 2021-04-25

## 2020-12-24 ENCOUNTER — PATIENT MESSAGE (OUTPATIENT)
Dept: OTOLARYNGOLOGY | Facility: CLINIC | Age: 70
End: 2020-12-24

## 2021-01-11 ENCOUNTER — PATIENT OUTREACH (OUTPATIENT)
Dept: OTHER | Facility: OTHER | Age: 71
End: 2021-01-11

## 2021-01-13 ENCOUNTER — PATIENT OUTREACH (OUTPATIENT)
Dept: ADMINISTRATIVE | Facility: OTHER | Age: 71
End: 2021-01-13

## 2021-01-13 DIAGNOSIS — Z12.31 ENCOUNTER FOR SCREENING MAMMOGRAM FOR MALIGNANT NEOPLASM OF BREAST: Primary | ICD-10-CM

## 2021-01-15 ENCOUNTER — OFFICE VISIT (OUTPATIENT)
Dept: ORTHOPEDICS | Facility: CLINIC | Age: 71
End: 2021-01-15
Payer: MEDICARE

## 2021-01-15 VITALS — BODY MASS INDEX: 42.84 KG/M2 | HEIGHT: 62 IN | WEIGHT: 232.81 LBS

## 2021-01-15 DIAGNOSIS — M17.11 PRIMARY OSTEOARTHRITIS OF RIGHT KNEE: Primary | ICD-10-CM

## 2021-01-15 PROCEDURE — 99999 PR PBB SHADOW E&M-EST. PATIENT-LVL III: CPT | Mod: PBBFAC,,, | Performed by: ORTHOPAEDIC SURGERY

## 2021-01-15 PROCEDURE — 99213 OFFICE O/P EST LOW 20 MIN: CPT | Mod: PBBFAC,PN | Performed by: ORTHOPAEDIC SURGERY

## 2021-01-15 PROCEDURE — 20610 DRAIN/INJ JOINT/BURSA W/O US: CPT | Mod: S$PBB,RT,, | Performed by: ORTHOPAEDIC SURGERY

## 2021-01-15 PROCEDURE — 20610 PR DRAIN/INJECT LARGE JOINT/BURSA: ICD-10-PCS | Mod: S$PBB,RT,, | Performed by: ORTHOPAEDIC SURGERY

## 2021-01-15 PROCEDURE — 99999 PR PBB SHADOW E&M-EST. PATIENT-LVL III: ICD-10-PCS | Mod: PBBFAC,,, | Performed by: ORTHOPAEDIC SURGERY

## 2021-01-15 PROCEDURE — 99499 UNLISTED E&M SERVICE: CPT | Mod: S$PBB,,, | Performed by: ORTHOPAEDIC SURGERY

## 2021-01-15 PROCEDURE — 99499 NO LOS: ICD-10-PCS | Mod: S$PBB,,, | Performed by: ORTHOPAEDIC SURGERY

## 2021-01-15 RX ORDER — TRIAMCINOLONE ACETONIDE 40 MG/ML
40 INJECTION, SUSPENSION INTRA-ARTICULAR; INTRAMUSCULAR
Status: DISCONTINUED | OUTPATIENT
Start: 2021-01-15 | End: 2021-01-15 | Stop reason: HOSPADM

## 2021-01-15 RX ADMIN — TRIAMCINOLONE ACETONIDE 40 MG: 40 INJECTION, SUSPENSION INTRA-ARTICULAR; INTRAMUSCULAR at 11:01

## 2021-01-27 ENCOUNTER — HOSPITAL ENCOUNTER (OUTPATIENT)
Dept: RADIOLOGY | Facility: HOSPITAL | Age: 71
Discharge: HOME | End: 2021-01-27
Attending: FAMILY MEDICINE
Payer: MEDICARE

## 2021-01-27 DIAGNOSIS — Z12.31 ENCOUNTER FOR SCREENING MAMMOGRAM FOR MALIGNANT NEOPLASM OF BREAST: ICD-10-CM

## 2021-01-27 PROCEDURE — 77067 SCR MAMMO BI INCL CAD: CPT | Mod: TC,PO

## 2021-02-05 RX ORDER — IMIPRAMINE HYDROCHLORIDE 25 MG/1
TABLET, FILM COATED ORAL
Qty: 30 TABLET | Refills: 2 | Status: SHIPPED | OUTPATIENT
Start: 2021-02-05 | End: 2021-05-10

## 2021-02-09 DIAGNOSIS — G25.81 RESTLESS LEG SYNDROME: ICD-10-CM

## 2021-02-09 DIAGNOSIS — M79.7 FIBROMYALGIA: ICD-10-CM

## 2021-02-10 RX ORDER — GABAPENTIN 600 MG/1
TABLET ORAL
Qty: 90 TABLET | Refills: 3 | Status: SHIPPED | OUTPATIENT
Start: 2021-02-10 | End: 2021-06-10

## 2021-03-02 RX ORDER — LOVASTATIN 20 MG/1
TABLET ORAL
Qty: 90 TABLET | Refills: 3 | Status: SHIPPED | OUTPATIENT
Start: 2021-03-02 | End: 2022-05-13 | Stop reason: SDUPTHER

## 2021-04-06 ENCOUNTER — TELEPHONE (OUTPATIENT)
Dept: FAMILY MEDICINE | Facility: CLINIC | Age: 71
End: 2021-04-06

## 2021-04-06 ENCOUNTER — OFFICE VISIT (OUTPATIENT)
Dept: FAMILY MEDICINE | Facility: CLINIC | Age: 71
End: 2021-04-06
Payer: MEDICARE

## 2021-04-06 VITALS
DIASTOLIC BLOOD PRESSURE: 80 MMHG | WEIGHT: 240.5 LBS | BODY MASS INDEX: 44.26 KG/M2 | SYSTOLIC BLOOD PRESSURE: 120 MMHG | TEMPERATURE: 98 F | HEART RATE: 102 BPM | HEIGHT: 62 IN | OXYGEN SATURATION: 98 %

## 2021-04-06 DIAGNOSIS — F39 UNSPECIFIED MOOD (AFFECTIVE) DISORDER: ICD-10-CM

## 2021-04-06 DIAGNOSIS — I77.1 TORTUOUS AORTA: ICD-10-CM

## 2021-04-06 DIAGNOSIS — I10 ESSENTIAL HYPERTENSION: ICD-10-CM

## 2021-04-06 DIAGNOSIS — M79.7 FIBROMYALGIA: ICD-10-CM

## 2021-04-06 DIAGNOSIS — R73.9 HYPERGLYCEMIA: ICD-10-CM

## 2021-04-06 DIAGNOSIS — M46.1 SACROILIITIS, NOT ELSEWHERE CLASSIFIED: ICD-10-CM

## 2021-04-06 DIAGNOSIS — R06.2 WHEEZING: ICD-10-CM

## 2021-04-06 DIAGNOSIS — Z01.818 PRE-OP EVALUATION: Primary | ICD-10-CM

## 2021-04-06 DIAGNOSIS — E66.01 MORBID OBESITY WITH BMI OF 40.0-44.9, ADULT: ICD-10-CM

## 2021-04-06 DIAGNOSIS — G47.33 OSA (OBSTRUCTIVE SLEEP APNEA): ICD-10-CM

## 2021-04-06 DIAGNOSIS — R05.9 COUGH: ICD-10-CM

## 2021-04-06 DIAGNOSIS — E03.9 HYPOTHYROIDISM, UNSPECIFIED TYPE: ICD-10-CM

## 2021-04-06 DIAGNOSIS — J40 BRONCHITIS: ICD-10-CM

## 2021-04-06 PROCEDURE — 99214 OFFICE O/P EST MOD 30 MIN: CPT | Mod: S$GLB,,, | Performed by: FAMILY MEDICINE

## 2021-04-06 PROCEDURE — 99214 PR OFFICE/OUTPT VISIT, EST, LEVL IV, 30-39 MIN: ICD-10-PCS | Mod: S$GLB,,, | Performed by: FAMILY MEDICINE

## 2021-04-06 RX ORDER — VENLAFAXINE HYDROCHLORIDE 150 MG/1
150 CAPSULE, EXTENDED RELEASE ORAL DAILY
Qty: 180 CAPSULE | Refills: 1 | Status: SHIPPED | OUTPATIENT
Start: 2021-04-06 | End: 2021-11-15 | Stop reason: SDUPTHER

## 2021-04-06 RX ORDER — ALBUTEROL SULFATE 90 UG/1
2 AEROSOL, METERED RESPIRATORY (INHALATION) EVERY 6 HOURS PRN
Qty: 18 G | Refills: 0 | Status: SHIPPED | OUTPATIENT
Start: 2021-04-06 | End: 2021-12-28 | Stop reason: SDUPTHER

## 2021-04-06 RX ORDER — MOMETASONE FUROATE 1 MG/G
OINTMENT TOPICAL DAILY
Qty: 45 G | Refills: 1 | Status: SHIPPED | OUTPATIENT
Start: 2021-04-06 | End: 2022-08-11

## 2021-04-13 ENCOUNTER — OFFICE VISIT (OUTPATIENT)
Dept: ORTHOPEDICS | Facility: CLINIC | Age: 71
End: 2021-04-13
Payer: MEDICARE

## 2021-04-13 VITALS — WEIGHT: 240.5 LBS | HEIGHT: 62 IN | BODY MASS INDEX: 44.26 KG/M2

## 2021-04-13 DIAGNOSIS — M17.11 PRIMARY OSTEOARTHRITIS OF RIGHT KNEE: Primary | ICD-10-CM

## 2021-04-13 PROCEDURE — 99213 OFFICE O/P EST LOW 20 MIN: CPT | Mod: S$PBB,,, | Performed by: PHYSICIAN ASSISTANT

## 2021-04-13 PROCEDURE — 99999 PR PBB SHADOW E&M-EST. PATIENT-LVL IV: CPT | Mod: PBBFAC,,, | Performed by: PHYSICIAN ASSISTANT

## 2021-04-13 PROCEDURE — 99214 OFFICE O/P EST MOD 30 MIN: CPT | Mod: PBBFAC,PN | Performed by: PHYSICIAN ASSISTANT

## 2021-04-13 PROCEDURE — 99213 PR OFFICE/OUTPT VISIT, EST, LEVL III, 20-29 MIN: ICD-10-PCS | Mod: S$PBB,,, | Performed by: PHYSICIAN ASSISTANT

## 2021-04-13 PROCEDURE — 99999 PR PBB SHADOW E&M-EST. PATIENT-LVL IV: ICD-10-PCS | Mod: PBBFAC,,, | Performed by: PHYSICIAN ASSISTANT

## 2021-04-20 RX ORDER — PANTOPRAZOLE SODIUM 40 MG/1
40 TABLET, DELAYED RELEASE ORAL DAILY
Qty: 30 TABLET | Refills: 3 | Status: CANCELLED | OUTPATIENT
Start: 2021-04-20

## 2021-04-25 RX ORDER — FUROSEMIDE 40 MG/1
TABLET ORAL
Qty: 60 TABLET | Refills: 2 | Status: SHIPPED | OUTPATIENT
Start: 2021-04-25 | End: 2021-12-26

## 2021-04-26 ENCOUNTER — LAB VISIT (OUTPATIENT)
Dept: LAB | Facility: HOSPITAL | Age: 71
End: 2021-04-26
Attending: FAMILY MEDICINE
Payer: MEDICARE

## 2021-04-26 DIAGNOSIS — G47.33 OSA (OBSTRUCTIVE SLEEP APNEA): ICD-10-CM

## 2021-04-26 DIAGNOSIS — I10 ESSENTIAL HYPERTENSION: ICD-10-CM

## 2021-04-26 DIAGNOSIS — E03.9 HYPOTHYROIDISM, UNSPECIFIED TYPE: ICD-10-CM

## 2021-04-26 DIAGNOSIS — R06.2 WHEEZING: ICD-10-CM

## 2021-04-26 DIAGNOSIS — E66.01 MORBID OBESITY WITH BMI OF 40.0-44.9, ADULT: ICD-10-CM

## 2021-04-26 DIAGNOSIS — R73.9 HYPERGLYCEMIA: ICD-10-CM

## 2021-04-26 LAB
ALBUMIN SERPL BCP-MCNC: 4.4 G/DL (ref 3.5–5.2)
ALP SERPL-CCNC: 96 U/L (ref 38–126)
ALT SERPL W/O P-5'-P-CCNC: 20 U/L (ref 10–44)
ANION GAP SERPL CALC-SCNC: 12 MMOL/L (ref 8–16)
AST SERPL-CCNC: 23 U/L (ref 15–46)
BILIRUB SERPL-MCNC: 0.4 MG/DL (ref 0.1–1)
CALCIUM SERPL-MCNC: 9 MG/DL (ref 8.7–10.5)
CHLORIDE SERPL-SCNC: 100 MMOL/L (ref 95–110)
CO2 SERPL-SCNC: 30 MMOL/L (ref 23–29)
CREAT SERPL-MCNC: 0.7 MG/DL (ref 0.5–1.4)
EST. GFR  (AFRICAN AMERICAN): >60 ML/MIN/1.73 M^2
EST. GFR  (NON AFRICAN AMERICAN): >60 ML/MIN/1.73 M^2
ESTIMATED AVG GLUCOSE: 123 MG/DL (ref 68–131)
GLUCOSE SERPL-MCNC: 141 MG/DL (ref 70–110)
HBA1C MFR BLD: 5.9 % (ref 4–5.6)
POTASSIUM SERPL-SCNC: 3.6 MMOL/L (ref 3.5–5.1)
PROT SERPL-MCNC: 7.2 G/DL (ref 6–8.4)
SODIUM SERPL-SCNC: 142 MMOL/L (ref 136–145)
T4 FREE SERPL-MCNC: 0.86 NG/DL (ref 0.71–1.51)
TSH SERPL DL<=0.005 MIU/L-ACNC: 2.25 UIU/ML (ref 0.4–4)
UUN UR-MCNC: 15 MG/DL (ref 7–17)

## 2021-04-26 PROCEDURE — 36415 COLL VENOUS BLD VENIPUNCTURE: CPT | Mod: PO | Performed by: FAMILY MEDICINE

## 2021-04-26 PROCEDURE — 84443 ASSAY THYROID STIM HORMONE: CPT | Mod: PO | Performed by: FAMILY MEDICINE

## 2021-04-26 PROCEDURE — 84439 ASSAY OF FREE THYROXINE: CPT | Performed by: FAMILY MEDICINE

## 2021-04-26 PROCEDURE — 83036 HEMOGLOBIN GLYCOSYLATED A1C: CPT | Performed by: FAMILY MEDICINE

## 2021-04-26 PROCEDURE — 80053 COMPREHEN METABOLIC PANEL: CPT | Mod: PO | Performed by: FAMILY MEDICINE

## 2021-05-10 ENCOUNTER — OFFICE VISIT (OUTPATIENT)
Dept: FAMILY MEDICINE | Facility: CLINIC | Age: 71
End: 2021-05-10
Payer: MEDICARE

## 2021-05-10 VITALS
DIASTOLIC BLOOD PRESSURE: 80 MMHG | SYSTOLIC BLOOD PRESSURE: 130 MMHG | BODY MASS INDEX: 44.59 KG/M2 | OXYGEN SATURATION: 95 % | TEMPERATURE: 98 F | HEIGHT: 62 IN | HEART RATE: 100 BPM | WEIGHT: 242.31 LBS

## 2021-05-10 DIAGNOSIS — G47.33 OSA (OBSTRUCTIVE SLEEP APNEA): ICD-10-CM

## 2021-05-10 DIAGNOSIS — R73.9 HYPERGLYCEMIA: Primary | ICD-10-CM

## 2021-05-10 DIAGNOSIS — I10 ESSENTIAL HYPERTENSION: ICD-10-CM

## 2021-05-10 DIAGNOSIS — E03.9 HYPOTHYROIDISM, UNSPECIFIED TYPE: ICD-10-CM

## 2021-05-10 PROCEDURE — 99214 PR OFFICE/OUTPT VISIT, EST, LEVL IV, 30-39 MIN: ICD-10-PCS | Mod: S$GLB,,, | Performed by: FAMILY MEDICINE

## 2021-05-10 PROCEDURE — 99214 OFFICE O/P EST MOD 30 MIN: CPT | Mod: S$GLB,,, | Performed by: FAMILY MEDICINE

## 2021-05-10 RX ORDER — IMIPRAMINE HYDROCHLORIDE 25 MG/1
TABLET, FILM COATED ORAL
Qty: 30 TABLET | Refills: 2 | Status: SHIPPED | OUTPATIENT
Start: 2021-05-10 | End: 2021-09-13

## 2021-05-17 ENCOUNTER — PES CALL (OUTPATIENT)
Dept: ADMINISTRATIVE | Facility: CLINIC | Age: 71
End: 2021-05-17

## 2021-05-24 ENCOUNTER — PES CALL (OUTPATIENT)
Dept: ADMINISTRATIVE | Facility: CLINIC | Age: 71
End: 2021-05-24

## 2021-05-26 DIAGNOSIS — G25.81 RLS (RESTLESS LEGS SYNDROME): ICD-10-CM

## 2021-05-27 ENCOUNTER — PATIENT MESSAGE (OUTPATIENT)
Dept: SLEEP MEDICINE | Facility: CLINIC | Age: 71
End: 2021-05-27

## 2021-05-27 ENCOUNTER — TELEPHONE (OUTPATIENT)
Dept: SLEEP MEDICINE | Facility: CLINIC | Age: 71
End: 2021-05-27

## 2021-05-27 RX ORDER — PRAMIPEXOLE DIHYDROCHLORIDE 0.12 MG/1
TABLET ORAL
Qty: 150 TABLET | Refills: 2 | Status: SHIPPED | OUTPATIENT
Start: 2021-05-27 | End: 2021-11-15

## 2021-05-27 RX ORDER — CLONAZEPAM 0.5 MG/1
TABLET ORAL
Qty: 14 TABLET | Refills: 0 | Status: SHIPPED | OUTPATIENT
Start: 2021-05-27 | End: 2021-05-31 | Stop reason: SDUPTHER

## 2021-05-31 ENCOUNTER — TELEPHONE (OUTPATIENT)
Dept: SLEEP MEDICINE | Facility: CLINIC | Age: 71
End: 2021-05-31

## 2021-05-31 DIAGNOSIS — G25.81 RLS (RESTLESS LEGS SYNDROME): ICD-10-CM

## 2021-05-31 RX ORDER — CLONAZEPAM 0.5 MG/1
TABLET ORAL
Qty: 30 TABLET | Refills: 3 | Status: SHIPPED | OUTPATIENT
Start: 2021-05-31 | End: 2021-05-31 | Stop reason: SDUPTHER

## 2021-05-31 RX ORDER — CLONAZEPAM 0.5 MG/1
TABLET ORAL
Qty: 30 TABLET | Refills: 3 | Status: SHIPPED | OUTPATIENT
Start: 2021-05-31 | End: 2021-12-06 | Stop reason: SDUPTHER

## 2021-06-01 RX ORDER — PANTOPRAZOLE SODIUM 40 MG/1
40 TABLET, DELAYED RELEASE ORAL DAILY
Qty: 30 TABLET | Refills: 3 | Status: CANCELLED | OUTPATIENT
Start: 2021-06-01

## 2021-06-09 ENCOUNTER — PES CALL (OUTPATIENT)
Dept: ADMINISTRATIVE | Facility: CLINIC | Age: 71
End: 2021-06-09

## 2021-06-10 DIAGNOSIS — G25.81 RESTLESS LEG SYNDROME: ICD-10-CM

## 2021-06-10 DIAGNOSIS — M79.7 FIBROMYALGIA: ICD-10-CM

## 2021-06-10 RX ORDER — GABAPENTIN 600 MG/1
TABLET ORAL
Qty: 90 TABLET | Refills: 3 | Status: SHIPPED | OUTPATIENT
Start: 2021-06-10 | End: 2021-10-08

## 2021-07-06 ENCOUNTER — OFFICE VISIT (OUTPATIENT)
Dept: CARDIOLOGY | Facility: CLINIC | Age: 71
End: 2021-07-06
Payer: MEDICARE

## 2021-07-06 VITALS
BODY MASS INDEX: 45.54 KG/M2 | HEART RATE: 90 BPM | DIASTOLIC BLOOD PRESSURE: 85 MMHG | WEIGHT: 249 LBS | SYSTOLIC BLOOD PRESSURE: 136 MMHG

## 2021-07-06 DIAGNOSIS — I77.1 TORTUOUS AORTA: ICD-10-CM

## 2021-07-06 DIAGNOSIS — Z01.818 PRE-OP EVALUATION: ICD-10-CM

## 2021-07-06 DIAGNOSIS — E78.5 HYPERLIPIDEMIA, UNSPECIFIED HYPERLIPIDEMIA TYPE: ICD-10-CM

## 2021-07-06 DIAGNOSIS — Z01.810 ENCOUNTER FOR PRE-OPERATIVE CARDIOVASCULAR CLEARANCE: Primary | ICD-10-CM

## 2021-07-06 DIAGNOSIS — I10 ESSENTIAL HYPERTENSION: ICD-10-CM

## 2021-07-06 DIAGNOSIS — R07.9 CHEST PAIN, UNSPECIFIED TYPE: ICD-10-CM

## 2021-07-06 PROCEDURE — 93010 EKG 12-LEAD: ICD-10-PCS | Mod: S$PBB,,, | Performed by: INTERNAL MEDICINE

## 2021-07-06 PROCEDURE — 99205 PR OFFICE/OUTPT VISIT, NEW, LEVL V, 60-74 MIN: ICD-10-PCS | Mod: S$PBB,,, | Performed by: INTERNAL MEDICINE

## 2021-07-06 PROCEDURE — 93005 ELECTROCARDIOGRAM TRACING: CPT | Mod: PBBFAC,PO | Performed by: INTERNAL MEDICINE

## 2021-07-06 PROCEDURE — 93010 ELECTROCARDIOGRAM REPORT: CPT | Mod: S$PBB,,, | Performed by: INTERNAL MEDICINE

## 2021-07-06 PROCEDURE — 99999 PR PBB SHADOW E&M-EST. PATIENT-LVL IV: CPT | Mod: PBBFAC,,, | Performed by: INTERNAL MEDICINE

## 2021-07-06 PROCEDURE — 99214 OFFICE O/P EST MOD 30 MIN: CPT | Mod: PBBFAC,PO | Performed by: INTERNAL MEDICINE

## 2021-07-06 PROCEDURE — 99205 OFFICE O/P NEW HI 60 MIN: CPT | Mod: S$PBB,,, | Performed by: INTERNAL MEDICINE

## 2021-07-06 PROCEDURE — 99999 PR PBB SHADOW E&M-EST. PATIENT-LVL IV: ICD-10-PCS | Mod: PBBFAC,,, | Performed by: INTERNAL MEDICINE

## 2021-07-09 ENCOUNTER — OFFICE VISIT (OUTPATIENT)
Dept: INTERNAL MEDICINE | Facility: CLINIC | Age: 71
End: 2021-07-09
Payer: MEDICARE

## 2021-07-09 VITALS
HEIGHT: 62 IN | WEIGHT: 244.38 LBS | HEART RATE: 85 BPM | DIASTOLIC BLOOD PRESSURE: 72 MMHG | OXYGEN SATURATION: 95 % | BODY MASS INDEX: 44.97 KG/M2 | SYSTOLIC BLOOD PRESSURE: 110 MMHG | RESPIRATION RATE: 18 BRPM

## 2021-07-09 DIAGNOSIS — R26.9 GAIT DISTURBANCE: ICD-10-CM

## 2021-07-09 DIAGNOSIS — G56.01 CARPAL TUNNEL SYNDROME OF RIGHT WRIST: ICD-10-CM

## 2021-07-09 DIAGNOSIS — G47.33 OSA (OBSTRUCTIVE SLEEP APNEA): ICD-10-CM

## 2021-07-09 DIAGNOSIS — M19.90 OTHER TYPE OF OSTEOARTHRITIS, UNSPECIFIED SITE: ICD-10-CM

## 2021-07-09 DIAGNOSIS — G25.0 ESSENTIAL TREMOR: ICD-10-CM

## 2021-07-09 DIAGNOSIS — M46.1 SACROILIITIS, NOT ELSEWHERE CLASSIFIED: ICD-10-CM

## 2021-07-09 DIAGNOSIS — Z00.00 ENCOUNTER FOR PREVENTIVE HEALTH EXAMINATION: Primary | ICD-10-CM

## 2021-07-09 DIAGNOSIS — R73.03 PREDIABETES: ICD-10-CM

## 2021-07-09 DIAGNOSIS — E66.01 MORBID OBESITY WITH BMI OF 40.0-44.9, ADULT: ICD-10-CM

## 2021-07-09 DIAGNOSIS — F39 UNSPECIFIED MOOD (AFFECTIVE) DISORDER: ICD-10-CM

## 2021-07-09 DIAGNOSIS — G89.29 CHRONIC BILATERAL LOW BACK PAIN WITHOUT SCIATICA: ICD-10-CM

## 2021-07-09 DIAGNOSIS — K21.9 LARYNGOPHARYNGEAL REFLUX (LPR): ICD-10-CM

## 2021-07-09 DIAGNOSIS — G25.81 RESTLESS LEG SYNDROME: ICD-10-CM

## 2021-07-09 DIAGNOSIS — I77.1 TORTUOUS AORTA: ICD-10-CM

## 2021-07-09 DIAGNOSIS — G51.0 LEFT-SIDED BELL'S PALSY: ICD-10-CM

## 2021-07-09 DIAGNOSIS — E78.2 MIXED HYPERLIPIDEMIA: ICD-10-CM

## 2021-07-09 DIAGNOSIS — F41.9 ANXIETY DISORDER, UNSPECIFIED TYPE: ICD-10-CM

## 2021-07-09 DIAGNOSIS — M53.86 DISORDER OF LUMBAR SPINE: ICD-10-CM

## 2021-07-09 DIAGNOSIS — M79.7 FIBROMYALGIA: ICD-10-CM

## 2021-07-09 DIAGNOSIS — I10 ESSENTIAL HYPERTENSION: ICD-10-CM

## 2021-07-09 DIAGNOSIS — M54.50 CHRONIC BILATERAL LOW BACK PAIN WITHOUT SCIATICA: ICD-10-CM

## 2021-07-09 PROCEDURE — 99999 PR PBB SHADOW E&M-EST. PATIENT-LVL V: ICD-10-PCS | Mod: PBBFAC,,, | Performed by: NURSE PRACTITIONER

## 2021-07-09 PROCEDURE — G0439 PPPS, SUBSEQ VISIT: HCPCS | Mod: ,,, | Performed by: NURSE PRACTITIONER

## 2021-07-09 PROCEDURE — G0439 PR MEDICARE ANNUAL WELLNESS SUBSEQUENT VISIT: ICD-10-PCS | Mod: ,,, | Performed by: NURSE PRACTITIONER

## 2021-07-09 PROCEDURE — 99999 PR PBB SHADOW E&M-EST. PATIENT-LVL V: CPT | Mod: PBBFAC,,, | Performed by: NURSE PRACTITIONER

## 2021-07-09 PROCEDURE — 99215 OFFICE O/P EST HI 40 MIN: CPT | Mod: PBBFAC,PN | Performed by: NURSE PRACTITIONER

## 2021-07-12 ENCOUNTER — PATIENT MESSAGE (OUTPATIENT)
Dept: CARDIOLOGY | Facility: CLINIC | Age: 71
End: 2021-07-12

## 2021-07-13 ENCOUNTER — OFFICE VISIT (OUTPATIENT)
Dept: ORTHOPEDICS | Facility: CLINIC | Age: 71
End: 2021-07-13
Payer: MEDICARE

## 2021-07-13 VITALS
WEIGHT: 243 LBS | SYSTOLIC BLOOD PRESSURE: 127 MMHG | BODY MASS INDEX: 44.72 KG/M2 | HEIGHT: 62 IN | DIASTOLIC BLOOD PRESSURE: 81 MMHG | HEART RATE: 94 BPM

## 2021-07-13 DIAGNOSIS — M17.11 PRIMARY OSTEOARTHRITIS OF RIGHT KNEE: Primary | ICD-10-CM

## 2021-07-13 PROCEDURE — 99999 PR PBB SHADOW E&M-EST. PATIENT-LVL IV: CPT | Mod: PBBFAC,,, | Performed by: PHYSICIAN ASSISTANT

## 2021-07-13 PROCEDURE — 99214 OFFICE O/P EST MOD 30 MIN: CPT | Mod: PBBFAC,PN | Performed by: PHYSICIAN ASSISTANT

## 2021-07-13 PROCEDURE — 99213 PR OFFICE/OUTPT VISIT, EST, LEVL III, 20-29 MIN: ICD-10-PCS | Mod: S$PBB,,, | Performed by: PHYSICIAN ASSISTANT

## 2021-07-13 PROCEDURE — 99999 PR PBB SHADOW E&M-EST. PATIENT-LVL IV: ICD-10-PCS | Mod: PBBFAC,,, | Performed by: PHYSICIAN ASSISTANT

## 2021-07-13 PROCEDURE — 99213 OFFICE O/P EST LOW 20 MIN: CPT | Mod: S$PBB,,, | Performed by: PHYSICIAN ASSISTANT

## 2021-07-15 ENCOUNTER — HOSPITAL ENCOUNTER (OUTPATIENT)
Dept: CARDIOLOGY | Facility: HOSPITAL | Age: 71
Discharge: HOME | End: 2021-07-15
Attending: INTERNAL MEDICINE
Payer: MEDICARE

## 2021-07-15 VITALS — WEIGHT: 243 LBS | BODY MASS INDEX: 45.88 KG/M2 | HEIGHT: 61 IN

## 2021-07-15 DIAGNOSIS — Z01.810 ENCOUNTER FOR PRE-OPERATIVE CARDIOVASCULAR CLEARANCE: ICD-10-CM

## 2021-07-15 LAB
AORTIC ROOT ANNULUS: 2.25 CM
AORTIC VALVE CUSP SEPERATION: 1.4 CM
AV INDEX (PROSTH): 0.96
AV MEAN GRADIENT: 6 MMHG
AV PEAK GRADIENT: 10 MMHG
AV VALVE AREA: 2.68 CM2
AV VELOCITY RATIO: 0.86
BSA FOR ECHO PROCEDURE: 2.18 M2
CV ECHO LV RWT: 0.55 CM
DOP CALC AO PEAK VEL: 1.55 M/S
DOP CALC AO VTI: 27.08 CM
DOP CALC LVOT AREA: 2.8 CM2
DOP CALC LVOT DIAMETER: 1.89 CM
DOP CALC LVOT PEAK VEL: 1.33 M/S
DOP CALC LVOT STROKE VOLUME: 72.57 CM3
DOP CALCLVOT PEAK VEL VTI: 25.88 CM
E WAVE DECELERATION TIME: 175.63 MSEC
E/A RATIO: 0.66
E/E' RATIO: 8.21 M/S
ECHO LV POSTERIOR WALL: 1.26 CM (ref 0.6–1.1)
EJECTION FRACTION: 70 %
FRACTIONAL SHORTENING: 33 % (ref 28–44)
INTERVENTRICULAR SEPTUM: 1.13 CM (ref 0.6–1.1)
LA MAJOR: 4.88 CM
LA MINOR: 4 CM
LA WIDTH: 3.41 CM
LEFT ATRIUM SIZE: 2.8 CM
LEFT ATRIUM VOLUME INDEX MOD: 15.5 ML/M2
LEFT ATRIUM VOLUME INDEX: 17.4 ML/M2
LEFT ATRIUM VOLUME MOD: 31.7 CM3
LEFT ATRIUM VOLUME: 35.68 CM3
LEFT INTERNAL DIMENSION IN SYSTOLE: 3.07 CM (ref 2.1–4)
LEFT VENTRICLE DIASTOLIC VOLUME INDEX: 46.22 ML/M2
LEFT VENTRICLE DIASTOLIC VOLUME: 94.75 ML
LEFT VENTRICLE MASS INDEX: 98 G/M2
LEFT VENTRICLE SYSTOLIC VOLUME INDEX: 18 ML/M2
LEFT VENTRICLE SYSTOLIC VOLUME: 36.92 ML
LEFT VENTRICULAR INTERNAL DIMENSION IN DIASTOLE: 4.55 CM (ref 3.5–6)
LEFT VENTRICULAR MASS: 200.33 G
LV LATERAL E/E' RATIO: 7.8 M/S
LV SEPTAL E/E' RATIO: 8.67 M/S
MV MEAN GRADIENT: 1 MMHG
MV PEAK A VEL: 1.18 M/S
MV PEAK E VEL: 0.78 M/S
MV PEAK GRADIENT: 6 MMHG
MV STENOSIS PRESSURE HALF TIME: 50.93 MS
MV VALVE AREA P 1/2 METHOD: 4.32 CM2
PISA TR MAX VEL: 2.33 M/S
PULM VEIN S/D RATIO: 1.92
PV PEAK D VEL: 0.25 M/S
PV PEAK S VEL: 0.48 M/S
PV PEAK VELOCITY: 1.09 CM/S
RA MAJOR: 3.88 CM
RA PRESSURE: 3 MMHG
RA WIDTH: 2.61 CM
RIGHT VENTRICULAR END-DIASTOLIC DIMENSION: 2.6 CM
RV TISSUE DOPPLER FREE WALL SYSTOLIC VELOCITY 1 (APICAL 4 CHAMBER VIEW): 16.62 CM/S
TDI LATERAL: 0.1 M/S
TDI SEPTAL: 0.09 M/S
TDI: 0.1 M/S
TR MAX PG: 22 MMHG
TRICUSPID ANNULAR PLANE SYSTOLIC EXCURSION: 1.99 CM
TV REST PULMONARY ARTERY PRESSURE: 25 MMHG

## 2021-07-15 PROCEDURE — 93306 TTE W/DOPPLER COMPLETE: CPT | Mod: 26,,, | Performed by: INTERNAL MEDICINE

## 2021-07-15 PROCEDURE — 93306 ECHO (CUPID ONLY): ICD-10-PCS | Mod: 26,,, | Performed by: INTERNAL MEDICINE

## 2021-07-15 PROCEDURE — 93306 TTE W/DOPPLER COMPLETE: CPT | Mod: PO

## 2021-07-23 ENCOUNTER — TELEPHONE (OUTPATIENT)
Dept: CARDIOLOGY | Facility: CLINIC | Age: 71
End: 2021-07-23

## 2021-07-27 ENCOUNTER — HOSPITAL ENCOUNTER (OUTPATIENT)
Dept: CARDIOLOGY | Facility: HOSPITAL | Age: 71
Discharge: HOME | End: 2021-07-27
Attending: INTERNAL MEDICINE
Payer: MEDICARE

## 2021-07-27 VITALS — HEART RATE: 99 BPM | SYSTOLIC BLOOD PRESSURE: 130 MMHG | DIASTOLIC BLOOD PRESSURE: 66 MMHG

## 2021-07-27 DIAGNOSIS — R07.9 CHEST PAIN, UNSPECIFIED TYPE: ICD-10-CM

## 2021-07-27 LAB
CFR FLOW - ANTERIOR: 1.99
CFR FLOW - INFERIOR: 2.09
CFR FLOW - LATERAL: 2.04
CFR FLOW - MAX: 3.01
CFR FLOW - MIN: 1.46
CFR FLOW - SEPTAL: 2.13
CFR FLOW - WHOLE HEART: 2.06
CV PHARM DOSE: 60 MG
CV STRESS BASE HR: 87 BPM
DIASTOLIC BLOOD PRESSURE: 66 MMHG
EJECTION FRACTION- HIGH: 65 %
END DIASTOLIC INDEX-HIGH: 153 ML/M2
END DIASTOLIC INDEX-LOW: 93 ML/M2
END SYSTOLIC INDEX-HIGH: 71 ML/M2
END SYSTOLIC INDEX-LOW: 31 ML/M2
NUC REST DIASTOLIC VOLUME INDEX: 75
NUC REST EJECTION FRACTION: 72
NUC REST SYSTOLIC VOLUME INDEX: 21
NUC STRESS DIASTOLIC VOLUME INDEX: 90
NUC STRESS EJECTION FRACTION: 74 %
NUC STRESS SYSTOLIC VOLUME INDEX: 23
OHS CV CPX 85 PERCENT MAX PREDICTED HEART RATE MALE: 121
OHS CV CPX MAX PREDICTED HEART RATE: 143
OHS CV CPX PATIENT IS FEMALE: 1
OHS CV CPX PATIENT IS MALE: 0
OHS CV CPX PEAK DIASTOLIC BLOOD PRESSURE: 61 MMHG
OHS CV CPX PEAK HEAR RATE: 95 BPM
OHS CV CPX PEAK RATE PRESSURE PRODUCT: NORMAL
OHS CV CPX PEAK SYSTOLIC BLOOD PRESSURE: 106 MMHG
OHS CV CPX PERCENT MAX PREDICTED HEART RATE ACHIEVED: 67
OHS CV CPX RATE PRESSURE PRODUCT PRESENTING: 9831
REST FLOW - ANTERIOR: 1 CC/MIN/G
REST FLOW - INFERIOR: 0.88 CC/MIN/G
REST FLOW - LATERAL: 1.05 CC/MIN/G
REST FLOW - MAX: 1.38 CC/MIN/G
REST FLOW - MIN: 0.5 CC/MIN/G
REST FLOW - SEPTAL: 0.9 CC/MIN/G
REST FLOW - WHOLE HEART: 0.96 CC/MIN/G
RETIRED EF AND QEF - SEE NOTES: 53 %
STRESS FLOW - ANTERIOR: 1.98 CC/MIN/G
STRESS FLOW - INFERIOR: 1.83 CC/MIN/G
STRESS FLOW - LATERAL: 2.14 CC/MIN/G
STRESS FLOW - MAX: 2.61 CC/MIN/G
STRESS FLOW - MIN: 1.16 CC/MIN/G
STRESS FLOW - SEPTAL: 1.89 CC/MIN/G
STRESS FLOW - WHOLE HEART: 1.96 CC/MIN/G
SYSTOLIC BLOOD PRESSURE: 113 MMHG

## 2021-07-27 PROCEDURE — 63600175 PHARM REV CODE 636 W HCPCS: Performed by: INTERNAL MEDICINE

## 2021-07-27 PROCEDURE — 93018 CARDIAC PET SCAN STRESS (CUPID ONLY): ICD-10-PCS | Mod: ,,, | Performed by: INTERNAL MEDICINE

## 2021-07-27 PROCEDURE — 93018 CV STRESS TEST I&R ONLY: CPT | Mod: ,,, | Performed by: INTERNAL MEDICINE

## 2021-07-27 PROCEDURE — 78431 MYOCRD IMG PET RST&STRS CT: CPT | Mod: 26,,, | Performed by: INTERNAL MEDICINE

## 2021-07-27 PROCEDURE — 78434 AQMBF PET REST & RX STRESS: CPT | Mod: 26,,, | Performed by: INTERNAL MEDICINE

## 2021-07-27 PROCEDURE — 93016 CV STRESS TEST SUPVJ ONLY: CPT | Mod: ,,, | Performed by: INTERNAL MEDICINE

## 2021-07-27 PROCEDURE — 78434 AQMBF PET REST & RX STRESS: CPT

## 2021-07-27 PROCEDURE — 78431 CARDIAC PET SCAN STRESS (CUPID ONLY): ICD-10-PCS | Mod: 26,,, | Performed by: INTERNAL MEDICINE

## 2021-07-27 PROCEDURE — 78434 CARDIAC PET SCAN STRESS (CUPID ONLY): ICD-10-PCS | Mod: 26,,, | Performed by: INTERNAL MEDICINE

## 2021-07-27 PROCEDURE — 93016 CARDIAC PET SCAN STRESS (CUPID ONLY): ICD-10-PCS | Mod: ,,, | Performed by: INTERNAL MEDICINE

## 2021-07-27 RX ORDER — DIPYRIDAMOLE 5 MG/ML
60 INJECTION INTRAVENOUS ONCE
Status: COMPLETED | OUTPATIENT
Start: 2021-07-27 | End: 2021-07-27

## 2021-07-27 RX ADMIN — DIPYRIDAMOLE 60 MG: 5 INJECTION INTRAVENOUS at 10:07

## 2021-07-28 ENCOUNTER — TELEPHONE (OUTPATIENT)
Dept: CARDIOLOGY | Facility: CLINIC | Age: 71
End: 2021-07-28

## 2021-08-25 ENCOUNTER — TELEPHONE (OUTPATIENT)
Dept: CARDIOLOGY | Facility: CLINIC | Age: 71
End: 2021-08-25

## 2021-09-13 RX ORDER — IMIPRAMINE HYDROCHLORIDE 25 MG/1
TABLET, FILM COATED ORAL
Qty: 30 TABLET | Refills: 2 | Status: SHIPPED | OUTPATIENT
Start: 2021-09-13 | End: 2021-11-15 | Stop reason: SDUPTHER

## 2021-10-08 DIAGNOSIS — G25.81 RESTLESS LEG SYNDROME: ICD-10-CM

## 2021-10-08 DIAGNOSIS — M79.7 FIBROMYALGIA: ICD-10-CM

## 2021-10-08 RX ORDER — GABAPENTIN 600 MG/1
TABLET ORAL
Qty: 90 TABLET | Refills: 3 | Status: SHIPPED | OUTPATIENT
Start: 2021-10-08 | End: 2021-10-12

## 2021-10-12 DIAGNOSIS — G25.81 RESTLESS LEG SYNDROME: ICD-10-CM

## 2021-10-12 DIAGNOSIS — M79.7 FIBROMYALGIA: ICD-10-CM

## 2021-10-12 RX ORDER — GABAPENTIN 600 MG/1
TABLET ORAL
Qty: 90 TABLET | Refills: 3 | Status: SHIPPED | OUTPATIENT
Start: 2021-10-12 | End: 2022-03-21

## 2021-10-23 ENCOUNTER — PATIENT MESSAGE (OUTPATIENT)
Dept: ADMINISTRATIVE | Facility: OTHER | Age: 71
End: 2021-10-23
Payer: MEDICARE

## 2021-10-25 ENCOUNTER — HOSPITAL ENCOUNTER (EMERGENCY)
Facility: HOSPITAL | Age: 71
Discharge: HOME | End: 2021-10-25
Attending: EMERGENCY MEDICINE
Payer: MEDICARE

## 2021-10-25 VITALS
HEART RATE: 80 BPM | SYSTOLIC BLOOD PRESSURE: 130 MMHG | RESPIRATION RATE: 16 BRPM | OXYGEN SATURATION: 99 % | WEIGHT: 240 LBS | DIASTOLIC BLOOD PRESSURE: 57 MMHG | BODY MASS INDEX: 45.31 KG/M2 | TEMPERATURE: 99 F | HEIGHT: 61 IN
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
## 2021-10-25 DIAGNOSIS — M25.531 RIGHT WRIST PAIN: ICD-10-CM

## 2021-10-25 DIAGNOSIS — S52.501A CLOSED FRACTURE OF DISTAL END OF RIGHT RADIUS, UNSPECIFIED FRACTURE MORPHOLOGY, INITIAL ENCOUNTER: Primary | ICD-10-CM

## 2021-10-25 PROCEDURE — 99285 EMERGENCY DEPT VISIT HI MDM: CPT | Mod: 25,ER

## 2021-10-25 PROCEDURE — 25605 CLTX DST RDL FX/EPHYS SEP W/: CPT | Mod: RT,ER

## 2021-10-25 PROCEDURE — 63600175 PHARM REV CODE 636 W HCPCS: Mod: ER | Performed by: PHYSICIAN ASSISTANT

## 2021-10-25 PROCEDURE — 96372 THER/PROPH/DIAG INJ SC/IM: CPT | Mod: 59,ER

## 2021-10-25 PROCEDURE — 25000003 PHARM REV CODE 250: Mod: ER | Performed by: PHYSICIAN ASSISTANT

## 2021-10-25 PROCEDURE — 96374 THER/PROPH/DIAG INJ IV PUSH: CPT | Mod: ER,59

## 2021-10-25 PROCEDURE — 99284 EMERGENCY DEPT VISIT MOD MDM: CPT | Mod: 25,ER

## 2021-10-25 RX ORDER — OXYCODONE AND ACETAMINOPHEN 5; 325 MG/1; MG/1
1 TABLET ORAL
Status: DISCONTINUED | OUTPATIENT
Start: 2021-10-25 | End: 2021-10-25

## 2021-10-25 RX ORDER — BUPIVACAINE HYDROCHLORIDE 2.5 MG/ML
5 INJECTION, SOLUTION EPIDURAL; INFILTRATION; INTRACAUDAL
Status: DISCONTINUED | OUTPATIENT
Start: 2021-10-25 | End: 2021-10-25

## 2021-10-25 RX ORDER — ORPHENADRINE CITRATE 30 MG/ML
30 INJECTION INTRAMUSCULAR; INTRAVENOUS
Status: COMPLETED | OUTPATIENT
Start: 2021-10-25 | End: 2021-10-25

## 2021-10-25 RX ORDER — BUPIVACAINE HYDROCHLORIDE 5 MG/ML
5 INJECTION, SOLUTION EPIDURAL; INTRACAUDAL
Status: COMPLETED | OUTPATIENT
Start: 2021-10-25 | End: 2021-10-25

## 2021-10-25 RX ORDER — FENTANYL CITRATE 50 UG/ML
25 INJECTION, SOLUTION INTRAMUSCULAR; INTRAVENOUS
Status: COMPLETED | OUTPATIENT
Start: 2021-10-25 | End: 2021-10-25

## 2021-10-25 RX ORDER — LIDOCAINE HYDROCHLORIDE 10 MG/ML
10 INJECTION, SOLUTION EPIDURAL; INFILTRATION; INTRACAUDAL; PERINEURAL
Status: COMPLETED | OUTPATIENT
Start: 2021-10-25 | End: 2021-10-25

## 2021-10-25 RX ADMIN — ORPHENADRINE CITRATE 30 MG: 30 INJECTION INTRAMUSCULAR; INTRAVENOUS at 05:10

## 2021-10-25 RX ADMIN — LIDOCAINE HYDROCHLORIDE 100 MG: 10 INJECTION, SOLUTION EPIDURAL; INFILTRATION; INTRACAUDAL; PERINEURAL at 05:10

## 2021-10-25 RX ADMIN — FENTANYL CITRATE 25 MCG: 50 INJECTION INTRAMUSCULAR; INTRAVENOUS at 04:10

## 2021-10-25 RX ADMIN — BUPIVACAINE HYDROCHLORIDE 25 MG: 5 INJECTION, SOLUTION EPIDURAL; INTRACAUDAL; PERINEURAL at 05:10

## 2021-10-26 ENCOUNTER — TELEPHONE (OUTPATIENT)
Dept: ORTHOPEDICS | Facility: CLINIC | Age: 71
End: 2021-10-26
Payer: MEDICARE

## 2021-10-27 DIAGNOSIS — S52.501A CLOSED FRACTURE OF DISTAL END OF RIGHT RADIUS, UNSPECIFIED FRACTURE MORPHOLOGY, INITIAL ENCOUNTER: Primary | ICD-10-CM

## 2021-10-28 ENCOUNTER — PATIENT OUTREACH (OUTPATIENT)
Dept: ADMINISTRATIVE | Facility: OTHER | Age: 71
End: 2021-10-28
Payer: MEDICARE

## 2021-11-01 ENCOUNTER — OFFICE VISIT (OUTPATIENT)
Dept: ORTHOPEDICS | Facility: CLINIC | Age: 71
End: 2021-11-01
Payer: MEDICARE

## 2021-11-01 ENCOUNTER — HOSPITAL ENCOUNTER (OUTPATIENT)
Dept: RADIOLOGY | Facility: HOSPITAL | Age: 71
Discharge: HOME | End: 2021-11-01
Attending: ORTHOPAEDIC SURGERY
Payer: MEDICARE

## 2021-11-01 VITALS — WEIGHT: 240 LBS | BODY MASS INDEX: 45.35 KG/M2

## 2021-11-01 DIAGNOSIS — S52.501A CLOSED FRACTURE OF DISTAL END OF RIGHT RADIUS, UNSPECIFIED FRACTURE MORPHOLOGY, INITIAL ENCOUNTER: ICD-10-CM

## 2021-11-01 DIAGNOSIS — S62.101A CLOSED FRACTURE OF RIGHT WRIST, INITIAL ENCOUNTER: ICD-10-CM

## 2021-11-01 PROCEDURE — 73110 X-RAY EXAM OF WRIST: CPT | Mod: 26,RT,, | Performed by: RADIOLOGY

## 2021-11-01 PROCEDURE — 29075 PR APPLY FOREARM CAST: ICD-10-PCS | Mod: S$PBB,RT,, | Performed by: ORTHOPAEDIC SURGERY

## 2021-11-01 PROCEDURE — 29075 APPL CST ELBW FNGR SHORT ARM: CPT | Mod: PBBFAC,PN | Performed by: ORTHOPAEDIC SURGERY

## 2021-11-01 PROCEDURE — 99999 PR PBB SHADOW E&M-EST. PATIENT-LVL III: ICD-10-PCS | Mod: PBBFAC,,, | Performed by: ORTHOPAEDIC SURGERY

## 2021-11-01 PROCEDURE — 99999 PR PBB SHADOW E&M-EST. PATIENT-LVL III: CPT | Mod: PBBFAC,,, | Performed by: ORTHOPAEDIC SURGERY

## 2021-11-01 PROCEDURE — 99213 OFFICE O/P EST LOW 20 MIN: CPT | Mod: PBBFAC,PN,25 | Performed by: ORTHOPAEDIC SURGERY

## 2021-11-01 PROCEDURE — 73110 X-RAY EXAM OF WRIST: CPT | Mod: TC,PN,RT

## 2021-11-01 PROCEDURE — 29075 APPL CST ELBW FNGR SHORT ARM: CPT | Mod: S$PBB,RT,, | Performed by: ORTHOPAEDIC SURGERY

## 2021-11-01 PROCEDURE — 99213 PR OFFICE/OUTPT VISIT, EST, LEVL III, 20-29 MIN: ICD-10-PCS | Mod: 25,S$PBB,, | Performed by: ORTHOPAEDIC SURGERY

## 2021-11-01 PROCEDURE — 73110 XR WRIST COMPLETE 3 VIEWS RIGHT: ICD-10-PCS | Mod: 26,RT,, | Performed by: RADIOLOGY

## 2021-11-01 PROCEDURE — 99213 OFFICE O/P EST LOW 20 MIN: CPT | Mod: 25,S$PBB,, | Performed by: ORTHOPAEDIC SURGERY

## 2021-11-01 RX ORDER — TRAMADOL HYDROCHLORIDE 50 MG/1
50 TABLET ORAL EVERY 6 HOURS PRN
Qty: 30 TABLET | Refills: 0 | Status: SHIPPED | OUTPATIENT
Start: 2021-11-01 | End: 2021-11-11

## 2021-11-08 RX ORDER — POTASSIUM CHLORIDE 20 MEQ/1
TABLET, EXTENDED RELEASE ORAL
Qty: 90 TABLET | Refills: 3 | Status: SHIPPED | OUTPATIENT
Start: 2021-11-08 | End: 2022-06-11 | Stop reason: SDUPTHER

## 2021-11-10 ENCOUNTER — LAB VISIT (OUTPATIENT)
Dept: LAB | Facility: HOSPITAL | Age: 71
End: 2021-11-10
Attending: FAMILY MEDICINE
Payer: MEDICARE

## 2021-11-10 DIAGNOSIS — G47.33 OSA (OBSTRUCTIVE SLEEP APNEA): ICD-10-CM

## 2021-11-10 DIAGNOSIS — E03.9 HYPOTHYROIDISM, UNSPECIFIED TYPE: ICD-10-CM

## 2021-11-10 DIAGNOSIS — I10 ESSENTIAL HYPERTENSION: ICD-10-CM

## 2021-11-10 DIAGNOSIS — R73.9 HYPERGLYCEMIA: ICD-10-CM

## 2021-11-10 LAB
ALBUMIN SERPL BCP-MCNC: 4.2 G/DL (ref 3.5–5.2)
ALP SERPL-CCNC: 83 U/L (ref 38–126)
ALT SERPL W/O P-5'-P-CCNC: 18 U/L (ref 10–44)
ANION GAP SERPL CALC-SCNC: 8 MMOL/L (ref 8–16)
AST SERPL-CCNC: 23 U/L (ref 15–46)
BASOPHILS # BLD AUTO: 0.05 K/UL (ref 0–0.2)
BASOPHILS NFR BLD: 0.9 % (ref 0–1.9)
BILIRUB SERPL-MCNC: 0.4 MG/DL (ref 0.1–1)
CALCIUM SERPL-MCNC: 8.9 MG/DL (ref 8.7–10.5)
CHLORIDE SERPL-SCNC: 98 MMOL/L (ref 95–110)
CHOLEST SERPL-MCNC: 162 MG/DL (ref 120–199)
CHOLEST/HDLC SERPL: 3.2 {RATIO} (ref 2–5)
CO2 SERPL-SCNC: 35 MMOL/L (ref 23–29)
CREAT SERPL-MCNC: 0.71 MG/DL (ref 0.5–1.4)
DIFFERENTIAL METHOD: ABNORMAL
EOSINOPHIL # BLD AUTO: 0.3 K/UL (ref 0–0.5)
EOSINOPHIL NFR BLD: 5.1 % (ref 0–8)
ERYTHROCYTE [DISTWIDTH] IN BLOOD BY AUTOMATED COUNT: 12.7 % (ref 11.5–14.5)
EST. GFR  (AFRICAN AMERICAN): >60 ML/MIN/1.73 M^2
EST. GFR  (NON AFRICAN AMERICAN): >60 ML/MIN/1.73 M^2
ESTIMATED AVG GLUCOSE: 131 MG/DL (ref 68–131)
GLUCOSE SERPL-MCNC: 120 MG/DL (ref 70–110)
HBA1C MFR BLD: 6.2 % (ref 4–5.6)
HCT VFR BLD AUTO: 43.6 % (ref 37–48.5)
HDLC SERPL-MCNC: 51 MG/DL (ref 40–75)
HDLC SERPL: 31.5 % (ref 20–50)
HGB BLD-MCNC: 13.4 G/DL (ref 12–16)
IMM GRANULOCYTES # BLD AUTO: 0.01 K/UL (ref 0–0.04)
IMM GRANULOCYTES NFR BLD AUTO: 0.2 % (ref 0–0.5)
LDLC SERPL CALC-MCNC: 71 MG/DL (ref 63–159)
LYMPHOCYTES # BLD AUTO: 2.3 K/UL (ref 1–4.8)
LYMPHOCYTES NFR BLD: 42.5 % (ref 18–48)
MCH RBC QN AUTO: 31 PG (ref 27–31)
MCHC RBC AUTO-ENTMCNC: 30.7 G/DL (ref 32–36)
MCV RBC AUTO: 101 FL (ref 82–98)
MONOCYTES # BLD AUTO: 0.5 K/UL (ref 0.3–1)
MONOCYTES NFR BLD: 8.2 % (ref 4–15)
NEUTROPHILS # BLD AUTO: 2.4 K/UL (ref 1.8–7.7)
NEUTROPHILS NFR BLD: 43.1 % (ref 38–73)
NONHDLC SERPL-MCNC: 111 MG/DL
NRBC BLD-RTO: 0 /100 WBC
PLATELET # BLD AUTO: 301 K/UL (ref 150–450)
PMV BLD AUTO: 10 FL (ref 9.2–12.9)
POTASSIUM SERPL-SCNC: 3.9 MMOL/L (ref 3.5–5.1)
PROT SERPL-MCNC: 6.9 G/DL (ref 6–8.4)
RBC # BLD AUTO: 4.32 M/UL (ref 4–5.4)
SODIUM SERPL-SCNC: 141 MMOL/L (ref 136–145)
T4 FREE SERPL-MCNC: 0.8 NG/DL (ref 0.71–1.51)
TRIGL SERPL-MCNC: 200 MG/DL (ref 30–150)
TSH SERPL DL<=0.005 MIU/L-ACNC: 2.09 UIU/ML (ref 0.4–4)
UUN UR-MCNC: 21 MG/DL (ref 7–17)
WBC # BLD AUTO: 5.5 K/UL (ref 3.9–12.7)

## 2021-11-10 PROCEDURE — 84439 ASSAY OF FREE THYROXINE: CPT | Performed by: FAMILY MEDICINE

## 2021-11-10 PROCEDURE — 36415 COLL VENOUS BLD VENIPUNCTURE: CPT | Mod: PO | Performed by: FAMILY MEDICINE

## 2021-11-10 PROCEDURE — 80053 COMPREHEN METABOLIC PANEL: CPT | Mod: PO | Performed by: FAMILY MEDICINE

## 2021-11-10 PROCEDURE — 83036 HEMOGLOBIN GLYCOSYLATED A1C: CPT | Performed by: FAMILY MEDICINE

## 2021-11-10 PROCEDURE — 85025 COMPLETE CBC W/AUTO DIFF WBC: CPT | Mod: PO | Performed by: FAMILY MEDICINE

## 2021-11-10 PROCEDURE — 80061 LIPID PANEL: CPT | Performed by: FAMILY MEDICINE

## 2021-11-10 PROCEDURE — 84443 ASSAY THYROID STIM HORMONE: CPT | Mod: PO | Performed by: FAMILY MEDICINE

## 2021-11-12 DIAGNOSIS — S52.501A CLOSED FRACTURE OF DISTAL END OF RIGHT RADIUS, UNSPECIFIED FRACTURE MORPHOLOGY, INITIAL ENCOUNTER: Primary | ICD-10-CM

## 2021-11-15 ENCOUNTER — HOSPITAL ENCOUNTER (OUTPATIENT)
Dept: RADIOLOGY | Facility: HOSPITAL | Age: 71
Discharge: HOME | End: 2021-11-15
Attending: ORTHOPAEDIC SURGERY
Payer: MEDICARE

## 2021-11-15 ENCOUNTER — OFFICE VISIT (OUTPATIENT)
Dept: ORTHOPEDICS | Facility: CLINIC | Age: 71
End: 2021-11-15
Payer: MEDICARE

## 2021-11-15 ENCOUNTER — OFFICE VISIT (OUTPATIENT)
Dept: FAMILY MEDICINE | Facility: CLINIC | Age: 71
End: 2021-11-15
Payer: MEDICARE

## 2021-11-15 VITALS
BODY MASS INDEX: 45.66 KG/M2 | OXYGEN SATURATION: 94 % | DIASTOLIC BLOOD PRESSURE: 70 MMHG | SYSTOLIC BLOOD PRESSURE: 124 MMHG | HEIGHT: 61 IN | WEIGHT: 241.88 LBS | HEART RATE: 82 BPM | TEMPERATURE: 98 F

## 2021-11-15 VITALS — BODY MASS INDEX: 45.35 KG/M2 | WEIGHT: 240 LBS

## 2021-11-15 DIAGNOSIS — S52.501A CLOSED FRACTURE OF DISTAL END OF RIGHT RADIUS, UNSPECIFIED FRACTURE MORPHOLOGY, INITIAL ENCOUNTER: ICD-10-CM

## 2021-11-15 DIAGNOSIS — M79.7 FIBROMYALGIA: ICD-10-CM

## 2021-11-15 DIAGNOSIS — S62.101P CLOSED FRACTURE OF RIGHT WRIST WITH MALUNION, SUBSEQUENT ENCOUNTER: Primary | ICD-10-CM

## 2021-11-15 DIAGNOSIS — S62.109A WRIST FRACTURE: ICD-10-CM

## 2021-11-15 DIAGNOSIS — Z00.00 ROUTINE HEALTH MAINTENANCE: Primary | ICD-10-CM

## 2021-11-15 PROCEDURE — 99213 OFFICE O/P EST LOW 20 MIN: CPT | Mod: S$GLB,,, | Performed by: FAMILY MEDICINE

## 2021-11-15 PROCEDURE — 73110 XR WRIST COMPLETE 3 VIEWS RIGHT: ICD-10-PCS | Mod: 26,RT,, | Performed by: RADIOLOGY

## 2021-11-15 PROCEDURE — 99999 PR PBB SHADOW E&M-EST. PATIENT-LVL IV: ICD-10-PCS | Mod: PBBFAC,,, | Performed by: ORTHOPAEDIC SURGERY

## 2021-11-15 PROCEDURE — 99214 OFFICE O/P EST MOD 30 MIN: CPT | Mod: S$PBB,,, | Performed by: ORTHOPAEDIC SURGERY

## 2021-11-15 PROCEDURE — 73110 X-RAY EXAM OF WRIST: CPT | Mod: TC,PN,RT

## 2021-11-15 PROCEDURE — 99214 OFFICE O/P EST MOD 30 MIN: CPT | Mod: PBBFAC,PN | Performed by: ORTHOPAEDIC SURGERY

## 2021-11-15 PROCEDURE — 99214 PR OFFICE/OUTPT VISIT, EST, LEVL IV, 30-39 MIN: ICD-10-PCS | Mod: S$PBB,,, | Performed by: ORTHOPAEDIC SURGERY

## 2021-11-15 PROCEDURE — 99999 PR PBB SHADOW E&M-EST. PATIENT-LVL IV: CPT | Mod: PBBFAC,,, | Performed by: ORTHOPAEDIC SURGERY

## 2021-11-15 PROCEDURE — 99213 PR OFFICE/OUTPT VISIT, EST, LEVL III, 20-29 MIN: ICD-10-PCS | Mod: S$GLB,,, | Performed by: FAMILY MEDICINE

## 2021-11-15 PROCEDURE — 73110 X-RAY EXAM OF WRIST: CPT | Mod: 26,RT,, | Performed by: RADIOLOGY

## 2021-11-15 RX ORDER — PANTOPRAZOLE SODIUM 40 MG/1
40 TABLET, DELAYED RELEASE ORAL DAILY
Qty: 90 TABLET | Refills: 3 | Status: ON HOLD | OUTPATIENT
Start: 2021-11-15 | End: 2022-02-16 | Stop reason: SDUPTHER

## 2021-11-15 RX ORDER — VENLAFAXINE HYDROCHLORIDE 150 MG/1
CAPSULE, EXTENDED RELEASE ORAL
Qty: 180 CAPSULE | Refills: 3 | Status: SHIPPED | OUTPATIENT
Start: 2021-11-15 | End: 2022-01-27 | Stop reason: SDUPTHER

## 2021-11-15 RX ORDER — IMIPRAMINE HYDROCHLORIDE 25 MG/1
TABLET, FILM COATED ORAL
Qty: 90 TABLET | Refills: 3 | Status: SHIPPED | OUTPATIENT
Start: 2021-11-15 | End: 2022-11-30 | Stop reason: SDUPTHER

## 2021-11-15 RX ORDER — FLUTICASONE PROPIONATE 50 MCG
1 SPRAY, SUSPENSION (ML) NASAL DAILY
Qty: 48 G | Refills: 11 | Status: SHIPPED | OUTPATIENT
Start: 2021-11-15

## 2021-11-16 ENCOUNTER — HOSPITAL ENCOUNTER (OUTPATIENT)
Facility: HOSPITAL | Age: 71
Discharge: HOME | End: 2021-11-16
Attending: ORTHOPAEDIC SURGERY | Admitting: ORTHOPAEDIC SURGERY
Payer: MEDICARE

## 2021-11-16 ENCOUNTER — ANESTHESIA EVENT (OUTPATIENT)
Dept: SURGERY | Facility: HOSPITAL | Age: 71
End: 2021-11-16
Payer: MEDICARE

## 2021-11-16 ENCOUNTER — ANESTHESIA (OUTPATIENT)
Dept: SURGERY | Facility: HOSPITAL | Age: 71
End: 2021-11-16
Payer: MEDICARE

## 2021-11-16 VITALS
TEMPERATURE: 98 F | RESPIRATION RATE: 24 BRPM | BODY MASS INDEX: 45.37 KG/M2 | OXYGEN SATURATION: 96 % | DIASTOLIC BLOOD PRESSURE: 69 MMHG | HEART RATE: 84 BPM | SYSTOLIC BLOOD PRESSURE: 134 MMHG | WEIGHT: 240.31 LBS | HEIGHT: 61 IN

## 2021-11-16 DIAGNOSIS — S62.101P CLOSED FRACTURE OF RIGHT WRIST WITH MALUNION, SUBSEQUENT ENCOUNTER: ICD-10-CM

## 2021-11-16 DIAGNOSIS — S62.109A WRIST FRACTURE: ICD-10-CM

## 2021-11-16 DIAGNOSIS — S62.101D CLOSED FRACTURE OF RIGHT WRIST WITH ROUTINE HEALING, SUBSEQUENT ENCOUNTER: Primary | ICD-10-CM

## 2021-11-16 PROCEDURE — 63600175 PHARM REV CODE 636 W HCPCS: Performed by: NURSE ANESTHETIST, CERTIFIED REGISTERED

## 2021-11-16 PROCEDURE — 71000033 HC RECOVERY, INTIAL HOUR: Performed by: ORTHOPAEDIC SURGERY

## 2021-11-16 PROCEDURE — 63600175 PHARM REV CODE 636 W HCPCS: Performed by: STUDENT IN AN ORGANIZED HEALTH CARE EDUCATION/TRAINING PROGRAM

## 2021-11-16 PROCEDURE — 37000008 HC ANESTHESIA 1ST 15 MINUTES: Performed by: ORTHOPAEDIC SURGERY

## 2021-11-16 PROCEDURE — 25609 PR OPEN RX DISTAL RADIUS FX, INTRA-ARTICULAR, 3+ FRAG: ICD-10-PCS | Mod: RT,,, | Performed by: ORTHOPAEDIC SURGERY

## 2021-11-16 PROCEDURE — 25000003 PHARM REV CODE 250: Performed by: NURSE ANESTHETIST, CERTIFIED REGISTERED

## 2021-11-16 PROCEDURE — C1713 ANCHOR/SCREW BN/BN,TIS/BN: HCPCS | Performed by: ORTHOPAEDIC SURGERY

## 2021-11-16 PROCEDURE — 64415 NJX AA&/STRD BRCH PLXS IMG: CPT | Performed by: STUDENT IN AN ORGANIZED HEALTH CARE EDUCATION/TRAINING PROGRAM

## 2021-11-16 PROCEDURE — 25609 OPTX DST RD XART FX/EP SEP3+: CPT | Mod: RT,,, | Performed by: ORTHOPAEDIC SURGERY

## 2021-11-16 PROCEDURE — 27201423 OPTIME MED/SURG SUP & DEVICES STERILE SUPPLY: Performed by: ORTHOPAEDIC SURGERY

## 2021-11-16 PROCEDURE — 27200665 HC NERVE BLOCK NEEDLE/ CATHETER: Performed by: STUDENT IN AN ORGANIZED HEALTH CARE EDUCATION/TRAINING PROGRAM

## 2021-11-16 PROCEDURE — 36000710: Performed by: ORTHOPAEDIC SURGERY

## 2021-11-16 PROCEDURE — 63600175 PHARM REV CODE 636 W HCPCS: Performed by: ORTHOPAEDIC SURGERY

## 2021-11-16 PROCEDURE — 37000009 HC ANESTHESIA EA ADD 15 MINS: Performed by: ORTHOPAEDIC SURGERY

## 2021-11-16 PROCEDURE — 63600175 PHARM REV CODE 636 W HCPCS: Performed by: ANESTHESIOLOGY

## 2021-11-16 PROCEDURE — 36000711: Performed by: ORTHOPAEDIC SURGERY

## 2021-11-16 PROCEDURE — C1769 GUIDE WIRE: HCPCS | Performed by: ORTHOPAEDIC SURGERY

## 2021-11-16 PROCEDURE — 71000039 HC RECOVERY, EACH ADD'L HOUR: Performed by: ORTHOPAEDIC SURGERY

## 2021-11-16 DEVICE — SCREW BNE N LOK HEXLB 3.5X12: Type: IMPLANTABLE DEVICE | Site: WRIST | Status: FUNCTIONAL

## 2021-11-16 DEVICE — SCREW BONE LOK CONG 2.3X22MM: Type: IMPLANTABLE DEVICE | Site: WRIST | Status: FUNCTIONAL

## 2021-11-16 DEVICE — SCREW BNE LOK HEXLB 3.5X12: Type: IMPLANTABLE DEVICE | Site: WRIST | Status: FUNCTIONAL

## 2021-11-16 DEVICE — PEG LOCKING CORITCAL 2.3X20MM: Type: IMPLANTABLE DEVICE | Site: WRIST | Status: FUNCTIONAL

## 2021-11-16 DEVICE — PLATE VDR NARROW RIGHT: Type: IMPLANTABLE DEVICE | Site: WRIST | Status: FUNCTIONAL

## 2021-11-16 DEVICE — SCREW BONE 2.3 X 18MM: Type: IMPLANTABLE DEVICE | Site: WRIST | Status: FUNCTIONAL

## 2021-11-16 DEVICE — SCREW BONE LOK CONG 2.3X20MM: Type: IMPLANTABLE DEVICE | Site: WRIST | Status: FUNCTIONAL

## 2021-11-16 RX ORDER — KETOROLAC TROMETHAMINE 30 MG/ML
15 INJECTION, SOLUTION INTRAMUSCULAR; INTRAVENOUS ONCE
Status: COMPLETED | OUTPATIENT
Start: 2021-11-16 | End: 2021-11-16

## 2021-11-16 RX ORDER — ONDANSETRON 2 MG/ML
4 INJECTION INTRAMUSCULAR; INTRAVENOUS DAILY PRN
Status: DISCONTINUED | OUTPATIENT
Start: 2021-11-16 | End: 2021-11-16 | Stop reason: HOSPADM

## 2021-11-16 RX ORDER — ONDANSETRON 2 MG/ML
INJECTION INTRAMUSCULAR; INTRAVENOUS
Status: DISCONTINUED | OUTPATIENT
Start: 2021-11-16 | End: 2021-11-16

## 2021-11-16 RX ORDER — ONDANSETRON 8 MG/1
8 TABLET, ORALLY DISINTEGRATING ORAL EVERY 8 HOURS PRN
Status: DISCONTINUED | OUTPATIENT
Start: 2021-11-16 | End: 2021-11-16 | Stop reason: HOSPADM

## 2021-11-16 RX ORDER — OXYCODONE AND ACETAMINOPHEN 7.5; 325 MG/1; MG/1
1 TABLET ORAL EVERY 4 HOURS PRN
Qty: 40 TABLET | Refills: 0 | Status: ON HOLD | OUTPATIENT
Start: 2021-11-16 | End: 2022-02-16 | Stop reason: HOSPADM

## 2021-11-16 RX ORDER — MIDAZOLAM HYDROCHLORIDE 1 MG/ML
INJECTION, SOLUTION INTRAMUSCULAR; INTRAVENOUS
Status: DISCONTINUED | OUTPATIENT
Start: 2021-11-16 | End: 2021-11-16

## 2021-11-16 RX ORDER — HYDROMORPHONE HYDROCHLORIDE 2 MG/ML
0.4 INJECTION, SOLUTION INTRAMUSCULAR; INTRAVENOUS; SUBCUTANEOUS EVERY 5 MIN PRN
Status: DISCONTINUED | OUTPATIENT
Start: 2021-11-16 | End: 2021-11-16 | Stop reason: HOSPADM

## 2021-11-16 RX ORDER — ACETAMINOPHEN 325 MG/1
650 TABLET ORAL EVERY 4 HOURS PRN
Status: DISCONTINUED | OUTPATIENT
Start: 2021-11-16 | End: 2021-11-16 | Stop reason: HOSPADM

## 2021-11-16 RX ORDER — PROPOFOL 10 MG/ML
VIAL (ML) INTRAVENOUS CONTINUOUS PRN
Status: DISCONTINUED | OUTPATIENT
Start: 2021-11-16 | End: 2021-11-16

## 2021-11-16 RX ORDER — DEXMEDETOMIDINE HYDROCHLORIDE 100 UG/ML
INJECTION, SOLUTION INTRAVENOUS
Status: DISCONTINUED | OUTPATIENT
Start: 2021-11-16 | End: 2021-11-16

## 2021-11-16 RX ORDER — PROPOFOL 10 MG/ML
VIAL (ML) INTRAVENOUS
Status: DISCONTINUED | OUTPATIENT
Start: 2021-11-16 | End: 2021-11-16

## 2021-11-16 RX ORDER — ROPIVACAINE HYDROCHLORIDE 5 MG/ML
INJECTION, SOLUTION EPIDURAL; INFILTRATION; PERINEURAL
Status: COMPLETED | OUTPATIENT
Start: 2021-11-16 | End: 2021-11-16

## 2021-11-16 RX ORDER — VANCOMYCIN HYDROCHLORIDE 1 G/20ML
INJECTION, POWDER, LYOPHILIZED, FOR SOLUTION INTRAVENOUS
Status: DISCONTINUED | OUTPATIENT
Start: 2021-11-16 | End: 2021-11-16 | Stop reason: HOSPADM

## 2021-11-16 RX ORDER — OXYCODONE HYDROCHLORIDE 5 MG/1
10 TABLET ORAL EVERY 4 HOURS PRN
Status: DISCONTINUED | OUTPATIENT
Start: 2021-11-16 | End: 2021-11-16 | Stop reason: HOSPADM

## 2021-11-16 RX ORDER — LIDOCAINE HCL/PF 100 MG/5ML
SYRINGE (ML) INTRAVENOUS
Status: DISCONTINUED | OUTPATIENT
Start: 2021-11-16 | End: 2021-11-16

## 2021-11-16 RX ORDER — CEFAZOLIN SODIUM 2 G/50ML
2 SOLUTION INTRAVENOUS
Status: DISCONTINUED | OUTPATIENT
Start: 2021-11-16 | End: 2021-11-16 | Stop reason: HOSPADM

## 2021-11-16 RX ADMIN — ROPIVACAINE HYDROCHLORIDE 30 ML: 5 INJECTION, SOLUTION EPIDURAL; INFILTRATION; PERINEURAL at 02:11

## 2021-11-16 RX ADMIN — SODIUM CHLORIDE, SODIUM LACTATE, POTASSIUM CHLORIDE, AND CALCIUM CHLORIDE: .6; .31; .03; .02 INJECTION, SOLUTION INTRAVENOUS at 04:11

## 2021-11-16 RX ADMIN — ONDANSETRON 4 MG: 2 INJECTION, SOLUTION INTRAMUSCULAR; INTRAVENOUS at 06:11

## 2021-11-16 RX ADMIN — LIDOCAINE HYDROCHLORIDE 60 MG: 20 INJECTION, SOLUTION INTRAVENOUS at 05:11

## 2021-11-16 RX ADMIN — PROPOFOL 50 MG: 10 INJECTION, EMULSION INTRAVENOUS at 05:11

## 2021-11-16 RX ADMIN — MIDAZOLAM 2 MG: 1 INJECTION INTRAMUSCULAR; INTRAVENOUS at 04:11

## 2021-11-16 RX ADMIN — KETOROLAC TROMETHAMINE 15 MG: 30 INJECTION, SOLUTION INTRAMUSCULAR; INTRAVENOUS at 07:11

## 2021-11-16 RX ADMIN — PROPOFOL 75 MCG/KG/MIN: 10 INJECTION, EMULSION INTRAVENOUS at 05:11

## 2021-11-16 RX ADMIN — MIDAZOLAM 3 MG: 1 INJECTION INTRAMUSCULAR; INTRAVENOUS at 02:11

## 2021-11-16 RX ADMIN — PROPOFOL 30 MG: 10 INJECTION, EMULSION INTRAVENOUS at 05:11

## 2021-11-16 RX ADMIN — DEXMEDETOMIDINE HYDROCHLORIDE 8 MCG: 100 INJECTION, SOLUTION, CONCENTRATE INTRAVENOUS at 04:11

## 2021-11-16 RX ADMIN — CEFAZOLIN SODIUM 2 G: 2 SOLUTION INTRAVENOUS at 05:11

## 2021-11-17 ENCOUNTER — TELEPHONE (OUTPATIENT)
Dept: ORTHOPEDICS | Facility: CLINIC | Age: 71
End: 2021-11-17
Payer: MEDICARE

## 2021-11-22 ENCOUNTER — TELEPHONE (OUTPATIENT)
Dept: SLEEP MEDICINE | Facility: CLINIC | Age: 71
End: 2021-11-22
Payer: MEDICARE

## 2021-11-22 DIAGNOSIS — G25.81 RLS (RESTLESS LEGS SYNDROME): Primary | ICD-10-CM

## 2021-11-22 RX ORDER — PRAMIPEXOLE DIHYDROCHLORIDE 0.12 MG/1
TABLET ORAL
Qty: 150 TABLET | Refills: 1 | Status: SHIPPED | OUTPATIENT
Start: 2021-11-22 | End: 2021-12-06 | Stop reason: SDUPTHER

## 2021-11-24 DIAGNOSIS — S62.101P CLOSED FRACTURE OF RIGHT WRIST WITH MALUNION, SUBSEQUENT ENCOUNTER: Primary | ICD-10-CM

## 2021-11-29 ENCOUNTER — OFFICE VISIT (OUTPATIENT)
Dept: ORTHOPEDICS | Facility: CLINIC | Age: 71
End: 2021-11-29
Payer: MEDICARE

## 2021-11-29 ENCOUNTER — HOSPITAL ENCOUNTER (OUTPATIENT)
Dept: RADIOLOGY | Facility: HOSPITAL | Age: 71
Discharge: HOME | End: 2021-11-29
Attending: ORTHOPAEDIC SURGERY
Payer: MEDICARE

## 2021-11-29 VITALS — BODY MASS INDEX: 45.35 KG/M2 | WEIGHT: 240 LBS

## 2021-11-29 DIAGNOSIS — S62.101P CLOSED FRACTURE OF RIGHT WRIST WITH MALUNION, SUBSEQUENT ENCOUNTER: ICD-10-CM

## 2021-11-29 DIAGNOSIS — S62.101D CLOSED FRACTURE OF RIGHT WRIST WITH ROUTINE HEALING, SUBSEQUENT ENCOUNTER: Primary | ICD-10-CM

## 2021-11-29 PROCEDURE — 73100 X-RAY EXAM OF WRIST: CPT | Mod: TC,PN,RT

## 2021-11-29 PROCEDURE — 99213 OFFICE O/P EST LOW 20 MIN: CPT | Mod: PBBFAC,PN | Performed by: ORTHOPAEDIC SURGERY

## 2021-11-29 PROCEDURE — 73100 X-RAY EXAM OF WRIST: CPT | Mod: 26,RT,, | Performed by: RADIOLOGY

## 2021-11-29 PROCEDURE — 99024 PR POST-OP FOLLOW-UP VISIT: ICD-10-PCS | Mod: POP,,, | Performed by: ORTHOPAEDIC SURGERY

## 2021-11-29 PROCEDURE — 99999 PR PBB SHADOW E&M-EST. PATIENT-LVL III: ICD-10-PCS | Mod: PBBFAC,,, | Performed by: ORTHOPAEDIC SURGERY

## 2021-11-29 PROCEDURE — 73100 XR WRIST 2 VIEW RIGHT: ICD-10-PCS | Mod: 26,RT,, | Performed by: RADIOLOGY

## 2021-11-29 PROCEDURE — 99024 POSTOP FOLLOW-UP VISIT: CPT | Mod: POP,,, | Performed by: ORTHOPAEDIC SURGERY

## 2021-11-29 PROCEDURE — 99999 PR PBB SHADOW E&M-EST. PATIENT-LVL III: CPT | Mod: PBBFAC,,, | Performed by: ORTHOPAEDIC SURGERY

## 2021-12-03 ENCOUNTER — CLINICAL SUPPORT (OUTPATIENT)
Dept: REHABILITATION | Facility: HOSPITAL | Age: 71
End: 2021-12-03
Attending: ORTHOPAEDIC SURGERY
Payer: MEDICARE

## 2021-12-03 DIAGNOSIS — M25.531 PAIN IN RIGHT WRIST: ICD-10-CM

## 2021-12-03 DIAGNOSIS — S62.101D CLOSED FRACTURE OF RIGHT WRIST WITH ROUTINE HEALING, SUBSEQUENT ENCOUNTER: ICD-10-CM

## 2021-12-03 DIAGNOSIS — M25.641 DECREASED RANGE OF MOTION OF FINGER OF RIGHT HAND: ICD-10-CM

## 2021-12-03 DIAGNOSIS — M79.89 SWELLING OF RIGHT HAND: ICD-10-CM

## 2021-12-03 DIAGNOSIS — M25.631 DECREASED RANGE OF MOTION OF RIGHT WRIST: ICD-10-CM

## 2021-12-03 PROCEDURE — 97110 THERAPEUTIC EXERCISES: CPT | Mod: PN

## 2021-12-03 PROCEDURE — 97140 MANUAL THERAPY 1/> REGIONS: CPT | Mod: PN

## 2021-12-03 PROCEDURE — 97165 OT EVAL LOW COMPLEX 30 MIN: CPT | Mod: PN

## 2021-12-06 ENCOUNTER — PATIENT MESSAGE (OUTPATIENT)
Dept: SLEEP MEDICINE | Facility: CLINIC | Age: 71
End: 2021-12-06

## 2021-12-06 ENCOUNTER — TELEPHONE (OUTPATIENT)
Dept: SLEEP MEDICINE | Facility: CLINIC | Age: 71
End: 2021-12-06
Payer: MEDICARE

## 2021-12-06 ENCOUNTER — PATIENT MESSAGE (OUTPATIENT)
Dept: ADMINISTRATIVE | Facility: OTHER | Age: 71
End: 2021-12-06
Payer: MEDICARE

## 2021-12-06 ENCOUNTER — OFFICE VISIT (OUTPATIENT)
Dept: SLEEP MEDICINE | Facility: CLINIC | Age: 71
End: 2021-12-06
Payer: MEDICARE

## 2021-12-06 DIAGNOSIS — G25.81 RLS (RESTLESS LEGS SYNDROME): ICD-10-CM

## 2021-12-06 DIAGNOSIS — G47.33 OSA (OBSTRUCTIVE SLEEP APNEA): Primary | ICD-10-CM

## 2021-12-06 PROCEDURE — 99214 OFFICE O/P EST MOD 30 MIN: CPT | Mod: 95,,, | Performed by: PSYCHIATRY & NEUROLOGY

## 2021-12-06 PROCEDURE — 99214 PR OFFICE/OUTPT VISIT, EST, LEVL IV, 30-39 MIN: ICD-10-PCS | Mod: 95,,, | Performed by: PSYCHIATRY & NEUROLOGY

## 2021-12-06 RX ORDER — PRAMIPEXOLE DIHYDROCHLORIDE 0.12 MG/1
TABLET ORAL
Qty: 180 TABLET | Refills: 5 | Status: SHIPPED | OUTPATIENT
Start: 2021-12-06 | End: 2023-09-01

## 2021-12-06 RX ORDER — CLONAZEPAM 0.5 MG/1
TABLET ORAL
Qty: 30 TABLET | Refills: 3 | Status: SHIPPED | OUTPATIENT
Start: 2021-12-06 | End: 2021-12-09 | Stop reason: SDUPTHER

## 2021-12-08 ENCOUNTER — CLINICAL SUPPORT (OUTPATIENT)
Dept: REHABILITATION | Facility: HOSPITAL | Age: 71
End: 2021-12-08
Attending: ORTHOPAEDIC SURGERY
Payer: MEDICARE

## 2021-12-08 DIAGNOSIS — M25.531 PAIN IN RIGHT WRIST: ICD-10-CM

## 2021-12-08 DIAGNOSIS — M79.89 SWELLING OF RIGHT HAND: ICD-10-CM

## 2021-12-08 DIAGNOSIS — M25.641 DECREASED RANGE OF MOTION OF FINGER OF RIGHT HAND: ICD-10-CM

## 2021-12-08 DIAGNOSIS — M25.631 DECREASED RANGE OF MOTION OF RIGHT WRIST: ICD-10-CM

## 2021-12-08 PROCEDURE — 97110 THERAPEUTIC EXERCISES: CPT | Mod: PN

## 2021-12-08 PROCEDURE — 97010 HOT OR COLD PACKS THERAPY: CPT | Mod: PN

## 2021-12-08 PROCEDURE — 97140 MANUAL THERAPY 1/> REGIONS: CPT | Mod: PN

## 2021-12-09 DIAGNOSIS — G25.81 RLS (RESTLESS LEGS SYNDROME): ICD-10-CM

## 2021-12-09 RX ORDER — CLONAZEPAM 0.5 MG/1
TABLET ORAL
Qty: 60 TABLET | Refills: 5 | Status: ON HOLD | OUTPATIENT
Start: 2021-12-09 | End: 2022-02-16 | Stop reason: SDUPTHER

## 2021-12-10 ENCOUNTER — CLINICAL SUPPORT (OUTPATIENT)
Dept: REHABILITATION | Facility: HOSPITAL | Age: 71
End: 2021-12-10
Payer: MEDICARE

## 2021-12-10 DIAGNOSIS — M79.89 SWELLING OF RIGHT HAND: ICD-10-CM

## 2021-12-10 DIAGNOSIS — M25.641 DECREASED RANGE OF MOTION OF FINGER OF RIGHT HAND: ICD-10-CM

## 2021-12-10 DIAGNOSIS — M25.531 PAIN IN RIGHT WRIST: ICD-10-CM

## 2021-12-10 DIAGNOSIS — M25.631 DECREASED RANGE OF MOTION OF RIGHT WRIST: ICD-10-CM

## 2021-12-10 PROCEDURE — 97140 MANUAL THERAPY 1/> REGIONS: CPT | Mod: PN

## 2021-12-10 PROCEDURE — 97110 THERAPEUTIC EXERCISES: CPT | Mod: PN

## 2021-12-10 PROCEDURE — 97010 HOT OR COLD PACKS THERAPY: CPT | Mod: PN

## 2021-12-13 ENCOUNTER — CLINICAL SUPPORT (OUTPATIENT)
Dept: REHABILITATION | Facility: HOSPITAL | Age: 71
End: 2021-12-13
Payer: MEDICARE

## 2021-12-13 DIAGNOSIS — M25.531 PAIN IN RIGHT WRIST: ICD-10-CM

## 2021-12-13 DIAGNOSIS — M25.641 DECREASED RANGE OF MOTION OF FINGER OF RIGHT HAND: ICD-10-CM

## 2021-12-13 DIAGNOSIS — M79.89 SWELLING OF RIGHT HAND: ICD-10-CM

## 2021-12-13 DIAGNOSIS — M25.631 DECREASED RANGE OF MOTION OF RIGHT WRIST: ICD-10-CM

## 2021-12-13 PROCEDURE — 97140 MANUAL THERAPY 1/> REGIONS: CPT | Mod: PN

## 2021-12-13 PROCEDURE — 97110 THERAPEUTIC EXERCISES: CPT | Mod: PN

## 2021-12-13 PROCEDURE — 97022 WHIRLPOOL THERAPY: CPT | Mod: PN

## 2021-12-17 ENCOUNTER — CLINICAL SUPPORT (OUTPATIENT)
Dept: REHABILITATION | Facility: HOSPITAL | Age: 71
End: 2021-12-17
Payer: MEDICARE

## 2021-12-17 DIAGNOSIS — M25.631 DECREASED RANGE OF MOTION OF RIGHT WRIST: ICD-10-CM

## 2021-12-17 DIAGNOSIS — M79.89 SWELLING OF RIGHT HAND: ICD-10-CM

## 2021-12-17 DIAGNOSIS — M25.641 DECREASED RANGE OF MOTION OF FINGER OF RIGHT HAND: ICD-10-CM

## 2021-12-17 DIAGNOSIS — M25.531 PAIN IN RIGHT WRIST: ICD-10-CM

## 2021-12-17 PROCEDURE — 97110 THERAPEUTIC EXERCISES: CPT | Mod: PN

## 2021-12-17 PROCEDURE — 97022 WHIRLPOOL THERAPY: CPT | Mod: PN

## 2021-12-17 PROCEDURE — 97140 MANUAL THERAPY 1/> REGIONS: CPT | Mod: PN

## 2021-12-21 ENCOUNTER — CLINICAL SUPPORT (OUTPATIENT)
Dept: REHABILITATION | Facility: HOSPITAL | Age: 71
End: 2021-12-21
Payer: MEDICARE

## 2021-12-21 ENCOUNTER — PATIENT MESSAGE (OUTPATIENT)
Dept: SLEEP MEDICINE | Facility: CLINIC | Age: 71
End: 2021-12-21
Payer: MEDICARE

## 2021-12-21 DIAGNOSIS — R06.2 WHEEZING: ICD-10-CM

## 2021-12-21 DIAGNOSIS — M79.89 SWELLING OF RIGHT HAND: ICD-10-CM

## 2021-12-21 DIAGNOSIS — M25.641 DECREASED RANGE OF MOTION OF FINGER OF RIGHT HAND: ICD-10-CM

## 2021-12-21 DIAGNOSIS — R05.9 COUGH: ICD-10-CM

## 2021-12-21 DIAGNOSIS — M25.631 DECREASED RANGE OF MOTION OF RIGHT WRIST: ICD-10-CM

## 2021-12-21 DIAGNOSIS — J40 BRONCHITIS: ICD-10-CM

## 2021-12-21 DIAGNOSIS — M25.531 PAIN IN RIGHT WRIST: ICD-10-CM

## 2021-12-21 PROCEDURE — 97110 THERAPEUTIC EXERCISES: CPT | Mod: PN

## 2021-12-21 PROCEDURE — 97022 WHIRLPOOL THERAPY: CPT | Mod: PN

## 2021-12-21 PROCEDURE — 97140 MANUAL THERAPY 1/> REGIONS: CPT | Mod: PN

## 2021-12-22 ENCOUNTER — CLINICAL SUPPORT (OUTPATIENT)
Dept: REHABILITATION | Facility: HOSPITAL | Age: 71
End: 2021-12-22
Payer: MEDICARE

## 2021-12-22 DIAGNOSIS — M25.641 DECREASED RANGE OF MOTION OF FINGER OF RIGHT HAND: ICD-10-CM

## 2021-12-22 DIAGNOSIS — M25.631 DECREASED RANGE OF MOTION OF RIGHT WRIST: ICD-10-CM

## 2021-12-22 DIAGNOSIS — M25.531 PAIN IN RIGHT WRIST: ICD-10-CM

## 2021-12-22 DIAGNOSIS — M79.89 SWELLING OF RIGHT HAND: ICD-10-CM

## 2021-12-22 PROCEDURE — 97110 THERAPEUTIC EXERCISES: CPT | Mod: PN

## 2021-12-22 PROCEDURE — 97022 WHIRLPOOL THERAPY: CPT | Mod: PN

## 2021-12-24 ENCOUNTER — PATIENT MESSAGE (OUTPATIENT)
Dept: ORTHOPEDICS | Facility: CLINIC | Age: 71
End: 2021-12-24
Payer: MEDICARE

## 2021-12-26 RX ORDER — FUROSEMIDE 40 MG/1
TABLET ORAL
Qty: 180 TABLET | Refills: 3 | Status: ON HOLD | OUTPATIENT
Start: 2021-12-26 | End: 2022-02-16 | Stop reason: SDUPTHER

## 2021-12-27 ENCOUNTER — OFFICE VISIT (OUTPATIENT)
Dept: ORTHOPEDICS | Facility: CLINIC | Age: 71
End: 2021-12-27
Payer: MEDICARE

## 2021-12-27 ENCOUNTER — HOSPITAL ENCOUNTER (OUTPATIENT)
Dept: RADIOLOGY | Facility: HOSPITAL | Age: 71
Discharge: HOME | End: 2021-12-27
Attending: ORTHOPAEDIC SURGERY
Payer: MEDICARE

## 2021-12-27 VITALS — HEIGHT: 61 IN | BODY MASS INDEX: 45.31 KG/M2 | WEIGHT: 240 LBS

## 2021-12-27 DIAGNOSIS — S62.101D CLOSED FRACTURE OF RIGHT WRIST WITH ROUTINE HEALING, SUBSEQUENT ENCOUNTER: Primary | ICD-10-CM

## 2021-12-27 DIAGNOSIS — S62.101D CLOSED FRACTURE OF RIGHT WRIST WITH ROUTINE HEALING, SUBSEQUENT ENCOUNTER: ICD-10-CM

## 2021-12-27 PROCEDURE — 73100 X-RAY EXAM OF WRIST: CPT | Mod: TC,PN,RT

## 2021-12-27 PROCEDURE — 99024 POSTOP FOLLOW-UP VISIT: CPT | Mod: POP,,, | Performed by: ORTHOPAEDIC SURGERY

## 2021-12-27 PROCEDURE — 99024 PR POST-OP FOLLOW-UP VISIT: ICD-10-PCS | Mod: POP,,, | Performed by: ORTHOPAEDIC SURGERY

## 2021-12-27 PROCEDURE — 99213 OFFICE O/P EST LOW 20 MIN: CPT | Mod: PBBFAC,PN | Performed by: ORTHOPAEDIC SURGERY

## 2021-12-27 PROCEDURE — 99999 PR PBB SHADOW E&M-EST. PATIENT-LVL III: CPT | Mod: PBBFAC,,, | Performed by: ORTHOPAEDIC SURGERY

## 2021-12-27 PROCEDURE — 99999 PR PBB SHADOW E&M-EST. PATIENT-LVL III: ICD-10-PCS | Mod: PBBFAC,,, | Performed by: ORTHOPAEDIC SURGERY

## 2021-12-27 PROCEDURE — 73100 XR WRIST 2 VIEW RIGHT: ICD-10-PCS | Mod: 26,RT,, | Performed by: RADIOLOGY

## 2021-12-27 PROCEDURE — 73100 X-RAY EXAM OF WRIST: CPT | Mod: 26,RT,, | Performed by: RADIOLOGY

## 2021-12-27 RX ORDER — TRAMADOL HYDROCHLORIDE 50 MG/1
50 TABLET ORAL EVERY 6 HOURS PRN
Qty: 30 TABLET | Refills: 0 | Status: SHIPPED | OUTPATIENT
Start: 2021-12-27 | End: 2021-12-28 | Stop reason: SDUPTHER

## 2021-12-28 ENCOUNTER — CLINICAL SUPPORT (OUTPATIENT)
Dept: REHABILITATION | Facility: HOSPITAL | Age: 71
End: 2021-12-28
Attending: ORTHOPAEDIC SURGERY
Payer: MEDICARE

## 2021-12-28 DIAGNOSIS — R06.2 WHEEZING: ICD-10-CM

## 2021-12-28 DIAGNOSIS — R05.9 COUGH: ICD-10-CM

## 2021-12-28 DIAGNOSIS — M79.89 SWELLING OF RIGHT HAND: ICD-10-CM

## 2021-12-28 DIAGNOSIS — J40 BRONCHITIS: ICD-10-CM

## 2021-12-28 DIAGNOSIS — M25.641 DECREASED RANGE OF MOTION OF FINGER OF RIGHT HAND: ICD-10-CM

## 2021-12-28 DIAGNOSIS — M25.531 PAIN IN RIGHT WRIST: ICD-10-CM

## 2021-12-28 DIAGNOSIS — M25.631 DECREASED RANGE OF MOTION OF RIGHT WRIST: ICD-10-CM

## 2021-12-28 PROCEDURE — 97140 MANUAL THERAPY 1/> REGIONS: CPT | Mod: PN

## 2021-12-28 PROCEDURE — 97022 WHIRLPOOL THERAPY: CPT | Mod: PN

## 2021-12-28 PROCEDURE — 97110 THERAPEUTIC EXERCISES: CPT | Mod: PN

## 2021-12-29 RX ORDER — TRAMADOL HYDROCHLORIDE 50 MG/1
50 TABLET ORAL EVERY 6 HOURS PRN
Qty: 30 TABLET | Refills: 0 | Status: SHIPPED | OUTPATIENT
Start: 2021-12-29 | End: 2022-01-08

## 2021-12-29 RX ORDER — ALBUTEROL SULFATE 90 UG/1
2 AEROSOL, METERED RESPIRATORY (INHALATION) EVERY 6 HOURS PRN
Qty: 18 G | Refills: 0 | Status: SHIPPED | OUTPATIENT
Start: 2021-12-29 | End: 2022-08-11 | Stop reason: SDUPTHER

## 2021-12-30 RX ORDER — ALBUTEROL SULFATE 90 UG/1
AEROSOL, METERED RESPIRATORY (INHALATION)
Qty: 18 G | Refills: 0 | OUTPATIENT
Start: 2021-12-30

## 2022-01-02 ENCOUNTER — PATIENT MESSAGE (OUTPATIENT)
Dept: ADMINISTRATIVE | Facility: OTHER | Age: 72
End: 2022-01-02
Payer: MEDICARE

## 2022-01-03 ENCOUNTER — PATIENT MESSAGE (OUTPATIENT)
Dept: ADMINISTRATIVE | Facility: OTHER | Age: 72
End: 2022-01-03
Payer: MEDICARE

## 2022-01-04 ENCOUNTER — CLINICAL SUPPORT (OUTPATIENT)
Dept: REHABILITATION | Facility: HOSPITAL | Age: 72
End: 2022-01-04
Payer: MEDICARE

## 2022-01-04 DIAGNOSIS — M79.89 SWELLING OF RIGHT HAND: ICD-10-CM

## 2022-01-04 DIAGNOSIS — M25.531 PAIN IN RIGHT WRIST: ICD-10-CM

## 2022-01-04 DIAGNOSIS — M25.631 DECREASED RANGE OF MOTION OF RIGHT WRIST: ICD-10-CM

## 2022-01-04 DIAGNOSIS — M25.641 DECREASED RANGE OF MOTION OF FINGER OF RIGHT HAND: ICD-10-CM

## 2022-01-04 PROCEDURE — 97110 THERAPEUTIC EXERCISES: CPT | Mod: PN

## 2022-01-04 PROCEDURE — 97140 MANUAL THERAPY 1/> REGIONS: CPT | Mod: PN

## 2022-01-04 PROCEDURE — 97022 WHIRLPOOL THERAPY: CPT | Mod: PN

## 2022-01-04 NOTE — PROGRESS NOTES
"  Occupational Therapy Daily Treatment Note     Date: 1/4/2022  Name: Hannah Norman  Clinic Number: [id number]    Therapy Diagnosis:   Encounter Diagnoses   Name Primary?    Pain in right wrist     Decreased range of motion of right wrist     Decreased range of motion of finger of right hand     Swelling of right hand      Physician: No ref. provider found    Medical Diagnosis: S62.101D (ICD-10-CM) - Closed fracture of right wrist with routine healing, subsequent encounter  Physician Orders: OT/CHT Eval & Treat; Wrist/Hand  Per visit with Dr. Lynn on 12/27/21,   "She can start weaning out of the splint now specially at home  Continue therapy  Edema control with a glove or compressive Nirmal  Light activities no heavy lifting Ultram for pain"  Evaluation Date: 12/3/2021    Insurance Authorization Period Expiration: 12/31/21  Plan of Care from 12/3/2021 to 03/03/2022   Surgery Date and Procedure: DOS-11/16/21; Open reduction and internal fixation of right distal radius fracture, 3 or more fragments, intra-articular, using Acumed volar    Date of Return to MD: 12/27/21     Visit #: 8 of 50  FOTO Eval: 59%  FOTO R/A next visit    Time In: 11:30 am  Time Out:  12:30  Total Billable Time: 40 min     Precautions: Standard, Fall and Weightbearing; attends pain mgmt; w/c for back pain; precautions/restrictions for Week 8 today; see 12/27/21 MD Plan    Subjective     Pt report/Response to previous treatments: 1/4: Pt discussed understanding MD Plan. She arrives in compression glove and wrist support. She c/o thumb and IF pain of a "5"/10. She leaves understanding to discontinue using the t-putty for now.  12/22: Pt reports no increase pain from last session. She had no c/o pain during this session.   12/17: Pt reports only pain at ulna wrist    Pain: 5/10 at rest; 2-3/10 at worst this session using non-verbal indicators  Location: Right Thumb and SF    Objective   Observation: Incision scar is near invisible. Skin is dry and " intact yet fragile.     Edema: Circumferential measurements: In centimters       Right/Left R R R R     IE-12/3/21 12/10/21 12/13/21 12/21/21 1/4/22   IF P1  7.5/7.0 7.3 (-.2) nt 7.3 (=) 7.2 (-.1)   SF P1   6.0/5.7 5.9 (-.1) nt 6.0 (+.1) 5.8 (-.2)   Thumb P1 7.3/6.6 7.0 (-.3) 6.7 (-.3) 6.8 (+.1) 6.5 (-.3)   DPC 20.2/18.8 19.5 (-.7) nt     Wrist 19.0/17.0 18.9 (-.1) 18.0 (-.9) 17.5 (-1.4) 17.3 (-.2)                Digit and Thumb AROM (in centimeters)  Measured from tip to DPC Right R R R   DATE IE-12/3/21 12/10/21 12/21/21 1/4/22   IF  3.0/0 2.5 (+.5) 1.0 (+1.5) 0 (+1.0)   Thumb 4.5/0 4.5 (=) 3.0 (+1.5) 1.5 (+1.5)               Wrist AROM    Right/Left R R R   DATE IE-12/3/21 12/10/21 12/21/21 1/4/22   EXT 35/62 35 41 47   FLX 5/62 32 34 50   RD 5/14 12 12 14   UD  10/40 25 24 29   WOODS 55/178 104 (+49) 111 (+7) 140 (+29)      Forearm AROM    Right/Left R R R   DATE IE-12/3/21 12/10/21 12/21/21 1/4/22   Supination 43/60 45 (+2) 64 (+19) 66 (+2)   Pronation 90/90 (=) (=) (=)     Treatment      Hannah received the following supervised modalities after being cleared for contraindications for 15 minutes:   - Fluidotherapy for promoting healing, reducing pain, desensitization and increasing tissue extensibility     Hannah received the following manual therapy techniques for 12 minutes:   - Scar massage  - Retrograde massage   - Grade I-II Rad/Carp, Carp/MC and DRUJ mobilizations  - AAROM wrist flexion/extension and rd/ud; 1x10     Hannah performed therapeutic exercises for 25 minutes including:  Each wrist, forearm AROM, TGEs and spreads & thumb AROM Performed during final minutes of Fluidotherapy   Digit AROM R/A 1/4/22   Wrist Wheel 2 min s/p   Wrist AROM R/A 1/4/22  1 min each:  - 4 ways holding 2cm dowel over wedge  2 min each:  - Jux-a-ciser   Thumb AROM R/A 1/4/22  - spool ladder spun on each digit   Intrinsic activation Deferred   Forearm AROM R/A 1/4/22  - sustained supination activities x 2 min each  - isospheres  (golf balls)  - large spool w/marble rotations   Sensory/propriocetion 2 min each:  Magnet balance stick       Hannah participated in a concurrent discussion of evaluation findings and specific home care in a prior session, including:  - protocol precautions/restrictions and MD orders clarified  - instruction in activity modifications for goals of protecting healing tissues to include wearing wrist brace at all times; remove for HEP and hygiene.    Home Exercise Program and Home Care Resources/Education:     Education provided:   - Discontinue t-putty; prior HEP in place  - Progress made  - Education provided on all interventions performed during today's session     Education provided prior sessions:   - Incision site health after MD viewed in Media  - HEP as set  - Compression glove recommended; reference provided of search results.  - Add'l tubigrip provided for level of proximal forearm to manage displaced edema.  - Add'l Edema mgmt options recommended: elevation and gentle HEP for AROM    Written Home Exercises Provided: Patient instructed to cont prior HEP.  Exercises were reviewed and Hannah was able to demonstrate them prior to the end of the session.  Hannah demonstrated good  understanding of the HEP provided.   .   See EMR under Patient Instructions for exercises provided prior visit.        Assessment   1/4: Good improvements in AROM and edema mgmt. Pain increased after initiating t-putty ex. HEP adjusted accordingly.  12/28: Excellent session today with improved pain and positive response to theraputty based strengthening exercises.  12/22: Good tolerance for advancing PREs.  12/21: Pt has increased AROM, reduced edema and is tolerating advancing PREs    Goals:  ( LTG's (8-10 weeks):  1)   Increase Right wrist WOODS to >80% of the unaffected wrist to increase functional hand use for weight bearing and reaching tasks.  2)   Right  strength  to be > 70 % of the unaffected side.  3)   Right pinch strength to  be >60% of the unaffected side.  4)   Decrease complaints of pain to 3 out of 10 at worst to increase functional hand use and UE support functions for moderate to heavy ADL/work/leisure activities.  5)   Patient to score at 40% or less on Quick/DASH to demonstrate improved perception of functional right hand use to evidence return to near to prior level of function for I/ADLs and functional mobility.     STG's (4-6 weeks)  1)   Patient to be IND with HEP and modalities for pain/edema managment. Progressing  2)   Pt to tolerate advancing PREs and flexibility activities for weight bearing positions with self-graded intensity and effective symptom monitoring. Progressing  3) Increase wrist and forearm WOODS by 60 degrees to increase functional hand use and support function positions for ADLs/work/leisure activities. Progressing  4) Digit and Thumb WOODS to be WNL as compared to the unaffected side for maximizing  and fine motor skills for reactivation the hand for light ADLs. Progressing  5)  Decrease edema by .5 to 1.0 cm for evidencing soft tissue healing and effective edema mgmt. MET 12/13/21     Pt would continue to benefit from skilled OT as per original POC.     Hannah is progressing as expected towards her goals and there are no updates to goals at this time. Pt prognosis is Good.     Pt will continue to benefit from skilled outpatient occupational therapy to address the deficits listed in the problem list on initial evaluation provide pt/family education and to maximize pt's level of independence in the home and community environment.     Pt's spiritual, cultural and educational needs considered and pt agreeable to plan of care and goals.    Plan   1/4: Advance flexibility and PREs as tolerated.     Cont OT to address above goals as per original POC.    EUGENIE Santos, COMFORTT  Occupational Therapist, Certified Hand Therapist

## 2022-01-06 ENCOUNTER — CLINICAL SUPPORT (OUTPATIENT)
Dept: REHABILITATION | Facility: HOSPITAL | Age: 72
End: 2022-01-06
Payer: MEDICARE

## 2022-01-06 DIAGNOSIS — M25.631 DECREASED RANGE OF MOTION OF RIGHT WRIST: ICD-10-CM

## 2022-01-06 DIAGNOSIS — M25.531 PAIN IN RIGHT WRIST: ICD-10-CM

## 2022-01-06 DIAGNOSIS — M79.89 SWELLING OF RIGHT HAND: ICD-10-CM

## 2022-01-06 DIAGNOSIS — M25.641 DECREASED RANGE OF MOTION OF FINGER OF RIGHT HAND: ICD-10-CM

## 2022-01-06 PROCEDURE — 97530 THERAPEUTIC ACTIVITIES: CPT | Mod: PN

## 2022-01-06 PROCEDURE — 97022 WHIRLPOOL THERAPY: CPT | Mod: PN

## 2022-01-06 NOTE — PROGRESS NOTES
"  Occupational Therapy Daily Treatment Note     Date: 1/6/2022  Name: Hannah Norman  Clinic Number: [id number]    Therapy Diagnosis:   Encounter Diagnoses   Name Primary?    Pain in right wrist     Decreased range of motion of right wrist     Decreased range of motion of finger of right hand     Swelling of right hand      Physician: Evelio Lynn Jr., *    Medical Diagnosis: S62.101D (ICD-10-CM) - Closed fracture of right wrist with routine healing, subsequent encounter  Physician Orders: OT/CHT Eval & Treat; Wrist/Hand  Per visit with Dr. Lynn on 12/27/21,   "She can start weaning out of the splint now specially at home  Continue therapy  Edema control with a glove or compressive Nirmal  Light activities no heavy lifting Ultram for pain"  Evaluation Date: 12/3/2021    Insurance Authorization Period Expiration: 12/31/21  Plan of Care from 12/3/2021 to 03/03/2022   Surgery Date and Procedure: DOS-11/16/21; Open reduction and internal fixation of right distal radius fracture, 3 or more fragments, intra-articular, using Acumed volar    Date of Return to MD: 12/27/21     Visit #: 9 of 50  FOTO Eval: 59%  FOTO R/A next visit    Time In: 11:40 am  Time Out:  12:30   Total Billable Time: 40 min     Precautions: Standard, Fall and Weightbearing; attends pain mgmt; w/c for back pain; precautions/restrictions for Week 8 today; see 12/27/21 MD Plan    Subjective     Pt report/Response to previous treatments:   1/4: Pt discussed understanding MD Plan. She arrives in compression glove and wrist support. She c/o thumb and IF pain of a "5"/10. She leaves understanding to discontinue using the t-putty for now.  12/22: Pt reports no increase pain from last session. She had no c/o pain during this session.   12/17: Pt reports only pain at ulna wrist    Pain: 3/10 start of session, 4/10 end of session   Location: Right Thumb and SF    Objective   Observation: Incision scar is near invisible. Skin is dry and intact yet fragile. " Increased redness and edema in digits      Edema: Circumferential measurements: In centimters       Right/Left R R R R     IE-12/3/21 12/10/21 12/13/21 12/21/21 1/4/22   IF P1  7.5/7.0 7.3 (-.2) nt 7.3 (=) 7.2 (-.1)   SF P1   6.0/5.7 5.9 (-.1) nt 6.0 (+.1) 5.8 (-.2)   Thumb P1 7.3/6.6 7.0 (-.3) 6.7 (-.3) 6.8 (+.1) 6.5 (-.3)   DPC 20.2/18.8 19.5 (-.7) nt     Wrist 19.0/17.0 18.9 (-.1) 18.0 (-.9) 17.5 (-1.4) 17.3 (-.2)                Digit and Thumb AROM (in centimeters)  Measured from tip to DPC Right R R R   DATE IE-12/3/21 12/10/21 12/21/21 1/4/22   IF  3.0/0 2.5 (+.5) 1.0 (+1.5) 0 (+1.0)   Thumb 4.5/0 4.5 (=) 3.0 (+1.5) 1.5 (+1.5)               Wrist AROM    Right/Left R R R   DATE IE-12/3/21 12/10/21 12/21/21 1/4/22   EXT 35/62 35 41 47   FLX 5/62 32 34 50   RD 5/14 12 12 14   UD  10/40 25 24 29   WOODS 55/178 104 (+49) 111 (+7) 140 (+29)      Forearm AROM    Right/Left R R R   DATE IE-12/3/21 12/10/21 12/21/21 1/4/22   Supination 43/60 45 (+2) 64 (+19) 66 (+2)   Pronation 90/90 (=) (=) (=)     Treatment      Hannah received the following supervised modalities after being cleared for contraindications for 15 minutes:   - Fluidotherapy for promoting healing, reducing pain, desensitization and increasing tissue extensibility     Hannah received the following manual therapy techniques for 10 minutes:   - Scar massage  - Retrograde massage   - AAROM wrist flexion/extension and rd/ud; 1x10     Hannah performed therapeutic exercises for 20 minutes including:  Each wrist, forearm AROM, TGEs and spreads & thumb AROM Performed during final minutes of Fluidotherapy   Digit AROM  Fists  x30 with elbow elevated    Wrist maze  2 minutes    Wrist Wheel 2 min s/p   Wrist AROM 1 min each:  - 4 ways holding 2cm dowel over wedge   In hand manipulation Spheres x1 min     sara grooved peg board on elevated surface, 2-3 at a time   Removed with red clothespin  1/2 board, no resistance second 1/2        Hannah participated in a concurrent  discussion of evaluation findings and specific home care in a prior session, including:  - protocol precautions/restrictions and MD orders clarified  - instruction in activity modifications for goals of protecting healing tissues to include wearing wrist brace at all times; remove for HEP and hygiene.    Home Exercise Program and Home Care Resources/Education:     Education provided:   - Discontinue t-putty; prior HEP in place  - Progress made  - Education provided on all interventions performed during today's session     Education provided prior sessions:   - Incision site health after MD viewed in Media  - HEP as set  - Compression glove recommended; reference provided of search results.  - Add'l tubigrip provided for level of proximal forearm to manage displaced edema.  - Add'l Edema mgmt options recommended: elevation and gentle HEP for AROM    Written Home Exercises Provided: Patient instructed to cont prior HEP.  Exercises were reviewed and Hannah was able to demonstrate them prior to the end of the session.  Hannah demonstrated good  understanding of the HEP provided.   .   See EMR under Patient Instructions for exercises provided prior visit.        Assessment     Hannah tolerated today's session well. Interventions focused on AROM, edema management No pain with AROM of wrist or digits. She reported mild soreness in R thumb with resistive pinch activity, graded to in hand manipulation. Pt reported 4/10 pain end of session. No difficulty with high level fine motor coordination activity      Goals:  ( LTG's (8-10 weeks):  1)   Increase Right wrist WOODS to >80% of the unaffected wrist to increase functional hand use for weight bearing and reaching tasks.  2)   Right  strength  to be > 70 % of the unaffected side.  3)   Right pinch strength to be >60% of the unaffected side.  4)   Decrease complaints of pain to 3 out of 10 at worst to increase functional hand use and UE support functions for moderate to heavy  ADL/work/leisure activities.  5)   Patient to score at 40% or less on Quick/DASH to demonstrate improved perception of functional right hand use to evidence return to near to prior level of function for I/ADLs and functional mobility.     STG's (4-6 weeks)  1)   Patient to be IND with HEP and modalities for pain/edema managment. Progressing  2)   Pt to tolerate advancing PREs and flexibility activities for weight bearing positions with self-graded intensity and effective symptom monitoring. Progressing  3) Increase wrist and forearm WOODS by 60 degrees to increase functional hand use and support function positions for ADLs/work/leisure activities. Progressing  4) Digit and Thumb OWODS to be WNL as compared to the unaffected side for maximizing  and fine motor skills for reactivation the hand for light ADLs. Progressing  5)  Decrease edema by .5 to 1.0 cm for evidencing soft tissue healing and effective edema mgmt. MET 12/13/21     Pt would continue to benefit from skilled OT as per original POC.     Hannah is progressing as expected towards her goals and there are no updates to goals at this time. Pt prognosis is Good.     Pt will continue to benefit from skilled outpatient occupational therapy to address the deficits listed in the problem list on initial evaluation provide pt/family education and to maximize pt's level of independence in the home and community environment.     Pt's spiritual, cultural and educational needs considered and pt agreeable to plan of care and goals.    Plan   1/4: Advance flexibility and PREs as tolerated.     Cont OT to address above goals as per original POC.    Corinne Rapier, OTR

## 2022-01-10 NOTE — PROGRESS NOTES
"  Occupational Therapy Daily Treatment Note     Date: 1/11/2022  Name: Hannah Norman  Clinic Number: [id number]    Therapy Diagnosis:   Encounter Diagnoses   Name Primary?    Pain in right wrist     Decreased range of motion of right wrist     Decreased range of motion of finger of right hand     Swelling of right hand      Physician: Evelio Lynn Jr., *    Medical Diagnosis: S62.101D (ICD-10-CM) - Closed fracture of right wrist with routine healing, subsequent encounter  Physician Orders: OT/CHT Eval & Treat; Wrist/Hand  Per visit with Dr. Lynn on 12/27/21,   "She can start weaning out of the splint now specially at home  Continue therapy  Edema control with a glove or compressive Nirmal  Light activities no heavy lifting Ultram for pain"  Evaluation Date: 12/3/2021    Insurance Authorization Period Expiration: 12/31/21  Plan of Care from 12/3/2021 to 03/03/2022   Surgery Date and Procedure: DOS-11/16/21; Open reduction and internal fixation of right distal radius fracture, 3 or more fragments, intra-articular, using Acumed volar    Date of Return to MD: 12/27/21     Visit #: 9 of 50  FOTO Eval: 59%  FOTO R/A next visit    Time In: 9:30 am  Time Out:  10:30 am   Total Billable Time: 45 min     Precautions: Standard, Fall and Weightbearing; attends pain mgmt; w/c for back pain; precautions/restrictions for Week 8 today; see 12/27/21 MD Plan    Subjective     Pt report/Response to previous treatments: 1/11: Pt reports pain of a "5"/10 in her thumb.  1/4: Pt discussed understanding MD Plan. She arrives in compression glove and wrist support. She c/o thumb and IF pain of a "5"/10. She leaves understanding to discontinue using the t-putty for now.  12/22: Pt reports no increase pain from last session. She had no c/o pain during this session.   12/17: Pt reports only pain at ulna wrist    Pain: 3/10 start of session, 4/10 end of session   Location: Right Thumb and SF    Objective   Observation: Incision scar is " near invisible. Skin is dry and intact yet fragile. Increased redness and edema in digits      Edema: Circumferential measurements: In centimters       Right/Left R R R R      IE-12/3/21 12/10/21 12/13/21 12/21/21 1/4/22    IF P1  7.5/7.0 7.3 (-.2) nt 7.3 (=) 7.2 (-.1)    SF P1   6.0/5.7 5.9 (-.1) nt 6.0 (+.1) 5.8 (-.2)    Thumb P1 7.3/6.6 7.0 (-.3) 6.7 (-.3) 6.8 (+.1) 6.5 (-.3)    DPC 20.2/18.8 19.5 (-.7) nt      Wrist 19.0/17.0 18.9 (-.1) 18.0 (-.9) 17.5 (-1.4) 17.3 (-.2)                  Digit and Thumb AROM (in centimeters)  Measured from tip to DPC Right R R R    DATE IE-12/3/21 12/10/21 12/21/21 1/4/22    IF  3.0/0 2.5 (+.5) 1.0 (+1.5) 0 (+1.0)    Thumb 4.5/0 4.5 (=) 3.0 (+1.5) 1.5 (+1.5)                 Wrist AROM    Right/Left R R R R   DATE IE-12/3/21 12/10/21 12/21/21 1/4/22    EXT 35/62 35 41 47    FLX 5/62 32 34 50    RD 5/14 12 12 14    UD  10/40 25 24 29    WOODS 55/178 104 (+49) 111 (+7) 140 (+29)       Forearm AROM    Right/Left R R R R   DATE IE-12/3/21 12/10/21 12/21/21 1/4/22    Supination 43/60 45 (+2) 64 (+19) 66 (+2)    Pronation 90/90 (=) (=) (=)      Treatment      Hannah received the following supervised modalities after being cleared for contraindications for 15 minutes:   - Fluidotherapy for promoting healing, reducing pain, desensitization and increasing tissue extensibility     Hannah received the following manual therapy techniques for 10 minutes:   - Scar massage  - Retrograde massage   - AAROM wrist flexion/extension and rd/ud; 1x10     Hannah performed therapeutic exercises for 30 minutes including:  Each wrist, forearm AROM, TGEs and spreads & thumb AROM Performed during final minutes of Fluidotherapy   Digit AROM  Fists  x30 with elbow elevated    Finger strengthening (no thumb) 1 min each, Yellow Powerweb:  - hook pulls  - lifts  - spreads   Forearm sup/pronation 2 min holding 4 oz hammer; progressing torque as tolerated    strengthening 1x10 each:  - Soft  (no thumb)    Defer-In hand manipulation Spheres x1 min    Weight bearing progression 3 min:  -Yellow Powerweb presses   Wrist/forearm strengthening 2 min each:  - wrist/digit extension using 1/2# wrap at hand level  - Red flexbar, frowns, no thumb       Hannah participated in a concurrent discussion of evaluation findings and specific home care in a prior session, including:  - protocol precautions/restrictions and MD orders clarified  - instruction in activity modifications for goals of protecting healing tissues to include wearing wrist brace at all times; remove for HEP and hygiene.    Home Exercise Program and Home Care Resources/Education:     Education provided:   - Discontinue t-putty; prior HEP in place  - Progress made  - Education provided on all interventions performed during today's session     Education provided prior sessions:   - Incision site health after MD viewed in Media  - HEP as set  - Compression glove recommended; reference provided of search results.  - Add'l tubigrip provided for level of proximal forearm to manage displaced edema.  - Add'l Edema mgmt options recommended: elevation and gentle HEP for AROM    Written Home Exercises Provided: Patient instructed to cont prior HEP.  Exercises were reviewed and Hannah was able to demonstrate them prior to the end of the session.  Hannah demonstrated good  understanding of the HEP provided.   .   See EMR under Patient Instructions for exercises provided prior visit.        Assessment     Hannah tolerated today's session well. Interventions focused on AROM, edema management No pain with AROM of wrist or digits. She reported mild soreness in R thumb with resistive pinch activity, graded to in hand manipulation. Pt reported 4/10 pain end of session. No difficulty with high level fine motor coordination activity      Goals:  ( LTG's (8-10 weeks):  1)   Increase Right wrist WOODS to >80% of the unaffected wrist to increase functional hand use for weight bearing and  reaching tasks.  2)   Right  strength  to be > 70 % of the unaffected side.  3)   Right pinch strength to be >60% of the unaffected side.  4)   Decrease complaints of pain to 3 out of 10 at worst to increase functional hand use and UE support functions for moderate to heavy ADL/work/leisure activities.  5)   Patient to score at 40% or less on Quick/DASH to demonstrate improved perception of functional right hand use to evidence return to near to prior level of function for I/ADLs and functional mobility.     STG's (4-6 weeks)  1)   Patient to be IND with HEP and modalities for pain/edema managment. Progressing  2)   Pt to tolerate advancing PREs and flexibility activities for weight bearing positions with self-graded intensity and effective symptom monitoring. Progressing  3) Increase wrist and forearm WOODS by 60 degrees to increase functional hand use and support function positions for ADLs/work/leisure activities. Progressing  4) Digit and Thumb WOODS to be WNL as compared to the unaffected side for maximizing  and fine motor skills for reactivation the hand for light ADLs. Progressing  5)  Decrease edema by .5 to 1.0 cm for evidencing soft tissue healing and effective edema mgmt. MET 12/13/21     Pt would continue to benefit from skilled OT as per original POC.     Hannah is progressing as expected towards her goals and there are no updates to goals at this time. Pt prognosis is Good.     Pt will continue to benefit from skilled outpatient occupational therapy to address the deficits listed in the problem list on initial evaluation provide pt/family education and to maximize pt's level of independence in the home and community environment.     Pt's spiritual, cultural and educational needs considered and pt agreeable to plan of care and goals.    Plan   1/4: Advance flexibility and PREs as tolerated.     Cont OT to address above goals as per original POC.    Pt to be treated by Occupational Therapy 2-3 times  per week for 10-12 weeks during the certification period from 12/3/2021 to 03/03/2022 to achieve the established goals.      Treatment to include: Paraffin, Fluidotherapy, Manual therapy/joint mobilizations, Modalities for pain management, Therapeutic exercises/activities., Strengthening, Orthotic Fabrication/Fit/Training, Edema Control, Scar Management, Joint Protection and Energy Conservation, as well as any other treatments deemed necessary based on the patient's needs or progress    EUGENIE Santos, JAVON  Occupational Therapist, Certified Hand Therapist

## 2022-01-11 ENCOUNTER — CLINICAL SUPPORT (OUTPATIENT)
Dept: REHABILITATION | Facility: HOSPITAL | Age: 72
End: 2022-01-11
Payer: MEDICARE

## 2022-01-11 DIAGNOSIS — M25.631 DECREASED RANGE OF MOTION OF RIGHT WRIST: ICD-10-CM

## 2022-01-11 DIAGNOSIS — M25.531 PAIN IN RIGHT WRIST: ICD-10-CM

## 2022-01-11 DIAGNOSIS — M25.641 DECREASED RANGE OF MOTION OF FINGER OF RIGHT HAND: ICD-10-CM

## 2022-01-11 DIAGNOSIS — M79.89 SWELLING OF RIGHT HAND: ICD-10-CM

## 2022-01-11 PROCEDURE — 97022 WHIRLPOOL THERAPY: CPT | Mod: PN

## 2022-01-11 PROCEDURE — 97110 THERAPEUTIC EXERCISES: CPT | Mod: PN

## 2022-01-11 PROCEDURE — 97140 MANUAL THERAPY 1/> REGIONS: CPT | Mod: PN

## 2022-01-12 ENCOUNTER — TELEPHONE (OUTPATIENT)
Dept: FAMILY MEDICINE | Facility: CLINIC | Age: 72
End: 2022-01-12
Payer: MEDICARE

## 2022-01-12 NOTE — TELEPHONE ENCOUNTER
----- Message from Nirmala Delgado sent at 1/12/2022  8:19 AM CST -----  Contact: Hannah [phone]  Type:  Same Day Appointment Request    Caller is requesting a same day appointment.  Caller declined first available appointment listed below.    Name of Caller: Hannah   When is the first available appointment? 2/7  Symptoms: fatigue, body aches, cough and congestion   Best Call Back Number: [phone]  Additional Information:  yesterday pt had temp of 101, symptoms started yesterday

## 2022-01-12 NOTE — TELEPHONE ENCOUNTER
Pt has  fatigue, body aches, cough and congestion  yesterday pt had temp of 101, symptoms started yesterday      Informed to go to health unit to get covid test. Pt will call back clinic on results

## 2022-01-18 ENCOUNTER — TELEPHONE (OUTPATIENT)
Dept: FAMILY MEDICINE | Facility: CLINIC | Age: 72
End: 2022-01-18
Payer: MEDICARE

## 2022-01-18 NOTE — TELEPHONE ENCOUNTER
We can do the referral-why did she want the referral?  She has seen Dr. Junior previously.  Is there problem?  Distance?  If so this is Jada there is none neck comes [place].

## 2022-01-18 NOTE — TELEPHONE ENCOUNTER
----- Message from Marlena Tan sent at 1/18/2022 10:02 AM CST -----  Contact: [phone]/ self  Who Called: pt   Regarding: pt wants to be referred to a pain management   Would the patient rather a call back or a response via [facility]? Call back  Best Call Back Number: [phone]  Additional Information:

## 2022-01-20 NOTE — TELEPHONE ENCOUNTER
Pt stated she did go to Dr. Junior in [place]. Pt's insurance has changed and Dr. Junior does not accept [facility] along medicare advantage. Pt stated she did look around for other pain managements doctors and a lot of them does not take her insurance. Pt is requesting a pain management clinic that you recommend.    Pt is requesting a 1 month supply on percocet until she is able to get a pain management doctor. Sent to medicine [facility].

## 2022-01-21 ENCOUNTER — TELEPHONE (OUTPATIENT)
Dept: FAMILY MEDICINE | Facility: CLINIC | Age: 72
End: 2022-01-21
Payer: MEDICARE

## 2022-01-21 NOTE — TELEPHONE ENCOUNTER
Spoke to pt she stated she contacted her insurance yesterday and they provided her a list of pain management doctors. Pt will contact the doctors and once she finds the one she likes she will contact our office to put in referral.    I also informed pt that if pt has not found a pain MD by feb 11th to contact our office and Dr. Garcia would give a one month supply of percocet

## 2022-01-21 NOTE — TELEPHONE ENCOUNTER
Pt is requesting that you place an order for her to see Dr. Toyin Schuler ([facility]) for pain mgmt. She's also needing new images of her back prior to being seen by pain mgmt.

## 2022-01-21 NOTE — TELEPHONE ENCOUNTER
I SUGGEST YOU CONTACT YOUR INSURANCE COMPANY TO SEE WHO IS ON THE PLAN BEFORE WE JUST MAKE REFER ALS FOR YOU.  SECONDLY MANY OF THE PAIN MGT MD WITH [facility] WILL NOT WRITE CHRONIC OPIOIDS   I WILL FILL ONE MONTH IF YOU DO NOT HAVE A PAIN MD BY THE 11 OF FEB

## 2022-01-21 NOTE — PROGRESS NOTES
"  Occupational Therapy Progress and Daily Treatment Note     Date: 1/25/2022  Name: Hannah Norman  Clinic Number: [id number]    Therapy Diagnosis:   Encounter Diagnoses   Name Primary?    Pain in right wrist     Decreased range of motion of right wrist     Decreased range of motion of finger of right hand     Swelling of right hand      Physician: Evelio Lynn Jr., *    Medical Diagnosis: S62.101D (ICD-10-CM) - Closed fracture of right wrist with routine healing, subsequent encounter  Physician Orders: OT/CHT Eval & Treat; Wrist/Hand  Per visit with Dr. Lynn on 12/27/21,   "She can start weaning out of the splint now specially at home  Continue therapy  Edema control with a glove or compressive Nirmal  Light activities no heavy lifting Ultram for pain"  Evaluation Date: 12/3/2021    Insurance Authorization Period Expiration: 12/31/22  Plan of Care from 12/3/2021 to 03/03/2022   Surgery Date and Procedure: DOS-11/16/21; Open reduction and internal fixation of right distal radius fracture, 3 or more fragments, intra-articular, using Acumed volar    Date of Return to MD: 1/28/22     Visit #: 10 of 50  FOTO (DASH) Eval: 59.9%  FOTO (DASH) 1/25/22: 40.7% (+19.2%)    Time In: 9:30 am  Time Out:  10:15 am   Total Billable Time: 40 min     Precautions: Standard, Fall and Weightbearing; attends pain mgmt; w/c for back pain; precautions/restrictions for Week 10; see 12/27/21 MD Plan    Subjective     Pt report/Response to previous treatments: 1/25: Pt arrives wearing compression glove and soft wrist cockup brace. Pt reports lifting a gallon of milk this morning during breakfast, reporting that it was difficult and caused pain in her right hand. Activity modifications discussed this date to maximize independence and limit pain.     Pain: 3/10; "nerve pain" reported at [facility] into thumb from the radial wrist and "tenderness" at ulnar wrist and hand  Location: Right Thumb and SF    Objective   Observation: Incision scar is " near invisible. Skin is dry and intact yet fragile. Increased redness and edema in digits      Edema: Circumferential measurements: In centimters       Right/Left R R R R R     IE-12/3/21 12/10/21 12/13/21 12/21/21 1/4/22 1/25/22   IF P1  7.5/7.0 7.3 (-.2) nt 7.3 (=) 7.2 (-.1) 7.0 (-.2)   SF P1   6.0/5.7 5.9 (-.1) nt 6.0 (+.1) 5.8 (-.2) 5.8 (=)   Thumb P1 7.3/6.6 7.0 (-.3) 6.7 (-.3) 6.8 (+.1) 6.5 (-.3) 6.5 (=)   DPC 20.2/18.8 19.5 (-.7) nt      Wrist 19.0/17.0 18.9 (-.1) 18.0 (-.9) 17.5 (-1.4) 17.3 (-.2) 17.3 (=)                 Digit and Thumb AROM (in centimeters)  Measured from tip to DPC Right R R R R   DATE IE-12/3/21 12/10/21 12/21/21 1/4/22 1/25/22   IF  3.0/0 2.5 (+.5) 1.0 (+1.5) 0 (+1.0) 0 (=)   Thumb 4.5/0 4.5 (=) 3.0 (+1.5) 1.5 (+1.5) 0 (+1.5)                Wrist AROM    Right/Left R R R R   DATE IE-12/3/21 12/10/21 12/21/21 1/4/22    EXT 35/62 35 41 47    FLX 5/62 32 34 50    RD 5/14 12 12 14    UD  10/40 25 24 29    WOODS 55/178 104 (+49) 111 (+7) 140 (+29)       Forearm AROM    Right/Left R R R R   DATE IE-12/3/21 12/10/21 12/21/21 1/4/22    Supination 43/60 45 (+2) 64 (+19) 66 (+2)    Pronation 90/90 (=) (=) (=)      Treatment      Hannah received the following supervised modalities after being cleared for contraindications for 15 minutes:   - Fluidotherapy for promoting healing, reducing pain, desensitization and increasing tissue extensibility     Hannah received the following manual therapy techniques for 10 minutes:   - Scar massage  - Retrograde massage   - AAROM wrist flexion/extension and rd/ud; 1x10  - R/A girth; Fit and issued size E Tubigrip to wear on top of compression glove for wrist edema management      Hannah performed therapeutic exercises for 15 minutes including:  Each wrist, forearm AROM, TGEs and spreads & thumb AROM Performed during final minutes of Fluidotherapy   Finger strengthening (no thumb) 1 min each, Yellow Powerweb:  - hook pulls (without thumb)  - lifts  - spreads   Forearm  sup/pronation 2 min holding 4 oz hammer; progressing torque as tolerated    strengthening 1x10 each:  - Soft  (no thumb)   In hand manipulation Spheres x1 min    Weight bearing progression 3 min:  -Yellow Powerweb presses*   Defer-Wrist/forearm strengthening 2 min each:  - wrist/digit extension using 1/2# wrap at hand level  - Red flexbar, frowns, no thumb       *R thumb pain during gradual weightbearing progression    Hannah participated in a concurrent discussion of evaluation findings and specific home care in a prior session, including:  - protocol precautions/restrictions and MD orders clarified  - instruction in activity modifications for goals of protecting healing tissues to include wearing wrist brace at all times; remove for HEP and hygiene.   - alternatives to lifting heavy items such as asking her daughter to pour gallon size milk into smaller containers for joint protection  and tissue healing.     Home Exercise Program and Home Care Resources/Education:     Education provided:   - Discontinue t-putty; prior HEP in place  - Progress made  - Education provided on all interventions performed during today's session   - Add'l tubigrip provided for level of proximal forearm to manage displaced edema.  - Strain on soft tissue when engaging in heavy I/ADLs    Education provided prior sessions:   - Incision site health after MD viewed in Media  - HEP as set  - Compression glove recommended; reference provided of search results.  - Add'l tubigrip provided for level of proximal forearm to manage displaced edema.  - Add'l Edema mgmt options recommended: elevation and gentle HEP for AROM    Written Home Exercises Provided: Patient instructed to cont prior HEP.  Exercises were reviewed and Hannah was able to demonstrate them prior to the end of the session.  Hannah demonstrated good  understanding of the HEP provided.   .   See EMR under Patient Instructions for exercises provided prior visit.         Assessment   1/25: Improvements in DASH score this date (19% improvement) and increased thumb ROM. Decreased edema in fingers, with edema still displaced in wrist. Right thumb CMC joint degeneration symptoms present.     Goals:  ( LTG's (8-10 weeks):  1)   Increase Right wrist WOODS to >80% of the unaffected wrist to increase functional hand use for weight bearing and reaching tasks.  2)   Right  strength  to be > 70 % of the unaffected side.  3)   Right pinch strength to be >60% of the unaffected side.  4)   Decrease complaints of pain to 3 out of 10 at worst to increase functional hand use and UE support functions for moderate to heavy ADL/work/leisure activities.  5)   Patient to score at 40% or less on Quick/DASH to demonstrate improved perception of functional right hand use to evidence return to near to prior level of function for I/ADLs and functional mobility. Progressing     STG's (4-6 weeks)  1)   Patient to be IND with HEP and modalities for pain/edema managment. Progressing  2)   Pt to tolerate advancing PREs and flexibility activities for weight bearing positions with self-graded intensity and effective symptom monitoring. Progressing  3) Increase wrist and forearm WOODS by 60 degrees to increase functional hand use and support function positions for ADLs/work/leisure activities. Progressing  4) Digit and Thumb WOODS to be WNL as compared to the unaffected side for maximizing  and fine motor skills for reactivation the hand for light ADLs. Progressing  5)  Decrease edema by .5 to 1.0 cm for evidencing soft tissue healing and effective edema mgmt. MET 12/13/21     Pt would continue to benefit from skilled OT as per original POC.     Hannah is progressing as expected towards her goals and there are no updates to goals at this time. Pt prognosis is Good.     Pt will continue to benefit from skilled outpatient occupational therapy to address the deficits listed in the problem list on initial evaluation  "provide pt/family education and to maximize pt's level of independence in the home and community environment.     Pt's spiritual, cultural and educational needs considered and pt agreeable to plan of care and goals.    Plan   1/25: Acknowledge any new MD/PA orders. R/A wrist & forearm AROM. Advance flexibility and PREs as tolerated. Encourage activity modifications for desired ADL and I/ADL participation with pain management and joint protection strategies.      Cont OT to address above goals as per original POC.    Pt to be treated by Occupational Therapy 2-3 times per week for 10-12 weeks during the certification period from 12/3/2021 to 03/03/2022 to achieve the established goals.      Treatment to include: Paraffin, Fluidotherapy, Manual therapy/joint mobilizations, Modalities for pain management, Therapeutic exercises/activities., Strengthening, Orthotic Fabrication/Fit/Training, Edema Control, Scar Management, Joint Protection and Energy Conservation, as well as any other treatments deemed necessary based on the patient's needs or progress    JERRY Denton  Student Occupational Therapist    "I, EUGENIE Reddy CHT,  certify that I was present in the room directing the student in service delivery and guiding them using my skilled judgment. As the co-signing therapist, I have reviewed the student's documentation and am responsible for the treatment, assessment and plan."    EUGENIE Santos CHT  Occupational Therapist, Certified Hand Therapist      "

## 2022-01-25 ENCOUNTER — CLINICAL SUPPORT (OUTPATIENT)
Dept: REHABILITATION | Facility: HOSPITAL | Age: 72
End: 2022-01-25
Payer: MEDICARE

## 2022-01-25 DIAGNOSIS — M25.641 DECREASED RANGE OF MOTION OF FINGER OF RIGHT HAND: ICD-10-CM

## 2022-01-25 DIAGNOSIS — M79.89 SWELLING OF RIGHT HAND: ICD-10-CM

## 2022-01-25 DIAGNOSIS — M25.631 DECREASED RANGE OF MOTION OF RIGHT WRIST: ICD-10-CM

## 2022-01-25 DIAGNOSIS — M25.531 PAIN IN RIGHT WRIST: ICD-10-CM

## 2022-01-25 PROCEDURE — 97110 THERAPEUTIC EXERCISES: CPT | Mod: PN

## 2022-01-25 PROCEDURE — 97140 MANUAL THERAPY 1/> REGIONS: CPT | Mod: PN

## 2022-01-25 PROCEDURE — 97022 WHIRLPOOL THERAPY: CPT | Mod: PN

## 2022-01-27 ENCOUNTER — TELEPHONE (OUTPATIENT)
Dept: NEUROSURGERY | Facility: CLINIC | Age: 72
End: 2022-01-27
Payer: MEDICARE

## 2022-01-27 ENCOUNTER — CLINICAL SUPPORT (OUTPATIENT)
Dept: REHABILITATION | Facility: HOSPITAL | Age: 72
End: 2022-01-27
Payer: MEDICARE

## 2022-01-27 DIAGNOSIS — M54.50 LOW BACK PAIN, UNSPECIFIED BACK PAIN LATERALITY, UNSPECIFIED CHRONICITY, UNSPECIFIED WHETHER SCIATICA PRESENT: Primary | ICD-10-CM

## 2022-01-27 DIAGNOSIS — M79.7 FIBROMYALGIA: ICD-10-CM

## 2022-01-27 DIAGNOSIS — M25.631 DECREASED RANGE OF MOTION OF RIGHT WRIST: ICD-10-CM

## 2022-01-27 DIAGNOSIS — M25.531 PAIN IN RIGHT WRIST: ICD-10-CM

## 2022-01-27 DIAGNOSIS — M25.641 DECREASED RANGE OF MOTION OF FINGER OF RIGHT HAND: ICD-10-CM

## 2022-01-27 DIAGNOSIS — M79.89 SWELLING OF RIGHT HAND: ICD-10-CM

## 2022-01-27 PROCEDURE — 97140 MANUAL THERAPY 1/> REGIONS: CPT | Mod: PN

## 2022-01-27 PROCEDURE — 97110 THERAPEUTIC EXERCISES: CPT | Mod: PN

## 2022-01-27 PROCEDURE — 97018 PARAFFIN BATH THERAPY: CPT | Mod: PN

## 2022-01-27 NOTE — PROGRESS NOTES
"  Occupational Therapy Progress and Daily Treatment Note     Date: 1/27/2022  Name: Hannah Norman  Clinic Number: [id number]    Therapy Diagnosis:   No diagnosis found.  Physician: Evelio Lynn Jr., *    Medical Diagnosis: S62.101D (ICD-10-CM) - Closed fracture of right wrist with routine healing, subsequent encounter  Physician Orders: OT/CHT Eval & Treat; Wrist/Hand  Per visit with Dr. Lynn on 12/27/21,   "She can start weaning out of the splint now specially at home  Continue therapy  Edema control with a glove or compressive Nirmal  Light activities no heavy lifting Ultram for pain"  Evaluation Date: 12/3/2021    Insurance Authorization Period Expiration: 12/31/22  Plan of Care from 12/3/2021 to 03/03/2022   Surgery Date and Procedure: DOS-11/16/21; Open reduction and internal fixation of right distal radius fracture, 3 or more fragments, intra-articular, using Acumed volar    Date of Return to MD: 1/28/22     Visit #: 11 of 50  FOTO (DASH) Eval: 59.9%  FOTO (DASH) 1/25/22: 40.7% (+19.2%)    Time In: 9:38 am  Time Out:  10:25 am   Total Billable Time: 35 min     Precautions: Standard, Fall and Weightbearing; attends pain mgmt; w/c for back pain; precautions/restrictions for Week 10; see 12/27/21 MD Plan    Subjective     Pt report/Response to previous treatments: 1/27: Pt reports functional use of right hand in daily activities with modifications such as weight bearing on bilateral hands to get out of bed in the morning. Pt reports activity modification of pushing through dorsum of hand without pain.   1/25: Pt arrives wearing compression glove and soft wrist cockup brace. Pt reports lifting a gallon of milk this morning during breakfast, reporting that it was difficult and caused pain in her right hand. Activity modifications discussed this date to maximize independence and limit pain.     Pain: 5/10 aching of dorsal and volar aspect of right hand when pt arrives; pt attributes pain to cold weather. 2/10 pain " "during session   "nerve pain" reported at [facility] into thumb from the radial wrist and "tenderness" at ulnar wrist and hand  Location: dorsal and volar aspect of right hand    Objective   Observation: Incision scar is near invisible. Skin is dry and intact yet fragile.      Edema: Circumferential measurements: In centimters       Right/Left R R R R R R     IE-12/3/21 12/10/21 12/13/21 12/21/21 1/4/22 1/25/22 1/27/22   IF P1  7.5/7.0 7.3 (-.2) nt 7.3 (=) 7.2 (-.1) 7.0 (-.2) 6.7 (-0.3)   SF P1   6.0/5.7 5.9 (-.1) nt 6.0 (+.1) 5.8 (-.2) 5.8 (=) 5.4 (0.4)   Thumb P1 7.3/6.6 7.0 (-.3) 6.7 (-.3) 6.8 (+.1) 6.5 (-.3) 6.5 (=) 6.4 (-0.1)   DPC 20.2/18.8 19.5 (-.7) nt       Wrist 19.0/17.0 18.9 (-.1) 18.0 (-.9) 17.5 (-1.4) 17.3 (-.2) 17.3 (=) 16.9 (-0.4)      Digit and Thumb AROM (in centimeters)  Measured from tip to DPC Right R R R R   DATE IE-12/3/21 12/10/21 12/21/21 1/4/22 1/25/22   IF  3.0/0 2.5 (+.5) 1.0 (+1.5) 0 (+1.0) 0 (=)   Thumb 4.5/0 4.5 (=) 3.0 (+1.5) 1.5 (+1.5) 0 (+1.5)      Wrist AROM    Right/Left R R R R   DATE IE-12/3/21 12/10/21 12/21/21 1/4/22 1/27/22   EXT 35/62 35 41 47 47   FLX 5/62 32 34 50 58   RD 5/14 12 12 14 21   UD  10/40 25 24 29 26   WOODS 55/178 104 (+49) 111 (+7) 140 (+29) 152 (+12)      Forearm AROM    Right/Left R R R   DATE IE-12/3/21 12/10/21 12/21/21 1/4/22   Supination 43/60 45 (+2) 64 (+19) 66 (+2)   Pronation 90/90 (=) (=) (=)     Treatment      Hannah received the following supervised modalities after being cleared for contraindications for 8 minutes:   - Defer-Fluidotherapy for promoting healing, reducing pain, desensitization and increasing tissue extensibility  - Paraffin wax heat pre-tx for promoting healing, decreasing pain and increasing tissue extensibility     Hannah received the following manual therapy techniques for 12 minutes:   - Scar massage  - Retrograde massage   - AAROM wrist flexion/extension and rd/ud; 1x15  - R/A girth; Checked fit of size E Tubigrip to wear on top of " compression glove for wrist edema management      Hannah performed therapeutic exercises for 15 minutes including:  Each wrist, forearm AROM, TGEs and spreads & thumb AROM Defer-Performed during final minutes of Fluidotherapy   Finger strengthening (no thumb) 1 min each, Yellow Powerweb:  - hook pulls (without thumb)  - lifts  - spreads   Forearm sup/pronation -2 min holding 4 oz hammer; progressing torque as tolerated  -1 min sustained supination with spool marble   -1 min sustained supination with power web     strengthening 1x10 each:  - Soft  (no thumb)   In hand manipulation Spheres x1 min    Weight bearing progression Defer- 3 min:  -Yellow Powerweb presses*   Wrist/forearm strengthening Defer-2 min each:  - wrist/digit extension using 1/2# wrap at hand level  - Red flexbar, frowns, no thumb   Wrist and Forearm AROM R/A 1/27/22       *R thumb pain during gradual weightbearing progression    Hannah participated in a concurrent discussion of evaluation findings and specific home care in a prior session, including:  - protocol precautions/restrictions and MD orders clarified  - instruction in activity modifications for goals of protecting healing tissues to include wearing wrist brace at all times; remove for HEP and hygiene.     Home Exercise Program and Home Care Resources/Education:     Education provided:   - Prior HEP in place  - Progress made  - Education provided on all interventions performed during today's session   - Education on continued wear of compression gloves and tubi  sleeve for edema management    Education provided prior sessions:   - Incision site health after MD viewed in Media  - HEP as set  - Compression glove recommended; reference provided of search results.  - Add'l tubigrip provided for level of proximal forearm to manage displaced edema.  - Add'l Edema mgmt options recommended: elevation and gentle HEP for AROM  - Strain on soft tissue when engaging in heavy I/ADLs  -  Alternatives to lifting heavy items such as asking her daughter to pour gallon size milk into smaller containers for joint protection and tissue healing.  -Discontinue t-putty    Written Home Exercises Provided: Patient instructed to cont prior HEP.  Exercises were reviewed and Hannah was able to demonstrate them prior to the end of the session.  Hannah demonstrated good  understanding of the HEP provided.      See EMR under Patient Instructions for exercises provided prior visit.        Assessment   1/27: Good improvements in wrist WOODS (12 degree increase). Decreased edema in wrist, digits, and thumb this date, evidencing follow through with wear of compression sleeve and Tubigrip sleeve.   1/25: Improvements in DASH score this date (19% improvement) and increased thumb ROM. Decreased edema in fingers, with edema still displaced in wrist. Right thumb CMC joint degeneration symptoms present.     Goals:  ( LTG's (8-10 weeks):  1)   Increase Right wrist WOODS to >80% of the unaffected wrist to increase functional hand use for weight bearing and reaching tasks.  2)   Right  strength  to be > 70 % of the unaffected side.  3)   Right pinch strength to be >60% of the unaffected side.  4)   Decrease complaints of pain to 3 out of 10 at worst to increase functional hand use and UE support functions for moderate to heavy ADL/work/leisure activities.  5)   Patient to score at 40% or less on Quick/DASH to demonstrate improved perception of functional right hand use to evidence return to near to prior level of function for I/ADLs and functional mobility. Progressing     STG's (4-6 weeks)  1)   Patient to be IND with HEP and modalities for pain/edema managment. Progressing  2)   Pt to tolerate advancing PREs and flexibility activities for weight bearing positions with self-graded intensity and effective symptom monitoring. Progressing  3) Increase wrist and forearm WOODS by 60 degrees to increase functional hand use and support  "function positions for ADLs/work/leisure activities. MET 1/27/22  4) Digit and Thumb WOODS to be WNL as compared to the unaffected side for maximizing  and fine motor skills for reactivation the hand for light ADLs. Met 1/27/22  5)  Decrease edema by .5 to 1.0 cm for evidencing soft tissue healing and effective edema mgmt. MET 12/13/21     Pt would continue to benefit from skilled OT as per original POC.     Hannah is progressing as expected towards her goals and there are no updates to goals at this time. Pt prognosis is Good.     Pt will continue to benefit from skilled outpatient occupational therapy to address the deficits listed in the problem list on initial evaluation provide pt/family education and to maximize pt's level of independence in the home and community environment.     Pt's spiritual, cultural and educational needs considered and pt agreeable to plan of care and goals.    Plan   1/27: Acknowledge any new MD/PA orders. Advance flexibility and PREs as tolerated. Assess baseline  and pinch.     Cont OT to address above goals as per original POC.    Pt to be treated by Occupational Therapy 2-3 times per week for 10-12 weeks during the certification period from 12/3/2021 to 03/03/2022 to achieve the established goals.      Treatment to include: Paraffin, Fluidotherapy, Manual therapy/joint mobilizations, Modalities for pain management, Therapeutic exercises/activities., Strengthening, Orthotic Fabrication/Fit/Training, Edema Control, Scar Management, Joint Protection and Energy Conservation, as well as any other treatments deemed necessary based on the patient's needs or progress    JERRY Denton  Student Occupational Therapist    "I, EUGENIE Reddy, T,  certify that I was present in the room directing the student in service delivery and guiding them using my skilled judgment. As the co-signing therapist, I have reviewed the student's documentation and am responsible for the " "treatment, assessment and plan."    EUGENIE Santos, T  Occupational Therapist, Certified Hand Therapist      "

## 2022-01-27 NOTE — TELEPHONE ENCOUNTER
No new care gaps identified.  Powered by [employer]. Reference number: [id number].   1/27/2022 5:44:11 PM CST

## 2022-01-28 ENCOUNTER — TELEPHONE (OUTPATIENT)
Dept: ORTHOPEDICS | Facility: CLINIC | Age: 72
End: 2022-01-28
Payer: MEDICARE

## 2022-01-28 ENCOUNTER — OFFICE VISIT (OUTPATIENT)
Dept: ORTHOPEDICS | Facility: CLINIC | Age: 72
End: 2022-01-28
Payer: MEDICARE

## 2022-01-28 ENCOUNTER — HOSPITAL ENCOUNTER (OUTPATIENT)
Dept: RADIOLOGY | Facility: HOSPITAL | Age: 72
Discharge: HOME | End: 2022-01-28
Attending: PHYSICIAN ASSISTANT
Payer: MEDICARE

## 2022-01-28 ENCOUNTER — HOSPITAL ENCOUNTER (OUTPATIENT)
Dept: RADIOLOGY | Facility: HOSPITAL | Age: 72
Discharge: HOME | End: 2022-01-28
Attending: ORTHOPAEDIC SURGERY
Payer: MEDICARE

## 2022-01-28 VITALS — HEIGHT: 61 IN | BODY MASS INDEX: 45.31 KG/M2 | WEIGHT: 240 LBS

## 2022-01-28 DIAGNOSIS — Z98.890 S/P ORIF (OPEN REDUCTION INTERNAL FIXATION) FRACTURE: ICD-10-CM

## 2022-01-28 DIAGNOSIS — Z87.81 S/P ORIF (OPEN REDUCTION INTERNAL FIXATION) FRACTURE: ICD-10-CM

## 2022-01-28 DIAGNOSIS — Z87.81 S/P ORIF (OPEN REDUCTION INTERNAL FIXATION) FRACTURE: Primary | ICD-10-CM

## 2022-01-28 DIAGNOSIS — S62.101D CLOSED FRACTURE OF RIGHT WRIST WITH ROUTINE HEALING, SUBSEQUENT ENCOUNTER: ICD-10-CM

## 2022-01-28 DIAGNOSIS — M54.50 LOW BACK PAIN, UNSPECIFIED BACK PAIN LATERALITY, UNSPECIFIED CHRONICITY, UNSPECIFIED WHETHER SCIATICA PRESENT: ICD-10-CM

## 2022-01-28 DIAGNOSIS — Z98.890 S/P ORIF (OPEN REDUCTION INTERNAL FIXATION) FRACTURE: Primary | ICD-10-CM

## 2022-01-28 PROCEDURE — 1160F PR REVIEW ALL MEDS BY PRESCRIBER/CLIN PHARMACIST DOCUMENTED: ICD-10-PCS | Mod: CPTII,S$GLB,, | Performed by: ORTHOPAEDIC SURGERY

## 2022-01-28 PROCEDURE — 72110 X-RAY EXAM L-2 SPINE 4/>VWS: CPT | Mod: TC,FY,PO

## 2022-01-28 PROCEDURE — 3008F PR BODY MASS INDEX (BMI) DOCUMENTED: ICD-10-PCS | Mod: CPTII,S$GLB,, | Performed by: ORTHOPAEDIC SURGERY

## 2022-01-28 PROCEDURE — 1159F PR MEDICATION LIST DOCUMENTED IN MEDICAL RECORD: ICD-10-PCS | Mod: CPTII,S$GLB,, | Performed by: ORTHOPAEDIC SURGERY

## 2022-01-28 PROCEDURE — 1125F AMNT PAIN NOTED PAIN PRSNT: CPT | Mod: CPTII,S$GLB,, | Performed by: ORTHOPAEDIC SURGERY

## 2022-01-28 PROCEDURE — 99999 PR PBB SHADOW E&M-EST. PATIENT-LVL III: ICD-10-PCS | Mod: PBBFAC,,, | Performed by: ORTHOPAEDIC SURGERY

## 2022-01-28 PROCEDURE — 3008F BODY MASS INDEX DOCD: CPT | Mod: CPTII,S$GLB,, | Performed by: ORTHOPAEDIC SURGERY

## 2022-01-28 PROCEDURE — 3288F PR FALLS RISK ASSESSMENT DOCUMENTED: ICD-10-PCS | Mod: CPTII,S$GLB,, | Performed by: ORTHOPAEDIC SURGERY

## 2022-01-28 PROCEDURE — 99024 PR POST-OP FOLLOW-UP VISIT: ICD-10-PCS | Mod: S$GLB,,, | Performed by: ORTHOPAEDIC SURGERY

## 2022-01-28 PROCEDURE — 1159F MED LIST DOCD IN RCRD: CPT | Mod: CPTII,S$GLB,, | Performed by: ORTHOPAEDIC SURGERY

## 2022-01-28 PROCEDURE — 99024 POSTOP FOLLOW-UP VISIT: CPT | Mod: S$GLB,,, | Performed by: ORTHOPAEDIC SURGERY

## 2022-01-28 PROCEDURE — 1160F RVW MEDS BY RX/DR IN RCRD: CPT | Mod: CPTII,S$GLB,, | Performed by: ORTHOPAEDIC SURGERY

## 2022-01-28 PROCEDURE — 99999 PR PBB SHADOW E&M-EST. PATIENT-LVL III: CPT | Mod: PBBFAC,,, | Performed by: ORTHOPAEDIC SURGERY

## 2022-01-28 PROCEDURE — 1125F PR PAIN SEVERITY QUANTIFIED, PAIN PRESENT: ICD-10-PCS | Mod: CPTII,S$GLB,, | Performed by: ORTHOPAEDIC SURGERY

## 2022-01-28 PROCEDURE — 3288F FALL RISK ASSESSMENT DOCD: CPT | Mod: CPTII,S$GLB,, | Performed by: ORTHOPAEDIC SURGERY

## 2022-01-28 PROCEDURE — 1100F PTFALLS ASSESS-DOCD GE2>/YR: CPT | Mod: CPTII,S$GLB,, | Performed by: ORTHOPAEDIC SURGERY

## 2022-01-28 PROCEDURE — 1100F PR PT FALLS ASSESS DOC 2+ FALLS/FALL W/INJURY/YR: ICD-10-PCS | Mod: CPTII,S$GLB,, | Performed by: ORTHOPAEDIC SURGERY

## 2022-01-28 RX ORDER — PROMETHAZINE HYDROCHLORIDE AND DEXTROMETHORPHAN HYDROBROMIDE 6.25; 15 MG/5ML; MG/5ML
SYRUP ORAL
Status: ON HOLD | COMMUNITY
Start: 2022-01-14 | End: 2022-02-16 | Stop reason: HOSPADM

## 2022-01-28 RX ORDER — VENLAFAXINE HYDROCHLORIDE 150 MG/1
CAPSULE, EXTENDED RELEASE ORAL
Qty: 180 CAPSULE | Refills: 3 | Status: ON HOLD | OUTPATIENT
Start: 2022-01-28 | End: 2022-02-16 | Stop reason: SDUPTHER

## 2022-01-28 NOTE — PROGRESS NOTES
Subjective:      Patient ID: Hannah Norman is a 71 y.o. female.    Chief Complaint: Post-op Evaluation (Right wrist ORIF ( Sx 11/16))      HPI: Hannah Norman is here for a PO visit. She is about 10 weeks s/p ORIF of the right distal radius. She is currently in therapy and reports making good progrss. Pain is minimal. She has returned to normal ALDs but no heavy lfiting. PO complaints include: weakness    Past Medical History:   Diagnosis Date    Anxiety disorder     Bell's palsy     Chronic back pain     Chronic osteoarthritis     Essential tremor     Fibromyalgia     Hypertension     Mild acid reflux     Neck pain     Other and unspecified hyperlipidemia     Sleep apnea     wear c-pap at night; does not use regularly    Unspecified mood (affective) disorder        Current Outpatient Medications:     acetaminophen (TYLENOL) 500 MG tablet, Take 500 mg by mouth every 6 (six) hours as needed., Disp: , Rfl:     albuterol (PROVENTIL/VENTOLIN HFA) 90 mcg/actuation inhaler, Inhale 2 puffs into the lungs every 6 (six) hours as needed for Wheezing or Shortness of Breath. Rescue, Disp: 18 g, Rfl: 0    clonazePAM (KLONOPIN) 0.5 MG tablet, 2  pills PO as needed at bedtime for the RLS, Disp: 60 tablet, Rfl: 5    cyclobenzaprine (FLEXERIL) 10 MG tablet, Take 10 mg by mouth 3 (three) times daily as needed. , Disp: , Rfl:     fluticasone propionate (FLONASE) 50 mcg/actuation nasal spray, 1 spray (50 mcg total) by Each Nostril route once daily., Disp: 48 g, Rfl: 11    furosemide (LASIX) 40 MG tablet, TAKE 1 TABLET BY MOUTH UP TO TWO TIMES A DAY IF SWELLING OCCURS, Disp: 180 tablet, Rfl: 3    gabapentin (NEURONTIN) 600 MG tablet, TAKE 1 TABLET BY MOUTH THREE TIMES A DAY, Disp: 90 tablet, Rfl: 3    imipramine (TOFRANIL) 25 MG tablet, TAKE 1 TABLET BY MOUTH EVERY EVENING TO HELP CALM STOMACH, Disp: 90 tablet, Rfl: 3    losartan (COZAAR) 100 MG tablet, TAKE 1 TABLET BY MOUTH ONCE A DAY FOR BLOOD PRESSURE, Disp: 90  "tablet, Rfl: 1    lovastatin (MEVACOR) 20 MG tablet, TAKE 1 TABLET BY MOUTH EVERY EVENING, Disp: 90 tablet, Rfl: 3    mometasone (ELOCON) 0.1 % ointment, Apply topically once daily. For 7 days and may repeat., Disp: 45 g, Rfl: 1    morphine (MS CONTIN) 15 MG 12 hr tablet, Take 1 tablet by mouth every 12 (twelve) hours., Disp: , Rfl:     oxycodone-acetaminophen (PERCOCET)  mg per tablet, Take 1 tablet by mouth every 8 (eight) hours as needed. , Disp: , Rfl:     oxyCODONE-acetaminophen (PERCOCET) 7.5-325 mg per tablet, Take 1 tablet by mouth every 4 (four) hours as needed for Pain., Disp: 40 tablet, Rfl: 0    pantoprazole (PROTONIX) 40 MG tablet, Take 1 tablet (40 mg total) by mouth once daily., Disp: 90 tablet, Rfl: 3    potassium chloride SA (K-DUR,KLOR-CON) 20 MEQ tablet, TAKE 1 TABLET BY MOUTH ONCE A DAY, Disp: 90 tablet, Rfl: 3    pramipexole (MIRAPEX) 0.125 MG tablet, TAKE 2 TABLETS BY MOUTH IN THE LATE AFTERNOON AND TAKE 4 TABLETS BY MOUTH AT BEDTIME, Disp: 180 tablet, Rfl: 5    promethazine-dextromethorphan (PROMETHAZINE-DM) 6.25-15 mg/5 mL Syrp, , Disp: , Rfl:     venlafaxine (EFFEXOR-XR) 150 MG Cp24, 1 tablet by mouth in morning and 1 tablet by mouth at night, Disp: 180 capsule, Rfl: 3  Review of patient's allergies indicates:   Allergen Reactions    Hyoscyamine Rash    Msg [glutamic acid] Swelling       Ht 5' 1" (1.549 m)   Wt 108.9 kg (240 lb)   BMI 45.35 kg/m²     Review of Systems   Constitutional: Negative for chills and fever.   Cardiovascular: Negative for chest pain and palpitations.   Respiratory: Negative for shortness of breath and wheezing.    Skin: Negative for poor wound healing and rash.   Musculoskeletal: Positive for muscle weakness and stiffness.   Gastrointestinal: Negative for nausea and vomiting.   Genitourinary: Negative for dysuria and hematuria.   Neurological: Negative for seizures and tremors.   Psychiatric/Behavioral: Negative for altered mental status. "   Allergic/Immunologic: Negative for environmental allergies and persistent infections.         Objective:    Ortho Exam       Right UE: Skin healed incision. Swelling minimal at MCP. TTP ulnar side. ROM essentially full in extension and flexion. ROm fingers full. Sensation intact. Tinels negtaive. Pulses present. Cap refill birsk.   GEN: Well developed, well nourished female. AAOX3. No acute distress.   Normocephalic, atraumatic.   MARLON  Breathing unlabored.  Mood and affect appropriate.     Assessment:     Imaging:  I have personally reviewed and interpreted the radiographs. Xray of the right wrist shows well reduced fracture, hardware without failure or complication          1. S/P ORIF (open reduction internal fixation) fracture    2. Closed fracture of right wrist with routine healing, subsequent encounter          Plan:           Very good PO progress  Can d/c therapy at her discretion  Stop bracing except for heavy lifting (like pots)     Follow up in about 3 months (around 4/28/2022) for 6 month check.

## 2022-01-31 ENCOUNTER — PATIENT MESSAGE (OUTPATIENT)
Dept: ADMINISTRATIVE | Facility: OTHER | Age: 72
End: 2022-01-31
Payer: MEDICARE

## 2022-01-31 ENCOUNTER — PATIENT OUTREACH (OUTPATIENT)
Dept: ADMINISTRATIVE | Facility: OTHER | Age: 72
End: 2022-01-31
Payer: MEDICARE

## 2022-01-31 DIAGNOSIS — Z12.31 ENCOUNTER FOR SCREENING MAMMOGRAM FOR MALIGNANT NEOPLASM OF BREAST: Primary | ICD-10-CM

## 2022-01-31 NOTE — PROGRESS NOTES
Health Maintenance Due   Topic Date Due    Shingles Vaccine (1 of 2) Never done    COVID-19 Vaccine (3 - Booster for Moderna series) 09/11/2021    Mammogram  01/27/2022     Updates were requested from care everywhere.  Chart was reviewed for overdue Proactive [facility] Encounters (VAL) topics (CRS, Breast Cancer Screening, Eye exam)  Health Maintenance has been updated.  LINKS immunization registry triggered.  Immunizations were reconciled.  Order placed for mammogram and tasked to pt via portal.

## 2022-02-03 ENCOUNTER — HOSPITAL ENCOUNTER (INPATIENT)
Facility: HOSPITAL | Age: 72
LOS: 13 days | Discharge: NURSING, REHAB OR LONG-TERM CARE | DRG: 871 | End: 2022-02-16
Attending: EMERGENCY MEDICINE | Admitting: HOSPITALIST
Payer: MEDICARE

## 2022-02-03 DIAGNOSIS — E87.70 HYPERVOLEMIA, UNSPECIFIED HYPERVOLEMIA TYPE: ICD-10-CM

## 2022-02-03 DIAGNOSIS — N17.9 AKI (ACUTE KIDNEY INJURY): ICD-10-CM

## 2022-02-03 DIAGNOSIS — A41.9 SEPSIS, DUE TO UNSPECIFIED ORGANISM, UNSPECIFIED WHETHER ACUTE ORGAN DYSFUNCTION PRESENT: Primary | ICD-10-CM

## 2022-02-03 DIAGNOSIS — E66.2 OBESITY HYPOVENTILATION SYNDROME: ICD-10-CM

## 2022-02-03 DIAGNOSIS — M79.7 FIBROMYALGIA: ICD-10-CM

## 2022-02-03 DIAGNOSIS — J96.01 ACUTE RESPIRATORY FAILURE WITH HYPOXIA AND HYPERCARBIA: ICD-10-CM

## 2022-02-03 DIAGNOSIS — I48.91 ATRIAL FIBRILLATION WITH RVR: ICD-10-CM

## 2022-02-03 DIAGNOSIS — J96.22 ACUTE ON CHRONIC RESPIRATORY FAILURE WITH HYPERCAPNIA: ICD-10-CM

## 2022-02-03 DIAGNOSIS — N17.9 ACUTE RENAL FAILURE, UNSPECIFIED ACUTE RENAL FAILURE TYPE: ICD-10-CM

## 2022-02-03 DIAGNOSIS — R41.82 AMS (ALTERED MENTAL STATUS): ICD-10-CM

## 2022-02-03 DIAGNOSIS — E87.5 HYPERKALEMIA: ICD-10-CM

## 2022-02-03 DIAGNOSIS — R00.0 TACHYARRHYTHMIA: ICD-10-CM

## 2022-02-03 DIAGNOSIS — J96.02 ACUTE HYPERCAPNIC RESPIRATORY FAILURE: ICD-10-CM

## 2022-02-03 DIAGNOSIS — R60.0 EDEMA OF RIGHT UPPER EXTREMITY: ICD-10-CM

## 2022-02-03 DIAGNOSIS — G93.41 ACUTE METABOLIC ENCEPHALOPATHY: ICD-10-CM

## 2022-02-03 DIAGNOSIS — G25.81 RLS (RESTLESS LEGS SYNDROME): ICD-10-CM

## 2022-02-03 DIAGNOSIS — J96.02 ACUTE RESPIRATORY FAILURE WITH HYPOXIA AND HYPERCARBIA: ICD-10-CM

## 2022-02-03 DIAGNOSIS — E66.01 MORBID OBESITY WITH BMI OF 45.0-49.9, ADULT: Chronic | ICD-10-CM

## 2022-02-03 DIAGNOSIS — U07.1 COVID-19: ICD-10-CM

## 2022-02-03 DIAGNOSIS — G93.41 ENCEPHALOPATHY, METABOLIC: ICD-10-CM

## 2022-02-03 DIAGNOSIS — R57.9 SHOCK: ICD-10-CM

## 2022-02-03 DIAGNOSIS — N17.9 ACUTE KIDNEY FAILURE, UNSPECIFIED: ICD-10-CM

## 2022-02-03 DIAGNOSIS — G47.33 OSA (OBSTRUCTIVE SLEEP APNEA): ICD-10-CM

## 2022-02-03 DIAGNOSIS — N39.0 URINARY TRACT INFECTION WITHOUT HEMATURIA, SITE UNSPECIFIED: ICD-10-CM

## 2022-02-03 DIAGNOSIS — R07.9 CHEST PAIN: ICD-10-CM

## 2022-02-03 DIAGNOSIS — R73.9 HYPERGLYCEMIA: ICD-10-CM

## 2022-02-03 DIAGNOSIS — E87.20 METABOLIC ACIDOSIS: ICD-10-CM

## 2022-02-03 PROBLEM — D72.829 LEUKOCYTOSIS: Status: ACTIVE | Noted: 2022-02-03

## 2022-02-03 PROBLEM — M25.531 PAIN IN RIGHT WRIST: Status: RESOLVED | Noted: 2021-12-03 | Resolved: 2022-02-03

## 2022-02-03 PROBLEM — R09.02 HYPOXIA: Status: ACTIVE | Noted: 2022-02-03

## 2022-02-03 LAB
ALBUMIN SERPL BCP-MCNC: 3.7 G/DL (ref 3.5–5.2)
ALLENS TEST: ABNORMAL
ALP SERPL-CCNC: 90 U/L (ref 55–135)
ALT SERPL W/O P-5'-P-CCNC: 43 U/L (ref 10–44)
AMMONIA PLAS-SCNC: 48 UMOL/L (ref 10–50)
ANION GAP SERPL CALC-SCNC: 19 MMOL/L (ref 8–16)
AST SERPL-CCNC: 84 U/L (ref 10–40)
BACTERIA #/AREA URNS HPF: ABNORMAL /HPF
BASOPHILS # BLD AUTO: 0.05 K/UL (ref 0–0.2)
BASOPHILS NFR BLD: 0.3 % (ref 0–1.9)
BILIRUB SERPL-MCNC: 0.5 MG/DL (ref 0.1–1)
BILIRUB UR QL STRIP: NEGATIVE
BNP SERPL-MCNC: 34 PG/ML (ref 0–99)
BUN SERPL-MCNC: 69 MG/DL (ref 8–23)
CALCIUM SERPL-MCNC: 7.6 MG/DL (ref 8.7–10.5)
CHLORIDE SERPL-SCNC: 97 MMOL/L (ref 95–110)
CLARITY UR: CLEAR
CO2 SERPL-SCNC: 15 MMOL/L (ref 23–29)
COLOR UR: YELLOW
CREAT SERPL-MCNC: 7.1 MG/DL (ref 0.5–1.4)
CTP QC/QA: YES
DELSYS: ABNORMAL
DIFFERENTIAL METHOD: ABNORMAL
EOSINOPHIL # BLD AUTO: 0 K/UL (ref 0–0.5)
EOSINOPHIL NFR BLD: 0.2 % (ref 0–8)
ERYTHROCYTE [DISTWIDTH] IN BLOOD BY AUTOMATED COUNT: 13.6 % (ref 11.5–14.5)
EST. GFR  (AFRICAN AMERICAN): 6 ML/MIN/1.73 M^2
EST. GFR  (NON AFRICAN AMERICAN): 5 ML/MIN/1.73 M^2
GLUCOSE SERPL-MCNC: 138 MG/DL (ref 70–110)
GLUCOSE UR QL STRIP: NEGATIVE
HCO3 UR-SCNC: 24.9 MMOL/L (ref 24–28)
HCT VFR BLD AUTO: 37.5 % (ref 37–48.5)
HGB BLD-MCNC: 12 G/DL (ref 12–16)
HGB UR QL STRIP: NEGATIVE
HYALINE CASTS #/AREA URNS LPF: 1 /LPF
IMM GRANULOCYTES # BLD AUTO: 0.18 K/UL (ref 0–0.04)
IMM GRANULOCYTES NFR BLD AUTO: 0.9 % (ref 0–0.5)
KETONES UR QL STRIP: NEGATIVE
LACTATE SERPL-SCNC: 1.1 MMOL/L (ref 0.5–2.2)
LEUKOCYTE ESTERASE UR QL STRIP: ABNORMAL
LYMPHOCYTES # BLD AUTO: 1.9 K/UL (ref 1–4.8)
LYMPHOCYTES NFR BLD: 10.1 % (ref 18–48)
MCH RBC QN AUTO: 31.4 PG (ref 27–31)
MCHC RBC AUTO-ENTMCNC: 32 G/DL (ref 32–36)
MCV RBC AUTO: 98 FL (ref 82–98)
MICROSCOPIC COMMENT: ABNORMAL
MONOCYTES # BLD AUTO: 1.3 K/UL (ref 0.3–1)
MONOCYTES NFR BLD: 6.9 % (ref 4–15)
NEUTROPHILS # BLD AUTO: 15.5 K/UL (ref 1.8–7.7)
NEUTROPHILS NFR BLD: 81.6 % (ref 38–73)
NITRITE UR QL STRIP: NEGATIVE
NRBC BLD-RTO: 0 /100 WBC
PCO2 BLDA: 65.4 MMHG (ref 35–45)
PH SMN: 7.19 [PH] (ref 7.35–7.45)
PH UR STRIP: 5 [PH] (ref 5–8)
PLATELET # BLD AUTO: 321 K/UL (ref 150–450)
PMV BLD AUTO: 10.5 FL (ref 9.2–12.9)
PO2 BLDA: 117 MMHG (ref 80–100)
POC BE: -3 MMOL/L
POC SATURATED O2: 97 % (ref 95–100)
POC TCO2: 27 MMOL/L (ref 23–27)
POTASSIUM SERPL-SCNC: 5.6 MMOL/L (ref 3.5–5.1)
PROT SERPL-MCNC: 6.9 G/DL (ref 6–8.4)
PROT UR QL STRIP: ABNORMAL
RBC # BLD AUTO: 3.82 M/UL (ref 4–5.4)
RBC #/AREA URNS HPF: 2 /HPF (ref 0–4)
SAMPLE: ABNORMAL
SARS-COV-2 RDRP RESP QL NAA+PROBE: POSITIVE
SITE: ABNORMAL
SODIUM SERPL-SCNC: 131 MMOL/L (ref 136–145)
SP GR UR STRIP: 1.02 (ref 1–1.03)
SQUAMOUS #/AREA URNS HPF: 2 /HPF
TROPONIN I SERPL DL<=0.01 NG/ML-MCNC: 0.02 NG/ML (ref 0–0.03)
URN SPEC COLLECT METH UR: ABNORMAL
UROBILINOGEN UR STRIP-ACNC: NEGATIVE EU/DL
WBC # BLD AUTO: 18.97 K/UL (ref 3.9–12.7)
WBC #/AREA URNS HPF: 4 /HPF (ref 0–5)

## 2022-02-03 PROCEDURE — 27000190 HC CPAP FULL FACE MASK W/VALVE

## 2022-02-03 PROCEDURE — 83880 ASSAY OF NATRIURETIC PEPTIDE: CPT | Performed by: EMERGENCY MEDICINE

## 2022-02-03 PROCEDURE — 93010 EKG 12-LEAD: ICD-10-PCS | Mod: ,,, | Performed by: INTERNAL MEDICINE

## 2022-02-03 PROCEDURE — 94660 CPAP INITIATION&MGMT: CPT

## 2022-02-03 PROCEDURE — 25000242 PHARM REV CODE 250 ALT 637 W/ HCPCS: Performed by: EMERGENCY MEDICINE

## 2022-02-03 PROCEDURE — 83605 ASSAY OF LACTIC ACID: CPT | Performed by: EMERGENCY MEDICINE

## 2022-02-03 PROCEDURE — 99900035 HC TECH TIME PER 15 MIN (STAT)

## 2022-02-03 PROCEDURE — 96375 TX/PRO/DX INJ NEW DRUG ADDON: CPT

## 2022-02-03 PROCEDURE — 87040 BLOOD CULTURE FOR BACTERIA: CPT | Performed by: EMERGENCY MEDICINE

## 2022-02-03 PROCEDURE — 85025 COMPLETE CBC W/AUTO DIFF WBC: CPT | Performed by: EMERGENCY MEDICINE

## 2022-02-03 PROCEDURE — 82803 BLOOD GASES ANY COMBINATION: CPT

## 2022-02-03 PROCEDURE — 84484 ASSAY OF TROPONIN QUANT: CPT | Performed by: EMERGENCY MEDICINE

## 2022-02-03 PROCEDURE — 80053 COMPREHEN METABOLIC PANEL: CPT | Performed by: EMERGENCY MEDICINE

## 2022-02-03 PROCEDURE — 93005 ELECTROCARDIOGRAM TRACING: CPT

## 2022-02-03 PROCEDURE — 81000 URINALYSIS NONAUTO W/SCOPE: CPT | Performed by: EMERGENCY MEDICINE

## 2022-02-03 PROCEDURE — 82140 ASSAY OF AMMONIA: CPT | Performed by: EMERGENCY MEDICINE

## 2022-02-03 PROCEDURE — 96365 THER/PROPH/DIAG IV INF INIT: CPT

## 2022-02-03 PROCEDURE — 99291 CRITICAL CARE FIRST HOUR: CPT | Mod: 25

## 2022-02-03 PROCEDURE — 27000207 HC ISOLATION

## 2022-02-03 PROCEDURE — 63600175 PHARM REV CODE 636 W HCPCS: Performed by: EMERGENCY MEDICINE

## 2022-02-03 PROCEDURE — 93010 ELECTROCARDIOGRAM REPORT: CPT | Mod: ,,, | Performed by: INTERNAL MEDICINE

## 2022-02-03 PROCEDURE — U0002 COVID-19 LAB TEST NON-CDC: HCPCS | Performed by: EMERGENCY MEDICINE

## 2022-02-03 PROCEDURE — 94640 AIRWAY INHALATION TREATMENT: CPT

## 2022-02-03 PROCEDURE — 36600 WITHDRAWAL OF ARTERIAL BLOOD: CPT

## 2022-02-03 PROCEDURE — 25000003 PHARM REV CODE 250: Performed by: EMERGENCY MEDICINE

## 2022-02-03 PROCEDURE — 12000002 HC ACUTE/MED SURGE SEMI-PRIVATE ROOM

## 2022-02-03 RX ORDER — HEPARIN SODIUM 5000 [USP'U]/ML
7500 INJECTION, SOLUTION INTRAVENOUS; SUBCUTANEOUS EVERY 8 HOURS
Status: DISCONTINUED | OUTPATIENT
Start: 2022-02-04 | End: 2022-02-13

## 2022-02-03 RX ORDER — ACETAMINOPHEN 325 MG/1
650 TABLET ORAL EVERY 8 HOURS PRN
Status: DISCONTINUED | OUTPATIENT
Start: 2022-02-03 | End: 2022-02-16 | Stop reason: HOSPADM

## 2022-02-03 RX ORDER — TALC
6 POWDER (GRAM) TOPICAL NIGHTLY PRN
Status: DISCONTINUED | OUTPATIENT
Start: 2022-02-03 | End: 2022-02-16 | Stop reason: HOSPADM

## 2022-02-03 RX ORDER — ONDANSETRON 2 MG/ML
4 INJECTION INTRAMUSCULAR; INTRAVENOUS EVERY 8 HOURS PRN
Status: DISCONTINUED | OUTPATIENT
Start: 2022-02-03 | End: 2022-02-16 | Stop reason: HOSPADM

## 2022-02-03 RX ORDER — AMOXICILLIN 250 MG
1 CAPSULE ORAL DAILY PRN
Status: DISCONTINUED | OUTPATIENT
Start: 2022-02-03 | End: 2022-02-16 | Stop reason: HOSPADM

## 2022-02-03 RX ORDER — ONDANSETRON 8 MG/1
8 TABLET, ORALLY DISINTEGRATING ORAL EVERY 8 HOURS PRN
Status: DISCONTINUED | OUTPATIENT
Start: 2022-02-03 | End: 2022-02-16 | Stop reason: HOSPADM

## 2022-02-03 RX ORDER — ALBUTEROL SULFATE 90 UG/1
2 AEROSOL, METERED RESPIRATORY (INHALATION) EVERY 6 HOURS
Status: DISCONTINUED | OUTPATIENT
Start: 2022-02-04 | End: 2022-02-04

## 2022-02-03 RX ORDER — NALOXONE HCL 0.4 MG/ML
0.02 VIAL (ML) INJECTION
Status: DISCONTINUED | OUTPATIENT
Start: 2022-02-03 | End: 2022-02-16 | Stop reason: HOSPADM

## 2022-02-03 RX ORDER — ALBUTEROL SULFATE 2.5 MG/.5ML
15 SOLUTION RESPIRATORY (INHALATION)
Status: COMPLETED | OUTPATIENT
Start: 2022-02-03 | End: 2022-02-03

## 2022-02-03 RX ORDER — ASCORBIC ACID 500 MG
500 TABLET ORAL 2 TIMES DAILY
Status: DISCONTINUED | OUTPATIENT
Start: 2022-02-04 | End: 2022-02-16 | Stop reason: HOSPADM

## 2022-02-03 RX ADMIN — INSULIN HUMAN 5 UNITS: 100 INJECTION, SOLUTION PARENTERAL at 11:02

## 2022-02-03 RX ADMIN — CEFTRIAXONE 1 G: 1 INJECTION, SOLUTION INTRAVENOUS at 10:02

## 2022-02-03 RX ADMIN — ALBUTEROL SULFATE 15 MG: 2.5 SOLUTION RESPIRATORY (INHALATION) at 11:02

## 2022-02-03 RX ADMIN — SODIUM CHLORIDE 1000 ML: 0.9 INJECTION, SOLUTION INTRAVENOUS at 10:02

## 2022-02-03 RX ADMIN — DEXTROSE MONOHYDRATE 25 G: 25 INJECTION, SOLUTION INTRAVENOUS at 11:02

## 2022-02-03 NOTE — Clinical Note
Is this patient a high probability for COVID-19?: Yes   Diagnosis: AMS (altered mental status) [3097824]   Admitting Provider:: CATALINA COLLIER [[id number]]   Future Attending Provider: CATALINA COLLIER [[id number]]   Reason for IP Medical Treatment  (Clinical interventions that can only be accomplished in the IP setting? ) :: Covid 19 on BIPAP for C02 Retention   Estimated Length of Stay:: 2 midnights   I certify that Inpatient services for greater than or equal to 2 midnights are medically necessary:: Yes   Plans for Post-Acute care--if anticipated (pick the single best option):: A. No post acute care anticipated at this time

## 2022-02-04 ENCOUNTER — ANESTHESIA (OUTPATIENT)
Dept: INTENSIVE CARE | Facility: HOSPITAL | Age: 72
DRG: 871 | End: 2022-02-04
Payer: MEDICARE

## 2022-02-04 ENCOUNTER — ANESTHESIA EVENT (OUTPATIENT)
Dept: INTENSIVE CARE | Facility: HOSPITAL | Age: 72
DRG: 871 | End: 2022-02-04
Payer: MEDICARE

## 2022-02-04 PROBLEM — A41.9 SEPSIS: Status: RESOLVED | Noted: 2022-02-03 | Resolved: 2022-02-04

## 2022-02-04 PROBLEM — E83.51 HYPOCALCEMIA: Status: ACTIVE | Noted: 2022-02-04

## 2022-02-04 PROBLEM — R41.82 AMS (ALTERED MENTAL STATUS): Status: RESOLVED | Noted: 2022-02-03 | Resolved: 2022-02-04

## 2022-02-04 PROBLEM — E83.39 HYPERPHOSPHATEMIA: Status: ACTIVE | Noted: 2022-02-04

## 2022-02-04 PROBLEM — R09.02 HYPOXIA: Status: RESOLVED | Noted: 2022-02-03 | Resolved: 2022-02-04

## 2022-02-04 PROBLEM — G93.40 ACUTE ENCEPHALOPATHY: Status: ACTIVE | Noted: 2022-02-04

## 2022-02-04 PROBLEM — J96.01 ACUTE RESPIRATORY FAILURE WITH HYPOXIA AND HYPERCARBIA: Status: ACTIVE | Noted: 2022-02-03

## 2022-02-04 PROBLEM — G93.41 ACUTE METABOLIC ENCEPHALOPATHY: Status: ACTIVE | Noted: 2022-02-04

## 2022-02-04 PROBLEM — R57.9 SHOCK, UNSPECIFIED: Status: ACTIVE | Noted: 2022-02-04

## 2022-02-04 PROBLEM — N17.0 ACUTE RENAL FAILURE WITH TUBULAR NECROSIS: Status: ACTIVE | Noted: 2022-02-03

## 2022-02-04 PROBLEM — E87.29 HIGH ANION GAP METABOLIC ACIDOSIS: Status: ACTIVE | Noted: 2022-02-03

## 2022-02-04 PROBLEM — R74.01 TRANSAMINITIS: Status: ACTIVE | Noted: 2022-02-04

## 2022-02-04 PROBLEM — R73.9 HYPERGLYCEMIA: Status: ACTIVE | Noted: 2022-02-04

## 2022-02-04 LAB
ALBUMIN SERPL BCP-MCNC: 2.6 G/DL (ref 3.5–5.2)
ALBUMIN SERPL BCP-MCNC: 2.9 G/DL (ref 3.5–5.2)
ALBUMIN SERPL BCP-MCNC: 3.1 G/DL (ref 3.5–5.2)
ALLENS TEST: ABNORMAL
ALP SERPL-CCNC: 97 U/L (ref 55–135)
ALT SERPL W/O P-5'-P-CCNC: 49 U/L (ref 10–44)
ANION GAP SERPL CALC-SCNC: 16 MMOL/L (ref 8–16)
ANION GAP SERPL CALC-SCNC: 16 MMOL/L (ref 8–16)
ANION GAP SERPL CALC-SCNC: 17 MMOL/L (ref 8–16)
AORTIC ROOT ANNULUS: 3.04 CM
AST SERPL-CCNC: 97 U/L (ref 10–40)
AV INDEX (PROSTH): 0.85
AV MEAN GRADIENT: 9 MMHG
AV PEAK GRADIENT: 16 MMHG
AV VALVE AREA: 2.88 CM2
AV VELOCITY RATIO: 0.75
BASOPHILS # BLD AUTO: 0.02 K/UL (ref 0–0.2)
BASOPHILS NFR BLD: 0.1 % (ref 0–1.9)
BILIRUB SERPL-MCNC: 0.3 MG/DL (ref 0.1–1)
BSA FOR ECHO PROCEDURE: 2.18 M2
BUN SERPL-MCNC: 67 MG/DL (ref 8–23)
BUN SERPL-MCNC: 72 MG/DL (ref 8–23)
BUN SERPL-MCNC: 75 MG/DL (ref 8–23)
CALCIUM SERPL-MCNC: 6.5 MG/DL (ref 8.7–10.5)
CALCIUM SERPL-MCNC: 6.6 MG/DL (ref 8.7–10.5)
CALCIUM SERPL-MCNC: 6.7 MG/DL (ref 8.7–10.5)
CHLORIDE SERPL-SCNC: 97 MMOL/L (ref 95–110)
CHLORIDE SERPL-SCNC: 99 MMOL/L (ref 95–110)
CHLORIDE SERPL-SCNC: 99 MMOL/L (ref 95–110)
CO2 SERPL-SCNC: 17 MMOL/L (ref 23–29)
CO2 SERPL-SCNC: 17 MMOL/L (ref 23–29)
CO2 SERPL-SCNC: 19 MMOL/L (ref 23–29)
CREAT SERPL-MCNC: 4.9 MG/DL (ref 0.5–1.4)
CREAT SERPL-MCNC: 6.7 MG/DL (ref 0.5–1.4)
CREAT SERPL-MCNC: 7 MG/DL (ref 0.5–1.4)
CREAT UR-MCNC: 179.4 MG/DL (ref 15–325)
CV ECHO LV RWT: 0.54 CM
DELSYS: ABNORMAL
DIFFERENTIAL METHOD: ABNORMAL
DOP CALC AO PEAK VEL: 2 M/S
DOP CALC AO VTI: 37 CM
DOP CALC LVOT AREA: 3.4 CM2
DOP CALC LVOT DIAMETER: 2.08 CM
DOP CALC LVOT PEAK VEL: 1.5 M/S
DOP CALC LVOT STROKE VOLUME: 106.74 CM3
DOP CALC MV VTI: 28.71 CM
DOP CALCLVOT PEAK VEL VTI: 31.43 CM
E WAVE DECELERATION TIME: 204.57 MSEC
E/A RATIO: 1.09
E/E' RATIO: 11.18 M/S
ECHO LV POSTERIOR WALL: 1.12 CM (ref 0.6–1.1)
EJECTION FRACTION: 75 %
EOSINOPHIL # BLD AUTO: 0 K/UL (ref 0–0.5)
EOSINOPHIL NFR BLD: 0 % (ref 0–8)
EP: 5
EP: 6
EP: 6
EP: 9
ERYTHROCYTE [DISTWIDTH] IN BLOOD BY AUTOMATED COUNT: 13.7 % (ref 11.5–14.5)
ERYTHROCYTE [SEDIMENTATION RATE] IN BLOOD BY WESTERGREN METHOD: 10 MM/H
ERYTHROCYTE [SEDIMENTATION RATE] IN BLOOD BY WESTERGREN METHOD: 16 MM/H
ERYTHROCYTE [SEDIMENTATION RATE] IN BLOOD BY WESTERGREN METHOD: 20 MM/H
ERYTHROCYTE [SEDIMENTATION RATE] IN BLOOD BY WESTERGREN METHOD: 20 MM/H
ERYTHROCYTE [SEDIMENTATION RATE] IN BLOOD BY WESTERGREN METHOD: 34 MM/H
EST. GFR  (AFRICAN AMERICAN): 10 ML/MIN/1.73 M^2
EST. GFR  (AFRICAN AMERICAN): 6 ML/MIN/1.73 M^2
EST. GFR  (AFRICAN AMERICAN): 7 ML/MIN/1.73 M^2
EST. GFR  (NON AFRICAN AMERICAN): 5 ML/MIN/1.73 M^2
EST. GFR  (NON AFRICAN AMERICAN): 6 ML/MIN/1.73 M^2
EST. GFR  (NON AFRICAN AMERICAN): 8 ML/MIN/1.73 M^2
FIO2: 100
FIO2: 21
FIO2: 30
FIO2: 40
FIO2: 40
FRACTIONAL SHORTENING: 26 % (ref 28–44)
GLUCOSE SERPL-MCNC: 135 MG/DL (ref 70–110)
GLUCOSE SERPL-MCNC: 219 MG/DL (ref 70–110)
GLUCOSE SERPL-MCNC: 257 MG/DL (ref 70–110)
HCO3 UR-SCNC: 20.4 MMOL/L (ref 24–28)
HCO3 UR-SCNC: 20.6 MMOL/L (ref 24–28)
HCO3 UR-SCNC: 21.8 MMOL/L (ref 24–28)
HCO3 UR-SCNC: 21.9 MMOL/L (ref 24–28)
HCO3 UR-SCNC: 22.7 MMOL/L (ref 24–28)
HCO3 UR-SCNC: 23.2 MMOL/L (ref 24–28)
HCO3 UR-SCNC: 23.4 MMOL/L (ref 24–28)
HCO3 UR-SCNC: 24.2 MMOL/L (ref 24–28)
HCT VFR BLD AUTO: 35.4 % (ref 37–48.5)
HGB BLD-MCNC: 11.2 G/DL (ref 12–16)
IMM GRANULOCYTES # BLD AUTO: 0.16 K/UL (ref 0–0.04)
IMM GRANULOCYTES NFR BLD AUTO: 0.9 % (ref 0–0.5)
INTERVENTRICULAR SEPTUM: 1.38 CM (ref 0.6–1.1)
IP: 12
IP: 14
IP: 18
IP: 18
LA MAJOR: 4.48 CM
LA MINOR: 4.46 CM
LA WIDTH: 2.72 CM
LACTATE SERPL-SCNC: 0.8 MMOL/L (ref 0.5–2.2)
LEFT ATRIUM SIZE: 3.51 CM
LEFT ATRIUM VOLUME INDEX MOD: 11.9 ML/M2
LEFT ATRIUM VOLUME INDEX: 17.7 ML/M2
LEFT ATRIUM VOLUME MOD: 24.36 CM3
LEFT ATRIUM VOLUME: 36.27 CM3
LEFT INTERNAL DIMENSION IN SYSTOLE: 3.04 CM (ref 2.1–4)
LEFT VENTRICLE DIASTOLIC VOLUME INDEX: 36.88 ML/M2
LEFT VENTRICLE DIASTOLIC VOLUME: 75.61 ML
LEFT VENTRICLE MASS INDEX: 90 G/M2
LEFT VENTRICLE SYSTOLIC VOLUME INDEX: 17.6 ML/M2
LEFT VENTRICLE SYSTOLIC VOLUME: 36.13 ML
LEFT VENTRICULAR INTERNAL DIMENSION IN DIASTOLE: 4.13 CM (ref 3.5–6)
LEFT VENTRICULAR MASS: 184.46 G
LIPASE SERPL-CCNC: 12 U/L (ref 4–60)
LV LATERAL E/E' RATIO: 11.18 M/S
LV SEPTAL E/E' RATIO: 11.18 M/S
LYMPHOCYTES # BLD AUTO: 0.7 K/UL (ref 1–4.8)
LYMPHOCYTES NFR BLD: 4.2 % (ref 18–48)
MAGNESIUM SERPL-MCNC: 2.1 MG/DL (ref 1.6–2.6)
MAGNESIUM SERPL-MCNC: 2.2 MG/DL (ref 1.6–2.6)
MCH RBC QN AUTO: 31.5 PG (ref 27–31)
MCHC RBC AUTO-ENTMCNC: 31.6 G/DL (ref 32–36)
MCV RBC AUTO: 99 FL (ref 82–98)
MODE: ABNORMAL
MONOCYTES # BLD AUTO: 0.7 K/UL (ref 0.3–1)
MONOCYTES NFR BLD: 3.8 % (ref 4–15)
MV MEAN GRADIENT: 1 MMHG
MV PEAK A VEL: 1.13 M/S
MV PEAK E VEL: 1.23 M/S
MV PEAK GRADIENT: 6 MMHG
MV STENOSIS PRESSURE HALF TIME: 59.33 MS
MV VALVE AREA BY CONTINUITY EQUATION: 3.72 CM2
MV VALVE AREA P 1/2 METHOD: 3.71 CM2
NEUTROPHILS # BLD AUTO: 15.5 K/UL (ref 1.8–7.7)
NEUTROPHILS NFR BLD: 91 % (ref 38–73)
NRBC BLD-RTO: 0 /100 WBC
PCO2 BLDA: 44.3 MMHG (ref 35–45)
PCO2 BLDA: 51.9 MMHG (ref 35–45)
PCO2 BLDA: 52.4 MMHG (ref 35–45)
PCO2 BLDA: 62.3 MMHG (ref 35–45)
PCO2 BLDA: 69.7 MMHG (ref 35–45)
PCO2 BLDA: 72.2 MMHG (ref 35–45)
PCO2 BLDA: 73.3 MMHG (ref 35–45)
PCO2 BLDA: 90.4 MMHG (ref 35–45)
PEEP: 5
PEEP: 8
PH SMN: 7.04 [PH] (ref 7.35–7.45)
PH SMN: 7.11 [PH] (ref 7.35–7.45)
PH SMN: 7.12 [PH] (ref 7.35–7.45)
PH SMN: 7.12 [PH] (ref 7.35–7.45)
PH SMN: 7.15 [PH] (ref 7.35–7.45)
PH SMN: 7.2 [PH] (ref 7.35–7.45)
PH SMN: 7.23 [PH] (ref 7.35–7.45)
PH SMN: 7.28 [PH] (ref 7.35–7.45)
PHOSPHATE SERPL-MCNC: 10.4 MG/DL (ref 2.7–4.5)
PHOSPHATE SERPL-MCNC: 10.8 MG/DL (ref 2.7–4.5)
PHOSPHATE SERPL-MCNC: 5.2 MG/DL (ref 2.7–4.5)
PLATELET # BLD AUTO: 268 K/UL (ref 150–450)
PMV BLD AUTO: 10.2 FL (ref 9.2–12.9)
PO2 BLDA: 101 MMHG (ref 80–100)
PO2 BLDA: 108 MMHG (ref 80–100)
PO2 BLDA: 112 MMHG (ref 80–100)
PO2 BLDA: 419 MMHG (ref 80–100)
PO2 BLDA: 63 MMHG (ref 80–100)
PO2 BLDA: 78 MMHG (ref 80–100)
PO2 BLDA: 80 MMHG (ref 80–100)
PO2 BLDA: 89 MMHG (ref 80–100)
POC BE: -6 MMOL/L
POC BE: -7 MMOL/L
POC BE: -7 MMOL/L
POC BE: -8 MMOL/L
POC SATURATED O2: 100 % (ref 95–100)
POC SATURATED O2: 87 % (ref 95–100)
POC SATURATED O2: 87 % (ref 95–100)
POC SATURATED O2: 93 % (ref 95–100)
POC SATURATED O2: 94 % (ref 95–100)
POC SATURATED O2: 95 % (ref 95–100)
POC SATURATED O2: 96 % (ref 95–100)
POC SATURATED O2: 97 % (ref 95–100)
POC TCO2: 22 MMOL/L (ref 23–27)
POC TCO2: 22 MMOL/L (ref 23–27)
POC TCO2: 23 MMOL/L (ref 23–27)
POC TCO2: 24 MMOL/L (ref 23–27)
POC TCO2: 25 MMOL/L (ref 23–27)
POC TCO2: 25 MMOL/L (ref 23–27)
POC TCO2: 26 MMOL/L (ref 23–27)
POC TCO2: 27 MMOL/L (ref 23–27)
POCT GLUCOSE: 149 MG/DL (ref 70–110)
POCT GLUCOSE: 283 MG/DL (ref 70–110)
POTASSIUM SERPL-SCNC: 3.8 MMOL/L (ref 3.5–5.1)
POTASSIUM SERPL-SCNC: 6 MMOL/L (ref 3.5–5.1)
POTASSIUM SERPL-SCNC: 6.1 MMOL/L (ref 3.5–5.1)
PROT SERPL-MCNC: 5.6 G/DL (ref 6–8.4)
PS: 18
PV PEAK VELOCITY: 1.57 CM/S
RA MAJOR: 3.68 CM
RA WIDTH: 2.78 CM
RBC # BLD AUTO: 3.56 M/UL (ref 4–5.4)
RV TISSUE DOPPLER FREE WALL SYSTOLIC VELOCITY 1 (APICAL 4 CHAMBER VIEW): 12.65 CM/S
SAMPLE: ABNORMAL
SITE: ABNORMAL
SODIUM SERPL-SCNC: 132 MMOL/L (ref 136–145)
SODIUM SERPL-SCNC: 132 MMOL/L (ref 136–145)
SODIUM SERPL-SCNC: 133 MMOL/L (ref 136–145)
SODIUM UR-SCNC: 21 MMOL/L (ref 20–250)
SP02: 98
TDI LATERAL: 0.11 M/S
TDI SEPTAL: 0.11 M/S
TDI: 0.11 M/S
TSH SERPL DL<=0.005 MIU/L-ACNC: 0.78 UIU/ML (ref 0.4–4)
VT: 370
VT: 500
WBC # BLD AUTO: 16.99 K/UL (ref 3.9–12.7)

## 2022-02-04 PROCEDURE — 94761 N-INVAS EAR/PLS OXIMETRY MLT: CPT

## 2022-02-04 PROCEDURE — 90945 DIALYSIS ONE EVALUATION: CPT

## 2022-02-04 PROCEDURE — 94003 VENT MGMT INPAT SUBQ DAY: CPT

## 2022-02-04 PROCEDURE — 25000003 PHARM REV CODE 250: Performed by: HOSPITALIST

## 2022-02-04 PROCEDURE — 25000242 PHARM REV CODE 250 ALT 637 W/ HCPCS: Performed by: NURSE PRACTITIONER

## 2022-02-04 PROCEDURE — 82570 ASSAY OF URINE CREATININE: CPT | Performed by: NURSE PRACTITIONER

## 2022-02-04 PROCEDURE — 83605 ASSAY OF LACTIC ACID: CPT | Performed by: NURSE PRACTITIONER

## 2022-02-04 PROCEDURE — 80069 RENAL FUNCTION PANEL: CPT | Mod: 91 | Performed by: INTERNAL MEDICINE

## 2022-02-04 PROCEDURE — 63600175 PHARM REV CODE 636 W HCPCS: Performed by: INTERNAL MEDICINE

## 2022-02-04 PROCEDURE — 86709 HEPATITIS A IGM ANTIBODY: CPT | Performed by: INTERNAL MEDICINE

## 2022-02-04 PROCEDURE — 63600175 PHARM REV CODE 636 W HCPCS: Performed by: EMERGENCY MEDICINE

## 2022-02-04 PROCEDURE — 25000003 PHARM REV CODE 250: Performed by: INTERNAL MEDICINE

## 2022-02-04 PROCEDURE — 86706 HEP B SURFACE ANTIBODY: CPT | Performed by: INTERNAL MEDICINE

## 2022-02-04 PROCEDURE — 63600175 PHARM REV CODE 636 W HCPCS: Performed by: STUDENT IN AN ORGANIZED HEALTH CARE EDUCATION/TRAINING PROGRAM

## 2022-02-04 PROCEDURE — 86704 HEP B CORE ANTIBODY TOTAL: CPT | Performed by: INTERNAL MEDICINE

## 2022-02-04 PROCEDURE — 25000003 PHARM REV CODE 250: Performed by: EMERGENCY MEDICINE

## 2022-02-04 PROCEDURE — 84300 ASSAY OF URINE SODIUM: CPT | Performed by: NURSE PRACTITIONER

## 2022-02-04 PROCEDURE — 83735 ASSAY OF MAGNESIUM: CPT | Mod: 91 | Performed by: INTERNAL MEDICINE

## 2022-02-04 PROCEDURE — 83735 ASSAY OF MAGNESIUM: CPT | Performed by: NURSE PRACTITIONER

## 2022-02-04 PROCEDURE — 63600175 PHARM REV CODE 636 W HCPCS: Performed by: NURSE PRACTITIONER

## 2022-02-04 PROCEDURE — 99900035 HC TECH TIME PER 15 MIN (STAT)

## 2022-02-04 PROCEDURE — 87205 SMEAR GRAM STAIN: CPT | Performed by: STUDENT IN AN ORGANIZED HEALTH CARE EDUCATION/TRAINING PROGRAM

## 2022-02-04 PROCEDURE — 82803 BLOOD GASES ANY COMBINATION: CPT

## 2022-02-04 PROCEDURE — 63600175 PHARM REV CODE 636 W HCPCS: Performed by: HOSPITALIST

## 2022-02-04 PROCEDURE — 27000221 HC OXYGEN, UP TO 24 HOURS

## 2022-02-04 PROCEDURE — 84100 ASSAY OF PHOSPHORUS: CPT | Performed by: NURSE PRACTITIONER

## 2022-02-04 PROCEDURE — 83690 ASSAY OF LIPASE: CPT | Performed by: STUDENT IN AN ORGANIZED HEALTH CARE EDUCATION/TRAINING PROGRAM

## 2022-02-04 PROCEDURE — 80100008 HC CRRT DAILY MAINTENANCE

## 2022-02-04 PROCEDURE — 84443 ASSAY THYROID STIM HORMONE: CPT | Performed by: NURSE PRACTITIONER

## 2022-02-04 PROCEDURE — 37799 UNLISTED PX VASCULAR SURGERY: CPT

## 2022-02-04 PROCEDURE — 25500020 PHARM REV CODE 255: Performed by: INTERNAL MEDICINE

## 2022-02-04 PROCEDURE — 94002 VENT MGMT INPAT INIT DAY: CPT

## 2022-02-04 PROCEDURE — 94640 AIRWAY INHALATION TREATMENT: CPT

## 2022-02-04 PROCEDURE — 25000003 PHARM REV CODE 250: Performed by: STUDENT IN AN ORGANIZED HEALTH CARE EDUCATION/TRAINING PROGRAM

## 2022-02-04 PROCEDURE — 27000207 HC ISOLATION

## 2022-02-04 PROCEDURE — 36600 WITHDRAWAL OF ARTERIAL BLOOD: CPT

## 2022-02-04 PROCEDURE — 85025 COMPLETE CBC W/AUTO DIFF WBC: CPT | Performed by: NURSE PRACTITIONER

## 2022-02-04 PROCEDURE — 87340 HEPATITIS B SURFACE AG IA: CPT | Performed by: INTERNAL MEDICINE

## 2022-02-04 PROCEDURE — 63600175 PHARM REV CODE 636 W HCPCS

## 2022-02-04 PROCEDURE — 87070 CULTURE OTHR SPECIMN AEROBIC: CPT | Performed by: STUDENT IN AN ORGANIZED HEALTH CARE EDUCATION/TRAINING PROGRAM

## 2022-02-04 PROCEDURE — 20000000 HC ICU ROOM

## 2022-02-04 PROCEDURE — 25000003 PHARM REV CODE 250

## 2022-02-04 PROCEDURE — 27202415 HC CARTRIDGE, CRRT

## 2022-02-04 PROCEDURE — 80053 COMPREHEN METABOLIC PANEL: CPT | Performed by: NURSE PRACTITIONER

## 2022-02-04 PROCEDURE — 63600175 PHARM REV CODE 636 W HCPCS: Mod: JG | Performed by: INTERNAL MEDICINE

## 2022-02-04 PROCEDURE — 25000003 PHARM REV CODE 250: Performed by: NURSE PRACTITIONER

## 2022-02-04 PROCEDURE — 94660 CPAP INITIATION&MGMT: CPT

## 2022-02-04 PROCEDURE — C9113 INJ PANTOPRAZOLE SODIUM, VIA: HCPCS | Performed by: HOSPITALIST

## 2022-02-04 RX ORDER — HEPARIN SODIUM 1000 [USP'U]/ML
2800 INJECTION, SOLUTION INTRAVENOUS; SUBCUTANEOUS
Status: DISCONTINUED | OUTPATIENT
Start: 2022-02-04 | End: 2022-02-16 | Stop reason: HOSPADM

## 2022-02-04 RX ORDER — FENTANYL CITRATE 50 UG/ML
100 INJECTION, SOLUTION INTRAMUSCULAR; INTRAVENOUS ONCE
Status: COMPLETED | OUTPATIENT
Start: 2022-02-04 | End: 2022-02-04

## 2022-02-04 RX ORDER — DEXAMETHASONE SODIUM PHOSPHATE 4 MG/ML
6 INJECTION, SOLUTION INTRA-ARTICULAR; INTRALESIONAL; INTRAMUSCULAR; INTRAVENOUS; SOFT TISSUE EVERY 24 HOURS
Status: DISCONTINUED | OUTPATIENT
Start: 2022-02-04 | End: 2022-02-04

## 2022-02-04 RX ORDER — ROCURONIUM BROMIDE 10 MG/ML
INJECTION, SOLUTION INTRAVENOUS
Status: COMPLETED
Start: 2022-02-04 | End: 2022-02-04

## 2022-02-04 RX ORDER — ROCURONIUM BROMIDE 10 MG/ML
1 INJECTION, SOLUTION INTRAVENOUS ONCE
Status: COMPLETED | OUTPATIENT
Start: 2022-02-04 | End: 2022-02-04

## 2022-02-04 RX ORDER — SUCCINYLCHOLINE CHLORIDE 20 MG/ML
INJECTION INTRAMUSCULAR; INTRAVENOUS
Status: DISPENSED
Start: 2022-02-04 | End: 2022-02-05

## 2022-02-04 RX ORDER — SODIUM CHLORIDE 9 MG/ML
INJECTION, SOLUTION INTRAVENOUS CONTINUOUS
Status: DISCONTINUED | OUTPATIENT
Start: 2022-02-04 | End: 2022-02-16 | Stop reason: HOSPADM

## 2022-02-04 RX ORDER — ROCURONIUM BROMIDE 10 MG/ML
INJECTION, SOLUTION INTRAVENOUS
Status: DISPENSED
Start: 2022-02-04 | End: 2022-02-05

## 2022-02-04 RX ORDER — HYDROCODONE BITARTRATE AND ACETAMINOPHEN 500; 5 MG/1; MG/1
TABLET ORAL CONTINUOUS
Status: DISCONTINUED | OUTPATIENT
Start: 2022-02-04 | End: 2022-02-09

## 2022-02-04 RX ORDER — SODIUM CHLORIDE 0.9 % (FLUSH) 0.9 %
3 SYRINGE (ML) INJECTION
Status: DISCONTINUED | OUTPATIENT
Start: 2022-02-04 | End: 2022-02-16 | Stop reason: HOSPADM

## 2022-02-04 RX ORDER — FENTANYL CITRATE 50 UG/ML
50 INJECTION, SOLUTION INTRAMUSCULAR; INTRAVENOUS
Status: DISCONTINUED | OUTPATIENT
Start: 2022-02-04 | End: 2022-02-09

## 2022-02-04 RX ORDER — ETOMIDATE 2 MG/ML
0.3 INJECTION INTRAVENOUS ONCE
Status: COMPLETED | OUTPATIENT
Start: 2022-02-04 | End: 2022-02-04

## 2022-02-04 RX ORDER — CHLORHEXIDINE GLUCONATE ORAL RINSE 1.2 MG/ML
15 SOLUTION DENTAL 2 TIMES DAILY
Status: DISCONTINUED | OUTPATIENT
Start: 2022-02-04 | End: 2022-02-09

## 2022-02-04 RX ORDER — MUPIROCIN 20 MG/G
OINTMENT TOPICAL 2 TIMES DAILY
Status: COMPLETED | OUTPATIENT
Start: 2022-02-04 | End: 2022-02-08

## 2022-02-04 RX ORDER — PROPOFOL 10 MG/ML
0-50 INJECTION, EMULSION INTRAVENOUS CONTINUOUS
Status: DISCONTINUED | OUTPATIENT
Start: 2022-02-04 | End: 2022-02-08

## 2022-02-04 RX ORDER — FENTANYL CITRATE 50 UG/ML
INJECTION, SOLUTION INTRAMUSCULAR; INTRAVENOUS
Status: COMPLETED
Start: 2022-02-04 | End: 2022-02-04

## 2022-02-04 RX ORDER — SODIUM CHLORIDE 9 MG/ML
INJECTION, SOLUTION INTRAVENOUS CONTINUOUS
Status: DISCONTINUED | OUTPATIENT
Start: 2022-02-04 | End: 2022-02-04

## 2022-02-04 RX ORDER — ETOMIDATE 2 MG/ML
INJECTION INTRAVENOUS
Status: COMPLETED
Start: 2022-02-04 | End: 2022-02-04

## 2022-02-04 RX ORDER — PROPOFOL 10 MG/ML
INJECTION, EMULSION INTRAVENOUS
Status: DISPENSED
Start: 2022-02-04 | End: 2022-02-05

## 2022-02-04 RX ORDER — FENTANYL CITRATE-0.9 % NACL/PF 10 MCG/ML
0-250 PLASTIC BAG, INJECTION (ML) INTRAVENOUS CONTINUOUS
Status: DISCONTINUED | OUTPATIENT
Start: 2022-02-04 | End: 2022-02-08

## 2022-02-04 RX ORDER — HEPARIN SODIUM 10000 [USP'U]/100ML
500 INJECTION, SOLUTION INTRAVENOUS CONTINUOUS
Status: DISCONTINUED | OUTPATIENT
Start: 2022-02-04 | End: 2022-02-05

## 2022-02-04 RX ORDER — FAMOTIDINE 10 MG/ML
20 INJECTION INTRAVENOUS DAILY
Status: DISCONTINUED | OUTPATIENT
Start: 2022-02-05 | End: 2022-02-05

## 2022-02-04 RX ORDER — FENTANYL CITRATE-0.9 % NACL/PF 10 MCG/ML
0-250 PLASTIC BAG, INJECTION (ML) INTRAVENOUS CONTINUOUS
Status: DISCONTINUED | OUTPATIENT
Start: 2022-02-04 | End: 2022-02-04

## 2022-02-04 RX ORDER — NOREPINEPHRINE BITARTRATE/D5W 4MG/250ML
0-3 PLASTIC BAG, INJECTION (ML) INTRAVENOUS CONTINUOUS
Status: DISCONTINUED | OUTPATIENT
Start: 2022-02-04 | End: 2022-02-06

## 2022-02-04 RX ORDER — INSULIN ASPART 100 [IU]/ML
0-5 INJECTION, SOLUTION INTRAVENOUS; SUBCUTANEOUS EVERY 6 HOURS PRN
Status: DISCONTINUED | OUTPATIENT
Start: 2022-02-04 | End: 2022-02-16 | Stop reason: HOSPADM

## 2022-02-04 RX ORDER — LIDOCAINE HYDROCHLORIDE 10 MG/ML
INJECTION INFILTRATION; PERINEURAL
Status: DISPENSED
Start: 2022-02-04 | End: 2022-02-04

## 2022-02-04 RX ORDER — GLUCAGON 1 MG
1 KIT INJECTION
Status: DISCONTINUED | OUTPATIENT
Start: 2022-02-04 | End: 2022-02-16 | Stop reason: HOSPADM

## 2022-02-04 RX ORDER — PANTOPRAZOLE SODIUM 40 MG/10ML
40 INJECTION, POWDER, LYOPHILIZED, FOR SOLUTION INTRAVENOUS DAILY
Status: DISCONTINUED | OUTPATIENT
Start: 2022-02-04 | End: 2022-02-09

## 2022-02-04 RX ADMIN — SODIUM CHLORIDE: 0.9 INJECTION, SOLUTION INTRAVENOUS at 02:02

## 2022-02-04 RX ADMIN — VANCOMYCIN HYDROCHLORIDE 2000 MG: 10 INJECTION, POWDER, LYOPHILIZED, FOR SOLUTION INTRAVENOUS at 12:02

## 2022-02-04 RX ADMIN — HEPARIN SODIUM 7500 UNITS: 5000 INJECTION, SOLUTION INTRAVENOUS; SUBCUTANEOUS at 09:02

## 2022-02-04 RX ADMIN — SODIUM CHLORIDE: 0.9 INJECTION, SOLUTION INTRAVENOUS at 10:02

## 2022-02-04 RX ADMIN — FENTANYL CITRATE 100 MCG: 50 INJECTION INTRAMUSCULAR; INTRAVENOUS at 05:02

## 2022-02-04 RX ADMIN — PROPOFOL 5 MCG/KG/MIN: 10 INJECTION, EMULSION INTRAVENOUS at 05:02

## 2022-02-04 RX ADMIN — Medication 0.02 MCG/KG/MIN: at 06:02

## 2022-02-04 RX ADMIN — ROCURONIUM BROMIDE 110 MG: 10 INJECTION, SOLUTION INTRAVENOUS at 05:02

## 2022-02-04 RX ADMIN — HUMAN ALBUMIN MICROSPHERES AND PERFLUTREN 0.11 MG: 10; .22 INJECTION, SOLUTION INTRAVENOUS at 03:02

## 2022-02-04 RX ADMIN — ETOMIDATE 30 MG: 2 INJECTION INTRAVENOUS at 05:02

## 2022-02-04 RX ADMIN — INSULIN ASPART 3 UNITS: 100 INJECTION, SOLUTION INTRAVENOUS; SUBCUTANEOUS at 06:02

## 2022-02-04 RX ADMIN — OXYCODONE HYDROCHLORIDE AND ACETAMINOPHEN 500 MG: 500 TABLET ORAL at 09:02

## 2022-02-04 RX ADMIN — ALBUTEROL SULFATE 2 PUFF: 90 AEROSOL, METERED RESPIRATORY (INHALATION) at 12:02

## 2022-02-04 RX ADMIN — ALBUTEROL SULFATE 2 PUFF: 90 AEROSOL, METERED RESPIRATORY (INHALATION) at 08:02

## 2022-02-04 RX ADMIN — REMDESIVIR 100 MG: 100 INJECTION, POWDER, LYOPHILIZED, FOR SOLUTION INTRAVENOUS at 12:02

## 2022-02-04 RX ADMIN — REMDESIVIR 200 MG: 100 INJECTION, POWDER, LYOPHILIZED, FOR SOLUTION INTRAVENOUS at 02:02

## 2022-02-04 RX ADMIN — HEPARIN SODIUM 2800 UNITS: 1000 INJECTION, SOLUTION INTRAVENOUS; SUBCUTANEOUS at 07:02

## 2022-02-04 RX ADMIN — PANTOPRAZOLE SODIUM 40 MG: 40 INJECTION, POWDER, FOR SOLUTION INTRAVENOUS at 03:02

## 2022-02-04 RX ADMIN — FENTANYL CITRATE 100 MCG: 50 INJECTION, SOLUTION INTRAMUSCULAR; INTRAVENOUS at 05:02

## 2022-02-04 RX ADMIN — PROPOFOL 30 MCG/KG/MIN: 10 INJECTION, EMULSION INTRAVENOUS at 08:02

## 2022-02-04 RX ADMIN — DEXAMETHASONE SODIUM PHOSPHATE 6 MG: 4 INJECTION, SOLUTION INTRA-ARTICULAR; INTRALESIONAL; INTRAMUSCULAR; INTRAVENOUS; SOFT TISSUE at 03:02

## 2022-02-04 RX ADMIN — MUPIROCIN: 20 OINTMENT TOPICAL at 09:02

## 2022-02-04 RX ADMIN — CALCIUM GLUCONATE 1 G: 98 INJECTION, SOLUTION INTRAVENOUS at 11:02

## 2022-02-04 RX ADMIN — CEFTRIAXONE SODIUM 1 G: 1 INJECTION, POWDER, FOR SOLUTION INTRAMUSCULAR; INTRAVENOUS at 10:02

## 2022-02-04 RX ADMIN — SODIUM BICARBONATE: 84 INJECTION, SOLUTION INTRAVENOUS at 06:02

## 2022-02-04 RX ADMIN — CHLORHEXIDINE GLUCONATE 0.12% ORAL RINSE 15 ML: 1.2 LIQUID ORAL at 09:02

## 2022-02-04 RX ADMIN — ALTEPLASE 2 MG: 2.2 INJECTION, POWDER, LYOPHILIZED, FOR SOLUTION INTRAVENOUS at 07:02

## 2022-02-04 RX ADMIN — HEPARIN SODIUM 7500 UNITS: 5000 INJECTION, SOLUTION INTRAVENOUS; SUBCUTANEOUS at 06:02

## 2022-02-04 RX ADMIN — HEPARIN SODIUM 7500 UNITS: 5000 INJECTION, SOLUTION INTRAVENOUS; SUBCUTANEOUS at 03:02

## 2022-02-04 RX ADMIN — SODIUM CHLORIDE 1000 ML: 0.9 INJECTION, SOLUTION INTRAVENOUS at 12:02

## 2022-02-04 RX ADMIN — SODIUM CHLORIDE 500 ML: 0.9 INJECTION, SOLUTION INTRAVENOUS at 04:02

## 2022-02-04 NOTE — CARE UPDATE
Patient intubated per Dr. Denson with a 7.5 ETT taped and secured at the 23cm.  Tolerated well.  Placed patient on vent with documented settings. Will continue to monitor.

## 2022-02-04 NOTE — ASSESSMENT & PLAN NOTE
Chronic, uncontrolled.  Latest blood pressure and vitals reviewed-   Temp:  [98.4 °F (36.9 °C)-99.1 °F (37.3 °C)]   Pulse:  [91-99]   Resp:  [16-44]   BP: (141-159)/()   SpO2:  [93 %-100 %] .   Home meds for hypertension were reviewed and noted below.   Hypertension Medications             furosemide (LASIX) 40 MG tablet TAKE 1 TABLET BY MOUTH UP TO TWO TIMES A DAY IF SWELLING OCCURS    losartan (COZAAR) 100 MG tablet TAKE 1 TABLET BY MOUTH ONCE A DAY FOR BLOOD PRESSURE          While in the hospital, will manage blood pressure as follows; Continue home antihypertensive regimen    Will utilize p.r.n. blood pressure medication only if patient's blood pressure greater than  180/110 and she develops symptoms such as worsening chest pain or shortness of breath.

## 2022-02-04 NOTE — NURSING
0130 Notified PRASHANT Lawson of patients arrival to the unit. Requested a repeat ABG. Orders to be placed and followed through, will continue to monitor.

## 2022-02-04 NOTE — ASSESSMENT & PLAN NOTE
Body mass index is 45.35 kg/m². Morbid obesity complicates all aspects of disease management from diagnostic modalities to treatment. Weight loss encouraged and health benefits explained to patient.

## 2022-02-04 NOTE — EICU
eICU Physician Brief Note    Chart reviewed. On camera, the patient is resting in bed, in NAD.  Hannah Montalvo is a 71 year old lady brought in for generalized weakness and sleeping more than usual, not conversing appropriately per . s/p COVID diagnosed 2 weeks PTA - not documented in our EMR (vaccinated, no booster). Initial working Dx was UTI, however subsequently was noted to be hypercapnic therefore placed on BIPAP.  PMH: HTN, HLD, Bell's palsy, fibromyalgia, MARY noncompliant with CPAP.   Labs and investigations reviewed.  Current medications reviewed.  A/P:  1. Acute hypercapnic respiratory failure  Possibly related to COVID infection.  CxR shows significantly diminished lung volumes, patient's home pain medication and anxiety regimen may be playing a role in hypoventilation.  BIPAP breathing 22/. Increased pressures from 10/5 to 16/8. F/up ABG 2 hours after making BIPAP changes.  2. COVID  Per history patient's COVID symptoms started at least 2 weeks ago.  Recommend stopping Remdesivir given duration of symptoms.  Ordered Dexamethasone 6 mg daily given respiratory status.  3. Severe RAQUEL  Unclear etiology.  Could be related to COVID infection, vs pre-renal if significantly decreased po intake, vs medications/OTC (Lasix, Losartan). Check urine Na and Cre.  Monitor Cre and electrolytes.  4. Encephalopathy  Likely due to hypercapnia.  Correct CO2 and re-evaluate. Monitor neuro status closely.  5. Presumed UTI  UA shows 4 WBC, unlikely to have significant infection.  Consider de-escalating/stopping ABx and stopping.  6. GI/DVT prophylaxis - is on SC Heparin.    eICU is available should acute issues arise.

## 2022-02-04 NOTE — ASSESSMENT & PLAN NOTE
Significant RAQUEL with creatinine of 7.1 from normal just 3 months ago, with acidosis, uremia, hyperkalemia, hyperphosphatemia, and hypocalcemia  Obtain renal ultrasound  -will need dialysis; pulmonology to place central line  Consult Nephrology  Strict I&O's

## 2022-02-04 NOTE — PROGRESS NOTES
[facility]           Pharmacy        Current Drug Shortage     Due to national backorder and [facility] is critically low on inventory of Dextrose 50% (D50) Syringes and Vials, pharmacy has automatically switched from D50% to D10% IVPB at the equivalent dose until resolution of the shortage per P&T approved protocol.               Yadi Dawn, PharmD  [phone]

## 2022-02-04 NOTE — PROCEDURES
[facility] Procedure Note    Procedure:   Internal jugular trialysis catheter, U/S guided, left sided.    Indication:vascular access, uremia    Procedure :  Raza Jauregui    Procedure Supervisor:  Claire Noel    Informed Consent:  Informed consent was obtained for the procedure, including sedation.  Risks of lung perforation, hemorrhage, arrhythmia, and adverse drug reaction were discussed.    Procedure Summary:   A time-out was performed. The resident initially prepped the patient's right neck region was cleaned with chlorhexidine and draped in the usual sterile fashion. Anesthesia was achieved with 1% lidocaine. The right internal jugular vein was accessed under ultrasound guidance using a finder needle and sheath. U/S images were viewed to confirm placement of the needle. Venous blood was withdrawn and the sheath was advanced into the vein and the needle was withdrawn. A guidewire was advanced through the sheath. U/S images were viewed to confirm placement of the guidewire inside the vessel. A small incision was made with a 11 blade scalpel and the sheath was exchanged for a dilator over the guidewire until appropriate dilation was obtained. The second dilation was unable to be passed, so pressure was held and bleeding stopped.    At that point, I prepped the patient's left neck region was then cleaned with chlorhexidine and draped in the usual sterile fashion. Anesthesia was achieved with 1% lidocaine. The left internal jugular vein was accessed under ultrasound guidance using a finder needle and sheath. U/S images were viewed to confirm placement of the needle. Venous blood was withdrawn and the sheath was advanced into the vein and the needle was withdrawn. A guidewire was advanced through the sheath. U/S images were viewed to confirm placement of the guidewire inside the vessel. A small incision was made with a 11 blade scalpel and the sheath was  exchanged for a dilator over the guidewire until appropriate dilation was obtained. The dilator was removed and a second dilator was placed over the guidewire until appropriate dilation was obtained. The second dilator was then removed and a 13 Divehi trialysis catheter was advanced over the guidewire and secured into place with 2 sutures at 20cm.      Maximum sterile technique was used including antiseptics, cap, gloves, gown, hand hygiene, mask and sheet.    Findings:  There were no changes to vital signs. Catheter was flushed with 30 cc NS, all ports are patent and able to flush. Patient did tolerate procedure well.    Recommendations:  CXR ordered to verify placement.    Estimated Blood Loss: Minimal           Complications: None; patient tolerated the procedure well.           Disposition: ICU and on bipap           Condition: stable    Raza Denson MD  [facility] Fellow

## 2022-02-04 NOTE — ED NOTES
Admit provider DARWIN Hoyt notified of the patients low blood pressure. Primary nurse is aware of patient status. Waiting on further orders.

## 2022-02-04 NOTE — ASSESSMENT & PLAN NOTE
Body mass index is 46.03 kg/m². Morbid obesity complicates all aspects of disease management from diagnostic modalities to treatment.

## 2022-02-04 NOTE — ASSESSMENT & PLAN NOTE
Reported by spouse to be with increased drowsiness and altered mentation. She has been sleeping a lot more than usual and is not alert. Found to be with renal failure, COVID19, and metabolic acidosis    CT head with no acute findings, ammonia level wnl  Will hold all sedating home meds for now  Currently on BIPAP for hypercapnia, which is likely contributing  -renal failure with high anion gap metabolic acidosis and uremia; will need dialysis as well  -pulmonology and nephrology consulted

## 2022-02-04 NOTE — SUBJECTIVE & OBJECTIVE
Interval History:  On BiPAP.  Worsening acidosis.  Will need dialysis.  Discussed with Critical Care Team and placing lying as well as adjusting BiPAP.  Hypercapnia is improving this afternoon.  Patient is lethargic at this time.    Review of Systems   Unable to perform ROS: Acuity of condition     Objective:     Vital Signs (Most Recent):  Temp: 98 °F (36.7 °C) (02/04/22 0800)  Pulse: 85 (02/04/22 1226)  Resp: (!) 26 (02/04/22 1226)  BP: (!) 105/53 (02/04/22 1200)  SpO2: 99 % (02/04/22 1226) Vital Signs (24h Range):  Temp:  [98 °F (36.7 °C)-99.3 °F (37.4 °C)] 98 °F (36.7 °C)  Pulse:  [] 85  Resp:  [15-94] 26  SpO2:  [76 %-100 %] 99 %  BP: ()/() 105/53  Arterial Line BP: (105-144)/(42-69) 105/48     Weight: 110.5 kg (243 lb 9.7 oz)  Body mass index is 46.03 kg/m².    Intake/Output Summary (Last 24 hours) at 2/4/2022 1257  Last data filed at 2/4/2022 1000  Gross per 24 hour   Intake 2841.13 ml   Output --   Net 2841.13 ml      Physical Exam  Vitals and nursing note reviewed.   Constitutional:       General: She is not in acute distress.     Appearance: She is morbidly obese.      Comments: BIPAP   HENT:      Head: Normocephalic and atraumatic.      Right Ear: External ear normal.      Left Ear: External ear normal.      Nose: Nose normal. No congestion.      Mouth/Throat:      Mouth: Mucous membranes are dry.      Pharynx: Oropharynx is clear.   Eyes:      Extraocular Movements: Extraocular movements intact.      Pupils: Pupils are equal, round, and reactive to light.   Cardiovascular:      Rate and Rhythm: Normal rate and regular rhythm.      Pulses: Normal pulses.      Heart sounds: Normal heart sounds.   Pulmonary:      Effort: Pulmonary effort is normal.      Breath sounds: Normal breath sounds.   Abdominal:      General: Bowel sounds are normal.      Palpations: Abdomen is soft.   Musculoskeletal:         General: Normal range of motion.      Cervical back: Normal range of motion. No rigidity.       Right lower leg: No edema.      Left lower leg: No edema.   Skin:     General: Skin is warm and dry.      Capillary Refill: Capillary refill takes less than 2 seconds.   Neurological:      General: No focal deficit present.      Mental Status: She is easily aroused. She is lethargic and disoriented.      Motor: Weakness present.      Comments: Opens eyes to voice, not following commands   Psychiatric:         Behavior: Behavior is slowed.         Cognition and Memory: Cognition is impaired.         Significant Labs: All pertinent labs within the past 24 hours have been reviewed.    Significant Imaging: I have reviewed all pertinent imaging results/findings within the past 24 hours.

## 2022-02-04 NOTE — ANESTHESIA PROCEDURE NOTES
Arterial    Diagnosis: Acute hypoxic respiratory failure on BiPAP, requiring frequent arterial blood gases    Patient location during procedure: ICU  Procedure start time: 2/4/2022 5:45 AM  Timeout: 2/4/2022 5:40 AM  Procedure end time: 2/4/2022 6:25 AM    Staffing  Authorizing Provider: Bony Solorzano MD  Performing Provider: Bony Solorzano MD    Anesthesiologist was present at the time of the procedure.    Preanesthetic Checklist  Completed: patient identified, IV checked, site marked, risks and benefits discussed, monitors and equipment checked, timeout performed and anesthesia consent givenArterial  Skin Prep: chlorhexidine gluconate and isopropyl alcohol  Local Infiltration: none  Orientation: right  Location: brachial    Catheter Size: 22 G  Catheter placement by Ultrasound guidance. Heme positive aspiration all ports.  Vessel Caliber: medium, patent, compressibility normal  Needle advanced into vessel with real time Ultrasound guidance.  Guidewire confirmed in vessel.Insertion Attempts: 5 or more  Assessment  Dressing: sutured in place and taped  Additional Notes  3 Attempts at arterial line placement by Dr. Wei. 2 Attempts at arterial line placement by Dr. Solorzano. Placement was technically difficult given small vessel size, relatively deep location, and patient having periodic myoclonic jerks during A-line placement.

## 2022-02-04 NOTE — ASSESSMENT & PLAN NOTE
Elevated CO2 on ABG, currently on BIPAP  Reassess with ABG with worsened CO2, settings adjusted  Hypoxia likely related to COVID19, was 93% on room air on arrival

## 2022-02-04 NOTE — ASSESSMENT & PLAN NOTE
With initial pH of 7.188  Trend ABG  Likely related to acute renal failure  -bicarbonate drip  -will need dialysis; nephrology consulted and pulmonology to place central line

## 2022-02-04 NOTE — ASSESSMENT & PLAN NOTE
Hemoglobin A1C   Date Value Ref Range Status   11/10/2021 6.2 (H) 4.0 - 5.6 % Final     Comment:     ADA Screening Guidelines:  5.7-6.4%  Consistent with prediabetes  >or=6.5%  Consistent with diabetes    High levels of fetal hemoglobin interfere with the HbA1C  assay. Heterozygous hemoglobin variants (HbS, HgC, etc)do  not significantly interfere with this assay.   However, presence of multiple variants may affect accuracy.     04/26/2021 5.9 (H) 4.0 - 5.6 % Final     Comment:     ADA Screening Guidelines:  5.7-6.4%  Consistent with prediabetes  >or=6.5%  Consistent with diabetes    High levels of fetal hemoglobin interfere with the HbA1C  assay. Heterozygous hemoglobin variants (HbS, HgC, etc)do  not significantly interfere with this assay.   However, presence of multiple variants may affect accuracy.     11/10/2020 6.0 (H) 4.0 - 5.6 % Final     Comment:     ADA Screening Guidelines:  5.7-6.4%  Consistent with prediabetes  >or=6.5%  Consistent with diabetes  High levels of fetal hemoglobin interfere with the HbA1C  assay. Heterozygous hemoglobin variants (HbS, HgC, etc)do  not significantly interfere with this assay.   However, presence of multiple variants may affect accuracy.           - LDSSI with accuchecks qachs

## 2022-02-04 NOTE — ASSESSMENT & PLAN NOTE
-likely due to acidosis and renal failure versus septic shock  -currently requiring Levophed  -nephrology consulted for CRRT  -continue IV antibiotics for possible infectious source  -COVID therapy as above

## 2022-02-04 NOTE — CONSULTS
NEPHROLOGY CONSULT NOTE    HPI & INTERVAL HISTORY:    Past Medical History:   Diagnosis Date    RAQUEL (acute kidney injury) 2/3/2022    Anxiety disorder     Bell's palsy     Chronic back pain     Chronic osteoarthritis     Essential tremor     Fibromyalgia     Hypertension     Mild acid reflux     Neck pain     Other and unspecified hyperlipidemia     Sleep apnea     wear c-pap at night; does not use regularly    Unspecified mood (affective) disorder       Past Surgical History:   Procedure Laterality Date    ADENOIDECTOMY      APPENDECTOMY      BLADDER SUSPENSION      BREAST BIOPSY Left     1995  negative    Breast reduction      BREAST SURGERY Bilateral     breast reduction    CARPAL TUNNEL RELEASE Right     COLONOSCOPY  2008    COLONOSCOPY N/A 11/10/2017    Procedure: COLONOSCOPY - Miralax split prep;  Surgeon: Annetta Powell MD;  Location: [facility];  Service: Endoscopy;  Laterality: N/A;    COSMETIC SURGERY Bilateral     breast reduction    CYSTOSCOPY N/A 10/23/2018    Procedure: CYSTOSCOPY;  Surgeon: Feli Garza MD;  Location: [address];  Service: Urology;  Laterality: N/A;    ESOPHAGOGASTRODUODENOSCOPY N/A 5/30/2019    Procedure: ESOPHAGOGASTRODUODENOSCOPY (EGD);  Surgeon: Oscar Wylie MD;  Location: [facility] (4TH FLR);  Service: Endoscopy;  Laterality: N/A;  Off PPI/H2 x 7 days prior- ERW    HYSTERECTOMY      JOINT REPLACEMENT Left     knee    KNEE SURGERY      bilateral    left shoulder Left     rotator cuff    OPEN REDUCTION AND INTERNAL FIXATION (ORIF) OF FRACTURE OF DISTAL RADIUS Right 11/16/2021    Procedure: ORIF, FRACTURE, RADIUS, DISTAL;  Surgeon: Evelio Lynn Jr., MD;  Location: [facility];  Service: Orthopedics;  Laterality: Right;  need accumed plate and mini c arm, Acumed confirmed 11/15/21 AM    PLACEMENT OF TRANSOBTURATOR TAPE N/A 10/23/2018    Procedure: PLACEMENT, TRANSOBTURATOR TAPE;  Surgeon: Feli Garza MD;  Location: [address];   Service: Urology;  Laterality: N/A;  1.5 hours    Tonsillectomy      TONSILLECTOMY      TOTAL REDUCTION MAMMOPLASTY      1995    TUBAL LIGATION        Review of patient's allergies indicates:   Allergen Reactions    Hyoscyamine Rash    Msg [glutamic acid] Swelling      Medications Prior to Admission   Medication Sig Dispense Refill Last Dose    acetaminophen (TYLENOL) 500 MG tablet Take 500 mg by mouth every 6 (six) hours as needed.       albuterol (PROVENTIL/VENTOLIN HFA) 90 mcg/actuation inhaler Inhale 2 puffs into the lungs every 6 (six) hours as needed for Wheezing or Shortness of Breath. Rescue 18 g 0     clonazePAM (KLONOPIN) 0.5 MG tablet 2  pills PO as needed at bedtime for the RLS 60 tablet 5     cyclobenzaprine (FLEXERIL) 10 MG tablet Take 10 mg by mouth 3 (three) times daily as needed.        fluticasone propionate (FLONASE) 50 mcg/actuation nasal spray 1 spray (50 mcg total) by Each Nostril route once daily. 48 g 11     furosemide (LASIX) 40 MG tablet TAKE 1 TABLET BY MOUTH UP TO TWO TIMES A DAY IF SWELLING OCCURS 180 tablet 3     gabapentin (NEURONTIN) 600 MG tablet TAKE 1 TABLET BY MOUTH THREE TIMES A DAY 90 tablet 3     imipramine (TOFRANIL) 25 MG tablet TAKE 1 TABLET BY MOUTH EVERY EVENING TO HELP CALM STOMACH 90 tablet 3     losartan (COZAAR) 100 MG tablet TAKE 1 TABLET BY MOUTH ONCE A DAY FOR BLOOD PRESSURE 90 tablet 1     lovastatin (MEVACOR) 20 MG tablet TAKE 1 TABLET BY MOUTH EVERY EVENING 90 tablet 3     mometasone (ELOCON) 0.1 % ointment Apply topically once daily. For 7 days and may repeat. 45 g 1     morphine (MS CONTIN) 15 MG 12 hr tablet Take 1 tablet by mouth every 12 (twelve) hours.       oxycodone-acetaminophen (PERCOCET)  mg per tablet Take 1 tablet by mouth every 8 (eight) hours as needed.        oxyCODONE-acetaminophen (PERCOCET) 7.5-325 mg per tablet Take 1 tablet by mouth every 4 (four) hours as needed for Pain. 40 tablet 0     pantoprazole (PROTONIX) 40  MG tablet Take 1 tablet (40 mg total) by mouth once daily. 90 tablet 3     potassium chloride SA (K-DUR,KLOR-CON) 20 MEQ tablet TAKE 1 TABLET BY MOUTH ONCE A DAY 90 tablet 3     pramipexole (MIRAPEX) 0.125 MG tablet TAKE 2 TABLETS BY MOUTH IN THE LATE AFTERNOON AND TAKE 4 TABLETS BY MOUTH AT BEDTIME 180 tablet 5     promethazine-dextromethorphan (PROMETHAZINE-DM) 6.25-15 mg/5 mL Syrp        venlafaxine (EFFEXOR-XR) 150 MG Cp24 1 tablet by mouth in morning and 1 tablet by mouth at night 180 capsule 3        Social History     Socioeconomic History    Marital status:    Tobacco Use    Smoking status: Former Smoker     Packs/day: 2.00     Years: 30.00     Pack years: 60.00     Types: Cigarettes     Start date:      Quit date: 1990     Years since quittin.5    Smokeless tobacco: Never Used   Substance and Sexual Activity    Alcohol use: No    Drug use: No    Sexual activity: Not Currently     Partners: Male     Birth control/protection: None        MEDS   albuterol  2 puff Inhalation Q6H    ascorbic acid (vitamin C)  500 mg Oral BID    cefTRIAXone (ROCEPHIN) IVPB  2 g Intravenous Q24H    dexamethasone  6 mg Intravenous Daily    heparin (porcine)  7,500 Units Subcutaneous Q8H    LIDOcaine HCL 10 mg/ml (1%)        multivitamin  1 tablet Oral Daily    mupirocin   Nasal BID    remdesivir infusion  100 mg Intravenous Daily        ROS:   not able to obtain       CONTINOUS INFUSIONS:      Intake/Output Summary (Last 24 hours) at 2022 1104  Last data filed at 2022 1000  Gross per 24 hour   Intake 2841.13 ml   Output --   Net 2841.13 ml        HEMODYNAMICS:    Temp:  [98 °F (36.7 °C)-99.3 °F (37.4 °C)] 98 °F (36.7 °C)  Pulse:  [] 91  Resp:  [15-94] 29  SpO2:  [76 %-100 %] 100 %  BP: ()/() 103/52  Arterial Line BP: (111-144)/(42-69) 144/69   General : NAD  Cardiology : pulse 89  Pulmonary : diminished breath sounds  On BIPAP  30 % O2  Abdomen soft   Extremities :  edema   Skin: dry   Dialysis Access:  Left IJ mere    LABS   Lab Results   Component Value Date    WBC 16.99 (H) 02/04/2022    HGB 11.2 (L) 02/04/2022    HCT 35.4 (L) 02/04/2022    MCV 99 (H) 02/04/2022     02/04/2022        Recent Labs   Lab 02/04/22  0738   *   CALCIUM 6.7*   ALBUMIN 3.1*   PROT 5.6*   *   K 6.0*   CO2 17*   CL 99   BUN 72*   CREATININE 7.0*   ALKPHOS 97   ALT 49*   AST 97*   BILITOT 0.3      Lab Results   Component Value Date    CALCIUM 6.7 (LL) 02/04/2022    PHOS 10.8 (HH) 02/04/2022      Lab Results   Component Value Date    IRON 60 09/19/2018    TIBC 351 09/19/2018    FERRITIN 139 09/19/2018        ABG  Recent Labs   Lab 02/04/22  1011   PH 7.109*   PO2 419*   PCO2 73.3*   HCO3 23.2*   BE -6         IMAGING:  CXR    ASSESSMENT / PLAN    COVID 19  CXR-  Interval placement of left jugular central venous catheter with tip projected over the cavoatrial junction.  Cardiac monitoring leads overlie the chest.     Bilateral low lung volumes with vascular crowding and subsegmental atelectasis.  No new focal airspace opacity.  No evidence of large pleural effusion or pneumothorax.    RAQUEL/ CKD 2  Fluid balance positive +2,841 cc  UA protein 1+, joselito 3+, WBC 4  GFR 5cc/min  Creatinine 7  BUN 72  Hyponatremia  Hyperkalemia  Acidemia  Respiratory acidosis  Gap Metabolic acidosis  Metabolic bone disease  Hyperphosphatemia  Corrected Calcium approximately 7.5  Poor nutrition   Albumin 3.1  Hb 11.2  Hypotensive  Blood pressure 103/52  On pressor  Echo  ·  Mild eccentric hypertrophy and normal systolic function.  · The estimated ejection fraction is 70-75%.  · Normal left ventricular diastolic function.  · Normal central venous pressure (3 mmHg).  · The estimated PA systolic pressure is 25 mmHg.  · Normal right ventricular size with normal right ventricular systolic function.  · Discussed with the critical care team.  · Discussed with the family about dialysis.  · Explained the risks of  hypotension, bleeding, arrhythmia,   · and death during dialysis.

## 2022-02-04 NOTE — ED PROVIDER NOTES
History       Chief Complaint   Patient presents with    Altered Mental Status     Brought to ED from home for AMS. Pt is awake and denies complaints at this time. States she is here because her  makes the decisions and decided to send her. Pt is slow to respond but answers appropriately.         HPI    Hannah Norman 71 y.o. who  has a past medical history of RAQUEL (acute kidney injury) (2/3/2022), Anxiety disorder, Bell's palsy, Chronic back pain, Chronic osteoarthritis, Essential tremor, Fibromyalgia, Hypertension, Mild acid reflux, Neck pain, Other and unspecified hyperlipidemia, Sleep apnea, and Unspecified mood (affective) disorder. who presents to the emergency department today after being weak and not completely herself. Information comes from her : For the past few weeks pt has been getting weaker. About 2 weeks ago diagnosed with COVID, they just came out of quarantine. Pt is vaccinated. Yesterday was more weak than usual. Went to bed early last night and today didn't wake up until the afternoon which is unusual for her. Additionally when she woke up she was not talking to him as she usually does. She has not had any vomiting, diarrhea, fevers at home recently. No cough. Has not really c/o to her  of anything recently. Has been sleeping more.       ROS    Unable to attain secondary to condition     The history is provided by the patients       ALLERGIES REVIEWED  MEDICATIONS REVIEWED  PMH/PSH/SOC/FH REVIEWED       Past Medical History:   Diagnosis Date    RAQUEL (acute kidney injury) 2/3/2022    Anxiety disorder     Bell's palsy     Chronic back pain     Chronic osteoarthritis     Essential tremor     Fibromyalgia     Hypertension     Mild acid reflux     Neck pain     Other and unspecified hyperlipidemia     Sleep apnea     wear c-pap at night; does not use regularly    Unspecified mood (affective) disorder          Past Surgical History:   Procedure Laterality Date     ADENOIDECTOMY      APPENDECTOMY      BLADDER SUSPENSION      BREAST BIOPSY Left       negative    Breast reduction      BREAST SURGERY Bilateral     breast reduction    CARPAL TUNNEL RELEASE Right     COLONOSCOPY      COLONOSCOPY N/A 11/10/2017    Procedure: COLONOSCOPY - Miralax split prep;  Surgeon: Annetta Powell MD;  Location: [facility];  Service: Endoscopy;  Laterality: N/A;    COSMETIC SURGERY Bilateral     breast reduction    CYSTOSCOPY N/A 10/23/2018    Procedure: CYSTOSCOPY;  Surgeon: Feli Garza MD;  Location: [address];  Service: Urology;  Laterality: N/A;    ESOPHAGOGASTRODUODENOSCOPY N/A 2019    Procedure: ESOPHAGOGASTRODUODENOSCOPY (EGD);  Surgeon: Oscar Wylie MD;  Location: [facility] (4TH FLR);  Service: Endoscopy;  Laterality: N/A;  Off PPI/H2 x 7 days prior- ERW    HYSTERECTOMY      JOINT REPLACEMENT Left     knee    KNEE SURGERY      bilateral    left shoulder Left     rotator cuff    OPEN REDUCTION AND INTERNAL FIXATION (ORIF) OF FRACTURE OF DISTAL RADIUS Right 2021    Procedure: ORIF, FRACTURE, RADIUS, DISTAL;  Surgeon: Evelio Lynn Jr., MD;  Location: [facility];  Service: Orthopedics;  Laterality: Right;  need accumed plate and mini c arm, Acumed confirmed 11/15/21 AM    PLACEMENT OF TRANSOBTURATOR TAPE N/A 10/23/2018    Procedure: PLACEMENT, TRANSOBTURATOR TAPE;  Surgeon: Feli Garza MD;  Location: [address];  Service: Urology;  Laterality: N/A;  1.5 hours    Tonsillectomy      TONSILLECTOMY      TOTAL REDUCTION MAMMOPLASTY          TUBAL LIGATION           Social History     Tobacco Use    Smoking status: Former Smoker     Packs/day: 2.00     Years: 30.00     Pack years: 60.00     Types: Cigarettes     Start date:      Quit date: 1990     Years since quittin.5    Smokeless tobacco: Never Used   Substance Use Topics    Alcohol use: No    Drug use: No       Family History   Problem Relation Age of Onset  "   Diabetes Mother     Tremor Mother     Liver disease Mother     Hypertension Mother     Diabetes Father     Heart disease Father     Hypertension Father     Tremor Son     Kidney disease Son     Diabetes Sister     Diabetes Brother     No Known Problems Daughter     Depression Sister     Kidney disease Son         Reniel failure but corrected    Learning disabilities Son         Slow in some areas    Mental retardation Son         Mild retardation    Tremor Son     Cancer Brother        Nursing/Ancillary staff note reviewed.  VS reviewed         Physical Exam     BP (!) 85/42   Pulse 94   Temp 99.1 °F (37.3 °C) (Rectal)   Resp 15   Ht 5' 1" (1.549 m)   Wt 108.9 kg (240 lb)   SpO2 99%   BMI 45.35 kg/m²     Physical Exam    General Appearance: The patient is sleeping upon my first seeing her - she does awaken with gentle stimulation.  She appears confused. She does not answer questions appropriately.    HEENT: Eyes: Pupils equal and round no pallor or injection. Extra ocular movements intact. No drainage.       Mouth: Mucous membranes are dry. Oropharynx clear.   Neck: Neck is supple non-tender. No lymphadenopathy. No stridor.   Respiratory: There are no retractions, lungs are clear to auscultation. No wheezing, no crackles. Chest wall nontender to palpation.   Cardiovascular: Regular rate and rhythm. No murmurs, rubs or gallops.  Gastrointestinal:  Abdomen is soft and non-tender, no masses, bowel sounds normal. No guarding, no rebound.  No pulsatile mass.   Neurological:  CN II-XII grossly intact. Moves all extremities.    Skin: Warm and dry, no rashes.  Musculoskeletal: Extremities have some slight B LE edema.     DIFFERENTIAL DIAGNOSIS: After history and physical exam a differential diagnosis was considered, but was not limited to, sepsis, pneumonia, UTI, intraabdominal infection, CNS infection, skin/soft tissue infection         Initial management:  Hannah Norman presents to the ED today " with confusion, weakness. Will obtain labs including blood cultures and lactic acid, CT scan of the head given her confusion. She is not hypotensive. Place on continuous cardiac monitor and continuous pulse ox. Closely monitor and reassess.         ED Course           I independently interpreted the lab results and it showed: Leukocytosis, Hypokalemia, ARF, Ammonis normal, UA with signs of UTI, metabolic acidosis on ABG         Reviewed by myself, read by radiology.     X-Ray Chest 1 View   Final Result      Low lung volumes and bibasilar subsegmental atelectasis.  No convincing acute process identified on this hypoventilatory position limited single view.         Electronically signed by: Shiv Winchester MD   Date:    02/03/2022   Time:    21:29      CT Head Without Contrast   Final Result      Resolution of scalp hematoma over the left frontal bone since the prior CT in 2019.      No evidence of acute hemorrhage, mass or infarction.         Electronically signed by: Kaveh Estrada   Date:    02/03/2022   Time:    20:51              I independently ordered and interpreted the EKG and it showed:  94 bpm, normal axis, no STEMI, no peaked T waves read by myself read by cardiology pending.       ED Course as of 02/04/22 0004   Thu Feb 03, 2022 2027 WBC(!): 18.97 [JA]   2210 SARS-CoV-2 RNA, Amplification, Qual(!): Positive [JA]   2211 Leukocytes, UA(!): 3+ [JA]   2252 Potassium(!): 5.6  No peaked t waves on EKG [JA]   2252 eGFR if non (!): 5  ARF, significant change from previous.  [JA]   2252 Troponin I: 0.021 [JA]   2252 POC PH(!!): 7.188 [JA]   2252 POC PCO2(!!): 65.4  Metabolic acidosis with elevated CO2 will place on Bipap.  [JA]   2253 [facility].  [JA]      ED Course User Index  [JA] Shun Glasgow MD             2130  I attest, a sepsis perfusion exam was performed within 6 hours of Septic Shock presentation, following fluid resuscitation.  /106 HR 99         [facility]           Hannah Norman  71 y.o. presents to the ED today with confusion, weakness. She is found to have UTI/sepsis/ARF. Likely source urinary. Initial Lactic acid normal. Hypokalemia. Has received IVFs, ABX, meds for her hyperkalemia.  The pt will be admitted for further care and management.  I have discussed this with her  and     The pt is comfortable with this plan and comfortable with admission.     Voice recognition software utilized in this note.        Critical Care   Date/Time:   Performed by: Shun Glasgow MD  Authorized by: Shun Glasgow MD   Total critical care time (exclusive of procedural time) :  49 minutes  Critical care time was exclusive of separately billable procedures and treating other patients.  Critical care was necessary to treat or prevent imminent or life-threatening deterioration of the following conditions: Sepsis.  Critical care was time spent personally by me on the following activities: development of treatment plan with patient or surrogate, interpretation of cardiac output measurements, examination of patient, evaluation of patient's response to treatment, obtaining history from patient or surrogate, ordering and performing treatments and interventions, ordering and review of radiographic studies, ordering and review of laboratory studies, pulse oximetry, review of old charts and re-evaluation of patient's condition.        Impression        The primary encounter diagnosis was Sepsis, due to unspecified organism, unspecified whether acute organ dysfunction present. Diagnoses of AMS (altered mental status), Hyperkalemia, Chest pain, and Urinary tract infection without hematuria, site unspecified were also pertinent to this visit.                           Shun Glasgow MD  02/04/22 0004

## 2022-02-04 NOTE — H&P
[facility] Medicine  History & Physical    Patient Name: Hannah Norman  MRN: [id number]  Patient Class: IP- Inpatient  Admission Date: 2/3/2022  Attending Physician: Cynthia Livingston MD   Primary Care Provider: Raffi Garcia MD    Patient information was obtained from patient, past medical records and ER records.   Subjective:     Principal Problem:Acute respiratory failure with hypoxia and hypercarbia    Chief Complaint:   Chief Complaint   Patient presents with    Altered Mental Status     Brought to ED from home for AMS. Pt is awake and denies complaints at this time. States she is here because her  makes the decisions and decided to send her. Pt is slow to respond but answers appropriately.        HPI: Ms. Norman is a 71 year old female with a medical history that includes anxiety disorder, bell's palsy, chronic back pain, chronic osteoarthritis, essential tremor, fibromyalgia, hypertension, GERD, hyperlipidemia, sleep apnea, and mood disorder. She presented to the ED via EMS with spouse who reports patient to be increasingly fatigued and weak. He reports over the past 2 weeks she was diagnosed with COVID19 and was ending their quarantine phase. She has been progressively weaker and unable to ambulate as usual with her walker. Her symptoms worsened over the past 2 days where she was not communicating at all. She is with no cough, vomiting, diarrhea or fevers. Her spouse contributed to her history during this visit. On arrival to the ED she was slightly hypoxic at 93% and hypertensive. Significant labs were with the following: wbc 18.97, Na 131, K 5.6, BUN 69, Cr 7.1, COVID19 +, urine with 3+ leukocytes and many bacteria, pH 7.1 and CO2 65.4. CT head and CXR with no acute findings. She was given neb treatment, Rocephin, Lokelma, reg insulin and NS bolus. She will admit to [facility] hospital medicine service for continued monitoring.         Past Medical History:   Diagnosis Date     RAQUEL (acute kidney injury) 2/3/2022    Anxiety disorder     Bell's palsy     Chronic back pain     Chronic osteoarthritis     Essential tremor     Fibromyalgia     Hypertension     Mild acid reflux     Neck pain     Other and unspecified hyperlipidemia     Sleep apnea     wear c-pap at night; does not use regularly    Unspecified mood (affective) disorder        Past Surgical History:   Procedure Laterality Date    ADENOIDECTOMY      APPENDECTOMY      BLADDER SUSPENSION      BREAST BIOPSY Left     1995  negative    Breast reduction      BREAST SURGERY Bilateral     breast reduction    CARPAL TUNNEL RELEASE Right     COLONOSCOPY  2008    COLONOSCOPY N/A 11/10/2017    Procedure: COLONOSCOPY - Miralax split prep;  Surgeon: Annetta Powell MD;  Location: [facility];  Service: Endoscopy;  Laterality: N/A;    COSMETIC SURGERY Bilateral     breast reduction    CYSTOSCOPY N/A 10/23/2018    Procedure: CYSTOSCOPY;  Surgeon: Feli Garza MD;  Location: [address];  Service: Urology;  Laterality: N/A;    ESOPHAGOGASTRODUODENOSCOPY N/A 5/30/2019    Procedure: ESOPHAGOGASTRODUODENOSCOPY (EGD);  Surgeon: Oscar Wylie MD;  Location: [facility] (4TH FLR);  Service: Endoscopy;  Laterality: N/A;  Off PPI/H2 x 7 days prior- ERW    HYSTERECTOMY      JOINT REPLACEMENT Left     knee    KNEE SURGERY      bilateral    left shoulder Left     rotator cuff    OPEN REDUCTION AND INTERNAL FIXATION (ORIF) OF FRACTURE OF DISTAL RADIUS Right 11/16/2021    Procedure: ORIF, FRACTURE, RADIUS, DISTAL;  Surgeon: Evelio Lynn Jr., MD;  Location: [facility];  Service: Orthopedics;  Laterality: Right;  need accumed plate and mini c arm, Acumed confirmed 11/15/21 AM    PLACEMENT OF TRANSOBTURATOR TAPE N/A 10/23/2018    Procedure: PLACEMENT, TRANSOBTURATOR TAPE;  Surgeon: Feli Garza MD;  Location: [address];  Service: Urology;  Laterality: N/A;  1.5 hours    Tonsillectomy      TONSILLECTOMY       TOTAL REDUCTION MAMMOPLASTY      1995    TUBAL LIGATION         Review of patient's allergies indicates:   Allergen Reactions    Hyoscyamine Rash    Msg [glutamic acid] Swelling       No current facility-administered medications on file prior to encounter.     Current Outpatient Medications on File Prior to Encounter   Medication Sig    acetaminophen (TYLENOL) 500 MG tablet Take 500 mg by mouth every 6 (six) hours as needed.    albuterol (PROVENTIL/VENTOLIN HFA) 90 mcg/actuation inhaler Inhale 2 puffs into the lungs every 6 (six) hours as needed for Wheezing or Shortness of Breath. Rescue    clonazePAM (KLONOPIN) 0.5 MG tablet 2  pills PO as needed at bedtime for the RLS    cyclobenzaprine (FLEXERIL) 10 MG tablet Take 10 mg by mouth 3 (three) times daily as needed.     fluticasone propionate (FLONASE) 50 mcg/actuation nasal spray 1 spray (50 mcg total) by Each Nostril route once daily.    furosemide (LASIX) 40 MG tablet TAKE 1 TABLET BY MOUTH UP TO TWO TIMES A DAY IF SWELLING OCCURS    gabapentin (NEURONTIN) 600 MG tablet TAKE 1 TABLET BY MOUTH THREE TIMES A DAY    imipramine (TOFRANIL) 25 MG tablet TAKE 1 TABLET BY MOUTH EVERY EVENING TO HELP CALM STOMACH    losartan (COZAAR) 100 MG tablet TAKE 1 TABLET BY MOUTH ONCE A DAY FOR BLOOD PRESSURE    lovastatin (MEVACOR) 20 MG tablet TAKE 1 TABLET BY MOUTH EVERY EVENING    mometasone (ELOCON) 0.1 % ointment Apply topically once daily. For 7 days and may repeat.    morphine (MS CONTIN) 15 MG 12 hr tablet Take 1 tablet by mouth every 12 (twelve) hours.    oxycodone-acetaminophen (PERCOCET)  mg per tablet Take 1 tablet by mouth every 8 (eight) hours as needed.     oxyCODONE-acetaminophen (PERCOCET) 7.5-325 mg per tablet Take 1 tablet by mouth every 4 (four) hours as needed for Pain.    pantoprazole (PROTONIX) 40 MG tablet Take 1 tablet (40 mg total) by mouth once daily.    potassium chloride SA (K-DUR,KLOR-CON) 20 MEQ tablet TAKE 1 TABLET BY MOUTH  ONCE A DAY    pramipexole (MIRAPEX) 0.125 MG tablet TAKE 2 TABLETS BY MOUTH IN THE LATE AFTERNOON AND TAKE 4 TABLETS BY MOUTH AT BEDTIME    promethazine-dextromethorphan (PROMETHAZINE-DM) 6.25-15 mg/5 mL Syrp     venlafaxine (EFFEXOR-XR) 150 MG Cp24 1 tablet by mouth in morning and 1 tablet by mouth at night     Family History     Problem Relation (Age of Onset)    Cancer Brother    Depression Sister    Diabetes Mother, Father, Sister, Brother    Heart disease Father    Hypertension Mother, Father    Kidney disease Son, Son    Learning disabilities Son    Liver disease Mother    Mental retardation Son    No Known Problems Daughter    Tremor Mother, Son, Son        Tobacco Use    Smoking status: Former Smoker     Packs/day: 2.00     Years: 30.00     Pack years: 60.00     Types: Cigarettes     Start date:      Quit date: 1990     Years since quittin.5    Smokeless tobacco: Never Used   Substance and Sexual Activity    Alcohol use: No    Drug use: No    Sexual activity: Not Currently     Partners: Male     Birth control/protection: None     Review of Systems   Unable to perform ROS: Mental status change     Objective:     Vital Signs (Most Recent):  Temp: 99.1 °F (37.3 °C) (22)  Pulse: 94 (22)  Resp: 15 (22)  BP: (!) 85/42 (22)  SpO2: 99 % (22) Vital Signs (24h Range):  Temp:  [98.4 °F (36.9 °C)-99.1 °F (37.3 °C)] 99.1 °F (37.3 °C)  Pulse:  [91-99] 94  Resp:  [15-44] 15  SpO2:  [93 %-100 %] 99 %  BP: ()/() 85/42     Weight: 108.9 kg (240 lb)  Body mass index is 45.35 kg/m².    Physical Exam  Vitals and nursing note reviewed.   Constitutional:       General: She is not in acute distress.     Appearance: She is morbidly obese.      Comments: BIPAP   HENT:      Head: Normocephalic and atraumatic.      Right Ear: External ear normal.      Left Ear: External ear normal.      Nose: Nose normal. No congestion.      Mouth/Throat:       Mouth: Mucous membranes are dry.      Pharynx: Oropharynx is clear.   Eyes:      Extraocular Movements: Extraocular movements intact.      Pupils: Pupils are equal, round, and reactive to light.   Cardiovascular:      Rate and Rhythm: Normal rate and regular rhythm.      Pulses: Normal pulses.      Heart sounds: Normal heart sounds.   Pulmonary:      Effort: Pulmonary effort is normal.      Breath sounds: Normal breath sounds.   Abdominal:      General: Bowel sounds are normal.      Palpations: Abdomen is soft.   Musculoskeletal:         General: Normal range of motion.      Cervical back: Normal range of motion. No rigidity.      Right lower leg: No edema.      Left lower leg: No edema.   Skin:     General: Skin is warm and dry.      Capillary Refill: Capillary refill takes less than 2 seconds.   Neurological:      General: No focal deficit present.      Mental Status: She is easily aroused. She is lethargic and disoriented.      Motor: Weakness present.      Gait: Gait abnormal.   Psychiatric:         Behavior: Behavior is slowed.         Cognition and Memory: Cognition is impaired.           CRANIAL NERVES     CN III, IV, VI   Pupils are equal, round, and reactive to light.       Significant Labs:   ABGs:   Recent Labs   Lab 02/03/22 2223   PH 7.188*   PCO2 65.4*   HCO3 24.9   POCSATURATED 97   BE -3   PO2 117*     CBC:   Recent Labs   Lab 02/03/22 2129   WBC 18.97*   HGB 12.0   HCT 37.5        CMP:   Recent Labs   Lab 02/03/22 2152   *   K 5.6*   CL 97   CO2 15*   *   BUN 69*   CREATININE 7.1*   CALCIUM 7.6*   PROT 6.9   ALBUMIN 3.7   BILITOT 0.5   ALKPHOS 90   AST 84*   ALT 43   ANIONGAP 19*   EGFRNONAA 5*     Lactic Acid:   Recent Labs   Lab 02/03/22 2129   LACTATE 1.1     Troponin:   Recent Labs   Lab 02/03/22 2152   TROPONINI 0.021     Urine Studies:   Recent Labs   Lab 02/03/22 2145   COLORU Yellow   APPEARANCEUA Clear   PHUR 5.0   SPECGRAV 1.020   PROTEINUA 1+*   GLUCUA Negative    KETONESU Negative   BILIRUBINUA Negative   OCCULTUA Negative   NITRITE Negative   UROBILINOGEN Negative   LEUKOCYTESUR 3+*   RBCUA 2   WBCUA 4   BACTERIA Many*   SQUAMEPITHEL 2   HYALINECASTS 1       Significant Imaging: I have reviewed all pertinent imaging results/findings within the past 24 hours.    Assessment/Plan:     * Acute respiratory failure with hypoxia and hypercarbia  Elevated CO2 on ABG, currently on BIPAP  Reassess with ABG with worsened CO2, settings adjusted  Hypoxia likely related to COVID19, was 93% on room air on arrival          Acute encephalopathy  Reported by spouse to be with increased drowsiness and altered mentation. She has been sleeping a lot more than usual and is not alert. Found to be with UTI, COVID19 and metabolic acidosis    CT head with no acute findings, ammonia level wnl  Will hold all sedating home meds for now  Await cultures to return  Currently on BIPAP for hypercapnia, ABG prn    RAQUEL (acute kidney injury)  Significant RAQUEL with creatinine of 7.1 from normal just 3 months ago, with hyperkalemia. Also with UTI  Obtain renal ultrasound  Consult Nephrology  Obtain urine sodium and creatinine  Strict I&O's      COVID-19  COVID positive; initiated on protocol  CXR with no infiltrate, requiring O2  initiated on remdesivir x5d; daily CMP  CRP pending; D-dimer pending  Heparin ppx  inhalers prn  MVI, ascorbic acid, incentive spirometry  statin  supplemental O2, will wean as tolerated  prn tylenol, loperamide  contact/airborne          UTI (urinary tract infection)  Found on urinalysis with 3+ leukocytes and many bacteria  Obtain blood cultures, awaiting urine culture  IV Rocephin  Trend CBC      Metabolic acidosis  With initial pH of 7.188  Trend ABG  Likely related to RAQUEL       Leukocytosis  Likley related to UTI vs COVID19  CXR was with no pneumonia  Urine with UTI, await urine culture  Blood cultures pending  Lactate 1.1  IV Rocephin continued  Trend CBC      Hyperkalemia  K of  5.6 on arrival  Received insulin in ED for shift  Will follow, no EKG changes      Unspecified mood (affective) disorder  Resume home meds, chronic      Morbid obesity with BMI of 45.0-49.9, adult  Body mass index is 45.35 kg/m². Morbid obesity complicates all aspects of disease management from diagnostic modalities to treatment. Weight loss encouraged and health benefits explained to patient.        MARY (obstructive sleep apnea)  Was ordered CPAP qhs per PCP but does not use it      Disorder of lumbar spine  Osteoarthritis    Utilize Tylenol prn for now  Hold pain meds 2/2 AMS      Osteoarthritis  See above      Restless leg syndrome  Resume Neurontin and Mirapex      Mixed hyperlipidemia  Patient is chronically on statin.will continue for now. Monitor clinically. Last LDL was   Lab Results   Component Value Date    LDLCALC 71.0 11/10/2021            Anxiety disorder  Hold Klonopin 2/2 AMS, chronic      Fibromyalgia  Resume Effexor and Neurontin      Chronic back pain  Will hold home pain medications until improvement in mentation      Essential hypertension  Chronic, uncontrolled.  Latest blood pressure and vitals reviewed-   Temp:  [98.4 °F (36.9 °C)-99.1 °F (37.3 °C)]   Pulse:  [91-99]   Resp:  [16-44]   BP: (141-159)/()   SpO2:  [93 %-100 %] .   Home meds for hypertension were reviewed and noted below.   Hypertension Medications             furosemide (LASIX) 40 MG tablet TAKE 1 TABLET BY MOUTH UP TO TWO TIMES A DAY IF SWELLING OCCURS    losartan (COZAAR) 100 MG tablet TAKE 1 TABLET BY MOUTH ONCE A DAY FOR BLOOD PRESSURE          While in the hospital, will manage blood pressure as follows; Continue home antihypertensive regimen    Will utilize p.r.n. blood pressure medication only if patient's blood pressure greater than  180/110 and she develops symptoms such as worsening chest pain or shortness of breath.        Mild acid reflux  PPI daily        VTE Risk Mitigation (From admission, onward)          Ordered     heparin (porcine) injection 7,500 Units  Every 8 hours         02/03/22 2251     Place KATERIN hose  Until discontinued         02/03/22 2251     IP VTE HIGH RISK PATIENT  Once         02/03/22 2251     Place sequential compression device  Until discontinued         02/03/22 2251              Critical care time spent on the evaluation and treatment of severe organ dysfunction, review of pertinent labs and imaging studies, discussions with consulting providers and discussions with patient/family: 45 minutes.             Lulu Hoyt APRN-FNP-C  [facility] Department of [facility] Medicine  [phone]

## 2022-02-04 NOTE — AI DETERIORATION ALERT
Sepsis Screen (most recent)     Sepsis Screen - 02/04/22 1118     Is the patient's history or complaint suggestive of a possible infection? Yes  -    Are there at least two of the following signs and symptoms present? Yes  -    Sepsis signs/symptoms - Tachycardia Tachycardia     >90  -    Sepsis signs/symptoms - Tachypnea Tachypnea     >20  -    Sepsis signs/symptoms - WBC WBC < 4,000 or WBC > 12,000  -    Sepsis signs/symptoms - Altered Mental Status Altered Mental Status  -JW    Are any of the following organ dysfunction criteria present and not considered to be due to a chronic condition? Yes  -    Organ Dysfunction Criteria Creatinine > 2.0  -    Organ Dysfunction Criteria Respirator compromise requiring Bipap, Cpap, or Intubation  -    Start Sepsis Timer No   on ABX, covid pos -          User Key  (r) = Recorded By, (t) = Taken By, (c) = Cosigned By    Initials Name    JW Kim Carrasquillo RN              Patient screens positive but is on ABX therapy, therefore timer not started. WCTM.

## 2022-02-04 NOTE — CONSULTS
Consult Note  [facility]    Attending: Dr. Noel  Admit Date: 2/3/2022  Today's Date: 02/04/2022  Reason for Consult:  Acute hypercapnic respiratory failure    SUBJECTIVE:     HPI:  Patient is a 71-year-old female with a past medical history of hypertension, hyperlipidemia, GERD, obesity, sleep apnea, osteoarthritis, chronic back pain, central tremor, fibromyalgia, anxiety, and mood disorder who presented to [facility] on 2/3/22 after increased weakness and altered mental status.  Patient early altered on interview; therefore, history was obtained from .  Patient was in her usual state of health until approximately 2 weeks ago when she tested positive for COVID.  Denied any severe symptoms at this time but since her diagnosis she notably became progressively more fatigued and weak.  Her fatigue and weakness progressed rapidly over the past 2 days until the day of presentation when she became somnolent and was no longer conversing with her  so he presents here to the E D. On presentation to the ED she was satting 93% on room air.  Initial ABG significant for pH of 7.19, pCO2 65, P O2 of 117 on nasal cannula.  Initial WBC 19, K 5.6, bicarb 15, BUN/creatinine 69/7.1, anion gap 19.  Patient was placed on BiPAP with additional ABG obtained showing pH of 7.12, pCO2 70, PO2 112 on BiPAP.  Patient was admitted to the ICU for closer monitoring.    Review of patient's allergies indicates:   Allergen Reactions    Hyoscyamine Rash    Msg [glutamic acid] Swelling       Past Medical History:   Diagnosis Date    RAQUEL (acute kidney injury) 2/3/2022    Anxiety disorder     Bell's palsy     Chronic back pain     Chronic osteoarthritis     Essential tremor     Fibromyalgia     Hypertension     Mild acid reflux     Neck pain     Other and unspecified hyperlipidemia     Sleep apnea     wear c-pap at night; does not use regularly    Unspecified mood (affective) disorder       Past Surgical History:   Procedure Laterality Date    ADENOIDECTOMY      APPENDECTOMY      BLADDER SUSPENSION      BREAST BIOPSY Left     1995  negative    Breast reduction      BREAST SURGERY Bilateral     breast reduction    CARPAL TUNNEL RELEASE Right     COLONOSCOPY  2008    COLONOSCOPY N/A 11/10/2017    Procedure: COLONOSCOPY - Miralax split prep;  Surgeon: Annetta Powell MD;  Location[facility];  Service: Endoscopy;  Laterality: N/A;    COSMETIC SURGERY Bilateral     breast reduction    CYSTOSCOPY N/A 10/23/2018    Procedure: CYSTOSCOPY;  Surgeon: Feli Garza MD;  Location: [address];  Service: Urology;  Laterality: N/A;    ESOPHAGOGASTRODUODENOSCOPY N/A 5/30/2019    Procedure: ESOPHAGOGASTRODUODENOSCOPY (EGD);  Surgeon: Oscar Wylie MD;  Location: [facility] (4TH FLR);  Service: Endoscopy;  Laterality: N/A;  Off PPI/H2 x 7 days prior- ERW    HYSTERECTOMY      JOINT REPLACEMENT Left     knee    KNEE SURGERY      bilateral    left shoulder Left     rotator cuff    OPEN REDUCTION AND INTERNAL FIXATION (ORIF) OF FRACTURE OF DISTAL RADIUS Right 11/16/2021    Procedure: ORIF, FRACTURE, RADIUS, DISTAL;  Surgeon: Evelio Lynn Jr., MD;  Location: [facility];  Service: Orthopedics;  Laterality: Right;  need accumed plate and mini c arm, Acumed confirmed 11/15/21 AM    PLACEMENT OF TRANSOBTURATOR TAPE N/A 10/23/2018    Procedure: PLACEMENT, TRANSOBTURATOR TAPE;  Surgeon: Feli Garza MD;  Location: [address];  Service: Urology;  Laterality: N/A;  1.5 hours    Tonsillectomy      TONSILLECTOMY      TOTAL REDUCTION MAMMOPLASTY      1995    TUBAL LIGATION       Family History   Problem Relation Age of Onset    Diabetes Mother     Tremor Mother     Liver disease Mother     Hypertension Mother     Diabetes Father     Heart disease Father     Hypertension Father     Tremor Son     Kidney disease Son     Diabetes Sister     Diabetes Brother     No Known Problems  Daughter     Depression Sister     Kidney disease Son         Reniel failure but corrected    Learning disabilities Son         Slow in some areas    Mental retardation Son         Mild retardation    Tremor Son     Cancer Brother      Social History     Tobacco Use    Smoking status: Former Smoker     Packs/day: 2.00     Years: 30.00     Pack years: 60.00     Types: Cigarettes     Start date:      Quit date: 1990     Years since quittin.5    Smokeless tobacco: Never Used   Substance Use Topics    Alcohol use: No    Drug use: No       All medications reviewed.    ROS  Negative unless stated in HPI    OBJECTIVE:     Vital Signs Trends/Hx Reviewed  Vitals:    22 1200 22 1226 22 1230 22 1300   BP: (!) 105/53  (!) 109/59 (!) 116/56   BP Location: Right arm      Patient Position: Lying      Pulse: 88 85 86 84   Resp: (!) 26 (!) 26 (!) 23 (!) 27   Temp: 98.3 °F (36.8 °C)      TempSrc: Oral      SpO2: 96% 99% 100% 100%   Weight:       Height:           Ventilator settings:   Oxygen Concentration (%):  [30-40] 30    Physical Exam:  General:  Opens eyes to name; not following commands, on bipap  HEENT: AT/NC, PERRL, EOMI, oral and nasal mucosa moist.   Neck: Supple without JVD or palpable LAD.   Cardiac: normal rate, regular rhythm, with no MRG with brisk cap refill and symmetric pulses in distal extremities.  Respiratory: Normal inspection. Symmetric chest rise. Normal palpation and percussion. Auscultation clear bilaterally. No increased work of breathing noted.   Abdomen: Soft, NT/ND. +BS. No hepatosplenomegaly.   Extremities: No edema.   Neuro:  As above    Laboratory:  Recent Labs   Lab 22  1217   PH 7.153*   PCO2 62.3*   PO2 89   HCO3 21.9*   POCSATURATED 93*   BE -7     Recent Labs   Lab 22  0738   WBC 16.99*   RBC 3.56*   HGB 11.2*   HCT 35.4*      MCV 99*   MCH 31.5*   MCHC 31.6*     Recent Labs   Lab 22  0738 22  0738 22  1236   NA  132*  --   --    K 6.0*  --   --    CL 99  --   --    CO2 17*  --   --    BUN 72*  --   --    CREATININE 7.0*  --   --    MG 2.1   < > 2.2    < > = values in this interval not displayed.       Microbiology Data:   Microbiology Results (last 7 days)     Procedure Component Value Units Date/Time    Blood culture #2 **CANNOT BE ORDERED STAT** [[id number]] Collected: 02/03/22 2237    Order Status: Completed Specimen: Blood Updated: 02/04/22 1115     Blood Culture, Routine No Growth to date    Narrative:      Collection has been rescheduled by [email or web address] at 02/03/2022 22:15 Reason:   Unable to collect   Collection has been rescheduled by [email or web address] at 02/03/2022 22:15 Reason:   Unable to collect     Blood culture #1 **CANNOT BE ORDERED STAT** [[id number]] Collected: 02/03/22 2151    Order Status: Completed Specimen: Blood from Peripheral, Upper Arm, Right Updated: 02/04/22 1115     Blood Culture, Routine No Growth to date           Chest Imaging:   No new imaging.     Infusions:     NORepinephrine bitartrate-D5W 0.08 mcg/kg/min (02/04/22 1200)    sodium bicarbonate drip 75 mL/hr at 02/04/22 1200       Scheduled Medications:    albuterol  2 puff Inhalation Q6H    ascorbic acid (vitamin C)  500 mg Oral BID    cefTRIAXone (ROCEPHIN) IVPB  1 g Intravenous Q24H    dexamethasone  6 mg Intravenous Daily    heparin (porcine)  7,500 Units Subcutaneous Q8H    LIDOcaine HCL 10 mg/ml (1%)        multivitamin  1 tablet Oral Daily    mupirocin   Nasal BID    pantoprazole  40 mg Intravenous Daily    remdesivir infusion  100 mg Intravenous Daily    vancomycin (VANCOCIN) IVPB  2,000 mg Intravenous Once       PRN Medications:   acetaminophen, melatonin, naloxone, ondansetron, ondansetron, senna-docusate 8.6-50 mg, Pharmacy to dose Vancomycin consult **AND** vancomycin - pharmacy to dose    Assessment & Plan:   Patient Active Problem List   Diagnosis    Mild acid reflux    Essential hypertension    Essential tremor    Neck pain     Chronic back pain    Fibromyalgia    Anxiety disorder    Mixed hyperlipidemia    Restless leg syndrome    Osteoarthritis    Gait disturbance    Left-sided Bell's palsy    CTS (carpal tunnel syndrome)    Disorder of lumbar spine    MARY (obstructive sleep apnea)    Urge incontinence    Laryngopharyngeal reflux (LPR)    Morbid obesity with BMI of 45.0-49.9, adult    Tortuous aorta    Prediabetes    Dysphonia    Sacroiliitis, not elsewhere classified    Unspecified mood (affective) disorder    Closed fracture of right wrist    Wrist fracture    Decreased range of motion of right wrist    Decreased range of motion of finger of right hand    Swelling of right hand    Leukocytosis    UTI (urinary tract infection)    Hyperkalemia    High anion gap metabolic acidosis    Acute kidney failure, unspecified    COVID-19    Acute respiratory failure with hypoxia and hypercarbia    Acute metabolic encephalopathy    Hypocalcemia    Hyperphosphatemia    Shock, unspecified       ASSESSMENT & RECOMMENDATIONS   Patient is a 71-year-old female with a past medical history of hypertension, hyperlipidemia, GERD, obesity, sleep apnea, osteoarthritis, chronic back pain, central tremor, fibromyalgia, anxiety, and mood disorder was admitted to [facility] on 2/3/22 for acute hypercapnic respiratory failure and RAQUEL.  Admitted to the ICU on BiPAP.  Trialysis line placed and placement started on CRRT    CNS/Neuro  #Acute encephalopathy  - Patient presented with altered mental status after 2 week of progressive fatigue and shortness of breath  - Initial ABG with a pH of 7.19, pCO2 65, PO2 of 117 on nasal cannula  - Etiology likely secondary to acidemia and hypercapnia  - Close monitoring of neurological status  - See below for further management    Cardiovascular  #Shock  - Patient became hypotensive shortly after presenting to the ED requiring use of pressors for blood pressure support  - Bedside echo  unremarkable  - Formal echo ordered  - Elevated white count on presentation to the ED of 19; lactate WNL  - UA with 3+ leukocytes and many bacteria; culture is pending  - Blood cultures obtained; follow-up  - Etiology likely distributive but unknown at this moment  - Started on ceftriaxone; addition of vanc  - Continue Levophed to maintain map greater than 65    Respiratory  #Acute hypercapnic respiratory failure  #MARY noncompliant with CPAP  #Obesity hypoventilation syndrome?  #COVID  - Patient is a with progressively worsening fatigue and weakness after recent diagnosis of COVID infection.  History of MARY but noncompliant with CPAP (has machine)  - ABG on presentation to the ED pH 7.19, pCO2 of 65, PO2 of 117 on nasal cannula  - Placed on BiPAP in the ED pH 7.12, pCO2 72 PO2 101  - Most recent ABG with some improvement  - Started on dexamethasone 6 mg  - Per patient's family's wishes, would like intubation for airway protection but would not like intubation due to respiratory compromise due to a sister having prolonged intubation due to COVID.  Will attempt to broach subject again with family as patient is oxygenating well intubation for patient is different situation than compared to patient's sister  - Repeat ABG this afternoon    GI/Metabolic  TRI    Renal  #RAQUEL  #AGMA  #Volume overload  - Patient presents to the ED with a BUN/creatinine of 69/7.1  - Noted history of renal disease  - Bicarb 15; anion gap 19 (previous bicarb in the 30s)  - ABG on presentation to the ED as above  - Started on bicarb drip  - Trialysis line placed in the ICU  - Received 1 L of LR and started on continuous LR 150cc/hr in the ED for perceived sepsis  - Patient started on CRRT; attempt to remove as much fluid as tolerated    Heme  TRI     Endo   - Strict Glucose control with goal 140-180    ID  #Sepsis?  - Broad-spectrum antibiotics with vanc and cefepime as above  - Follow-up cultures as above    GI Ppx: none  Diet: NPO  DVT Ppx:  hep    Thank you for allowing us to participate in the care of this patient. We will continue to follow at this time. Please call with questions.    Bud Jauregui MD  [facility]

## 2022-02-04 NOTE — ASSESSMENT & PLAN NOTE
Patient is chronically on statin.will continue for now once able to take p.o.. Monitor clinically. Last LDL was   Lab Results   Component Value Date    LDLCALC 71.0 11/10/2021

## 2022-02-04 NOTE — PLAN OF CARE
Pt on Bipap, POX >90%. Sinus rhythm on the monitor. BP low, see flow sheets for data. 500 cc bolus NS given, Na bicarb started. No UOP overnight, MD aware. Pt responds to voice and follows commands at times. Does not respond to orientation questions. Skin and safety precautions in place. POC reviewed with pt and spouse. WCTM    Anesthesia at the bedside placing an A-line.

## 2022-02-04 NOTE — ASSESSMENT & PLAN NOTE
K of 5.6 on arrival  Received insulin in ED for shift  Will follow, no EKG changes  -will need dialysis for control

## 2022-02-04 NOTE — HPI
Ms. Norman is a 71 year old female with a medical history that includes anxiety disorder, bell's palsy, chronic back pain, chronic osteoarthritis, essential tremor, fibromyalgia, hypertension, GERD, hyperlipidemia, sleep apnea, and mood disorder. She presented to the ED via EMS with spouse who reports patient to be increasingly fatigued and weak. He reports over the past 2 weeks she was diagnosed with COVID19 and was ending their quarantine phase. She has been progressively weaker and unable to ambulate as usual with her walker. Her symptoms worsened over the past 2 days where she was not communicating at all. She is with no cough, vomiting, diarrhea or fevers. Her spouse contributed to her history during this visit. On arrival to the ED she was slightly hypoxic at 93% and hypertensive. Significant labs were with the following: wbc 18.97, Na 131, K 5.6, BUN 69, Cr 7.1, COVID19 +, urine with 3+ leukocytes and many bacteria, pH 7.1 and CO2 65.4. CT head and CXR with no acute findings. She was given neb treatment, Rocephin, Lokelma, reg insulin and NS bolus. She will admit to [facility] hospital medicine service for continued monitoring.

## 2022-02-04 NOTE — EICU
EICU Physician Brief Note - Overnight Events    Called for hypotension and no UOP. Also difficult stick for ABG.  Plan:  NS bolus 500 mL IV x1, then reassess BP and UOP.  Insert art line to accurately monitor BP, as well as draw ABGs and labs.

## 2022-02-04 NOTE — ASSESSMENT & PLAN NOTE
Urinalysis relatively unremarkable, may be asymptomatic bacteriuria but in the setting of shock will continue antibiotics  Awaiting blood cultures, urine culture  IV Rocephin  Trend CBC

## 2022-02-04 NOTE — ASSESSMENT & PLAN NOTE
COVID positive; initiated on protocol  CXR with no infiltrate, requiring O2  initiated on remdesivir x5d; daily CMP  CRP pending; D-dimer pending  Heparin ppx  inhalers prn  MVI, ascorbic acid, incentive spirometry  statin  supplemental O2, will wean as tolerated  prn tylenol, loperamide  contact/airborne

## 2022-02-04 NOTE — PROCEDURES
"Hannah Norman is a 71 y.o. female patient.    Temp: 98.3 °F (36.8 °C) (02/04/22 1200)  Pulse: 78 (02/04/22 1716)  Resp: (!) 47 (02/04/22 1716)  BP: (!) 107/55 (02/04/22 1530)  SpO2: (!) 94 % (02/04/22 1716)  Weight: 110.2 kg (243 lb) (02/04/22 1455)  Height: 5' 1" (154.9 cm) (02/04/22 1455)       Intubation    Date/Time: 2/4/2022 5:30 PM  Location procedure was performed: [facility]  Performed by: Raza Denson MD  Authorized by: Raza Denson MD   Assisting provider: Claire Noel MD  Pre-operative diagnosis: Acute Hypercapnic Respiratory Failure  Post-operative diagnosis: Acute Hypercapnic Respiratory Failure  Consent Done: Emergent Situation  Indications: respiratory failure and  hypercapnia  Intubation method: video-assisted  Patient status: paralyzed (RSI)  Preoxygenation: mask (bipap)  Sedatives: etomidate  Paralytic: rocuronium  Laryngoscope size: Glide 4  Tube size: 7.5 mm  Tube type: cuffed  Number of attempts: 1  Cricoid pressure: yes  Cords visualized: yes  Post-procedure assessment: chest rise and CO2 detector  Breath sounds: clear  Cuff inflated: yes  ETT to lip: 23 cm  Tube secured with: adhesive tape  Patient tolerance: Patient tolerated the procedure well with no immediate complications  Complications: No  Specimens: No  Implants: No        Raza Denson MD  [facility] Fellow  2/4/2022  "

## 2022-02-04 NOTE — ASSESSMENT & PLAN NOTE
Reported by spouse to be with increased drowsiness and altered mentation. She has been sleeping a lot more than usual and is not alert. Found to be with UTI, COVID19 and metabolic acidosis    CT head with no acute findings, ammonia level wnl  Will hold all sedating home meds for now  Await cultures to return  Currently on BIPAP for hypercapnia, ABG prn

## 2022-02-04 NOTE — ASSESSMENT & PLAN NOTE
Juanis related to UTI vs COVID19  CXR was with no pneumonia  Urine with UTI, await urine culture  Blood cultures no growth to date  Lactate 1.1  IV Rocephin continued for now, can likely deescalate  Trend CBC

## 2022-02-04 NOTE — ED NOTES
Patient identifiers for Hannah Norman checked and correct.  LOC: Eyes open.  Not following commands, not answering questions. Responds to pain.  APPEARANCE: Patient resting quietly.  SKIN: The skin is warm and dry.  Buttocks red.  Bruise to right mid leg.  MUSKULOSKELETAL: Patient not moving extremities.  RESPIRATORY: Respirations labored.  CARDIAC: Patient has a normal rate and rhythm.  NEUROLOGIC: PERRL.

## 2022-02-04 NOTE — PROGRESS NOTES
[facility] Medicine  Progress Note    Patient Name: Hannah Norman  MRN: [id number]  Patient Class: IP- Inpatient   Admission Date: 2/3/2022  Length of Stay: 1 days  Attending Physician: Naren Davenport, *  Primary Care Provider: Raffi Garcia MD        Subjective:     Principal Problem:Acute respiratory failure with hypoxia and hypercarbia        HPI:  Ms. Norman is a 71 year old female with a medical history that includes anxiety disorder, bell's palsy, chronic back pain, chronic osteoarthritis, essential tremor, fibromyalgia, hypertension, GERD, hyperlipidemia, sleep apnea, and mood disorder. She presented to the ED via EMS with spouse who reports patient to be increasingly fatigued and weak. He reports over the past 2 weeks she was diagnosed with COVID19 and was ending their quarantine phase. She has been progressively weaker and unable to ambulate as usual with her walker. Her symptoms worsened over the past 2 days where she was not communicating at all. She is with no cough, vomiting, diarrhea or fevers. Her spouse contributed to her history during this visit. On arrival to the ED she was slightly hypoxic at 93% and hypertensive. Significant labs were with the following: wbc 18.97, Na 131, K 5.6, BUN 69, Cr 7.1, COVID19 +, urine with 3+ leukocytes and many bacteria, pH 7.1 and CO2 65.4. CT head and CXR with no acute findings. She was given neb treatment, Rocephin, Lokelma, reg insulin and NS bolus. She will admit to [facility] hospital medicine service for continued monitoring.           Overview/Hospital Course:  No notes on file    Interval History:  On BiPAP.  Worsening acidosis.  Will need dialysis.  Discussed with Critical Care Team and placing lying as well as adjusting BiPAP.  Hypercapnia is improving this afternoon.  Patient is lethargic at this time.    Review of Systems   Unable to perform ROS: Acuity of condition     Objective:     Vital Signs (Most Recent):  Temp:  98 °F (36.7 °C) (02/04/22 0800)  Pulse: 85 (02/04/22 1226)  Resp: (!) 26 (02/04/22 1226)  BP: (!) 105/53 (02/04/22 1200)  SpO2: 99 % (02/04/22 1226) Vital Signs (24h Range):  Temp:  [98 °F (36.7 °C)-99.3 °F (37.4 °C)] 98 °F (36.7 °C)  Pulse:  [] 85  Resp:  [15-94] 26  SpO2:  [76 %-100 %] 99 %  BP: ()/() 105/53  Arterial Line BP: (105-144)/(42-69) 105/48     Weight: 110.5 kg (243 lb 9.7 oz)  Body mass index is 46.03 kg/m².    Intake/Output Summary (Last 24 hours) at 2/4/2022 1257  Last data filed at 2/4/2022 1000  Gross per 24 hour   Intake 2841.13 ml   Output --   Net 2841.13 ml      Physical Exam  Vitals and nursing note reviewed.   Constitutional:       General: She is not in acute distress.     Appearance: She is morbidly obese.      Comments: BIPAP   HENT:      Head: Normocephalic and atraumatic.      Right Ear: External ear normal.      Left Ear: External ear normal.      Nose: Nose normal. No congestion.      Mouth/Throat:      Mouth: Mucous membranes are dry.      Pharynx: Oropharynx is clear.   Eyes:      Extraocular Movements: Extraocular movements intact.      Pupils: Pupils are equal, round, and reactive to light.   Cardiovascular:      Rate and Rhythm: Normal rate and regular rhythm.      Pulses: Normal pulses.      Heart sounds: Normal heart sounds.   Pulmonary:      Effort: Pulmonary effort is normal.      Breath sounds: Normal breath sounds.   Abdominal:      General: Bowel sounds are normal.      Palpations: Abdomen is soft.   Musculoskeletal:         General: Normal range of motion.      Cervical back: Normal range of motion. No rigidity.      Right lower leg: No edema.      Left lower leg: No edema.   Skin:     General: Skin is warm and dry.      Capillary Refill: Capillary refill takes less than 2 seconds.   Neurological:      General: No focal deficit present.      Mental Status: She is easily aroused. She is lethargic and disoriented.      Motor: Weakness present.       Comments: Opens eyes to voice, not following commands   Psychiatric:         Behavior: Behavior is slowed.         Cognition and Memory: Cognition is impaired.         Significant Labs: All pertinent labs within the past 24 hours have been reviewed.    Significant Imaging: I have reviewed all pertinent imaging results/findings within the past 24 hours.      Assessment/Plan:      * Acute respiratory failure with hypoxia and hypercarbia  Elevated CO2 on ABG, currently on BIPAP  Reassess with ABG with worsened CO2, settings adjusted and adjusting face mask for better fit  Hypoxia likely related to COVID19, was 93% on room air on arrival  -suspect that some of patient's encephalopathy secondary to elevated CO2 vs uremia    Transaminitis  - in setting of shock  - monitor for now; can consider escalation of w/u if persists      Hyperglycemia  Hemoglobin A1C   Date Value Ref Range Status   11/10/2021 6.2 (H) 4.0 - 5.6 % Final     Comment:     ADA Screening Guidelines:  5.7-6.4%  Consistent with prediabetes  >or=6.5%  Consistent with diabetes    High levels of fetal hemoglobin interfere with the HbA1C  assay. Heterozygous hemoglobin variants (HbS, HgC, etc)do  not significantly interfere with this assay.   However, presence of multiple variants may affect accuracy.     04/26/2021 5.9 (H) 4.0 - 5.6 % Final     Comment:     ADA Screening Guidelines:  5.7-6.4%  Consistent with prediabetes  >or=6.5%  Consistent with diabetes    High levels of fetal hemoglobin interfere with the HbA1C  assay. Heterozygous hemoglobin variants (HbS, HgC, etc)do  not significantly interfere with this assay.   However, presence of multiple variants may affect accuracy.     11/10/2020 6.0 (H) 4.0 - 5.6 % Final     Comment:     ADA Screening Guidelines:  5.7-6.4%  Consistent with prediabetes  >or=6.5%  Consistent with diabetes  High levels of fetal hemoglobin interfere with the HbA1C  assay. Heterozygous hemoglobin variants (HbS, HgC, etc)do  not  significantly interfere with this assay.   However, presence of multiple variants may affect accuracy.           - LDSSI with accuchecks qachs        Shock, unspecified  -likely due to acidosis and renal failure versus septic shock  -currently requiring Levophed  -nephrology consulted for CRRT  -continue IV antibiotics for possible infectious source  -COVID therapy as above      Hyperphosphatemia  -in the setting of acute renal failure  -will need dialysis  -nephrology consulted      Hypocalcemia  -in the setting of acute renal failure  -dialysis; will replace  -nephrology consulted      Acute metabolic encephalopathy  Reported by spouse to be with increased drowsiness and altered mentation. She has been sleeping a lot more than usual and is not alert. Found to be with renal failure, COVID19, and metabolic acidosis    CT head with no acute findings, ammonia level wnl  Will hold all sedating home meds for now  Currently on BIPAP for hypercapnia, which is likely contributing  -renal failure with high anion gap metabolic acidosis and uremia; will need dialysis as well  -pulmonology and nephrology consulted    COVID-19  COVID positive; initiated on protocol  CXR with no infiltrate, requiring O2  initiated on remdesivir x5d; daily CMP  -dexamethasone via IV  Heparin ppx  inhalers prn  MVI, ascorbic acid, incentive spirometry  statin  supplemental O2, will wean as tolerated  prn tylenol, loperamide  contact/airborne    Acute kidney failure, unspecified  Significant RAQUEL with creatinine of 7.1 from normal just 3 months ago, with acidosis, uremia, hyperkalemia, hyperphosphatemia, and hypocalcemia  Obtain renal ultrasound  -will need dialysis; pulmonology to place central line  Consult Nephrology  Strict I&O's      High anion gap metabolic acidosis  With initial pH of 7.188  Trend ABG  Likely related to acute renal failure  -bicarbonate drip  -will need dialysis; nephrology consulted and pulmonology to place central  line      Hyperkalemia  K of 5.6 on arrival  Received insulin in ED for shift  Will follow, no EKG changes  -will need dialysis for control    UTI (urinary tract infection)  Urinalysis relatively unremarkable, may be asymptomatic bacteriuria but in the setting of shock will continue antibiotics  Awaiting blood cultures, urine culture  IV Rocephin  Trend CBC      Leukocytosis  [place]vinley related to UTI vs COVID19  CXR was with no pneumonia  Urine with UTI, await urine culture  Blood cultures no growth to date  Lactate 1.1  IV Rocephin continued for now, can likely deescalate  Trend CBC      Unspecified mood (affective) disorder  Resume home meds once taking p.o., chronic      Morbid obesity with BMI of 45.0-49.9, adult  Body mass index is 46.03 kg/m². Morbid obesity complicates all aspects of disease management from diagnostic modalities to treatment.    MARY (obstructive sleep apnea)  Was ordered CPAP qhs per PCP but does not use it    Disorder of lumbar spine  Osteoarthritis    Hold home pain medications for now      Osteoarthritis  See above      Restless leg syndrome  Hold home Neurontin and Mirapex      Mixed hyperlipidemia  Patient is chronically on statin.will continue for now once able to take p.o.. Monitor clinically. Last LDL was   Lab Results   Component Value Date    LDLCALC 71.0 11/10/2021            Anxiety disorder  Hold Klonopin 2/2 AMS, chronic      Fibromyalgia  Hold home Effexor and Neurontin      Chronic back pain  Will hold home pain medications until improvement in mentation      Essential hypertension  - currently in shock  - hold home antihypertensives      Mild acid reflux  PPI daily        VTE Risk Mitigation (From admission, onward)         Ordered     heparin (porcine) injection 7,500 Units  Every 8 hours         02/03/22 2251     Place KATERIN hose  Until discontinued         02/03/22 2251     IP VTE HIGH RISK PATIENT  Once         02/03/22 2251     Place sequential compression device  Until  discontinued         02/03/22 2251                Discharge Planning   ARNULFO:      Code Status: Full Code   Is the patient medically ready for discharge?:     Reason for patient still in hospital (select all that apply): Patient unstable               Critical care time spent on the evaluation and treatment of severe organ dysfunction, review of pertinent labs and imaging studies, discussions with consulting providers and discussions with patient/family: 40 minutes.      Naren Davenport MD  Department of Hospital Medicine   [place] [facility]

## 2022-02-04 NOTE — ASSESSMENT & PLAN NOTE
Found on urinalysis with 3+ leukocytes and many bacteria  Obtain blood cultures, awaiting urine culture  IV Rocephin  Trend CBC

## 2022-02-04 NOTE — ASSESSMENT & PLAN NOTE
Likley related to UTI vs COVID19  CXR was with no pneumonia  Urine with UTI, await urine culture  Blood cultures pending  Lactate 1.1  IV Rocephin continued  Trend CBC

## 2022-02-04 NOTE — ASSESSMENT & PLAN NOTE
COVID positive; initiated on protocol  CXR with no infiltrate, requiring O2  initiated on remdesivir x5d; daily CMP  -dexamethasone via IV  Heparin ppx  inhalers prn  MVI, ascorbic acid, incentive spirometry  statin  supplemental O2, will wean as tolerated  prn tylenol, loperamide  contact/airborne

## 2022-02-04 NOTE — ASSESSMENT & PLAN NOTE
Elevated CO2 on ABG, currently on BIPAP  Reassess with ABG with worsened CO2, settings adjusted and adjusting face mask for better fit  Hypoxia likely related to COVID19, was 93% on room air on arrival  -suspect that some of patient's encephalopathy secondary to elevated CO2 vs uremia

## 2022-02-04 NOTE — PROGRESS NOTES
Pharmacokinetic Initial Assessment: IV Vancomycin    Assessment/Plan:    Initiate intravenous vancomycin with loading dose of 2000 mg once with subsequent doses when random concentrations are less than 20 mcg/mL  Desired empiric serum trough concentration is 15 to 20 mcg/mL  Draw vancomycin random level on 1200 at 2/4.  Pharmacy will continue to follow and monitor vancomycin.      Please contact pharmacy at extension [phone] with any questions regarding this assessment.     Thank you for the consult,   Tyra Goyal       Patient brief summary:  Hannah Norman is a 71 y.o. female initiated on antimicrobial therapy with IV Vancomycin for treatment of suspected bacteremia    Drug Allergies:   Review of patient's allergies indicates:   Allergen Reactions    Hyoscyamine Rash    Msg [glutamic acid] Swelling       Actual Body Weight:   110.5 kg    Renal Function:   Estimated Creatinine Clearance: 8.4 mL/min (A) (based on SCr of 7 mg/dL (H)).,     Dialysis Method (if applicable):  CRRT    CBC (last 72 hours):  Recent Labs   Lab Result Units 02/03/22 2129 02/04/22  0738   WBC K/uL 18.97* 16.99*   Hemoglobin g/dL 12.0 11.2*   Hematocrit % 37.5 35.4*   Platelets K/uL 321 268   Gran % % 81.6* 91.0*   Lymph % % 10.1* 4.2*   Mono % % 6.9 3.8*   Eosinophil % % 0.2 0.0   Basophil % % 0.3 0.1   Differential Method  Automated Automated       Metabolic Panel (last 72 hours):  Recent Labs   Lab Result Units 02/03/22  2145 02/03/22  2152 02/04/22  0738   Sodium mmol/L  --  131* 132*   Potassium mmol/L  --  5.6* 6.0*   Chloride mmol/L  --  97 99   CO2 mmol/L  --  15* 17*   Glucose mg/dL  --  138* 219*   Glucose, UA  Negative  --   --    BUN mg/dL  --  69* 72*   Creatinine mg/dL  --  7.1* 7.0*   Albumin g/dL  --  3.7 3.1*   Total Bilirubin mg/dL  --  0.5 0.3   Alkaline Phosphatase U/L  --  90 97   AST U/L  --  84* 97*   ALT U/L  --  43 49*   Magnesium mg/dL  --   --  2.1   Phosphorus mg/dL  --   --  10.8*       Drug levels (last 3  results):  No results for input(s): VANCOMYCINRA, VANCOMYCINPE, VANCOMYCINTR in the last 72 hours.    Microbiologic Results:  Microbiology Results (last 7 days)       Procedure Component Value Units Date/Time    Blood culture #2 **CANNOT BE ORDERED STAT** [[id number]] Collected: 02/03/22 2237    Order Status: Completed Specimen: Blood Updated: 02/04/22 1115     Blood Culture, Routine No Growth to date    Narrative:      Collection has been rescheduled by [email or web address] at 02/03/2022 22:15 Reason:   Unable to collect   Collection has been rescheduled by [email or web address] at 02/03/2022 22:15 Reason:   Unable to collect     Blood culture #1 **CANNOT BE ORDERED STAT** [[id number]] Collected: 02/03/22 2151    Order Status: Completed Specimen: Blood from Peripheral, Upper Arm, Right Updated: 02/04/22 1115     Blood Culture, Routine No Growth to date

## 2022-02-04 NOTE — ASSESSMENT & PLAN NOTE
Patient is chronically on statin.will continue for now. Monitor clinically. Last LDL was   Lab Results   Component Value Date    LDLCALC 71.0 11/10/2021

## 2022-02-04 NOTE — ASSESSMENT & PLAN NOTE
Significant RAQUEL with creatinine of 7.1 from normal just 3 months ago, with hyperkalemia. Also with UTI  Obtain renal ultrasound  Consult Nephrology  Obtain urine sodium and creatinine  Strict I&O's

## 2022-02-04 NOTE — NURSING
Patient's  called, stating he was unaware of patients' admission to ICU. RN updated  of pt's condition. Will call him back with any other future updates. Will continue to monitor.

## 2022-02-05 LAB
ALBUMIN SERPL BCP-MCNC: 2.5 G/DL (ref 3.5–5.2)
ALBUMIN SERPL BCP-MCNC: 2.6 G/DL (ref 3.5–5.2)
ALBUMIN SERPL BCP-MCNC: 2.6 G/DL (ref 3.5–5.2)
ALLENS TEST: ABNORMAL
ALLENS TEST: ABNORMAL
ALP SERPL-CCNC: 121 U/L (ref 55–135)
ALT SERPL W/O P-5'-P-CCNC: 52 U/L (ref 10–44)
ANION GAP SERPL CALC-SCNC: 14 MMOL/L (ref 8–16)
ANION GAP SERPL CALC-SCNC: 15 MMOL/L (ref 8–16)
ANION GAP SERPL CALC-SCNC: 15 MMOL/L (ref 8–16)
APTT BLDCRRT: 32.7 SEC (ref 21–32)
AST SERPL-CCNC: 77 U/L (ref 10–40)
BASOPHILS # BLD AUTO: 0.02 K/UL (ref 0–0.2)
BASOPHILS NFR BLD: 0.2 % (ref 0–1.9)
BILIRUB SERPL-MCNC: 0.3 MG/DL (ref 0.1–1)
BUN SERPL-MCNC: 45 MG/DL (ref 8–23)
BUN SERPL-MCNC: 56 MG/DL (ref 8–23)
BUN SERPL-MCNC: 56 MG/DL (ref 8–23)
CALCIUM SERPL-MCNC: 7 MG/DL (ref 8.7–10.5)
CALCIUM SERPL-MCNC: 7 MG/DL (ref 8.7–10.5)
CALCIUM SERPL-MCNC: 7.2 MG/DL (ref 8.7–10.5)
CHLORIDE SERPL-SCNC: 100 MMOL/L (ref 95–110)
CO2 SERPL-SCNC: 18 MMOL/L (ref 23–29)
CO2 SERPL-SCNC: 18 MMOL/L (ref 23–29)
CO2 SERPL-SCNC: 20 MMOL/L (ref 23–29)
CORTIS SERPL-MCNC: 2.2 UG/DL
CREAT SERPL-MCNC: 2.7 MG/DL (ref 0.5–1.4)
CREAT SERPL-MCNC: 3.7 MG/DL (ref 0.5–1.4)
CREAT SERPL-MCNC: 3.7 MG/DL (ref 0.5–1.4)
DELSYS: ABNORMAL
DELSYS: ABNORMAL
DIFFERENTIAL METHOD: ABNORMAL
EOSINOPHIL # BLD AUTO: 0 K/UL (ref 0–0.5)
EOSINOPHIL NFR BLD: 0.2 % (ref 0–8)
ERYTHROCYTE [DISTWIDTH] IN BLOOD BY AUTOMATED COUNT: 12.8 % (ref 11.5–14.5)
ERYTHROCYTE [SEDIMENTATION RATE] IN BLOOD BY WESTERGREN METHOD: 22 MM/H
ERYTHROCYTE [SEDIMENTATION RATE] IN BLOOD BY WESTERGREN METHOD: 34 MM/H
EST. GFR  (AFRICAN AMERICAN): 13 ML/MIN/1.73 M^2
EST. GFR  (AFRICAN AMERICAN): 13 ML/MIN/1.73 M^2
EST. GFR  (AFRICAN AMERICAN): 20 ML/MIN/1.73 M^2
EST. GFR  (NON AFRICAN AMERICAN): 12 ML/MIN/1.73 M^2
EST. GFR  (NON AFRICAN AMERICAN): 12 ML/MIN/1.73 M^2
EST. GFR  (NON AFRICAN AMERICAN): 17 ML/MIN/1.73 M^2
FIO2: 30
GLUCOSE SERPL-MCNC: 139 MG/DL (ref 70–110)
GLUCOSE SERPL-MCNC: 139 MG/DL (ref 70–110)
GLUCOSE SERPL-MCNC: 148 MG/DL (ref 70–110)
HCO3 UR-SCNC: 20 MMOL/L (ref 24–28)
HCO3 UR-SCNC: 22.8 MMOL/L (ref 24–28)
HCT VFR BLD AUTO: 32.8 % (ref 37–48.5)
HGB BLD-MCNC: 11.2 G/DL (ref 12–16)
IMM GRANULOCYTES # BLD AUTO: 0.11 K/UL (ref 0–0.04)
IMM GRANULOCYTES NFR BLD AUTO: 0.9 % (ref 0–0.5)
LYMPHOCYTES # BLD AUTO: 1.1 K/UL (ref 1–4.8)
LYMPHOCYTES NFR BLD: 9.1 % (ref 18–48)
MAGNESIUM SERPL-MCNC: 1.8 MG/DL (ref 1.6–2.6)
MAGNESIUM SERPL-MCNC: 1.9 MG/DL (ref 1.6–2.6)
MAGNESIUM SERPL-MCNC: 2 MG/DL (ref 1.6–2.6)
MAGNESIUM SERPL-MCNC: 2 MG/DL (ref 1.6–2.6)
MCH RBC QN AUTO: 31.4 PG (ref 27–31)
MCHC RBC AUTO-ENTMCNC: 34.1 G/DL (ref 32–36)
MCV RBC AUTO: 92 FL (ref 82–98)
MODE: ABNORMAL
MODE: ABNORMAL
MONOCYTES # BLD AUTO: 0.6 K/UL (ref 0.3–1)
MONOCYTES NFR BLD: 4.5 % (ref 4–15)
NEUTROPHILS # BLD AUTO: 10.7 K/UL (ref 1.8–7.7)
NEUTROPHILS NFR BLD: 85.1 % (ref 38–73)
NRBC BLD-RTO: 0 /100 WBC
PCO2 BLDA: 32.9 MMHG (ref 35–45)
PCO2 BLDA: 45.8 MMHG (ref 35–45)
PEEP: 8
PEEP: 8
PH SMN: 7.3 [PH] (ref 7.35–7.45)
PH SMN: 7.39 [PH] (ref 7.35–7.45)
PHOSPHATE SERPL-MCNC: 3.8 MG/DL (ref 2.7–4.5)
PHOSPHATE SERPL-MCNC: 4.5 MG/DL (ref 2.7–4.5)
PHOSPHATE SERPL-MCNC: 4.5 MG/DL (ref 2.7–4.5)
PLATELET # BLD AUTO: 182 K/UL (ref 150–450)
PMV BLD AUTO: 10.5 FL (ref 9.2–12.9)
PO2 BLDA: 102 MMHG (ref 80–100)
PO2 BLDA: 87 MMHG (ref 80–100)
POC BE: -4 MMOL/L
POC BE: -5 MMOL/L
POC SATURATED O2: 97 % (ref 95–100)
POC SATURATED O2: 97 % (ref 95–100)
POC TCO2: 21 MMOL/L (ref 23–27)
POC TCO2: 24 MMOL/L (ref 23–27)
POCT GLUCOSE: 132 MG/DL (ref 70–110)
POCT GLUCOSE: 134 MG/DL (ref 70–110)
POTASSIUM SERPL-SCNC: 4 MMOL/L (ref 3.5–5.1)
POTASSIUM SERPL-SCNC: 4 MMOL/L (ref 3.5–5.1)
POTASSIUM SERPL-SCNC: 4.1 MMOL/L (ref 3.5–5.1)
PROT SERPL-MCNC: 5.1 G/DL (ref 6–8.4)
RBC # BLD AUTO: 3.57 M/UL (ref 4–5.4)
SAMPLE: ABNORMAL
SAMPLE: ABNORMAL
SITE: ABNORMAL
SITE: ABNORMAL
SODIUM SERPL-SCNC: 133 MMOL/L (ref 136–145)
SODIUM SERPL-SCNC: 133 MMOL/L (ref 136–145)
SODIUM SERPL-SCNC: 134 MMOL/L (ref 136–145)
SP02: 35
VANCOMYCIN SERPL-MCNC: 11.8 UG/ML
VT: 370
VT: 370
WBC # BLD AUTO: 12.53 K/UL (ref 3.9–12.7)

## 2022-02-05 PROCEDURE — 80202 ASSAY OF VANCOMYCIN: CPT | Performed by: HOSPITALIST

## 2022-02-05 PROCEDURE — 80069 RENAL FUNCTION PANEL: CPT | Mod: 91 | Performed by: INTERNAL MEDICINE

## 2022-02-05 PROCEDURE — 25000003 PHARM REV CODE 250: Performed by: STUDENT IN AN ORGANIZED HEALTH CARE EDUCATION/TRAINING PROGRAM

## 2022-02-05 PROCEDURE — 83735 ASSAY OF MAGNESIUM: CPT | Performed by: NURSE PRACTITIONER

## 2022-02-05 PROCEDURE — 25000003 PHARM REV CODE 250: Performed by: INTERNAL MEDICINE

## 2022-02-05 PROCEDURE — 51702 INSERT TEMP BLADDER CATH: CPT

## 2022-02-05 PROCEDURE — 85379 FIBRIN DEGRADATION QUANT: CPT | Performed by: NURSE PRACTITIONER

## 2022-02-05 PROCEDURE — 83615 LACTATE (LD) (LDH) ENZYME: CPT | Performed by: NURSE PRACTITIONER

## 2022-02-05 PROCEDURE — 84100 ASSAY OF PHOSPHORUS: CPT | Performed by: NURSE PRACTITIONER

## 2022-02-05 PROCEDURE — 82533 TOTAL CORTISOL: CPT | Performed by: STUDENT IN AN ORGANIZED HEALTH CARE EDUCATION/TRAINING PROGRAM

## 2022-02-05 PROCEDURE — 99900035 HC TECH TIME PER 15 MIN (STAT)

## 2022-02-05 PROCEDURE — 94003 VENT MGMT INPAT SUBQ DAY: CPT

## 2022-02-05 PROCEDURE — 63600175 PHARM REV CODE 636 W HCPCS: Performed by: INTERNAL MEDICINE

## 2022-02-05 PROCEDURE — 27202415 HC CARTRIDGE, CRRT

## 2022-02-05 PROCEDURE — 20000000 HC ICU ROOM

## 2022-02-05 PROCEDURE — 25000003 PHARM REV CODE 250: Performed by: HOSPITALIST

## 2022-02-05 PROCEDURE — 63600175 PHARM REV CODE 636 W HCPCS: Performed by: STUDENT IN AN ORGANIZED HEALTH CARE EDUCATION/TRAINING PROGRAM

## 2022-02-05 PROCEDURE — 51798 US URINE CAPACITY MEASURE: CPT

## 2022-02-05 PROCEDURE — C9113 INJ PANTOPRAZOLE SODIUM, VIA: HCPCS | Performed by: HOSPITALIST

## 2022-02-05 PROCEDURE — 27000207 HC ISOLATION

## 2022-02-05 PROCEDURE — 63600175 PHARM REV CODE 636 W HCPCS: Performed by: HOSPITALIST

## 2022-02-05 PROCEDURE — 37799 UNLISTED PX VASCULAR SURGERY: CPT

## 2022-02-05 PROCEDURE — 83735 ASSAY OF MAGNESIUM: CPT | Mod: 91 | Performed by: INTERNAL MEDICINE

## 2022-02-05 PROCEDURE — 63600175 PHARM REV CODE 636 W HCPCS: Performed by: NURSE PRACTITIONER

## 2022-02-05 PROCEDURE — 82728 ASSAY OF FERRITIN: CPT | Performed by: NURSE PRACTITIONER

## 2022-02-05 PROCEDURE — 80100008 HC CRRT DAILY MAINTENANCE

## 2022-02-05 PROCEDURE — 82803 BLOOD GASES ANY COMBINATION: CPT

## 2022-02-05 PROCEDURE — 85025 COMPLETE CBC W/AUTO DIFF WBC: CPT | Performed by: NURSE PRACTITIONER

## 2022-02-05 PROCEDURE — 85730 THROMBOPLASTIN TIME PARTIAL: CPT | Performed by: INTERNAL MEDICINE

## 2022-02-05 PROCEDURE — 27000221 HC OXYGEN, UP TO 24 HOURS

## 2022-02-05 PROCEDURE — 25000003 PHARM REV CODE 250: Performed by: NURSE PRACTITIONER

## 2022-02-05 PROCEDURE — 80053 COMPREHEN METABOLIC PANEL: CPT | Performed by: NURSE PRACTITIONER

## 2022-02-05 PROCEDURE — 90945 DIALYSIS ONE EVALUATION: CPT

## 2022-02-05 RX ORDER — HEPARIN SODIUM 10000 [USP'U]/100ML
1000 INJECTION, SOLUTION INTRAVENOUS CONTINUOUS
Status: DISCONTINUED | OUTPATIENT
Start: 2022-02-05 | End: 2022-02-09

## 2022-02-05 RX ADMIN — Medication 0.06 MCG/KG/MIN: at 02:02

## 2022-02-05 RX ADMIN — PROPOFOL 50 MCG/KG/MIN: 10 INJECTION, EMULSION INTRAVENOUS at 11:02

## 2022-02-05 RX ADMIN — CHLORHEXIDINE GLUCONATE 0.12% ORAL RINSE 15 ML: 1.2 LIQUID ORAL at 09:02

## 2022-02-05 RX ADMIN — PROPOFOL 40 MCG/KG/MIN: 10 INJECTION, EMULSION INTRAVENOUS at 01:02

## 2022-02-05 RX ADMIN — PROPOFOL 50 MCG/KG/MIN: 10 INJECTION, EMULSION INTRAVENOUS at 06:02

## 2022-02-05 RX ADMIN — PROPOFOL 50 MCG/KG/MIN: 10 INJECTION, EMULSION INTRAVENOUS at 02:02

## 2022-02-05 RX ADMIN — PANTOPRAZOLE SODIUM 40 MG: 40 INJECTION, POWDER, FOR SOLUTION INTRAVENOUS at 09:02

## 2022-02-05 RX ADMIN — OXYCODONE HYDROCHLORIDE AND ACETAMINOPHEN 500 MG: 500 TABLET ORAL at 09:02

## 2022-02-05 RX ADMIN — HEPARIN SODIUM 7500 UNITS: 5000 INJECTION, SOLUTION INTRAVENOUS; SUBCUTANEOUS at 05:02

## 2022-02-05 RX ADMIN — HEPARIN SODIUM 2800 UNITS: 1000 INJECTION, SOLUTION INTRAVENOUS; SUBCUTANEOUS at 05:02

## 2022-02-05 RX ADMIN — MUPIROCIN: 20 OINTMENT TOPICAL at 09:02

## 2022-02-05 RX ADMIN — Medication 0.08 MCG/KG/MIN: at 04:02

## 2022-02-05 RX ADMIN — THERA TABS 1 TABLET: TAB at 09:02

## 2022-02-05 RX ADMIN — HEPARIN SODIUM 500 UNITS/HR: 5000 INJECTION, SOLUTION INTRAVENOUS; SUBCUTANEOUS at 12:02

## 2022-02-05 RX ADMIN — CEFTRIAXONE SODIUM 1 G: 1 INJECTION, POWDER, FOR SOLUTION INTRAMUSCULAR; INTRAVENOUS at 09:02

## 2022-02-05 RX ADMIN — HEPARIN SODIUM 7500 UNITS: 5000 INJECTION, SOLUTION INTRAVENOUS; SUBCUTANEOUS at 09:02

## 2022-02-05 RX ADMIN — ALTEPLASE 2 MG: 2.2 INJECTION, POWDER, LYOPHILIZED, FOR SOLUTION INTRAVENOUS at 05:02

## 2022-02-05 RX ADMIN — PROPOFOL 50 MCG/KG/MIN: 10 INJECTION, EMULSION INTRAVENOUS at 08:02

## 2022-02-05 RX ADMIN — FAMOTIDINE 20 MG: 10 INJECTION INTRAVENOUS at 09:02

## 2022-02-05 RX ADMIN — HEPARIN SODIUM 7500 UNITS: 5000 INJECTION, SOLUTION INTRAVENOUS; SUBCUTANEOUS at 02:02

## 2022-02-05 NOTE — NURSING
Patient intubated with 7.5 ETT, by MD Denson. Per MD bilateral breath sounds clear to auscultation, positive color change noted. Per respiratory Tube is 23 at the lips. Awaiting chest x ray.    After chest x ray - ETT pulled back to 25 at the lips

## 2022-02-05 NOTE — ASSESSMENT & PLAN NOTE
Elevated CO2 on ABG, currently on BIPAP  Reassess with ABG with worsened CO2, settings adjusted and adjusting face mask for better fit  Hypoxia likely related to COVID19, was 93% on room air on arrival  -suspect that patient's encephalopathy secondary to elevated CO2 vs uremia

## 2022-02-05 NOTE — ASSESSMENT & PLAN NOTE
COVID positive; initiated on protocol  CXR with no infiltrate, requiring O2  -doubt that current presentation is due to COVID; will hold remdesivir and dexamethasone  Heparin ppx  inhalers prn  MVI, ascorbic acid, incentive spirometry  statin  supplemental O2, will wean as tolerated  prn tylenol, loperamide  contact/airborne

## 2022-02-05 NOTE — PLAN OF CARE
Pt drowsy arousal with stimulation. Does not follow command. (+) gag/cough. Moves Upper extremities spontaneously. Pt intubated and sedated. FiO2 increased to 30% with O2 sat 96%. Unable to wean off levo. Maxed on propofol d/t increased agitation. Cath flowed Trialysis x2. Restart CRRT x1 at MN. Planned Rinse back around 1930. Emergent rinse back around 0500. Decreased BFR, reversed lines around 0300. Prefiltered heparin. Arterial line now with Cath flow. Dr. Prieto and Dr. Murray called with orders placed in MAR. Pt bladder scaned 546 urine, gresham placed with tea colored 625 ml out. Cont on IV abt. Heparin for VTE. Safety maintained. SR up x3. Call light in reach. Family not present at bedside.

## 2022-02-05 NOTE — PROGRESS NOTES
[facility] Medicine  Progress Note    Patient Name: Hannah Norman  MRN: [id number]  Patient Class: IP- Inpatient   Admission Date: 2/3/2022  Length of Stay: 2 days  Attending Physician: Naren Davenport, *  Primary Care Provider: Raffi Garcia MD        Subjective:     Principal Problem:Acute respiratory failure with hypoxia and hypercarbia  Acute Condition:  Acute hypercapnic respiratory failure        HPI:  Ms. Norman is a 71 year old female with a medical history that includes anxiety disorder, bell's palsy, chronic back pain, chronic osteoarthritis, essential tremor, fibromyalgia, hypertension, GERD, hyperlipidemia, sleep apnea, and mood disorder. She presented to the ED via EMS with spouse who reports patient to be increasingly fatigued and weak. He reports over the past 2 weeks she was diagnosed with COVID19 and was ending their quarantine phase. She has been progressively weaker and unable to ambulate as usual with her walker. Her symptoms worsened over the past 2 days where she was not communicating at all. She is with no cough, vomiting, diarrhea or fevers. Her spouse contributed to her history during this visit. On arrival to the ED she was slightly hypoxic at 93% and hypertensive. Significant labs were with the following: wbc 18.97, Na 131, K 5.6, BUN 69, Cr 7.1, COVID19 +, urine with 3+ leukocytes and many bacteria, pH 7.1 and CO2 65.4. CT head and CXR with no acute findings. She was given neb treatment, Rocephin, Lokelma, reg insulin and NS bolus. She will admit to [facility] hospital medicine service for continued monitoring.           Overview/Hospital Course:  No notes on file    Interval History:  Intubated yesterday due to worsening acidosis/mentation despite BiPAP. Initiated on CRRT. Vila in place and making some urine today.    Review of Systems   Unable to perform ROS: Intubated     Objective:     Vital Signs (Most Recent):  Temp: 98.6 °F (37 °C) (02/05/22  1629)  Pulse: 97 (02/05/22 1629)  Resp: (!) 22 (02/05/22 1629)  BP: (!) 103/58 (02/05/22 1537)  SpO2: 100 % (02/05/22 1629) Vital Signs (24h Range):  Temp:  [94 °F (34.4 °C)-98.78 °F (37.1 °C)] 98.6 °F (37 °C)  Pulse:  [] 97  Resp:  [22-49] 22  SpO2:  [87 %-100 %] 100 %  BP: ()/(50-71) 103/58  Arterial Line BP: ()/(47-74) 110/53     Weight: 108 kg (238 lb 1.6 oz)  Body mass index is 44.99 kg/m².    Intake/Output Summary (Last 24 hours) at 2/5/2022 1648  Last data filed at 2/5/2022 0627  Gross per 24 hour   Intake 1443.67 ml   Output 1931 ml   Net -487.33 ml      Physical Exam  Vitals and nursing note reviewed.   Constitutional:       General: She is not in acute distress.     Appearance: She is morbidly obese.      Interventions: She is sedated, intubated and restrained.   HENT:      Head: Normocephalic and atraumatic.      Right Ear: External ear normal.      Left Ear: External ear normal.      Nose: Nose normal. No congestion.      Mouth/Throat:      Mouth: Mucous membranes are dry.      Pharynx: Oropharynx is clear.   Eyes:      Extraocular Movements: Extraocular movements intact.      Pupils: Pupils are equal, round, and reactive to light.   Cardiovascular:      Rate and Rhythm: Normal rate and regular rhythm.      Pulses: Normal pulses.      Heart sounds: Normal heart sounds.   Pulmonary:      Effort: Pulmonary effort is normal. She is intubated.      Breath sounds: Normal breath sounds.   Abdominal:      General: Bowel sounds are normal.      Palpations: Abdomen is soft.   Genitourinary:     Comments: gresham  Musculoskeletal:         General: Normal range of motion.      Cervical back: Normal range of motion. No rigidity.      Right lower leg: No edema.      Left lower leg: No edema.   Skin:     General: Skin is warm and dry.      Capillary Refill: Capillary refill takes less than 2 seconds.      Comments: trialysis line   Neurological:      General: No focal deficit present.      Mental  Status: She is disoriented.      Motor: Weakness present.   Psychiatric:      Comments: Unable to assess         Significant Labs: All pertinent labs within the past 24 hours have been reviewed.    Significant Imaging: I have reviewed all pertinent imaging results/findings within the past 24 hours.      Assessment/Plan:      * Acute respiratory failure with hypoxia and hypercarbia  Elevated CO2 on ABG, currently on BIPAP  Reassess with ABG with worsened CO2, settings adjusted and adjusting face mask for better fit  Hypoxia likely related to COVID19, was 93% on room air on arrival  -suspect that patient's encephalopathy secondary to elevated CO2 vs uremia    Transaminitis  - in setting of shock  - monitor for now; can consider escalation of w/u if persists      Hyperglycemia  Hemoglobin A1C   Date Value Ref Range Status   11/10/2021 6.2 (H) 4.0 - 5.6 % Final     Comment:     ADA Screening Guidelines:  5.7-6.4%  Consistent with prediabetes  >or=6.5%  Consistent with diabetes    High levels of fetal hemoglobin interfere with the HbA1C  assay. Heterozygous hemoglobin variants (HbS, HgC, etc)do  not significantly interfere with this assay.   However, presence of multiple variants may affect accuracy.     04/26/2021 5.9 (H) 4.0 - 5.6 % Final     Comment:     ADA Screening Guidelines:  5.7-6.4%  Consistent with prediabetes  >or=6.5%  Consistent with diabetes    High levels of fetal hemoglobin interfere with the HbA1C  assay. Heterozygous hemoglobin variants (HbS, HgC, etc)do  not significantly interfere with this assay.   However, presence of multiple variants may affect accuracy.     11/10/2020 6.0 (H) 4.0 - 5.6 % Final     Comment:     ADA Screening Guidelines:  5.7-6.4%  Consistent with prediabetes  >or=6.5%  Consistent with diabetes  High levels of fetal hemoglobin interfere with the HbA1C  assay. Heterozygous hemoglobin variants (HbS, HgC, etc)do  not significantly interfere with this assay.   However, presence of  multiple variants may affect accuracy.           - LDSSI with accuchecks qachs        Shock, unspecified  -likely due to acidosis and renal failure versus septic shock  -currently requiring Levophed  -nephrology consulted for CRRT  -continue IV antibiotics for possible infectious source  -COVID therapy as above      Hyperphosphatemia  -in the setting of acute renal failure  -on CRRT  -nephrology consulted      Hypocalcemia  -in the setting of acute renal failure  -dialysis; will replace  -nephrology consulted      Acute metabolic encephalopathy  Reported by spouse to be with increased drowsiness and altered mentation. She has been sleeping a lot more than usual and is not alert. Found to be with renal failure, COVID19, and metabolic acidosis    CT head with no acute findings, ammonia level wnl  Will hold all sedating home meds for now  Currently on BIPAP for hypercapnia, which is likely contributing  -renal failure with high anion gap metabolic acidosis and uremia; initiated on dialysis  -pulmonology and nephrology consulted    COVID-19  COVID positive; initiated on protocol  CXR with no infiltrate, requiring O2  -doubt that current presentation is due to COVID; will hold remdesivir and dexamethasone  Heparin ppx  inhalers prn  MVI, ascorbic acid, incentive spirometry  statin  supplemental O2, will wean as tolerated  prn tylenol, loperamide  contact/airborne    Acute kidney failure, unspecified  Significant RAQUEL with creatinine of 7.1 from normal just 3 months ago, with acidosis, uremia, hyperkalemia, hyperphosphatemia, and hypocalcemia  Renal ultrasound   -Vila catheter placed  -initiated on CRRT 2/4  Consult Nephrology  Strict I&O's      High anion gap metabolic acidosis  With initial pH of 7.188  Trend ABG  Likely related to acute renal failure  -bicarbonate drip started on admission, now discontinued  -initiated on CRRT  -nephrology following      Hyperkalemia  K of 5.6 on arrival  Received insulin in ED for  "shift  -controlled with CRRT    UTI (urinary tract infection)  Urinalysis relatively unremarkable, may be asymptomatic bacteriuria but in the setting of shock will continue antibiotics  Awaiting blood cultures, urine culture  IV Rocephin  Trend CBC      Leukocytosis  Juanis related to UTI vs COVID19  CXR was with no pneumonia  Urine with UTI, await urine culture  Blood cultures no growth to date  Lactate 1.1  IV Rocephin continued for now, can likely deescalate  Trend CBC      Unspecified mood (affective) disorder  Resume home meds once taking p.o., chronic      Morbid obesity with BMI of 45.0-49.9, adult  Body mass index is 44.99 kg/m². Morbid obesity complicates all aspects of disease management from diagnostic modalities to treatment.    MARY (obstructive sleep apnea)  Was ordered CPAP qhs per PCP but does not use it  -suspect may have OHS    Disorder of lumbar spine  Osteoarthritis    Hold home pain medications for now      Osteoarthritis  See above      Restless leg syndrome  Hold home Neurontin and Mirapex      Mixed hyperlipidemia  Patient is chronically on statin.will continue for now once able to take p.o.. Monitor clinically. Last LDL was   Lab Results   Component Value Date    LDLCALC 71.0 11/10/2021            Anxiety disorder  Hold Klonopin 2/2 AMS, chronic      Fibromyalgia  Hold home Effexor and Neurontin      Chronic back pain  Will hold home pain medications until improvement in mentation      Essential hypertension  - currently in shock  - hold home antihypertensives      Mild acid reflux  - protonix ppx        VTE Risk Mitigation (From admission, onward)         Ordered     heparin 25,000 units in dextrose 5% 250 mL (100 units/mL) infusion (heparin infusion - NO NOMOGRAM)  Continuous        "And" Linked Group Details    02/04/22 1918     heparin (porcine) injection 2,800 Units  As needed (PRN)         02/04/22 1911     heparin (porcine) injection 7,500 Units  Every 8 hours         02/03/22 8910     " Place KATERIN hose  Until discontinued         02/03/22 2251     IP VTE HIGH RISK PATIENT  Once         02/03/22 2251     Place sequential compression device  Until discontinued         02/03/22 2251                Discharge Planning   ARNULFO:      Code Status: Full Code   Is the patient medically ready for discharge?:     Reason for patient still in hospital (select all that apply): Patient unstable               Critical care time spent on the evaluation and treatment of severe organ dysfunction, review of pertinent labs and imaging studies, discussions with consulting providers and discussions with patient/family: 35 minutes.      Naren Davenport MD  Department of Hospital Medicine   [place] - Intensive Care

## 2022-02-05 NOTE — AI DETERIORATION ALERT
Sepsis Screen (most recent)     Sepsis Screen - 02/05/22 1126     Is the patient's history or complaint suggestive of a possible infection? Yes  -    Are there at least two of the following signs and symptoms present? Yes  -    Sepsis signs/symptoms - Tachypnea Tachypnea     >20  -    Sepsis signs/symptoms - WBC WBC < 4,000 or WBC > 12,000  -    Are any of the following organ dysfunction criteria present and not considered to be due to a chronic condition? Yes  -    Organ Dysfunction Criteria Creatinine > 2.0  -    Organ Dysfunction Criteria Respirator compromise requiring Bipap, Cpap, or Intubation  -    Start Sepsis Timer No   on ABX -          User Key  (r) = Recorded By, (t) = Taken By, (c) = Cosigned By    Initials Name    JW Kim Carrasquillo RN              Patient screens positive but is on ABX therapy, therefore timer not started. WCTM.

## 2022-02-05 NOTE — PLAN OF CARE
Recommendation:   1. When medically acceptable, initiate TF of Novasource Renal at 10ml/hr and advance as tolerated to goal rate of 30ml/hr (while on propofol). Add 4 packs beneprotein daily to better meet protein needs.     2. Monitor weight/labs.     3. RD to follow to monitor nutrition status    Goals:   TF will be started by RD follow up  Nutrition Goal Status: new

## 2022-02-05 NOTE — ASSESSMENT & PLAN NOTE
Contributing Nutrition Diagnosis  Inadequate energy intake    Related to (etiology):   Intubation/COVID    Signs and Symptoms (as evidenced by):   NPO    Interventions:  Collaboration with other providers    Nutrition Diagnosis Status:   Continues

## 2022-02-05 NOTE — SUBJECTIVE & OBJECTIVE
Interval History:  Intubated yesterday due to worsening acidosis/mentation despite BiPAP. Initiated on CRRT. Vila in place and making some urine today.    Review of Systems   Unable to perform ROS: Intubated     Objective:     Vital Signs (Most Recent):  Temp: 98.6 °F (37 °C) (02/05/22 1629)  Pulse: 97 (02/05/22 1629)  Resp: (!) 22 (02/05/22 1629)  BP: (!) 103/58 (02/05/22 1537)  SpO2: 100 % (02/05/22 1629) Vital Signs (24h Range):  Temp:  [94 °F (34.4 °C)-98.78 °F (37.1 °C)] 98.6 °F (37 °C)  Pulse:  [] 97  Resp:  [22-49] 22  SpO2:  [87 %-100 %] 100 %  BP: ()/(50-71) 103/58  Arterial Line BP: ()/(47-74) 110/53     Weight: 108 kg (238 lb 1.6 oz)  Body mass index is 44.99 kg/m².    Intake/Output Summary (Last 24 hours) at 2/5/2022 1648  Last data filed at 2/5/2022 0627  Gross per 24 hour   Intake 1443.67 ml   Output 1931 ml   Net -487.33 ml      Physical Exam  Vitals and nursing note reviewed.   Constitutional:       General: She is not in acute distress.     Appearance: She is morbidly obese.      Interventions: She is sedated, intubated and restrained.   HENT:      Head: Normocephalic and atraumatic.      Right Ear: External ear normal.      Left Ear: External ear normal.      Nose: Nose normal. No congestion.      Mouth/Throat:      Mouth: Mucous membranes are dry.      Pharynx: Oropharynx is clear.   Eyes:      Extraocular Movements: Extraocular movements intact.      Pupils: Pupils are equal, round, and reactive to light.   Cardiovascular:      Rate and Rhythm: Normal rate and regular rhythm.      Pulses: Normal pulses.      Heart sounds: Normal heart sounds.   Pulmonary:      Effort: Pulmonary effort is normal. She is intubated.      Breath sounds: Normal breath sounds.   Abdominal:      General: Bowel sounds are normal.      Palpations: Abdomen is soft.   Genitourinary:     Comments: marga  Musculoskeletal:         General: Normal range of motion.      Cervical back: Normal range of motion.  No rigidity.      Right lower leg: No edema.      Left lower leg: No edema.   Skin:     General: Skin is warm and dry.      Capillary Refill: Capillary refill takes less than 2 seconds.      Comments: trialysis line   Neurological:      General: No focal deficit present.      Mental Status: She is disoriented.      Motor: Weakness present.   Psychiatric:      Comments: Unable to assess         Significant Labs: All pertinent labs within the past 24 hours have been reviewed.    Significant Imaging: I have reviewed all pertinent imaging results/findings within the past 24 hours.

## 2022-02-05 NOTE — NURSING
K 3.8 at 2213. Notified Dr. Prieto. Telephone orders given to restart CRRT with a 4K bath at this time. Also notified MD of Ca level of 6.5. Orders given to replace with 1g Calcium gluconate. No other orders given at this time. Will continue to monitor.

## 2022-02-05 NOTE — ASSESSMENT & PLAN NOTE
With initial pH of 7.188  Trend ABG  Likely related to acute renal failure  -bicarbonate drip started on admission, now discontinued  -initiated on CRRT  -nephrology following

## 2022-02-05 NOTE — ASSESSMENT & PLAN NOTE
Significant RAQUEL with creatinine of 7.1 from normal just 3 months ago, with acidosis, uremia, hyperkalemia, hyperphosphatemia, and hypocalcemia  Renal ultrasound   -Vila catheter placed  -initiated on CRRT 2/4  Consult Nephrology  Strict I&O's

## 2022-02-05 NOTE — ASSESSMENT & PLAN NOTE
Body mass index is 44.99 kg/m². Morbid obesity complicates all aspects of disease management from diagnostic modalities to treatment.

## 2022-02-05 NOTE — ASSESSMENT & PLAN NOTE
Reported by spouse to be with increased drowsiness and altered mentation. She has been sleeping a lot more than usual and is not alert. Found to be with renal failure, COVID19, and metabolic acidosis    CT head with no acute findings, ammonia level wnl  Will hold all sedating home meds for now  Currently on BIPAP for hypercapnia, which is likely contributing  -renal failure with high anion gap metabolic acidosis and uremia; initiated on dialysis  -pulmonology and nephrology consulted

## 2022-02-05 NOTE — CONSULTS
"  [facility] - Intensive Care  Adult Nutrition  Consult Note    SUMMARY     Recommendations    Recommendation:   1. When medically acceptable, initiate TF of Novasource Renal at 10ml/hr and advance as tolerated to goal rate of 30ml/hr (while on propofol). Add 4 packs beneprotein daily to better meet protein needs.     2. Monitor weight/labs.     3. RD to follow to monitor nutrition status    Goals:   TF will be started by RD follow up  Nutrition Goal Status: new  Communication of RD Recs: other (comment) (POC)    Assessment and Plan  * Acute respiratory failure with hypoxia and hypercarbia  Contributing Nutrition Diagnosis  Inadequate energy intake    Related to (etiology):   Intubation/COVID    Signs and Symptoms (as evidenced by):   NPO    Interventions:  Collaboration with other providers    Nutrition Diagnosis Status:   New      Malnutrition Assessment  Unable to assess NFPE 2/2 pt on COVID isolation       Reason for Assessment  Reason For Assessment: consult (nutrition)  Diagnosis:  (acute resp failure)  Relevant Medical History: HTN, chronic osteoarthritis, [place] palsy, sleep apnea, RAQUEL, hysterectomy, B knee surgery, appendectomy  General Information Comments: Pt intubated thia afternoon. Starting on CRRT. On propofol. OG tube. Shakir 12-skin intact. Unable to assess NFPE 2/2 pt on COVID isolation. No recent weight loss noted  Nutrition Discharge Planning: d/c needs to be determined    Nutrition Risk Screen  Nutrition Risk Screen: no indicators present    Nutrition/Diet History  Food Preferences: unable to assess  Factors Affecting Nutritional Intake: NPO,on mechanical ventilation    Anthropometrics  Temp: (!) 95.5 °F (35.3 °C)  Height Method: Estimated  Height: 5' 1" (154.9 cm)  Height (inches): 61 in  Weight Method: Bed Scale  Weight: 110.2 kg (242 lb 15.2 oz)  Weight (lb): 242.95 lb  Ideal Body Weight (IBW), Female: 105 lb  % Ideal Body Weight, Female (lb): 231.38 %  BMI (Calculated): 45.9  BMI Grade: greater " than 40 - morbid obesity     Lab/Procedures/Meds  Pertinent Labs Reviewed: reviewed  Pertinent Labs Comments: Na 132L, K 6.1H, BUN 75H, Crea 6.7H, Glu 257H, Ca 6.6L, Phos 10.4H. Alb 2.9L  Pertinent Medications Reviewed: reviewed  Pertinent Medications Comments: Vit C, rocephin, MVI, pantoprazole, fentanyl, heparin, propofol    Estimated/Assessed Needs  Weight Used For Calorie Calculations: 110.2 kg (242 lb 15.2 oz)  Energy Calorie Requirements (kcal): 1903  Energy Need Method: [facility]  Protein Requirements: 132g (1.2g/kg)  Weight Used For Protein Calculations: 110.2 kg (242 lb 15.2 oz)  Estimated Fluid Requirement Method: RDA Method  RDA Method (mL): 1903  CHO Requirement: 200g    Nutrition Prescription Ordered  Current Diet Order: NPO    Evaluation of Received Nutrient/Fluid Intake  Other Calories (kcal): 522 (propofol)  % Kcal Needs: 29  I/O: 3258/611  Energy Calories Required: not meeting needs  Protein Required: not meeting needs  Fluid Required: not meeting needs  Comments: LBM unknown  % Intake of Estimated Energy Needs: 25 - 50 %  % Meal Intake: NPO    Nutrition Risk  Level of Risk/Frequency of Follow-up:  (2xweekly)     Monitor and Evaluation  Food and Nutrient Intake: energy intake  Food and Nutrient Adminstration: diet order,enteral and parenteral nutrition administration  Physical Activity and Function: nutrition-related ADLs and IADLs  Anthropometric Measurements: weight  Biochemical Data, Medical Tests and Procedures: electrolyte and renal panel  Nutrition-Focused Physical Findings: overall appearance     Nutrition Follow-Up  RD Follow-up?: Yes

## 2022-02-05 NOTE — PROGRESS NOTES
Nephrology Progress Note     Consult Requested By: Naren Davenport, *  Reason for Consult: RAQUEL     SUBJECTIVE:          Review of Systems   Unable to perform ROS: Acuity of condition       Past Medical History:   Diagnosis Date    RAQUEL (acute kidney injury) 2/3/2022    Anxiety disorder     Bell's palsy     Chronic back pain     Chronic osteoarthritis     Essential tremor     Fibromyalgia     Hypertension     Mild acid reflux     Neck pain     Other and unspecified hyperlipidemia     Sleep apnea     wear c-pap at night; does not use regularly    Unspecified mood (affective) disorder      Past Surgical History:   Procedure Laterality Date    ADENOIDECTOMY      APPENDECTOMY      BLADDER SUSPENSION      BREAST BIOPSY Left     1995  negative    Breast reduction      BREAST SURGERY Bilateral     breast reduction    CARPAL TUNNEL RELEASE Right     COLONOSCOPY  2008    COLONOSCOPY N/A 11/10/2017    Procedure: COLONOSCOPY - Miralax split prep;  Surgeon: Annetta Powell MD;  Location: [facility];  Service: Endoscopy;  Laterality: N/A;    COSMETIC SURGERY Bilateral     breast reduction    CYSTOSCOPY N/A 10/23/2018    Procedure: CYSTOSCOPY;  Surgeon: Feli Garza MD;  Location: [address];  Service: Urology;  Laterality: N/A;    ESOPHAGOGASTRODUODENOSCOPY N/A 5/30/2019    Procedure: ESOPHAGOGASTRODUODENOSCOPY (EGD);  Surgeon: Oscar Wylie MD;  Location: [facility] (4TH FLR);  Service: Endoscopy;  Laterality: N/A;  Off PPI/H2 x 7 days prior- ERW    HYSTERECTOMY      JOINT REPLACEMENT Left     knee    KNEE SURGERY      bilateral    left shoulder Left     rotator cuff    OPEN REDUCTION AND INTERNAL FIXATION (ORIF) OF FRACTURE OF DISTAL RADIUS Right 11/16/2021    Procedure: ORIF, FRACTURE, RADIUS, DISTAL;  Surgeon: Evelio Lynn Jr., MD;  Location: [facility];  Service: Orthopedics;  Laterality: Right;  need accumed plate and mini c arm, Acumed confirmed 11/15/21 AM    PLACEMENT OF  TRANSOBTURATOR TAPE N/A 10/23/2018    Procedure: PLACEMENT, TRANSOBTURATOR TAPE;  Surgeon: Feli Garza MD;  Location: [facility] OR [address] [place] [address];  Service: Urology;  Laterality: N/A;  1.5 hours    Tonsillectomy      TONSILLECTOMY      TOTAL REDUCTION MAMMOPLASTY          TUBAL LIGATION       Family History   Problem Relation Age of Onset    Diabetes Mother     Tremor Mother     Liver disease Mother     Hypertension Mother     Diabetes Father     Heart disease Father     Hypertension Father     Tremor Son     Kidney disease Son     Diabetes Sister     Diabetes Brother     No Known Problems Daughter     Depression Sister     Kidney disease Son         Reniel failure but corrected    Learning disabilities Son         Slow in some areas    Mental retardation Son         Mild retardation    Tremor Son     Cancer Brother      Social History     Tobacco Use    Smoking status: Former Smoker     Packs/day: 2.00     Years: 30.00     Pack years: 60.00     Types: Cigarettes     Start date:      Quit date: 1990     Years since quittin.5    Smokeless tobacco: Never Used   Substance Use Topics    Alcohol use: No    Drug use: No       Review of patient's allergies indicates:   Allergen Reactions    Hyoscyamine Rash    Msg [glutamic acid] Swelling            OBJECTIVE:     Vital Signs (Most Recent)  Vitals:    22 0715 22 0730 22 0800 22 0900   BP:  (!) 102/52 (!) 102/55 102/71   Pulse: 89 92 89 90   Resp: (!) 35 (!) 34 (!) 33 (!) 43   Temp: 98.06 °F (36.7 °C) 98.06 °F (36.7 °C) 98.24 °F (36.8 °C) 98.78 °F (37.1 °C)   TempSrc:       SpO2: 100% 100% 99% 99%   Weight:       Height:                     Medications:   ascorbic acid (vitamin C)  500 mg Oral BID    cefTRIAXone (ROCEPHIN) IVPB  1 g Intravenous Q24H    chlorhexidine  15 mL Mouth/Throat BID    famotidine (PF)  20 mg Intravenous Daily    heparin (porcine)  7,500 Units Subcutaneous Q8H    multivitamin  1  tablet Oral Daily    mupirocin   Nasal BID    pantoprazole  40 mg Intravenous Daily           Physical Exam  Vitals and nursing note reviewed.   Constitutional:       General: She is not in acute distress.     Appearance: She is obese. She is ill-appearing. She is not diaphoretic.   HENT:      Head: Normocephalic and atraumatic.      Mouth/Throat:      Pharynx: No oropharyngeal exudate.   Eyes:      General: No scleral icterus.     Extraocular Movements: EOM normal.      Conjunctiva/sclera: Conjunctivae normal.   Cardiovascular:      Rate and Rhythm: Normal rate and regular rhythm.      Heart sounds: Normal heart sounds. No murmur heard.      Pulmonary:      Effort: No respiratory distress.      Comments: Intubated   Abdominal:      General: There is no distension.      Palpations: Abdomen is soft.      Tenderness: There is no abdominal tenderness.   Musculoskeletal:         General: Swelling present. No edema. Normal range of motion.      Cervical back: Normal range of motion and neck supple.   Skin:     General: Skin is warm and dry.      Findings: No erythema.   Neurological:      Cranial Nerves: No cranial nerve deficit.      Comments: Sedated          Laboratory:  Recent Labs   Lab 02/03/22 2129 02/03/22 2129 02/04/22  0738 02/05/22  0446   WBC 18.97*  --  16.99* 12.53   HGB 12.0  --  11.2* 11.2*   HCT 37.5  --  35.4* 32.8*     --  268 182   MONO 6.9  1.3*   < > 3.8*  0.7 4.5  0.6    < > = values in this interval not displayed.     Recent Labs   Lab 02/04/22  1236 02/04/22  2213 02/05/22  0446   * 133* 133*  133*   K 6.1* 3.8 4.0  4.0   CL 97 99 100  100   CO2 19* 17* 18*  18*   BUN 75* 67* 56*  56*   CREATININE 6.7* 4.9* 3.7*  3.7*   CALCIUM 6.6* 6.5* 7.0*  7.0*   PHOS 10.4* 5.2* 4.5  4.5       Diagnostic Results:  X-Ray: Reviewed  US: Reviewed  Echo: Reviewed  ASSESSMENT/PLAN:     1. RAQUEL  - ATN, COVID sepsis, Hypotension, started on CRRT   -- now making urine with gresham   -- UF  with CRRT - for now.      2. Hypotension - titrate pressors to MAP> 60-65  3. Anemia of acute illness -   transfuse <7   Recent Labs   Lab 02/03/22  2129 02/04/22  0738 02/05/22  0446   HGB 12.0 11.2* 11.2*   HCT 37.5 35.4* 32.8*    268 182       Iron  Lab Results   Component Value Date    IRON 60 09/19/2018    TIBC 351 09/19/2018    FERRITIN 139 09/19/2018       4. MBD (E88.9 M90.80) -  Recent Labs   Lab 02/05/22  0446   CALCIUM 7.0*  7.0*   PHOS 4.5  4.5     Recent Labs   Lab 02/04/22  1236 02/05/22  0057 02/05/22  0446   MG 2.2 1.8 1.9       Lab Results   Component Value Date    CALCIUM 7.0 (L) 02/05/2022    CALCIUM 7.0 (L) 02/05/2022    PHOS 4.5 02/05/2022    PHOS 4.5 02/05/2022     No results found for: [id number]    Lab Results   Component Value Date    CO2 18 (L) 02/05/2022    CO2 18 (L) 02/05/2022     Acidosis - correct with CRRT   5. Hemodialysis Access (Z99.2 V45.11)- CVC  6. Nutrition/Hypoalbuminemia (E88.09) -    Recent Labs   Lab 02/04/22  2213 02/05/22  0446   ALBUMIN 2.6* 2.6*  2.6*     Nepro with meals TID. Renal vitamins daily      Thank you for the consult, will follow  With any question please call [phone]  Krystina Mayer MD    [facility]  FRANCI Madison MD, FACP,   MRashida Prieto MD,   MD MESHA Staples MD E. V. Harmon, NP    [address]  [place], [address] [phone]

## 2022-02-05 NOTE — PROGRESS NOTES
"[facility] Pul[facility] Critical [facility] Fellow Progress Note    Subjective:       at the bedside this morning. Patient intubated yesterday afternoon 2/ increasing hypercapnia despite continuous BiPAP. Receiving CRRT.  states that she wears her CPAP at most one night each week at home.      Objective:     Last 24 Hour Vital Signs:  BP  Min: 98/50  Max: 149/66  Temp  Av.6 °F (36.4 °C)  Min: 94 °F (34.4 °C)  Max: 98.78 °F (37.1 °C)  Pulse  Av.4  Min: 70  Max: 99  Resp  Av.9  Min: 17  Max: 47  SpO2  Av.9 %  Min: 87 %  Max: 100 %  Height  Av' 1" (154.9 cm)  Min: 5' 1" (154.9 cm)  Max: 5' 1" (154.9 cm)  Weight  Av.5 kg (241 lb 5.6 oz)  Min: 108 kg (238 lb 1.6 oz)  Max: 110.2 kg (243 lb)  I/O last 3 completed shifts:  In: 5341.1 [I.V.:2584.2; NG/GT:70; IV Piggyback:2686.8]  Out:  [Urine:895; Other:1112]    Physical Examination:  General: arouses with stimulation, doesn't follow commands  HEENT: PERRL, mucus membranes moist   Cardiac: normal rate, regular rhythm  Respiratory: mechanical breath sounds  Abdomen: Soft, ND. +BS.   Extremities: Edema bilateral LE  Neuro: patient sedated. Arouses with stimulation but not following commands     Laboratory:  Trended Lab Data:  Recent Labs   Lab 22  0738 22  0446   WBC 18.97* 16.99* 12.53   HGB 12.0 11.2* 11.2*   HCT 37.5 35.4* 32.8*    268 182       Recent Labs   Lab 22  1236 223 22  0057 22  0446   * 133*  --  133*  133*   K 6.1* 3.8  --  4.0  4.0   CL 97 99  --  100  100   CO2 19* 17*  --  18*  18*   BUN 75* 67*  --  56*  56*   CREATININE 6.7* 4.9*  --  3.7*  3.7*   * 135*  --  139*  139*   CALCIUM 6.6* 6.5*  --  7.0*  7.0*   MG 2.2  --  1.8 1.9   PHOS 10.4* 5.2*  --  4.5  4.5       Recent Labs   Lab 22  0738 22  0738 22  1236 223 22  0446   PROT 6.9  --  5.6*  --   --   --  5.1*   ALBUMIN 3.7  "  < > 3.1*   < > 2.9* 2.6* 2.6*  2.6*   BILITOT 0.5  --  0.3  --   --   --  0.3   AST 84*  --  97*  --   --   --  77*   ALT 43  --  49*  --   --   --  52*   ALKPHOS 90  --  97  --   --   --  121    < > = values in this interval not displayed.     Cardiac:   Recent Labs   Lab 02/03/22 2129 02/03/22 2152   TROPONINI  --  0.021   BNP 34  --      Radiology:  Reviewed.    Current Medications:     Infusions:   sodium chloride 0.9% Stopped (02/04/22 2000)    sodium chloride 0.9% 5 mL/hr at 02/05/22 0627    fentanyl Stopped (02/04/22 1830)    heparin (porcine) in 5 % dex Stopped (02/05/22 0557)    NORepinephrine bitartrate-D5W 0.08 mcg/kg/min (02/05/22 0627)    propofoL 50 mcg/kg/min (02/05/22 0640)        Scheduled:   ascorbic acid (vitamin C)  500 mg Oral BID    cefTRIAXone (ROCEPHIN) IVPB  1 g Intravenous Q24H    chlorhexidine  15 mL Mouth/Throat BID    famotidine (PF)  20 mg Intravenous Daily    heparin (porcine)  7,500 Units Subcutaneous Q8H    multivitamin  1 tablet Oral Daily    mupirocin   Nasal BID    pantoprazole  40 mg Intravenous Daily     Assessment:     Patient is a 71-year-old female with a past medical history of hypertension, hyperlipidemia, GERD, obesity, sleep apnea, osteoarthritis, chronic back pain, central tremor, fibromyalgia, anxiety, and mood disorder was admitted to [facility] on 2/3/22 for acute hypercapnic respiratory failure and RAQUEL.  Admitted to the ICU on BiPAP, now s/p intubation. CRRT started yesterday. Is producing some urine. Will require careful titration of vent settings to not overcorrect PCO2 as patient is a chronic CO2 retainer. Continues to oxygenate well.    Plan:     CNS/Neuro  #Acute encephalopathy  - Patient presented with altered mental status after 2 week of progressive fatigue and shortness of breath  - Initial ABG with a pH of 7.19, pCO2 65, PO2 of 117 on nasal cannula  - Etiology likely secondary to acidemia and hypercapnia  - Now intubated, sedated       Cardiovascular  #Shock  - Patient became hypotensive shortly after presenting to the ED requiring use of pressors for blood pressure support  - Echo remarkable for grade II diastolic dysfunction   - Elevated white count on presentation to the ED of 19; lactate WNL  - UA with 3+ leukocytes and many bacteria; now on ceftriaxone   - Blood cultures obtained; NGTD  - Continue Levophed to maintain map greater than 65     Respiratory  #Acute hypercapnic respiratory failure  #MARY noncompliant with CPAP  #Obesity hypoventilation syndrome?  #COVID  - Patient is a with progressively worsening fatigue and weakness after recent diagnosis of COVID infection.  History of MARY but noncompliant with CPAP (has machine)  - ABG on presentation to the ED pH 7.19, pCO2 of 65, PO2 of 117 on nasal cannula  - Placed on BiPAP in the ED pH 7.12, pCO2 72 PO2 101  - Patient now intubated, mechanically ventilated 2/2 worsening hypercapnia despite continuous BiPAP 2/4  - Careful titration of vent settings to not overcorrect PCO2 as patient is a chronic CO2 retainer  - Continues to oxygenate well      GI/Metabolic  TRI     Renal  #RAQUEL  #AGMA  #Volume overload  - Patient presents to the ED with a BUN/creatinine of 69/7.1  - Noted history of renal disease  - Bicarb 15; anion gap 19 (previous bicarb in the 30s)  - ABG on presentation to the ED as above  - s/p bicarb drip  - Trialysis line placed in the ICU  - Received 1 L of LR and started on continuous LR 150cc/hr in the ED for perceived sepsis  - Now undergoing CRRT     Heme  #Anemia  - Hgb 11.2     Endo   - Strict Glucose control with goal 140-180     ID  #Undifferentiated shock  #UTI  - Continue ceftriaxone for UTI tx  - Afebrile, WBC 12  - Still requiring levo to maintain MAP  - Continue to follow cultures      GI Ppx: pepcid  Diet: NPO  DVT Ppx: hep     Thank you for allowing us to participate in the care of this patient. We will continue to follow at this time. Please call with  questions.    Reba Kaplan,   [facility] Internal Medicine-Pediatrics HO-1

## 2022-02-06 PROBLEM — E87.5 HYPERKALEMIA: Status: RESOLVED | Noted: 2022-02-03 | Resolved: 2022-02-06

## 2022-02-06 PROBLEM — J96.22 ACUTE ON CHRONIC RESPIRATORY FAILURE WITH HYPERCAPNIA: Status: ACTIVE | Noted: 2022-02-03

## 2022-02-06 LAB
ADENOVIRUS: NOT DETECTED
ALBUMIN SERPL BCP-MCNC: 2.6 G/DL (ref 3.5–5.2)
ALBUMIN SERPL BCP-MCNC: 2.8 G/DL (ref 3.5–5.2)
ALBUMIN SERPL BCP-MCNC: 3 G/DL (ref 3.5–5.2)
ALBUMIN SERPL BCP-MCNC: 3.2 G/DL (ref 3.5–5.2)
ALLENS TEST: ABNORMAL
ANION GAP SERPL CALC-SCNC: 14 MMOL/L (ref 8–16)
ANION GAP SERPL CALC-SCNC: 15 MMOL/L (ref 8–16)
ANION GAP SERPL CALC-SCNC: 17 MMOL/L (ref 8–16)
ANION GAP SERPL CALC-SCNC: 19 MMOL/L (ref 8–16)
APTT BLDCRRT: 69.8 SEC (ref 21–32)
BASOPHILS # BLD AUTO: 0.08 K/UL (ref 0–0.2)
BASOPHILS NFR BLD: 0.5 % (ref 0–1.9)
BORDETELLA PARAPERTUSSIS (IS1001): NOT DETECTED
BORDETELLA PERTUSSIS (PTXP): NOT DETECTED
BUN SERPL-MCNC: 25 MG/DL (ref 8–23)
BUN SERPL-MCNC: 27 MG/DL (ref 8–23)
BUN SERPL-MCNC: 28 MG/DL (ref 8–23)
BUN SERPL-MCNC: 34 MG/DL (ref 8–23)
CALCIUM SERPL-MCNC: 7.4 MG/DL (ref 8.7–10.5)
CALCIUM SERPL-MCNC: 8.1 MG/DL (ref 8.7–10.5)
CALCIUM SERPL-MCNC: 8.2 MG/DL (ref 8.7–10.5)
CALCIUM SERPL-MCNC: 8.5 MG/DL (ref 8.7–10.5)
CHLAMYDIA PNEUMONIAE: NOT DETECTED
CHLORIDE SERPL-SCNC: 101 MMOL/L (ref 95–110)
CHLORIDE SERPL-SCNC: 97 MMOL/L (ref 95–110)
CHLORIDE SERPL-SCNC: 98 MMOL/L (ref 95–110)
CHLORIDE SERPL-SCNC: 98 MMOL/L (ref 95–110)
CO2 SERPL-SCNC: 19 MMOL/L (ref 23–29)
CO2 SERPL-SCNC: 20 MMOL/L (ref 23–29)
CO2 SERPL-SCNC: 20 MMOL/L (ref 23–29)
CO2 SERPL-SCNC: 21 MMOL/L (ref 23–29)
CORONAVIRUS 229E, COMMON COLD VIRUS: NOT DETECTED
CORONAVIRUS HKU1, COMMON COLD VIRUS: NOT DETECTED
CORONAVIRUS NL63, COMMON COLD VIRUS: NOT DETECTED
CORONAVIRUS OC43, COMMON COLD VIRUS: NOT DETECTED
CREAT SERPL-MCNC: 1.8 MG/DL (ref 0.5–1.4)
CREAT SERPL-MCNC: 1.9 MG/DL (ref 0.5–1.4)
CREAT SERPL-MCNC: 2 MG/DL (ref 0.5–1.4)
CREAT SERPL-MCNC: 2.1 MG/DL (ref 0.5–1.4)
D DIMER PPP IA.FEU-MCNC: 13.84 MG/L FEU
DELSYS: ABNORMAL
DIFFERENTIAL METHOD: ABNORMAL
EOSINOPHIL # BLD AUTO: 0.2 K/UL (ref 0–0.5)
EOSINOPHIL NFR BLD: 1.4 % (ref 0–8)
ERYTHROCYTE [DISTWIDTH] IN BLOOD BY AUTOMATED COUNT: 13.2 % (ref 11.5–14.5)
EST. GFR  (AFRICAN AMERICAN): 27 ML/MIN/1.73 M^2
EST. GFR  (AFRICAN AMERICAN): 28 ML/MIN/1.73 M^2
EST. GFR  (AFRICAN AMERICAN): 30 ML/MIN/1.73 M^2
EST. GFR  (AFRICAN AMERICAN): 32 ML/MIN/1.73 M^2
EST. GFR  (NON AFRICAN AMERICAN): 23 ML/MIN/1.73 M^2
EST. GFR  (NON AFRICAN AMERICAN): 24 ML/MIN/1.73 M^2
EST. GFR  (NON AFRICAN AMERICAN): 26 ML/MIN/1.73 M^2
EST. GFR  (NON AFRICAN AMERICAN): 28 ML/MIN/1.73 M^2
FERRITIN SERPL-MCNC: 668 NG/ML (ref 20–300)
FIO2: 21
FLUBV RNA NPH QL NAA+NON-PROBE: NOT DETECTED
GLUCOSE SERPL-MCNC: 179 MG/DL (ref 70–110)
GLUCOSE SERPL-MCNC: 226 MG/DL (ref 70–110)
GLUCOSE SERPL-MCNC: 241 MG/DL (ref 70–110)
GLUCOSE SERPL-MCNC: 342 MG/DL (ref 70–110)
HCO3 UR-SCNC: 20.6 MMOL/L (ref 24–28)
HCT VFR BLD AUTO: 37.9 % (ref 37–48.5)
HGB BLD-MCNC: 12.6 G/DL (ref 12–16)
HPIV1 RNA NPH QL NAA+NON-PROBE: NOT DETECTED
HPIV2 RNA NPH QL NAA+NON-PROBE: NOT DETECTED
HPIV3 RNA NPH QL NAA+NON-PROBE: NOT DETECTED
HPIV4 RNA NPH QL NAA+NON-PROBE: NOT DETECTED
HUMAN METAPNEUMOVIRUS: NOT DETECTED
IMM GRANULOCYTES # BLD AUTO: 0.3 K/UL (ref 0–0.04)
IMM GRANULOCYTES NFR BLD AUTO: 1.7 % (ref 0–0.5)
INFLUENZA A (SUBTYPES H1,H1-2009,H3): NOT DETECTED
LDH SERPL L TO P-CCNC: 423 U/L (ref 110–260)
LYMPHOCYTES # BLD AUTO: 2.2 K/UL (ref 1–4.8)
LYMPHOCYTES NFR BLD: 12.5 % (ref 18–48)
MAGNESIUM SERPL-MCNC: 2.2 MG/DL (ref 1.6–2.6)
MAGNESIUM SERPL-MCNC: 2.5 MG/DL (ref 1.6–2.6)
MAGNESIUM SERPL-MCNC: 2.5 MG/DL (ref 1.6–2.6)
MCH RBC QN AUTO: 30.7 PG (ref 27–31)
MCHC RBC AUTO-ENTMCNC: 33.2 G/DL (ref 32–36)
MCV RBC AUTO: 92 FL (ref 82–98)
MODE: ABNORMAL
MONOCYTES # BLD AUTO: 1.4 K/UL (ref 0.3–1)
MONOCYTES NFR BLD: 7.8 % (ref 4–15)
MYCOPLASMA PNEUMONIAE: NOT DETECTED
NEUTROPHILS # BLD AUTO: 13.4 K/UL (ref 1.8–7.7)
NEUTROPHILS NFR BLD: 76.1 % (ref 38–73)
NRBC BLD-RTO: 0 /100 WBC
PCO2 BLDA: 31.4 MMHG (ref 35–45)
PEEP: 5
PH SMN: 7.42 [PH] (ref 7.35–7.45)
PHOSPHATE SERPL-MCNC: 2.3 MG/DL (ref 2.7–4.5)
PHOSPHATE SERPL-MCNC: 2.6 MG/DL (ref 2.7–4.5)
PHOSPHATE SERPL-MCNC: 2.6 MG/DL (ref 2.7–4.5)
PHOSPHATE SERPL-MCNC: 2.7 MG/DL (ref 2.7–4.5)
PLATELET # BLD AUTO: 184 K/UL (ref 150–450)
PMV BLD AUTO: 10.7 FL (ref 9.2–12.9)
PO2 BLDA: 74 MMHG (ref 80–100)
POC BE: -4 MMOL/L
POC SATURATED O2: 95 % (ref 95–100)
POC TCO2: 22 MMOL/L (ref 23–27)
POCT GLUCOSE: 122 MG/DL (ref 70–110)
POCT GLUCOSE: 164 MG/DL (ref 70–110)
POCT GLUCOSE: 180 MG/DL (ref 70–110)
POCT GLUCOSE: 228 MG/DL (ref 70–110)
POTASSIUM SERPL-SCNC: 4 MMOL/L (ref 3.5–5.1)
POTASSIUM SERPL-SCNC: 4.3 MMOL/L (ref 3.5–5.1)
POTASSIUM SERPL-SCNC: 4.4 MMOL/L (ref 3.5–5.1)
POTASSIUM SERPL-SCNC: 4.6 MMOL/L (ref 3.5–5.1)
PS: 10
RBC # BLD AUTO: 4.1 M/UL (ref 4–5.4)
RESPIRATORY INFECTION PANEL SOURCE: ABNORMAL
RSV RNA NPH QL NAA+NON-PROBE: NOT DETECTED
RV+EV RNA NPH QL NAA+NON-PROBE: NOT DETECTED
SAMPLE: ABNORMAL
SARS-COV-2 RNA RESP QL NAA+PROBE: DETECTED
SITE: ABNORMAL
SODIUM SERPL-SCNC: 132 MMOL/L (ref 136–145)
SODIUM SERPL-SCNC: 134 MMOL/L (ref 136–145)
SODIUM SERPL-SCNC: 135 MMOL/L (ref 136–145)
SODIUM SERPL-SCNC: 138 MMOL/L (ref 136–145)
VANCOMYCIN SERPL-MCNC: 11.6 UG/ML
WBC # BLD AUTO: 17.63 K/UL (ref 3.9–12.7)

## 2022-02-06 PROCEDURE — 94003 VENT MGMT INPAT SUBQ DAY: CPT

## 2022-02-06 PROCEDURE — 27000221 HC OXYGEN, UP TO 24 HOURS

## 2022-02-06 PROCEDURE — 85730 THROMBOPLASTIN TIME PARTIAL: CPT | Performed by: HOSPITALIST

## 2022-02-06 PROCEDURE — 63600175 PHARM REV CODE 636 W HCPCS: Performed by: HOSPITALIST

## 2022-02-06 PROCEDURE — 63600175 PHARM REV CODE 636 W HCPCS: Performed by: NURSE PRACTITIONER

## 2022-02-06 PROCEDURE — 25000003 PHARM REV CODE 250: Performed by: HOSPITALIST

## 2022-02-06 PROCEDURE — 99900035 HC TECH TIME PER 15 MIN (STAT)

## 2022-02-06 PROCEDURE — 80202 ASSAY OF VANCOMYCIN: CPT | Performed by: HOSPITALIST

## 2022-02-06 PROCEDURE — 25000003 PHARM REV CODE 250: Performed by: NURSE PRACTITIONER

## 2022-02-06 PROCEDURE — 20000000 HC ICU ROOM

## 2022-02-06 PROCEDURE — 94761 N-INVAS EAR/PLS OXIMETRY MLT: CPT

## 2022-02-06 PROCEDURE — 85025 COMPLETE CBC W/AUTO DIFF WBC: CPT | Performed by: INTERNAL MEDICINE

## 2022-02-06 PROCEDURE — 80069 RENAL FUNCTION PANEL: CPT | Mod: 91 | Performed by: INTERNAL MEDICINE

## 2022-02-06 PROCEDURE — C9113 INJ PANTOPRAZOLE SODIUM, VIA: HCPCS | Performed by: HOSPITALIST

## 2022-02-06 PROCEDURE — 25000003 PHARM REV CODE 250: Performed by: STUDENT IN AN ORGANIZED HEALTH CARE EDUCATION/TRAINING PROGRAM

## 2022-02-06 PROCEDURE — 27200966 HC CLOSED SUCTION SYSTEM

## 2022-02-06 PROCEDURE — 87798 DETECT AGENT NOS DNA AMP: CPT | Performed by: HOSPITALIST

## 2022-02-06 PROCEDURE — 27000207 HC ISOLATION

## 2022-02-06 PROCEDURE — 80100008 HC CRRT DAILY MAINTENANCE

## 2022-02-06 PROCEDURE — 63600175 PHARM REV CODE 636 W HCPCS: Performed by: INTERNAL MEDICINE

## 2022-02-06 PROCEDURE — 63600175 PHARM REV CODE 636 W HCPCS: Performed by: STUDENT IN AN ORGANIZED HEALTH CARE EDUCATION/TRAINING PROGRAM

## 2022-02-06 PROCEDURE — 83735 ASSAY OF MAGNESIUM: CPT | Performed by: INTERNAL MEDICINE

## 2022-02-06 RX ORDER — DEXMEDETOMIDINE HYDROCHLORIDE 4 UG/ML
INJECTION, SOLUTION INTRAVENOUS
Status: DISPENSED
Start: 2022-02-06 | End: 2022-02-07

## 2022-02-06 RX ORDER — DEXMEDETOMIDINE HYDROCHLORIDE 4 UG/ML
0-1.4 INJECTION, SOLUTION INTRAVENOUS CONTINUOUS
Status: DISCONTINUED | OUTPATIENT
Start: 2022-02-06 | End: 2022-02-09

## 2022-02-06 RX ADMIN — CHLORHEXIDINE GLUCONATE 0.12% ORAL RINSE 15 ML: 1.2 LIQUID ORAL at 10:02

## 2022-02-06 RX ADMIN — NOREPINEPHRINE BITARTRATE 0.02 MCG/KG/MIN: 1 INJECTION, SOLUTION, CONCENTRATE INTRAVENOUS at 09:02

## 2022-02-06 RX ADMIN — DEXMEDETOMIDINE HYDROCHLORIDE 0.2 MCG/KG/HR: 4 INJECTION, SOLUTION INTRAVENOUS at 03:02

## 2022-02-06 RX ADMIN — OXYCODONE HYDROCHLORIDE AND ACETAMINOPHEN 500 MG: 500 TABLET ORAL at 04:02

## 2022-02-06 RX ADMIN — MUPIROCIN: 20 OINTMENT TOPICAL at 09:02

## 2022-02-06 RX ADMIN — HEPARIN SODIUM 2800 UNITS: 1000 INJECTION, SOLUTION INTRAVENOUS; SUBCUTANEOUS at 08:02

## 2022-02-06 RX ADMIN — THERA TABS 1 TABLET: TAB at 10:02

## 2022-02-06 RX ADMIN — CEFTRIAXONE SODIUM 1 G: 1 INJECTION, POWDER, FOR SOLUTION INTRAMUSCULAR; INTRAVENOUS at 10:02

## 2022-02-06 RX ADMIN — VANCOMYCIN HYDROCHLORIDE 1500 MG: 1.5 INJECTION, POWDER, LYOPHILIZED, FOR SOLUTION INTRAVENOUS at 06:02

## 2022-02-06 RX ADMIN — PROPOFOL 45 MCG/KG/MIN: 10 INJECTION, EMULSION INTRAVENOUS at 02:02

## 2022-02-06 RX ADMIN — Medication 0.08 MCG/KG/MIN: at 04:02

## 2022-02-06 RX ADMIN — HEPARIN SODIUM 7500 UNITS: 5000 INJECTION, SOLUTION INTRAVENOUS; SUBCUTANEOUS at 10:02

## 2022-02-06 RX ADMIN — FENTANYL CITRATE 25 MCG/HR: 50 INJECTION INTRAVENOUS at 10:02

## 2022-02-06 RX ADMIN — PROPOFOL 35 MCG/KG/MIN: 10 INJECTION, EMULSION INTRAVENOUS at 06:02

## 2022-02-06 RX ADMIN — PROPOFOL 10 MCG/KG/MIN: 10 INJECTION, EMULSION INTRAVENOUS at 11:02

## 2022-02-06 RX ADMIN — HEPARIN SODIUM 7500 UNITS: 5000 INJECTION, SOLUTION INTRAVENOUS; SUBCUTANEOUS at 09:02

## 2022-02-06 RX ADMIN — MUPIROCIN: 20 OINTMENT TOPICAL at 10:02

## 2022-02-06 RX ADMIN — INSULIN ASPART 2 UNITS: 100 INJECTION, SOLUTION INTRAVENOUS; SUBCUTANEOUS at 05:02

## 2022-02-06 RX ADMIN — DOCUSATE SODIUM AND SENNOSIDES 1 TABLET: 8.6; 5 TABLET, FILM COATED ORAL at 05:02

## 2022-02-06 RX ADMIN — PANTOPRAZOLE SODIUM 40 MG: 40 INJECTION, POWDER, FOR SOLUTION INTRAVENOUS at 10:02

## 2022-02-06 RX ADMIN — CHLORHEXIDINE GLUCONATE 0.12% ORAL RINSE 15 ML: 1.2 LIQUID ORAL at 09:02

## 2022-02-06 RX ADMIN — OXYCODONE HYDROCHLORIDE AND ACETAMINOPHEN 500 MG: 500 TABLET ORAL at 09:02

## 2022-02-06 RX ADMIN — SODIUM CHLORIDE: 0.9 INJECTION, SOLUTION INTRAVENOUS at 11:02

## 2022-02-06 RX ADMIN — HEPARIN SODIUM 7500 UNITS: 5000 INJECTION, SOLUTION INTRAVENOUS; SUBCUTANEOUS at 05:02

## 2022-02-06 NOTE — PROCEDURES
"Patient seen and examined on CRRT tolerating UF well NET negative 4.8L     BP 99/60   Pulse 102   Temp 96.08 °F (35.6 °C)   Resp 13   Ht 5' 1" (1.549 m)   Wt 105 kg (231 lb 7.7 oz)   SpO2 100%   Breastfeeding No   BMI 43.74 kg/m²     With any question please call answering service [phone]  Krystina Mayer MD    [facility]  FRANCI Madison MD, ELAINE CALI MD,   MD MESHA Staples MD E. V. Harmon, NP    [address]  [place], [address]  "

## 2022-02-06 NOTE — ASSESSMENT & PLAN NOTE
Urinalysis relatively unremarkable, may be asymptomatic bacteriuria but in the setting of shock will continue antibiotics  Awaiting blood cultures  IV Rocephin  Trend CBC

## 2022-02-06 NOTE — PROGRESS NOTES
[facility] Progress Note    Subjective:      Patient seen at bedside.  Afebrile overnight; remains intubated.  On CRRT with -7.3 L removed.  Urine output and 535 mL.  Passed SBT this morning but with prolonged weakness coming off of sedation.  Bicarb 20 this morning; up from 19 yesterday.     Objective:     Last 24 Hour Vital Signs:  BP  Min: 82/44  Max: 125/51  Temp  Av.9 °F (36.6 °C)  Min: 96.26 °F (35.7 °C)  Max: 99.32 °F (37.4 °C)  Pulse  Av.6  Min: 85  Max: 100  Resp  Av.7  Min: 18  Max: 49  SpO2  Av %  Min: 99 %  Max: 100 %  Weight  Av kg (231 lb 7.7 oz)  Min: 105 kg (231 lb 7.7 oz)  Max: 105 kg (231 lb 7.7 oz)  I/O last 3 completed shifts:  In: 3898.4 [I.V.:2401.8; NG/GT:1050; IV Piggyback:446.6]  Out: 9042 [Urine:1230; Other:7812]    Physical Examination:  General: arouses with stimulation, doesn't follow commands  HEENT: PERRL, mucus membranes moist   Cardiac: normal rate, regular rhythm  Respiratory: mechanical breath sounds  Abdomen: Soft, ND. +BS.   Extremities: Edema bilateral LE  Neuro: patient sedated. Arouses with stimulation but not following commands     Laboratory:  Trended Lab Data:  Recent Labs   Lab 22  0738 22  0529   WBC 16.99* 12.53 17.63*   HGB 11.2* 11.2* 12.6   HCT 35.4* 32.8* 37.9    182 184       Recent Labs   Lab 22  0446 22  0446 22  1407 22  2312 22  0529   *  133*   < > 134* 135* 132*   K 4.0  4.0   < > 4.1 4.0 4.3     100   < > 100 101 98   CO2 18*  18*   < > 20* 19* 20*   BUN 56*  56*   < > 45* 34* 28*   CREATININE 3.7*  3.7*   < > 2.7* 2.0* 1.8*   *  139*   < > 148* 179* 241*   CALCIUM 7.0*  7.0*   < > 7.2* 7.4* 8.1*   MG 1.9  --  2.0  2.0 2.2  --    PHOS 4.5  4.5   < > 3.8 2.6* 2.7    < > = values in this interval not displayed.       Recent Labs   Lab 22  21522  2152 22  0738 22  1236 22  0446 22  0446  02/05/22  1407 02/05/22  2312 02/06/22  0529   PROT 6.9  --  5.6*  --  5.1*  --   --   --   --    ALBUMIN 3.7   < > 3.1*   < > 2.6*  2.6*   < > 2.5* 2.6* 2.8*   BILITOT 0.5  --  0.3  --  0.3  --   --   --   --    AST 84*  --  97*  --  77*  --   --   --   --    ALT 43  --  49*  --  52*  --   --   --   --    ALKPHOS 90  --  97  --  121  --   --   --   --     < > = values in this interval not displayed.     Cardiac:   Recent Labs   Lab 02/03/22 2129 02/03/22 2152   TROPONINI  --  0.021   BNP 34  --      Radiology:  Reviewed.    Current Medications:     Infusions:   sodium chloride 0.9% Stopped (02/04/22 2000)    sodium chloride 0.9% 5 mL/hr at 02/06/22 0625    fentanyl Stopped (02/04/22 1830)    heparin (porcine) in 5 % dex 1,000 Units/hr (02/06/22 0625)    NORepinephrine bitartrate-D5W 0.06 mcg/kg/min (02/06/22 0625)    propofoL 35 mcg/kg/min (02/06/22 0631)        Scheduled:   ascorbic acid (vitamin C)  500 mg Oral BID    cefTRIAXone (ROCEPHIN) IVPB  1 g Intravenous Q24H    chlorhexidine  15 mL Mouth/Throat BID    heparin (porcine)  7,500 Units Subcutaneous Q8H    multivitamin  1 tablet Oral Daily    mupirocin   Nasal BID    pantoprazole  40 mg Intravenous Daily     Assessment:     Patient is a 71-year-old female with a past medical history of hypertension, hyperlipidemia, GERD, obesity, sleep apnea, osteoarthritis, chronic back pain, central tremor, fibromyalgia, anxiety, and mood disorder was admitted to [facility] on 2/3/22 for acute hypercapnic respiratory failure and RAQUEL.  Admitted to the ICU on BiPAP, now s/p intubation. CRRT started yesterday. Is producing some urine. Will require careful titration of vent settings to not overcorrect PCO2 as patient is a chronic CO2 retainer. Continues to oxygenate well.    Plan:     CNS/Neuro  #Acute encephalopathy  - Patient presented with altered mental status after 2 week of progressive fatigue and shortness of breath  - Initial ABG with a pH of 7.19,  pCO2 65, PO2 of 117 on nasal cannula  - Etiology likely secondary to acidemia and hypercapnia  - Now intubated, sedated      Cardiovascular  #Shock  - Patient became hypotensive shortly after presenting to the ED requiring use of pressors for blood pressure support  - Echo remarkable for grade II diastolic dysfunction; hyperdynamic with EF 75%  - Elevated white count on presentation to the ED of 19; lactate WNL  - UA with 3+ leukocytes and many bacteria; now on ceftriaxone   - Blood cultures obtained; NGTD  - Continue Levophed to maintain map greater than 65     Respiratory  #Acute hypercapnic respiratory failure  #MARY noncompliant with CPAP  #Obesity hypoventilation syndrome?  #COVID  - Patient presented with progressively worsening fatigue and weakness after recent diagnosis of COVID infection.  History of MARY but noncompliant with CPAP (has machine)  - ABG on presentation to the ED pH 7.19, pCO2 of 65, PO2 of 117 on nasal cannula  - Placed on BiPAP in the ED pH 7.12, pCO2 72 PO2 101  - Patient now intubated, mechanically ventilated 2/2 worsening hypercapnia despite continuous BiPAP 2/4  - Careful titration of vent settings to not overcorrect PCO2 as patient is a chronic CO2 retainer  - Continues to oxygenate well  - Discussed with renal to adjust her CRRT to adjust her bicarb to target her bicarb in blood to 32-33     GI/Metabolic  TRI     Renal  #RAQUEL  #AGMA  #Volume overload  - Patient presents to the ED with a BUN/creatinine of 69/7.1  - Noted history of renal disease  - Bicarb 15; anion gap 19 (previous bicarb in the 30s)  - ABG on presentation to the ED as above  - s/p bicarb drip  - Trialysis line placed in the ICU  - Received 1 L of LR and started on continuous LR 150cc/hr in the ED for perceived sepsis  - Significant fluid removal for CRRT; now need to adjust bicarb     Heme  #Anemia  - Hgb 11.2     Endo   - Strict Glucose control with goal 140-180     ID  #Undifferentiated shock  #UTI  - Continue  ceftriaxone for UTI tx  - Afebrile, WBC 12  - Still requiring levo to maintain MAP  - Continue to follow cultures      GI Ppx: pepcid  Diet: NPO  DVT Ppx: hep     Thank you for allowing us to participate in the care of this patient. We will continue to follow at this time. Please call with questions.    Bud Jauregui MD  [facility] Internal Medicine-Pediatrics HO-1

## 2022-02-06 NOTE — EICU
Rounding (Video Assessment):  No    Intervention Initiated From:  Bedside    Ramirez Communicated with Bedside Nurse regarding:  Labs    Nurse Notified:  No    Doctor Notified:  Yes    Comments: bedside nurse asked for md to order a cbc for am labs and to reorder the respiratory infection panel,  due to she needs to recollect. Informed .

## 2022-02-06 NOTE — ASSESSMENT & PLAN NOTE
-likely distributive due to acidosis and renal failure versus septic shock  -currently requiring Levophed  -nephrology consulted for CRRT  -continue IV antibiotics for possible infectious source  -COVID therapy as above

## 2022-02-06 NOTE — PLAN OF CARE
Problem: Adult Inpatient Plan of Care  Goal: Plan of Care Review  Outcome: Ongoing, Progressing  Goal: Patient-Specific Goal (Individualized)  Outcome: Ongoing, Progressing  Goal: Absence of Hospital-Acquired Illness or Injury  Outcome: Ongoing, Progressing  Goal: Optimal Comfort and Wellbeing  Outcome: Ongoing, Progressing  Goal: Readiness for Transition of Care  Outcome: Ongoing, Progressing     Problem: Bariatric Environmental Safety  Goal: Safety Maintained with Care  Outcome: Ongoing, Progressing     Problem: Adjustment to Illness (Sepsis/Septic Shock)  Goal: Optimal Coping  Outcome: Ongoing, Progressing     Problem: Bleeding (Sepsis/Septic Shock)  Goal: Absence of Bleeding  Outcome: Ongoing, Progressing     Problem: Glycemic Control Impaired (Sepsis/Septic Shock)  Goal: Blood Glucose Level Within Desired Range  Outcome: Ongoing, Progressing     Problem: Infection Progression (Sepsis/Septic Shock)  Goal: Absence of Infection Signs and Symptoms  Outcome: Ongoing, Progressing     Problem: Nutrition Impaired (Sepsis/Septic Shock)  Goal: Optimal Nutrition Intake  Outcome: Ongoing, Progressing     Problem: Fluid and Electrolyte Imbalance (Acute Kidney Injury/Impairment)  Goal: Fluid and Electrolyte Balance  Outcome: Ongoing, Progressing     Problem: Oral Intake Inadequate (Acute Kidney Injury/Impairment)  Goal: Optimal Nutrition Intake  Outcome: Ongoing, Progressing     Problem: Renal Function Impairment (Acute Kidney Injury/Impairment)  Goal: Effective Renal Function  Outcome: Ongoing, Progressing     Problem: Skin Injury Risk Increased  Goal: Skin Health and Integrity  Outcome: Ongoing, Progressing     Problem: Device-Related Complication Risk (CRRT (Continuous Renal Replacement Therapy))  Goal: Safe, Effective Therapy Delivery  Outcome: Ongoing, Progressing     Problem: Hypothermia (CRRT (Continuous Renal Replacement Therapy))  Goal: Body Temperature Maintained in Desired Range  Outcome: Ongoing, Progressing      Problem: Infection (CRRT (Continuous Renal Replacement Therapy))  Goal: Absence of Infection Signs and Symptoms  Outcome: Ongoing, Progressing     Problem: Infection  Goal: Absence of Infection Signs and Symptoms  Outcome: Ongoing, Progressing     Problem: Communication Impairment (Mechanical Ventilation, Invasive)  Goal: Effective Communication  Outcome: Ongoing, Progressing     Problem: Device-Related Complication Risk (Mechanical Ventilation, Invasive)  Goal: Optimal Device Function  Outcome: Ongoing, Progressing     Problem: Inability to Wean (Mechanical Ventilation, Invasive)  Goal: Mechanical Ventilation Liberation  Outcome: Ongoing, Progressing     Problem: Nutrition Impairment (Mechanical Ventilation, Invasive)  Goal: Optimal Nutrition Delivery  Outcome: Ongoing, Progressing     Problem: Skin and Tissue Injury (Mechanical Ventilation, Invasive)  Goal: Absence of Device-Related Skin and Tissue Injury  Outcome: Ongoing, Progressing     Problem: Ventilator-Induced Lung Injury (Mechanical Ventilation, Invasive)  Goal: Absence of Ventilator-Induced Lung Injury  Outcome: Ongoing, Progressing     Problem: Communication Impairment (Artificial Airway)  Goal: Effective Communication  Outcome: Ongoing, Progressing     Problem: Device-Related Complication Risk (Artificial Airway)  Goal: Optimal Device Function  Outcome: Ongoing, Progressing     Problem: Skin and Tissue Injury (Artificial Airway)  Goal: Absence of Device-Related Skin or Tissue Injury  Outcome: Ongoing, Progressing     Problem: Fall Injury Risk  Goal: Absence of Fall and Fall-Related Injury  Outcome: Ongoing, Progressing    Pt Intubated and sedated Rass-2. Withdraws to pain. (+) gag/cough. Pupils equal and reactive. Breathing even and unlabored on Vent FiO2 28% with O2 sat 100%. Unable to collect Resp PCR d/t needing correct tube. Day shift informed. eICU didn't want to reorder without ID approval. BUN/Crit trending down. VTE Heparin. Cont on Abt  therapy. No noted adverse rxns noted. Safety maintained. SR up x3. Call light in reach. Family not present at bedside.

## 2022-02-06 NOTE — ASSESSMENT & PLAN NOTE
Elevated CO2 on ABG, currently on BIPAP  Reassess with ABG with worsened CO2, settings adjusted and adjusting face mask for better fit  Hypoxia likely related to COVID19, was 93% on room air on arrival  -suspect that patient's encephalopathy secondary to elevated CO2 vs uremia  -past SBT this am, but did not extubate due to concerns about ability to compensate due to chronic hypercapnea and metabolic acidosis  -Pulm following

## 2022-02-06 NOTE — PROGRESS NOTES
Pharmacokinetic Assessment Follow Up: IV Vancomycin    Vancomycin serum concentration assessment(s):    The random level was drawn correctly and can be used to guide therapy at this time. The measurement is below the desired definitive target range of 15 to 20 mcg/mL.    Vancomycin Regimen Plan:    Continue Vancomycin pulse dosing regimen and give 1500 mg IV now with the next serum random concentration measured at 1830 on 2-7-22    Drug levels (last 3 results):  Recent Labs   Lab Result Units 02/05/22  1407 02/06/22  1643   Vancomycin, Random ug/mL 11.8 11.6       Pharmacy will continue to follow and monitor vancomycin.    Please contact pharmacy at extension [phone] for questions regarding this assessment.    Thank you for the consult,   Obdulio Shepard       Patient brief summary:  Hannah Norman is a 71 y.o. female initiated on antimicrobial therapy with IV Vancomycin for treatment of sepsis    The patient's current regimen is Vancomycin pulse dosing    Drug Allergies:   Review of patient's allergies indicates:   Allergen Reactions    Hyoscyamine Rash    Msg [glutamic acid] Swelling       Actual Body Weight:   105 kg    Renal Function:   Estimated Creatinine Clearance: 29.9 mL/min (A) (based on SCr of 1.9 mg/dL (H)).,     Dialysis Method (if applicable):  CRRT    CBC (last 72 hours):  Recent Labs   Lab Result Units 02/03/22  2129 02/04/22  0738 02/05/22  0446 02/06/22  0529   WBC K/uL 18.97* 16.99* 12.53 17.63*   Hemoglobin g/dL 12.0 11.2* 11.2* 12.6   Hematocrit % 37.5 35.4* 32.8* 37.9   Platelets K/uL 321 268 182 184   Gran % % 81.6* 91.0* 85.1* 76.1*   Lymph % % 10.1* 4.2* 9.1* 12.5*   Mono % % 6.9 3.8* 4.5 7.8   Eosinophil % % 0.2 0.0 0.2 1.4   Basophil % % 0.3 0.1 0.2 0.5   Differential Method  Automated Automated Automated Automated       Metabolic Panel (last 72 hours):  Recent Labs   Lab Result Units 02/03/22  2145 02/03/22  2152 02/04/22  0738 02/04/22  1236 02/04/22  1552 02/04/22  2213 02/05/22  0057  02/05/22  0446 02/05/22  1407 02/05/22  2312 02/06/22  0529 02/06/22  1643 02/06/22  1647   Sodium mmol/L  --  131* 132* 132*  --  133*  --  133*  133* 134* 135* 132*  --  138   Sodium, Urine mmol/L  --   --   --   --  21  --   --   --   --   --   --   --   --    Potassium mmol/L  --  5.6* 6.0* 6.1*  --  3.8  --  4.0  4.0 4.1 4.0 4.3  --  4.6   Chloride mmol/L  --  97 99 97  --  99  --  100  100 100 101 98  --  98   CO2 mmol/L  --  15* 17* 19*  --  17*  --  18*  18* 20* 19* 20*  --  21*   Glucose mg/dL  --  138* 219* 257*  --  135*  --  139*  139* 148* 179* 241*  --  226*   Glucose, UA  Negative  --   --   --   --   --   --   --   --   --   --   --   --    BUN mg/dL  --  69* 72* 75*  --  67*  --  56*  56* 45* 34* 28*  --  25*   Creatinine mg/dL  --  7.1* 7.0* 6.7*  --  4.9*  --  3.7*  3.7* 2.7* 2.0* 1.8*  --  1.9*   Creatinine, Urine mg/dL  --   --   --   --  179.4  --   --   --   --   --   --   --   --    Albumin g/dL  --  3.7 3.1* 2.9*  --  2.6*  --  2.6*  2.6* 2.5* 2.6* 2.8*  --  3.2*   Total Bilirubin mg/dL  --  0.5 0.3  --   --   --   --  0.3  --   --   --   --   --    Alkaline Phosphatase U/L  --  90 97  --   --   --   --  121  --   --   --   --   --    AST U/L  --  84* 97*  --   --   --   --  77*  --   --   --   --   --    ALT U/L  --  43 49*  --   --   --   --  52*  --   --   --   --   --    Magnesium mg/dL  --   --  2.1 2.2  --   --  1.8 1.9 2.0  2.0 2.2  --  2.5  2.5  --    Phosphorus mg/dL  --   --  10.8* 10.4*  --  5.2*  --  4.5  4.5 3.8 2.6* 2.7  --  2.3*       Vancomycin Administrations:  vancomycin given in the last 96 hours                     vancomycin 1.5 g in dextrose 5 % 250 mL IVPB (ready to mix) (mg) 1,500 mg New Bag 02/05/22 1507    vancomycin (VANCOCIN) 2,000 mg in dextrose 5 % 500 mL IVPB (mg) 2,000 mg New Bag 02/04/22 1234                    Microbiologic Results:  Microbiology Results (last 7 days)       Procedure Component Value Units Date/Time    Respiratory Infection Panel  (PCR), Nasopharyngeal [[id number]] Collected: 02/06/22 1645    Order Status: Completed Specimen: Nasopharyngeal Swab Updated: 02/06/22 1705     Respiratory Infection Panel Source NP Swab    Narrative:      For all other respiratory sources, order [id number] -  Respiratory Viral Panel by PCR    Culture, Respiratory with Gram Stain [[id number]] Collected: 02/04/22 1730    Order Status: Completed Specimen: Respiratory from Endotracheal Aspirate Updated: 02/06/22 1005     Respiratory Culture Normal respiratory jeffy     Gram Stain (Respiratory) Rare WBC's     Gram Stain (Respiratory) Rare Gram positive cocci     Gram Stain (Respiratory) Rare Gram positive rods    Blood culture #1 **CANNOT BE ORDERED STAT** [[id number]] Collected: 02/03/22 2151    Order Status: Completed Specimen: Blood from Peripheral, Upper Arm, Right Updated: 02/06/22 0613     Blood Culture, Routine No Growth to date      No Growth to date      No Growth to date    Blood culture #2 **CANNOT BE ORDERED STAT** [[id number]] Collected: 02/03/22 2237    Order Status: Completed Specimen: Blood Updated: 02/06/22 0613     Blood Culture, Routine No Growth to date      No Growth to date      No Growth to date    Narrative:      Collection has been rescheduled by [email or web address] at 02/03/2022 22:15 Reason:   Unable to collect   Collection has been rescheduled by [email or web address] at 02/03/2022 22:15 Reason:   Unable to collect     Respiratory Infection Panel (PCR), Nasopharyngeal [[id number]] Collected: 02/05/22 1842    Order Status: Canceled Specimen: Nasopharyngeal Swab

## 2022-02-06 NOTE — ASSESSMENT & PLAN NOTE
Juanis related to UTI vs COVID19  CXR was with no pneumonia  Urine with UTI  Blood cultures no growth to date  Lactate 1.1  IV Rocephin continued for now, can likely deescalate  Trend CBC

## 2022-02-06 NOTE — SUBJECTIVE & OBJECTIVE
Interval History:  Able to pass SBT today but chronic hypercapnea leading to concern about ability to reestablish compensation. Keeping off sedation if possible.    Review of Systems   Unable to perform ROS: Intubated     Objective:     Vital Signs (Most Recent):  Temp: 97.16 °F (36.2 °C) (02/06/22 1219)  Pulse: 101 (02/06/22 1219)  Resp: 16 (02/06/22 1219)  BP: 99/60 (02/06/22 0730)  SpO2: 100 % (02/06/22 1219) Vital Signs (24h Range):  Temp:  [95.18 °F (35.1 °C)-98.6 °F (37 °C)] 97.16 °F (36.2 °C)  Pulse:  [] 101  Resp:  [13-42] 16  SpO2:  [99 %-100 %] 100 %  BP: ()/(44-84) 99/60  Arterial Line BP: ()/(48-68) 93/53     Weight: 105 kg (231 lb 7.7 oz)  Body mass index is 43.74 kg/m².    Intake/Output Summary (Last 24 hours) at 2/6/2022 1511  Last data filed at 2/6/2022 0625  Gross per 24 hour   Intake 2875.42 ml   Output 6446 ml   Net -3570.58 ml      Physical Exam  Vitals and nursing note reviewed.   Constitutional:       General: She is not in acute distress.     Appearance: She is morbidly obese.      Interventions: She is sedated, intubated and restrained.   HENT:      Head: Normocephalic and atraumatic.      Right Ear: External ear normal.      Left Ear: External ear normal.      Nose: Nose normal. No congestion.      Mouth/Throat:      Mouth: Mucous membranes are dry.      Pharynx: Oropharynx is clear.   Eyes:      Extraocular Movements: Extraocular movements intact.      Pupils: Pupils are equal, round, and reactive to light.   Cardiovascular:      Rate and Rhythm: Normal rate and regular rhythm.      Pulses: Normal pulses.      Heart sounds: Normal heart sounds.   Pulmonary:      Effort: Pulmonary effort is normal. She is intubated.      Breath sounds: Normal breath sounds.   Abdominal:      General: Bowel sounds are normal.      Palpations: Abdomen is soft.   Genitourinary:     Comments: gresham  Musculoskeletal:         General: Normal range of motion.      Cervical back: Normal range of  motion. No rigidity.      Right lower leg: No edema.      Left lower leg: No edema.   Skin:     General: Skin is warm and dry.      Capillary Refill: Capillary refill takes less than 2 seconds.      Comments: trialysis line   Neurological:      General: No focal deficit present.      Mental Status: She is disoriented.      Motor: Weakness present.   Psychiatric:      Comments: Unable to assess         Significant Labs: All pertinent labs within the past 24 hours have been reviewed.    Significant Imaging: I have reviewed all pertinent imaging results/findings within the past 24 hours.

## 2022-02-06 NOTE — PROGRESS NOTES
[facility]  Progress Note    Patient Name: Hannah Norman  MRN: [id number]  Patient Class: IP- Inpatient   Admission Date: 2/3/2022  Length of Stay: 3 days  Attending Physician: Naren Davenport, *  Primary Care Provider: Raffi Garcia MD        Subjective:     Principal Problem:Acute on chronic respiratory failure with hypercapnia  Acute Condition:  Respiratory and renal failure        HPI:  Ms. Norman is a 71 year old female with a medical history that includes anxiety disorder, bell's palsy, chronic back pain, chronic osteoarthritis, essential tremor, fibromyalgia, hypertension, GERD, hyperlipidemia, sleep apnea, and mood disorder. She presented to the ED via EMS with spouse who reports patient to be increasingly fatigued and weak. He reports over the past 2 weeks she was diagnosed with COVID19 and was ending their quarantine phase. She has been progressively weaker and unable to ambulate as usual with her walker. Her symptoms worsened over the past 2 days where she was not communicating at all. She is with no cough, vomiting, diarrhea or fevers. Her spouse contributed to her history during this visit. On arrival to the ED she was slightly hypoxic at 93% and hypertensive. Significant labs were with the following: wbc 18.97, Na 131, K 5.6, BUN 69, Cr 7.1, COVID19 +, urine with 3+ leukocytes and many bacteria, pH 7.1 and CO2 65.4. CT head and CXR with no acute findings. She was given neb treatment, Rocephin, Lokelma, reg insulin and NS bolus. She will admit to [facility] hospital medicine service for continued monitoring.           Overview/Hospital Course:  No notes on file    Interval History:  Able to pass SBT today but chronic hypercapnea leading to concern about ability to reestablish compensation. Keeping off sedation if possible.    Review of Systems   Unable to perform ROS: Intubated     Objective:     Vital Signs (Most Recent):  Temp: 97.16 °F (36.2 °C) (02/06/22  1219)  Pulse: 101 (02/06/22 1219)  Resp: 16 (02/06/22 1219)  BP: 99/60 (02/06/22 0730)  SpO2: 100 % (02/06/22 1219) Vital Signs (24h Range):  Temp:  [95.18 °F (35.1 °C)-98.6 °F (37 °C)] 97.16 °F (36.2 °C)  Pulse:  [] 101  Resp:  [13-42] 16  SpO2:  [99 %-100 %] 100 %  BP: ()/(44-84) 99/60  Arterial Line BP: ()/(48-68) 93/53     Weight: 105 kg (231 lb 7.7 oz)  Body mass index is 43.74 kg/m².    Intake/Output Summary (Last 24 hours) at 2/6/2022 1511  Last data filed at 2/6/2022 0625  Gross per 24 hour   Intake 2875.42 ml   Output 6446 ml   Net -3570.58 ml      Physical Exam  Vitals and nursing note reviewed.   Constitutional:       General: She is not in acute distress.     Appearance: She is morbidly obese.      Interventions: She is sedated, intubated and restrained.   HENT:      Head: Normocephalic and atraumatic.      Right Ear: External ear normal.      Left Ear: External ear normal.      Nose: Nose normal. No congestion.      Mouth/Throat:      Mouth: Mucous membranes are dry.      Pharynx: Oropharynx is clear.   Eyes:      Extraocular Movements: Extraocular movements intact.      Pupils: Pupils are equal, round, and reactive to light.   Cardiovascular:      Rate and Rhythm: Normal rate and regular rhythm.      Pulses: Normal pulses.      Heart sounds: Normal heart sounds.   Pulmonary:      Effort: Pulmonary effort is normal. She is intubated.      Breath sounds: Normal breath sounds.   Abdominal:      General: Bowel sounds are normal.      Palpations: Abdomen is soft.   Genitourinary:     Comments: gresham  Musculoskeletal:         General: Normal range of motion.      Cervical back: Normal range of motion. No rigidity.      Right lower leg: No edema.      Left lower leg: No edema.   Skin:     General: Skin is warm and dry.      Capillary Refill: Capillary refill takes less than 2 seconds.      Comments: trialysis line   Neurological:      General: No focal deficit present.      Mental Status:  She is disoriented.      Motor: Weakness present.   Psychiatric:      Comments: Unable to assess         Significant Labs: All pertinent labs within the past 24 hours have been reviewed.    Significant Imaging: I have reviewed all pertinent imaging results/findings within the past 24 hours.      Assessment/Plan:      * Acute on chronic respiratory failure with hypercapnia  Elevated CO2 on ABG, currently on BIPAP  Reassess with ABG with worsened CO2, settings adjusted and adjusting face mask for better fit  Hypoxia likely related to COVID19, was 93% on room air on arrival  -suspect that patient's encephalopathy secondary to elevated CO2 vs uremia  -past SBT this am, but did not extubate due to concerns about ability to compensate due to chronic hypercapnea and metabolic acidosis  -Pulm following    Transaminitis  - in setting of shock  - monitor for now; can consider escalation of w/u if persists      Hyperglycemia  Hemoglobin A1C   Date Value Ref Range Status   11/10/2021 6.2 (H) 4.0 - 5.6 % Final     Comment:     ADA Screening Guidelines:  5.7-6.4%  Consistent with prediabetes  >or=6.5%  Consistent with diabetes    High levels of fetal hemoglobin interfere with the HbA1C  assay. Heterozygous hemoglobin variants (HbS, HgC, etc)do  not significantly interfere with this assay.   However, presence of multiple variants may affect accuracy.     04/26/2021 5.9 (H) 4.0 - 5.6 % Final     Comment:     ADA Screening Guidelines:  5.7-6.4%  Consistent with prediabetes  >or=6.5%  Consistent with diabetes    High levels of fetal hemoglobin interfere with the HbA1C  assay. Heterozygous hemoglobin variants (HbS, HgC, etc)do  not significantly interfere with this assay.   However, presence of multiple variants may affect accuracy.     11/10/2020 6.0 (H) 4.0 - 5.6 % Final     Comment:     ADA Screening Guidelines:  5.7-6.4%  Consistent with prediabetes  >or=6.5%  Consistent with diabetes  High levels of fetal hemoglobin interfere  with the HbA1C  assay. Heterozygous hemoglobin variants (HbS, HgC, etc)do  not significantly interfere with this assay.   However, presence of multiple variants may affect accuracy.           - LDSSI with accuchecks qachs        Shock, unspecified  -likely distributive due to acidosis and renal failure versus septic shock  -currently requiring Levophed  -nephrology consulted for CRRT  -continue IV antibiotics for possible infectious source  -COVID therapy as above      Hyperphosphatemia  -in the setting of acute renal failure  -on CRRT  -nephrology consulted      Hypocalcemia  -in the setting of acute renal failure  -dialysis; will replace  -nephrology consulted      Acute metabolic encephalopathy  Reported by spouse to be with increased drowsiness and altered mentation. She has been sleeping a lot more than usual and is not alert. Found to be with renal failure, COVID19, and metabolic acidosis    CT head with no acute findings, ammonia level wnl  Will hold all sedating home meds for now  Currently on BIPAP for hypercapnia, which is likely contributing  -renal failure with high anion gap metabolic acidosis and uremia; initiated on dialysis  -pulmonology and nephrology consulted    COVID-19  COVID positive; initiated on protocol  CXR with no infiltrate, requiring O2  -doubt that current presentation is due to COVID; will hold remdesivir and dexamethasone  Heparin ppx  inhalers prn  MVI, ascorbic acid, incentive spirometry  statin  supplemental O2, will wean as tolerated  prn tylenol, loperamide  contact/airborne    Acute renal failure with tubular necrosis  Significant RAQUEL with creatinine of 7.1 from normal just 3 months ago, with acidosis, uremia, hyperkalemia, hyperphosphatemia, and hypocalcemia  Renal ultrasound   -Vila catheter placed  -initiated on CRRT 2/4  Consult Nephrology  Strict I&O's      High anion gap metabolic acidosis  With initial pH of 7.188  Trend ABG  Likely related to acute renal  "failure  -bicarbonate drip started on admission, now discontinued  -initiated on CRRT  -nephrology following      UTI (urinary tract infection)  Urinalysis relatively unremarkable, may be asymptomatic bacteriuria but in the setting of shock will continue antibiotics  Awaiting blood cultures  IV Rocephin  Trend CBC      Leukocytosis  Ashvinley related to UTI vs COVID19  CXR was with no pneumonia  Urine with UTI  Blood cultures no growth to date  Lactate 1.1  IV Rocephin continued for now, can likely deescalate  Trend CBC      Unspecified mood (affective) disorder  Resume home meds once taking p.o., chronic      Morbid obesity with BMI of 45.0-49.9, adult  Body mass index is 43.74 kg/m². Morbid obesity complicates all aspects of disease management from diagnostic modalities to treatment.    MARY (obstructive sleep apnea)  Was ordered CPAP qhs per PCP but does not use it  -suspect may have OHS    Disorder of lumbar spine  Osteoarthritis    Hold home pain medications for now      Osteoarthritis  See above      Restless leg syndrome  Hold home Neurontin and Mirapex      Mixed hyperlipidemia  Patient is chronically on statin.will continue for now once able to take p.o.. Monitor clinically. Last LDL was   Lab Results   Component Value Date    LDLCALC 71.0 11/10/2021            Anxiety disorder  Hold Klonopin 2/2 AMS, chronic      Fibromyalgia  Hold home Effexor and Neurontin      Chronic back pain  Will hold home pain medications until improvement in mentation      Essential hypertension  - currently in shock  - hold home antihypertensives      Mild acid reflux  - protonix ppx        VTE Risk Mitigation (From admission, onward)         Ordered     heparin 25,000 units in dextrose 5% 250 mL (100 units/mL) infusion (heparin infusion - NO NOMOGRAM)  Continuous        "And" Linked Group Details    02/05/22 1750     heparin (porcine) injection 2,800 Units  As needed (PRN)         02/04/22 1911     heparin (porcine) injection 7,500 " Units  Every 8 hours         02/03/22 2251     Place KATERIN hose  Until discontinued         02/03/22 2251     IP VTE HIGH RISK PATIENT  Once         02/03/22 2251     Place sequential compression device  Until discontinued         02/03/22 2251                Discharge Planning   ARNULFO:      Code Status: Full Code   Is the patient medically ready for discharge?:     Reason for patient still in hospital (select all that apply): Patient unstable               Critical care time spent on the evaluation and treatment of severe organ dysfunction, review of pertinent labs and imaging studies, discussions with consulting providers and discussions with patient/family: 35 minutes.      Naren Davenport MD  Department of Hospital Medicine   [place] - Intensive Care

## 2022-02-06 NOTE — ASSESSMENT & PLAN NOTE
Body mass index is 43.74 kg/m². Morbid obesity complicates all aspects of disease management from diagnostic modalities to treatment.

## 2022-02-07 PROBLEM — I48.91 ATRIAL FIBRILLATION WITH RVR: Status: ACTIVE | Noted: 2022-02-07

## 2022-02-07 LAB
ALBUMIN SERPL BCP-MCNC: 2.3 G/DL (ref 3.5–5.2)
ALBUMIN SERPL BCP-MCNC: 2.4 G/DL (ref 3.5–5.2)
ALBUMIN SERPL BCP-MCNC: 2.5 G/DL (ref 3.5–5.2)
ALLENS TEST: ABNORMAL
ANION GAP SERPL CALC-SCNC: 12 MMOL/L (ref 8–16)
ANION GAP SERPL CALC-SCNC: 14 MMOL/L (ref 8–16)
ANION GAP SERPL CALC-SCNC: 17 MMOL/L (ref 8–16)
APTT BLDCRRT: 48.7 SEC (ref 21–32)
BACTERIA SPEC AEROBE CULT: NORMAL
BACTERIA SPEC AEROBE CULT: NORMAL
BASOPHILS # BLD AUTO: ABNORMAL K/UL (ref 0–0.2)
BASOPHILS NFR BLD: 0 % (ref 0–1.9)
BUN SERPL-MCNC: 26 MG/DL (ref 8–23)
BUN SERPL-MCNC: 29 MG/DL (ref 8–23)
BUN SERPL-MCNC: 31 MG/DL (ref 8–23)
CALCIUM SERPL-MCNC: 7 MG/DL (ref 8.7–10.5)
CALCIUM SERPL-MCNC: 7.5 MG/DL (ref 8.7–10.5)
CALCIUM SERPL-MCNC: 7.7 MG/DL (ref 8.7–10.5)
CHLORIDE SERPL-SCNC: 102 MMOL/L (ref 95–110)
CHLORIDE SERPL-SCNC: 99 MMOL/L (ref 95–110)
CHLORIDE SERPL-SCNC: 99 MMOL/L (ref 95–110)
CO2 SERPL-SCNC: 18 MMOL/L (ref 23–29)
CO2 SERPL-SCNC: 20 MMOL/L (ref 23–29)
CO2 SERPL-SCNC: 21 MMOL/L (ref 23–29)
CREAT SERPL-MCNC: 1.3 MG/DL (ref 0.5–1.4)
CREAT SERPL-MCNC: 1.6 MG/DL (ref 0.5–1.4)
CREAT SERPL-MCNC: 2.2 MG/DL (ref 0.5–1.4)
D DIMER PPP IA.FEU-MCNC: 1.13 MG/L FEU
DELSYS: ABNORMAL
DIFFERENTIAL METHOD: ABNORMAL
EOSINOPHIL # BLD AUTO: ABNORMAL K/UL (ref 0–0.5)
EOSINOPHIL NFR BLD: 1 % (ref 0–8)
ERYTHROCYTE [DISTWIDTH] IN BLOOD BY AUTOMATED COUNT: 13.1 % (ref 11.5–14.5)
ERYTHROCYTE [SEDIMENTATION RATE] IN BLOOD BY WESTERGREN METHOD: 22 MM/H
EST. GFR  (AFRICAN AMERICAN): 25 ML/MIN/1.73 M^2
EST. GFR  (AFRICAN AMERICAN): 37 ML/MIN/1.73 M^2
EST. GFR  (AFRICAN AMERICAN): 47 ML/MIN/1.73 M^2
EST. GFR  (NON AFRICAN AMERICAN): 22 ML/MIN/1.73 M^2
EST. GFR  (NON AFRICAN AMERICAN): 32 ML/MIN/1.73 M^2
EST. GFR  (NON AFRICAN AMERICAN): 41 ML/MIN/1.73 M^2
FIO2: 21
GLUCOSE SERPL-MCNC: 216 MG/DL (ref 70–110)
GLUCOSE SERPL-MCNC: 248 MG/DL (ref 70–110)
GLUCOSE SERPL-MCNC: 291 MG/DL (ref 70–110)
GRAM STN SPEC: NORMAL
HAV IGM SERPL QL IA: NEGATIVE
HBV CORE AB SERPL QL IA: NEGATIVE
HBV SURFACE AB SER-ACNC: NEGATIVE M[IU]/ML
HBV SURFACE AG SERPL QL IA: NEGATIVE
HCO3 UR-SCNC: 21.8 MMOL/L (ref 24–28)
HCT VFR BLD AUTO: 32.1 % (ref 37–48.5)
HGB BLD-MCNC: 11 G/DL (ref 12–16)
HYPOCHROMIA BLD QL SMEAR: ABNORMAL
IMM GRANULOCYTES # BLD AUTO: ABNORMAL K/UL (ref 0–0.04)
IMM GRANULOCYTES NFR BLD AUTO: ABNORMAL % (ref 0–0.5)
LACTATE SERPL-SCNC: 1.2 MMOL/L (ref 0.5–2.2)
LYMPHOCYTES # BLD AUTO: ABNORMAL K/UL (ref 1–4.8)
LYMPHOCYTES NFR BLD: 20 % (ref 18–48)
MAGNESIUM SERPL-MCNC: 2.2 MG/DL (ref 1.6–2.6)
MAGNESIUM SERPL-MCNC: 2.2 MG/DL (ref 1.6–2.6)
MAGNESIUM SERPL-MCNC: 2.3 MG/DL (ref 1.6–2.6)
MAGNESIUM SERPL-MCNC: 2.4 MG/DL (ref 1.6–2.6)
MAGNESIUM SERPL-MCNC: 2.4 MG/DL (ref 1.6–2.6)
MCH RBC QN AUTO: 31.5 PG (ref 27–31)
MCHC RBC AUTO-ENTMCNC: 34.3 G/DL (ref 32–36)
MCV RBC AUTO: 92 FL (ref 82–98)
METAMYELOCYTES NFR BLD MANUAL: 3 %
MODE: ABNORMAL
MONOCYTES # BLD AUTO: ABNORMAL K/UL (ref 0.3–1)
MONOCYTES NFR BLD: 2 % (ref 4–15)
MYELOCYTES NFR BLD MANUAL: 1 %
NEUTROPHILS NFR BLD: 65 % (ref 38–73)
NEUTS BAND NFR BLD MANUAL: 8 %
NRBC BLD-RTO: 0 /100 WBC
OVALOCYTES BLD QL SMEAR: ABNORMAL
PCO2 BLDA: 37 MMHG (ref 35–45)
PEEP: 5
PH SMN: 7.38 [PH] (ref 7.35–7.45)
PHOSPHATE SERPL-MCNC: 2.2 MG/DL (ref 2.7–4.5)
PHOSPHATE SERPL-MCNC: 2.5 MG/DL (ref 2.7–4.5)
PHOSPHATE SERPL-MCNC: 3.9 MG/DL (ref 2.7–4.5)
PLATELET # BLD AUTO: 147 K/UL (ref 150–450)
PLATELET BLD QL SMEAR: ABNORMAL
PMV BLD AUTO: 10.5 FL (ref 9.2–12.9)
PO2 BLDA: 87 MMHG (ref 80–100)
POC BE: -3 MMOL/L
POC SATURATED O2: 97 % (ref 95–100)
POC TCO2: 23 MMOL/L (ref 23–27)
POCT GLUCOSE: 192 MG/DL (ref 70–110)
POCT GLUCOSE: 208 MG/DL (ref 70–110)
POCT GLUCOSE: 215 MG/DL (ref 70–110)
POCT GLUCOSE: 258 MG/DL (ref 70–110)
POCT GLUCOSE: 289 MG/DL (ref 70–110)
POCT GLUCOSE: 321 MG/DL (ref 70–110)
POIKILOCYTOSIS BLD QL SMEAR: SLIGHT
POLYCHROMASIA BLD QL SMEAR: ABNORMAL
POTASSIUM SERPL-SCNC: 3.7 MMOL/L (ref 3.5–5.1)
POTASSIUM SERPL-SCNC: 3.9 MMOL/L (ref 3.5–5.1)
POTASSIUM SERPL-SCNC: 4.3 MMOL/L (ref 3.5–5.1)
RBC # BLD AUTO: 3.49 M/UL (ref 4–5.4)
SAMPLE: ABNORMAL
SITE: ABNORMAL
SODIUM SERPL-SCNC: 132 MMOL/L (ref 136–145)
SODIUM SERPL-SCNC: 133 MMOL/L (ref 136–145)
SODIUM SERPL-SCNC: 137 MMOL/L (ref 136–145)
VANCOMYCIN SERPL-MCNC: 16 UG/ML
VT: 350
WBC # BLD AUTO: 20.02 K/UL (ref 3.9–12.7)

## 2022-02-07 PROCEDURE — 63600175 PHARM REV CODE 636 W HCPCS: Performed by: NURSE PRACTITIONER

## 2022-02-07 PROCEDURE — C9113 INJ PANTOPRAZOLE SODIUM, VIA: HCPCS | Performed by: HOSPITALIST

## 2022-02-07 PROCEDURE — 82728 ASSAY OF FERRITIN: CPT | Performed by: NURSE PRACTITIONER

## 2022-02-07 PROCEDURE — 83615 LACTATE (LD) (LDH) ENZYME: CPT | Performed by: NURSE PRACTITIONER

## 2022-02-07 PROCEDURE — 93010 ELECTROCARDIOGRAM REPORT: CPT | Mod: ,,, | Performed by: INTERNAL MEDICINE

## 2022-02-07 PROCEDURE — 83735 ASSAY OF MAGNESIUM: CPT | Mod: 91 | Performed by: HOSPITALIST

## 2022-02-07 PROCEDURE — 85730 THROMBOPLASTIN TIME PARTIAL: CPT | Performed by: HOSPITALIST

## 2022-02-07 PROCEDURE — 27200966 HC CLOSED SUCTION SYSTEM

## 2022-02-07 PROCEDURE — 83735 ASSAY OF MAGNESIUM: CPT | Mod: 91 | Performed by: INTERNAL MEDICINE

## 2022-02-07 PROCEDURE — 63600175 PHARM REV CODE 636 W HCPCS: Performed by: STUDENT IN AN ORGANIZED HEALTH CARE EDUCATION/TRAINING PROGRAM

## 2022-02-07 PROCEDURE — 94003 VENT MGMT INPAT SUBQ DAY: CPT

## 2022-02-07 PROCEDURE — 82803 BLOOD GASES ANY COMBINATION: CPT

## 2022-02-07 PROCEDURE — 25000003 PHARM REV CODE 250: Performed by: NURSE PRACTITIONER

## 2022-02-07 PROCEDURE — 80100008 HC CRRT DAILY MAINTENANCE

## 2022-02-07 PROCEDURE — 80202 ASSAY OF VANCOMYCIN: CPT | Performed by: HOSPITALIST

## 2022-02-07 PROCEDURE — 85007 BL SMEAR W/DIFF WBC COUNT: CPT | Performed by: HOSPITALIST

## 2022-02-07 PROCEDURE — 83735 ASSAY OF MAGNESIUM: CPT | Performed by: INTERNAL MEDICINE

## 2022-02-07 PROCEDURE — 63600175 PHARM REV CODE 636 W HCPCS: Performed by: INTERNAL MEDICINE

## 2022-02-07 PROCEDURE — 85379 FIBRIN DEGRADATION QUANT: CPT | Performed by: NURSE PRACTITIONER

## 2022-02-07 PROCEDURE — 25000003 PHARM REV CODE 250: Performed by: STUDENT IN AN ORGANIZED HEALTH CARE EDUCATION/TRAINING PROGRAM

## 2022-02-07 PROCEDURE — 25000003 PHARM REV CODE 250: Performed by: HOSPITALIST

## 2022-02-07 PROCEDURE — 93005 ELECTROCARDIOGRAM TRACING: CPT

## 2022-02-07 PROCEDURE — 83605 ASSAY OF LACTIC ACID: CPT | Performed by: STUDENT IN AN ORGANIZED HEALTH CARE EDUCATION/TRAINING PROGRAM

## 2022-02-07 PROCEDURE — 27000207 HC ISOLATION

## 2022-02-07 PROCEDURE — 93010 EKG 12-LEAD: ICD-10-PCS | Mod: ,,, | Performed by: INTERNAL MEDICINE

## 2022-02-07 PROCEDURE — 80069 RENAL FUNCTION PANEL: CPT | Mod: 91 | Performed by: INTERNAL MEDICINE

## 2022-02-07 PROCEDURE — 27000221 HC OXYGEN, UP TO 24 HOURS

## 2022-02-07 PROCEDURE — 80069 RENAL FUNCTION PANEL: CPT | Mod: 91 | Performed by: HOSPITALIST

## 2022-02-07 PROCEDURE — C9399 UNCLASSIFIED DRUGS OR BIOLOG: HCPCS | Performed by: STUDENT IN AN ORGANIZED HEALTH CARE EDUCATION/TRAINING PROGRAM

## 2022-02-07 PROCEDURE — 99900035 HC TECH TIME PER 15 MIN (STAT)

## 2022-02-07 PROCEDURE — 94761 N-INVAS EAR/PLS OXIMETRY MLT: CPT

## 2022-02-07 PROCEDURE — 90945 DIALYSIS ONE EVALUATION: CPT

## 2022-02-07 PROCEDURE — 27202415 HC CARTRIDGE, CRRT

## 2022-02-07 PROCEDURE — 85027 COMPLETE CBC AUTOMATED: CPT | Performed by: HOSPITALIST

## 2022-02-07 PROCEDURE — 20000000 HC ICU ROOM

## 2022-02-07 PROCEDURE — 63600175 PHARM REV CODE 636 W HCPCS: Performed by: HOSPITALIST

## 2022-02-07 RX ORDER — FLUDROCORTISONE ACETATE 0.1 MG/1
100 TABLET ORAL DAILY
Status: DISCONTINUED | OUTPATIENT
Start: 2022-02-07 | End: 2022-02-07

## 2022-02-07 RX ORDER — FLUDROCORTISONE ACETATE 0.1 MG/1
100 TABLET ORAL DAILY
Status: DISCONTINUED | OUTPATIENT
Start: 2022-02-07 | End: 2022-02-09

## 2022-02-07 RX ADMIN — INSULIN ASPART 2 UNITS: 100 INJECTION, SOLUTION INTRAVENOUS; SUBCUTANEOUS at 05:02

## 2022-02-07 RX ADMIN — HEPARIN SODIUM 7500 UNITS: 5000 INJECTION, SOLUTION INTRAVENOUS; SUBCUTANEOUS at 02:02

## 2022-02-07 RX ADMIN — INSULIN ASPART 5 UNITS: 100 INJECTION, SOLUTION INTRAVENOUS; SUBCUTANEOUS at 02:02

## 2022-02-07 RX ADMIN — FLUDROCORTISONE ACETATE 100 MCG: 0.1 TABLET ORAL at 06:02

## 2022-02-07 RX ADMIN — CEFTRIAXONE SODIUM 1 G: 1 INJECTION, POWDER, FOR SOLUTION INTRAMUSCULAR; INTRAVENOUS at 10:02

## 2022-02-07 RX ADMIN — OXYCODONE HYDROCHLORIDE AND ACETAMINOPHEN 500 MG: 500 TABLET ORAL at 09:02

## 2022-02-07 RX ADMIN — INSULIN DETEMIR 7 UNITS: 100 INJECTION, SOLUTION SUBCUTANEOUS at 03:02

## 2022-02-07 RX ADMIN — PROPOFOL 40 MCG/KG/MIN: 10 INJECTION, EMULSION INTRAVENOUS at 04:02

## 2022-02-07 RX ADMIN — VASOPRESSIN 0.04 UNITS/MIN: 20 INJECTION INTRAVENOUS at 11:02

## 2022-02-07 RX ADMIN — OXYCODONE HYDROCHLORIDE AND ACETAMINOPHEN 500 MG: 500 TABLET ORAL at 11:02

## 2022-02-07 RX ADMIN — VASOPRESSIN 0.04 UNITS/MIN: 20 INJECTION INTRAVENOUS at 03:02

## 2022-02-07 RX ADMIN — AMIODARONE HYDROCHLORIDE 1 MG/MIN: 1.8 INJECTION, SOLUTION INTRAVENOUS at 10:02

## 2022-02-07 RX ADMIN — NOREPINEPHRINE BITARTRATE 0.22 MCG/KG/MIN: 1 INJECTION, SOLUTION, CONCENTRATE INTRAVENOUS at 03:02

## 2022-02-07 RX ADMIN — HYDROCORTISONE SODIUM SUCCINATE 50 MG: 100 INJECTION, POWDER, FOR SOLUTION INTRAMUSCULAR; INTRAVENOUS at 11:02

## 2022-02-07 RX ADMIN — MUPIROCIN: 20 OINTMENT TOPICAL at 09:02

## 2022-02-07 RX ADMIN — PROPOFOL 50 MCG/KG/MIN: 10 INJECTION, EMULSION INTRAVENOUS at 08:02

## 2022-02-07 RX ADMIN — FENTANYL CITRATE 125 MCG/HR: 50 INJECTION INTRAVENOUS at 09:02

## 2022-02-07 RX ADMIN — SODIUM PHOSPHATE, MONOBASIC, MONOHYDRATE 15 MMOL: 276; 142 INJECTION, SOLUTION INTRAVENOUS at 12:02

## 2022-02-07 RX ADMIN — HEPARIN SODIUM 7500 UNITS: 5000 INJECTION, SOLUTION INTRAVENOUS; SUBCUTANEOUS at 09:02

## 2022-02-07 RX ADMIN — AMIODARONE HYDROCHLORIDE 1 MG/MIN: 1.8 INJECTION, SOLUTION INTRAVENOUS at 05:02

## 2022-02-07 RX ADMIN — CHLORHEXIDINE GLUCONATE 0.12% ORAL RINSE 15 ML: 1.2 LIQUID ORAL at 11:02

## 2022-02-07 RX ADMIN — CHLORHEXIDINE GLUCONATE 0.12% ORAL RINSE 15 ML: 1.2 LIQUID ORAL at 09:02

## 2022-02-07 RX ADMIN — HEPARIN SODIUM 7500 UNITS: 5000 INJECTION, SOLUTION INTRAVENOUS; SUBCUTANEOUS at 05:02

## 2022-02-07 RX ADMIN — HEPARIN SODIUM 1000 UNITS/HR: 5000 INJECTION, SOLUTION INTRAVENOUS; SUBCUTANEOUS at 06:02

## 2022-02-07 RX ADMIN — MUPIROCIN: 20 OINTMENT TOPICAL at 11:02

## 2022-02-07 RX ADMIN — PROPOFOL 50 MCG/KG/MIN: 10 INJECTION, EMULSION INTRAVENOUS at 11:02

## 2022-02-07 RX ADMIN — PANTOPRAZOLE SODIUM 40 MG: 40 INJECTION, POWDER, FOR SOLUTION INTRAVENOUS at 11:02

## 2022-02-07 RX ADMIN — PROPOFOL 25 MCG/KG/MIN: 10 INJECTION, EMULSION INTRAVENOUS at 10:02

## 2022-02-07 RX ADMIN — VANCOMYCIN HYDROCHLORIDE 1500 MG: 1.5 INJECTION, POWDER, LYOPHILIZED, FOR SOLUTION INTRAVENOUS at 10:02

## 2022-02-07 RX ADMIN — HYDROCORTISONE SODIUM SUCCINATE 50 MG: 100 INJECTION, POWDER, FOR SOLUTION INTRAMUSCULAR; INTRAVENOUS at 06:02

## 2022-02-07 RX ADMIN — INSULIN ASPART 1 UNITS: 100 INJECTION, SOLUTION INTRAVENOUS; SUBCUTANEOUS at 11:02

## 2022-02-07 RX ADMIN — AMIODARONE HYDROCHLORIDE 150 MG: 1.5 INJECTION, SOLUTION INTRAVENOUS at 05:02

## 2022-02-07 RX ADMIN — DEXTROSE 0.5 MG/MIN: 5 SOLUTION INTRAVENOUS at 10:02

## 2022-02-07 RX ADMIN — SODIUM CHLORIDE, SODIUM LACTATE, POTASSIUM CHLORIDE, AND CALCIUM CHLORIDE 1000 ML: .6; .31; .03; .02 INJECTION, SOLUTION INTRAVENOUS at 10:02

## 2022-02-07 RX ADMIN — VASOPRESSIN 0.04 UNITS/MIN: 20 INJECTION INTRAVENOUS at 09:02

## 2022-02-07 RX ADMIN — THERA TABS 1 TABLET: TAB at 11:02

## 2022-02-07 RX ADMIN — PROPOFOL 50 MCG/KG/MIN: 10 INJECTION, EMULSION INTRAVENOUS at 02:02

## 2022-02-07 NOTE — SUBJECTIVE & OBJECTIVE
Interval History:  Became agitated overnight with CRRT clotting and with some blood lost from trialysis line. Had large BM followed by vagal to 40s then development of afib w/ RVR. Placed on amio. Became hypotensive requiring uptitration of levophed, with addition of vasopressin today. Giving fluid and will maintain net even with CRRT. Discussed with  at bedside today.    Review of Systems   Unable to perform ROS: Intubated     Objective:     Vital Signs (Most Recent):  Temp: (!) 95.72 °F (35.4 °C) (02/07/22 1209)  Pulse: 93 (02/07/22 1209)  Resp: 18 (02/07/22 1209)  BP: (!) 102/56 (02/07/22 0600)  SpO2: 98 % (02/07/22 1209) Vital Signs (24h Range):  Temp:  [94.64 °F (34.8 °C)-100.22 °F (37.9 °C)] 95.72 °F (35.4 °C)  Pulse:  [] 93  Resp:  [12-49] 18  SpO2:  [86 %-100 %] 98 %  BP: ()/() 102/56  Arterial Line BP: ()/(38-99) 105/59     Weight: 100 kg (220 lb 7.4 oz)  Body mass index is 41.66 kg/m².    Intake/Output Summary (Last 24 hours) at 2/7/2022 1302  Last data filed at 2/7/2022 1048  Gross per 24 hour   Intake 3101.47 ml   Output 2704 ml   Net 397.47 ml      Physical Exam  Vitals and nursing note reviewed.   Constitutional:       General: She is not in acute distress.     Appearance: She is morbidly obese.      Interventions: She is sedated, intubated and restrained.   HENT:      Head: Normocephalic and atraumatic.      Right Ear: External ear normal.      Left Ear: External ear normal.      Nose: Nose normal. No congestion.      Mouth/Throat:      Mouth: Mucous membranes are dry.      Pharynx: Oropharynx is clear.   Eyes:      Extraocular Movements: Extraocular movements intact.      Pupils: Pupils are equal, round, and reactive to light.   Cardiovascular:      Rate and Rhythm: Normal rate and regular rhythm.      Pulses: Normal pulses.      Heart sounds: Normal heart sounds.   Pulmonary:      Effort: Pulmonary effort is normal. She is intubated.      Breath sounds: Normal breath  sounds.   Abdominal:      General: Bowel sounds are normal.      Palpations: Abdomen is soft.   Genitourinary:     Comments: gresham  Musculoskeletal:         General: Normal range of motion.      Cervical back: Normal range of motion. No rigidity.      Right lower leg: No edema.      Left lower leg: No edema.   Skin:     General: Skin is warm and dry.      Capillary Refill: Capillary refill takes less than 2 seconds.      Comments: trialysis line   Neurological:      General: No focal deficit present.      Mental Status: She is disoriented.      Motor: Weakness present.   Psychiatric:      Comments: Unable to assess         Significant Labs: All pertinent labs within the past 24 hours have been reviewed.    Significant Imaging: I have reviewed all pertinent imaging results/findings within the past 24 hours.

## 2022-02-07 NOTE — EICU
Rounding (Video Assessment):  No    Intervention Initiated From:  Bedside    Ramirez Communicated with Bedside Nurse regarding:  HR    Nurse Notified:  No    Doctor Notified:  Yes    Comments: bedside nurse elert for pt Hr . Presently. 130's. Asked bedside nurse to get ekg and upload it so md can review. Informed. Dr. More.   04:59-EKG results are posted. Informed Dr. More.

## 2022-02-07 NOTE — PLAN OF CARE
Recommendation:   1. While on propofol, continue current TF of Novasource Renal at 30ml/hr.   2. Add beneprotein 4 packs daily to better meet protein needs.   3. Monitor weight/labs.   4. RD to continue to follow to monitor TF tolerance    Goals:   TF to meet at least 85% EEN/EPN by RD follow up  Nutrition Goal Status: new

## 2022-02-07 NOTE — ASSESSMENT & PLAN NOTE
Elevated CO2 on ABG, currently on BIPAP  Reassess with ABG with worsened CO2, settings adjusted and adjusting face mask for better fit  Hypoxia likely related to COVID19, was 93% on room air on arrival  -suspect that patient's encephalopathy secondary to elevated CO2 vs uremia  -Pulm following

## 2022-02-07 NOTE — PROGRESS NOTES
"[facility] - Intensive Care  Adult Nutrition  Progress Note    SUMMARY       Recommendations    Recommendation:   1. While on propofol, continue current TF of Novasource Renal at 30ml/hr.   2. Add beneprotein 4 packs daily to better meet protein needs.   3. Monitor weight/labs.   4. RD to continue to follow to monitor TF tolerance    Goals:   TF to meet at least 85% EEN/EPN by RD follow up  Nutrition Goal Status: new  Communication of RD Recs: reviewed with RN (Ashtyn)    Assessment and Plan  * Acute on chronic respiratory failure with hypercapnia  Contributing Nutrition Diagnosis  Inadequate energy intake    Related to (etiology):   Intubation/COVID    Signs and Symptoms (as evidenced by):   NPO    Interventions:  Collaboration with other providers    Nutrition Diagnosis Status:   Continues      Malnutrition Assessment  Weight Loss (Malnutrition): greater than 7.5% in 3 months (8% x 3 months)       Reason for Assessment  Reason For Assessment: RD follow-up  Diagnosis:  (acute resp failure)  Relevant Medical History: HTN, chronic osteoarthritis, [place] palsy, sleep apnea, RAQUEL, hysterectomy, B knee surgery, appendectomy  General Information Comments: Pt remains intubated on vent. OG tube. Receiving TF of Novasource Renal at 30ml/hr. Tolerating per RN. On propofol. Unable to assess NFPE 2/2 pt on COVID isolation. Noted 20lb weight loss x 3 months.  Nutrition Discharge Planning: d/c needs to be determined    Nutrition Risk Screen  Nutrition Risk Screen: tube feeding or parenteral nutrition    Nutrition/Diet History  Food Preferences: unable to assess  Factors Affecting Nutritional Intake: NPO,on mechanical ventilation    Anthropometrics  Temp: (!) 95.72 °F (35.4 °C)  Height Method: Estimated  Height: 5' 1" (154.9 cm)  Height (inches): 61 in  Weight Method: Bed Scale  Weight: 100 kg (220 lb 7.4 oz)  Weight (lb): 220.46 lb  Ideal Body Weight (IBW), Female: 105 lb  % Ideal Body Weight, Female (lb): 209.96 %  BMI " (Calculated): 41.7  BMI Grade: greater than 40 - morbid obesity  Usual Body Weight (UBW), k.9 kg (10/25)  % Usual Body Weight: 92.02  % Weight Change From Usual Weight: -8.17 %     Lab/Procedures/Meds  Pertinent Labs Reviewed: reviewed  Pertinent Labs Comments: BUN 31H, Crea 2.2H, GLu 216H, Alb 2.3L, Ca 7.0L  Pertinent Medications Reviewed: reviewed  Pertinent Medications Comments: fentanyl, precedex, pantoprazole, MVI, rocephin, heparin, Vit C, propofol    Estimated/Assessed Needs  Weight Used For Calorie Calculations: 100 kg (220 lb 7.4 oz)  Energy Calorie Requirements (kcal): 1812  Energy Need Method: [facility]  Protein Requirements: 120g (1.2g/kg)  Weight Used For Protein Calculations: 100 kg (220 lb 7.4 oz)  Estimated Fluid Requirement Method: RDA Method  RDA Method (mL): 1812  CHO Requirement: 200g    Nutrition Prescription Ordered  Current Diet Order: NPO  Current Nutrition Support Formula Ordered: Novasource Renal  Current Nutrition Support Rate Ordered: 30 (ml)  Current Nutrition Support Frequency Ordered: ml/hr    Evaluation of Received Nutrient/Fluid Intake  Enteral Calories (kcal): 1440  Enteral Protein (gm): 65  Enteral (Free Water) Fluid (mL): 516  Other Calories (kcal): 435 (propofol)  Total Calories (kcal): 1875  % Kcal Needs: 103  % Protein Needs: 54  I/O: 3101/4652  Energy Calories Required: meeting needs  Protein Required: not meeting needs  Fluid Required: meeting needs  Comments: LBM 2/4  % Intake of Estimated Energy Needs: 75 - 100 %  % Meal Intake: NPO    Nutrition Risk  Level of Risk/Frequency of Follow-up:  (2xweekly)     Monitor and Evaluation  Food and Nutrient Intake: energy intake  Food and Nutrient Adminstration: diet order,enteral and parenteral nutrition administration  Physical Activity and Function: nutrition-related ADLs and IADLs  Anthropometric Measurements: weight  Biochemical Data, Medical Tests and Procedures: electrolyte and renal panel  Nutrition-Focused Physical  Findings: overall appearance     Nutrition Follow-Up  RD Follow-up?: Yes

## 2022-02-07 NOTE — ASSESSMENT & PLAN NOTE
- went into afib w/ RVR overnight following episode of bradycardia and vasovagal  - given fluid and will maintain net even today on CRRT  - initiated on amiodarone gtt with improvement in rate control

## 2022-02-07 NOTE — PROGRESS NOTES
[facility]  Progress Note    Patient Name: Hannah Norman  MRN: [id number]  Patient Class: IP- Inpatient   Admission Date: 2/3/2022  Length of Stay: 4 days  Attending Physician: Naren Davenport, *  Primary Care Provider: aRffi Garcia MD        Subjective:     Principal Problem:Acute on chronic respiratory failure with hypercapnia  Acute Condition: resp failure and renal failure        HPI:  Ms. Norman is a 71 year old female with a medical history that includes anxiety disorder, bell's palsy, chronic back pain, chronic osteoarthritis, essential tremor, fibromyalgia, hypertension, GERD, hyperlipidemia, sleep apnea, and mood disorder. She presented to the ED via EMS with spouse who reports patient to be increasingly fatigued and weak. He reports over the past 2 weeks she was diagnosed with COVID19 and was ending their quarantine phase. She has been progressively weaker and unable to ambulate as usual with her walker. Her symptoms worsened over the past 2 days where she was not communicating at all. She is with no cough, vomiting, diarrhea or fevers. Her spouse contributed to her history during this visit. On arrival to the ED she was slightly hypoxic at 93% and hypertensive. Significant labs were with the following: wbc 18.97, Na 131, K 5.6, BUN 69, Cr 7.1, COVID19 +, urine with 3+ leukocytes and many bacteria, pH 7.1 and CO2 65.4. CT head and CXR with no acute findings. She was given neb treatment, Rocephin, Lokelma, reg insulin and NS bolus. She will admit to [facility] hospital medicine service for continued monitoring.           Overview/Hospital Course:  No notes on file    Interval History:  Became agitated overnight with CRRT clotting and with some blood lost from trialysis line. Had large BM followed by vagal to 40s then development of afib w/ RVR. Placed on amio. Became hypotensive requiring uptitration of levophed, with addition of vasopressin today. Giving fluid  and will maintain net even with CRRT. Discussed with  at bedside today.    Review of Systems   Unable to perform ROS: Intubated     Objective:     Vital Signs (Most Recent):  Temp: (!) 95.72 °F (35.4 °C) (02/07/22 1209)  Pulse: 93 (02/07/22 1209)  Resp: 18 (02/07/22 1209)  BP: (!) 102/56 (02/07/22 0600)  SpO2: 98 % (02/07/22 1209) Vital Signs (24h Range):  Temp:  [94.64 °F (34.8 °C)-100.22 °F (37.9 °C)] 95.72 °F (35.4 °C)  Pulse:  [] 93  Resp:  [12-49] 18  SpO2:  [86 %-100 %] 98 %  BP: ()/() 102/56  Arterial Line BP: ()/(38-99) 105/59     Weight: 100 kg (220 lb 7.4 oz)  Body mass index is 41.66 kg/m².    Intake/Output Summary (Last 24 hours) at 2/7/2022 1302  Last data filed at 2/7/2022 1048  Gross per 24 hour   Intake 3101.47 ml   Output 2704 ml   Net 397.47 ml      Physical Exam  Vitals and nursing note reviewed.   Constitutional:       General: She is not in acute distress.     Appearance: She is morbidly obese.      Interventions: She is sedated, intubated and restrained.   HENT:      Head: Normocephalic and atraumatic.      Right Ear: External ear normal.      Left Ear: External ear normal.      Nose: Nose normal. No congestion.      Mouth/Throat:      Mouth: Mucous membranes are dry.      Pharynx: Oropharynx is clear.   Eyes:      Extraocular Movements: Extraocular movements intact.      Pupils: Pupils are equal, round, and reactive to light.   Cardiovascular:      Rate and Rhythm: Normal rate and regular rhythm.      Pulses: Normal pulses.      Heart sounds: Normal heart sounds.   Pulmonary:      Effort: Pulmonary effort is normal. She is intubated.      Breath sounds: Normal breath sounds.   Abdominal:      General: Bowel sounds are normal.      Palpations: Abdomen is soft.   Genitourinary:     Comments: gresham  Musculoskeletal:         General: Normal range of motion.      Cervical back: Normal range of motion. No rigidity.      Right lower leg: No edema.      Left lower leg:  No edema.   Skin:     General: Skin is warm and dry.      Capillary Refill: Capillary refill takes less than 2 seconds.      Comments: trialysis line   Neurological:      General: No focal deficit present.      Mental Status: She is disoriented.      Motor: Weakness present.   Psychiatric:      Comments: Unable to assess         Significant Labs: All pertinent labs within the past 24 hours have been reviewed.    Significant Imaging: I have reviewed all pertinent imaging results/findings within the past 24 hours.      Assessment/Plan:      * Acute on chronic respiratory failure with hypercapnia  Elevated CO2 on ABG, currently on BIPAP  Reassess with ABG with worsened CO2, settings adjusted and adjusting face mask for better fit  Hypoxia likely related to COVID19, was 93% on room air on arrival  -suspect that patient's encephalopathy secondary to elevated CO2 vs uremia  -Pulm following    Atrial fibrillation with RVR  - went into afib w/ RVR overnight following episode of bradycardia and vasovagal  - given fluid and will maintain net even today on CRRT  - initiated on amiodarone gtt with improvement in rate control      Transaminitis  - in setting of shock  - monitor for now; can consider escalation of w/u if persists      Hyperglycemia  Hemoglobin A1C   Date Value Ref Range Status   11/10/2021 6.2 (H) 4.0 - 5.6 % Final     Comment:     ADA Screening Guidelines:  5.7-6.4%  Consistent with prediabetes  >or=6.5%  Consistent with diabetes    High levels of fetal hemoglobin interfere with the HbA1C  assay. Heterozygous hemoglobin variants (HbS, HgC, etc)do  not significantly interfere with this assay.   However, presence of multiple variants may affect accuracy.     04/26/2021 5.9 (H) 4.0 - 5.6 % Final     Comment:     ADA Screening Guidelines:  5.7-6.4%  Consistent with prediabetes  >or=6.5%  Consistent with diabetes    High levels of fetal hemoglobin interfere with the HbA1C  assay. Heterozygous hemoglobin variants  (HbS, HgC, etc)do  not significantly interfere with this assay.   However, presence of multiple variants may affect accuracy.     11/10/2020 6.0 (H) 4.0 - 5.6 % Final     Comment:     ADA Screening Guidelines:  5.7-6.4%  Consistent with prediabetes  >or=6.5%  Consistent with diabetes  High levels of fetal hemoglobin interfere with the HbA1C  assay. Heterozygous hemoglobin variants (HbS, HgC, etc)do  not significantly interfere with this assay.   However, presence of multiple variants may affect accuracy.           - LDSSI with accuchecks qachs        Shock, unspecified  -likely distributive due to acidosis and renal failure versus septic shock  -currently requiring Levophed with addition of vasopressin and stress dose steroids today  -nephrology consulted for CRRT  -continue IV antibiotics for possible infectious source, thought less likely  -possibly worsening due to too much volume removal  -COVID therapy as above      Hyperphosphatemia  -in the setting of acute renal failure  -on CRRT  -nephrology consulted      Hypocalcemia  -in the setting of acute renal failure  -dialysis; will replace  -nephrology consulted      Acute metabolic encephalopathy  Reported by spouse to be with increased drowsiness and altered mentation. She has been sleeping a lot more than usual and is not alert. Found to be with renal failure, COVID19, and metabolic acidosis    CT head with no acute findings, ammonia level wnl  Will hold all sedating home meds for now  Currently on BIPAP for hypercapnia, which is likely contributing  -renal failure with high anion gap metabolic acidosis and uremia; initiated on dialysis  -pulmonology and nephrology consulted    COVID-19  COVID positive; initiated on protocol  CXR with no infiltrate, requiring O2  -doubt that current presentation is due to COVID; will hold remdesivir and dexamethasone  Heparin ppx  inhalers prn  MVI, ascorbic acid, incentive spirometry  statin  supplemental O2, will wean as  tolerated  prn tylenol, loperamide  contact/airborne    Acute renal failure with tubular necrosis  Significant RAQUEL with creatinine of 7.1 from normal just 3 months ago, with acidosis, uremia, hyperkalemia, hyperphosphatemia, and hypocalcemia  Renal ultrasound   -Vila catheter placed  -initiated on CRRT 2/4, continue  Consult Nephrology  Strict I&O's      High anion gap metabolic acidosis  With initial pH of 7.188  Trend ABG  Likely related to acute renal failure  -bicarbonate drip started on admission, now discontinued  -initiated on CRRT  -nephrology following    UTI (urinary tract infection)  Urinalysis relatively unremarkable, may be asymptomatic bacteriuria but in the setting of shock will continue antibiotics  Blood cultures ngtd  IV Rocephin  Trend CBC      Leukocytosis  Likley related to UTI vs COVID19  CXR was with no pneumonia  Urine with UTI  Blood cultures no growth to date  Lactate 1.1  IV Rocephin continued for now  Trend CBC      Unspecified mood (affective) disorder  Resume home meds once taking p.o., chronic      Morbid obesity with BMI of 45.0-49.9, adult  Body mass index is 41.66 kg/m². Morbid obesity complicates all aspects of disease management from diagnostic modalities to treatment.    MARY (obstructive sleep apnea)  Was ordered CPAP qhs per PCP but does not use it  -suspect she has OHS    Disorder of lumbar spine  Osteoarthritis    Hold home pain medications for now      Osteoarthritis  See above      Restless leg syndrome  Hold home Neurontin and Mirapex      Mixed hyperlipidemia  Patient is chronically on statin.will continue for now once able to take p.o.. Monitor clinically. Last LDL was   Lab Results   Component Value Date    LDLCALC 71.0 11/10/2021            Anxiety disorder  Hold Klonopin 2/2 AMS, chronic      Fibromyalgia  Hold home Effexor and Neurontin      Chronic back pain  Will hold home pain medications until improvement in mentation      Essential hypertension  - currently in  "shock  - hold home antihypertensives      Mild acid reflux  - protonix ppx        VTE Risk Mitigation (From admission, onward)         Ordered     heparin 25,000 units in dextrose 5% 250 mL (100 units/mL) infusion (heparin infusion - NO NOMOGRAM)  Continuous        "And" Linked Group Details    02/05/22 1750     heparin (porcine) injection 2,800 Units  As needed (PRN)         02/04/22 1911     heparin (porcine) injection 7,500 Units  Every 8 hours         02/03/22 2251     Place KATERIN hose  Until discontinued         02/03/22 2251     IP VTE HIGH RISK PATIENT  Once         02/03/22 2251     Place sequential compression device  Until discontinued         02/03/22 2251                Discharge Planning   ARNULFO:      Code Status: Full Code   Is the patient medically ready for discharge?:     Reason for patient still in hospital (select all that apply): Patient unstable               Critical care time spent on the evaluation and treatment of severe organ dysfunction, review of pertinent labs and imaging studies, discussions with consulting providers and discussions with patient/family: 35 minutes.      Naren Davenport MD  Department of Hospital Medicine   [facility] - Intensive Care  "

## 2022-02-07 NOTE — ASSESSMENT & PLAN NOTE
Urinalysis relatively unremarkable, may be asymptomatic bacteriuria but in the setting of shock will continue antibiotics  Blood cultures ngtd  IV Rocephin  Trend CBC

## 2022-02-07 NOTE — PLAN OF CARE
Pt very agitated, biting vent pulling self OOB, slashing head around. CRRT clotted off and Planned Rinse back successful. Bmx2 Large with Hair washed d/t blood loss from Trialysis line. Pt vagaled down to 40s with BM. Cleaned and redressed with surgicel. Pt went into aflutter and Afib RVR. Pt required Propofol, increased Levo requirements, Fentanyl. Weaned off precedex but unable to wean off levo. CRRT restarted with Heparin prefilter per Dr. Murray orders. Net even to 100. Delayed start d/t severe agitation. Lines reversed. Placed on a rate on vent. eICU at bedside. MD notified in the am. Safety maintained. SR up x3. Call light in reach. Family not present at bedside.

## 2022-02-07 NOTE — ASSESSMENT & PLAN NOTE
Body mass index is 41.66 kg/m². Morbid obesity complicates all aspects of disease management from diagnostic modalities to treatment.

## 2022-02-07 NOTE — AI DETERIORATION ALERT
Sepsis Screen (most recent)     Sepsis Screen - 02/07/22 7605     Is the patient's history or complaint suggestive of a possible infection? Yes  -JW    Are there at least two of the following signs and symptoms present? Yes  -    Sepsis signs/symptoms - Tachypnea Tachypnea     >20  -    Sepsis signs/symptoms - WBC WBC < 4,000 or WBC > 12,000  -JW    Are any of the following organ dysfunction criteria present and not considered to be due to a chronic condition? Yes  -    Organ Dysfunction Criteria Respirator compromise requiring Bipap, Cpap, or Intubation  -    Start Sepsis Timer No   on ABX -          User Key  (r) = Recorded By, (t) = Taken By, (c) = Cosigned By    Initials Name    JW Kim Carrasquillo RN              Patient screens positive but is on ABX therapy, therefore timer not started. WCTM.

## 2022-02-07 NOTE — PROGRESS NOTES
Nephrology Progress Note     Consult Requested By: Naren Davenport, *  Reason for Consult: RAQUEL     SUBJECTIVE:          Review of Systems   Unable to perform ROS: Acuity of condition       Past Medical History:   Diagnosis Date    RAQUEL (acute kidney injury) 2/3/2022    Anxiety disorder     Bell's palsy     Chronic back pain     Chronic osteoarthritis     Essential tremor     Fibromyalgia     Hypertension     Mild acid reflux     Neck pain     Other and unspecified hyperlipidemia     Sleep apnea     wear c-pap at night; does not use regularly    Unspecified mood (affective) disorder      Past Surgical History:   Procedure Laterality Date    ADENOIDECTOMY      APPENDECTOMY      BLADDER SUSPENSION      BREAST BIOPSY Left     1995  negative    Breast reduction      BREAST SURGERY Bilateral     breast reduction    CARPAL TUNNEL RELEASE Right     COLONOSCOPY  2008    COLONOSCOPY N/A 11/10/2017    Procedure: COLONOSCOPY - Miralax split prep;  Surgeon: Annetta Powell MD;  Location: [facility];  Service: Endoscopy;  Laterality: N/A;    COSMETIC SURGERY Bilateral     breast reduction    CYSTOSCOPY N/A 10/23/2018    Procedure: CYSTOSCOPY;  Surgeon: Feli Garza MD;  Location: [address];  Service: Urology;  Laterality: N/A;    ESOPHAGOGASTRODUODENOSCOPY N/A 5/30/2019    Procedure: ESOPHAGOGASTRODUODENOSCOPY (EGD);  Surgeon: Oscar Wylie MD;  Location: [facility] (4TH FLR);  Service: Endoscopy;  Laterality: N/A;  Off PPI/H2 x 7 days prior- ERW    HYSTERECTOMY      JOINT REPLACEMENT Left     knee    KNEE SURGERY      bilateral    left shoulder Left     rotator cuff    OPEN REDUCTION AND INTERNAL FIXATION (ORIF) OF FRACTURE OF DISTAL RADIUS Right 11/16/2021    Procedure: ORIF, FRACTURE, RADIUS, DISTAL;  Surgeon: Evelio Lynn Jr., MD;  [facility];  Service: Orthopedics;  Laterality: Right;  need accumed plate and mini c arm, Acumed confirmed 11/15/21 AM    PLACEMENT OF  TRANSOBTURATOR TAPE N/A 10/23/2018    Procedure: PLACEMENT, TRANSOBTURATOR TAPE;  Surgeon: Feli Garza MD;  Location: [facility] [address] [place] [address];  Service: Urology;  Laterality: N/A;  1.5 hours    Tonsillectomy      TONSILLECTOMY      TOTAL REDUCTION MAMMOPLASTY          TUBAL LIGATION       Family History   Problem Relation Age of Onset    Diabetes Mother     Tremor Mother     Liver disease Mother     Hypertension Mother     Diabetes Father     Heart disease Father     Hypertension Father     Tremor Son     Kidney disease Son     Diabetes Sister     Diabetes Brother     No Known Problems Daughter     Depression Sister     Kidney disease Son         Reniel failure but corrected    Learning disabilities Son         Slow in some areas    Mental retardation Son         Mild retardation    Tremor Son     Cancer Brother      Social History     Tobacco Use    Smoking status: Former Smoker     Packs/day: 2.00     Years: 30.00     Pack years: 60.00     Types: Cigarettes     Start date:      Quit date: 1990     Years since quittin.5    Smokeless tobacco: Never Used   Substance Use Topics    Alcohol use: No    Drug use: No       Review of patient's allergies indicates:   Allergen Reactions    Hyoscyamine Rash    Msg [glutamic acid] Swelling            OBJECTIVE:     Vital Signs (Most Recent)  Vitals:    22 0615 22 0700 22 0800 22 1021   BP:       Pulse: (!) 123  101 90   Resp: 18  16 20   Temp: (!) 95 °F (35 °C) (!) 95.36 °F (35.2 °C) 96.26 °F (35.7 °C) 97.34 °F (36.3 °C)   TempSrc:  Core Esophageal     SpO2: 100%  100% 97%   Weight:       Height:                     Medications:   ascorbic acid (vitamin C)  500 mg Oral BID    cefTRIAXone (ROCEPHIN) IVPB  1 g Intravenous Q24H    chlorhexidine  15 mL Mouth/Throat BID    heparin (porcine)  7,500 Units Subcutaneous Q8H    hydrocortisone sodium succinate  50 mg Intravenous Q6H    insulin detemir U-100  7 Units  Subcutaneous Daily    lactated ringers  1,000 mL Intravenous Once    multivitamin  1 tablet Oral Daily    mupirocin   Nasal BID    pantoprazole  40 mg Intravenous Daily           Physical Exam  Vitals and nursing note reviewed.   Constitutional:       General: She is not in acute distress.     Appearance: She is obese. She is ill-appearing. She is not diaphoretic.   HENT:      Head: Normocephalic and atraumatic.      Mouth/Throat:      Pharynx: No oropharyngeal exudate.   Eyes:      General: No scleral icterus.     Conjunctiva/sclera: Conjunctivae normal.   Cardiovascular:      Rate and Rhythm: Normal rate and regular rhythm.      Heart sounds: Normal heart sounds. No murmur heard.      Pulmonary:      Effort: No respiratory distress.      Comments: Intubated   Abdominal:      General: There is no distension.      Palpations: Abdomen is soft.      Tenderness: There is no abdominal tenderness.   Musculoskeletal:         General: Swelling present. Normal range of motion.      Cervical back: Normal range of motion and neck supple.   Skin:     General: Skin is warm and dry.      Findings: No erythema.   Neurological:      Cranial Nerves: No cranial nerve deficit.      Comments: Sedated          Laboratory:  Recent Labs   Lab 02/05/22  0446 02/05/22  0446 02/06/22  0529 02/07/22  0511   WBC 12.53  --  17.63* 20.02*   HGB 11.2*  --  12.6 11.0*   HCT 32.8*  --  37.9 32.1*     --  184 147*   MONO 4.5  0.6   < > 7.8  1.4* 2.0*  CANCELED    < > = values in this interval not displayed.     Recent Labs   Lab 02/06/22  1647 02/06/22 2028 02/07/22  0511    134* 137   K 4.6 4.4 3.7   CL 98 97 102   CO2 21* 20* 18*   BUN 25* 27* 31*   CREATININE 1.9* 2.1* 2.2*   CALCIUM 8.5* 8.2* 7.0*   PHOS 2.3* 2.6* 3.9       Diagnostic Results:  X-Ray: Reviewed  US: Reviewed  Echo: Reviewed  ASSESSMENT/PLAN:     1. RAQUEL  - ATN, COVID sepsis, Hypotension, started on CRRT exelent UF   Now tachycardic hypotensive cut back UF to  NET 50ml/hr or even   -- now making urine with gresham   -- UF with CRRT - for now.      2. Hypotension - titrate pressors to MAP> 60-65  3. Anemia of acute illness -   transfuse <7   Recent Labs   Lab 02/05/22  0446 02/06/22  0529 02/07/22  0511   HGB 11.2* 12.6 11.0*   HCT 32.8* 37.9 32.1*    184 147*       Iron  Lab Results   Component Value Date    IRON 60 09/19/2018    TIBC 351 09/19/2018    FERRITIN 668 (H) 02/05/2022       4. MBD (E88.9 M90.80) -  Recent Labs   Lab 02/07/22  0511   CALCIUM 7.0*   PHOS 3.9     Recent Labs   Lab 02/05/22  2312 02/06/22  1643 02/07/22  0037   MG 2.2 2.5  2.5 2.4       Lab Results   Component Value Date    CALCIUM 7.0 (L) 02/07/2022    PHOS 3.9 02/07/2022     No results found for: [id number]    Lab Results   Component Value Date    CO2 18 (L) 02/07/2022     Acidosis - correct with CRRT  -- check Lactic acid   -- Ph at range   5. Hemodialysis Access (Z99.2 V45.11)- CVC  6. Nutrition/Hypoalbuminemia (E88.09) -    Recent Labs   Lab 02/06/22 2028 02/07/22  0511   ALBUMIN 3.0* 2.3*     Nepro with meals TID. Renal vitamins daily      Thank you for the consult, will follow  With any question please call [phone]  Krystina Mayer MD    [facility]  FRANCI Madison MD, FACP,   ELAINE Prieto MD,   MD MESHA Staples MD E. V. Harmon, NP    [address]  [place], [address] [phone]

## 2022-02-07 NOTE — ASSESSMENT & PLAN NOTE
Juanis related to UTI vs COVID19  CXR was with no pneumonia  Urine with UTI  Blood cultures no growth to date  Lactate 1.1  IV Rocephin continued for now  Trend CBC

## 2022-02-07 NOTE — ASSESSMENT & PLAN NOTE
Significant RAQUEL with creatinine of 7.1 from normal just 3 months ago, with acidosis, uremia, hyperkalemia, hyperphosphatemia, and hypocalcemia  Renal ultrasound   -Vila catheter placed  -initiated on CRRT 2/4, continue  Consult Nephrology  Strict I&O's

## 2022-02-07 NOTE — EICU
EICU Physician Brief Note - Overnight Events    Called for tachycardia 130, EKG show s a flutter. Hypotensive requiring increase in levophed. On CRRT (started 3 hours prior to this episode).  Plan:  Check ABG.  Add on Mag level.  Amio bolus and drip.

## 2022-02-07 NOTE — ASSESSMENT & PLAN NOTE
-likely distributive due to acidosis and renal failure versus septic shock  -currently requiring Levophed with addition of vasopressin and stress dose steroids today  -nephrology consulted for CRRT  -continue IV antibiotics for possible infectious source, thought less likely  -possibly worsening due to too much volume removal  -COVID therapy as above

## 2022-02-07 NOTE — PROGRESS NOTES
"[facility] Progress Note    Subjective:      Overnight, patient became agitated;/and had around.  CRRT clotted off and rinse back unsuccessful.  Patient subsequently went on to a flutter with RVR with heart rate of 146. Started on amiodarone with improvement in rate but required propofol and increasing levo and fentanyl and addition of vaso.  CRRT restarted 6 hours later.  Delayed start due to severe agitation.  Precedex also wean down due to vagal down to 40s with bowel movement.  Still with minimal urine output.  4.6 L removed with CRRT     Objective:     Last 24 Hour Vital Signs:  BP  Min: 80/50  Max: 189/77  Temp  Av °F (36.1 °C)  Min: 94.64 °F (34.8 °C)  Max: 100.22 °F (37.9 °C)  Pulse  Av.7  Min: 89  Max: 140  Resp  Av.6  Min: 12  Max: 49  SpO2  Av.5 %  Min: 86 %  Max: 100 %  Height  Av' 1" (154.9 cm)  Min: 5' 1" (154.9 cm)  Max: 5' 1" (154.9 cm)  Weight  Av kg (220 lb 7.4 oz)  Min: 100 kg (220 lb 7.4 oz)  Max: 100 kg (220 lb 7.4 oz)  I/O last 3 completed shifts:  In: 4900 [I.V.:2260.6; NG/GT:1950; IV Piggyback:689.3]  Out: 9469 [Urine:85; Other:9384]    Physical Examination:  General: arouses with stimulation, doesn't follow commands  HEENT: PERRL, mucus membranes moist   Cardiac: normal rate, regular rhythm  Respiratory: mechanical breath sounds  Abdomen: Soft, ND. +BS.   Extremities: Edema bilateral LE  Neuro: patient sedated. Arouses with stimulation but not following commands     Laboratory:  Trended Lab Data:  Recent Labs   Lab 22  0446 22  0529 22  0511   WBC 12.53 17.63* 20.02*   HGB 11.2* 12.6 11.0*   HCT 32.8* 37.9 32.1*    184 147*       Recent Labs   Lab 22  2312 22  0529 22  1643 22  1647 22  0037 22  0511   *   < >  --  138 134*  --  137   K 4.0   < >  --  4.6 4.4  --  3.7      < >  --  98 97  --  102   CO2 19*   < >  --  21* 20*  --  18*   BUN 34*   < >  --  " 25* 27*  --  31*   CREATININE 2.0*   < >  --  1.9* 2.1*  --  2.2*   *   < >  --  226* 342*  --  216*   CALCIUM 7.4*   < >  --  8.5* 8.2*  --  7.0*   MG 2.2  --  2.5  2.5  --   --  2.4  --    PHOS 2.6*   < >  --  2.3* 2.6*  --  3.9    < > = values in this interval not displayed.       Recent Labs   Lab 02/03/22 2152 02/03/22 2152 02/04/22  0738 02/04/22  1236 02/05/22  0446 02/05/22  1407 02/06/22  1647 02/06/22 2028 02/07/22  0511   PROT 6.9  --  5.6*  --  5.1*  --   --   --   --    ALBUMIN 3.7   < > 3.1*   < > 2.6*  2.6*   < > 3.2* 3.0* 2.3*   BILITOT 0.5  --  0.3  --  0.3  --   --   --   --    AST 84*  --  97*  --  77*  --   --   --   --    ALT 43  --  49*  --  52*  --   --   --   --    ALKPHOS 90  --  97  --  121  --   --   --   --     < > = values in this interval not displayed.     Cardiac:   Recent Labs   Lab 02/03/22 2129 02/03/22 2152   TROPONINI  --  0.021   BNP 34  --      Radiology:  Reviewed.    Current Medications:     Infusions:   sodium chloride 0.9% Stopped (02/04/22 2000)    sodium chloride 0.9% 5 mL/hr at 02/07/22 0615    amiodarone in dextrose 5% 0.5 mg/min (02/07/22 1130)    dexmedetomidine (PRECEDEX) infusion Stopped (02/06/22 2338)    fentanyl 25 mcg/hr (02/07/22 0615)    heparin (porcine) in 5 % dex 1,000 Units/hr (02/07/22 0615)    NORepinephrine bitartrate-D5W 0.36 mcg/kg/min (02/07/22 0615)    propofoL 25 mcg/kg/min (02/07/22 1051)    vasopressin 0.04 Units/min (02/07/22 0911)        Scheduled:   ascorbic acid (vitamin C)  500 mg Oral BID    cefTRIAXone (ROCEPHIN) IVPB  1 g Intravenous Q24H    chlorhexidine  15 mL Mouth/Throat BID    heparin (porcine)  7,500 Units Subcutaneous Q8H    hydrocortisone sodium succinate  50 mg Intravenous Q6H    insulin detemir U-100  7 Units Subcutaneous Daily    lactated ringers  1,000 mL Intravenous Once    multivitamin  1 tablet Oral Daily    mupirocin   Nasal BID    pantoprazole  40 mg Intravenous Daily     Assessment:      Patient is a 71-year-old female with a past medical history of hypertension, hyperlipidemia, GERD, obesity, sleep apnea, osteoarthritis, chronic back pain, central tremor, fibromyalgia, anxiety, and mood disorder was admitted to [facility] on 2/3/22 for acute hypercapnic respiratory failure and RAQUEL.  Admitted to the ICU on BiPAP, now s/p intubation. CRRT started yesterday. Is producing some urine. Will require careful titration of vent settings to not overcorrect PCO2 as patient is a chronic CO2 retainer. Continues to oxygenate well.    Plan:     CNS/Neuro  #Acute encephalopathy  - Patient presented with altered mental status after 2 week of progressive fatigue and shortness of breath  - Initial ABG with a pH of 7.19, pCO2 65, PO2 of 117 on nasal cannula  - Etiology likely secondary to acidemia and hypercapnia  - Now intubated, sedated   - Plan to leave on sedation tonight and trial SAT tomorrow     Cardiovascular  #Shock  - Patient became hypotensive shortly after presenting to the ED requiring use of pressors for blood pressure support  - Echo remarkable for grade II diastolic dysfunction; hyperdynamic with EF 75%  - Elevated white count on presentation to the ED of 19; lactate WNL  - UA with 3+ leukocytes and many bacteria; now on ceftriaxone   - Blood cultures obtained; NGTD  - Continue Levophed to maintain map greater than 65     Respiratory  #Acute hypercapnic respiratory failure  #MARY noncompliant with CPAP  #Obesity hypoventilation syndrome?  #COVID  - Patient presented with progressively worsening fatigue and weakness after recent diagnosis of COVID infection.  History of MARY but noncompliant with CPAP (has machine)  - ABG on presentation to the ED pH 7.19, pCO2 of 65, PO2 of 117 on nasal cannula  - Placed on BiPAP in the ED pH 7.12, pCO2 72 PO2 101  - Patient now intubated, mechanically ventilated 2/2 worsening hypercapnia despite continuous BiPAP 2/4  - Continues to oxygenate well  - Adequate  volume removed with CRRT, stop pulling fluid off     GI/Metabolic  TRI     Renal  #RAQUEL  #AGMA  #Volume overload  - Patient presents to the ED with a BUN/creatinine of 69/7.1  - Noted history of renal disease  - Bicarb 15; anion gap 19 (previous bicarb in the 30s)  - ABG on presentation to the ED as above  - s/p bicarb drip  - Trialysis line placed in the ICU  - Received 1 L of LR and started on continuous LR 150cc/hr in the ED for perceived sepsis  - Adequate volume removed with CRRT, stop pulling fluid off  - Mild setback of CRRT due to clotting off 2/6; will remained on sedation and continue with CRRT for next 24 hours     Heme  #Anemia  - Hgb 11.2  - Continue to monitor     Endo   - Strict Glucose control with goal 140-180     ID  #Undifferentiated shock  #UTI  - Continue ceftriaxone for UTI tx  - Afebrile, WBC 12; now increasing  - Still requiring levo to maintain MAP and now with addition of vaso  - Stress dose steroids added 2/7  - Continue to follow cultures      GI Ppx:  Protonix  Diet: NPO  DVT Ppx: hep     Thank you for allowing us to participate in the care of this patient. We will continue to follow at this time. Please call with questions.    Bud Jauregui MD  [facility]

## 2022-02-08 LAB
ALBUMIN SERPL BCP-MCNC: 2.4 G/DL (ref 3.5–5.2)
ALBUMIN SERPL BCP-MCNC: 2.5 G/DL (ref 3.5–5.2)
ALLENS TEST: ABNORMAL
ALP SERPL-CCNC: 117 U/L (ref 55–135)
ALT SERPL W/O P-5'-P-CCNC: 25 U/L (ref 10–44)
ANION GAP SERPL CALC-SCNC: 10 MMOL/L (ref 8–16)
ANION GAP SERPL CALC-SCNC: 11 MMOL/L (ref 8–16)
ANION GAP SERPL CALC-SCNC: 12 MMOL/L (ref 8–16)
ANION GAP SERPL CALC-SCNC: 13 MMOL/L (ref 8–16)
APTT BLDCRRT: 65.8 SEC (ref 21–32)
AST SERPL-CCNC: 18 U/L (ref 10–40)
BASOPHILS # BLD AUTO: ABNORMAL K/UL (ref 0–0.2)
BASOPHILS NFR BLD: 0 % (ref 0–1.9)
BILIRUB SERPL-MCNC: 0.3 MG/DL (ref 0.1–1)
BUN SERPL-MCNC: 20 MG/DL (ref 8–23)
BUN SERPL-MCNC: 23 MG/DL (ref 8–23)
BUN SERPL-MCNC: 24 MG/DL (ref 8–23)
BUN SERPL-MCNC: 26 MG/DL (ref 8–23)
CALCIUM SERPL-MCNC: 7.4 MG/DL (ref 8.7–10.5)
CALCIUM SERPL-MCNC: 7.9 MG/DL (ref 8.7–10.5)
CALCIUM SERPL-MCNC: 7.9 MG/DL (ref 8.7–10.5)
CALCIUM SERPL-MCNC: 8.2 MG/DL (ref 8.7–10.5)
CHLORIDE SERPL-SCNC: 102 MMOL/L (ref 95–110)
CHLORIDE SERPL-SCNC: 103 MMOL/L (ref 95–110)
CHLORIDE SERPL-SCNC: 98 MMOL/L (ref 95–110)
CHLORIDE SERPL-SCNC: 99 MMOL/L (ref 95–110)
CO2 SERPL-SCNC: 21 MMOL/L (ref 23–29)
CO2 SERPL-SCNC: 22 MMOL/L (ref 23–29)
CO2 SERPL-SCNC: 22 MMOL/L (ref 23–29)
CO2 SERPL-SCNC: 23 MMOL/L (ref 23–29)
CORTIS SERPL-MCNC: 14.2 UG/DL (ref 4.3–22.4)
CREAT SERPL-MCNC: 0.8 MG/DL (ref 0.5–1.4)
CREAT SERPL-MCNC: 0.9 MG/DL (ref 0.5–1.4)
CREAT SERPL-MCNC: 1.1 MG/DL (ref 0.5–1.4)
CREAT SERPL-MCNC: 1.2 MG/DL (ref 0.5–1.4)
DELSYS: ABNORMAL
DELSYS: ABNORMAL
DIFFERENTIAL METHOD: ABNORMAL
EOSINOPHIL # BLD AUTO: ABNORMAL K/UL (ref 0–0.5)
EOSINOPHIL NFR BLD: 4 % (ref 0–8)
EP: 10
ERYTHROCYTE [DISTWIDTH] IN BLOOD BY AUTOMATED COUNT: 13.3 % (ref 11.5–14.5)
ERYTHROCYTE [SEDIMENTATION RATE] IN BLOOD BY WESTERGREN METHOD: 22 MM/H
EST. GFR  (AFRICAN AMERICAN): 52 ML/MIN/1.73 M^2
EST. GFR  (AFRICAN AMERICAN): 58 ML/MIN/1.73 M^2
EST. GFR  (AFRICAN AMERICAN): >60 ML/MIN/1.73 M^2
EST. GFR  (AFRICAN AMERICAN): >60 ML/MIN/1.73 M^2
EST. GFR  (NON AFRICAN AMERICAN): 45 ML/MIN/1.73 M^2
EST. GFR  (NON AFRICAN AMERICAN): 50 ML/MIN/1.73 M^2
EST. GFR  (NON AFRICAN AMERICAN): >60 ML/MIN/1.73 M^2
EST. GFR  (NON AFRICAN AMERICAN): >60 ML/MIN/1.73 M^2
FERRITIN SERPL-MCNC: 397 NG/ML (ref 20–300)
FIO2: 21
FIO2: 21
GLUCOSE SERPL-MCNC: 147 MG/DL (ref 70–110)
GLUCOSE SERPL-MCNC: 161 MG/DL (ref 70–110)
GLUCOSE SERPL-MCNC: 255 MG/DL (ref 70–110)
GLUCOSE SERPL-MCNC: 303 MG/DL (ref 70–110)
HCO3 UR-SCNC: 23.4 MMOL/L (ref 24–28)
HCO3 UR-SCNC: 25.1 MMOL/L (ref 24–28)
HCT VFR BLD AUTO: 27.9 % (ref 37–48.5)
HGB BLD-MCNC: 9.7 G/DL (ref 12–16)
HYPOCHROMIA BLD QL SMEAR: ABNORMAL
IMM GRANULOCYTES # BLD AUTO: ABNORMAL K/UL (ref 0–0.04)
IMM GRANULOCYTES NFR BLD AUTO: ABNORMAL % (ref 0–0.5)
IP: 18
LDH SERPL L TO P-CCNC: 469 U/L (ref 110–260)
LYMPHOCYTES # BLD AUTO: ABNORMAL K/UL (ref 1–4.8)
LYMPHOCYTES NFR BLD: 4 % (ref 18–48)
MAGNESIUM SERPL-MCNC: 2.4 MG/DL (ref 1.6–2.6)
MAGNESIUM SERPL-MCNC: 2.5 MG/DL (ref 1.6–2.6)
MAGNESIUM SERPL-MCNC: 2.5 MG/DL (ref 1.6–2.6)
MCH RBC QN AUTO: 31.8 PG (ref 27–31)
MCHC RBC AUTO-ENTMCNC: 34.8 G/DL (ref 32–36)
MCV RBC AUTO: 92 FL (ref 82–98)
METAMYELOCYTES NFR BLD MANUAL: 2 %
MODE: ABNORMAL
MODE: ABNORMAL
MONOCYTES # BLD AUTO: ABNORMAL K/UL (ref 0.3–1)
MONOCYTES NFR BLD: 5 % (ref 4–15)
MYELOCYTES NFR BLD MANUAL: 2 %
NEUTROPHILS NFR BLD: 76 % (ref 38–73)
NEUTS BAND NFR BLD MANUAL: 7 %
NRBC BLD-RTO: 0 /100 WBC
PCO2 BLDA: 39.7 MMHG (ref 35–45)
PCO2 BLDA: 41.3 MMHG (ref 35–45)
PEEP: 5
PH SMN: 7.38 [PH] (ref 7.35–7.45)
PH SMN: 7.39 [PH] (ref 7.35–7.45)
PHOSPHATE SERPL-MCNC: 2.2 MG/DL (ref 2.7–4.5)
PHOSPHATE SERPL-MCNC: 2.5 MG/DL (ref 2.7–4.5)
PHOSPHATE SERPL-MCNC: 3.1 MG/DL (ref 2.7–4.5)
PLATELET # BLD AUTO: 137 K/UL (ref 150–450)
PLATELET BLD QL SMEAR: ABNORMAL
PMV BLD AUTO: 10.7 FL (ref 9.2–12.9)
PO2 BLDA: 73 MMHG (ref 80–100)
PO2 BLDA: 89 MMHG (ref 80–100)
POC BE: -2 MMOL/L
POC BE: 0 MMOL/L
POC SATURATED O2: 94 % (ref 95–100)
POC SATURATED O2: 97 % (ref 95–100)
POC TCO2: 25 MMOL/L (ref 23–27)
POC TCO2: 26 MMOL/L (ref 23–27)
POCT GLUCOSE: 106 MG/DL (ref 70–110)
POCT GLUCOSE: 160 MG/DL (ref 70–110)
POCT GLUCOSE: 215 MG/DL (ref 70–110)
POCT GLUCOSE: 264 MG/DL (ref 70–110)
POLYCHROMASIA BLD QL SMEAR: ABNORMAL
POTASSIUM SERPL-SCNC: 3.8 MMOL/L (ref 3.5–5.1)
POTASSIUM SERPL-SCNC: 4.4 MMOL/L (ref 3.5–5.1)
POTASSIUM SERPL-SCNC: 4.4 MMOL/L (ref 3.5–5.1)
POTASSIUM SERPL-SCNC: 4.6 MMOL/L (ref 3.5–5.1)
PROT SERPL-MCNC: 4.9 G/DL (ref 6–8.4)
RBC # BLD AUTO: 3.05 M/UL (ref 4–5.4)
SAMPLE: ABNORMAL
SAMPLE: ABNORMAL
SITE: ABNORMAL
SITE: ABNORMAL
SODIUM SERPL-SCNC: 131 MMOL/L (ref 136–145)
SODIUM SERPL-SCNC: 132 MMOL/L (ref 136–145)
SODIUM SERPL-SCNC: 136 MMOL/L (ref 136–145)
SODIUM SERPL-SCNC: 137 MMOL/L (ref 136–145)
VANCOMYCIN SERPL-MCNC: 12.4 UG/ML
VT: 350
WBC # BLD AUTO: 21.82 K/UL (ref 3.9–12.7)

## 2022-02-08 PROCEDURE — C1751 CATH, INF, PER/CENT/MIDLINE: HCPCS

## 2022-02-08 PROCEDURE — 94660 CPAP INITIATION&MGMT: CPT

## 2022-02-08 PROCEDURE — 85027 COMPLETE CBC AUTOMATED: CPT | Performed by: STUDENT IN AN ORGANIZED HEALTH CARE EDUCATION/TRAINING PROGRAM

## 2022-02-08 PROCEDURE — 25000003 PHARM REV CODE 250: Performed by: INTERNAL MEDICINE

## 2022-02-08 PROCEDURE — 80069 RENAL FUNCTION PANEL: CPT | Mod: 91 | Performed by: HOSPITALIST

## 2022-02-08 PROCEDURE — 82803 BLOOD GASES ANY COMBINATION: CPT

## 2022-02-08 PROCEDURE — 63600175 PHARM REV CODE 636 W HCPCS: Performed by: INTERNAL MEDICINE

## 2022-02-08 PROCEDURE — 99900035 HC TECH TIME PER 15 MIN (STAT)

## 2022-02-08 PROCEDURE — 83735 ASSAY OF MAGNESIUM: CPT | Mod: 91 | Performed by: HOSPITALIST

## 2022-02-08 PROCEDURE — 87040 BLOOD CULTURE FOR BACTERIA: CPT | Performed by: STUDENT IN AN ORGANIZED HEALTH CARE EDUCATION/TRAINING PROGRAM

## 2022-02-08 PROCEDURE — 25000003 PHARM REV CODE 250: Performed by: STUDENT IN AN ORGANIZED HEALTH CARE EDUCATION/TRAINING PROGRAM

## 2022-02-08 PROCEDURE — 90945 DIALYSIS ONE EVALUATION: CPT

## 2022-02-08 PROCEDURE — 63600175 PHARM REV CODE 636 W HCPCS: Performed by: STUDENT IN AN ORGANIZED HEALTH CARE EDUCATION/TRAINING PROGRAM

## 2022-02-08 PROCEDURE — 80202 ASSAY OF VANCOMYCIN: CPT | Performed by: HOSPITALIST

## 2022-02-08 PROCEDURE — 94003 VENT MGMT INPAT SUBQ DAY: CPT

## 2022-02-08 PROCEDURE — 82533 TOTAL CORTISOL: CPT | Performed by: HOSPITALIST

## 2022-02-08 PROCEDURE — 36569 INSJ PICC 5 YR+ W/O IMAGING: CPT

## 2022-02-08 PROCEDURE — 25000003 PHARM REV CODE 250: Performed by: NURSE PRACTITIONER

## 2022-02-08 PROCEDURE — 27000190 HC CPAP FULL FACE MASK W/VALVE

## 2022-02-08 PROCEDURE — 37799 UNLISTED PX VASCULAR SURGERY: CPT

## 2022-02-08 PROCEDURE — 85007 BL SMEAR W/DIFF WBC COUNT: CPT | Performed by: STUDENT IN AN ORGANIZED HEALTH CARE EDUCATION/TRAINING PROGRAM

## 2022-02-08 PROCEDURE — 63600175 PHARM REV CODE 636 W HCPCS: Performed by: NURSE PRACTITIONER

## 2022-02-08 PROCEDURE — 80100008 HC CRRT DAILY MAINTENANCE

## 2022-02-08 PROCEDURE — 36600 WITHDRAWAL OF ARTERIAL BLOOD: CPT

## 2022-02-08 PROCEDURE — 20000000 HC ICU ROOM

## 2022-02-08 PROCEDURE — C9113 INJ PANTOPRAZOLE SODIUM, VIA: HCPCS | Performed by: HOSPITALIST

## 2022-02-08 PROCEDURE — 25000003 PHARM REV CODE 250: Performed by: HOSPITALIST

## 2022-02-08 PROCEDURE — 80053 COMPREHEN METABOLIC PANEL: CPT | Performed by: STUDENT IN AN ORGANIZED HEALTH CARE EDUCATION/TRAINING PROGRAM

## 2022-02-08 PROCEDURE — 94761 N-INVAS EAR/PLS OXIMETRY MLT: CPT

## 2022-02-08 PROCEDURE — 85730 THROMBOPLASTIN TIME PARTIAL: CPT | Performed by: HOSPITALIST

## 2022-02-08 PROCEDURE — 63600175 PHARM REV CODE 636 W HCPCS: Performed by: HOSPITALIST

## 2022-02-08 PROCEDURE — 27000207 HC ISOLATION

## 2022-02-08 RX ORDER — OLANZAPINE 10 MG/2ML
5 INJECTION, POWDER, FOR SOLUTION INTRAMUSCULAR ONCE AS NEEDED
Status: DISCONTINUED | OUTPATIENT
Start: 2022-02-08 | End: 2022-02-09

## 2022-02-08 RX ORDER — OLANZAPINE 10 MG/2ML
5 INJECTION, POWDER, FOR SOLUTION INTRAMUSCULAR ONCE
Status: DISCONTINUED | OUTPATIENT
Start: 2022-02-08 | End: 2022-02-08

## 2022-02-08 RX ORDER — MAGNESIUM SULFATE HEPTAHYDRATE 40 MG/ML
2 INJECTION, SOLUTION INTRAVENOUS
Status: ACTIVE | OUTPATIENT
Start: 2022-02-08 | End: 2022-02-09

## 2022-02-08 RX ORDER — SODIUM CHLORIDE 0.9 % (FLUSH) 0.9 %
10 SYRINGE (ML) INJECTION
Status: DISCONTINUED | OUTPATIENT
Start: 2022-02-08 | End: 2022-02-16 | Stop reason: HOSPADM

## 2022-02-08 RX ORDER — SODIUM CHLORIDE 0.9 % (FLUSH) 0.9 %
10 SYRINGE (ML) INJECTION EVERY 6 HOURS
Status: DISCONTINUED | OUTPATIENT
Start: 2022-02-08 | End: 2022-02-16 | Stop reason: HOSPADM

## 2022-02-08 RX ADMIN — OXYCODONE HYDROCHLORIDE AND ACETAMINOPHEN 500 MG: 500 TABLET ORAL at 09:02

## 2022-02-08 RX ADMIN — HYDROCORTISONE SODIUM SUCCINATE 50 MG: 100 INJECTION, POWDER, FOR SOLUTION INTRAMUSCULAR; INTRAVENOUS at 07:02

## 2022-02-08 RX ADMIN — DEXTROSE 0.5 MG/MIN: 5 SOLUTION INTRAVENOUS at 09:02

## 2022-02-08 RX ADMIN — HEPARIN SODIUM 1000 UNITS/HR: 5000 INJECTION, SOLUTION INTRAVENOUS; SUBCUTANEOUS at 10:02

## 2022-02-08 RX ADMIN — HEPARIN SODIUM 7500 UNITS: 5000 INJECTION, SOLUTION INTRAVENOUS; SUBCUTANEOUS at 05:02

## 2022-02-08 RX ADMIN — MUPIROCIN: 20 OINTMENT TOPICAL at 09:02

## 2022-02-08 RX ADMIN — PROPOFOL 45 MCG/KG/MIN: 10 INJECTION, EMULSION INTRAVENOUS at 05:02

## 2022-02-08 RX ADMIN — HYDROCORTISONE SODIUM SUCCINATE 50 MG: 100 INJECTION, POWDER, FOR SOLUTION INTRAMUSCULAR; INTRAVENOUS at 05:02

## 2022-02-08 RX ADMIN — PANTOPRAZOLE SODIUM 40 MG: 40 INJECTION, POWDER, FOR SOLUTION INTRAVENOUS at 09:02

## 2022-02-08 RX ADMIN — SODIUM PHOSPHATE, MONOBASIC, MONOHYDRATE 20.01 MMOL: 276; 142 INJECTION, SOLUTION INTRAVENOUS at 12:02

## 2022-02-08 RX ADMIN — THERA TABS 1 TABLET: TAB at 09:02

## 2022-02-08 RX ADMIN — DEXMEDETOMIDINE HYDROCHLORIDE 1.2 MCG/KG/HR: 4 INJECTION, SOLUTION INTRAVENOUS at 05:02

## 2022-02-08 RX ADMIN — CEFTRIAXONE SODIUM 1 G: 1 INJECTION, POWDER, FOR SOLUTION INTRAMUSCULAR; INTRAVENOUS at 09:02

## 2022-02-08 RX ADMIN — CHLORHEXIDINE GLUCONATE 0.12% ORAL RINSE 15 ML: 1.2 LIQUID ORAL at 09:02

## 2022-02-08 RX ADMIN — DEXMEDETOMIDINE HYDROCHLORIDE 1.2 MCG/KG/HR: 4 INJECTION, SOLUTION INTRAVENOUS at 02:02

## 2022-02-08 RX ADMIN — HEPARIN SODIUM 7500 UNITS: 5000 INJECTION, SOLUTION INTRAVENOUS; SUBCUTANEOUS at 09:02

## 2022-02-08 RX ADMIN — HYDROCORTISONE SODIUM SUCCINATE 50 MG: 100 INJECTION, POWDER, FOR SOLUTION INTRAMUSCULAR; INTRAVENOUS at 12:02

## 2022-02-08 RX ADMIN — VASOPRESSIN 0.04 UNITS/MIN: 20 INJECTION INTRAVENOUS at 10:02

## 2022-02-08 RX ADMIN — PROPOFOL 50 MCG/KG/MIN: 10 INJECTION, EMULSION INTRAVENOUS at 02:02

## 2022-02-08 RX ADMIN — INSULIN ASPART 3 UNITS: 100 INJECTION, SOLUTION INTRAVENOUS; SUBCUTANEOUS at 05:02

## 2022-02-08 RX ADMIN — VANCOMYCIN HYDROCHLORIDE 1750 MG: 10 INJECTION, POWDER, LYOPHILIZED, FOR SOLUTION INTRAVENOUS at 10:02

## 2022-02-08 RX ADMIN — DEXMEDETOMIDINE HYDROCHLORIDE 1.3 MCG/KG/HR: 4 INJECTION, SOLUTION INTRAVENOUS at 09:02

## 2022-02-08 RX ADMIN — FLUDROCORTISONE ACETATE 100 MCG: 0.1 TABLET ORAL at 09:02

## 2022-02-08 RX ADMIN — MUPIROCIN: 20 OINTMENT TOPICAL at 10:02

## 2022-02-08 NOTE — PROGRESS NOTES
"[facility] Progress Note    Subjective:      Did well on CRRT overnight with no clotting off.  Remains on sedation with propofol and fentanyl overnight.  The sedation turned off this morning for SBT/SAT.  Passing SVT.  Plan for extubation today to BiPAP.  See plan of care discussing patient's family discussion concerning goals of care.    Objective:     Last 24 Hour Vital Signs:  BP  Min: 82/48  Max: 150/60  Temp  Av °F (36.1 °C)  Min: 88.7 °F (31.5 °C)  Max: 98.78 °F (37.1 °C)  Pulse  Av.9  Min: 72  Max: 113  Resp  Av.5  Min: 17  Max: 35  SpO2  Av.6 %  Min: 92 %  Max: 100 %  Height  Av' 1" (154.9 cm)  Min: 5' 1" (154.9 cm)  Max: 5' 1" (154.9 cm)  Weight  Av.7 kg (222 lb 1.8 oz)  Min: 100 kg (220 lb 7.4 oz)  Max: 101.5 kg (223 lb 12.3 oz)  I/O last 3 completed shifts:  In: 7365.2 [I.V.:3297.5; NG/GT:1790; IV Piggyback:2277.7]  Out: 4126 [Urine:180; Other:3946]    Physical Examination:  General: arouses with stimulation, doesn't follow commands  HEENT: PERRL, mucus membranes moist   Cardiac: normal rate, regular rhythm  Respiratory: mechanical breath sounds  Abdomen: Soft, ND. +BS.   Extremities: Edema bilateral LE  Neuro: patient sedated. Arouses with stimulation but not following commands     Laboratory:  Trended Lab Data:  Recent Labs   Lab 22  0529 22  0511 22  0809   WBC 17.63* 20.02* 21.82*   HGB 12.6 11.0* 9.7*   HCT 37.9 32.1* 27.9*    147* 137*       Recent Labs   Lab 22  1458 22  1458 22  1756 22  2229 22  0530 22  0809   *   < >  --  133* 132* 131*   K 3.9   < >  --  4.3 4.4 3.8   CL 99   < >  --  99 98 99   CO2 21*   < >  --  20* 21* 22*   BUN 29*   < >  --  26* 24* 26*   CREATININE 1.6*   < >  --  1.3 1.2 1.1   *   < >  --  248* 303* 255*   CALCIUM 7.5*   < >  --  7.7* 7.9* 7.4*   MG 2.2   < > 2.3 2.4 2.4  --    PHOS 2.2*  --   --  2.5* 3.1  --     < > = values in this interval not " displayed.       Recent Labs   Lab 02/04/22  0738 02/04/22  1236 02/05/22  0446 02/05/22  1407 02/07/22  2229 02/08/22  0530 02/08/22  0809   PROT 5.6*  --  5.1*  --   --   --  4.9*   ALBUMIN 3.1*   < > 2.6*  2.6*   < > 2.5* 2.5* 2.4*   BILITOT 0.3  --  0.3  --   --   --  0.3   AST 97*  --  77*  --   --   --  18   ALT 49*  --  52*  --   --   --  25   ALKPHOS 97  --  121  --   --   --  117    < > = values in this interval not displayed.     Cardiac:   Recent Labs   Lab 02/03/22 2129 02/03/22 2152   TROPONINI  --  0.021   BNP 34  --      Radiology:  Reviewed.    Current Medications:     Infusions:   sodium chloride 0.9% Stopped (02/04/22 2000)    sodium chloride 0.9% 5 mL/hr at 02/08/22 0629    amiodarone in dextrose 5% 0.5 mg/min (02/08/22 0906)    dexmedetomidine (PRECEDEX) infusion Stopped (02/06/22 2338)    fentanyl 112.5 mcg/hr (02/08/22 0629)    heparin (porcine) in 5 % dex 1,000 Units/hr (02/08/22 0629)    NORepinephrine bitartrate-D5W 0.12 mcg/kg/min (02/08/22 0629)    propofoL 40 mcg/kg/min (02/08/22 0629)    vasopressin 0.04 Units/min (02/08/22 0629)        Scheduled:   ascorbic acid (vitamin C)  500 mg Oral BID    cefTRIAXone (ROCEPHIN) IVPB  1 g Intravenous Q24H    chlorhexidine  15 mL Mouth/Throat BID    fludrocortisone  100 mcg Per G Tube Daily    heparin (porcine)  7,500 Units Subcutaneous Q8H    hydrocortisone sodium succinate  50 mg Intravenous Q6H    insulin detemir U-100  15 Units Subcutaneous Daily    multivitamin  1 tablet Oral Daily    mupirocin   Nasal BID    pantoprazole  40 mg Intravenous Daily     Assessment:     Patient is a 71-year-old female with a past medical history of hypertension, hyperlipidemia, GERD, obesity, sleep apnea, osteoarthritis, chronic back pain, central tremor, fibromyalgia, anxiety, and mood disorder was admitted to [facility] on 2/3/22 for acute hypercapnic respiratory failure and RAQUEL.  Admitted to the ICU on BiPAP, now s/p intubation. CRRT  started yesterday. Is producing some urine. Will require careful titration of vent settings to not overcorrect PCO2 as patient is a chronic CO2 retainer. Continues to oxygenate well.    Plan:     CNS/Neuro  #Acute encephalopathy  - Patient presented with altered mental status after 2 week of progressive fatigue and shortness of breath  - Initial ABG with a pH of 7.19, pCO2 65, PO2 of 117 on nasal cannula  - Etiology likely secondary to acidemia and hypercapnia  - Now intubated, sedated   - Sedation weaned off; plan for extubation today     Cardiovascular  #Shock  - Patient became hypotensive shortly after presenting to the ED requiring use of pressors for blood pressure support  - Echo remarkable for grade II diastolic dysfunction; hyperdynamic with EF 75%  - Elevated white count on presentation to the ED of 19; lactate WNL  - UA with 3+ leukocytes and many bacteria; now on ceftriaxone   - Blood cultures obtained; NGTD  - Continue Levophed to maintain map greater than 65  - PM cortisol 2.2; a.m. cortisol 2/8 pending  - Plan for bedside echo today     Respiratory  #Acute hypercapnic respiratory failure  #MARY noncompliant with CPAP  #Obesity hypoventilation syndrome?  #COVID  - Patient presented with progressively worsening fatigue and weakness after recent diagnosis of COVID infection.  History of MARY but noncompliant with CPAP (has machine)  - ABG on presentation to the ED pH 7.19, pCO2 of 65, PO2 of 117 on nasal cannula  - Placed on BiPAP in the ED pH 7.12, pCO2 72 PO2 101  - Patient now intubated, mechanically ventilated 2/2 worsening hypercapnia despite continuous BiPAP 2/4  - Continues to oxygenate well  - Adequate volume removed with CRRT, stop pulling fluid off  - Doing well on SAT/SBT 2/8; plan for extubation today to BiPAP 18/10     GI/Metabolic  TRI     Renal  #RAQUEL  #AGMA  #Volume overload  - Patient presents to the ED with a BUN/creatinine of 69/7.1  - Noted history of renal disease  - Bicarb 15; anion gap  19 (previous bicarb in the 30s)  - ABG on presentation to the ED as above  - s/p bicarb drip  - Trialysis line placed in the ICU  - Received 1 L of LR and started on continuous LR 150cc/hr in the ED for perceived sepsis  - Adequate volume removed with CRRT, stop pulling fluid off  - Mild setback of CRRT due to clotting off 2/6; will remained on sedation and continue with CRRT for next 24 hours     Heme  #Anemia  - Hgb 11.2 on presentation to the ED  - Sharp drop in hemoglobin to 9.7 on 2/8  - No obvious source of bleeding; may be secondary to line clot 2/7 a.m.  - Continue to monitor     Endo   #Hyperglycemia  - Elevated glucose 303 after initiation of steroids  - Started on Levemir 10 units  - Continues to titrate insulin  - Strict Glucose control with goal 140-180     ID  #Undifferentiated shock  #UTI  - Continue ceftriaxone for UTI tx  - Afebrile, WBC 12; now increasing  - Still requiring levo to maintain MAP and now with addition of vaso  - Stress dose steroids added 2/7  - Continue to follow cultures      GI Ppx:  Protonix  Diet: NPO  DVT Ppx: hep     Thank you for allowing us to participate in the care of this patient. We will continue to follow at this time. Please call with questions.    Bud Jauregui MD  [facility]

## 2022-02-08 NOTE — PLAN OF CARE
Pt lightly sedated at present, but can get really restless and agitated if Sedation is lowered. O2 sat 97% on Fi02 of 21%. CRRT running to clean blood, netting even. PRN electrolytes placed in MAR. HR NSR on Amio. Plans for extubation in am. Safety maintained. SR up x3. Call light in reach. Family not present at bedside.  Safety maintained. SR up x3. Call light in reach. Family not present at bedside.

## 2022-02-08 NOTE — PROGRESS NOTES
I have seen and examined the patient. As the teaching physician, the resident and I performed the key portions of this service together. In addition to the resident's documentation, I add the following:          Ms. Norman is a 70 yo F with PMHx of morbid obesity (BMI 45), HTN, HFpEF, HLD, MARY who presented to [facility] on 2/3 for encephalopathy found to be in acute hypercapnic respiratory failure requiring BIPAP. Admission was complicated by anuric RAQUEL complicated by hyperkalemia and acidosis requiring bicarb drip. Trialysis line was placed and dialysis was initiated on 2/4. Patient was intubated on 2/4 due to worsening hypercapnic respiratory failure given patient lost her renal compensation.    Plan:    1. Shock: Undifferentiated etiology. Bedside U/S today. On Levo 0.14 and vaso 0.04. On stress dose steroids. Cultures NGTD - WBC demonstrates 7 bands. On vanc and ceftriaxone.  2. Hypercapnic respiratory failure: ABG 7.39/41/73 on 350/22/5/21%. SAT/SBT performed this morning - passing SBT, but mental status is poor off of all sedation. Will extubate to BIPAP 18/10 21%. Concern given morbid obesity and hx of MARY/OHS in the setting of lost renal compensation. Expressed concern to family.  3. RAQUEL: Likely ATN after prolonged pre-renal state. On CRRT with pre-filter heparin due to clotting. Keeping net even.  4. Metabolic acidosis: Bicarb improved to 22 today.   5. Afib RVR: Continue on amio drip. Rate controlled at this time.  6. COVID-19: No active treatment at this time. Patient does not have an O2 requirement.  7. Hyperglycemia: On Levemir. Goal glucoses 140-180.  8. Nutrition: TF off this morning. Will continue to hold nutrition today.  9. DVT ppx: Heparin 7500  10. GI ppx: PPI  11. Code status: DNR/DNI       80 minutes of critical care time was spent  in the critical care management of the patient. This included management of organ failures related to critical illness, titration of continuous infusions, management  of mechanical ventilation, review of pertinent labs and imaging studies, discussion of the patient with consulting services, and discussion of the patients condition with the patient and family.         Claire Noel MD    [facility] Attending    [phone]

## 2022-02-08 NOTE — PLAN OF CARE
U Pulm/Crit Plan of Care    Discussed with patient's  at bedsided and daughter, Lucinda, over the phone. Discussed with them plan to extubated patient to BiPAP in addition to goals of care.  and daughter would NOT want patient ot be reintubated in the event that she does not do well off the vent. Would also NOT like chest compressions in the event that she were to code. DNR/DNI code status changed in chart.    Bud Jauregui MD  [facility] Internal Medicine HO-II

## 2022-02-08 NOTE — PROCEDURES
"Hannah Norman is a 71 y.o. female patient.    Temp: (!) 69.44 °F (20.8 °C) (02/08/22 1200)  Pulse: 104 (02/08/22 1200)  Resp: (!) 21 (02/08/22 1200)  BP: (!) 94/55 (02/08/22 0700)  SpO2: 98 % (02/08/22 1200)  Weight: 101.5 kg (223 lb 12.3 oz) (02/08/22 0600)  Height: 5' 1" (154.9 cm) (02/07/22 1209)    PICC  Date/Time: 2/8/2022 1:50 PM  Performed by: Jose Luis Max RN  Consent Done: Yes  Time out: Immediately prior to procedure a time out was called to verify the correct patient, procedure, equipment, support staff and site/side marked as required  Indications: med administration and vascular access  Anesthesia: local infiltration  Local anesthetic: lidocaine 1% without epinephrine  Anesthetic Total (mL): 5  Preparation: skin prepped with ChloraPrep  Skin prep agent dried: skin prep agent completely dried prior to procedure  Sterile barriers: all five maximum sterile barriers used - cap, mask, sterile gown, sterile gloves, and large sterile sheet  Hand hygiene: hand hygiene performed prior to central venous catheter insertion  Location details: right basilic  Catheter type: triple lumen  Catheter size: 5 Fr  Catheter Length: 38cm    Ultrasound guidance: yes  Vessel Caliber: medium, compressibility normal  Needle advanced into vessel with real time Ultrasound guidance.  Guidewire confirmed in vessel.  Sterile sheath used.  Number of attempts: 1  Post-procedure: blood return through all ports, chlorhexidine patch and sterile dressing applied            Name jose luis max  2/8/2022  "

## 2022-02-08 NOTE — PROGRESS NOTES
Pharmacokinetic Assessment Follow Up: IV Vancomycin    Vancomycin serum concentration assessment(s):    The random level was drawn correctly and can be used to guide therapy at this time. The measurement is within the desired definitive target range of 15 to 20 mcg/mL.    Vancomycin Regimen Plan:    Re-dose when the random level is less than 20 mcg/mL, next level to be drawn at 2000 on 02/08  Most recent vancomycin level 16 ug/mL, will redose now with 1500 mg x 1 dose (15 mg/kg).     Drug levels (last 3 results):  Recent Labs   Lab Result Units 02/05/22  1407 02/06/22  1643 02/07/22  1756   Vancomycin, Random ug/mL 11.8 11.6 16.0       Pharmacy will continue to follow and monitor vancomycin.    Please contact pharmacy at extension [phone] for questions regarding this assessment.    Thank you for the consult,   Bala June       Patient brief summary:  Hannah Norman is a 71 y.o. female initiated on antimicrobial therapy with IV Vancomycin for treatment of sepsis    The patient's current regimen is 1500 mg x 1 dose (15 mg/kg) per pulse dose    Drug Allergies:   Review of patient's allergies indicates:   Allergen Reactions    Hyoscyamine Rash    Msg [glutamic acid] Swelling       Actual Body Weight:   100 kg    Renal Function:   Estimated Creatinine Clearance: 34.5 mL/min (A) (based on SCr of 1.6 mg/dL (H)).,     Dialysis Method (if applicable):  CRRT    CBC (last 72 hours):  Recent Labs   Lab Result Units 02/05/22  0446 02/06/22  0529 02/07/22  0511   WBC K/uL 12.53 17.63* 20.02*   Hemoglobin g/dL 11.2* 12.6 11.0*   Hematocrit % 32.8* 37.9 32.1*   Platelets K/uL 182 184 147*   Gran % % 85.1* 76.1* 65.0   Lymph % % 9.1* 12.5* 20.0   Mono % % 4.5 7.8 2.0*   Eosinophil % % 0.2 1.4 1.0   Basophil % % 0.2 0.5 0.0   Differential Method  Automated Automated Manual       Metabolic Panel (last 72 hours):  Recent Labs   Lab Result Units 02/04/22  2213 02/05/22  0057 02/05/22  0446 02/05/22  1407 02/05/22  2312 02/06/22  0529  02/06/22  1643 02/06/22  1647 02/06/22 2028 02/07/22  0037 02/07/22  0511 02/07/22  0604 02/07/22  1458 02/07/22  1756   Sodium mmol/L 133*  --  133*  133* 134* 135* 132*  --  138 134*  --  137  --  132*  --    Potassium mmol/L 3.8  --  4.0  4.0 4.1 4.0 4.3  --  4.6 4.4  --  3.7  --  3.9  --    Chloride mmol/L 99  --  100  100 100 101 98  --  98 97  --  102  --  99  --    CO2 mmol/L 17*  --  18*  18* 20* 19* 20*  --  21* 20*  --  18*  --  21*  --    Glucose mg/dL 135*  --  139*  139* 148* 179* 241*  --  226* 342*  --  216*  --  291*  --    BUN mg/dL 67*  --  56*  56* 45* 34* 28*  --  25* 27*  --  31*  --  29*  --    Creatinine mg/dL 4.9*  --  3.7*  3.7* 2.7* 2.0* 1.8*  --  1.9* 2.1*  --  2.2*  --  1.6*  --    Albumin g/dL 2.6*  --  2.6*  2.6* 2.5* 2.6* 2.8*  --  3.2* 3.0*  --  2.3*  --  2.4*  --    Total Bilirubin mg/dL  --   --  0.3  --   --   --   --   --   --   --   --   --   --   --    Alkaline Phosphatase U/L  --   --  121  --   --   --   --   --   --   --   --   --   --   --    AST U/L  --   --  77*  --   --   --   --   --   --   --   --   --   --   --    ALT U/L  --   --  52*  --   --   --   --   --   --   --   --   --   --   --    Magnesium mg/dL  --  1.8 1.9 2.0  2.0 2.2  --  2.5  2.5  --   --  2.4  --  2.2 2.2 2.3   Phosphorus mg/dL 5.2*  --  4.5  4.5 3.8 2.6* 2.7  --  2.3* 2.6*  --  3.9  --  2.2*  --        Vancomycin Administrations:  vancomycin given in the last 96 hours                     vancomycin 1.5 g in dextrose 5 % 250 mL IVPB (ready to mix) (mg) 1,500 mg New Bag 02/06/22 1828    vancomycin 1.5 g in dextrose 5 % 250 mL IVPB (ready to mix) (mg) 1,500 mg New Bag 02/05/22 1507    vancomycin (VANCOCIN) 2,000 mg in dextrose 5 % 500 mL IVPB (mg) 2,000 mg New Bag 02/04/22 1234                    Microbiologic Results:  Microbiology Results (last 7 days)       Procedure Component Value Units Date/Time    Blood culture [[id number]]     Order Status: No result Specimen: Blood     Blood culture  [[id number]]     Order Status: No result Specimen: Blood     Culture, Respiratory with Gram Stain [[id number]] Collected: 02/04/22 1730    Order Status: Completed Specimen: Respiratory from Endotracheal Aspirate Updated: 02/07/22 0940     Respiratory Culture Normal respiratory jeffy      No S aureus or Pseudomonas isolated.     Gram Stain (Respiratory) Rare WBC's     Gram Stain (Respiratory) Rare Gram positive cocci     Gram Stain (Respiratory) Rare Gram positive rods    Blood culture #1 **CANNOT BE ORDERED STAT** [[id number]] Collected: 02/03/22 2151    Order Status: Completed Specimen: Blood from Peripheral, Upper Arm, Right Updated: 02/07/22 0613     Blood Culture, Routine No Growth to date      No Growth to date      No Growth to date      No Growth to date    Blood culture #2 **CANNOT BE ORDERED STAT** [[id number]] Collected: 02/03/22 2237    Order Status: Completed Specimen: Blood Updated: 02/07/22 0613     Blood Culture, Routine No Growth to date      No Growth to date      No Growth to date      No Growth to date    Narrative:      Collection has been rescheduled by [email or web address] at 02/03/2022 22:15 Reason:   Unable to collect   Collection has been rescheduled by [email or web address] at 02/03/2022 22:15 Reason:   Unable to collect     Respiratory Infection Panel (PCR), Nasopharyngeal [[id number]]  (Abnormal) Collected: 02/06/22 1645    Order Status: Completed Specimen: Nasopharyngeal Swab Updated: 02/06/22 2210     Respiratory Infection Panel Source NP Swab     Adenovirus Not Detected     Coronavirus 229E, Common Cold Virus Not Detected     Coronavirus HKU1, Common Cold Virus Not Detected     Coronavirus NL63, Common Cold Virus Not Detected     Coronavirus OC43, Common Cold Virus Not Detected     Comment: The Coronavirus strains detected in this test cause the common cold.  These strains are not the COVID-19 (novel Coronavirus)strain   associated with the respiratory disease outbreak.          SARS-CoV2 (COVID-19) Qualitative PCR  Detected     Human Metapneumovirus Not Detected     Human Rhinovirus/Enterovirus Not Detected     Influenza A (subtypes H1, H1-2009,H3) Not Detected     Influenza B Not Detected     Parainfluenza Virus 1 Not Detected     Parainfluenza Virus 2 Not Detected     Parainfluenza Virus 3 Not Detected     Parainfluenza Virus 4 Not Detected     Respiratory Syncytial Virus Not Detected     Bordetella Parapertussis (TE1061) Not Detected     Bordetella pertussis (ptxP) Not Detected     Chlamydia pneumoniae Not Detected     Mycoplasma pneumoniae Not Detected    Narrative:      For all other respiratory sources, order [id number] -  Respiratory Viral Panel by PCR    Respiratory Infection Panel (PCR), Nasopharyngeal [[id number]] Collected: 02/05/22 1842    Order Status: Canceled Specimen: Nasopharyngeal Swab

## 2022-02-08 NOTE — PROCEDURES
"Patient seen and examined on CRRT tolerating well. Pending extubation, keep CRRT NET even for now BP on low end no volume issues as of today, need CRRT for acidosis correction.    BP (!) 94/55   Pulse 104   Temp (!) 69.44 °F (20.8 °C)   Resp (!) 21   Ht 5' 1" (1.549 m)   Wt 101.5 kg (223 lb 12.3 oz)   SpO2 98%   Breastfeeding No   BMI 42.28 kg/m²     With any question please call answering service [phone]  Krystina Mayer MD    [facility]  FRANCI Madison MD, FACP,   MRashida Prieto MD,   MD MESHA Staples MD E. V. Harmon, NP    [address]  [place], [id number]  "

## 2022-02-09 LAB
ALBUMIN SERPL BCP-MCNC: 2.4 G/DL (ref 3.5–5.2)
ALBUMIN SERPL BCP-MCNC: 2.5 G/DL (ref 3.5–5.2)
ALLENS TEST: ABNORMAL
ALLENS TEST: ABNORMAL
ANION GAP SERPL CALC-SCNC: 8 MMOL/L (ref 8–16)
ANION GAP SERPL CALC-SCNC: 9 MMOL/L (ref 8–16)
APTT BLDCRRT: 72 SEC (ref 21–32)
BACTERIA BLD CULT: NORMAL
BACTERIA BLD CULT: NORMAL
BASOPHILS NFR BLD: 0 % (ref 0–1.9)
BUN SERPL-MCNC: 17 MG/DL (ref 8–23)
BUN SERPL-MCNC: 19 MG/DL (ref 8–23)
CALCIUM SERPL-MCNC: 7.5 MG/DL (ref 8.7–10.5)
CALCIUM SERPL-MCNC: 7.8 MG/DL (ref 8.7–10.5)
CHLORIDE SERPL-SCNC: 103 MMOL/L (ref 95–110)
CHLORIDE SERPL-SCNC: 104 MMOL/L (ref 95–110)
CO2 SERPL-SCNC: 22 MMOL/L (ref 23–29)
CO2 SERPL-SCNC: 26 MMOL/L (ref 23–29)
CREAT SERPL-MCNC: 0.8 MG/DL (ref 0.5–1.4)
CREAT SERPL-MCNC: 0.8 MG/DL (ref 0.5–1.4)
D DIMER PPP IA.FEU-MCNC: 0.98 MG/L FEU
DELSYS: ABNORMAL
DELSYS: ABNORMAL
DIFFERENTIAL METHOD: ABNORMAL
EOSINOPHIL NFR BLD: 0 % (ref 0–8)
EP: 10
EP: 8
ERYTHROCYTE [DISTWIDTH] IN BLOOD BY AUTOMATED COUNT: 13 % (ref 11.5–14.5)
ERYTHROCYTE [SEDIMENTATION RATE] IN BLOOD BY WESTERGREN METHOD: 15 MM/H
EST. GFR  (AFRICAN AMERICAN): >60 ML/MIN/1.73 M^2
EST. GFR  (AFRICAN AMERICAN): >60 ML/MIN/1.73 M^2
EST. GFR  (NON AFRICAN AMERICAN): >60 ML/MIN/1.73 M^2
EST. GFR  (NON AFRICAN AMERICAN): >60 ML/MIN/1.73 M^2
FERRITIN SERPL-MCNC: 51 NG/ML (ref 20–300)
FIO2: 21
GLUCOSE SERPL-MCNC: 110 MG/DL (ref 70–110)
GLUCOSE SERPL-MCNC: 164 MG/DL (ref 70–110)
HCO3 UR-SCNC: 23.5 MMOL/L (ref 24–28)
HCO3 UR-SCNC: 24.9 MMOL/L (ref 24–28)
HCT VFR BLD AUTO: 26.3 % (ref 37–48.5)
HGB BLD-MCNC: 9.1 G/DL (ref 12–16)
HYPOCHROMIA BLD QL SMEAR: ABNORMAL
IMM GRANULOCYTES # BLD AUTO: ABNORMAL K/UL (ref 0–0.04)
IMM GRANULOCYTES NFR BLD AUTO: ABNORMAL % (ref 0–0.5)
IP: 16
IP: 18
LDH SERPL L TO P-CCNC: 376 U/L (ref 110–260)
LYMPHOCYTES NFR BLD: 14 % (ref 18–48)
MAGNESIUM SERPL-MCNC: 2.4 MG/DL (ref 1.6–2.6)
MAGNESIUM SERPL-MCNC: 2.5 MG/DL (ref 1.6–2.6)
MCH RBC QN AUTO: 31.9 PG (ref 27–31)
MCHC RBC AUTO-ENTMCNC: 34.6 G/DL (ref 32–36)
MCV RBC AUTO: 92 FL (ref 82–98)
METAMYELOCYTES NFR BLD MANUAL: 1 %
MODE: ABNORMAL
MODE: ABNORMAL
MONOCYTES NFR BLD: 7 % (ref 4–15)
MYELOCYTES NFR BLD MANUAL: 2 %
NEUTROPHILS NFR BLD: 60 % (ref 38–73)
NEUTS BAND NFR BLD MANUAL: 16 %
NRBC BLD-RTO: 0 /100 WBC
PCO2 BLDA: 34 MMHG (ref 35–45)
PCO2 BLDA: 34.6 MMHG (ref 35–45)
PH SMN: 7.44 [PH] (ref 7.35–7.45)
PH SMN: 7.47 [PH] (ref 7.35–7.45)
PHOSPHATE SERPL-MCNC: 2.1 MG/DL (ref 2.7–4.5)
PHOSPHATE SERPL-MCNC: 2.5 MG/DL (ref 2.7–4.5)
PLATELET # BLD AUTO: 137 K/UL (ref 150–450)
PLATELET BLD QL SMEAR: ABNORMAL
PMV BLD AUTO: 10.4 FL (ref 9.2–12.9)
PO2 BLDA: 79 MMHG (ref 80–100)
PO2 BLDA: 92 MMHG (ref 80–100)
POC BE: -1 MMOL/L
POC BE: 1 MMOL/L
POC SATURATED O2: 96 % (ref 95–100)
POC SATURATED O2: 98 % (ref 95–100)
POC TCO2: 25 MMOL/L (ref 23–27)
POC TCO2: 26 MMOL/L (ref 23–27)
POCT GLUCOSE: 118 MG/DL (ref 70–110)
POCT GLUCOSE: 169 MG/DL (ref 70–110)
POTASSIUM SERPL-SCNC: 4.2 MMOL/L (ref 3.5–5.1)
POTASSIUM SERPL-SCNC: 4.6 MMOL/L (ref 3.5–5.1)
RBC # BLD AUTO: 2.85 M/UL (ref 4–5.4)
SAMPLE: ABNORMAL
SAMPLE: ABNORMAL
SITE: ABNORMAL
SITE: ABNORMAL
SODIUM SERPL-SCNC: 134 MMOL/L (ref 136–145)
SODIUM SERPL-SCNC: 138 MMOL/L (ref 136–145)
WBC # BLD AUTO: 22.75 K/UL (ref 3.9–12.7)

## 2022-02-09 PROCEDURE — 94660 CPAP INITIATION&MGMT: CPT

## 2022-02-09 PROCEDURE — 37799 UNLISTED PX VASCULAR SURGERY: CPT

## 2022-02-09 PROCEDURE — 99900035 HC TECH TIME PER 15 MIN (STAT)

## 2022-02-09 PROCEDURE — 82803 BLOOD GASES ANY COMBINATION: CPT

## 2022-02-09 PROCEDURE — 63600175 PHARM REV CODE 636 W HCPCS: Performed by: STUDENT IN AN ORGANIZED HEALTH CARE EDUCATION/TRAINING PROGRAM

## 2022-02-09 PROCEDURE — 63600175 PHARM REV CODE 636 W HCPCS: Performed by: INTERNAL MEDICINE

## 2022-02-09 PROCEDURE — 82728 ASSAY OF FERRITIN: CPT | Performed by: NURSE PRACTITIONER

## 2022-02-09 PROCEDURE — 94761 N-INVAS EAR/PLS OXIMETRY MLT: CPT

## 2022-02-09 PROCEDURE — 83615 LACTATE (LD) (LDH) ENZYME: CPT | Performed by: NURSE PRACTITIONER

## 2022-02-09 PROCEDURE — 83735 ASSAY OF MAGNESIUM: CPT | Mod: 91 | Performed by: INTERNAL MEDICINE

## 2022-02-09 PROCEDURE — 27202415 HC CARTRIDGE, CRRT

## 2022-02-09 PROCEDURE — 80069 RENAL FUNCTION PANEL: CPT | Performed by: HOSPITALIST

## 2022-02-09 PROCEDURE — 20000000 HC ICU ROOM

## 2022-02-09 PROCEDURE — 27000207 HC ISOLATION

## 2022-02-09 PROCEDURE — 83735 ASSAY OF MAGNESIUM: CPT | Performed by: HOSPITALIST

## 2022-02-09 PROCEDURE — 25000003 PHARM REV CODE 250: Performed by: STUDENT IN AN ORGANIZED HEALTH CARE EDUCATION/TRAINING PROGRAM

## 2022-02-09 PROCEDURE — A4216 STERILE WATER/SALINE, 10 ML: HCPCS | Performed by: HOSPITALIST

## 2022-02-09 PROCEDURE — A4216 STERILE WATER/SALINE, 10 ML: HCPCS | Performed by: INTERNAL MEDICINE

## 2022-02-09 PROCEDURE — 27000221 HC OXYGEN, UP TO 24 HOURS

## 2022-02-09 PROCEDURE — 85007 BL SMEAR W/DIFF WBC COUNT: CPT | Performed by: HOSPITALIST

## 2022-02-09 PROCEDURE — 80100008 HC CRRT DAILY MAINTENANCE

## 2022-02-09 PROCEDURE — 85730 THROMBOPLASTIN TIME PARTIAL: CPT | Performed by: HOSPITALIST

## 2022-02-09 PROCEDURE — 90945 DIALYSIS ONE EVALUATION: CPT

## 2022-02-09 PROCEDURE — 63600175 PHARM REV CODE 636 W HCPCS: Performed by: HOSPITALIST

## 2022-02-09 PROCEDURE — 63600175 PHARM REV CODE 636 W HCPCS: Performed by: NURSE PRACTITIONER

## 2022-02-09 PROCEDURE — 25000003 PHARM REV CODE 250: Performed by: NURSE PRACTITIONER

## 2022-02-09 PROCEDURE — 80069 RENAL FUNCTION PANEL: CPT | Mod: 91 | Performed by: INTERNAL MEDICINE

## 2022-02-09 PROCEDURE — 25000003 PHARM REV CODE 250: Performed by: HOSPITALIST

## 2022-02-09 PROCEDURE — S0166 INJ OLANZAPINE 2.5MG: HCPCS | Performed by: STUDENT IN AN ORGANIZED HEALTH CARE EDUCATION/TRAINING PROGRAM

## 2022-02-09 PROCEDURE — 85027 COMPLETE CBC AUTOMATED: CPT | Performed by: HOSPITALIST

## 2022-02-09 PROCEDURE — C9113 INJ PANTOPRAZOLE SODIUM, VIA: HCPCS | Performed by: HOSPITALIST

## 2022-02-09 PROCEDURE — 25000003 PHARM REV CODE 250: Performed by: INTERNAL MEDICINE

## 2022-02-09 PROCEDURE — 85379 FIBRIN DEGRADATION QUANT: CPT | Performed by: NURSE PRACTITIONER

## 2022-02-09 RX ORDER — DEXMEDETOMIDINE HYDROCHLORIDE 4 UG/ML
0-1.4 INJECTION, SOLUTION INTRAVENOUS CONTINUOUS
Status: DISCONTINUED | OUTPATIENT
Start: 2022-02-09 | End: 2022-02-10

## 2022-02-09 RX ORDER — OLANZAPINE 10 MG/2ML
5 INJECTION, POWDER, FOR SOLUTION INTRAMUSCULAR EVERY 6 HOURS PRN
Status: DISCONTINUED | OUTPATIENT
Start: 2022-02-09 | End: 2022-02-16 | Stop reason: HOSPADM

## 2022-02-09 RX ORDER — PANTOPRAZOLE SODIUM 40 MG/1
40 TABLET, DELAYED RELEASE ORAL DAILY
Status: DISCONTINUED | OUTPATIENT
Start: 2022-02-10 | End: 2022-02-14

## 2022-02-09 RX ORDER — FLUDROCORTISONE ACETATE 0.1 MG/1
100 TABLET ORAL DAILY
Status: DISCONTINUED | OUTPATIENT
Start: 2022-02-10 | End: 2022-02-11

## 2022-02-09 RX ADMIN — DEXMEDETOMIDINE HYDROCHLORIDE 1.3 MCG/KG/HR: 4 INJECTION, SOLUTION INTRAVENOUS at 12:02

## 2022-02-09 RX ADMIN — HEPARIN SODIUM 7500 UNITS: 5000 INJECTION, SOLUTION INTRAVENOUS; SUBCUTANEOUS at 10:02

## 2022-02-09 RX ADMIN — DEXMEDETOMIDINE HYDROCHLORIDE IN 0.9% SODIUM CHLORIDE 1.4 MCG/KG/HR: 4 INJECTION INTRAVENOUS at 12:02

## 2022-02-09 RX ADMIN — HEPARIN SODIUM 7500 UNITS: 5000 INJECTION, SOLUTION INTRAVENOUS; SUBCUTANEOUS at 05:02

## 2022-02-09 RX ADMIN — DEXMEDETOMIDINE HYDROCHLORIDE IN 0.9% SODIUM CHLORIDE 1 MCG/KG/HR: 4 INJECTION INTRAVENOUS at 08:02

## 2022-02-09 RX ADMIN — HYDROCORTISONE SODIUM SUCCINATE 50 MG: 100 INJECTION, POWDER, FOR SOLUTION INTRAMUSCULAR; INTRAVENOUS at 06:02

## 2022-02-09 RX ADMIN — Medication 3 ML: at 11:02

## 2022-02-09 RX ADMIN — FLUDROCORTISONE ACETATE 100 MCG: 0.1 TABLET ORAL at 09:02

## 2022-02-09 RX ADMIN — DEXMEDETOMIDINE HYDROCHLORIDE 1.2 MCG/KG/HR: 4 INJECTION, SOLUTION INTRAVENOUS at 05:02

## 2022-02-09 RX ADMIN — SODIUM CHLORIDE, PRESERVATIVE FREE 10 ML: 5 INJECTION INTRAVENOUS at 12:02

## 2022-02-09 RX ADMIN — CEFTRIAXONE SODIUM 1 G: 1 INJECTION, POWDER, FOR SOLUTION INTRAMUSCULAR; INTRAVENOUS at 10:02

## 2022-02-09 RX ADMIN — HEPARIN SODIUM 7500 UNITS: 5000 INJECTION, SOLUTION INTRAVENOUS; SUBCUTANEOUS at 01:02

## 2022-02-09 RX ADMIN — MELATONIN TAB 3 MG 6 MG: 3 TAB at 10:02

## 2022-02-09 RX ADMIN — PANTOPRAZOLE SODIUM 40 MG: 40 INJECTION, POWDER, FOR SOLUTION INTRAVENOUS at 09:02

## 2022-02-09 RX ADMIN — SODIUM CHLORIDE, PRESERVATIVE FREE 10 ML: 5 INJECTION INTRAVENOUS at 06:02

## 2022-02-09 RX ADMIN — DEXMEDETOMIDINE HYDROCHLORIDE 1.4 MCG/KG/HR: 4 INJECTION, SOLUTION INTRAVENOUS at 02:02

## 2022-02-09 RX ADMIN — OXYCODONE HYDROCHLORIDE AND ACETAMINOPHEN 500 MG: 500 TABLET ORAL at 09:02

## 2022-02-09 RX ADMIN — OLANZAPINE 5 MG: 10 INJECTION, POWDER, FOR SOLUTION INTRAMUSCULAR at 11:02

## 2022-02-09 RX ADMIN — OLANZAPINE 5 MG: 10 INJECTION, POWDER, FOR SOLUTION INTRAMUSCULAR at 01:02

## 2022-02-09 RX ADMIN — HYDROCORTISONE SODIUM SUCCINATE 50 MG: 100 INJECTION, POWDER, FOR SOLUTION INTRAMUSCULAR; INTRAVENOUS at 12:02

## 2022-02-09 RX ADMIN — HYDROCORTISONE SODIUM SUCCINATE 50 MG: 100 INJECTION, POWDER, FOR SOLUTION INTRAMUSCULAR; INTRAVENOUS at 11:02

## 2022-02-09 RX ADMIN — CHLORHEXIDINE GLUCONATE 0.12% ORAL RINSE 15 ML: 1.2 LIQUID ORAL at 09:02

## 2022-02-09 RX ADMIN — HEPARIN SODIUM 2800 UNITS: 1000 INJECTION, SOLUTION INTRAVENOUS; SUBCUTANEOUS at 06:02

## 2022-02-09 RX ADMIN — HYDROCORTISONE SODIUM SUCCINATE 50 MG: 100 INJECTION, POWDER, FOR SOLUTION INTRAMUSCULAR; INTRAVENOUS at 05:02

## 2022-02-09 NOTE — SUBJECTIVE & OBJECTIVE
Interval History:  Remains on levophed and vasopressin for shock. On CRRT. Successful extubation to bipap today; remains lethargic following discontinuation of sedation. Family requesting DNR/DNI at this time - do not want re-intubation if unable to tolerate bipap.     Review of Systems   Unable to perform ROS: Mental status change     Objective:     Vital Signs (Most Recent):  Temp: 97.9 °F (36.6 °C) (02/08/22 1920)  Pulse: 70 (02/08/22 2022)  Resp: 12 (02/08/22 2022)  BP: 130/64 (02/08/22 1330)  SpO2: 95 % (02/08/22 2022) Vital Signs (24h Range):  Temp:  [69.44 °F (20.8 °C)-98.78 °F (37.1 °C)] 97.9 °F (36.6 °C)  Pulse:  [] 70  Resp:  [12-33] 12  SpO2:  [87 %-99 %] 95 %  BP: ()/(48-90) 130/64  Arterial Line BP: ()/(39-89) 113/62     Weight: 101.5 kg (223 lb 12.3 oz)  Body mass index is 42.28 kg/m².    Intake/Output Summary (Last 24 hours) at 2/8/2022 2253  Last data filed at 2/8/2022 2000  Gross per 24 hour   Intake 3242.12 ml   Output 1654 ml   Net 1588.12 ml      Physical Exam  Vitals and nursing note reviewed.   Constitutional:       General: She is not in acute distress.     Appearance: She is morbidly obese.      Interventions: She is sedated and restrained.   HENT:      Head: Normocephalic and atraumatic.      Right Ear: External ear normal.      Left Ear: External ear normal.      Nose: Nose normal. No congestion.      Mouth/Throat:      Mouth: Mucous membranes are dry.      Pharynx: Oropharynx is clear.   Eyes:      Extraocular Movements: Extraocular movements intact.      Pupils: Pupils are equal, round, and reactive to light.   Cardiovascular:      Rate and Rhythm: Normal rate and regular rhythm.      Pulses: Normal pulses.      Heart sounds: Normal heart sounds.   Pulmonary:      Effort: Pulmonary effort is normal.      Breath sounds: Normal breath sounds.   Abdominal:      General: Bowel sounds are normal.      Palpations: Abdomen is soft.   Genitourinary:     Comments:  marga  Musculoskeletal:         General: Normal range of motion.      Cervical back: Normal range of motion. No rigidity.      Right lower leg: No edema.      Left lower leg: No edema.   Skin:     General: Skin is warm and dry.      Capillary Refill: Capillary refill takes less than 2 seconds.      Comments: trialysis line   Neurological:      Motor: Weakness present.      Comments: lethargic   Psychiatric:      Comments: Unable to assess         Significant Labs: All pertinent labs within the past 24 hours have been reviewed.    Significant Imaging: I have reviewed all pertinent imaging results/findings within the past 24 hours.

## 2022-02-09 NOTE — ASSESSMENT & PLAN NOTE
-likely distributive due to acidosis and renal failure versus septic shock  -currently requiring Levophed with addition of vasopressin and stress dose steroids   -nephrology consulted for CRRT  -continue IV antibiotics for possible infectious source, thought less likely  -possibly worsening due to too much volume removal  -COVID therapy as above  -AM cortisol ordered

## 2022-02-09 NOTE — PROGRESS NOTES
Pharmacokinetic Assessment Follow Up: IV Vancomycin    Vancomycin serum concentration assessment(s):    The random level was drawn correctly and can be used to guide therapy at this time. The measurement is below the desired definitive target range of 15 to 20 mcg/mL.    Vancomycin Regimen Plan:    Re-dose when the random level is less than 20 mcg/mL, next level to be drawn at 2100 on 2/9    Drug levels (last 3 results):  Recent Labs   Lab Result Units 02/06/22  1643 02/07/22  1756 02/08/22  1851   Vancomycin, Random ug/mL 11.6 16.0 12.4       Pharmacy will continue to follow and monitor vancomycin.    Please contact pharmacy at extension [phone] for questions regarding this assessment.    Thank you for the consult,   Dayan Pettit       Patient brief summary:  Hannah Norman is a 71 y.o. female initiated on antimicrobial therapy with IV Vancomycin for treatment of sepsis    The patient's current regimen is pulse dosing.   Give 1750 mg now x 1 dose and recheck level in 24 hours.     Drug Allergies:   Review of patient's allergies indicates:   Allergen Reactions    Hyoscyamine Rash    Msg [glutamic acid] Swelling       Actual Body Weight:   101.5 kg    Renal Function:   Estimated Creatinine Clearance: 50.6 mL/min (based on SCr of 1.1 mg/dL).,     Dialysis Method (if applicable):  CRRT    CBC (last 72 hours):  Recent Labs   Lab Result Units 02/06/22  0529 02/07/22  0511 02/08/22  0809   WBC K/uL 17.63* 20.02* 21.82*   Hemoglobin g/dL 12.6 11.0* 9.7*   Hematocrit % 37.9 32.1* 27.9*   Platelets K/uL 184 147* 137*   Gran % % 76.1* 65.0 76.0*   Lymph % % 12.5* 20.0 4.0*   Mono % % 7.8 2.0* 5.0   Eosinophil % % 1.4 1.0 4.0   Basophil % % 0.5 0.0 0.0   Differential Method  Automated Manual Manual       Metabolic Panel (last 72 hours):  Recent Labs   Lab Result Units 02/05/22  2312 02/06/22  0529 02/06/22  1643 02/06/22  1647 02/06/22  2028 02/07/22  0037 02/07/22  0511 02/07/22  0604 02/07/22  1458 02/07/22  1756  02/07/22 2229 02/08/22  0530 02/08/22  0809   Sodium mmol/L 135* 132*  --  138 134*  --  137  --  132*  --  133* 132* 131*   Potassium mmol/L 4.0 4.3  --  4.6 4.4  --  3.7  --  3.9  --  4.3 4.4 3.8   Chloride mmol/L 101 98  --  98 97  --  102  --  99  --  99 98 99   CO2 mmol/L 19* 20*  --  21* 20*  --  18*  --  21*  --  20* 21* 22*   Glucose mg/dL 179* 241*  --  226* 342*  --  216*  --  291*  --  248* 303* 255*   BUN mg/dL 34* 28*  --  25* 27*  --  31*  --  29*  --  26* 24* 26*   Creatinine mg/dL 2.0* 1.8*  --  1.9* 2.1*  --  2.2*  --  1.6*  --  1.3 1.2 1.1   Albumin g/dL 2.6* 2.8*  --  3.2* 3.0*  --  2.3*  --  2.4*  --  2.5* 2.5* 2.4*   Total Bilirubin mg/dL  --   --   --   --   --   --   --   --   --   --   --   --  0.3   Alkaline Phosphatase U/L  --   --   --   --   --   --   --   --   --   --   --   --  117   AST U/L  --   --   --   --   --   --   --   --   --   --   --   --  18   ALT U/L  --   --   --   --   --   --   --   --   --   --   --   --  25   Magnesium mg/dL 2.2  --  2.5  2.5  --   --  2.4  --  2.2 2.2 2.3 2.4 2.4  --    Phosphorus mg/dL 2.6* 2.7  --  2.3* 2.6*  --  3.9  --  2.2*  --  2.5* 3.1  --        Vancomycin Administrations:  vancomycin given in the last 96 hours                     vancomycin 1.5 g in dextrose 5 % 250 mL IVPB (ready to mix) (mg) 1,500 mg New Bag 02/07/22 2246    vancomycin 1.5 g in dextrose 5 % 250 mL IVPB (ready to mix) (mg) 1,500 mg New Bag 02/06/22 1828    vancomycin 1.5 g in dextrose 5 % 250 mL IVPB (ready to mix) (mg) 1,500 mg New Bag 02/05/22 1507                    Microbiologic Results:  Microbiology Results (last 7 days)       Procedure Component Value Units Date/Time    Blood culture [[id number]] Collected: 02/08/22 1828    Order Status: Sent Specimen: Blood from Line, PICC Right Brachial Updated: 02/08/22 1831    Blood culture #2 **CANNOT BE ORDERED STAT** [[id number]] Collected: 02/03/22 2237    Order Status: Completed Specimen: Blood Updated: 02/08/22 0612      Blood Culture, Routine No Growth to date      No Growth to date      No Growth to date      No Growth to date      No Growth to date    Narrative:      Collection has been rescheduled by [email or web address] at 02/03/2022 22:15 Reason:   Unable to collect   Collection has been rescheduled by [email or web address] at 02/03/2022 22:15 Reason:   Unable to collect     Blood culture #1 **CANNOT BE ORDERED STAT** [[id number]] Collected: 02/03/22 2151    Order Status: Completed Specimen: Blood from Peripheral, Upper Arm, Right Updated: 02/08/22 0612     Blood Culture, Routine No Growth to date      No Growth to date      No Growth to date      No Growth to date      No Growth to date    Blood culture [[id number]]     Order Status: No result Specimen: Blood     Culture, Respiratory with Gram Stain [[id number]] Collected: 02/04/22 1730    Order Status: Completed Specimen: Respiratory from Endotracheal Aspirate Updated: 02/07/22 0940     Respiratory Culture Normal respiratory jeffy      No S aureus or Pseudomonas isolated.     Gram Stain (Respiratory) Rare WBC's     Gram Stain (Respiratory) Rare Gram positive cocci     Gram Stain (Respiratory) Rare Gram positive rods    Respiratory Infection Panel (PCR), Nasopharyngeal [[id number]]  (Abnormal) Collected: 02/06/22 1645    Order Status: Completed Specimen: Nasopharyngeal Swab Updated: 02/06/22 2210     Respiratory Infection Panel Source NP Swab     Adenovirus Not Detected     Coronavirus 229E, Common Cold Virus Not Detected     Coronavirus HKU1, Common Cold Virus Not Detected     Coronavirus NL63, Common Cold Virus Not Detected     Coronavirus OC43, Common Cold Virus Not Detected     Comment: The Coronavirus strains detected in this test cause the common cold.  These strains are not the COVID-19 (novel Coronavirus)strain   associated with the respiratory disease outbreak.          SARS-CoV2 (COVID-19) Qualitative PCR Detected     Human Metapneumovirus Not Detected     Human Rhinovirus/Enterovirus Not Detected      Influenza A (subtypes H1, H1-2009,H3) Not Detected     Influenza B Not Detected     Parainfluenza Virus 1 Not Detected     Parainfluenza Virus 2 Not Detected     Parainfluenza Virus 3 Not Detected     Parainfluenza Virus 4 Not Detected     Respiratory Syncytial Virus Not Detected     Bordetella Parapertussis (ID9117) Not Detected     Bordetella pertussis (ptxP) Not Detected     Chlamydia pneumoniae Not Detected     Mycoplasma pneumoniae Not Detected    Narrative:      For all other respiratory sources, order [id number] -  Respiratory Viral Panel by PCR    Respiratory Infection Panel (PCR), Nasopharyngeal [[id number]] Collected: 02/05/22 1842    Order Status: Canceled Specimen: Nasopharyngeal Swab

## 2022-02-09 NOTE — PROGRESS NOTES
[facility] Medicine  Progress Note    Patient Name: Hannah Norman  MRN: [id number]  Patient Class: IP- Inpatient   Admission Date: 2/3/2022  Length of Stay: 5 days  Attending Physician: Bernabe King MD  Primary Care Provider: Raffi Garcia MD        Subjective:     Principal Problem:Shock        HPI:  Ms. Norman is a 71 year old female with a medical history that includes anxiety disorder, bell's palsy, chronic back pain, chronic osteoarthritis, essential tremor, fibromyalgia, hypertension, GERD, hyperlipidemia, sleep apnea, and mood disorder. She presented to the ED via EMS with spouse who reports patient to be increasingly fatigued and weak. He reports over the past 2 weeks she was diagnosed with COVID19 and was ending their quarantine phase. She has been progressively weaker and unable to ambulate as usual with her walker. Her symptoms worsened over the past 2 days where she was not communicating at all. She is with no cough, vomiting, diarrhea or fevers. Her spouse contributed to her history during this visit. On arrival to the ED she was slightly hypoxic at 93% and hypertensive. Significant labs were with the following: wbc 18.97, Na 131, K 5.6, BUN 69, Cr 7.1, COVID19 +, urine with 3+ leukocytes and many bacteria, pH 7.1 and CO2 65.4. CT head and CXR with no acute findings. She was given neb treatment, Rocephin, Lokelma, reg insulin and NS bolus. She will admit to [facility] hospital medicine service for continued monitoring.           Overview/Hospital Course:  No notes on file    Interval History:  Remains on levophed and vasopressin for shock. On CRRT. Successful extubation to bipap today; remains lethargic following discontinuation of sedation. Family requesting DNR/DNI at this time - do not want re-intubation if unable to tolerate bipap.     Review of Systems   Unable to perform ROS: Mental status change     Objective:     Vital Signs (Most Recent):  Temp: 97.9 °F (36.6 °C)  (02/08/22 1920)  Pulse: 70 (02/08/22 2022)  Resp: 12 (02/08/22 2022)  BP: 130/64 (02/08/22 1330)  SpO2: 95 % (02/08/22 2022) Vital Signs (24h Range):  Temp:  [69.44 °F (20.8 °C)-98.78 °F (37.1 °C)] 97.9 °F (36.6 °C)  Pulse:  [] 70  Resp:  [12-33] 12  SpO2:  [87 %-99 %] 95 %  BP: ()/(48-90) 130/64  Arterial Line BP: ()/(39-89) 113/62     Weight: 101.5 kg (223 lb 12.3 oz)  Body mass index is 42.28 kg/m².    Intake/Output Summary (Last 24 hours) at 2/8/2022 2253  Last data filed at 2/8/2022 2000  Gross per 24 hour   Intake 3242.12 ml   Output 1654 ml   Net 1588.12 ml      Physical Exam  Vitals and nursing note reviewed.   Constitutional:       General: She is not in acute distress.     Appearance: She is morbidly obese.      Interventions: She is sedated and restrained.   HENT:      Head: Normocephalic and atraumatic.      Right Ear: External ear normal.      Left Ear: External ear normal.      Nose: Nose normal. No congestion.      Mouth/Throat:      Mouth: Mucous membranes are dry.      Pharynx: Oropharynx is clear.   Eyes:      Extraocular Movements: Extraocular movements intact.      Pupils: Pupils are equal, round, and reactive to light.   Cardiovascular:      Rate and Rhythm: Normal rate and regular rhythm.      Pulses: Normal pulses.      Heart sounds: Normal heart sounds.   Pulmonary:      Effort: Pulmonary effort is normal.      Breath sounds: Normal breath sounds.   Abdominal:      General: Bowel sounds are normal.      Palpations: Abdomen is soft.   Genitourinary:     Comments: gresham  Musculoskeletal:         General: Normal range of motion.      Cervical back: Normal range of motion. No rigidity.      Right lower leg: No edema.      Left lower leg: No edema.   Skin:     General: Skin is warm and dry.      Capillary Refill: Capillary refill takes less than 2 seconds.      Comments: trialysis line   Neurological:      Motor: Weakness present.      Comments: lethargic   Psychiatric:       Comments: Unable to assess         Significant Labs: All pertinent labs within the past 24 hours have been reviewed.    Significant Imaging: I have reviewed all pertinent imaging results/findings within the past 24 hours.      Assessment/Plan:      * Shock        Encephalopathy, metabolic        Acute hypercapnic respiratory failure        Atrial fibrillation with RVR  - went into afib w/ RVR overnight following episode of bradycardia and vasovagal  - given fluid and will maintain net even today on CRRT  - initiated on amiodarone gtt with improvement in rate control      Hypervolemia        Metabolic acidosis        RAQUEL (acute kidney injury)        Transaminitis  - in setting of shock  - monitor for now; can consider escalation of w/u if persists      Hyperglycemia  Hemoglobin A1C   Date Value Ref Range Status   11/10/2021 6.2 (H) 4.0 - 5.6 % Final     Comment:     ADA Screening Guidelines:  5.7-6.4%  Consistent with prediabetes  >or=6.5%  Consistent with diabetes    High levels of fetal hemoglobin interfere with the HbA1C  assay. Heterozygous hemoglobin variants (HbS, HgC, etc)do  not significantly interfere with this assay.   However, presence of multiple variants may affect accuracy.     04/26/2021 5.9 (H) 4.0 - 5.6 % Final     Comment:     ADA Screening Guidelines:  5.7-6.4%  Consistent with prediabetes  >or=6.5%  Consistent with diabetes    High levels of fetal hemoglobin interfere with the HbA1C  assay. Heterozygous hemoglobin variants (HbS, HgC, etc)do  not significantly interfere with this assay.   However, presence of multiple variants may affect accuracy.     11/10/2020 6.0 (H) 4.0 - 5.6 % Final     Comment:     ADA Screening Guidelines:  5.7-6.4%  Consistent with prediabetes  >or=6.5%  Consistent with diabetes  High levels of fetal hemoglobin interfere with the HbA1C  assay. Heterozygous hemoglobin variants (HbS, HgC, etc)do  not significantly interfere with this assay.   However, presence of multiple  variants may affect accuracy.           - LDSSI with accuchecks qachs        Shock, unspecified  -likely distributive due to acidosis and renal failure versus septic shock  -currently requiring Levophed with addition of vasopressin and stress dose steroids   -nephrology consulted for CRRT  -continue IV antibiotics for possible infectious source, thought less likely  -possibly worsening due to too much volume removal  -COVID therapy as above  -AM cortisol ordered    Hyperphosphatemia  -in the setting of acute renal failure  -on CRRT  -nephrology consulted      Hypocalcemia  -in the setting of acute renal failure  -dialysis; will replace  -nephrology consulted      Acute metabolic encephalopathy  Reported by spouse to be with increased drowsiness and altered mentation. She has been sleeping a lot more than usual and is not alert. Found to be with renal failure, COVID19, and metabolic acidosis    CT head with no acute findings, ammonia level wnl  Will hold all sedating home meds for now  Currently on BIPAP for hypercapnia, which is likely contributing  -renal failure with high anion gap metabolic acidosis and uremia; initiated on dialysis  -pulmonology and nephrology consulted    Acute on chronic respiratory failure with hypercapnia  Elevated CO2 on ABG, currently on BIPAP  Reassess with ABG with worsened CO2, settings adjusted and adjusting face mask for better fit  Hypoxia likely related to COVID19, was 93% on room air on arrival  -suspect that patient's encephalopathy secondary to elevated CO2 vs uremia  -Pulm following    COVID-19  COVID positive; initiated on protocol  CXR with no infiltrate, requiring O2  -doubt that current presentation is due to COVID; will hold remdesivir and dexamethasone  Heparin ppx  inhalers prn  MVI, ascorbic acid, incentive spirometry  statin  supplemental O2, will wean as tolerated  prn tylenol, loperamide  contact/airborne    Acute renal failure with tubular necrosis  Significant RAQUEL  with creatinine of 7.1 from normal just 3 months ago, with acidosis, uremia, hyperkalemia, hyperphosphatemia, and hypocalcemia  Renal ultrasound   -Vila catheter placed  -initiated on CRRT 2/4, continue  Consult Nephrology  Strict I&O's      High anion gap metabolic acidosis  With initial pH of 7.188  Trend ABG  Likely related to acute renal failure  -bicarbonate drip started on admission, now discontinued  -initiated on CRRT  -nephrology following    UTI (urinary tract infection)  Urinalysis relatively unremarkable, may be asymptomatic bacteriuria but in the setting of shock will continue antibiotics  Blood cultures ngtd  IV Rocephin  Trend CBC      Leukocytosis  Likley related to UTI vs COVID19  CXR was with no pneumonia  Urine with UTI  Blood cultures no growth to date  Lactate 1.1  IV vanco and rocephin continued for now  Trend CBC  Cultures remain negative    Unspecified mood (affective) disorder  Resume home meds once taking p.o., chronic      Morbid obesity with BMI of 45.0-49.9, adult  Body mass index is 42.28 kg/m². Morbid obesity complicates all aspects of disease management from diagnostic modalities to treatment.    MARY (obstructive sleep apnea)  Was ordered CPAP qhs per PCP but does not use it  -suspect she has OHS    Disorder of lumbar spine  Osteoarthritis    Hold home pain medications for now      Osteoarthritis  See above      Restless leg syndrome  Hold home Neurontin and Mirapex      Mixed hyperlipidemia  Patient is chronically on statin.will continue for now once able to take p.o.. Monitor clinically. Last LDL was   Lab Results   Component Value Date    LDLCALC 71.0 11/10/2021            Anxiety disorder  Hold Klonopin 2/2 AMS, chronic      Fibromyalgia  Hold home Effexor and Neurontin      Chronic back pain  Will hold home pain medications until improvement in mentation      Essential hypertension  - currently in shock  - hold home antihypertensives      Mild acid reflux  - protonix  "ppx        VTE Risk Mitigation (From admission, onward)         Ordered     heparin 25,000 units in dextrose 5% 250 mL (100 units/mL) infusion (heparin infusion - NO NOMOGRAM)  Continuous        "And" Linked Group Details    02/05/22 1750     heparin (porcine) injection 2,800 Units  As needed (PRN)         02/04/22 1911     heparin (porcine) injection 7,500 Units  Every 8 hours         02/03/22 2251     Place KATERIN hose  Until discontinued         02/03/22 2251     IP VTE HIGH RISK PATIENT  Once         02/03/22 2251     Place sequential compression device  Until discontinued         02/03/22 2251                Discharge Planning   ARNULFO:      Code Status: DNR   Is the patient medically ready for discharge?:     Reason for patient still in hospital (select all that apply): Patient unstable, Patient trending condition, Treatment and Consult recommendations               Critical care time spent on the evaluation and treatment of severe organ dysfunction, review of pertinent labs and imaging studies, discussions with consulting providers and discussions with patient/family: 45 minutes.      Bernabe King MD  Department of Hospital Medicine   [place] - Intensive Care  "

## 2022-02-09 NOTE — PROGRESS NOTES
[facility] Progress Note    Subjective:      Mental status significantly improved this morning.  Patient alert and able to follow commands.  Enquiring as to her presentation to the hospital.  Blood pressure is stable off levo and vaso.  1.7 L removed with ultrafiltrate.  Only 285 mL of urine produced.      Objective:     Last 24 Hour Vital Signs:  BP  Min: 80/47  Max: 143/62  Temp  Av.6 °F (35.3 °C)  Min: 69.44 °F (20.8 °C)  Max: 98.78 °F (37.1 °C)  Pulse  Av.1  Min: 56  Max: 130  Resp  Av.3  Min: 11  Max: 33  SpO2  Av.5 %  Min: 87 %  Max: 100 %  I/O last 3 completed shifts:  In: 3582.1 [I.V.:2133.6; NG/GT:910; IV Piggyback:538.6]  Out: 3896 [Urine:395; Other:3501]    Physical Examination:  General: arouses with stimulation, doesn't follow commands  HEENT: PERRL, mucus membranes moist   Cardiac: normal rate, regular rhythm  Respiratory: mechanical breath sounds  Abdomen: Soft, ND. +BS.   Extremities: Edema bilateral LE  Neuro: patient sedated. Arouses with stimulation but not following commands     Laboratory:  Trended Lab Data:  Recent Labs   Lab 22  0511 22  0809 22  0511   WBC 20.02* 21.82* 22.75*   HGB 11.0* 9.7* 9.1*   HCT 32.1* 27.9* 26.3*   * 137* 137*       Recent Labs   Lab 22  1822 22  2202 22  0511    137 134*   K 4.6 4.4 4.6    103 103   CO2 22* 23 22*   BUN 23 20 19   CREATININE 0.9 0.8 0.8   * 161* 164*   CALCIUM 8.2* 7.9* 7.5*   MG 2.5 2.5 2.4   PHOS 2.5* 2.2* 2.5*       Recent Labs   Lab 22  0738 22  1236 22  0446 22  1407 22  0809 22  0809 22  1822 22  2202 22  0511   PROT 5.6*  --  5.1*  --  4.9*  --   --   --   --    ALBUMIN 3.1*   < > 2.6*  2.6*   < > 2.4*   < > 2.5* 2.5* 2.4*   BILITOT 0.3  --  0.3  --  0.3  --   --   --   --    AST 97*  --  77*  --  18  --   --   --   --    ALT 49*  --  52*  --  25  --   --   --   --    ALKPHOS 97  --  121   --  117  --   --   --   --     < > = values in this interval not displayed.     Cardiac:   Recent Labs   Lab 02/03/22 2129 02/03/22 2152   TROPONINI  --  0.021   BNP 34  --      Radiology:  Reviewed.    Current Medications:     Infusions:   sodium chloride 0.9% Stopped (02/04/22 2000)    sodium chloride 0.9% 5 mL/hr at 02/08/22 1858    dexmedetomidine (PRECEDEX) infusion 1.2 mcg/kg/hr (02/09/22 0548)    heparin (porcine) in 5 % dex 1,000 Units/hr (02/08/22 2200)    NORepinephrine bitartrate-D5W 0.04 mcg/kg/min (02/08/22 1858)    vasopressin Stopped (02/08/22 1758)        Scheduled:   ascorbic acid (vitamin C)  500 mg Oral BID    cefTRIAXone (ROCEPHIN) IVPB  1 g Intravenous Q24H    chlorhexidine  15 mL Mouth/Throat BID    fludrocortisone  100 mcg Per G Tube Daily    heparin (porcine)  7,500 Units Subcutaneous Q8H    hydrocortisone sodium succinate  50 mg Intravenous Q6H    insulin detemir U-100  15 Units Subcutaneous Daily    multivitamin  1 tablet Oral Daily    pantoprazole  40 mg Intravenous Daily    sodium chloride 0.9%  10 mL Intravenous Q6H     Assessment:     Patient is a 71-year-old female with a past medical history of hypertension, hyperlipidemia, GERD, obesity, sleep apnea, osteoarthritis, chronic back pain, central tremor, fibromyalgia, anxiety, and mood disorder was admitted to [facility] on 2/3/22 for acute hypercapnic respiratory failure and RAQUEL. Initially admitted to the ICU on BiPAP but was intubated for worsening hypercapnia in the setting of her renal failure.  Status post extubation and continue dialysis    Plan:     CNS/Neuro  #Acute encephalopathy  - Patient presented with altered mental status after 2 week of progressive fatigue and shortness of breath  - Initial ABG with a pH of 7.19, pCO2 65, PO2 of 117 on nasal cannula  - Etiology likely secondary to acidemia and hypercapnia  - Significant improvement in mental status since extubation     Cardiovascular  #Shock  -  Patient became hypotensive shortly after presenting to the ED requiring use of pressors for blood pressure support  - Echo remarkable for grade II diastolic dysfunction; hyperdynamic with EF 75%  - Elevated white count on presentation to the ED of 19; lactate WNL  - UA with 3+ leukocytes and many bacteria; now on ceftriaxone, broadened with vanc due to increasing pressor requirement; vanc discontinued after improving blood pressures post extubation 2/9  - Blood cultures obtained; NGTD  - PM cortisol 2.2; a.m. cortisol 14.2  - Blood pressure stable since extubation; levo/vanco discontinued 2/8  - Continue antibiotics as above and stress dose steroids     Respiratory  #Acute hypercapnic respiratory failure  #MARY noncompliant with CPAP  #Obesity hypoventilation syndrome?  #COVID  - Patient presented with progressively worsening fatigue and weakness after recent diagnosis of COVID infection.  History of MARY but noncompliant with CPAP (has machine)  - ABG on presentation to the ED pH 7.19, pCO2 of 65, PO2 of 117 on nasal cannula  - Placed on BiPAP in the ED pH 7.12, pCO2 72 PO2 101  - Intubated, mechanically ventilated 2/2 worsening hypercapnia despite continuous BiPAP 2/4  - Adequate volume removed with CRRT, stop pulling fluid off  - Extubated to BiPAP 18/10.  ABG with pH of 7.38, pCO2 40, PO2 89     GI/Metabolic  TRI     Renal  #RAQUEL  #AGMA  #Volume overload  - Patient presents to the ED with a BUN/creatinine of 69/7.1  - Noted history of renal disease  - Adequate volume removed with CRRT, stop pulling fluid off  - Touch base with Nephrology to see about switching to HD    Heme  #Anemia  - Hgb 11.2 on presentation to the ED  - Sharp drop in hemoglobin to 9.7 on 2/8  - No obvious source of bleeding; may be secondary to line clot 2/7 a.m.  - Continue to monitor     Endo   #Hyperglycemia  - Started on Levemir 10 units  - Continues to titrate insulin  - Strict Glucose control with goal 140-180     ID  #Undifferentiated  shock  #UTI  - Continue ceftriaxone for UTI tx  - Stress dose steroids added 2/7 secondary to increasing pressor requirements  - WBC increased to around 20; since stable but with increasing bandemia  - Continue antibiotics as above and stress dose steroids  - Continue to follow cultures      GI Ppx:  home Protonix  Diet: Diet renal [facility]; Cardiac (Low Na/Chol)  DVT Ppx: hep     Thank you for allowing us to participate in the care of this patient. We will continue to follow at this time. Please call with questions.     Bud Jauregui MD  [facility] Internal Medicine HO-II

## 2022-02-09 NOTE — PROGRESS NOTES
Nephrology Progress Note     Consult Requested By: Bernabe King MD  Reason for Consult: RAQUEL     SUBJECTIVE:          Review of Systems   Unable to perform ROS: Acuity of condition       Past Medical History:   Diagnosis Date    RAQUEL (acute kidney injury) 2/3/2022    Anxiety disorder     Atrial fibrillation with RVR 2/7/2022    Bell's palsy     Chronic back pain     Chronic osteoarthritis     Essential tremor     Fibromyalgia     Hypertension     Mild acid reflux     Neck pain     Other and unspecified hyperlipidemia     Sleep apnea     wear c-pap at night; does not use regularly    Unspecified mood (affective) disorder      Past Surgical History:   Procedure Laterality Date    ADENOIDECTOMY      APPENDECTOMY      BLADDER SUSPENSION      BREAST BIOPSY Left     1995  negative    Breast reduction      BREAST SURGERY Bilateral     breast reduction    CARPAL TUNNEL RELEASE Right     COLONOSCOPY  2008    COLONOSCOPY N/A 11/10/2017    Procedure: COLONOSCOPY - Miralax split prep;  Surgeon: Annetta Powell MD;  Location[facility];  Service: Endoscopy;  Laterality: N/A;    COSMETIC SURGERY Bilateral     breast reduction    CYSTOSCOPY N/A 10/23/2018    Procedure: CYSTOSCOPY;  Surgeon: Feli Garza MD;  [address];  Service: Urology;  Laterality: N/A;    ESOPHAGOGASTRODUODENOSCOPY N/A 5/30/2019    Procedure: ESOPHAGOGASTRODUODENOSCOPY (EGD);  Surgeon: Oscar Wyile MD;  Location: [facility] (4TH FLR);  Service: Endoscopy;  Laterality: N/A;  Off PPI/H2 x 7 days prior- ERW    HYSTERECTOMY      JOINT REPLACEMENT Left     knee    KNEE SURGERY      bilateral    left shoulder Left     rotator cuff    OPEN REDUCTION AND INTERNAL FIXATION (ORIF) OF FRACTURE OF DISTAL RADIUS Right 11/16/2021    Procedure: ORIF, FRACTURE, RADIUS, DISTAL;  Surgeon: Evelio Lynn Jr., MD;  Location: [facility];  Service: Orthopedics;  Laterality: Right;  need accumed plate and mini c arm, Acumed  confirmed 11/15/21 AM    PLACEMENT OF TRANSOBTURATOR TAPE N/A 10/23/2018    Procedure: PLACEMENT, TRANSOBTURATOR TAPE;  Surgeon: Feli Garza MD;  Location[facility] OR [address] [place] [address];  Service: Urology;  Laterality: N/A;  1.5 hours    Tonsillectomy      TONSILLECTOMY      TOTAL REDUCTION MAMMOPLASTY          TUBAL LIGATION       Family History   Problem Relation Age of Onset    Diabetes Mother     Tremor Mother     Liver disease Mother     Hypertension Mother     Diabetes Father     Heart disease Father     Hypertension Father     Tremor Son     Kidney disease Son     Diabetes Sister     Diabetes Brother     No Known Problems Daughter     Depression Sister     Kidney disease Son         Reniel failure but corrected    Learning disabilities Son         Slow in some areas    Mental retardation Son         Mild retardation    Tremor Son     Cancer Brother      Social History     Tobacco Use    Smoking status: Former Smoker     Packs/day: 2.00     Years: 30.00     Pack years: 60.00     Types: Cigarettes     Start date:      Quit date: 1990     Years since quittin.5    Smokeless tobacco: Never Used   Substance Use Topics    Alcohol use: No    Drug use: No       Review of patient's allergies indicates:   Allergen Reactions    Hyoscyamine Rash    Msg [glutamic acid] Swelling            OBJECTIVE:     Vital Signs (Most Recent)  Vitals:    22 1115 22 1130 22 1145 22 1200   BP:    108/69   BP Location:       Patient Position:       Pulse: 101 102 104 107   Resp: (!) 26 18 20 (!) 21   Temp:  98.6 °F (37 °C)     TempSrc:  Oral     SpO2: 97% 96% 96% 97%   Weight:       Height:             Date 22 0700 - 02/10/22 0659   Shift 5092-0064 2870-4204 2572-8688 24 Hour Total   INTAKE   P.O. 200   200   I.V.(mL/kg) 506.2(5)   506.2(5)   IV Piggyback 544.3   544.3   Shift Total(mL/kg) 1250.5(12.3)   1250.5(12.3)   OUTPUT   Other 184   184   Shift Total(mL/kg)  184(1.8)   184(1.8)   Weight (kg) 101.5 101.5 101.5 101.5           Medications:   ascorbic acid (vitamin C)  500 mg Oral BID    cefTRIAXone (ROCEPHIN) IVPB  1 g Intravenous Q24H    [START ON 2/10/2022] fludrocortisone  100 mcg Oral Daily    heparin (porcine)  7,500 Units Subcutaneous Q8H    hydrocortisone sodium succinate  50 mg Intravenous Q6H    insulin detemir U-100  15 Units Subcutaneous Daily    multivitamin  1 tablet Oral Daily    [START ON 2/10/2022] pantoprazole  40 mg Oral Daily    sodium chloride 0.9%  10 mL Intravenous Q6H           Physical Exam  Vitals and nursing note reviewed.   Constitutional:       General: She is not in acute distress.     Appearance: She is obese. She is ill-appearing. She is not diaphoretic.   HENT:      Head: Normocephalic and atraumatic.      Mouth/Throat:      Pharynx: No oropharyngeal exudate.   Eyes:      General: No scleral icterus.     Conjunctiva/sclera: Conjunctivae normal.   Cardiovascular:      Rate and Rhythm: Normal rate and regular rhythm.      Heart sounds: Normal heart sounds. No murmur heard.      Pulmonary:      Effort: No respiratory distress.      Comments:    Abdominal:      General: There is no distension.      Palpations: Abdomen is soft.      Tenderness: There is no abdominal tenderness.   Musculoskeletal:         General: Swelling (trace ) present. Normal range of motion.      Cervical back: Normal range of motion and neck supple.   Skin:     General: Skin is warm and dry.      Findings: No erythema.   Neurological:      Mental Status: She is alert. She is disoriented.      Cranial Nerves: No cranial nerve deficit.      Comments: Sedated          Laboratory:  Recent Labs   Lab 02/07/22  0511 02/07/22  0511 02/08/22  0809 02/09/22  0511   WBC 20.02*  --  21.82* 22.75*   HGB 11.0*  --  9.7* 9.1*   HCT 32.1*  --  27.9* 26.3*   *  --  137* 137*   MONO 2.0*  CANCELED   < > 5.0  CANCELED 7.0    < > = values in this interval not displayed.      Recent Labs   Lab 02/08/22 1822 02/08/22 2202 02/09/22  0511    137 134*   K 4.6 4.4 4.6    103 103   CO2 22* 23 22*   BUN 23 20 19   CREATININE 0.9 0.8 0.8   CALCIUM 8.2* 7.9* 7.5*   PHOS 2.5* 2.2* 2.5*       Diagnostic Results:  X-Ray: Reviewed  US: Reviewed  Echo: Reviewed  ASSESSMENT/PLAN:     1. RAQUEL  - ATN, COVID sepsis, Hypotension, started on CRRT exelent UF   Now tachycardic hypotensive cut back UF to NET   even for now   -- Extubated still confused   -- now making urine with gresham   -- Keep CRRT - for now mainly for acidosis      2. Hypotension - titrate pressors to MAP> 60-65  3. Anemia of acute illness -   transfuse <7   Recent Labs   Lab 02/07/22  0511 02/08/22  0809 02/09/22  0511   HGB 11.0* 9.7* 9.1*   HCT 32.1* 27.9* 26.3*   * 137* 137*       Iron  Lab Results   Component Value Date    IRON 60 09/19/2018    TIBC 351 09/19/2018    FERRITIN 397 (H) 02/07/2022       4. MBD (E88.9 M90.80) -  Recent Labs   Lab 02/09/22  0511   CALCIUM 7.5*   PHOS 2.5*     Recent Labs   Lab 02/08/22 1822 02/08/22 2202 02/09/22  0511   MG 2.5 2.5 2.4       Lab Results   Component Value Date    CALCIUM 7.5 (L) 02/09/2022    PHOS 2.5 (L) 02/09/2022     No results found for: [id number]    Lab Results   Component Value Date    CO2 22 (L) 02/09/2022     Acidosis - correct with CRRT  -- check Lactic acid   -- Ph at range   5. Hemodialysis Access (Z99.2 V45.11)- CVC  6. Nutrition/Hypoalbuminemia (E88.09) -    Recent Labs   Lab 02/08/22 2202 02/09/22  0511   ALBUMIN 2.5* 2.4*     Nepro with meals TID. Renal vitamins daily      Thank you for the consult, will follow  With any question please call [phone]  Krystina Mayer MD    [facility]  FRANCI Madison MD, ELAINE CALI MD,   MD MESHA Staples MD E. V. Harmon, NP    [address]  [place], [address]  [phone]

## 2022-02-09 NOTE — PROGRESS NOTES
Pharmacist Intervention IV to PO Note    Hannah Norman is a 71 y.o. female being treated with IV medication pantoprazole    Patient Data:    Vital Signs (Most Recent):  Temp: 97.7 °F (36.5 °C) (02/09/22 0945)  Pulse: 98 (02/09/22 1030)  Resp: 17 (02/09/22 1030)  BP: (!) 98/56 (02/09/22 1000)  SpO2: 97 % (02/09/22 1030)   Vital Signs (72h Range):  Temp:  [69.44 °F (20.8 °C)-100.22 °F (37.9 °C)]   Pulse:  []   Resp:  [11-65]   BP: ()/()   SpO2:  [86 %-100 %]   Arterial Line BP: ()/(37-99)      CBC:  Recent Labs   Lab 02/07/22  0511 02/08/22  0809 02/09/22  0511   WBC 20.02* 21.82* 22.75*   RBC 3.49* 3.05* 2.85*   HGB 11.0* 9.7* 9.1*   HCT 32.1* 27.9* 26.3*   * 137* 137*   MCV 92 92 92   MCH 31.5* 31.8* 31.9*   MCHC 34.3 34.8 34.6     CMP:     Recent Labs   Lab 02/04/22  0738 02/04/22  1236 02/05/22  0446 02/05/22  1407 02/08/22  0809 02/08/22  0809 02/08/22  1822 02/08/22  1822 02/08/22 2202 02/08/22 2202 02/09/22  0511   *   < > 139*  139*   < > 255*   < > 147*  --  161*  --  164*   CALCIUM 6.7*   < > 7.0*  7.0*   < > 7.4*   < > 8.2*   < > 7.9*   < > 7.5*   ALBUMIN 3.1*   < > 2.6*  2.6*   < > 2.4*   < > 2.5*  --  2.5*  --  2.4*   PROT 5.6*  --  5.1*  --  4.9*  --   --   --   --   --   --    *   < > 133*  133*   < > 131*   < > 136  --  137  --  134*   K 6.0*   < > 4.0  4.0   < > 3.8   < > 4.6  --  4.4  --  4.6   CO2 17*   < > 18*  18*   < > 22*   < > 22*  --  23  --  22*   CL 99   < > 100  100   < > 99   < > 102  --  103  --  103   BUN 72*   < > 56*  56*   < > 26*   < > 23  --  20  --  19   CREATININE 7.0*   < > 3.7*  3.7*   < > 1.1   < > 0.9  --  0.8  --  0.8   ALKPHOS 97  --  121  --  117  --   --   --   --   --   --    ALT 49*  --  52*  --  25  --   --   --   --   --   --    AST 97*  --  77*  --  18  --   --   --   --   --   --    BILITOT 0.3  --  0.3  --  0.3  --   --   --   --   --   --     < > = values in this interval not displayed.       Dietary  Orders:  Diet Orders  Report           Diet Dysphagia Mechanical Soft (IDDSI Level 5) [facility]; Renal, Cardiac (Low Na/Chol): Dysphagia 2 (Mechanical Soft Ground) starting at 02/09 1050            Based on the following criteria, this patient qualifies for intravenous to oral conversion:  [x] The patients gastrointestinal tract is functioning (tolerating medications via oral or enteral route for 24 hours and tolerating food or enteral feeds for 24 hours).  [x] The patient is hemodynamically stable for 24 hours (heart rate <100 beats per minute, systolic blood pressure >99 mm Hg, and respiratory rate <20 breaths per minute).  [x] The patient shows clinical improvement (afebrile for at least 24 hours and white blood cell count downtrending or normalized). Additionally, the patient must be non-neutropenic (absolute neutrophil count >500 cells/mm3).  [x] For antimicrobials, the patient has received IV therapy for at least 24 hours.    IV medication pantoprazole will be changed to oral medication pantoprazole    Pharmacist's Name: Tyra Goyal  Pharmacist's Extension: [phone]

## 2022-02-09 NOTE — ASSESSMENT & PLAN NOTE
Body mass index is 42.28 kg/m². Morbid obesity complicates all aspects of disease management from diagnostic modalities to treatment.

## 2022-02-09 NOTE — PLAN OF CARE
The pt's Covid(+)in the ICU. The sw spoke to the pt's spouse Eric Norman [phone] via phone to complete the assessment. The pt lives with her spouse,dtr Carole Norman [phone] along with her g-dtr and great-granddtr in [place]. The pt's independent with her adl's and she has a bsc,electric w/c and a w/c at home. The pt's spouse states she doesn't use all the dme but she has it at home in case she needs it. The pt still drives but her spouse will transport her home at d/c. The sw left her name and contact info with him and encouraged him to call if he has any further questions or concerns. The sw will continue to follow the pt throughout her transitions of care and will assist with any d/c needs.        02/09/22 1517   Discharge Assessment   Assessment Type Discharge Planning Assessment   Confirmed/corrected address, phone number and insurance Yes   Confirmed Demographics Correct on Facesheet   Source of Information family   If unable to respond/provide information was family/caregiver contacted? Yes   Contact Name/Number Eric Norman(spouse)[phone]   When was your last doctors appointment?   (last month)   Communicated ARNULFO with patient/caregiver Date not available/Unable to determine   Reason For Admission shock   Lives With child(addis), adult;grandchild(addis);spouse   Do you expect to return to your current living situation? Other (see comments)  (TBD)   Do you have help at home or someone to help you manage your care at home? Yes   Who are your caregiver(s) and their phone number(s)? Eric Norman(spouse)[phone]   Prior to hospitilization cognitive status: Alert/Oriented   Current cognitive status: Alert/Oriented   Walking or Climbing Stairs Difficulty none   Dressing/Bathing Difficulty none   Home Accessibility not wheelchair accessible;stairs to enter home   Number of Stairs, Main Entrance three   Home Layout Able to live on 1st floor   Equipment Currently Used at Home bedside  commode;wheelchair  (scooter)   Readmission within 30 days? No   Patient currently being followed by outpatient case management? No   Do you currently have service(s) that help you manage your care at home? No   Do you take prescription medications? Yes   Do you have prescription coverage? Yes   Coverage [facility] Managed Medicare   Do you have any problems affording any of your prescribed medications? No  (the pt receives her meds affordably at [facility] in [place])   Is the patient taking medications as prescribed? yes   Who is going to help you get home at discharge? Eric Norman(spouse)[phone]   How do you get to doctors appointments? car, drives self   Are you on dialysis? No   Do you take coumadin? No   Discharge Plan A Home Health   Discharge Plan B Other  (TBD)   DME Needed Upon Discharge  other (see comments)  (TBD)   Discharge Plan discussed with: Spouse/sig other   Name(s) and Number(s) Eric Norman(spouse)[phone]   Discharge Barriers Identified None   Relationship/Environment   Name(s) of Who Lives With Patient Eric Norman(spouse)[phone]/Carole Norman(dtr)[phone]

## 2022-02-09 NOTE — PROGRESS NOTES
Therapy with vancomycin complete and/or consult discontinued by provider.  Pharmacy will sign off, please re-consult as needed.    Tyra Goyal, PharmD, BCPS  Clinical Pharmacist  [phone]

## 2022-02-09 NOTE — ASSESSMENT & PLAN NOTE
Juanis related to UTI vs COVID19  CXR was with no pneumonia  Urine with UTI  Blood cultures no growth to date  Lactate 1.1  IV vanco and rocephin continued for now  Trend CBC  Cultures remain negative

## 2022-02-10 LAB
ALBUMIN SERPL BCP-MCNC: 2.5 G/DL (ref 3.5–5.2)
ALLENS TEST: ABNORMAL
ALP SERPL-CCNC: 86 U/L (ref 55–135)
ALT SERPL W/O P-5'-P-CCNC: 23 U/L (ref 10–44)
ANION GAP SERPL CALC-SCNC: 11 MMOL/L (ref 8–16)
APTT BLDCRRT: 30.5 SEC (ref 21–32)
AST SERPL-CCNC: 25 U/L (ref 10–40)
BASOPHILS # BLD AUTO: ABNORMAL K/UL (ref 0–0.2)
BASOPHILS NFR BLD: 2 % (ref 0–1.9)
BILIRUB SERPL-MCNC: 0.3 MG/DL (ref 0.1–1)
BUN SERPL-MCNC: 30 MG/DL (ref 8–23)
CALCIUM SERPL-MCNC: 8 MG/DL (ref 8.7–10.5)
CHLORIDE SERPL-SCNC: 104 MMOL/L (ref 95–110)
CO2 SERPL-SCNC: 22 MMOL/L (ref 23–29)
CREAT SERPL-MCNC: 1.2 MG/DL (ref 0.5–1.4)
DELSYS: ABNORMAL
DIFFERENTIAL METHOD: ABNORMAL
EOSINOPHIL # BLD AUTO: ABNORMAL K/UL (ref 0–0.5)
EOSINOPHIL NFR BLD: 4 % (ref 0–8)
ERYTHROCYTE [DISTWIDTH] IN BLOOD BY AUTOMATED COUNT: 13.4 % (ref 11.5–14.5)
EST. GFR  (AFRICAN AMERICAN): 52 ML/MIN/1.73 M^2
EST. GFR  (NON AFRICAN AMERICAN): 45 ML/MIN/1.73 M^2
FIO2: 21
FLOW: 30
FOLATE SERPL-MCNC: 11.6 NG/ML (ref 4–24)
GLUCOSE SERPL-MCNC: 110 MG/DL (ref 70–110)
HCO3 UR-SCNC: 21.8 MMOL/L (ref 24–28)
HCT VFR BLD AUTO: 24.4 % (ref 37–48.5)
HGB BLD-MCNC: 8.2 G/DL (ref 12–16)
HYPOCHROMIA BLD QL SMEAR: ABNORMAL
IMM GRANULOCYTES # BLD AUTO: ABNORMAL K/UL (ref 0–0.04)
IMM GRANULOCYTES NFR BLD AUTO: ABNORMAL % (ref 0–0.5)
IRON SERPL-MCNC: 56 UG/DL (ref 30–160)
LYMPHOCYTES # BLD AUTO: ABNORMAL K/UL (ref 1–4.8)
LYMPHOCYTES NFR BLD: 21 % (ref 18–48)
MCH RBC QN AUTO: 31.5 PG (ref 27–31)
MCHC RBC AUTO-ENTMCNC: 33.6 G/DL (ref 32–36)
MCV RBC AUTO: 94 FL (ref 82–98)
METAMYELOCYTES NFR BLD MANUAL: 1 %
MODE: ABNORMAL
MONOCYTES # BLD AUTO: ABNORMAL K/UL (ref 0.3–1)
MONOCYTES NFR BLD: 6 % (ref 4–15)
NEUTROPHILS NFR BLD: 55 % (ref 38–73)
NEUTS BAND NFR BLD MANUAL: 11 %
NRBC BLD-RTO: 0 /100 WBC
PCO2 BLDA: 30.8 MMHG (ref 35–45)
PH SMN: 7.46 [PH] (ref 7.35–7.45)
PLATELET # BLD AUTO: 153 K/UL (ref 150–450)
PLATELET BLD QL SMEAR: ABNORMAL
PMV BLD AUTO: 10.6 FL (ref 9.2–12.9)
PO2 BLDA: 86 MMHG (ref 80–100)
POC BE: -2 MMOL/L
POC SATURATED O2: 97 % (ref 95–100)
POC TCO2: 23 MMOL/L (ref 23–27)
POCT GLUCOSE: 113 MG/DL (ref 70–110)
POCT GLUCOSE: 119 MG/DL (ref 70–110)
POCT GLUCOSE: 121 MG/DL (ref 70–110)
POCT GLUCOSE: 72 MG/DL (ref 70–110)
POCT GLUCOSE: 79 MG/DL (ref 70–110)
POLYCHROMASIA BLD QL SMEAR: ABNORMAL
POTASSIUM SERPL-SCNC: 4 MMOL/L (ref 3.5–5.1)
PROT SERPL-MCNC: 4.9 G/DL (ref 6–8.4)
RBC # BLD AUTO: 2.6 M/UL (ref 4–5.4)
SAMPLE: ABNORMAL
SATURATED IRON: 23 % (ref 20–50)
SITE: ABNORMAL
SODIUM SERPL-SCNC: 137 MMOL/L (ref 136–145)
TOTAL IRON BINDING CAPACITY: 244 UG/DL (ref 250–450)
TRANSFERRIN SERPL-MCNC: 165 MG/DL (ref 200–375)
VIT B12 SERPL-MCNC: 1109 PG/ML (ref 210–950)
WBC # BLD AUTO: 16.76 K/UL (ref 3.9–12.7)

## 2022-02-10 PROCEDURE — 27100092 HC HIGH FLOW DELIVERY CANNULA

## 2022-02-10 PROCEDURE — 37799 UNLISTED PX VASCULAR SURGERY: CPT

## 2022-02-10 PROCEDURE — 27000207 HC ISOLATION

## 2022-02-10 PROCEDURE — A4216 STERILE WATER/SALINE, 10 ML: HCPCS | Performed by: INTERNAL MEDICINE

## 2022-02-10 PROCEDURE — 94660 CPAP INITIATION&MGMT: CPT

## 2022-02-10 PROCEDURE — 94799 UNLISTED PULMONARY SVC/PX: CPT

## 2022-02-10 PROCEDURE — 25000003 PHARM REV CODE 250: Performed by: STUDENT IN AN ORGANIZED HEALTH CARE EDUCATION/TRAINING PROGRAM

## 2022-02-10 PROCEDURE — 25000003 PHARM REV CODE 250: Performed by: INTERNAL MEDICINE

## 2022-02-10 PROCEDURE — 94761 N-INVAS EAR/PLS OXIMETRY MLT: CPT

## 2022-02-10 PROCEDURE — 25000003 PHARM REV CODE 250: Performed by: NURSE PRACTITIONER

## 2022-02-10 PROCEDURE — 82746 ASSAY OF FOLIC ACID SERUM: CPT | Performed by: INTERNAL MEDICINE

## 2022-02-10 PROCEDURE — 80053 COMPREHEN METABOLIC PANEL: CPT | Performed by: INTERNAL MEDICINE

## 2022-02-10 PROCEDURE — 85007 BL SMEAR W/DIFF WBC COUNT: CPT | Performed by: INTERNAL MEDICINE

## 2022-02-10 PROCEDURE — 63600175 PHARM REV CODE 636 W HCPCS: Performed by: NURSE PRACTITIONER

## 2022-02-10 PROCEDURE — 85027 COMPLETE CBC AUTOMATED: CPT | Performed by: INTERNAL MEDICINE

## 2022-02-10 PROCEDURE — 82803 BLOOD GASES ANY COMBINATION: CPT

## 2022-02-10 PROCEDURE — 82607 VITAMIN B-12: CPT | Performed by: INTERNAL MEDICINE

## 2022-02-10 PROCEDURE — 99900035 HC TECH TIME PER 15 MIN (STAT)

## 2022-02-10 PROCEDURE — 27000249 HC VAPOTHERM CIRCUIT

## 2022-02-10 PROCEDURE — 63600175 PHARM REV CODE 636 W HCPCS: Performed by: STUDENT IN AN ORGANIZED HEALTH CARE EDUCATION/TRAINING PROGRAM

## 2022-02-10 PROCEDURE — 20000000 HC ICU ROOM

## 2022-02-10 PROCEDURE — 27100171 HC OXYGEN HIGH FLOW UP TO 24 HOURS

## 2022-02-10 PROCEDURE — 84466 ASSAY OF TRANSFERRIN: CPT | Performed by: INTERNAL MEDICINE

## 2022-02-10 PROCEDURE — 85730 THROMBOPLASTIN TIME PARTIAL: CPT | Performed by: HOSPITALIST

## 2022-02-10 RX ORDER — OXYCODONE HYDROCHLORIDE 5 MG/1
5 TABLET ORAL ONCE
Status: COMPLETED | OUTPATIENT
Start: 2022-02-10 | End: 2022-02-10

## 2022-02-10 RX ORDER — SODIUM BICARBONATE 650 MG/1
650 TABLET ORAL 3 TIMES DAILY
Status: DISCONTINUED | OUTPATIENT
Start: 2022-02-10 | End: 2022-02-13

## 2022-02-10 RX ADMIN — ACETAMINOPHEN 650 MG: 325 TABLET ORAL at 09:02

## 2022-02-10 RX ADMIN — HYDROCORTISONE SODIUM SUCCINATE 50 MG: 100 INJECTION, POWDER, FOR SOLUTION INTRAMUSCULAR; INTRAVENOUS at 05:02

## 2022-02-10 RX ADMIN — HYDROCORTISONE SODIUM SUCCINATE 50 MG: 100 INJECTION, POWDER, FOR SOLUTION INTRAMUSCULAR; INTRAVENOUS at 12:02

## 2022-02-10 RX ADMIN — FLUDROCORTISONE ACETATE 100 MCG: 0.1 TABLET ORAL at 08:02

## 2022-02-10 RX ADMIN — HEPARIN SODIUM 7500 UNITS: 5000 INJECTION, SOLUTION INTRAVENOUS; SUBCUTANEOUS at 09:02

## 2022-02-10 RX ADMIN — HEPARIN SODIUM 7500 UNITS: 5000 INJECTION, SOLUTION INTRAVENOUS; SUBCUTANEOUS at 04:02

## 2022-02-10 RX ADMIN — OXYCODONE HYDROCHLORIDE AND ACETAMINOPHEN 500 MG: 500 TABLET ORAL at 08:02

## 2022-02-10 RX ADMIN — DEXMEDETOMIDINE HYDROCHLORIDE IN 0.9% SODIUM CHLORIDE 1 MCG/KG/HR: 4 INJECTION INTRAVENOUS at 12:02

## 2022-02-10 RX ADMIN — SODIUM CHLORIDE, PRESERVATIVE FREE 10 ML: 5 INJECTION INTRAVENOUS at 12:02

## 2022-02-10 RX ADMIN — ACETAMINOPHEN 650 MG: 325 TABLET ORAL at 05:02

## 2022-02-10 RX ADMIN — THERA TABS 1 TABLET: TAB at 08:02

## 2022-02-10 RX ADMIN — SODIUM BICARBONATE 650 MG TABLET 650 MG: at 08:02

## 2022-02-10 RX ADMIN — OXYCODONE 5 MG: 5 TABLET ORAL at 01:02

## 2022-02-10 RX ADMIN — PANTOPRAZOLE SODIUM 40 MG: 40 TABLET, DELAYED RELEASE ORAL at 08:02

## 2022-02-10 RX ADMIN — DEXMEDETOMIDINE HYDROCHLORIDE IN 0.9% SODIUM CHLORIDE 1.1 MCG/KG/HR: 4 INJECTION INTRAVENOUS at 04:02

## 2022-02-10 RX ADMIN — SODIUM CHLORIDE, PRESERVATIVE FREE 10 ML: 5 INJECTION INTRAVENOUS at 05:02

## 2022-02-10 RX ADMIN — SODIUM BICARBONATE 650 MG TABLET 650 MG: at 04:02

## 2022-02-10 RX ADMIN — HEPARIN SODIUM 7500 UNITS: 5000 INJECTION, SOLUTION INTRAVENOUS; SUBCUTANEOUS at 05:02

## 2022-02-10 NOTE — PROGRESS NOTES
Nephrology Progress Note     Consult Requested By: Bernabe King MD  Reason for Consult: RAQUEL     SUBJECTIVE:          Review of Systems   Constitutional: Positive for malaise/fatigue. Negative for chills and fever.   HENT: Negative for congestion and sore throat.    Eyes: Negative for blurred vision, double vision and photophobia.   Respiratory: Negative for cough and shortness of breath.    Cardiovascular: Negative for chest pain, palpitations and leg swelling.   Gastrointestinal: Negative for abdominal pain, diarrhea, nausea and vomiting.   Genitourinary: Negative for dysuria and urgency.   Musculoskeletal: Negative for joint pain and myalgias.   Skin: Negative for itching and rash.   Neurological: Negative for dizziness, sensory change, weakness and headaches.   Endo/Heme/Allergies: Negative for polydipsia. Does not bruise/bleed easily.   Psychiatric/Behavioral: Negative for depression.       Past Medical History:   Diagnosis Date    RAQUEL (acute kidney injury) 2/3/2022    Anxiety disorder     Atrial fibrillation with RVR 2/7/2022    Bell's palsy     Chronic back pain     Chronic osteoarthritis     Essential tremor     Fibromyalgia     Hypertension     Mild acid reflux     Neck pain     Other and unspecified hyperlipidemia     Sleep apnea     wear c-pap at night; does not use regularly    Unspecified mood (affective) disorder      Past Surgical History:   Procedure Laterality Date    ADENOIDECTOMY      APPENDECTOMY      BLADDER SUSPENSION      BREAST BIOPSY Left     1995  negative    Breast reduction      BREAST SURGERY Bilateral     breast reduction    CARPAL TUNNEL RELEASE Right     COLONOSCOPY  2008    COLONOSCOPY N/A 11/10/2017    Procedure: COLONOSCOPY - Miralax split prep;  Surgeon: Annetta Powell MD;  Location: [facility];  Service: Endoscopy;  Laterality: N/A;    COSMETIC SURGERY Bilateral     breast reduction    CYSTOSCOPY N/A 10/23/2018    Procedure: CYSTOSCOPY;  Surgeon:  Feli Garza MD;  Location: [facility] 2ND FLR;  Service: Urology;  Laterality: N/A;    ESOPHAGOGASTRODUODENOSCOPY N/A 2019    Procedure: ESOPHAGOGASTRODUODENOSCOPY (EGD);  Surgeon: Oscar Wylie MD;  Location: [facility] ENDO (4TH FLR);  Service: Endoscopy;  Laterality: N/A;  Off PPI/H2 x 7 days prior- ERW    HYSTERECTOMY      JOINT REPLACEMENT Left     knee    KNEE SURGERY      bilateral    left shoulder Left     rotator cuff    OPEN REDUCTION AND INTERNAL FIXATION (ORIF) OF FRACTURE OF DISTAL RADIUS Right 2021    Procedure: ORIF, FRACTURE, RADIUS, DISTAL;  Surgeon: Evelio Lynn Jr., MD;  Location: [facility];  Service: Orthopedics;  Laterality: Right;  need accumed plate and mini c arm, Acumed confirmed 11/15/21 AM    PLACEMENT OF TRANSOBTURATOR TAPE N/A 10/23/2018    Procedure: PLACEMENT, TRANSOBTURATOR TAPE;  Surgeon: Feli Garza MD;  Location: [facility] OR 2ND FLR;  Service: Urology;  Laterality: N/A;  1.5 hours    Tonsillectomy      TONSILLECTOMY      TOTAL REDUCTION MAMMOPLASTY          TUBAL LIGATION       Family History   Problem Relation Age of Onset    Diabetes Mother     Tremor Mother     Liver disease Mother     Hypertension Mother     Diabetes Father     Heart disease Father     Hypertension Father     Tremor Son     Kidney disease Son     Diabetes Sister     Diabetes Brother     No Known Problems Daughter     Depression Sister     Kidney disease Son         Reniel failure but corrected    Learning disabilities Son         Slow in some areas    Mental retardation Son         Mild retardation    Tremor Son     Cancer Brother      Social History     Tobacco Use    Smoking status: Former Smoker     Packs/day: 2.00     Years: 30.00     Pack years: 60.00     Types: Cigarettes     Start date:      Quit date: 1990     Years since quittin.5    Smokeless tobacco: Never Used   Substance Use Topics    Alcohol use: No    Drug use: No       Review of  patient's allergies indicates:   Allergen Reactions    Hyoscyamine Rash    Msg [glutamic acid] Swelling            OBJECTIVE:     Vital Signs (Most Recent)  Vitals:    02/10/22 1415 02/10/22 1430 02/10/22 1445 02/10/22 1500   BP:    (!) 113/55   BP Location:       Patient Position:       Pulse: 108 104 107 107   Resp: (!) 22 20 (!) 25 (!) 24   Temp:       TempSrc:       SpO2: 97% 96% 97% 97%   Weight:       Height:             Date 02/10/22 0700 - 02/11/22 0659   Shift 8527-7339 7383-7907 5443-1600 24 Hour Total   INTAKE   P.O. 120   120   I.V.(mL/kg) 74.7(0.7)   74.7(0.7)   Shift Total(mL/kg) 194.7(1.9)   194.7(1.9)   OUTPUT   Urine(mL/kg/hr) 625(0.8)   625   Shift Total(mL/kg) 625(6.2)   625(6.2)   Weight (kg) 101.5 101.5 101.5 101.5           Medications:   ascorbic acid (vitamin C)  500 mg Oral BID    fludrocortisone  100 mcg Oral Daily    heparin (porcine)  7,500 Units Subcutaneous Q8H    hydrocortisone sodium succinate  50 mg Intravenous Q6H    insulin detemir U-100  15 Units Subcutaneous Daily    pantoprazole  40 mg Oral Daily    sodium bicarbonate  650 mg Oral TID    sodium chloride 0.9%  10 mL Intravenous Q6H           Physical Exam  Vitals and nursing note reviewed.   Constitutional:       General: She is not in acute distress.     Appearance: She is obese. She is ill-appearing. She is not diaphoretic.   HENT:      Head: Normocephalic and atraumatic.      Mouth/Throat:      Pharynx: No oropharyngeal exudate.   Eyes:      General: No scleral icterus.     Conjunctiva/sclera: Conjunctivae normal.   Cardiovascular:      Rate and Rhythm: Normal rate and regular rhythm.      Heart sounds: Normal heart sounds. No murmur heard.      Pulmonary:      Effort: No respiratory distress.      Comments:    Abdominal:      General: There is no distension.      Palpations: Abdomen is soft.      Tenderness: There is no abdominal tenderness.   Musculoskeletal:         General: Swelling (trace ) present. Normal range  of motion.      Cervical back: Normal range of motion and neck supple.   Skin:     General: Skin is warm and dry.      Findings: No erythema.   Neurological:      Mental Status: She is alert and oriented to person, place, and time.      Cranial Nerves: No cranial nerve deficit.         Laboratory:  Recent Labs   Lab 02/08/22  0809 02/08/22  0809 02/09/22  0511 02/10/22  0523   WBC 21.82*  --  22.75* 16.76*   HGB 9.7*  --  9.1* 8.2*   HCT 27.9*  --  26.3* 24.4*   *  --  137* 153   MONO 5.0  CANCELED   < > 7.0 6.0  CANCELED    < > = values in this interval not displayed.     Recent Labs   Lab 02/08/22 2202 02/08/22 2202 02/09/22 0511 02/09/22  1654 02/10/22  0523      < > 134* 138 137   K 4.4   < > 4.6 4.2 4.0      < > 103 104 104   CO2 23   < > 22* 26 22*   BUN 20   < > 19 17 30*   CREATININE 0.8   < > 0.8 0.8 1.2   CALCIUM 7.9*   < > 7.5* 7.8* 8.0*   PHOS 2.2*  --  2.5* 2.1*  --     < > = values in this interval not displayed.       Diagnostic Results:  X-Ray: Reviewed  US: Reviewed  Echo: Reviewed  ASSESSMENT/PLAN:     1. RAQUEL  - ATN, COVID sepsis, Hypotension, started on CRRT exelent UF   Was tachycardic hypotensive cut back UF to NET  Clotted last night   -- Extubated  More awake today AAOx3   -- No need for HD today   -- now making urine with gresham but minimal need HD/UF   -- Add PO bicarbonate  for acidosis      2. Hypotension - resolved   3. Anemia of acute illness -   transfuse <7   Recent Labs   Lab 02/08/22  0809 02/09/22  0511 02/10/22  0523   HGB 9.7* 9.1* 8.2*   HCT 27.9* 26.3* 24.4*   * 137* 153       Iron  Lab Results   Component Value Date    IRON 56 02/10/2022    TIBC 244 (L) 02/10/2022    FERRITIN 51 02/09/2022       4. MBD (E88.9 M90.80) -  Recent Labs   Lab 02/09/22  1654 02/09/22  1654 02/10/22  0523   CALCIUM 7.8*   < > 8.0*   PHOS 2.1*  --   --     < > = values in this interval not displayed.     Recent Labs   Lab 02/08/22  2202 02/09/22  0511 02/09/22  1654   MG  2.5 2.4 2.5       Lab Results   Component Value Date    CALCIUM 8.0 (L) 02/10/2022    PHOS 2.1 (L) 02/09/2022     No results found for: [id number]    Lab Results   Component Value Date    CO2 22 (L) 02/10/2022     Acidosis - correct with HD/Po bicarb   -- check Lactic acid   -- Ph at range   5. Hemodialysis Access (Z99.2 V45.11)- CVC  6. Nutrition/Hypoalbuminemia (E88.09) -    Recent Labs   Lab 02/09/22  1654 02/10/22  0523   ALBUMIN 2.5* 2.5*     Nepro with meals TID. Renal vitamins daily      Thank you for the consult, will follow  With any question please call [phone]  Krystina Mayer MD    [facility]  FRANCI Madison MD, FACPELAINE MD,   MD MESHA Staples MD E. V. Harmon, NP    [address]  [place], [address] [phone]

## 2022-02-10 NOTE — PROGRESS NOTES
[facility] Progress Note    Subjective:      Mental status continues to show improvement this morning. Did have an episode of delirium yesterday afternoon requiring the use of precedex and zyprexa. Was on BiPAP all night. Placed on HFNC this AM; tolerating well.  Son at bedside and  over the phone updated on plan of care.  Still with negligible urine output.    Objective:     Last 24 Hour Vital Signs:  BP  Min: 88/54  Max: 139/89  Temp  Av.5 °F (36.9 °C)  Min: 97.7 °F (36.5 °C)  Max: 99.2 °F (37.3 °C)  Pulse  Av  Min: 61  Max: 109  Resp  Av.1  Min: 12  Max: 30  SpO2  Av.9 %  Min: 96 %  Max: 100 %  I/O last 3 completed shifts:  In: 1728.5 [P.O.:200; I.V.:984.2; IV Piggyback:544.3]  Out: 1627 [Urine:175; Other:1452]    Physical Examination:  Gen: NAD, well appearing, alert, interactive, A&Ox3  HEENT: NC/AT, PERRL, EOMI, good conjugate gaze, MMM  NECK: Supple, No LAD, Normal ROM  CV: RRR, normal S1/S2, no murmurs  Resp: CTAB, normal WOB, no wheezing, rhonchi, or crackles  Abd: soft, ND/NT, normal bowel sounds, no masses  Ext: normal tone and ROM, strength and sensation intact, cap refill <2s, 2+ dp equal bl  Neuro: A&Ox3, CN II to XII intact, reflex symmetric, sensation normal, gait normal   Skin: intact, no rashes, no lesions, no erythema     Laboratory:  Trended Lab Data:  Recent Labs   Lab 22  0809 22  0511 02/10/22  0523   WBC 21.82* 22.75* 16.76*   HGB 9.7* 9.1* 8.2*   HCT 27.9* 26.3* 24.4*   * 137* 153       Recent Labs   Lab 22  2202 22  0511 22  1654 02/10/22  0523      < > 134* 138 137   K 4.4   < > 4.6 4.2 4.0      < > 103 104 104   CO2 23   < > 22* 26 22*   BUN 20   < > 19 17 30*   CREATININE 0.8   < > 0.8 0.8 1.2   *   < > 164* 110 110   CALCIUM 7.9*   < > 7.5* 7.8* 8.0*   MG 2.5  --  2.4 2.5  --    PHOS 2.2*  --  2.5* 2.1*  --     < > = values in this interval not displayed.       Recent  Labs   Lab 02/05/22  0446 02/05/22  1407 02/08/22  0809 02/08/22  1822 02/09/22  0511 02/09/22  1654 02/10/22  0523   PROT 5.1*  --  4.9*  --   --   --  4.9*   ALBUMIN 2.6*  2.6*   < > 2.4*   < > 2.4* 2.5* 2.5*   BILITOT 0.3  --  0.3  --   --   --  0.3   AST 77*  --  18  --   --   --  25   ALT 52*  --  25  --   --   --  23   ALKPHOS 121  --  117  --   --   --  86    < > = values in this interval not displayed.     Cardiac:   Recent Labs   Lab 02/03/22 2129 02/03/22 2152   TROPONINI  --  0.021   BNP 34  --      Radiology:  Reviewed.    Current Medications:     Infusions:   sodium chloride 0.9% 5 mL/hr at 02/09/22 1857    dexmedetomidine (PRECEDEX) infusion 1.1 mcg/kg/hr (02/10/22 0411)        Scheduled:   ascorbic acid (vitamin C)  500 mg Oral BID    cefTRIAXone (ROCEPHIN) IVPB  1 g Intravenous Q24H    fludrocortisone  100 mcg Oral Daily    heparin (porcine)  7,500 Units Subcutaneous Q8H    hydrocortisone sodium succinate  50 mg Intravenous Q6H    insulin detemir U-100  15 Units Subcutaneous Daily    multivitamin  1 tablet Oral Daily    pantoprazole  40 mg Oral Daily    sodium chloride 0.9%  10 mL Intravenous Q6H     Assessment:     Patient is a 71-year-old female with a past medical history of hypertension, hyperlipidemia, GERD, obesity, sleep apnea, osteoarthritis, chronic back pain, central tremor, fibromyalgia, anxiety, and mood disorder was admitted to [facility] on 2/3/22 for acute hypercapnic respiratory failure and RAQUEL. Initially admitted to the ICU on BiPAP but was intubated for worsening hypercapnia in the setting of her renal failure.  Status post extubation and continue dialysis    Plan:     CNS/Neuro  #Acute encephalopathy  - Patient presented with altered mental status after 2 week of progressive fatigue and shortness of breath  - Initial ABG with a pH of 7.19, pCO2 65, PO2 of 117 on nasal cannula  - Etiology likely secondary to acidemia and hypercapnia  - Significant improvement in  mental status since extubation     Cardiovascular  #Shock, resolved  - Patient became hypotensive shortly after presenting to the ED requiring use of pressors for blood pressure support  - Echo remarkable for grade II diastolic dysfunction; hyperdynamic with EF 75%  - Elevated white count on presentation to the ED of 19; lactate WNL  - UA with 3+ leukocytes and many bacteria; complete a day course of ceftriaxone, broadened with vanc due to increasing pressor requirement; vanc discontinued after improving blood pressures post extubation 2/9  - Blood cultures obtained; NGTD  - PM cortisol 2.2; a.m. cortisol 14.2  - Blood pressure stable since extubation; levo/vanco discontinued 2/8    #AFib with RVR  - Night of 2/6 patient went into AFib with RVR with a heart rate of 46  - Started on amiodarone with improvement in rate  - Rate since been controlled off amiodarone drip  - Continue to monitor     Respiratory  #Acute hypercapnic respiratory failure  #MARY noncompliant with CPAP  #Obesity hypoventilation syndrome?  #COVID  - Patient presented with progressively worsening fatigue and weakness after recent diagnosis of COVID infection.  History of MARY but noncompliant with CPAP (has machine)  - ABG on presentation to the ED pH 7.19, pCO2 of 65, PO2 of 117 on nasal cannula  - Placed on BiPAP in the ED pH 7.12, pCO2 72 PO2 101  - Intubated, mechanically ventilated 2/2 worsening hypercapnia despite continuous BiPAP 2/4  - Adequate volume removed with CRRT, stop pulling fluid off  - Extubated to BiPAP 18/10.  ABG with pH of 7.38, pCO2 40, PO2 89  - Now on high-flow nasal cannula; will repeat gas this p.m.  - Continue BiPAP at night     GI/Metabolic  TRI     Renal  #RAQUEL  #AGMA  #Volume overload  - Patient presents to the ED with a BUN/creatinine of 69/7.1  - Noted history of renal disease  - Adequate volume removed with CRRT, stop pulling fluid off  - Touch base with Nephrology to see about switching to HD    Heme  #Anemia  - Hgb  11.2 on presentation to the ED  - Sharp drop in hemoglobin to 9.7 on 2/8  - No obvious source of bleeding; may be secondary to line clot 2/7 a.m.  - Continue to monitor     Endo   #Hyperglycemia  - Started on Levemir 10 units  - Continues to titrate insulin  - Strict Glucose control with goal 140-180     ID  #Undifferentiated shock  #UTI  - Ceftriaxone for UTI tx s/p 8 course  - Stress dose steroids added 2/7 secondary to increasing pressor requirements     GI Ppx:  home Protonix  Diet: Diet renal [facility]; Cardiac (Low Na/Chol)  DVT Ppx: hep     Thank you for allowing us to participate in the care of this patient. We will continue to follow at this time. Please call with questions.     Bud Jauregui MD  [facility] Internal Medicine [facility]

## 2022-02-10 NOTE — NURSING
CVVH progressing without difficulty. Orders updated to Uf net zero. Fluids and cartridges stocked on unit.

## 2022-02-10 NOTE — PROGRESS NOTES
"[facility] - Intensive Care  Adult Nutrition  Progress Note    SUMMARY       Recommendations    Recommendation:   1. Encourage intake at meals as tolerated.   2. Add Novasource Renal 1xday.   3. Monitor weight/labs.   4. RD to continue to follow to monitor po intake    Goals:   Pt will tolerate diet with at least 50-75% intake at meals by RD follow up  Nutrition Goal Status: new  Communication of RD Recs: reviewed with RN (Amy)    Assessment and Plan  Acute on chronic respiratory failure with hypercapnia  Contributing Nutrition Diagnosis  Inadequate energy intake    Related to (etiology):   Intubation/COVID    Signs and Symptoms (as evidenced by):   NPO    Interventions:  Collaboration with other providers    Nutrition Diagnosis Status:   Continues      Malnutrition Assessment  Weight Loss (Malnutrition):  (6% x 3 months)     Reason for Assessment  Reason For Assessment: RD follow-up  Diagnosis:  (acute resp failure)  Relevant Medical History: HTN, chronic osteoarthritis, [place] palsy, sleep apnea, RAQUEL, hysterectomy, B knee surgery, appendectomy  General Information Comments: Pt extubated . Now on Renal Cardiac diet with poor intake. PICC line. Shakir 11-skin intact. On oxygen. Noted 16lb weight loss x 3 months. Unable to assess NFPE 2/2 pt on COVID isolation  Nutrition Discharge Planning: pt to d/c on Renal Cardiac diet    Nutrition Risk Screen  Nutrition Risk Screen: no indicators present    Nutrition/Diet History  Food Preferences: unable to assess  Factors Affecting Nutritional Intake: decreased appetite    Anthropometrics  Temp: 98.8 °F (37.1 °C)  Height Method: Estimated  Height: 5' 1" (154.9 cm)  Height (inches): 61 in  Weight Method: Bed Scale  Weight: 101.5 kg (223 lb 12.3 oz)  Weight (lb): 223.77 lb  Ideal Body Weight (IBW), Female: 105 lb  % Ideal Body Weight, Female (lb): 213.11 %  BMI (Calculated): 42.3  BMI Grade: greater than 40 - morbid obesity  Usual Body Weight (UBW), k.9 kg (10/25)  % Usual " Body Weight: 93.4  % Weight Change From Usual Weight: -6.8 %     Lab/Procedures/Meds  Pertinent Labs Reviewed: reviewed  Pertinent Labs Comments: BUN 30H, Ca 8.0L, Phos 2.1L, Alb 2.5L  Pertinent Medications Reviewed: reviewed  Pertinent Medications Comments: Vit C, rocephin, insulin, MVI, pantoprazole, precedex    Estimated/Assessed Needs  Weight Used For Calorie Calculations: 101.5 kg (223 lb 12.3 oz)  Energy Calorie Requirements (kcal): 1754  Energy Need Method: Addison-St Jeor (x1.2)  Protein Requirements: 81g (0.8g/kg)  Weight Used For Protein Calculations: 101.5 kg (223 lb 12.3 oz)  Estimated Fluid Requirement Method: RDA Method  RDA Method (mL): 1754  CHO Requirement: 200g    Nutrition Prescription Ordered  Current Diet Order: Renal Cardiac    Evaluation of Received Nutrient/Fluid Intake  I/O: 120/0  Energy Calories Required: not meeting needs  Protein Required: not meeting needs  Fluid Required: not meeting needs  Comments: LBM 2/9  % Intake of Estimated Energy Needs: 25 - 50 %  % Meal Intake: 25 - 50 %    Nutrition Risk  Level of Risk/Frequency of Follow-up:  (2xweekly)     Monitor and Evaluation  Food and Nutrient Intake: energy intake  Food and Nutrient Adminstration: diet order,enteral and parenteral nutrition administration  Physical Activity and Function: nutrition-related ADLs and IADLs  Anthropometric Measurements: weight  Biochemical Data, Medical Tests and Procedures: electrolyte and renal panel  Nutrition-Focused Physical Findings: overall appearance     Nutrition Follow-Up  RD Follow-up?: Yes

## 2022-02-10 NOTE — PLAN OF CARE
Recommendation:   1. Encourage intake at meals as tolerated.   2. Add Novasource Renal 1xday.   3. Monitor weight/labs.   4. RD to continue to follow to monitor po intake    Goals:   Pt will tolerate diet with at least 50-75% intake at meals by RD follow up  Nutrition Goal Status: new

## 2022-02-10 NOTE — NURSING
Blood leak detected in CRRT machine. Stopped treatment and unable to rinse blood back. Hep locked Trialysis.

## 2022-02-11 LAB
ALBUMIN SERPL BCP-MCNC: 2.7 G/DL (ref 3.5–5.2)
ALP SERPL-CCNC: 95 U/L (ref 55–135)
ALT SERPL W/O P-5'-P-CCNC: 27 U/L (ref 10–44)
ANION GAP SERPL CALC-SCNC: 16 MMOL/L (ref 8–16)
ANISOCYTOSIS BLD QL SMEAR: SLIGHT
APTT BLDCRRT: 27.1 SEC (ref 21–32)
AST SERPL-CCNC: 28 U/L (ref 10–40)
BASOPHILS # BLD AUTO: ABNORMAL K/UL (ref 0–0.2)
BASOPHILS NFR BLD: 0 % (ref 0–1.9)
BILIRUB SERPL-MCNC: 0.4 MG/DL (ref 0.1–1)
BUN SERPL-MCNC: 40 MG/DL (ref 8–23)
CALCIUM SERPL-MCNC: 8.1 MG/DL (ref 8.7–10.5)
CHLORIDE SERPL-SCNC: 103 MMOL/L (ref 95–110)
CO2 SERPL-SCNC: 20 MMOL/L (ref 23–29)
CREAT SERPL-MCNC: 1.5 MG/DL (ref 0.5–1.4)
DIFFERENTIAL METHOD: ABNORMAL
EOSINOPHIL # BLD AUTO: ABNORMAL K/UL (ref 0–0.5)
EOSINOPHIL NFR BLD: 1 % (ref 0–8)
ERYTHROCYTE [DISTWIDTH] IN BLOOD BY AUTOMATED COUNT: 14 % (ref 11.5–14.5)
EST. GFR  (AFRICAN AMERICAN): 40 ML/MIN/1.73 M^2
EST. GFR  (NON AFRICAN AMERICAN): 35 ML/MIN/1.73 M^2
GLUCOSE SERPL-MCNC: 120 MG/DL (ref 70–110)
HCT VFR BLD AUTO: 24.4 % (ref 37–48.5)
HGB BLD-MCNC: 8.3 G/DL (ref 12–16)
HYPOCHROMIA BLD QL SMEAR: ABNORMAL
IMM GRANULOCYTES # BLD AUTO: ABNORMAL K/UL (ref 0–0.04)
IMM GRANULOCYTES NFR BLD AUTO: ABNORMAL % (ref 0–0.5)
LYMPHOCYTES # BLD AUTO: ABNORMAL K/UL (ref 1–4.8)
LYMPHOCYTES NFR BLD: 9 % (ref 18–48)
MCH RBC QN AUTO: 31.8 PG (ref 27–31)
MCHC RBC AUTO-ENTMCNC: 34 G/DL (ref 32–36)
MCV RBC AUTO: 94 FL (ref 82–98)
METAMYELOCYTES NFR BLD MANUAL: 2 %
MONOCYTES # BLD AUTO: ABNORMAL K/UL (ref 0.3–1)
MONOCYTES NFR BLD: 2 % (ref 4–15)
MYELOCYTES NFR BLD MANUAL: 5 %
NEUTROPHILS NFR BLD: 74 % (ref 38–73)
NEUTS BAND NFR BLD MANUAL: 7 %
NRBC BLD-RTO: 0 /100 WBC
PHOSPHATE SERPL-MCNC: 4.6 MG/DL (ref 2.7–4.5)
PLATELET # BLD AUTO: 204 K/UL (ref 150–450)
PLATELET BLD QL SMEAR: ABNORMAL
PMV BLD AUTO: 10 FL (ref 9.2–12.9)
POCT GLUCOSE: 122 MG/DL (ref 70–110)
POCT GLUCOSE: 203 MG/DL (ref 70–110)
POCT GLUCOSE: 76 MG/DL (ref 70–110)
POLYCHROMASIA BLD QL SMEAR: ABNORMAL
POTASSIUM SERPL-SCNC: 4.3 MMOL/L (ref 3.5–5.1)
PROT SERPL-MCNC: 5.3 G/DL (ref 6–8.4)
RBC # BLD AUTO: 2.61 M/UL (ref 4–5.4)
SODIUM SERPL-SCNC: 139 MMOL/L (ref 136–145)
WBC # BLD AUTO: 23.88 K/UL (ref 3.9–12.7)

## 2022-02-11 PROCEDURE — A4216 STERILE WATER/SALINE, 10 ML: HCPCS | Performed by: INTERNAL MEDICINE

## 2022-02-11 PROCEDURE — 85730 THROMBOPLASTIN TIME PARTIAL: CPT | Performed by: HOSPITALIST

## 2022-02-11 PROCEDURE — 63600175 PHARM REV CODE 636 W HCPCS: Performed by: NURSE PRACTITIONER

## 2022-02-11 PROCEDURE — 94660 CPAP INITIATION&MGMT: CPT

## 2022-02-11 PROCEDURE — 99900035 HC TECH TIME PER 15 MIN (STAT)

## 2022-02-11 PROCEDURE — 80053 COMPREHEN METABOLIC PANEL: CPT | Performed by: INTERNAL MEDICINE

## 2022-02-11 PROCEDURE — 25000003 PHARM REV CODE 250: Performed by: STUDENT IN AN ORGANIZED HEALTH CARE EDUCATION/TRAINING PROGRAM

## 2022-02-11 PROCEDURE — 85027 COMPLETE CBC AUTOMATED: CPT | Performed by: HOSPITALIST

## 2022-02-11 PROCEDURE — 84100 ASSAY OF PHOSPHORUS: CPT | Performed by: HOSPITALIST

## 2022-02-11 PROCEDURE — 85007 BL SMEAR W/DIFF WBC COUNT: CPT | Performed by: HOSPITALIST

## 2022-02-11 PROCEDURE — 25000003 PHARM REV CODE 250: Performed by: INTERNAL MEDICINE

## 2022-02-11 PROCEDURE — 63600175 PHARM REV CODE 636 W HCPCS: Performed by: STUDENT IN AN ORGANIZED HEALTH CARE EDUCATION/TRAINING PROGRAM

## 2022-02-11 PROCEDURE — 27000207 HC ISOLATION

## 2022-02-11 PROCEDURE — 20000000 HC ICU ROOM

## 2022-02-11 PROCEDURE — 25000003 PHARM REV CODE 250: Performed by: NURSE PRACTITIONER

## 2022-02-11 PROCEDURE — 94761 N-INVAS EAR/PLS OXIMETRY MLT: CPT

## 2022-02-11 RX ORDER — OXYCODONE AND ACETAMINOPHEN 5; 325 MG/1; MG/1
1 TABLET ORAL ONCE
Status: COMPLETED | OUTPATIENT
Start: 2022-02-11 | End: 2022-02-11

## 2022-02-11 RX ORDER — TALC
9 POWDER (GRAM) TOPICAL NIGHTLY
Status: DISCONTINUED | OUTPATIENT
Start: 2022-02-11 | End: 2022-02-16 | Stop reason: HOSPADM

## 2022-02-11 RX ADMIN — FLUDROCORTISONE ACETATE 100 MCG: 0.1 TABLET ORAL at 08:02

## 2022-02-11 RX ADMIN — MELATONIN TAB 3 MG 9 MG: 3 TAB at 09:02

## 2022-02-11 RX ADMIN — HYDROCORTISONE SODIUM SUCCINATE 50 MG: 100 INJECTION, POWDER, FOR SOLUTION INTRAMUSCULAR; INTRAVENOUS at 03:02

## 2022-02-11 RX ADMIN — PANTOPRAZOLE SODIUM 40 MG: 40 TABLET, DELAYED RELEASE ORAL at 08:02

## 2022-02-11 RX ADMIN — ACETAMINOPHEN 650 MG: 325 TABLET ORAL at 08:02

## 2022-02-11 RX ADMIN — INSULIN DETEMIR 12 UNITS: 100 INJECTION, SOLUTION SUBCUTANEOUS at 08:02

## 2022-02-11 RX ADMIN — SODIUM BICARBONATE 650 MG TABLET 650 MG: at 09:02

## 2022-02-11 RX ADMIN — OXYCODONE HYDROCHLORIDE AND ACETAMINOPHEN 1 TABLET: 5; 325 TABLET ORAL at 12:02

## 2022-02-11 RX ADMIN — SODIUM BICARBONATE 650 MG TABLET 650 MG: at 02:02

## 2022-02-11 RX ADMIN — INSULIN ASPART 2 UNITS: 100 INJECTION, SOLUTION INTRAVENOUS; SUBCUTANEOUS at 04:02

## 2022-02-11 RX ADMIN — HEPARIN SODIUM 7500 UNITS: 5000 INJECTION, SOLUTION INTRAVENOUS; SUBCUTANEOUS at 02:02

## 2022-02-11 RX ADMIN — HYDROCORTISONE SODIUM SUCCINATE 50 MG: 100 INJECTION, POWDER, FOR SOLUTION INTRAMUSCULAR; INTRAVENOUS at 11:02

## 2022-02-11 RX ADMIN — HEPARIN SODIUM 7500 UNITS: 5000 INJECTION, SOLUTION INTRAVENOUS; SUBCUTANEOUS at 06:02

## 2022-02-11 RX ADMIN — SODIUM CHLORIDE, PRESERVATIVE FREE 10 ML: 5 INJECTION INTRAVENOUS at 12:02

## 2022-02-11 RX ADMIN — HEPARIN SODIUM 7500 UNITS: 5000 INJECTION, SOLUTION INTRAVENOUS; SUBCUTANEOUS at 09:02

## 2022-02-11 RX ADMIN — OXYCODONE HYDROCHLORIDE AND ACETAMINOPHEN 500 MG: 500 TABLET ORAL at 09:02

## 2022-02-11 RX ADMIN — OXYCODONE HYDROCHLORIDE AND ACETAMINOPHEN 500 MG: 500 TABLET ORAL at 08:02

## 2022-02-11 RX ADMIN — SODIUM BICARBONATE 650 MG TABLET 650 MG: at 08:02

## 2022-02-11 NOTE — PROGRESS NOTES
"Patient BiPap is alarming, noted she had removed it herself, she was informed on the need of the BiPap she verbalized she knows that. I questioned her to assess if she would allow me to replace it, and she said, "she do not want to wear it."  She then ask me for water, it was given and I turned off the Bipap machine.  "

## 2022-02-11 NOTE — ASSESSMENT & PLAN NOTE
Juanis related to UTI vs COVID19  CXR was with no pneumonia  Urine with UTI  Blood cultures no growth to date  Lactate 1.1  Trend CBC  Cultures remain negative  IV vanco discontinued. Rocephin continued for now

## 2022-02-11 NOTE — ASSESSMENT & PLAN NOTE
With initial pH of 7.188  Trend ABG  Likely related to acute renal failure  -bicarbonate drip started on admission, now discontinued  -initiated on CRRT  -bicarb tabs  -nephrology following

## 2022-02-11 NOTE — SUBJECTIVE & OBJECTIVE
Interval History:  Weaned to room air, bipap qhs due to ge/ohs. Mental status back to baseline. Urine output cont to improve.    Review of Systems   Constitutional: Positive for activity change and fatigue. Negative for appetite change, chills, diaphoresis and fever.   HENT: Negative for congestion, postnasal drip, rhinorrhea, sinus pressure, sinus pain and sneezing.    Respiratory: Positive for shortness of breath (improving). Negative for cough, chest tightness, wheezing and stridor.    Cardiovascular: Negative for chest pain, palpitations and leg swelling.   Gastrointestinal: Negative for abdominal distention, abdominal pain, blood in stool, constipation, diarrhea and nausea.   Endocrine: Negative for cold intolerance and heat intolerance.   Genitourinary: Positive for decreased urine volume (improving). Negative for dysuria, flank pain and hematuria.   Musculoskeletal: Negative for gait problem.   Neurological: Negative for dizziness and weakness.   Psychiatric/Behavioral: Negative for agitation, behavioral problems and confusion.     Objective:     Vital Signs (Most Recent):  Temp: 98.3 °F (36.8 °C) (02/11/22 1108)  Pulse: 100 (02/11/22 1503)  Resp: 20 (02/11/22 1503)  BP: 126/72 (02/11/22 1503)  SpO2: 98 % (02/11/22 1503) Vital Signs (24h Range):  Temp:  [98.1 °F (36.7 °C)-98.9 °F (37.2 °C)] 98.3 °F (36.8 °C)  Pulse:  [] 100  Resp:  [13-39] 20  SpO2:  [93 %-100 %] 98 %  BP: (123-168)/() 126/72     Weight: 101.5 kg (223 lb 12.3 oz)  Body mass index is 42.28 kg/m².    Intake/Output Summary (Last 24 hours) at 2/11/2022 1540  Last data filed at 2/11/2022 1200  Gross per 24 hour   Intake 775 ml   Output 1510 ml   Net -735 ml      Physical Exam  Vitals and nursing note reviewed.   Constitutional:       General: She is not in acute distress.     Appearance: She is morbidly obese.      Interventions: She is sedated and restrained.   HENT:      Head: Normocephalic and atraumatic.      Right Ear: External  ear normal.      Left Ear: External ear normal.      Nose: Nose normal. No congestion.      Mouth/Throat:      Mouth: Mucous membranes are dry.      Pharynx: Oropharynx is clear.   Eyes:      Extraocular Movements: Extraocular movements intact.      Pupils: Pupils are equal, round, and reactive to light.   Cardiovascular:      Rate and Rhythm: Normal rate and regular rhythm.      Pulses: Normal pulses.      Heart sounds: Normal heart sounds.   Pulmonary:      Effort: Pulmonary effort is normal.      Breath sounds: Normal breath sounds.   Abdominal:      General: Bowel sounds are normal.      Palpations: Abdomen is soft.   Genitourinary:     Comments: marga  Musculoskeletal:         General: Normal range of motion.      Cervical back: Normal range of motion. No rigidity.      Right lower leg: No edema.      Left lower leg: No edema.   Skin:     General: Skin is warm and dry.      Capillary Refill: Capillary refill takes less than 2 seconds.      Comments: trialysis line   Neurological:      General: No focal deficit present.      Mental Status: She is alert and oriented to person, place, and time. Mental status is at baseline.      Motor: No weakness.         Significant Labs: All pertinent labs within the past 24 hours have been reviewed.    Significant Imaging: I have reviewed all pertinent imaging results/findings within the past 24 hours.

## 2022-02-11 NOTE — ASSESSMENT & PLAN NOTE
Reported by spouse to be with increased drowsiness and altered mentation. She has been sleeping a lot more than usual and is not alert. Found to be with renal failure, COVID19, and metabolic acidosis    CT head with no acute findings, ammonia level wnl  Will hold all sedating home meds for now  Currently on BIPAP for hypercapnia, which is likely contributing  -renal failure with high anion gap metabolic acidosis and uremia; initiated on dialysis  -pulmonology and nephrology consulted  -resolved

## 2022-02-11 NOTE — ASSESSMENT & PLAN NOTE
Elevated CO2 on ABG, currently on BIPAP  Reassess with ABG with worsened CO2, settings adjusted and adjusting face mask for better fit  Hypoxia likely related to COVID19, was 93% on room air on arrival  -suspect that patient's encephalopathy secondary to elevated CO2 vs uremia  -Pulm following  -Required intubation. Successfully extubated 2/8 to bipap  -now on vapotherm during day with bipap qhs

## 2022-02-11 NOTE — PROGRESS NOTES
"[facility] Progress Note    Subjective:      Mental status returned to baseline. Pulled off bipap this monrning at around 3 am, refused to put bipap back on. Urine output shows significant improvement at more than 1 L.     Objective:     Last 24 Hour Vital Signs:  BP  Min: 113/55  Max: 168/80  Temp  Av.5 °F (36.9 °C)  Min: 98.1 °F (36.7 °C)  Max: 98.9 °F (37.2 °C)  Pulse  Av.7  Min: 102  Max: 114  Resp  Av.5  Min: 13  Max: 33  SpO2  Av.6 %  Min: 93 %  Max: 100 %  Height  Av' 1" (154.9 cm)  Min: 5' 1" (154.9 cm)  Max: 5' 1" (154.9 cm)  Weight  Av.5 kg (223 lb 12.3 oz)  Min: 101.5 kg (223 lb 12.3 oz)  Max: 101.5 kg (223 lb 12.3 oz)  I/O last 3 completed shifts:  In: 769.7 [P.O.:695; I.V.:74.7]  Out: 1585 [Urine:1585]    Physical Examination:  Gen: NAD, well appearing, alert, interactive, A&Ox3  HEENT: NC/AT, PERRL, EOMI, good conjugate gaze, MMM  NECK: Supple, No LAD, Normal ROM  CV: RRR, normal S1/S2, no murmurs  Resp: CTAB, normal WOB, no wheezing, rhonchi, or crackles  Abd: soft, ND/NT, normal bowel sounds, no masses  Ext: normal tone and ROM, strength and sensation intact, cap refill <2s, 2+ dp equal bl  Neuro: A&Ox3, CN II to XII intact, reflex symmetric, sensation normal, gait normal   Skin: intact, no rashes, no lesions, no erythema     Laboratory:  Trended Lab Data:  Recent Labs   Lab 22  0511 02/10/22  0523 22  0552   WBC 22.75* 16.76* 23.88*   HGB 9.1* 8.2* 8.3*   HCT 26.3* 24.4* 24.4*   * 153 204       Recent Labs   Lab 22  22022  0511 22  0511 22  1654 02/10/22  0523 22  0552      < > 134*   < > 138 137 139   K 4.4   < > 4.6   < > 4.2 4.0 4.3      < > 103   < > 104 104 103   CO2 23   < > 22*   < > 26 22* 20*   BUN 20   < > 19   < > 17 30* 40*   CREATININE 0.8   < > 0.8   < > 0.8 1.2 1.5*   *   < > 164*   < > 110 110 120*   CALCIUM 7.9*   < > 7.5*   < > 7.8* 8.0* 8.1*   MG " 2.5  --  2.4  --  2.5  --   --    PHOS 2.2*   < > 2.5*  --  2.1*  --  4.6*    < > = values in this interval not displayed.       Recent Labs   Lab 02/08/22  0809 02/08/22  1822 02/09/22  1654 02/10/22  0523 02/11/22  0552   PROT 4.9*  --   --  4.9* 5.3*   ALBUMIN 2.4*   < > 2.5* 2.5* 2.7*   BILITOT 0.3  --   --  0.3 0.4   AST 18  --   --  25 28   ALT 25  --   --  23 27   ALKPHOS 117  --   --  86 95    < > = values in this interval not displayed.     Cardiac:   No results for input(s): TROPONINI, CKTOTAL, CKMB, BNP in the last 168 hours.  Radiology:  Reviewed.    Current Medications:     Infusions:   sodium chloride 0.9% Stopped (02/10/22 1023)        Scheduled:   ascorbic acid (vitamin C)  500 mg Oral BID    fludrocortisone  100 mcg Oral Daily    heparin (porcine)  7,500 Units Subcutaneous Q8H    hydrocortisone sodium succinate  50 mg Intravenous Q6H    insulin detemir U-100  12 Units Subcutaneous Daily    pantoprazole  40 mg Oral Daily    sodium bicarbonate  650 mg Oral TID    sodium chloride 0.9%  10 mL Intravenous Q6H     Assessment:     Patient is a 71-year-old female with a past medical history of hypertension, hyperlipidemia, GERD, obesity, sleep apnea, osteoarthritis, chronic back pain, central tremor, fibromyalgia, anxiety, and mood disorder was admitted to [facility] on 2/3/22 for acute hypercapnic respiratory failure and RAQUEL. Initially admitted to the ICU on BiPAP but was intubated for worsening hypercapnia in the setting of her renal failure.  Status post extubation and continued dialysis. Transitioned to HD.    Plan:     CNS/Neuro  #Acute encephalopathy, resolved  - Patient presented with altered mental status after 2 week of progressive fatigue and shortness of breath  - Initial ABG with a pH of 7.19, pCO2 65, PO2 of 117 on nasal cannula  - Etiology likely secondary to acidemia and hypercapnia  - Significant improvement in mental status since extubation     Cardiovascular  #Shock,  resolved  - Patient became hypotensive shortly after presenting to the ED requiring use of pressors for blood pressure support  - Echo remarkable for grade II diastolic dysfunction; hyperdynamic with EF 75%  - Elevated white count on presentation to the ED of 19; lactate WNL  - UA with 3+ leukocytes and many bacteria; complete a day course of ceftriaxone, broadened with vanc due to increasing pressor requirement; vanc discontinued after improving blood pressures post extubation 2/9  - Blood cultures obtained; NGTD  - PM cortisol 2.2; a.m. cortisol 14.2  - Blood pressure stable since extubation; levo/vanco discontinued 2/8    #AFib with RVR, rate controlled   - Night of 2/6 patient went into AFib with RVR with a heart rate of 46  - Started on amiodarone with improvement in rate  - Rate since been controlled off amiodarone drip  - Continue to monitor      Respiratory  #Acute hypercapnic respiratory failure  #MARY noncompliant with CPAP  #Obesity hypoventilation syndrome?  #COVID  - Patient presented with progressively worsening fatigue and weakness after recent diagnosis of COVID infection.  History of MARY but noncompliant with CPAP (has machine)  - ABG on presentation to the ED pH 7.19, pCO2 of 65, PO2 of 117 on nasal cannula  - Placed on BiPAP in the ED pH 7.12, pCO2 72 PO2 101  - Intubated, mechanically ventilated 2/2 worsening hypercapnia despite continuous BiPAP 2/4  - Adequate volume removed with CRRT, stop pulling fluid off  - Extubated to BiPAP 18/10.  ABG with pH of 7.38, pCO2 40, PO2 89  - Now on high-flow nasal cannula; will repeat gas this p.m.  - Continue BiPAP at night  - Recommend obtaining ABG 1 day prior to discharge to determine eligibility for triligy machine, please reach out to pulm service when ABG is drawn so that we may assist in coordinating      GI/Metabolic  TRI     Renal  #RAQUEL  #AGMA  #Volume overload  - Patient presents to the ED with a BUN/creatinine of 69/7.1  - Noted history of renal  disease  - Adequate volume removed with CRRT, stop pulling fluid off  - On HD    Heme  #Anemia  - Hgb 11.2 on presentation to the ED  - Sharp drop in hemoglobin to 9.7 on 2/8  - No obvious source of bleeding; may be secondary to line clot 2/7 a.m.  - Continue to monitor     Endo   #Hyperglycemia  - Started on Levemir 10 units  - Continues to titrate insulin  - Strict Glucose control with goal 140-180     ID  #Undifferentiated shock  #UTI  - Ceftriaxone for UTI tx s/p 8 course  - Stress dose steroids added 2/7 secondary to increasing pressor requirements     GI Ppx:  home Protonix  Diet: Diet renal [facility]; Cardiac (Low Na/Chol)  DVT Ppx: hep     Thank you for allowing us to participate in the care of this patient. We will continue to follow at this time. Please call with questions.     Bud Jauregui MD  [facility] Internal Medicine [facility]

## 2022-02-11 NOTE — ASSESSMENT & PLAN NOTE
With initial pH of 7.188  Trend ABG  Likely related to acute renal failure  -bicarbonate drip started on admission, now discontinued  -initiated on CRRT>HD  -bicarb tabs  -nephrology following

## 2022-02-11 NOTE — PROGRESS NOTES
[facility]  Progress Note    Patient Name: Hannah Norman  MRN: [id number]  Patient Class: IP- Inpatient   Admission Date: 2/3/2022  Length of Stay: 8 days  Attending Physician: Bernabe King MD  Primary Care Provider: Raffi Garcia MD        Subjective:     Principal Problem:Acute on chronic respiratory failure with hypercapnia        HPI:  Ms. Norman is a 71 year old female with a medical history that includes anxiety disorder, bell's palsy, chronic back pain, chronic osteoarthritis, essential tremor, fibromyalgia, hypertension, GERD, hyperlipidemia, sleep apnea, and mood disorder. She presented to the ED via EMS with spouse who reports patient to be increasingly fatigued and weak. He reports over the past 2 weeks she was diagnosed with COVID19 and was ending their quarantine phase. She has been progressively weaker and unable to ambulate as usual with her walker. Her symptoms worsened over the past 2 days where she was not communicating at all. She is with no cough, vomiting, diarrhea or fevers. Her spouse contributed to her history during this visit. On arrival to the ED she was slightly hypoxic at 93% and hypertensive. Significant labs were with the following: wbc 18.97, Na 131, K 5.6, BUN 69, Cr 7.1, COVID19 +, urine with 3+ leukocytes and many bacteria, pH 7.1 and CO2 65.4. CT head and CXR with no acute findings. She was given neb treatment, Rocephin, Lokelma, reg insulin and NS bolus. She will admit to [facility] hospital medicine service for continued monitoring.           Overview/Hospital Course:  No notes on file    Interval History:  Weaned to room air, bipap qhs due to ge/ohs. Mental status back to baseline. Urine output cont to improve.    Review of Systems   Constitutional: Positive for activity change and fatigue. Negative for appetite change, chills, diaphoresis and fever.   HENT: Negative for congestion, postnasal drip, rhinorrhea, sinus pressure, sinus pain  and sneezing.    Respiratory: Positive for shortness of breath (improving). Negative for cough, chest tightness, wheezing and stridor.    Cardiovascular: Negative for chest pain, palpitations and leg swelling.   Gastrointestinal: Negative for abdominal distention, abdominal pain, blood in stool, constipation, diarrhea and nausea.   Endocrine: Negative for cold intolerance and heat intolerance.   Genitourinary: Positive for decreased urine volume (improving). Negative for dysuria, flank pain and hematuria.   Musculoskeletal: Negative for gait problem.   Neurological: Negative for dizziness and weakness.   Psychiatric/Behavioral: Negative for agitation, behavioral problems and confusion.     Objective:     Vital Signs (Most Recent):  Temp: 98.3 °F (36.8 °C) (02/11/22 1108)  Pulse: 100 (02/11/22 1503)  Resp: 20 (02/11/22 1503)  BP: 126/72 (02/11/22 1503)  SpO2: 98 % (02/11/22 1503) Vital Signs (24h Range):  Temp:  [98.1 °F (36.7 °C)-98.9 °F (37.2 °C)] 98.3 °F (36.8 °C)  Pulse:  [] 100  Resp:  [13-39] 20  SpO2:  [93 %-100 %] 98 %  BP: (123-168)/() 126/72     Weight: 101.5 kg (223 lb 12.3 oz)  Body mass index is 42.28 kg/m².    Intake/Output Summary (Last 24 hours) at 2/11/2022 1540  Last data filed at 2/11/2022 1200  Gross per 24 hour   Intake 775 ml   Output 1510 ml   Net -735 ml      Physical Exam  Vitals and nursing note reviewed.   Constitutional:       General: She is not in acute distress.     Appearance: She is morbidly obese.      Interventions: She is sedated and restrained.   HENT:      Head: Normocephalic and atraumatic.      Right Ear: External ear normal.      Left Ear: External ear normal.      Nose: Nose normal. No congestion.      Mouth/Throat:      Mouth: Mucous membranes are dry.      Pharynx: Oropharynx is clear.   Eyes:      Extraocular Movements: Extraocular movements intact.      Pupils: Pupils are equal, round, and reactive to light.   Cardiovascular:      Rate and Rhythm: Normal rate  and regular rhythm.      Pulses: Normal pulses.      Heart sounds: Normal heart sounds.   Pulmonary:      Effort: Pulmonary effort is normal.      Breath sounds: Normal breath sounds.   Abdominal:      General: Bowel sounds are normal.      Palpations: Abdomen is soft.   Genitourinary:     Comments: marga  Musculoskeletal:         General: Normal range of motion.      Cervical back: Normal range of motion. No rigidity.      Right lower leg: No edema.      Left lower leg: No edema.   Skin:     General: Skin is warm and dry.      Capillary Refill: Capillary refill takes less than 2 seconds.      Comments: trialysis line   Neurological:      General: No focal deficit present.      Mental Status: She is alert and oriented to person, place, and time. Mental status is at baseline.      Motor: No weakness.         Significant Labs: All pertinent labs within the past 24 hours have been reviewed.    Significant Imaging: I have reviewed all pertinent imaging results/findings within the past 24 hours.      Assessment/Plan:      * Acute on chronic respiratory failure with hypercapnia  Elevated CO2 on ABG, currently on BIPAP  Reassess with ABG with worsened CO2, settings adjusted and adjusting face mask for better fit  Hypoxia likely related to COVID19, was 93% on room air on arrival  -suspect that patient's encephalopathy secondary to elevated CO2 vs uremia  -Pulm following  -Required intubation. Successfully extubated 2/8 to bipap  -now on vapotherm during day with bipap qhs  -weaned to room air. Cont bipap qhs due to ge/ohs  -downgrade to floor    Obesity hypoventilation syndrome  BIPAP QHS      Encephalopathy, metabolic        Acute hypercapnic respiratory failure        Atrial fibrillation with RVR  - went into afib w/ RVR overnight following episode of bradycardia and vasovagal  - given fluid and will maintain net even today on CRRT  - initiated on amiodarone gtt with improvement in rate control  - amio  discontinued    Shock        Hypervolemia        Metabolic acidosis        RAQUEL (acute kidney injury)        Transaminitis  - in setting of shock  - monitor for now; can consider escalation of w/u if persists      Hyperglycemia  Hemoglobin A1C   Date Value Ref Range Status   11/10/2021 6.2 (H) 4.0 - 5.6 % Final     Comment:     ADA Screening Guidelines:  5.7-6.4%  Consistent with prediabetes  >or=6.5%  Consistent with diabetes    High levels of fetal hemoglobin interfere with the HbA1C  assay. Heterozygous hemoglobin variants (HbS, HgC, etc)do  not significantly interfere with this assay.   However, presence of multiple variants may affect accuracy.     04/26/2021 5.9 (H) 4.0 - 5.6 % Final     Comment:     ADA Screening Guidelines:  5.7-6.4%  Consistent with prediabetes  >or=6.5%  Consistent with diabetes    High levels of fetal hemoglobin interfere with the HbA1C  assay. Heterozygous hemoglobin variants (HbS, HgC, etc)do  not significantly interfere with this assay.   However, presence of multiple variants may affect accuracy.     11/10/2020 6.0 (H) 4.0 - 5.6 % Final     Comment:     ADA Screening Guidelines:  5.7-6.4%  Consistent with prediabetes  >or=6.5%  Consistent with diabetes  High levels of fetal hemoglobin interfere with the HbA1C  assay. Heterozygous hemoglobin variants (HbS, HgC, etc)do  not significantly interfere with this assay.   However, presence of multiple variants may affect accuracy.           - LDSSI with accuchecks qachs        Shock, unspecified  -likely distributive due to acidosis and renal failure versus septic shock  -currently requiring Levophed with addition of vasopressin and stress dose steroids   -nephrology consulted for CRRT  -continue IV antibiotics for possible infectious source, thought less likely  -possibly worsening due to too much volume removal  -COVID therapy as above  -AM cortisol low  -started on hydrocortisone/fludrocortisone  -has been weaned off  pressors  -hydrocortisone/fludrocortisone discontinued    Hyperphosphatemia  -in the setting of acute renal failure  -on CRRT  -nephrology consulted      Hypocalcemia  -in the setting of acute renal failure  -dialysis; will replace  -nephrology consulted      Acute metabolic encephalopathy  Reported by spouse to be with increased drowsiness and altered mentation. She has been sleeping a lot more than usual and is not alert. Found to be with renal failure, COVID19, and metabolic acidosis    CT head with no acute findings, ammonia level wnl  Will hold all sedating home meds for now  Currently on BIPAP for hypercapnia, which is likely contributing  -renal failure with high anion gap metabolic acidosis and uremia; initiated on dialysis  -pulmonology and nephrology consulted  -resolved    COVID-19  COVID positive; initiated on protocol  CXR with no infiltrate, requiring O2  -doubt that current presentation is due to COVID; will hold remdesivir and dexamethasone  Heparin ppx  inhalers prn  MVI, ascorbic acid, incentive spirometry  statin  supplemental O2, will wean as tolerated  prn tylenol, loperamide  contact/airborne    Acute renal failure with tubular necrosis  Significant RAQUEL with creatinine of 7.1 from normal just 3 months ago, with acidosis, uremia, hyperkalemia, hyperphosphatemia, and hypocalcemia  Renal ultrasound   -Vila catheter placed  Consulted Nephrology  Strict I&O's  -initiated on CRRT 2/4  -now with some urine output  -cont to monitor  -HD       High anion gap metabolic acidosis  With initial pH of 7.188  Trend ABG  Likely related to acute renal failure  -bicarbonate drip started on admission, now discontinued  -initiated on CRRT>HD  -bicarb tabs  -nephrology following    UTI (urinary tract infection)  Urinalysis relatively unremarkable, may be asymptomatic bacteriuria but in the setting of shock will continue antibiotics  Blood cultures ngtd  IV Rocephin completed      Leukocytosis  [place] related to  UTI vs COVID19  CXR was with no pneumonia  Urine with UTI  Blood cultures no growth to date  Lactate 1.1  Trend CBC  Cultures remain negative  IV vanco discontinued. Rocephin completed    Unspecified mood (affective) disorder  Resume home meds once taking p.o., chronic      Morbid obesity with BMI of 45.0-49.9, adult  Body mass index is 42.28 kg/m². Morbid obesity complicates all aspects of disease management from diagnostic modalities to treatment.    MARY (obstructive sleep apnea)  Was ordered CPAP qhs per PCP but does not use it  -suspect she has OHS  On CPAP 4 cm H2O at home. Increased to 10 cm H2O on home CPAP device. Will need outpatient sleep study (last one 10 years ago). Check ABG day before discharge - if pCO2 >52, then meets criteria for trilogy. In that situation, patient should go home with trilogy device (settings would be 16/8 21% until can get a sleep study).    Disorder of lumbar spine  Osteoarthritis    Hold home pain medications for now      Osteoarthritis  See above      Restless leg syndrome  Hold home Neurontin and Mirapex      Mixed hyperlipidemia  Patient is chronically on statin.will continue for now once able to take p.o.. Monitor clinically. Last LDL was   Lab Results   Component Value Date    LDLCALC 71.0 11/10/2021            Anxiety disorder  Hold Klonopin 2/2 AMS, chronic      Fibromyalgia  Hold home Effexor and Neurontin      Chronic back pain  Will hold home pain medications until improvement in mentation      Essential hypertension  - hold home antihypertensives due to shock  - resume prn      Mild acid reflux  - protonix ppx        VTE Risk Mitigation (From admission, onward)         Ordered     heparin (porcine) injection 2,800 Units  As needed (PRN)         02/04/22 1911     heparin (porcine) injection 7,500 Units  Every 8 hours         02/03/22 2251     Place KATERIN hose  Until discontinued         02/03/22 2251     IP VTE HIGH RISK PATIENT  Once         02/03/22 2251     Place  sequential compression device  Until discontinued         02/03/22 5949                Discharge Planning   ARNULFO:      Code Status: DNR   Is the patient medically ready for discharge?:     Reason for patient still in hospital (select all that apply): Patient trending condition, Treatment and PT / OT recommendations  Discharge Plan A: Home Health            Critical care time spent on the evaluation and treatment of severe organ dysfunction, review of pertinent labs and imaging studies, discussions with consulting providers and discussions with patient/family: 40 minutes.      Bernabe King MD  Department of Hospital Medicine   [facility] - Intensive Care

## 2022-02-11 NOTE — SUBJECTIVE & OBJECTIVE
Interval History:  Placed on precedex overnight due to agitation and pulling at lines. Mental status now improved, back to baseline. Now with some urine output.      Review of Systems   Constitutional: Positive for activity change and fatigue. Negative for appetite change, chills, diaphoresis and fever.   HENT: Negative for congestion, postnasal drip, rhinorrhea, sinus pressure, sinus pain and sneezing.    Respiratory: Positive for shortness of breath. Negative for cough, chest tightness, wheezing and stridor.    Cardiovascular: Negative for chest pain, palpitations and leg swelling.   Gastrointestinal: Negative for abdominal distention, abdominal pain, blood in stool, constipation, diarrhea and nausea.   Endocrine: Negative for cold intolerance and heat intolerance.   Genitourinary: Positive for decreased urine volume. Negative for dysuria, flank pain and hematuria.   Musculoskeletal: Negative for gait problem.   Neurological: Negative for dizziness and weakness.   Psychiatric/Behavioral: Positive for confusion. Negative for agitation and behavioral problems.     Objective:     Vital Signs (Most Recent):  Temp: 98.2 °F (36.8 °C) (02/10/22 1930)  Pulse: 102 (02/10/22 2000)  Resp: 18 (02/10/22 2000)  BP: 138/65 (02/10/22 2000)  SpO2: 99 % (02/10/22 2000) Vital Signs (24h Range):  Temp:  [98.1 °F (36.7 °C)-99.2 °F (37.3 °C)] 98.2 °F (36.8 °C)  Pulse:  [] 102  Resp:  [14-55] 18  SpO2:  [96 %-100 %] 99 %  BP: ()/(47-94) 138/65  Arterial Line BP: ()/(46-97) 102/95     Weight: 101.5 kg (223 lb 12.3 oz)  Body mass index is 42.28 kg/m².    Intake/Output Summary (Last 24 hours) at 2/10/2022 2111  Last data filed at 2/10/2022 2100  Gross per 24 hour   Intake 269.7 ml   Output 1135 ml   Net -865.3 ml      Physical Exam  Vitals and nursing note reviewed.   Constitutional:       General: She is not in acute distress.     Appearance: She is morbidly obese.      Interventions: She is sedated and restrained.    HENT:      Head: Normocephalic and atraumatic.      Right Ear: External ear normal.      Left Ear: External ear normal.      Nose: Nose normal. No congestion.      Mouth/Throat:      Mouth: Mucous membranes are dry.      Pharynx: Oropharynx is clear.   Eyes:      Extraocular Movements: Extraocular movements intact.      Pupils: Pupils are equal, round, and reactive to light.   Cardiovascular:      Rate and Rhythm: Normal rate and regular rhythm.      Pulses: Normal pulses.      Heart sounds: Normal heart sounds.   Pulmonary:      Effort: Pulmonary effort is normal.      Breath sounds: Normal breath sounds.   Abdominal:      General: Bowel sounds are normal.      Palpations: Abdomen is soft.   Genitourinary:     Comments: gresham  Musculoskeletal:         General: Normal range of motion.      Cervical back: Normal range of motion. No rigidity.      Right lower leg: No edema.      Left lower leg: No edema.   Skin:     General: Skin is warm and dry.      Capillary Refill: Capillary refill takes less than 2 seconds.      Comments: trialysis line   Neurological:      Motor: Weakness present.      Comments: lethargic   Psychiatric:      Comments: Unable to assess         Significant Labs: All pertinent labs within the past 24 hours have been reviewed.    Significant Imaging: I have reviewed all pertinent imaging results/findings within the past 24 hours.

## 2022-02-11 NOTE — ASSESSMENT & PLAN NOTE
Significant RAQUEL with creatinine of 7.1 from normal just 3 months ago, with acidosis, uremia, hyperkalemia, hyperphosphatemia, and hypocalcemia  Renal ultrasound   -Vila catheter placed  Consulted Nephrology  Strict I&O's  -initiated on CRRT 2/4  -now with some urine output  -cont to monitor  -HD

## 2022-02-11 NOTE — ASSESSMENT & PLAN NOTE
-likely distributive due to acidosis and renal failure versus septic shock  -currently requiring Levophed with addition of vasopressin and stress dose steroids   -nephrology consulted for CRRT  -continue IV antibiotics for possible infectious source, thought less likely  -possibly worsening due to too much volume removal  -COVID therapy as above  -AM cortisol low  -started on hydrocortisone/fludrocortisone  -has been weaned off pressors  -hydrocortisone/fludrocortisone discontinued

## 2022-02-11 NOTE — PROGRESS NOTES
Nephrology Progress Note     Consult Requested By: Bernabe King MD  Reason for Consult: RAQUEL     SUBJECTIVE:          Review of Systems   Constitutional: Positive for malaise/fatigue. Negative for chills and fever.   HENT: Negative for congestion and sore throat.    Eyes: Negative for blurred vision, double vision and photophobia.   Respiratory: Negative for cough and shortness of breath.    Cardiovascular: Negative for chest pain, palpitations and leg swelling.   Gastrointestinal: Negative for abdominal pain, diarrhea, nausea and vomiting.   Genitourinary: Negative for dysuria and urgency.   Musculoskeletal: Negative for joint pain and myalgias.   Skin: Negative for itching and rash.   Neurological: Negative for dizziness, sensory change, weakness and headaches.   Endo/Heme/Allergies: Negative for polydipsia. Does not bruise/bleed easily.   Psychiatric/Behavioral: Negative for depression.       Past Medical History:   Diagnosis Date    RAQUEL (acute kidney injury) 2/3/2022    Anxiety disorder     Atrial fibrillation with RVR 2/7/2022    Bell's palsy     Chronic back pain     Chronic osteoarthritis     Essential tremor     Fibromyalgia     Hypertension     Mild acid reflux     Neck pain     Other and unspecified hyperlipidemia     Sleep apnea     wear c-pap at night; does not use regularly    Unspecified mood (affective) disorder      Past Surgical History:   Procedure Laterality Date    ADENOIDECTOMY      APPENDECTOMY      BLADDER SUSPENSION      BREAST BIOPSY Left     1995  negative    Breast reduction      BREAST SURGERY Bilateral     breast reduction    CARPAL TUNNEL RELEASE Right     COLONOSCOPY  2008    COLONOSCOPY N/A 11/10/2017    Procedure: COLONOSCOPY - Miralax split prep;  Surgeon: Annetta Powell MD;  Location: [facility];  Service: Endoscopy;  Laterality: N/A;    COSMETIC SURGERY Bilateral     breast reduction    CYSTOSCOPY N/A 10/23/2018    Procedure: CYSTOSCOPY;  Surgeon:  Feli Garza MD;  Location: [facility] 2ND FLR;  Service: Urology;  Laterality: N/A;    ESOPHAGOGASTRODUODENOSCOPY N/A 2019    Procedure: ESOPHAGOGASTRODUODENOSCOPY (EGD);  Surgeon: Oscar Wylie MD;  Location: [facility] ENDO (4TH FLR);  Service: Endoscopy;  Laterality: N/A;  Off PPI/H2 x 7 days prior- ERW    HYSTERECTOMY      JOINT REPLACEMENT Left     knee    KNEE SURGERY      bilateral    left shoulder Left     rotator cuff    OPEN REDUCTION AND INTERNAL FIXATION (ORIF) OF FRACTURE OF DISTAL RADIUS Right 2021    Procedure: ORIF, FRACTURE, RADIUS, DISTAL;  Surgeon: Evelio Lynn Jr., MD;  Location: [facility];  Service: Orthopedics;  Laterality: Right;  need accumed plate and mini c arm, Acumed confirmed 11/15/21 AM    PLACEMENT OF TRANSOBTURATOR TAPE N/A 10/23/2018    Procedure: PLACEMENT, TRANSOBTURATOR TAPE;  Surgeon: Feli Garza MD;  Location: [facility] OR 2ND FLR;  Service: Urology;  Laterality: N/A;  1.5 hours    Tonsillectomy      TONSILLECTOMY      TOTAL REDUCTION MAMMOPLASTY          TUBAL LIGATION       Family History   Problem Relation Age of Onset    Diabetes Mother     Tremor Mother     Liver disease Mother     Hypertension Mother     Diabetes Father     Heart disease Father     Hypertension Father     Tremor Son     Kidney disease Son     Diabetes Sister     Diabetes Brother     No Known Problems Daughter     Depression Sister     Kidney disease Son         Reniel failure but corrected    Learning disabilities Son         Slow in some areas    Mental retardation Son         Mild retardation    Tremor Son     Cancer Brother      Social History     Tobacco Use    Smoking status: Former Smoker     Packs/day: 2.00     Years: 30.00     Pack years: 60.00     Types: Cigarettes     Start date:      Quit date: 1990     Years since quittin.5    Smokeless tobacco: Never Used   Substance Use Topics    Alcohol use: No    Drug use: No       Review of  patient's allergies indicates:   Allergen Reactions    Hyoscyamine Rash    Msg [glutamic acid] Swelling            OBJECTIVE:     Vital Signs (Most Recent)  Vitals:    02/11/22 0645 02/11/22 0700 02/11/22 0715 02/11/22 0742   BP:  (!) 156/72     Pulse: 107 107 105 105   Resp: (!) 26 (!) 26 (!) 22 (!) 24   Temp:       TempSrc:       SpO2: 100% 100% 100% 99%   Weight:       Height:             Date 02/11/22 0700 - 02/12/22 0659   Shift 9155-7468 4993-1970 2266-5653 24 Hour Total   INTAKE   Shift Total(mL/kg)       OUTPUT   Urine(mL/kg/hr) 250   250   Shift Total(mL/kg) 250(2.5)   250(2.5)   Weight (kg) 101.5 101.5 101.5 101.5           Medications:   ascorbic acid (vitamin C)  500 mg Oral BID    fludrocortisone  100 mcg Oral Daily    heparin (porcine)  7,500 Units Subcutaneous Q8H    hydrocortisone sodium succinate  50 mg Intravenous Q6H    insulin detemir U-100  12 Units Subcutaneous Daily    pantoprazole  40 mg Oral Daily    sodium bicarbonate  650 mg Oral TID    sodium chloride 0.9%  10 mL Intravenous Q6H           Physical Exam  Vitals and nursing note reviewed.   Constitutional:       General: She is not in acute distress.     Appearance: She is obese. She is ill-appearing. She is not diaphoretic.   HENT:      Head: Normocephalic and atraumatic.      Mouth/Throat:      Pharynx: No oropharyngeal exudate.   Eyes:      General: No scleral icterus.     Conjunctiva/sclera: Conjunctivae normal.   Cardiovascular:      Rate and Rhythm: Normal rate and regular rhythm.      Heart sounds: Normal heart sounds. No murmur heard.      Pulmonary:      Effort: No respiratory distress.      Comments:    Abdominal:      General: There is no distension.      Palpations: Abdomen is soft.      Tenderness: There is no abdominal tenderness.   Musculoskeletal:         General: Swelling (trace ) present. Normal range of motion.      Cervical back: Normal range of motion and neck supple.   Skin:     General: Skin is warm and dry.       Findings: No erythema.   Neurological:      Mental Status: She is alert and oriented to person, place, and time.      Cranial Nerves: No cranial nerve deficit.         Laboratory:  Recent Labs   Lab 02/08/22  0809 02/09/22  0511 02/10/22  0523 02/11/22  0552   WBC  --  22.75* 16.76* 23.88*   HGB  --  9.1* 8.2* 8.3*   HCT  --  26.3* 24.4* 24.4*   PLT  --  137* 153 204   MONO   < > 7.0 6.0  CANCELED 2.0*  CANCELED    < > = values in this interval not displayed.     Recent Labs   Lab 02/08/22 2202 02/08/22  2202 02/09/22 0511 02/09/22 0511 02/09/22  1654 02/10/22  0523 02/11/22  0552      < > 134*   < > 138 137 139   K 4.4   < > 4.6   < > 4.2 4.0 4.3      < > 103   < > 104 104 103   CO2 23   < > 22*   < > 26 22* 20*   BUN 20   < > 19   < > 17 30* 40*   CREATININE 0.8   < > 0.8   < > 0.8 1.2 1.5*   CALCIUM 7.9*   < > 7.5*   < > 7.8* 8.0* 8.1*   PHOS 2.2*  --  2.5*  --  2.1*  --   --     < > = values in this interval not displayed.       Diagnostic Results:  X-Ray: Reviewed  US: Reviewed  Echo: Reviewed  ASSESSMENT/PLAN:     1. RAQUEL  - ATN, COVID sepsis, Hypotension, started on CRRT exelent UF   Was tachycardic hypotensive cut back UF to NET   Even    -- Extubated  More awake  AAOx3 making Urine NET negative   -- No need for HD today monitor for recovery   -- Increase  PO bicarbonate to 1300 TID  for acidosis      2. Hypotension - resolved   3. Anemia of acute illness -   transfuse <7   Recent Labs   Lab 02/09/22  0511 02/10/22  0523 02/11/22  0552   HGB 9.1* 8.2* 8.3*   HCT 26.3* 24.4* 24.4*   * 153 204       Iron  Lab Results   Component Value Date    IRON 56 02/10/2022    TIBC 244 (L) 02/10/2022    FERRITIN 51 02/09/2022       4. MBD (E88.9 M90.80) -  Recent Labs   Lab 02/09/22  1654 02/10/22  0523 02/11/22  0552   CALCIUM 7.8*   < > 8.1*   PHOS 2.1*  --   --     < > = values in this interval not displayed.     Recent Labs   Lab 02/08/22 2202 02/09/22  0511 02/09/22  1654   MG 2.5 2.4 2.5        Lab Results   Component Value Date    CALCIUM 8.1 (L) 02/11/2022    PHOS 2.1 (L) 02/09/2022     No results found for: [id number]    Lab Results   Component Value Date    CO2 20 (L) 02/11/2022   - Replace phos if needed check today   Acidosis - Po bicarb     5. Hemodialysis Access (Z99.2 V45.11)- CVC  6. Nutrition/Hypoalbuminemia (E88.09) -    Recent Labs   Lab 02/10/22  0523 02/11/22  0552   ALBUMIN 2.5* 2.7*     Nepro with meals TID. Renal vitamins daily      Thank you for the consult, will follow  With any question please call [phone]  Krystina Mayer MD    [facility]  FRANCI Madison MD, ELAINE CALI MD,   MD MESHA Staples MD E. V. Harmon, NP    [address]  [place], [address]  [phone]

## 2022-02-11 NOTE — ASSESSMENT & PLAN NOTE
Reported by spouse to be with increased drowsiness and altered mentation. She has been sleeping a lot more than usual and is not alert. Found to be with renal failure, COVID19, and metabolic acidosis    CT head with no acute findings, ammonia level wnl  Will hold all sedating home meds for now  Currently on BIPAP for hypercapnia, which is likely contributing  -renal failure with high anion gap metabolic acidosis and uremia; initiated on dialysis  -pulmonology and nephrology consulted  -improving

## 2022-02-11 NOTE — PROGRESS NOTES
[facility] Medicine  Progress Note    Patient Name: Hannah Norman  MRN: [id number]  Patient Class: IP- Inpatient   Admission Date: 2/3/2022  Length of Stay: 7 days  Attending Physician: Bernabe King MD  Primary Care Provider: Raffi Garcia MD        Subjective:     Principal Problem:Acute on chronic respiratory failure with hypercapnia        HPI:  Ms. Norman is a 71 year old female with a medical history that includes anxiety disorder, bell's palsy, chronic back pain, chronic osteoarthritis, essential tremor, fibromyalgia, hypertension, GERD, hyperlipidemia, sleep apnea, and mood disorder. She presented to the ED via EMS with spouse who reports patient to be increasingly fatigued and weak. He reports over the past 2 weeks she was diagnosed with COVID19 and was ending their quarantine phase. She has been progressively weaker and unable to ambulate as usual with her walker. Her symptoms worsened over the past 2 days where she was not communicating at all. She is with no cough, vomiting, diarrhea or fevers. Her spouse contributed to her history during this visit. On arrival to the ED she was slightly hypoxic at 93% and hypertensive. Significant labs were with the following: wbc 18.97, Na 131, K 5.6, BUN 69, Cr 7.1, COVID19 +, urine with 3+ leukocytes and many bacteria, pH 7.1 and CO2 65.4. CT head and CXR with no acute findings. She was given neb treatment, Rocephin, Lokelma, reg insulin and NS bolus. She will admit to [facility] hospital medicine service for continued monitoring.           Overview/Hospital Course:  No notes on file    Interval History:  Placed on precedex overnight due to agitation and pulling at lines. Mental status now improved, back to baseline. Now with some urine output.      Review of Systems   Constitutional: Positive for activity change and fatigue. Negative for appetite change, chills, diaphoresis and fever.   HENT: Negative for congestion, postnasal drip,  rhinorrhea, sinus pressure, sinus pain and sneezing.    Respiratory: Positive for shortness of breath. Negative for cough, chest tightness, wheezing and stridor.    Cardiovascular: Negative for chest pain, palpitations and leg swelling.   Gastrointestinal: Negative for abdominal distention, abdominal pain, blood in stool, constipation, diarrhea and nausea.   Endocrine: Negative for cold intolerance and heat intolerance.   Genitourinary: Positive for decreased urine volume. Negative for dysuria, flank pain and hematuria.   Musculoskeletal: Negative for gait problem.   Neurological: Negative for dizziness and weakness.   Psychiatric/Behavioral: Positive for confusion. Negative for agitation and behavioral problems.     Objective:     Vital Signs (Most Recent):  Temp: 98.2 °F (36.8 °C) (02/10/22 1930)  Pulse: 102 (02/10/22 2000)  Resp: 18 (02/10/22 2000)  BP: 138/65 (02/10/22 2000)  SpO2: 99 % (02/10/22 2000) Vital Signs (24h Range):  Temp:  [98.1 °F (36.7 °C)-99.2 °F (37.3 °C)] 98.2 °F (36.8 °C)  Pulse:  [] 102  Resp:  [14-55] 18  SpO2:  [96 %-100 %] 99 %  BP: ()/(47-94) 138/65  Arterial Line BP: ()/(46-97) 102/95     Weight: 101.5 kg (223 lb 12.3 oz)  Body mass index is 42.28 kg/m².    Intake/Output Summary (Last 24 hours) at 2/10/2022 2111  Last data filed at 2/10/2022 2100  Gross per 24 hour   Intake 269.7 ml   Output 1135 ml   Net -865.3 ml      Physical Exam  Vitals and nursing note reviewed.   Constitutional:       General: She is not in acute distress.     Appearance: She is morbidly obese.      Interventions: She is sedated and restrained.   HENT:      Head: Normocephalic and atraumatic.      Right Ear: External ear normal.      Left Ear: External ear normal.      Nose: Nose normal. No congestion.      Mouth/Throat:      Mouth: Mucous membranes are dry.      Pharynx: Oropharynx is clear.   Eyes:      Extraocular Movements: Extraocular movements intact.      Pupils: Pupils are equal, round,  and reactive to light.   Cardiovascular:      Rate and Rhythm: Normal rate and regular rhythm.      Pulses: Normal pulses.      Heart sounds: Normal heart sounds.   Pulmonary:      Effort: Pulmonary effort is normal.      Breath sounds: Normal breath sounds.   Abdominal:      General: Bowel sounds are normal.      Palpations: Abdomen is soft.   Genitourinary:     Comments: marga  Musculoskeletal:         General: Normal range of motion.      Cervical back: Normal range of motion. No rigidity.      Right lower leg: No edema.      Left lower leg: No edema.   Skin:     General: Skin is warm and dry.      Capillary Refill: Capillary refill takes less than 2 seconds.      Comments: trialysis line   Neurological:      Motor: Weakness present.      Comments: lethargic   Psychiatric:      Comments: Unable to assess         Significant Labs: All pertinent labs within the past 24 hours have been reviewed.    Significant Imaging: I have reviewed all pertinent imaging results/findings within the past 24 hours.      Assessment/Plan:      * Acute on chronic respiratory failure with hypercapnia  Elevated CO2 on ABG, currently on BIPAP  Reassess with ABG with worsened CO2, settings adjusted and adjusting face mask for better fit  Hypoxia likely related to COVID19, was 93% on room air on arrival  -suspect that patient's encephalopathy secondary to elevated CO2 vs uremia  -Pulm following  -Required intubation. Successfully extubated 2/8 to bipap  -now on vapotherm during day with bipap qhs    Obesity hypoventilation syndrome  BIPAP QHS      Encephalopathy, metabolic        Acute hypercapnic respiratory failure        Atrial fibrillation with RVR  - went into afib w/ RVR overnight following episode of bradycardia and vasovagal  - given fluid and will maintain net even today on CRRT  - initiated on amiodarone gtt with improvement in rate control  - amio discontinued    Shock        Hypervolemia        Metabolic acidosis        RAQUEL  (acute kidney injury)        Transaminitis  - in setting of shock  - monitor for now; can consider escalation of w/u if persists      Hyperglycemia  Hemoglobin A1C   Date Value Ref Range Status   11/10/2021 6.2 (H) 4.0 - 5.6 % Final     Comment:     ADA Screening Guidelines:  5.7-6.4%  Consistent with prediabetes  >or=6.5%  Consistent with diabetes    High levels of fetal hemoglobin interfere with the HbA1C  assay. Heterozygous hemoglobin variants (HbS, HgC, etc)do  not significantly interfere with this assay.   However, presence of multiple variants may affect accuracy.     04/26/2021 5.9 (H) 4.0 - 5.6 % Final     Comment:     ADA Screening Guidelines:  5.7-6.4%  Consistent with prediabetes  >or=6.5%  Consistent with diabetes    High levels of fetal hemoglobin interfere with the HbA1C  assay. Heterozygous hemoglobin variants (HbS, HgC, etc)do  not significantly interfere with this assay.   However, presence of multiple variants may affect accuracy.     11/10/2020 6.0 (H) 4.0 - 5.6 % Final     Comment:     ADA Screening Guidelines:  5.7-6.4%  Consistent with prediabetes  >or=6.5%  Consistent with diabetes  High levels of fetal hemoglobin interfere with the HbA1C  assay. Heterozygous hemoglobin variants (HbS, HgC, etc)do  not significantly interfere with this assay.   However, presence of multiple variants may affect accuracy.           - LDSSI with accuchecks qachs        Shock, unspecified  -likely distributive due to acidosis and renal failure versus septic shock  -currently requiring Levophed with addition of vasopressin and stress dose steroids   -nephrology consulted for CRRT  -continue IV antibiotics for possible infectious source, thought less likely  -possibly worsening due to too much volume removal  -COVID therapy as above  -AM cortisol low  -started on hydrocortisone/fludrocortisone  -has been weaned off pressors    Hyperphosphatemia  -in the setting of acute renal failure  -on CRRT  -nephrology  consulted      Hypocalcemia  -in the setting of acute renal failure  -dialysis; will replace  -nephrology consulted      Acute metabolic encephalopathy  Reported by spouse to be with increased drowsiness and altered mentation. She has been sleeping a lot more than usual and is not alert. Found to be with renal failure, COVID19, and metabolic acidosis    CT head with no acute findings, ammonia level wnl  Will hold all sedating home meds for now  Currently on BIPAP for hypercapnia, which is likely contributing  -renal failure with high anion gap metabolic acidosis and uremia; initiated on dialysis  -pulmonology and nephrology consulted  -improving    COVID-19  COVID positive; initiated on protocol  CXR with no infiltrate, requiring O2  -doubt that current presentation is due to COVID; will hold remdesivir and dexamethasone  Heparin ppx  inhalers prn  MVI, ascorbic acid, incentive spirometry  statin  supplemental O2, will wean as tolerated  prn tylenol, loperamide  contact/airborne    Acute renal failure with tubular necrosis  Significant RAQUEL with creatinine of 7.1 from normal just 3 months ago, with acidosis, uremia, hyperkalemia, hyperphosphatemia, and hypocalcemia  Renal ultrasound   -Vila catheter placed  Consulted Nephrology  Strict I&O's  -initiated on CRRT 2/4  -now with some urine output  -cont to monitor  -HD PRN      High anion gap metabolic acidosis  With initial pH of 7.188  Trend ABG  Likely related to acute renal failure  -bicarbonate drip started on admission, now discontinued  -initiated on CRRT  -bicarb tabs  -nephrology following    UTI (urinary tract infection)  Urinalysis relatively unremarkable, may be asymptomatic bacteriuria but in the setting of shock will continue antibiotics  Blood cultures ngtd  IV Rocephin completed      Leukocytosis  Likley related to UTI vs COVID19  CXR was with no pneumonia  Urine with UTI  Blood cultures no growth to date  Lactate 1.1  Trend CBC  Cultures remain  negative  IV vanco discontinued. Rocephin completed    Unspecified mood (affective) disorder  Resume home meds once taking p.o., chronic      Morbid obesity with BMI of 45.0-49.9, adult  Body mass index is 42.28 kg/m². Morbid obesity complicates all aspects of disease management from diagnostic modalities to treatment.    MARY (obstructive sleep apnea)  Was ordered CPAP qhs per PCP but does not use it  -suspect she has OHS    Disorder of lumbar spine  Osteoarthritis    Hold home pain medications for now      Osteoarthritis  See above      Restless leg syndrome  Hold home Neurontin and Mirapex      Mixed hyperlipidemia  Patient is chronically on statin.will continue for now once able to take p.o.. Monitor clinically. Last LDL was   Lab Results   Component Value Date    LDLCALC 71.0 11/10/2021            Anxiety disorder  Hold Klonopin 2/2 AMS, chronic      Fibromyalgia  Hold home Effexor and Neurontin      Chronic back pain  Will hold home pain medications until improvement in mentation      Essential hypertension  - hold home antihypertensives due to shock  - resume prn      Mild acid reflux  - protonix ppx        VTE Risk Mitigation (From admission, onward)         Ordered     heparin (porcine) injection 2,800 Units  As needed (PRN)         02/04/22 1911     heparin (porcine) injection 7,500 Units  Every 8 hours         02/03/22 2251     Place KATERIN hose  Until discontinued         02/03/22 2251     IP VTE HIGH RISK PATIENT  Once         02/03/22 2251     Place sequential compression device  Until discontinued         02/03/22 2251                Discharge Planning   ARNULFO:      Code Status: DNR   Is the patient medically ready for discharge?:     Reason for patient still in hospital (select all that apply): Patient trending condition, Treatment and Consult recommendations  Discharge Plan A: Home Health            Critical care time spent on the evaluation and treatment of severe organ dysfunction, review of pertinent  labs and imaging studies, discussions with consulting providers and discussions with patient/family: 45 minutes.      Bernabe King MD  Department of Hospital Medicine   [place] - Intensive Care

## 2022-02-11 NOTE — SUBJECTIVE & OBJECTIVE
Interval History:  Remains anuric on CRRT. Mental status improving. Weaned off pressors.     Review of Systems   Constitutional: Positive for activity change and fatigue. Negative for appetite change, chills, diaphoresis and fever.   HENT: Negative for congestion, postnasal drip, rhinorrhea, sinus pressure, sinus pain and sneezing.    Respiratory: Positive for shortness of breath. Negative for cough, chest tightness, wheezing and stridor.    Cardiovascular: Negative for chest pain, palpitations and leg swelling.   Gastrointestinal: Negative for abdominal distention, abdominal pain, blood in stool, constipation, diarrhea and nausea.   Endocrine: Negative for cold intolerance and heat intolerance.   Genitourinary: Positive for decreased urine volume. Negative for dysuria, flank pain and hematuria.   Musculoskeletal: Negative for gait problem.   Neurological: Negative for dizziness and weakness.   Psychiatric/Behavioral: Positive for confusion. Negative for agitation and behavioral problems.     Objective:     Vital Signs (Most Recent):  Temp: 98.9 °F (37.2 °C) (02/10/22 1615)  Pulse: 105 (02/10/22 1800)  Resp: (!) 27 (02/10/22 1800)  BP: (!) 146/61 (02/10/22 1800)  SpO2: 98 % (02/10/22 1800) Vital Signs (24h Range):  Temp:  [98.1 °F (36.7 °C)-99.2 °F (37.3 °C)] 98.9 °F (37.2 °C)  Pulse:  [] 105  Resp:  [14-55] 27  SpO2:  [96 %-100 %] 98 %  BP: ()/(47-73) 146/61  Arterial Line BP: ()/(46-97) 102/95     Weight: 101.5 kg (223 lb 12.3 oz)  Body mass index is 42.28 kg/m².    Intake/Output Summary (Last 24 hours) at 2/10/2022 2102  Last data filed at 2/10/2022 1800  Gross per 24 hour   Intake 269.7 ml   Output 935 ml   Net -665.3 ml      Physical Exam  Vitals and nursing note reviewed.   Constitutional:       General: She is not in acute distress.     Appearance: She is morbidly obese.      Interventions: She is sedated and restrained.   HENT:      Head: Normocephalic and atraumatic.      Right Ear:  External ear normal.      Left Ear: External ear normal.      Nose: Nose normal. No congestion.      Mouth/Throat:      Mouth: Mucous membranes are dry.      Pharynx: Oropharynx is clear.   Eyes:      Extraocular Movements: Extraocular movements intact.      Pupils: Pupils are equal, round, and reactive to light.   Cardiovascular:      Rate and Rhythm: Normal rate and regular rhythm.      Pulses: Normal pulses.      Heart sounds: Normal heart sounds.   Pulmonary:      Effort: Pulmonary effort is normal.      Breath sounds: Normal breath sounds.   Abdominal:      General: Bowel sounds are normal.      Palpations: Abdomen is soft.   Genitourinary:     Comments: marga  Musculoskeletal:         General: Normal range of motion.      Cervical back: Normal range of motion. No rigidity.      Right lower leg: No edema.      Left lower leg: No edema.   Skin:     General: Skin is warm and dry.      Capillary Refill: Capillary refill takes less than 2 seconds.      Comments: trialysis line   Neurological:      Motor: Weakness present.      Comments: lethargic   Psychiatric:      Comments: Unable to assess         Significant Labs: All pertinent labs within the past 24 hours have been reviewed.    Significant Imaging: I have reviewed all pertinent imaging results/findings within the past 24 hours.

## 2022-02-11 NOTE — ASSESSMENT & PLAN NOTE
Was ordered CPAP qhs per PCP but does not use it  -suspect she has OHS  On CPAP 4 cm H2O at home. Increased to 10 cm H2O on home CPAP device. Will need outpatient sleep study (last one 10 years ago). Check ABG day before discharge - if pCO2 >52, then meets criteria for trilogy. In that situation, patient should go home with trilogy device (settings would be 16/8 21% until can get a sleep study).

## 2022-02-11 NOTE — ASSESSMENT & PLAN NOTE
Elevated CO2 on ABG, currently on BIPAP  Reassess with ABG with worsened CO2, settings adjusted and adjusting face mask for better fit  Hypoxia likely related to COVID19, was 93% on room air on arrival  -suspect that patient's encephalopathy secondary to elevated CO2 vs uremia  -Pulm following  -Required intubation. Successfully extubated 2/8 to bipap  -now on vapotherm during day with bipap qhs  -weaned to room air. Cont bipap qhs due to ge/ohs  -downgrade to floor

## 2022-02-11 NOTE — ASSESSMENT & PLAN NOTE
- went into afib w/ RVR overnight following episode of bradycardia and vasovagal  - given fluid and will maintain net even today on CRRT  - initiated on amiodarone gtt with improvement in rate control  - amio discontinued

## 2022-02-11 NOTE — ASSESSMENT & PLAN NOTE
Likley related to UTI vs COVID19  CXR was with no pneumonia  Urine with UTI  Blood cultures no growth to date  Lactate 1.1  Trend CBC  Cultures remain negative  IV vanco discontinued. Rocephin completed

## 2022-02-11 NOTE — PROGRESS NOTES
[facility]  Progress Note    Patient Name: Hannah Norman  MRN: [id number]  Patient Class: IP- Inpatient   Admission Date: 2/3/2022  Length of Stay: 7 days  Attending Physician: Bernabe King MD  Primary Care Provider: Raffi Garcia MD        Subjective:     Principal Problem:Shock      HPI:  Ms. Norman is a 71 year old female with a medical history that includes anxiety disorder, bell's palsy, chronic back pain, chronic osteoarthritis, essential tremor, fibromyalgia, hypertension, GERD, hyperlipidemia, sleep apnea, and mood disorder. She presented to the ED via EMS with spouse who reports patient to be increasingly fatigued and weak. He reports over the past 2 weeks she was diagnosed with COVID19 and was ending their quarantine phase. She has been progressively weaker and unable to ambulate as usual with her walker. Her symptoms worsened over the past 2 days where she was not communicating at all. She is with no cough, vomiting, diarrhea or fevers. Her spouse contributed to her history during this visit. On arrival to the ED she was slightly hypoxic at 93% and hypertensive. Significant labs were with the following: wbc 18.97, Na 131, K 5.6, BUN 69, Cr 7.1, COVID19 +, urine with 3+ leukocytes and many bacteria, pH 7.1 and CO2 65.4. CT head and CXR with no acute findings. She was given neb treatment, Rocephin, Lokelma, reg insulin and NS bolus. She will admit to [facility] hospital medicine service for continued monitoring.           Overview/Hospital Course:  No notes on file    Interval History:  Remains anuric on CRRT. Mental status improving. Weaned off pressors.     Review of Systems   Constitutional: Positive for activity change and fatigue. Negative for appetite change, chills, diaphoresis and fever.   HENT: Negative for congestion, postnasal drip, rhinorrhea, sinus pressure, sinus pain and sneezing.    Respiratory: Positive for shortness of breath. Negative for cough,  chest tightness, wheezing and stridor.    Cardiovascular: Negative for chest pain, palpitations and leg swelling.   Gastrointestinal: Negative for abdominal distention, abdominal pain, blood in stool, constipation, diarrhea and nausea.   Endocrine: Negative for cold intolerance and heat intolerance.   Genitourinary: Positive for decreased urine volume. Negative for dysuria, flank pain and hematuria.   Musculoskeletal: Negative for gait problem.   Neurological: Negative for dizziness and weakness.   Psychiatric/Behavioral: Positive for confusion. Negative for agitation and behavioral problems.     Objective:     Vital Signs (Most Recent):  Temp: 98.9 °F (37.2 °C) (02/10/22 1615)  Pulse: 105 (02/10/22 1800)  Resp: (!) 27 (02/10/22 1800)  BP: (!) 146/61 (02/10/22 1800)  SpO2: 98 % (02/10/22 1800) Vital Signs (24h Range):  Temp:  [98.1 °F (36.7 °C)-99.2 °F (37.3 °C)] 98.9 °F (37.2 °C)  Pulse:  [] 105  Resp:  [14-55] 27  SpO2:  [96 %-100 %] 98 %  BP: ()/(47-73) 146/61  Arterial Line BP: ()/(46-97) 102/95     Weight: 101.5 kg (223 lb 12.3 oz)  Body mass index is 42.28 kg/m².    Intake/Output Summary (Last 24 hours) at 2/10/2022 2102  Last data filed at 2/10/2022 1800  Gross per 24 hour   Intake 269.7 ml   Output 935 ml   Net -665.3 ml      Physical Exam  Vitals and nursing note reviewed.   Constitutional:       General: She is not in acute distress.     Appearance: She is morbidly obese.      Interventions: She is sedated and restrained.   HENT:      Head: Normocephalic and atraumatic.      Right Ear: External ear normal.      Left Ear: External ear normal.      Nose: Nose normal. No congestion.      Mouth/Throat:      Mouth: Mucous membranes are dry.      Pharynx: Oropharynx is clear.   Eyes:      Extraocular Movements: Extraocular movements intact.      Pupils: Pupils are equal, round, and reactive to light.   Cardiovascular:      Rate and Rhythm: Normal rate and regular rhythm.      Pulses: Normal  pulses.      Heart sounds: Normal heart sounds.   Pulmonary:      Effort: Pulmonary effort is normal.      Breath sounds: Normal breath sounds.   Abdominal:      General: Bowel sounds are normal.      Palpations: Abdomen is soft.   Genitourinary:     Comments: greshma  Musculoskeletal:         General: Normal range of motion.      Cervical back: Normal range of motion. No rigidity.      Right lower leg: No edema.      Left lower leg: No edema.   Skin:     General: Skin is warm and dry.      Capillary Refill: Capillary refill takes less than 2 seconds.      Comments: trialysis line   Neurological:      Motor: Weakness present.      Comments: lethargic   Psychiatric:      Comments: Unable to assess         Significant Labs: All pertinent labs within the past 24 hours have been reviewed.    Significant Imaging: I have reviewed all pertinent imaging results/findings within the past 24 hours.      Assessment/Plan:      * Shock        Obesity hypoventilation syndrome  BIPAP QHS      Encephalopathy, metabolic        Acute hypercapnic respiratory failure        Atrial fibrillation with RVR  - went into afib w/ RVR overnight following episode of bradycardia and vasovagal  - given fluid and will maintain net even today on CRRT  - initiated on amiodarone gtt with improvement in rate control  - amio discontinued    Hypervolemia        Metabolic acidosis        RAQUEL (acute kidney injury)        Transaminitis  - in setting of shock  - monitor for now; can consider escalation of w/u if persists      Hyperglycemia  Hemoglobin A1C   Date Value Ref Range Status   11/10/2021 6.2 (H) 4.0 - 5.6 % Final     Comment:     ADA Screening Guidelines:  5.7-6.4%  Consistent with prediabetes  >or=6.5%  Consistent with diabetes    High levels of fetal hemoglobin interfere with the HbA1C  assay. Heterozygous hemoglobin variants (HbS, HgC, etc)do  not significantly interfere with this assay.   However, presence of multiple variants may affect  accuracy.     04/26/2021 5.9 (H) 4.0 - 5.6 % Final     Comment:     ADA Screening Guidelines:  5.7-6.4%  Consistent with prediabetes  >or=6.5%  Consistent with diabetes    High levels of fetal hemoglobin interfere with the HbA1C  assay. Heterozygous hemoglobin variants (HbS, HgC, etc)do  not significantly interfere with this assay.   However, presence of multiple variants may affect accuracy.     11/10/2020 6.0 (H) 4.0 - 5.6 % Final     Comment:     ADA Screening Guidelines:  5.7-6.4%  Consistent with prediabetes  >or=6.5%  Consistent with diabetes  High levels of fetal hemoglobin interfere with the HbA1C  assay. Heterozygous hemoglobin variants (HbS, HgC, etc)do  not significantly interfere with this assay.   However, presence of multiple variants may affect accuracy.           - LDSSI with accuchecks qachs        Shock, unspecified  -likely distributive due to acidosis and renal failure versus septic shock  -currently requiring Levophed with addition of vasopressin and stress dose steroids   -nephrology consulted for CRRT  -continue IV antibiotics for possible infectious source, thought less likely  -possibly worsening due to too much volume removal  -COVID therapy as above  -AM cortisol low  -started on hydrocortisone/fludrocortisone  -has been weaned off pressors    Hyperphosphatemia  -in the setting of acute renal failure  -on CRRT  -nephrology consulted      Hypocalcemia  -in the setting of acute renal failure  -dialysis; will replace  -nephrology consulted      Acute metabolic encephalopathy  Reported by spouse to be with increased drowsiness and altered mentation. She has been sleeping a lot more than usual and is not alert. Found to be with renal failure, COVID19, and metabolic acidosis    CT head with no acute findings, ammonia level wnl  Will hold all sedating home meds for now  Currently on BIPAP for hypercapnia, which is likely contributing  -renal failure with high anion gap metabolic acidosis and  uremia; initiated on dialysis  -pulmonology and nephrology consulted  -improving    Acute on chronic respiratory failure with hypercapnia  Elevated CO2 on ABG, currently on BIPAP  Reassess with ABG with worsened CO2, settings adjusted and adjusting face mask for better fit  Hypoxia likely related to COVID19, was 93% on room air on arrival  -suspect that patient's encephalopathy secondary to elevated CO2 vs uremia  -Pulm following    COVID-19  COVID positive; initiated on protocol  CXR with no infiltrate, requiring O2  -doubt that current presentation is due to COVID; will hold remdesivir and dexamethasone  Heparin ppx  inhalers prn  MVI, ascorbic acid, incentive spirometry  statin  supplemental O2, will wean as tolerated  prn tylenol, loperamide  contact/airborne    Acute renal failure with tubular necrosis  Significant RAQUEL with creatinine of 7.1 from normal just 3 months ago, with acidosis, uremia, hyperkalemia, hyperphosphatemia, and hypocalcemia  Renal ultrasound   -Vila catheter placed  Consulted Nephrology  Strict I&O's  -initiated on CRRT 2/4, continue      High anion gap metabolic acidosis  With initial pH of 7.188  Trend ABG  Likely related to acute renal failure  -bicarbonate drip started on admission, now discontinued  -initiated on CRRT  -nephrology following    UTI (urinary tract infection)  Urinalysis relatively unremarkable, may be asymptomatic bacteriuria but in the setting of shock will continue antibiotics  Blood cultures ngtd  IV Rocephin  Trend CBC      Leukocytosis  Likley related to UTI vs COVID19  CXR was with no pneumonia  Urine with UTI  Blood cultures no growth to date  Lactate 1.1  Trend CBC  Cultures remain negative  IV vanco discontinued. Rocephin continued for now    Unspecified mood (affective) disorder  Resume home meds once taking p.o., chronic      Morbid obesity with BMI of 45.0-49.9, adult  Body mass index is 42.28 kg/m². Morbid obesity complicates all aspects of disease  management from diagnostic modalities to treatment.    MARY (obstructive sleep apnea)  Was ordered CPAP qhs per PCP but does not use it  -suspect she has OHS    Disorder of lumbar spine  Osteoarthritis    Hold home pain medications for now      Osteoarthritis  See above      Restless leg syndrome  Hold home Neurontin and Mirapex      Mixed hyperlipidemia  Patient is chronically on statin.will continue for now once able to take p.o.. Monitor clinically. Last LDL was   Lab Results   Component Value Date    LDLCALC 71.0 11/10/2021            Anxiety disorder  Hold Klonopin 2/2 AMS, chronic      Fibromyalgia  Hold home Effexor and Neurontin      Chronic back pain  Will hold home pain medications until improvement in mentation      Essential hypertension  - currently in shock  - hold home antihypertensives      Mild acid reflux  - protonix ppx        VTE Risk Mitigation (From admission, onward)         Ordered     heparin (porcine) injection 2,800 Units  As needed (PRN)         02/04/22 1911     heparin (porcine) injection 7,500 Units  Every 8 hours         02/03/22 2251     Place KATERIN hose  Until discontinued         02/03/22 2251     IP VTE HIGH RISK PATIENT  Once         02/03/22 2251     Place sequential compression device  Until discontinued         02/03/22 2251                Discharge Planning   ARNULFO:      Code Status: DNR   Is the patient medically ready for discharge?:     Reason for patient still in hospital (select all that apply): Patient trending condition, Treatment and Consult recommendations  Discharge Plan A: Home Health            Critical care time spent on the evaluation and treatment of severe organ dysfunction, review of pertinent labs and imaging studies, discussions with consulting providers and discussions with patient/family: 45 minutes.      Bernabe King MD  Department of Hospital Medicine   [place] - Intensive Care

## 2022-02-11 NOTE — ASSESSMENT & PLAN NOTE
Urinalysis relatively unremarkable, may be asymptomatic bacteriuria but in the setting of shock will continue antibiotics  Blood cultures ngtd  IV Rocephin completed

## 2022-02-11 NOTE — ASSESSMENT & PLAN NOTE
Significant RAQUEL with creatinine of 7.1 from normal just 3 months ago, with acidosis, uremia, hyperkalemia, hyperphosphatemia, and hypocalcemia  Renal ultrasound   -Vila catheter placed  Consulted Nephrology  Strict I&O's  -initiated on CRRT 2/4, continue

## 2022-02-11 NOTE — ASSESSMENT & PLAN NOTE
-likely distributive due to acidosis and renal failure versus septic shock  -currently requiring Levophed with addition of vasopressin and stress dose steroids   -nephrology consulted for CRRT  -continue IV antibiotics for possible infectious source, thought less likely  -possibly worsening due to too much volume removal  -COVID therapy as above  -AM cortisol low  -started on hydrocortisone/fludrocortisone  -has been weaned off pressors

## 2022-02-11 NOTE — ASSESSMENT & PLAN NOTE
Significant RAQUEL with creatinine of 7.1 from normal just 3 months ago, with acidosis, uremia, hyperkalemia, hyperphosphatemia, and hypocalcemia  Renal ultrasound   -Vila catheter placed  Consulted Nephrology  Strict I&O's  -initiated on CRRT 2/4  -now with some urine output  -cont to monitor  -HD PRN

## 2022-02-12 LAB
ANION GAP SERPL CALC-SCNC: 13 MMOL/L (ref 8–16)
ANISOCYTOSIS BLD QL SMEAR: SLIGHT
APTT BLDCRRT: 24.9 SEC (ref 21–32)
BACTERIA #/AREA URNS HPF: ABNORMAL /HPF
BASOPHILS # BLD AUTO: ABNORMAL K/UL (ref 0–0.2)
BASOPHILS NFR BLD: 0 % (ref 0–1.9)
BILIRUB UR QL STRIP: NEGATIVE
BUN SERPL-MCNC: 35 MG/DL (ref 8–23)
CALCIUM SERPL-MCNC: 8.5 MG/DL (ref 8.7–10.5)
CHLORIDE SERPL-SCNC: 101 MMOL/L (ref 95–110)
CLARITY UR: ABNORMAL
CO2 SERPL-SCNC: 24 MMOL/L (ref 23–29)
COLOR UR: YELLOW
CREAT SERPL-MCNC: 1.3 MG/DL (ref 0.5–1.4)
D DIMER PPP IA.FEU-MCNC: 2.43 MG/L FEU
DIFFERENTIAL METHOD: ABNORMAL
EOSINOPHIL # BLD AUTO: ABNORMAL K/UL (ref 0–0.5)
EOSINOPHIL NFR BLD: 1 % (ref 0–8)
ERYTHROCYTE [DISTWIDTH] IN BLOOD BY AUTOMATED COUNT: 14.2 % (ref 11.5–14.5)
EST. GFR  (AFRICAN AMERICAN): 47 ML/MIN/1.73 M^2
EST. GFR  (NON AFRICAN AMERICAN): 41 ML/MIN/1.73 M^2
FERRITIN SERPL-MCNC: 312 NG/ML (ref 20–300)
GLUCOSE SERPL-MCNC: 128 MG/DL (ref 70–110)
GLUCOSE UR QL STRIP: NEGATIVE
HCT VFR BLD AUTO: 22 % (ref 37–48.5)
HGB BLD-MCNC: 7.4 G/DL (ref 12–16)
HGB UR QL STRIP: ABNORMAL
HYPOCHROMIA BLD QL SMEAR: ABNORMAL
IMM GRANULOCYTES # BLD AUTO: ABNORMAL K/UL (ref 0–0.04)
IMM GRANULOCYTES NFR BLD AUTO: ABNORMAL % (ref 0–0.5)
KETONES UR QL STRIP: ABNORMAL
LDH SERPL L TO P-CCNC: 349 U/L (ref 110–260)
LEUKOCYTE ESTERASE UR QL STRIP: ABNORMAL
LYMPHOCYTES # BLD AUTO: ABNORMAL K/UL (ref 1–4.8)
LYMPHOCYTES NFR BLD: 16 % (ref 18–48)
MCH RBC QN AUTO: 31.5 PG (ref 27–31)
MCHC RBC AUTO-ENTMCNC: 33.6 G/DL (ref 32–36)
MCV RBC AUTO: 94 FL (ref 82–98)
MICROSCOPIC COMMENT: ABNORMAL
MONOCYTES # BLD AUTO: ABNORMAL K/UL (ref 0.3–1)
MONOCYTES NFR BLD: 2 % (ref 4–15)
NEUTROPHILS NFR BLD: 77 % (ref 38–73)
NEUTS BAND NFR BLD MANUAL: 4 %
NITRITE UR QL STRIP: NEGATIVE
NRBC BLD-RTO: 0 /100 WBC
OB PNL STL: POSITIVE
PH UR STRIP: 5 [PH] (ref 5–8)
PLATELET # BLD AUTO: 235 K/UL (ref 150–450)
PLATELET BLD QL SMEAR: ABNORMAL
PMV BLD AUTO: 9.6 FL (ref 9.2–12.9)
POCT GLUCOSE: 129 MG/DL (ref 70–110)
POCT GLUCOSE: 147 MG/DL (ref 70–110)
POCT GLUCOSE: 149 MG/DL (ref 70–110)
POIKILOCYTOSIS BLD QL SMEAR: SLIGHT
POLYCHROMASIA BLD QL SMEAR: ABNORMAL
POTASSIUM SERPL-SCNC: 3.9 MMOL/L (ref 3.5–5.1)
PROCALCITONIN SERPL IA-MCNC: 0.23 NG/ML
PROT UR QL STRIP: ABNORMAL
RBC # BLD AUTO: 2.35 M/UL (ref 4–5.4)
RBC #/AREA URNS HPF: 8 /HPF (ref 0–4)
SODIUM SERPL-SCNC: 138 MMOL/L (ref 136–145)
SP GR UR STRIP: 1.01 (ref 1–1.03)
SQUAMOUS #/AREA URNS HPF: 5 /HPF
URN SPEC COLLECT METH UR: ABNORMAL
UROBILINOGEN UR STRIP-ACNC: NEGATIVE EU/DL
WBC # BLD AUTO: 25.53 K/UL (ref 3.9–12.7)
WBC #/AREA URNS HPF: 6 /HPF (ref 0–5)
YEAST URNS QL MICRO: ABNORMAL

## 2022-02-12 PROCEDURE — 83615 LACTATE (LD) (LDH) ENZYME: CPT | Performed by: NURSE PRACTITIONER

## 2022-02-12 PROCEDURE — C1751 CATH, INF, PER/CENT/MIDLINE: HCPCS

## 2022-02-12 PROCEDURE — 97165 OT EVAL LOW COMPLEX 30 MIN: CPT

## 2022-02-12 PROCEDURE — 94761 N-INVAS EAR/PLS OXIMETRY MLT: CPT

## 2022-02-12 PROCEDURE — 85730 THROMBOPLASTIN TIME PARTIAL: CPT | Performed by: HOSPITALIST

## 2022-02-12 PROCEDURE — 82728 ASSAY OF FERRITIN: CPT | Performed by: NURSE PRACTITIONER

## 2022-02-12 PROCEDURE — 85379 FIBRIN DEGRADATION QUANT: CPT | Performed by: NURSE PRACTITIONER

## 2022-02-12 PROCEDURE — 82272 OCCULT BLD FECES 1-3 TESTS: CPT | Performed by: INTERNAL MEDICINE

## 2022-02-12 PROCEDURE — 80048 BASIC METABOLIC PNL TOTAL CA: CPT | Performed by: INTERNAL MEDICINE

## 2022-02-12 PROCEDURE — 97530 THERAPEUTIC ACTIVITIES: CPT

## 2022-02-12 PROCEDURE — 25000003 PHARM REV CODE 250: Performed by: NURSE PRACTITIONER

## 2022-02-12 PROCEDURE — 85027 COMPLETE CBC AUTOMATED: CPT | Performed by: INTERNAL MEDICINE

## 2022-02-12 PROCEDURE — 25000003 PHARM REV CODE 250: Performed by: STUDENT IN AN ORGANIZED HEALTH CARE EDUCATION/TRAINING PROGRAM

## 2022-02-12 PROCEDURE — 63600175 PHARM REV CODE 636 W HCPCS: Performed by: NURSE PRACTITIONER

## 2022-02-12 PROCEDURE — A4216 STERILE WATER/SALINE, 10 ML: HCPCS | Performed by: INTERNAL MEDICINE

## 2022-02-12 PROCEDURE — 27000923 HC TRIALYSIS CATHETER, ANY SIZE

## 2022-02-12 PROCEDURE — 27000207 HC ISOLATION

## 2022-02-12 PROCEDURE — 25000003 PHARM REV CODE 250: Performed by: INTERNAL MEDICINE

## 2022-02-12 PROCEDURE — 97163 PT EVAL HIGH COMPLEX 45 MIN: CPT

## 2022-02-12 PROCEDURE — 81000 URINALYSIS NONAUTO W/SCOPE: CPT | Performed by: INTERNAL MEDICINE

## 2022-02-12 PROCEDURE — 97535 SELF CARE MNGMENT TRAINING: CPT

## 2022-02-12 PROCEDURE — 11000001 HC ACUTE MED/SURG PRIVATE ROOM

## 2022-02-12 PROCEDURE — 85007 BL SMEAR W/DIFF WBC COUNT: CPT | Performed by: INTERNAL MEDICINE

## 2022-02-12 PROCEDURE — 84145 PROCALCITONIN (PCT): CPT | Performed by: INTERNAL MEDICINE

## 2022-02-12 RX ORDER — AMLODIPINE BESYLATE 5 MG/1
10 TABLET ORAL DAILY
Status: DISCONTINUED | OUTPATIENT
Start: 2022-02-12 | End: 2022-02-16 | Stop reason: HOSPADM

## 2022-02-12 RX ADMIN — MELATONIN TAB 3 MG 9 MG: 3 TAB at 09:02

## 2022-02-12 RX ADMIN — SODIUM CHLORIDE, PRESERVATIVE FREE 10 ML: 5 INJECTION INTRAVENOUS at 12:02

## 2022-02-12 RX ADMIN — OXYCODONE HYDROCHLORIDE AND ACETAMINOPHEN 500 MG: 500 TABLET ORAL at 09:02

## 2022-02-12 RX ADMIN — SODIUM CHLORIDE, PRESERVATIVE FREE 10 ML: 5 INJECTION INTRAVENOUS at 06:02

## 2022-02-12 RX ADMIN — HEPARIN SODIUM 7500 UNITS: 5000 INJECTION, SOLUTION INTRAVENOUS; SUBCUTANEOUS at 02:02

## 2022-02-12 RX ADMIN — HEPARIN SODIUM 7500 UNITS: 5000 INJECTION, SOLUTION INTRAVENOUS; SUBCUTANEOUS at 05:02

## 2022-02-12 RX ADMIN — PANTOPRAZOLE SODIUM 40 MG: 40 TABLET, DELAYED RELEASE ORAL at 08:02

## 2022-02-12 RX ADMIN — SODIUM BICARBONATE 650 MG TABLET 650 MG: at 08:02

## 2022-02-12 RX ADMIN — AMLODIPINE BESYLATE 10 MG: 5 TABLET ORAL at 04:02

## 2022-02-12 RX ADMIN — HEPARIN SODIUM 7500 UNITS: 5000 INJECTION, SOLUTION INTRAVENOUS; SUBCUTANEOUS at 09:02

## 2022-02-12 RX ADMIN — SODIUM BICARBONATE 650 MG TABLET 650 MG: at 02:02

## 2022-02-12 RX ADMIN — INSULIN DETEMIR 12 UNITS: 100 INJECTION, SOLUTION SUBCUTANEOUS at 08:02

## 2022-02-12 RX ADMIN — SODIUM BICARBONATE 650 MG TABLET 650 MG: at 09:02

## 2022-02-12 RX ADMIN — OXYCODONE HYDROCHLORIDE AND ACETAMINOPHEN 500 MG: 500 TABLET ORAL at 08:02

## 2022-02-12 NOTE — PROGRESS NOTES
Progress Note  Nephrology      Consult Requested By: Bernabe King MD      SUBJECTIVE:     Overnight events  Patient is a 71 y.o. female     Patient Active Problem List   Diagnosis    Mild acid reflux    Essential hypertension    Essential tremor    Neck pain    Chronic back pain    Fibromyalgia    Anxiety disorder    Mixed hyperlipidemia    Restless leg syndrome    Osteoarthritis    Gait disturbance    Left-sided Bell's palsy    CTS (carpal tunnel syndrome)    Disorder of lumbar spine    MARY (obstructive sleep apnea)    Urge incontinence    Laryngopharyngeal reflux (LPR)    Morbid obesity with BMI of 45.0-49.9, adult    Tortuous aorta    Prediabetes    Dysphonia    Sacroiliitis, not elsewhere classified    Unspecified mood (affective) disorder    Closed fracture of right wrist    Wrist fracture    Decreased range of motion of right wrist    Decreased range of motion of finger of right hand    Swelling of right hand    Leukocytosis    UTI (urinary tract infection)    High anion gap metabolic acidosis    Acute renal failure with tubular necrosis    COVID-19    Acute on chronic respiratory failure with hypercapnia    Acute metabolic encephalopathy    Hypocalcemia    Hyperphosphatemia    Shock, unspecified    Hyperglycemia    Transaminitis    RAQUEL (acute kidney injury)    Metabolic acidosis    Hypervolemia    Shock    Atrial fibrillation with RVR    Acute hypercapnic respiratory failure    Encephalopathy, metabolic    Obesity hypoventilation syndrome     Past Medical History:   Diagnosis Date    RAQUEL (acute kidney injury) 2/3/2022    Anxiety disorder     Atrial fibrillation with RVR 2/7/2022    Bell's palsy     Chronic back pain     Chronic osteoarthritis     Essential tremor     Fibromyalgia     Hypertension     Mild acid reflux     Neck pain     Other and unspecified hyperlipidemia     Sleep apnea     wear c-pap at night; does not use regularly     Unspecified mood (affective) disorder               OBJECTIVE:     Vitals:    02/12/22 1130 02/12/22 1145 02/12/22 1207 02/12/22 1215   BP:   (!) 163/70    Pulse: 96 96 96 101   Resp: (!) 40 (!) 24 (!) 58 (!) 30   Temp:    99 °F (37.2 °C)   TempSrc:    Oral   SpO2: (!) 82% (!) 84% (!) 86% 98%   Weight:       Height:           Temp: 99 °F (37.2 °C) (02/12/22 1215)  Pulse: 101 (02/12/22 1215)  Resp: (!) 30 (02/12/22 1215)  BP: (!) 163/70 (02/12/22 1207)  SpO2: 98 % (02/12/22 1215)    Date 02/12/22 0700 - 02/13/22 0659   Shift 1725-0690 5162-3735 [id number] 24 Hour Total   INTAKE   P.O. 300   300   Shift Total(mL/kg) 300(3)   300(3)   OUTPUT   Urine(mL/kg/hr) 450   450   Shift Total(mL/kg) 450(4.4)   450(4.4)   Weight (kg) 101.5 101.5 101.5 101.5             Medications:   ascorbic acid (vitamin C)  500 mg Oral BID    heparin (porcine)  7,500 Units Subcutaneous Q8H    insulin detemir U-100  12 Units Subcutaneous Daily    melatonin  9 mg Oral Nightly    pantoprazole  40 mg Oral Daily    sodium bicarbonate  650 mg Oral TID    sodium chloride 0.9%  10 mL Intravenous Q6H      sodium chloride 0.9% Stopped (02/10/22 1023)         Physical Exam:  Diarrhea  Poor intake  General appearance: NAD  Lungs: diminished breath sounds  Heart: pulse 100  Abdomen: soft  Extremities: edema  Skin: dry    Laboratory:  ABG  Labs reviewed  Recent Results (from the past 336 hour(s))   Basic metabolic panel    Collection Time: 02/12/22 12:25 PM   Result Value Ref Range    Sodium 138 136 - 145 mmol/L    Potassium 3.9 3.5 - 5.1 mmol/L    Chloride 101 95 - 110 mmol/L    CO2 24 23 - 29 mmol/L    BUN 35 (H) 8 - 23 mg/dL    Creatinine 1.3 0.5 - 1.4 mg/dL    Calcium 8.5 (L) 8.7 - 10.5 mg/dL    Anion Gap 13 8 - 16 mmol/L     Recent Results (from the past 336 hour(s))   CBC auto differential    Collection Time: 02/12/22  6:07 AM   Result Value Ref Range    WBC 25.53 (H) 3.90 - 12.70 K/uL    Hemoglobin 7.4 (L) 12.0 - 16.0 g/dL    Hematocrit 22.0  (L) 37.0 - 48.5 %    Platelets 235 150 - 450 K/uL   CBC auto differential    Collection Time: 02/11/22  5:52 AM   Result Value Ref Range    WBC 23.88 (H) 3.90 - 12.70 K/uL    Hemoglobin 8.3 (L) 12.0 - 16.0 g/dL    Hematocrit 24.4 (L) 37.0 - 48.5 %    Platelets 204 150 - 450 K/uL   CBC auto differential    Collection Time: 02/10/22  5:23 AM   Result Value Ref Range    WBC 16.76 (H) 3.90 - 12.70 K/uL    Hemoglobin 8.2 (L) 12.0 - 16.0 g/dL    Hematocrit 24.4 (L) 37.0 - 48.5 %    Platelets 153 150 - 450 K/uL     Urinalysis  No results for input(s): COLORU, CLARITYU, SPECGRAV, PHUR, PROTEINUA, GLUCOSEU, BILIRUBINCON, BLOODU, WBCU, RBCU, BACTERIA, MUCUS, NITRITE, LEUKOCYTESUR, UROBILINOGEN, HYALINECASTS in the last 24 hours.    Diagnostic Results:  X-Ray: Reviewed  US: Reviewed  Echo: Reviewed  ACCESS    ASSESSMENT/PLAN:      RAQUEL/ CKD 2  Improving  UO - 1870 cc +  450 cc +  Fluid balance negative - 4, 214  cc since admit  UA protein 1+, joselito 3+, WBC 4  GFR 41 cc/min  Creatinine 1.3  BUN 35  Metabolic bone disease  Hyperphosphatemia  Poor nutrition   Albumin 2.7  Anemia multifactorial  Hb 7.4  Hypertensive  Blood pressure 163/70  Echo  ·  Mild eccentric hypertrophy and normal systolic function.  · The estimated ejection fraction is 70-75%.  · Normal left ventricular diastolic function.  · Normal central venous pressure (3 mmHg).  · The estimated PA systolic pressure is 25 mmHg.  · Normal right ventricular size with normal right ventricular systolic function  · Weight daily  · I and O  · Avoid nephrotoxic agents, hypotension, hypovolemia

## 2022-02-12 NOTE — PT/OT/SLP EVAL
Occupational Therapy   Evaluation    Name: Hannah Norman  MRN: [id number]  Admitting Diagnosis:  Acute on chronic respiratory failure with hypercapnia  Recent Surgery: * No surgery found *      Recommendations:     Discharge Recommendations: other (see comments) (TBD)  Discharge Equipment Recommendations:  none  Barriers to discharge:  Other (Comment) (Pt requires increased assistance at this time)    Assessment:     Hannah Norman is a 71 y.o. female with a medical diagnosis of Acute on chronic respiratory failure with hypercapnia.  She presents with The primary encounter diagnosis was Sepsis, due to unspecified organism, unspecified whether acute organ dysfunction present. Diagnoses of AMS (altered mental status), Hyperkalemia, Chest pain, Urinary tract infection without hematuria, site unspecified, RAQUEL (acute kidney injury), Acute respiratory failure with hypoxia and hypercarbia, Acute metabolic encephalopathy, Metabolic acidosis, COVID-19, Hypervolemia, unspecified hypervolemia type, Shock, Morbid obesity with BMI of 45.0-49.9, adult, MARY (obstructive sleep apnea), Acute kidney failure, unspecified, Acute renal failure, unspecified acute renal failure type, Acute on chronic respiratory failure with hypercapnia, Tachyarrhythmia, Atrial fibrillation with RVR, Acute hypercapnic respiratory failure, Encephalopathy, metabolic, Hyperglycemia, Obesity hypoventilation syndrome, and Edema of right upper extremity were also pertinent to this visit. Performance deficits affecting function: weakness,impaired self care skills,impaired balance,decreased safety awareness,decreased ROM,impaired endurance,impaired functional mobilty,gait instability,decreased lower extremity function,decreased upper extremity function,impaired cardiopulmonary response to activity,impaired skin,edema,decreased coordination.      Pt would benefit from cont OT services in order to maximize functional independence. Recommending TBD pending progress. OT  "eval performed & pt educated on role of OT and POC. Pt requires MaxA for all functional mobility at this time. Pt soiled & requires MaxA for hygiene. See note for vitals during session.     Rehab Prognosis: Good; patient would benefit from acute skilled OT services to address these deficits and reach maximum level of function.       Plan:     Patient to be seen 3 x/week to address the above listed problems via self-care/home management,therapeutic activities,therapeutic exercises  · Plan of Care Expires: 03/12/22  · Plan of Care Reviewed with: patient,spouse    Subjective     Chief Complaint: "I am doing a little better."   Patient/Family Comments/goals: Return to PLOF    Occupational Profile:  Patient lives with spouse and family in 1 [place], 2 [place], no railings.  T/S in  Bathroom with shower chair.    Prior to admission, patients level of function was independent with gait, ADLS, + Driving.    Equipment used at home: bedside commode,wheelchair,cane, straight,cane, quad,walker, rolling,shower chair (power scooter).    DME owned (not currently used): single point cane and wheelchair.    Assistance upon Discharge: Family    Pain/Comfort:  · Pain Rating 1: 0/10    Patients cultural, spiritual, [employer]ian conflicts given the current situation: no    Objective:     Communicated with: nsnatali prior to session.  Patient found HOB elevated with PICC line,telemetry,pulse ox (continuous),blood pressure cuff,Trialysis,gresham catheter upon OT entry to room.    General Precautions: Standard, airborne,contact,droplet,fall   Orthopedic Precautions:N/A   Braces: N/A  Respiratory Status: Room air    Occupational Performance:    Bed Mobility:    · Patient completed Scooting/Bridging with maximal assistance  · Patient completed Supine to Sit with maximal assistance  · Patient completed Sit to Supine with maximal assistance    Functional Mobility/Transfers:  · Patient completed Sit <> Stand Transfer with maximal assistance  with  rolling " walker x3 trials, once from EOB & twice from bedside chair for hygiene  · Patient completed Bed <> Chair Transfer using Step Transfer technique with maximal assistance with rolling walker    Activities of Daily Living:  · Pt requires MaxA for hygiene in static standing    Cognitive/Visual Perceptual:  Cognitive/Psychosocial Skills:     -       Oriented to: Person, Place, Time and Situation   -       Follows Commands/attention:Follows multistep  commands  -       Safety awareness/insight to disability: impaired   -       Mood/Affect/Coping skills/emotional control: Cooperative and Pleasant    Physical Exam:  Sensation:    -       Intact  light/touch BUE  Upper Extremity Range of Motion:     -       Right Upper Extremity: WFL except decreased shoulder flex  -       Left Upper Extremity: WFL except decreased shoulder flex  Upper Extremity Strength:    -       Right Upper Extremity: 3+-4/5  -       Left Upper Extremity: 3+-4/5   Strength:    -       Right Upper Extremity: Fair  -       Left Upper Extremity: Fair    AMPAC 6 Click ADL:  AMPAC Total Score: 14    Treatment & Education:  OT eval performed & pt educated on role of OT and POC.   Pt requires MaxA for all functional mobility at this time as above.   Pt sat EOB with Min/ModA.  Pt soiled & requires MaxA for hygiene.     Vitals:    BP supine:  170/73     Sitting 150s/60s    After Gait trial 130/50s, Complains of slight dizziness.    After 2-3 minutes of rest, patient recovers to 150s/60s  Education:   Patient very fatigued after gait and transfers.  O2 sats 100%,     Patient left up in chair with all lines intact, call button in reach, nsg notified and spouse present    GOALS:   Multidisciplinary Problems     Occupational Therapy Goals        Problem: Occupational Therapy Goal    Goal Priority Disciplines Outcome Interventions   Occupational Therapy Goal     OT, PT/OT Ongoing, Progressing    Description: Goals to be met by: 3/12/2022     Patient will  increase functional independence with ADLs by performing:    UE Dressing with Modified [place].  LE Dressing with Set-up Assistance.  Grooming while standing with Set-up Assistance.  Toileting from toilet with Supervision for hygiene and clothing management.   Supine to sit with Modified independence.  Step transfer with Supervision & appropriate AD.   Toilet transfer to toilet with Supervision & appropriate AD.  Increased functional strength to WFL for self-care.                     History:     Past Medical History:   Diagnosis Date    RAQUEL (acute kidney injury) 2/3/2022    Anxiety disorder     Atrial fibrillation with RVR 2/7/2022    Bell's palsy     Chronic back pain     Chronic osteoarthritis     Essential tremor     Fibromyalgia     Hypertension     Mild acid reflux     Neck pain     Other and unspecified hyperlipidemia     Sleep apnea     wear c-pap at night; does not use regularly    Unspecified mood (affective) disorder        Past Surgical History:   Procedure Laterality Date    ADENOIDECTOMY      APPENDECTOMY      BLADDER SUSPENSION      BREAST BIOPSY Left     1995  negative    Breast reduction      BREAST SURGERY Bilateral     breast reduction    CARPAL TUNNEL RELEASE Right     COLONOSCOPY  2008    COLONOSCOPY N/A 11/10/2017    Procedure: COLONOSCOPY - Miralax split prep;  Surgeon: Annetta Powell MD;  Location: [facility];  Service: Endoscopy;  Laterality: N/A;    COSMETIC SURGERY Bilateral     breast reduction    CYSTOSCOPY N/A 10/23/2018    Procedure: CYSTOSCOPY;  Surgeon: Feli Garza MD;  Location: [facility] OR [address] [place] [address];  Service: Urology;  Laterality: N/A;    ESOPHAGOGASTRODUODENOSCOPY N/A 5/30/2019    Procedure: ESOPHAGOGASTRODUODENOSCOPY (EGD);  Surgeon: Oscar Wylie MD;  Location: [facility] ([address] [place] [address]);  Service: Endoscopy;  Laterality: N/A;  Off PPI/H2 x 7 days prior- ERW    HYSTERECTOMY      JOINT REPLACEMENT Left     knee    KNEE SURGERY      bilateral    left  shoulder Left     rotator cuff    OPEN REDUCTION AND INTERNAL FIXATION (ORIF) OF FRACTURE OF DISTAL RADIUS Right 11/16/2021    Procedure: ORIF, FRACTURE, RADIUS, DISTAL;  Surgeon: Evelio Lynn Jr., MD;  Location: [facility];  Service: Orthopedics;  Laterality: Right;  need accumed plate and mini c arm, Acumed confirmed 11/15/21 AM    PLACEMENT OF TRANSOBTURATOR TAPE N/A 10/23/2018    Procedure: PLACEMENT, TRANSOBTURATOR TAPE;  Surgeon: Feli Garza MD;  Location: [address];  Service: Urology;  Laterality: N/A;  1.5 hours    Tonsillectomy      TONSILLECTOMY      TOTAL REDUCTION MAMMOPLASTY      1995    TUBAL LIGATION         Time Tracking:     OT Date of Treatment: 02/12/22  OT Start Time: 0925  OT Stop Time: 1006  OT Total Time (min): 41 min with PT    Billable Minutes:Evaluation 11  Self Care/Home Management 15    2/12/2022

## 2022-02-12 NOTE — PT/OT/SLP EVAL
"Physical Therapy Evaluation    Patient Name:  Hannah Norman   MRN:  [id number]    Recommendations:     Discharge Recommendations:  other (see comments) (TBD)   Discharge Equipment Recommendations: none   Barriers to discharge: current functional status    Assessment:     Hannah Norman is a 71 y.o. female admitted with a medical diagnosis of Acute on chronic respiratory failure with hypercapnia.  She presents with the following impairments/functional limitations:  weakness,impaired functional mobilty,impaired cognition,decreased safety awareness,impaired cardiopulmonary response to activity,impaired endurance,gait instability,impaired balance,impaired skin,impaired self care skills,decreased lower extremity function,edema.  Patient seen for physical therapy evaluation - co-evaluation with occupational therapy.  Patient requiring Max Assist for all functional mobility.  Reccs TBD.    Rehab Prognosis: Fair; patient would benefit from acute skilled PT services to address these deficits and reach maximum level of function.    Recent Surgery: * No surgery found *      Plan:     During this hospitalization, patient to be seen 5 x/week to address the identified rehab impairments via gait training,therapeutic activities,therapeutic exercises,neuromuscular re-education and progress toward the following goals:    · Plan of Care Expires:  03/14/22    Subjective     Chief Complaint: "I am doing a little better"  Patient/Family Comments/goals: To return [facility] at [facility]  Pain/Comfort:  · Pain Rating 1: 0/10  · Pain Rating Post-Intervention 1: 0/10    Patients cultural, spiritual, Muslim conflicts given the current situation: no    Living Environment:  Patient lives with spouse and family in 1 , 2 [facility], no railings.  T/S in  Bathroom with shower chair.    Prior to admission, patients level of function was independent with gait, ADLS, + Driving.  Equipment used at home: bedside commode,wheelchair,cane, straight,cane, quad,walker, " rolling,shower chair (power scooter).  DME owned (not currently used): single point cane and wheelchair.  Upon discharge, patient will have assistance from family.    Objective:     Communicated with nurse prior to session.  Patient found supine with PICC line,gresham catheter,Trialysis,blood pressure cuff,telemetry,pulse ox (continuous)  upon PT entry to room.    General Precautions: Standard, airborne,contact,droplet,fall   Orthopedic Precautions:N/A   Braces: N/A  Respiratory Status: Room air    Exams:  · Cognitive Exam:  Patient is oriented to Person, Place, Time and Situation.  However, patient with birthday tomorrow, and stated wrong age.  Patient also poor historian.    · Fine Motor Coordination:    · -       Intact  Rapid alternating ankle DF/PF  · Gross Motor Coordination:  Min decreased due to weakness  · Postural Exam:  Patient presented with the following abnormalities:    · -       Rounded shoulders  · -       Forward head  · -       Kyphosis  · Sensation:  Light Touch grossly intact.  Patient states she has numbness in B feet.  · Skin Integrity/Edema:      · -       Skin integrity: Scabbed over area on nose from BIPAP  · RLE ROM: Deficits: 25% limited due to body habitus  · RLE Strength: Deficits: Hip:  3-/5, Knee: 3+/5  Ankle 4/5  · LLE ROM: Deficits: 25% limited due to body habitus  · LLE Strength: Deficits: Hip:  3-/5, Knee:  3+/5.  Ankle 4/5    Functional Mobility:  · Bed Mobility:     · Rolling Right: maximal assistance  · Scooting: maximal assistance  · Supine to Sit: maximal assistance  · Transfers:     · Sit to Stand:  Max A x 3 trials from bed x 1, chair x 2 for perineal hygiene (dependent)  · Gait: Patient took 4-5 steps with RW towards bedside chair with Max assist and Max VCs  · Balance: Sitting at EOB x 5-6 minutes with min to mod assist for maintaining midline posture.  Standing:  With RW, Mod A Static, Max A for gait    Therapeutic Activities and Exercises:   Vitals:  BP supine:  170/73,    Sitting 150s/60s.  After Gait trial, 130/50s.   Complains of slight dizziness.  After 2-3 minutes of rest, patient recovers to 150s/60s.   Patient very fatigued after gait and transfers.  O2 sats 100%, .   Educated on role of Physical therapy   Educated on importance of OOB.    AM-PAC 6 CLICK MOBILITY  Total Score:11     Patient left up in chair with all lines intact, call button in reach and nurse notified.    GOALS:   Multidisciplinary Problems     Physical Therapy Goals        Problem: Physical Therapy Goal    Goal Priority Disciplines Outcome Goal Variances Interventions   Physical Therapy Goal     PT, PT/OT Ongoing, Progressing     Description: Goals to be met by: 3/14/2022    Patient will increase functional independence with mobility by performin. Supine to sit with Contact Guard Assistance  2. Sit to supine with Contact Guard Assistance  3. Rolling to Left and Right with Contact Guard Assistance.  4. Sit to stand transfer with Contact Guard Assistance  5. Bed to chair transfer with Contact Guard Assistance using Rolling Walker  6. Gait  x 50 feet with Contact Guard Assistance using Rolling Walker.   7. Ascend/descend 2 stair with Minimal Assistance                      History:     Past Medical History:   Diagnosis Date    RAQUEL (acute kidney injury) 2/3/2022    Anxiety disorder     Atrial fibrillation with RVR 2022    Bell's palsy     Chronic back pain     Chronic osteoarthritis     Essential tremor     Fibromyalgia     Hypertension     Mild acid reflux     Neck pain     Other and unspecified hyperlipidemia     Sleep apnea     wear c-pap at night; does not use regularly    Unspecified mood (affective) disorder        Past Surgical History:   Procedure Laterality Date    ADENOIDECTOMY      APPENDECTOMY      BLADDER SUSPENSION      BREAST BIOPSY Left       negative    Breast reduction      BREAST SURGERY Bilateral     breast reduction    CARPAL TUNNEL RELEASE Right      COLONOSCOPY  2008    COLONOSCOPY N/A 11/10/2017    Procedure: COLONOSCOPY - Miralax split prep;  Surgeon: Annetta Powell MD;  Location[facility];  Service: Endoscopy;  Laterality: N/A;    COSMETIC SURGERY Bilateral     breast reduction    CYSTOSCOPY N/A 10/23/2018    Procedure: CYSTOSCOPY;  Surgeon: Feli Garza MD;  Location: [facility] OR 2ND FLR;  Service: Urology;  Laterality: N/A;    ESOPHAGOGASTRODUODENOSCOPY N/A 5/30/2019    Procedure: ESOPHAGOGASTRODUODENOSCOPY (EGD);  Surgeon: Oscar Wylie MD;  Location: [facility] (4TH FLR);  Service: Endoscopy;  Laterality: N/A;  Off PPI/H2 x 7 days prior- ERW    HYSTERECTOMY      JOINT REPLACEMENT Left     knee    KNEE SURGERY      bilateral    left shoulder Left     rotator cuff    OPEN REDUCTION AND INTERNAL FIXATION (ORIF) OF FRACTURE OF DISTAL RADIUS Right 11/16/2021    Procedure: ORIF, FRACTURE, RADIUS, DISTAL;  Surgeon: Evelio Lynn Jr., MD;  Location: [facility];  Service: Orthopedics;  Laterality: Right;  need accumed plate and mini c arm, Acumed confirmed 11/15/21 AM    PLACEMENT OF TRANSOBTURATOR TAPE N/A 10/23/2018    Procedure: PLACEMENT, TRANSOBTURATOR TAPE;  Surgeon: Feli Garza MD;  Location: [facility] OR 2ND FLR;  Service: Urology;  Laterality: N/A;  1.5 hours    Tonsillectomy      TONSILLECTOMY      TOTAL REDUCTION MAMMOPLASTY      1995    TUBAL LIGATION         Time Tracking:     PT Received On: 02/12/22  PT Start Time: 0925     PT Stop Time: 1006  PT Total Time (min): 41 min Total Time:  Co-eval with OT    Billable Minutes: Evaluation 10 and Therapeutic Activity 10      02/12/2022

## 2022-02-12 NOTE — PLAN OF CARE
Pt would benefit from cont OT services in order to maximize functional independence. Recommending TBD pending progress. OT eval performed & pt educated on role of OT and POC. Pt requires MaxA for all functional mobility at this time. Pt soiled & requires MaxA for hygiene. See note for vitals during session.     Problem: Occupational Therapy Goal  Goal: Occupational Therapy Goal  Description: Goals to be met by: 3/12/2022     Patient will increase functional independence with ADLs by performing:    UE Dressing with Modified [place].  LE Dressing with Set-up Assistance.  Grooming while standing with Set-up Assistance.  Toileting from toilet with Supervision for hygiene and clothing management.   Supine to sit with Modified independence.  Step transfer with Supervision & appropriate AD.   Toilet transfer to toilet with Supervision & appropriate AD.  Increased functional strength to WFL for self-care.    Outcome: Ongoing, Progressing

## 2022-02-12 NOTE — PROGRESS NOTES
[facility] Medicine  Progress Note    Patient Name: Hannah Norman  MRN: [id number]  Patient Class: IP- Inpatient   Admission Date: 2/3/2022  Length of Stay: 9 days  Attending Physician: Bernabe King MD  Primary Care Provider: Raffi Garcia MD        Subjective:     Principal Problem:Acute on chronic respiratory failure with hypercapnia        HPI:  Ms. Norman is a 71 year old female with a medical history that includes anxiety disorder, bell's palsy, chronic back pain, chronic osteoarthritis, essential tremor, fibromyalgia, hypertension, GERD, hyperlipidemia, sleep apnea, and mood disorder. She presented to the ED via EMS with spouse who reports patient to be increasingly fatigued and weak. He reports over the past 2 weeks she was diagnosed with COVID19 and was ending their quarantine phase. She has been progressively weaker and unable to ambulate as usual with her walker. Her symptoms worsened over the past 2 days where she was not communicating at all. She is with no cough, vomiting, diarrhea or fevers. Her spouse contributed to her history during this visit. On arrival to the ED she was slightly hypoxic at 93% and hypertensive. Significant labs were with the following: wbc 18.97, Na 131, K 5.6, BUN 69, Cr 7.1, COVID19 +, urine with 3+ leukocytes and many bacteria, pH 7.1 and CO2 65.4. CT head and CXR with no acute findings. She was given neb treatment, Rocephin, Lokelma, reg insulin and NS bolus. She will admit to [facility] hospital medicine service for continued monitoring.           Overview/Hospital Course:  No notes on file    Interval History:  Remains on room air with bipap qhs. Mental status back to baseline. Kidney function improving, no need for HD. Pending downgrade.    Review of Systems   Constitutional: Positive for activity change and fatigue. Negative for appetite change, chills, diaphoresis and fever.   HENT: Negative for congestion, postnasal drip, rhinorrhea, sinus  pressure, sinus pain and sneezing.    Respiratory: Positive for shortness of breath (improving). Negative for cough, chest tightness, wheezing and stridor.    Cardiovascular: Negative for chest pain, palpitations and leg swelling.   Gastrointestinal: Negative for abdominal distention, abdominal pain, blood in stool, constipation, diarrhea and nausea.   Endocrine: Negative for cold intolerance and heat intolerance.   Genitourinary: Positive for decreased urine volume (improving). Negative for dysuria, flank pain and hematuria.   Musculoskeletal: Negative for gait problem.   Neurological: Negative for dizziness and weakness.   Psychiatric/Behavioral: Negative for agitation, behavioral problems and confusion.     Objective:     Vital Signs (Most Recent):  Temp: 99 °F (37.2 °C) (02/12/22 1215)  Pulse: 101 (02/12/22 1215)  Resp: (!) 30 (02/12/22 1215)  BP: (!) 163/70 (02/12/22 1207)  SpO2: 98 % (02/12/22 1215) Vital Signs (24h Range):  Temp:  [98 °F (36.7 °C)-99.2 °F (37.3 °C)] 99 °F (37.2 °C)  Pulse:  [] 101  Resp:  [14-59] 30  SpO2:  [82 %-100 %] 98 %  BP: ()/(55-95) 163/70     Weight: 101.5 kg (223 lb 12.3 oz)  Body mass index is 42.28 kg/m².    Intake/Output Summary (Last 24 hours) at 2/12/2022 1444  Last data filed at 2/12/2022 1200  Gross per 24 hour   Intake 500 ml   Output 1770 ml   Net -1270 ml      Physical Exam  Vitals and nursing note reviewed.   Constitutional:       General: She is not in acute distress.     Appearance: She is morbidly obese.      Interventions: She is sedated and restrained.   HENT:      Head: Normocephalic and atraumatic.      Right Ear: External ear normal.      Left Ear: External ear normal.      Nose: Nose normal. No congestion.      Mouth/Throat:      Mouth: Mucous membranes are dry.      Pharynx: Oropharynx is clear.   Eyes:      Extraocular Movements: Extraocular movements intact.      Pupils: Pupils are equal, round, and reactive to light.   Cardiovascular:      Rate  and Rhythm: Normal rate and regular rhythm.      Pulses: Normal pulses.      Heart sounds: Normal heart sounds.   Pulmonary:      Effort: Pulmonary effort is normal.      Breath sounds: Normal breath sounds.   Abdominal:      General: Bowel sounds are normal.      Palpations: Abdomen is soft.   Genitourinary:     Comments: marga  Musculoskeletal:         General: Normal range of motion.      Cervical back: Normal range of motion. No rigidity.      Right lower leg: No edema.      Left lower leg: No edema.   Skin:     General: Skin is warm and dry.      Capillary Refill: Capillary refill takes less than 2 seconds.      Comments: trialysis line   Neurological:      General: No focal deficit present.      Mental Status: She is alert and oriented to person, place, and time. Mental status is at baseline.      Motor: No weakness.         Significant Labs: All pertinent labs within the past 24 hours have been reviewed.    Significant Imaging: I have reviewed all pertinent imaging results/findings within the past 24 hours.      Assessment/Plan:      * Acute on chronic respiratory failure with hypercapnia  Elevated CO2 on ABG, currently on BIPAP  Reassess with ABG with worsened CO2, settings adjusted and adjusting face mask for better fit  Hypoxia likely related to COVID19, was 93% on room air on arrival  -suspect that patient's encephalopathy secondary to elevated CO2 vs uremia  -Pulm following  -Required intubation. Successfully extubated 2/8 to bipap  -now on vapotherm during day with bipap qhs  -weaned to room air. Cont bipap qhs due to ge/ohs  -downgrade to floor    Obesity hypoventilation syndrome  BIPAP QHS      Encephalopathy, metabolic        Acute hypercapnic respiratory failure        Atrial fibrillation with RVR  - went into afib w/ RVR overnight following episode of bradycardia and vasovagal  - given fluid and will maintain net even today on CRRT  - initiated on amiodarone gtt with improvement in rate control  -  amio discontinued    Shock        Hypervolemia        Metabolic acidosis        RAQUEL (acute kidney injury)        Transaminitis  - in setting of shock  - monitor for now; can consider escalation of w/u if persists      Hyperglycemia  Hemoglobin A1C   Date Value Ref Range Status   11/10/2021 6.2 (H) 4.0 - 5.6 % Final     Comment:     ADA Screening Guidelines:  5.7-6.4%  Consistent with prediabetes  >or=6.5%  Consistent with diabetes    High levels of fetal hemoglobin interfere with the HbA1C  assay. Heterozygous hemoglobin variants (HbS, HgC, etc)do  not significantly interfere with this assay.   However, presence of multiple variants may affect accuracy.     04/26/2021 5.9 (H) 4.0 - 5.6 % Final     Comment:     ADA Screening Guidelines:  5.7-6.4%  Consistent with prediabetes  >or=6.5%  Consistent with diabetes    High levels of fetal hemoglobin interfere with the HbA1C  assay. Heterozygous hemoglobin variants (HbS, HgC, etc)do  not significantly interfere with this assay.   However, presence of multiple variants may affect accuracy.     11/10/2020 6.0 (H) 4.0 - 5.6 % Final     Comment:     ADA Screening Guidelines:  5.7-6.4%  Consistent with prediabetes  >or=6.5%  Consistent with diabetes  High levels of fetal hemoglobin interfere with the HbA1C  assay. Heterozygous hemoglobin variants (HbS, HgC, etc)do  not significantly interfere with this assay.   However, presence of multiple variants may affect accuracy.           - LDSSI with accuchecks qachs        Shock, unspecified  -likely distributive due to acidosis and renal failure versus septic shock  -currently requiring Levophed with addition of vasopressin and stress dose steroids   -nephrology consulted for CRRT  -continue IV antibiotics for possible infectious source, thought less likely  -possibly worsening due to too much volume removal  -COVID therapy as above  -AM cortisol low  -started on hydrocortisone/fludrocortisone  -has been weaned off  pressors  -hydrocortisone/fludrocortisone discontinued    Hyperphosphatemia  -in the setting of acute renal failure  -on CRRT  -nephrology consulted      Hypocalcemia  -in the setting of acute renal failure  -dialysis; will replace  -nephrology consulted      Acute metabolic encephalopathy  Reported by spouse to be with increased drowsiness and altered mentation. She has been sleeping a lot more than usual and is not alert. Found to be with renal failure, COVID19, and metabolic acidosis    CT head with no acute findings, ammonia level wnl  Will hold all sedating home meds for now  Currently on BIPAP for hypercapnia, which is likely contributing  -renal failure with high anion gap metabolic acidosis and uremia; initiated on dialysis  -pulmonology and nephrology consulted  -resolved    COVID-19  COVID positive; initiated on protocol  CXR with no infiltrate, requiring O2  -doubt that current presentation is due to COVID; will hold remdesivir and dexamethasone  Heparin ppx  inhalers prn  MVI, ascorbic acid, incentive spirometry  statin  supplemental O2, will wean as tolerated  prn tylenol, loperamide  contact/airborne    Acute renal failure with tubular necrosis  Significant RAQUEL with creatinine of 7.1 from normal just 3 months ago, with acidosis, uremia, hyperkalemia, hyperphosphatemia, and hypocalcemia  Renal ultrasound   -Vila catheter placed  Consulted Nephrology  Strict I&O's  -initiated on CRRT 2/4  -kidney function improving  -no longer requiring dialysis        High anion gap metabolic acidosis  With initial pH of 7.188  Trend ABG  Likely related to acute renal failure  -bicarbonate drip started on admission, now discontinued  -initiated on CRRT>HD  -bicarb tabs  -nephrology following  -improving    UTI (urinary tract infection)  Urinalysis relatively unremarkable, may be asymptomatic bacteriuria but in the setting of shock will continue antibiotics  Blood cultures ngtd  IV Rocephin  completed      Leukocytosis  Juanis related to UTI vs COVID19  CXR was with no pneumonia  Urine with UTI  Blood cultures no growth to date  Lactate 1.1  Trend CBC  Cultures remain negative  IV vanco discontinued. Rocephin completed  WBC trending up. No clear source of infection identified. Possibly due to steroids. Check UA, blood cx, CXR    Unspecified mood (affective) disorder  Resume home meds once taking p.o., chronic      Morbid obesity with BMI of 45.0-49.9, adult  Body mass index is 42.28 kg/m². Morbid obesity complicates all aspects of disease management from diagnostic modalities to treatment.    MARY (obstructive sleep apnea)  Was ordered CPAP qhs per PCP but does not use it  -suspect she has OHS  On CPAP 4 cm H2O at home. Increased to 10 cm H2O on home CPAP device. Will need outpatient sleep study (last one 10 years ago). Check ABG day before discharge - if pCO2 >52, then meets criteria for trilogy. In that situation, patient should go home with trilogy device (settings would be 16/8 21% until can get a sleep study).    Disorder of lumbar spine  Osteoarthritis    Hold home pain medications for now      Osteoarthritis  See above      Restless leg syndrome  Hold home Neurontin and Mirapex      Mixed hyperlipidemia  Patient is chronically on statin.will continue for now once able to take p.o.. Monitor clinically. Last LDL was   Lab Results   Component Value Date    LDLCALC 71.0 11/10/2021            Anxiety disorder  Hold Klonopin 2/2 AMS, chronic      Fibromyalgia  Hold home Effexor and Neurontin      Chronic back pain  Will hold home pain medications until improvement in mentation      Essential hypertension  - hold home antihypertensives due to shock  - start amlodipine      Mild acid reflux  - protonix ppx        VTE Risk Mitigation (From admission, onward)         Ordered     heparin (porcine) injection 2,800 Units  As needed (PRN)         02/04/22 1911     heparin (porcine) injection 7,500 Units  Every  8 hours         02/03/22 2251     Place KATERIN hose  Until discontinued         02/03/22 2251     IP VTE HIGH RISK PATIENT  Once         02/03/22 2251     Place sequential compression device  Until discontinued         02/03/22 2251                Discharge Planning   ARNULFO:      Code Status: DNR   Is the patient medically ready for discharge?:     Reason for patient still in hospital (select all that apply): Laboratory test, Treatment and PT / OT recommendations  Discharge Plan A: Home Health            Critical care time spent on the evaluation and treatment of severe organ dysfunction, review of pertinent labs and imaging studies, discussions with consulting providers and discussions with patient/family: 45 minutes.      Bernabe King MD  Department of Hospital Medicine   [place] - Intensive Care

## 2022-02-12 NOTE — PLAN OF CARE
Problem: Physical Therapy Goal  Goal: Physical Therapy Goal  Description: Goals to be met by: 3/14/2022    Patient will increase functional independence with mobility by performin. Supine to sit with Contact Guard Assistance  2. Sit to supine with Contact Guard Assistance  3. Rolling to Left and Right with Contact Guard Assistance.  4. Sit to stand transfer with Contact Guard Assistance  5. Bed to chair transfer with Contact Guard Assistance using Rolling Walker  6. Gait  x 50 feet with Contact Guard Assistance using Rolling Walker.   7. Ascend/descend 2 stair with Minimal Assistance     Outcome: Ongoing, Progressing     Patient seen for physical therapy evaluation - co-evaluation with occupational therapy.  Patient requiring Max Assist for all functional mobility.  Reccs TBD.

## 2022-02-12 NOTE — ASSESSMENT & PLAN NOTE
Juanis related to UTI vs COVID19  CXR was with no pneumonia  Urine with UTI  Blood cultures no growth to date  Lactate 1.1  Trend CBC  Cultures remain negative  IV vanco discontinued. Rocephin completed  WBC trending up. No clear source of infection identified. Possibly due to steroids. Check UA, blood cx, CXR

## 2022-02-12 NOTE — PROGRESS NOTES
Patient have removed BiPap and refusing to wear the device. Device turned off. Patient given some water and instructed to try to rest, her lights are turned off alone with her TV

## 2022-02-12 NOTE — NURSING
Nursing Transfer Note      2/12/2022     Report called to Lala    Reason patient is being transferred: condition stable    Transfer To: Room 430    Transfer via bed    Transfer with cardiac monitoring    Transported by Ale RN, Ce RN    Medicines sent: insulin detemir, insulin aspart, albuterol, mupirocin, olanzapine    Chart send with patient: Yes    Upon arrival to floor: cardiac monitor applied, patient oriented to room, call bell in reach and bed in lowest position, Lala came to bedside to assume care of pt.

## 2022-02-12 NOTE — SUBJECTIVE & OBJECTIVE
Interval History:  Remains on room air with bipap qhs. Mental status back to baseline. Kidney function improving, no need for HD. Pending downgrade.    Review of Systems   Constitutional: Positive for activity change and fatigue. Negative for appetite change, chills, diaphoresis and fever.   HENT: Negative for congestion, postnasal drip, rhinorrhea, sinus pressure, sinus pain and sneezing.    Respiratory: Positive for shortness of breath (improving). Negative for cough, chest tightness, wheezing and stridor.    Cardiovascular: Negative for chest pain, palpitations and leg swelling.   Gastrointestinal: Negative for abdominal distention, abdominal pain, blood in stool, constipation, diarrhea and nausea.   Endocrine: Negative for cold intolerance and heat intolerance.   Genitourinary: Positive for decreased urine volume (improving). Negative for dysuria, flank pain and hematuria.   Musculoskeletal: Negative for gait problem.   Neurological: Negative for dizziness and weakness.   Psychiatric/Behavioral: Negative for agitation, behavioral problems and confusion.     Objective:     Vital Signs (Most Recent):  Temp: 99 °F (37.2 °C) (02/12/22 1215)  Pulse: 101 (02/12/22 1215)  Resp: (!) 30 (02/12/22 1215)  BP: (!) 163/70 (02/12/22 1207)  SpO2: 98 % (02/12/22 1215) Vital Signs (24h Range):  Temp:  [98 °F (36.7 °C)-99.2 °F (37.3 °C)] 99 °F (37.2 °C)  Pulse:  [] 101  Resp:  [14-59] 30  SpO2:  [82 %-100 %] 98 %  BP: ()/(55-95) 163/70     Weight: 101.5 kg (223 lb 12.3 oz)  Body mass index is 42.28 kg/m².    Intake/Output Summary (Last 24 hours) at 2/12/2022 1444  Last data filed at 2/12/2022 1200  Gross per 24 hour   Intake 500 ml   Output 1770 ml   Net -1270 ml      Physical Exam  Vitals and nursing note reviewed.   Constitutional:       General: She is not in acute distress.     Appearance: She is morbidly obese.      Interventions: She is sedated and restrained.   HENT:      Head: Normocephalic and atraumatic.       Right Ear: External ear normal.      Left Ear: External ear normal.      Nose: Nose normal. No congestion.      Mouth/Throat:      Mouth: Mucous membranes are dry.      Pharynx: Oropharynx is clear.   Eyes:      Extraocular Movements: Extraocular movements intact.      Pupils: Pupils are equal, round, and reactive to light.   Cardiovascular:      Rate and Rhythm: Normal rate and regular rhythm.      Pulses: Normal pulses.      Heart sounds: Normal heart sounds.   Pulmonary:      Effort: Pulmonary effort is normal.      Breath sounds: Normal breath sounds.   Abdominal:      General: Bowel sounds are normal.      Palpations: Abdomen is soft.   Genitourinary:     Comments: marga  Musculoskeletal:         General: Normal range of motion.      Cervical back: Normal range of motion. No rigidity.      Right lower leg: No edema.      Left lower leg: No edema.   Skin:     General: Skin is warm and dry.      Capillary Refill: Capillary refill takes less than 2 seconds.      Comments: trialysis line   Neurological:      General: No focal deficit present.      Mental Status: She is alert and oriented to person, place, and time. Mental status is at baseline.      Motor: No weakness.         Significant Labs: All pertinent labs within the past 24 hours have been reviewed.    Significant Imaging: I have reviewed all pertinent imaging results/findings within the past 24 hours.

## 2022-02-12 NOTE — ASSESSMENT & PLAN NOTE
With initial pH of 7.188  Trend ABG  Likely related to acute renal failure  -bicarbonate drip started on admission, now discontinued  -initiated on CRRT>HD  -bicarb tabs  -nephrology following  -improving

## 2022-02-12 NOTE — ASSESSMENT & PLAN NOTE
Significant RAQUEL with creatinine of 7.1 from normal just 3 months ago, with acidosis, uremia, hyperkalemia, hyperphosphatemia, and hypocalcemia  Renal ultrasound   -Vila catheter placed  Consulted Nephrology  Strict I&O's  -initiated on CRRT 2/4  -kidney function improving  -no longer requiring dialysis

## 2022-02-13 PROBLEM — D62 ACUTE BLOOD LOSS ANEMIA: Status: ACTIVE | Noted: 2022-02-13

## 2022-02-13 LAB
ABO + RH BLD: NORMAL
ANISOCYTOSIS BLD QL SMEAR: SLIGHT
BASOPHILS # BLD AUTO: ABNORMAL K/UL (ref 0–0.2)
BASOPHILS NFR BLD: 0 % (ref 0–1.9)
BLD GP AB SCN CELLS X3 SERPL QL: NORMAL
DIFFERENTIAL METHOD: ABNORMAL
EOSINOPHIL # BLD AUTO: ABNORMAL K/UL (ref 0–0.5)
EOSINOPHIL NFR BLD: 1 % (ref 0–8)
ERYTHROCYTE [DISTWIDTH] IN BLOOD BY AUTOMATED COUNT: 14.6 % (ref 11.5–14.5)
HCT VFR BLD AUTO: 22.8 % (ref 37–48.5)
HGB BLD-MCNC: 7.3 G/DL (ref 12–16)
HGB BLD-MCNC: 7.5 G/DL (ref 12–16)
HYPOCHROMIA BLD QL SMEAR: ABNORMAL
IMM GRANULOCYTES # BLD AUTO: ABNORMAL K/UL (ref 0–0.04)
IMM GRANULOCYTES NFR BLD AUTO: ABNORMAL % (ref 0–0.5)
LYMPHOCYTES # BLD AUTO: ABNORMAL K/UL (ref 1–4.8)
LYMPHOCYTES NFR BLD: 13 % (ref 18–48)
MCH RBC QN AUTO: 31.3 PG (ref 27–31)
MCHC RBC AUTO-ENTMCNC: 32.9 G/DL (ref 32–36)
MCV RBC AUTO: 95 FL (ref 82–98)
METAMYELOCYTES NFR BLD MANUAL: 3 %
MONOCYTES # BLD AUTO: ABNORMAL K/UL (ref 0.3–1)
MONOCYTES NFR BLD: 1 % (ref 4–15)
NEUTROPHILS NFR BLD: 80 % (ref 38–73)
NEUTS BAND NFR BLD MANUAL: 2 %
NRBC BLD-RTO: 0 /100 WBC
OB PNL STL: NEGATIVE
PLATELET # BLD AUTO: 284 K/UL (ref 150–450)
PLATELET BLD QL SMEAR: ABNORMAL
PMV BLD AUTO: 9.8 FL (ref 9.2–12.9)
POCT GLUCOSE: 116 MG/DL (ref 70–110)
POCT GLUCOSE: 125 MG/DL (ref 70–110)
POCT GLUCOSE: 125 MG/DL (ref 70–110)
POCT GLUCOSE: 128 MG/DL (ref 70–110)
POCT GLUCOSE: 137 MG/DL (ref 70–110)
POLYCHROMASIA BLD QL SMEAR: ABNORMAL
RBC # BLD AUTO: 2.4 M/UL (ref 4–5.4)
WBC # BLD AUTO: 25 K/UL (ref 3.9–12.7)

## 2022-02-13 PROCEDURE — 25000003 PHARM REV CODE 250: Performed by: STUDENT IN AN ORGANIZED HEALTH CARE EDUCATION/TRAINING PROGRAM

## 2022-02-13 PROCEDURE — 85027 COMPLETE CBC AUTOMATED: CPT | Performed by: INTERNAL MEDICINE

## 2022-02-13 PROCEDURE — 25000003 PHARM REV CODE 250: Performed by: INTERNAL MEDICINE

## 2022-02-13 PROCEDURE — 11000001 HC ACUTE MED/SURG PRIVATE ROOM

## 2022-02-13 PROCEDURE — C1751 CATH, INF, PER/CENT/MIDLINE: HCPCS

## 2022-02-13 PROCEDURE — 86901 BLOOD TYPING SEROLOGIC RH(D): CPT | Performed by: INTERNAL MEDICINE

## 2022-02-13 PROCEDURE — 99900035 HC TECH TIME PER 15 MIN (STAT)

## 2022-02-13 PROCEDURE — 27000923 HC TRIALYSIS CATHETER, ANY SIZE

## 2022-02-13 PROCEDURE — 63600175 PHARM REV CODE 636 W HCPCS: Performed by: INTERNAL MEDICINE

## 2022-02-13 PROCEDURE — 85007 BL SMEAR W/DIFF WBC COUNT: CPT | Performed by: INTERNAL MEDICINE

## 2022-02-13 PROCEDURE — 27000207 HC ISOLATION

## 2022-02-13 PROCEDURE — 87086 URINE CULTURE/COLONY COUNT: CPT | Performed by: INTERNAL MEDICINE

## 2022-02-13 PROCEDURE — 86920 COMPATIBILITY TEST SPIN: CPT | Performed by: NURSE PRACTITIONER

## 2022-02-13 PROCEDURE — 63600175 PHARM REV CODE 636 W HCPCS: Performed by: NURSE PRACTITIONER

## 2022-02-13 PROCEDURE — 36415 COLL VENOUS BLD VENIPUNCTURE: CPT | Performed by: INTERNAL MEDICINE

## 2022-02-13 PROCEDURE — 85018 HEMOGLOBIN: CPT | Performed by: INTERNAL MEDICINE

## 2022-02-13 PROCEDURE — 25000003 PHARM REV CODE 250: Performed by: NURSE PRACTITIONER

## 2022-02-13 PROCEDURE — A4216 STERILE WATER/SALINE, 10 ML: HCPCS | Performed by: INTERNAL MEDICINE

## 2022-02-13 PROCEDURE — 82272 OCCULT BLD FECES 1-3 TESTS: CPT | Performed by: INTERNAL MEDICINE

## 2022-02-13 RX ORDER — CYCLOBENZAPRINE HCL 10 MG
10 TABLET ORAL 3 TIMES DAILY PRN
Status: DISCONTINUED | OUTPATIENT
Start: 2022-02-13 | End: 2022-02-13

## 2022-02-13 RX ORDER — VENLAFAXINE HYDROCHLORIDE 75 MG/1
150 CAPSULE, EXTENDED RELEASE ORAL DAILY
Status: DISCONTINUED | OUTPATIENT
Start: 2022-02-13 | End: 2022-02-16 | Stop reason: HOSPADM

## 2022-02-13 RX ORDER — GABAPENTIN 300 MG/1
300 CAPSULE ORAL 3 TIMES DAILY
Status: DISCONTINUED | OUTPATIENT
Start: 2022-02-13 | End: 2022-02-16 | Stop reason: HOSPADM

## 2022-02-13 RX ORDER — PRAMIPEXOLE DIHYDROCHLORIDE 0.12 MG/1
0.12 TABLET ORAL NIGHTLY
Status: DISCONTINUED | OUTPATIENT
Start: 2022-02-13 | End: 2022-02-16 | Stop reason: HOSPADM

## 2022-02-13 RX ORDER — CYCLOBENZAPRINE HCL 10 MG
10 TABLET ORAL NIGHTLY PRN
Status: DISCONTINUED | OUTPATIENT
Start: 2022-02-13 | End: 2022-02-16 | Stop reason: HOSPADM

## 2022-02-13 RX ORDER — ATORVASTATIN CALCIUM 10 MG/1
10 TABLET, FILM COATED ORAL NIGHTLY
Status: DISCONTINUED | OUTPATIENT
Start: 2022-02-13 | End: 2022-02-16 | Stop reason: HOSPADM

## 2022-02-13 RX ADMIN — OXYCODONE HYDROCHLORIDE AND ACETAMINOPHEN 500 MG: 500 TABLET ORAL at 09:02

## 2022-02-13 RX ADMIN — HEPARIN SODIUM 7500 UNITS: 5000 INJECTION, SOLUTION INTRAVENOUS; SUBCUTANEOUS at 06:02

## 2022-02-13 RX ADMIN — OXYCODONE HYDROCHLORIDE AND ACETAMINOPHEN 500 MG: 500 TABLET ORAL at 08:02

## 2022-02-13 RX ADMIN — SODIUM CHLORIDE, PRESERVATIVE FREE 10 ML: 5 INJECTION INTRAVENOUS at 05:02

## 2022-02-13 RX ADMIN — SODIUM CHLORIDE, PRESERVATIVE FREE 10 ML: 5 INJECTION INTRAVENOUS at 01:02

## 2022-02-13 RX ADMIN — SODIUM CHLORIDE, PRESERVATIVE FREE 10 ML: 5 INJECTION INTRAVENOUS at 06:02

## 2022-02-13 RX ADMIN — VENLAFAXINE HYDROCHLORIDE 150 MG: 75 CAPSULE, EXTENDED RELEASE ORAL at 02:02

## 2022-02-13 RX ADMIN — GABAPENTIN 300 MG: 300 CAPSULE ORAL at 02:02

## 2022-02-13 RX ADMIN — ATORVASTATIN CALCIUM 10 MG: 10 TABLET, FILM COATED ORAL at 10:02

## 2022-02-13 RX ADMIN — SODIUM BICARBONATE 650 MG TABLET 650 MG: at 09:02

## 2022-02-13 RX ADMIN — MELATONIN TAB 3 MG 9 MG: 3 TAB at 08:02

## 2022-02-13 RX ADMIN — PANTOPRAZOLE SODIUM 40 MG: 40 TABLET, DELAYED RELEASE ORAL at 09:02

## 2022-02-13 RX ADMIN — PRAMIPEXOLE DIHYDROCHLORIDE 0.12 MG: 0.12 TABLET ORAL at 10:02

## 2022-02-13 RX ADMIN — INSULIN DETEMIR 12 UNITS: 100 INJECTION, SOLUTION SUBCUTANEOUS at 09:02

## 2022-02-13 RX ADMIN — AMLODIPINE BESYLATE 10 MG: 5 TABLET ORAL at 09:02

## 2022-02-13 RX ADMIN — SODIUM CHLORIDE, PRESERVATIVE FREE 10 ML: 5 INJECTION INTRAVENOUS at 04:02

## 2022-02-13 RX ADMIN — GABAPENTIN 300 MG: 300 CAPSULE ORAL at 08:02

## 2022-02-13 RX ADMIN — CEFTRIAXONE 1 G: 1 INJECTION, SOLUTION INTRAVENOUS at 01:02

## 2022-02-13 NOTE — ASSESSMENT & PLAN NOTE
-likely distributive due to acidosis and renal failure versus septic shock  -currently requiring Levophed with addition of vasopressin and stress dose steroids   -nephrology consulted for CRRT  -continue IV antibiotics for possible infectious source, thought less likely  -possibly worsening due to too much volume removal  -COVID therapy as above  -AM cortisol low  -started on hydrocortisone/fludrocortisone  -has been weaned off pressors  -hydrocortisone/fludrocortisone discontinued  -resolved

## 2022-02-13 NOTE — ASSESSMENT & PLAN NOTE
Ashvinley related to UTI vs COVID19  CXR was with no pneumonia  Urine with UTI  Blood cultures no growth to date  Lactate 1.1  Trend CBC  Cultures remain negative  IV vanco discontinued. Rocephin completed  WBC trending up. No clear source of infection identified. Possibly due to steroids. Check UA, blood cx, CXR  Suspected UTI. Start ceftriaxone. Check urine culture

## 2022-02-13 NOTE — ASSESSMENT & PLAN NOTE
Urinalysis relatively unremarkable, may be asymptomatic bacteriuria but in the setting of shock will continue antibiotics  Blood cultures ngtd  IV Rocephin completed  Repeat UA 2/12 concerning for UTI  Resume ceftriaxone. Check urine cx

## 2022-02-13 NOTE — ASSESSMENT & PLAN NOTE
With initial pH of 7.188  Trend ABG  Likely related to acute renal failure  -bicarbonate drip started on admission, now discontinued  -initiated on CRRT>HD  -bicarb tabs  -nephrology following  -resolved. Discontinue bicarb tabs.

## 2022-02-13 NOTE — ASSESSMENT & PLAN NOTE
Significant RAQUEL with creatinine of 7.1 from normal just 3 months ago, with acidosis, uremia, hyperkalemia, hyperphosphatemia, and hypocalcemia  Renal ultrasound   -Vila catheter placed  Consulted Nephrology  Strict I&O's  -initiated on CRRT 2/4  -kidney function improving  -no longer requiring dialysis       [de-identified] : Mrs. Isaac is a 70 year old female who is referred by Dr.Sanford Garcia for initial consultation for evaluation of coagulopathy. The patient was scheduled for thyroid lobectomy with possible total thyroidectomy and was noted to have a large bruise on her right arm after a blood draw several days ago. She reports that she had hemorrhage with dental extractions and was told to take vitamin K.  She reports that her mother and sister had similar experiences. She also had prominent bruising after a thyroid biopsy which continued for several days. Recently after being on a course of steroids for asthma she had a vitreal hemorrhage. She reports that after an umbilical hernia surgery in 2010 she had to go undergo evacuation of a large blood clot just inferior to the surgical site. C-sections thirty years ago did not result in excessive bleeding and she did not experience menorrhagia when premenopausal. On further evaluation she was found to have declining platelet counts;  146,000 in 2014, 166,000 in 2016 150,000 earlier in 2018 and 122,000 recently. She presents for evaluation of coagulopathy.  [de-identified] : She presents today for follow up.  She was found  to have acquired  Von Willebrand Disease after bruising before surgery.  She feels well.   Her partial thyroidectomy is held until she is hematologically cleared.

## 2022-02-13 NOTE — PLAN OF CARE
Plan of care discussed with patient verbalized understanding. Generalized weakness instructed to call for assist  pt is covid +  Isolation maintained  heparin subq generlized bruising noted to abdomen denies pain cardiac monitoring NSR

## 2022-02-13 NOTE — CARE UPDATE
Pt states she has home CPAP in her room that was examined by [employer] and approved by MD. ANEUDY Garcia made aware.

## 2022-02-13 NOTE — ASSESSMENT & PLAN NOTE
Elevated CO2 on ABG, currently on BIPAP  Reassess with ABG with worsened CO2, settings adjusted and adjusting face mask for better fit  Hypoxia likely related to COVID19, was 93% on room air on arrival  -suspect that patient's encephalopathy secondary to elevated CO2 vs uremia  -Pulm following  -Required intubation. Successfully extubated 2/8 to bipap  -now on vapotherm during day with bipap qhs  -weaned to room air. Cont bipap qhs due to ge/ohs

## 2022-02-13 NOTE — SUBJECTIVE & OBJECTIVE
Interval History:  Persistently elevated WBC, appears to have UTI. Also with down trending H/H with + hemoccult. Pt has no complaints, states she is feeling better.    Review of Systems   Constitutional: Positive for activity change and fatigue. Negative for appetite change, chills, diaphoresis and fever.   HENT: Negative for congestion, postnasal drip, rhinorrhea, sinus pressure, sinus pain and sneezing.    Respiratory: Negative for cough, chest tightness, shortness of breath (resolved), wheezing and stridor.    Cardiovascular: Negative for chest pain, palpitations and leg swelling.   Gastrointestinal: Negative for abdominal distention, abdominal pain, blood in stool, constipation, diarrhea and nausea.   Endocrine: Negative for cold intolerance and heat intolerance.   Genitourinary: Negative for decreased urine volume, dysuria, flank pain and hematuria.   Musculoskeletal: Negative for gait problem.   Neurological: Positive for weakness. Negative for dizziness.   Psychiatric/Behavioral: Negative for agitation, behavioral problems and confusion.     Objective:     Vital Signs (Most Recent):  Temp: 98.6 °F (37 °C) (02/13/22 0852)  Pulse: 100 (02/13/22 0852)  Resp: 18 (02/13/22 0852)  BP: (!) 143/60 (02/13/22 0852)  SpO2: 96 % (02/13/22 0414) Vital Signs (24h Range):  Temp:  [97.5 °F (36.4 °C)-99.5 °F (37.5 °C)] 98.6 °F (37 °C)  Pulse:  [] 100  Resp:  [18-58] 18  SpO2:  [86 %-100 %] 96 %  BP: (126-163)/(59-96) 143/60     Weight: 101.5 kg (223 lb 12.3 oz)  Body mass index is 42.28 kg/m².    Intake/Output Summary (Last 24 hours) at 2/13/2022 1153  Last data filed at 2/12/2022 1900  Gross per 24 hour   Intake 240 ml   Output 285 ml   Net -45 ml      Physical Exam  Vitals and nursing note reviewed.   Constitutional:       General: She is not in acute distress.     Appearance: She is morbidly obese.      Interventions: She is sedated and restrained.   HENT:      Head: Normocephalic and atraumatic.      Right Ear:  External ear normal.      Left Ear: External ear normal.      Nose: Nose normal. No congestion.      Mouth/Throat:      Mouth: Mucous membranes are dry.      Pharynx: Oropharynx is clear.   Eyes:      Extraocular Movements: Extraocular movements intact.      Pupils: Pupils are equal, round, and reactive to light.   Cardiovascular:      Rate and Rhythm: Normal rate and regular rhythm.      Pulses: Normal pulses.      Heart sounds: Normal heart sounds.   Pulmonary:      Effort: Pulmonary effort is normal.      Breath sounds: Normal breath sounds.   Abdominal:      General: Bowel sounds are normal.      Palpations: Abdomen is soft.   Musculoskeletal:         General: Normal range of motion.      Cervical back: Normal range of motion. No rigidity.      Right lower leg: No edema.      Left lower leg: No edema.   Skin:     General: Skin is warm and dry.      Capillary Refill: Capillary refill takes less than 2 seconds.   Neurological:      General: No focal deficit present.      Mental Status: She is alert and oriented to person, place, and time. Mental status is at baseline.      Motor: No weakness.         Significant Labs: All pertinent labs within the past 24 hours have been reviewed.    Significant Imaging: I have reviewed all pertinent imaging results/findings within the past 24 hours.

## 2022-02-13 NOTE — ASSESSMENT & PLAN NOTE
Suspected GI bleed  Hemoccult positive w/ down trending H/H  Trend H/H  Transfuse to keep Hb>7  GI consult

## 2022-02-13 NOTE — PROGRESS NOTES
[facility] Medicine  Progress Note    Patient Name: Hnanah Norman  MRN: [id number]  Patient Class: IP- Inpatient   Admission Date: 2/3/2022  Length of Stay: 10 days  Attending Physician: Bernabe King MD  Primary Care Provider: Raffi Garcia MD        Subjective:     Principal Problem:Acute on chronic respiratory failure with hypercapnia        HPI:  Ms. Norman is a 71 year old female with a medical history that includes anxiety disorder, bell's palsy, chronic back pain, chronic osteoarthritis, essential tremor, fibromyalgia, hypertension, GERD, hyperlipidemia, sleep apnea, and mood disorder. She presented to the ED via EMS with spouse who reports patient to be increasingly fatigued and weak. He reports over the past 2 weeks she was diagnosed with COVID19 and was ending their quarantine phase. She has been progressively weaker and unable to ambulate as usual with her walker. Her symptoms worsened over the past 2 days where she was not communicating at all. She is with no cough, vomiting, diarrhea or fevers. Her spouse contributed to her history during this visit. On arrival to the ED she was slightly hypoxic at 93% and hypertensive. Significant labs were with the following: wbc 18.97, Na 131, K 5.6, BUN 69, Cr 7.1, COVID19 +, urine with 3+ leukocytes and many bacteria, pH 7.1 and CO2 65.4. CT head and CXR with no acute findings. She was given neb treatment, Rocephin, Lokelma, reg insulin and NS bolus. She will admit to [facility] hospital medicine service for continued monitoring.           Overview/Hospital Course:  No notes on file    Interval History:  Persistently elevated WBC, appears to have UTI. Also with down trending H/H with + hemoccult. Pt has no complaints, states she is feeling better.    Review of Systems   Constitutional: Positive for activity change and fatigue. Negative for appetite change, chills, diaphoresis and fever.   HENT: Negative for congestion, postnasal drip,  rhinorrhea, sinus pressure, sinus pain and sneezing.    Respiratory: Negative for cough, chest tightness, shortness of breath (resolved), wheezing and stridor.    Cardiovascular: Negative for chest pain, palpitations and leg swelling.   Gastrointestinal: Negative for abdominal distention, abdominal pain, blood in stool, constipation, diarrhea and nausea.   Endocrine: Negative for cold intolerance and heat intolerance.   Genitourinary: Negative for decreased urine volume, dysuria, flank pain and hematuria.   Musculoskeletal: Negative for gait problem.   Neurological: Positive for weakness. Negative for dizziness.   Psychiatric/Behavioral: Negative for agitation, behavioral problems and confusion.     Objective:     Vital Signs (Most Recent):  Temp: 98.6 °F (37 °C) (02/13/22 0852)  Pulse: 100 (02/13/22 0852)  Resp: 18 (02/13/22 0852)  BP: (!) 143/60 (02/13/22 0852)  SpO2: 96 % (02/13/22 0414) Vital Signs (24h Range):  Temp:  [97.5 °F (36.4 °C)-99.5 °F (37.5 °C)] 98.6 °F (37 °C)  Pulse:  [] 100  Resp:  [18-58] 18  SpO2:  [86 %-100 %] 96 %  BP: (126-163)/(59-96) 143/60     Weight: 101.5 kg (223 lb 12.3 oz)  Body mass index is 42.28 kg/m².    Intake/Output Summary (Last 24 hours) at 2/13/2022 1153  Last data filed at 2/12/2022 1900  Gross per 24 hour   Intake 240 ml   Output 285 ml   Net -45 ml      Physical Exam  Vitals and nursing note reviewed.   Constitutional:       General: She is not in acute distress.     Appearance: She is morbidly obese.      Interventions: She is sedated and restrained.   HENT:      Head: Normocephalic and atraumatic.      Right Ear: External ear normal.      Left Ear: External ear normal.      Nose: Nose normal. No congestion.      Mouth/Throat:      Mouth: Mucous membranes are dry.      Pharynx: Oropharynx is clear.   Eyes:      Extraocular Movements: Extraocular movements intact.      Pupils: Pupils are equal, round, and reactive to light.   Cardiovascular:      Rate and Rhythm:  Normal rate and regular rhythm.      Pulses: Normal pulses.      Heart sounds: Normal heart sounds.   Pulmonary:      Effort: Pulmonary effort is normal.      Breath sounds: Normal breath sounds.   Abdominal:      General: Bowel sounds are normal.      Palpations: Abdomen is soft.   Musculoskeletal:         General: Normal range of motion.      Cervical back: Normal range of motion. No rigidity.      Right lower leg: No edema.      Left lower leg: No edema.   Skin:     General: Skin is warm and dry.      Capillary Refill: Capillary refill takes less than 2 seconds.   Neurological:      General: No focal deficit present.      Mental Status: She is alert and oriented to person, place, and time. Mental status is at baseline.      Motor: No weakness.         Significant Labs: All pertinent labs within the past 24 hours have been reviewed.    Significant Imaging: I have reviewed all pertinent imaging results/findings within the past 24 hours.      Assessment/Plan:      * Acute on chronic respiratory failure with hypercapnia  Elevated CO2 on ABG, currently on BIPAP  Reassess with ABG with worsened CO2, settings adjusted and adjusting face mask for better fit  Hypoxia likely related to COVID19, was 93% on room air on arrival  -suspect that patient's encephalopathy secondary to elevated CO2 vs uremia  -Pulm following  -Required intubation. Successfully extubated 2/8 to bipap  -now on vapotherm during day with bipap qhs  -weaned to room air. Cont bipap qhs due to ge/ohs    Acute blood loss anemia  Suspected GI bleed  Hemoccult positive w/ down trending H/H  Trend H/H  Transfuse to keep Hb>7  GI consult      Obesity hypoventilation syndrome  BIPAP QHS      Encephalopathy, metabolic        Acute hypercapnic respiratory failure        Atrial fibrillation with RVR  - went into afib w/ RVR overnight following episode of bradycardia and vasovagal  - given fluid and will maintain net even today on CRRT  - initiated on amiodarone  gtt with improvement in rate control  - amio discontinued    Shock        Hypervolemia        Metabolic acidosis        RAQUEL (acute kidney injury)        Transaminitis  - in setting of shock  - monitor for now; can consider escalation of w/u if persists      Hyperglycemia  Hemoglobin A1C   Date Value Ref Range Status   11/10/2021 6.2 (H) 4.0 - 5.6 % Final     Comment:     ADA Screening Guidelines:  5.7-6.4%  Consistent with prediabetes  >or=6.5%  Consistent with diabetes    High levels of fetal hemoglobin interfere with the HbA1C  assay. Heterozygous hemoglobin variants (HbS, HgC, etc)do  not significantly interfere with this assay.   However, presence of multiple variants may affect accuracy.     04/26/2021 5.9 (H) 4.0 - 5.6 % Final     Comment:     ADA Screening Guidelines:  5.7-6.4%  Consistent with prediabetes  >or=6.5%  Consistent with diabetes    High levels of fetal hemoglobin interfere with the HbA1C  assay. Heterozygous hemoglobin variants (HbS, HgC, etc)do  not significantly interfere with this assay.   However, presence of multiple variants may affect accuracy.     11/10/2020 6.0 (H) 4.0 - 5.6 % Final     Comment:     ADA Screening Guidelines:  5.7-6.4%  Consistent with prediabetes  >or=6.5%  Consistent with diabetes  High levels of fetal hemoglobin interfere with the HbA1C  assay. Heterozygous hemoglobin variants (HbS, HgC, etc)do  not significantly interfere with this assay.   However, presence of multiple variants may affect accuracy.           - LDSSI with accuchecks qachs        Shock, unspecified  -likely distributive due to acidosis and renal failure versus septic shock  -currently requiring Levophed with addition of vasopressin and stress dose steroids   -nephrology consulted for CRRT  -continue IV antibiotics for possible infectious source, thought less likely  -possibly worsening due to too much volume removal  -COVID therapy as above  -AM cortisol low  -started on  hydrocortisone/fludrocortisone  -has been weaned off pressors  -hydrocortisone/fludrocortisone discontinued  -resolved    Hyperphosphatemia  -in the setting of acute renal failure  -on CRRT  -nephrology consulted      Hypocalcemia  -in the setting of acute renal failure  -dialysis; will replace  -nephrology consulted      Acute metabolic encephalopathy  Reported by spouse to be with increased drowsiness and altered mentation. She has been sleeping a lot more than usual and is not alert. Found to be with renal failure, COVID19, and metabolic acidosis    CT head with no acute findings, ammonia level wnl  Will hold all sedating home meds for now  Currently on BIPAP for hypercapnia, which is likely contributing  -renal failure with high anion gap metabolic acidosis and uremia; initiated on dialysis  -pulmonology and nephrology consulted  -resolved    COVID-19  COVID positive; initiated on protocol  CXR with no infiltrate, requiring O2  -doubt that current presentation is due to COVID; will hold remdesivir and dexamethasone  Heparin ppx  inhalers prn  MVI, ascorbic acid, incentive spirometry  statin  supplemental O2, will wean as tolerated  prn tylenol, loperamide  contact/airborne    Acute renal failure with tubular necrosis  Significant RAQUEL with creatinine of 7.1 from normal just 3 months ago, with acidosis, uremia, hyperkalemia, hyperphosphatemia, and hypocalcemia  Renal ultrasound   -Vila catheter placed  Consulted Nephrology  Strict I&O's  -initiated on CRRT 2/4  -kidney function improving  -no longer requiring dialysis        High anion gap metabolic acidosis  With initial pH of 7.188  Trend ABG  Likely related to acute renal failure  -bicarbonate drip started on admission, now discontinued  -initiated on CRRT>HD  -bicarb tabs  -nephrology following  -resolved. Discontinue bicarb tabs.    UTI (urinary tract infection)  Urinalysis relatively unremarkable, may be asymptomatic bacteriuria but in the setting of shock  will continue antibiotics  Blood cultures ngtd  IV Rocephin completed  Repeat UA 2/12 concerning for UTI  Resume ceftriaxone. Check urine cx      Leukocytosis  Likley related to UTI vs COVID19  CXR was with no pneumonia  Urine with UTI  Blood cultures no growth to date  Lactate 1.1  Trend CBC  Cultures remain negative  IV vanco discontinued. Rocephin completed  WBC trending up. No clear source of infection identified. Possibly due to steroids. Check UA, blood cx, CXR  Suspected UTI. Start ceftriaxone. Check urine culture    Unspecified mood (affective) disorder  Resume home meds      Morbid obesity with BMI of 45.0-49.9, adult  Body mass index is 42.28 kg/m². Morbid obesity complicates all aspects of disease management from diagnostic modalities to treatment.    MARY (obstructive sleep apnea)  Was ordered CPAP qhs per PCP but does not use it  -suspect she has OHS  On CPAP 4 cm H2O at home. Increased to 10 cm H2O on home CPAP device. Will need outpatient sleep study (last one 10 years ago). Check ABG day before discharge - if pCO2 >52, then meets criteria for trilogy. In that situation, patient should go home with trilogy device (settings would be 16/8 21% until can get a sleep study).    Disorder of lumbar spine  Osteoarthritis    Hold home pain medications for now      Osteoarthritis  See above      Restless leg syndrome  Resume Neurontin and Mirapex      Mixed hyperlipidemia  Patient is chronically on statin.will continue for now once able to take p.o.. Monitor clinically. Last LDL was   Lab Results   Component Value Date    LDLCALC 71.0 11/10/2021            Anxiety disorder  No episodes of anxiety  Monitor    Fibromyalgia  Resume Effexor and Neurontin      Chronic back pain  Resume PRN      Essential hypertension  - hold home antihypertensives due to shock  - start amlodipine      Mild acid reflux  - protonix ppx        VTE Risk Mitigation (From admission, onward)         Ordered     heparin (porcine) injection  2,800 Units  As needed (PRN)         02/04/22 1911     Place KATERIN hose  Until discontinued         02/03/22 2251     IP VTE HIGH RISK PATIENT  Once         02/03/22 2251     Place sequential compression device  Until discontinued         02/03/22 2251                Discharge Planning   ARNULFO:      Code Status: DNR   Is the patient medically ready for discharge?:     Reason for patient still in hospital (select all that apply): Patient new problem, Patient trending condition, Laboratory test and Treatment  Discharge Plan A: Home Health                  Bernabe King MD  Department of Hospital Medicine   [place] - [facility]

## 2022-02-14 ENCOUNTER — TELEPHONE (OUTPATIENT)
Dept: FAMILY MEDICINE | Facility: CLINIC | Age: 72
End: 2022-02-14
Payer: MEDICARE

## 2022-02-14 LAB
ALLENS TEST: ABNORMAL
BACTERIA BLD CULT: NORMAL
BACTERIA UR CULT: NORMAL
BACTERIA UR CULT: NORMAL
BASOPHILS # BLD AUTO: 0.09 K/UL (ref 0–0.2)
BASOPHILS NFR BLD: 0.5 % (ref 0–1.9)
DELSYS: ABNORMAL
DIFFERENTIAL METHOD: ABNORMAL
EOSINOPHIL # BLD AUTO: 0.6 K/UL (ref 0–0.5)
EOSINOPHIL NFR BLD: 3.1 % (ref 0–8)
ERYTHROCYTE [DISTWIDTH] IN BLOOD BY AUTOMATED COUNT: 14.6 % (ref 11.5–14.5)
HCO3 UR-SCNC: 29.1 MMOL/L (ref 24–28)
HCT VFR BLD AUTO: 21.6 % (ref 37–48.5)
HGB BLD-MCNC: 6.9 G/DL (ref 12–16)
HGB BLD-MCNC: 7.2 G/DL (ref 12–16)
HGB BLD-MCNC: 7.3 G/DL (ref 12–16)
IMM GRANULOCYTES # BLD AUTO: 1.31 K/UL (ref 0–0.04)
IMM GRANULOCYTES NFR BLD AUTO: 6.9 % (ref 0–0.5)
LYMPHOCYTES # BLD AUTO: 4.8 K/UL (ref 1–4.8)
LYMPHOCYTES NFR BLD: 25.6 % (ref 18–48)
MCH RBC QN AUTO: 31.9 PG (ref 27–31)
MCHC RBC AUTO-ENTMCNC: 33.3 G/DL (ref 32–36)
MCV RBC AUTO: 96 FL (ref 82–98)
MONOCYTES # BLD AUTO: 1.6 K/UL (ref 0.3–1)
MONOCYTES NFR BLD: 8.4 % (ref 4–15)
NEUTROPHILS # BLD AUTO: 10.5 K/UL (ref 1.8–7.7)
NEUTROPHILS NFR BLD: 55.5 % (ref 38–73)
NRBC BLD-RTO: 0 /100 WBC
PCO2 BLDA: 36 MMHG (ref 35–45)
PH SMN: 7.52 [PH] (ref 7.35–7.45)
PLATELET # BLD AUTO: 309 K/UL (ref 150–450)
PMV BLD AUTO: 9.5 FL (ref 9.2–12.9)
PO2 BLDA: 99 MMHG (ref 80–100)
POC BE: 6 MMOL/L
POC SATURATED O2: 98 % (ref 95–100)
POC TCO2: 30 MMOL/L (ref 23–27)
POCT GLUCOSE: 126 MG/DL (ref 70–110)
POCT GLUCOSE: 163 MG/DL (ref 70–110)
POCT GLUCOSE: 181 MG/DL (ref 70–110)
POCT GLUCOSE: 204 MG/DL (ref 70–110)
RBC # BLD AUTO: 2.26 M/UL (ref 4–5.4)
SAMPLE: ABNORMAL
SITE: ABNORMAL
WBC # BLD AUTO: 18.9 K/UL (ref 3.9–12.7)

## 2022-02-14 PROCEDURE — 99232 SBSQ HOSP IP/OBS MODERATE 35: CPT | Mod: NSCH,,, | Performed by: INTERNAL MEDICINE

## 2022-02-14 PROCEDURE — 97530 THERAPEUTIC ACTIVITIES: CPT

## 2022-02-14 PROCEDURE — 85007 BL SMEAR W/DIFF WBC COUNT: CPT | Performed by: INTERNAL MEDICINE

## 2022-02-14 PROCEDURE — 85018 HEMOGLOBIN: CPT | Mod: 91 | Performed by: INTERNAL MEDICINE

## 2022-02-14 PROCEDURE — 25000003 PHARM REV CODE 250: Performed by: STUDENT IN AN ORGANIZED HEALTH CARE EDUCATION/TRAINING PROGRAM

## 2022-02-14 PROCEDURE — C9113 INJ PANTOPRAZOLE SODIUM, VIA: HCPCS | Performed by: INTERNAL MEDICINE

## 2022-02-14 PROCEDURE — 27000207 HC ISOLATION

## 2022-02-14 PROCEDURE — 99232 PR SUBSEQUENT HOSPITAL CARE,LEVL II: ICD-10-PCS | Mod: NSCH,,, | Performed by: INTERNAL MEDICINE

## 2022-02-14 PROCEDURE — 85027 COMPLETE CBC AUTOMATED: CPT | Performed by: INTERNAL MEDICINE

## 2022-02-14 PROCEDURE — C9399 UNCLASSIFIED DRUGS OR BIOLOG: HCPCS | Performed by: STUDENT IN AN ORGANIZED HEALTH CARE EDUCATION/TRAINING PROGRAM

## 2022-02-14 PROCEDURE — A4216 STERILE WATER/SALINE, 10 ML: HCPCS | Performed by: INTERNAL MEDICINE

## 2022-02-14 PROCEDURE — 25000003 PHARM REV CODE 250: Performed by: NURSE PRACTITIONER

## 2022-02-14 PROCEDURE — 85018 HEMOGLOBIN: CPT | Performed by: INTERNAL MEDICINE

## 2022-02-14 PROCEDURE — 82803 BLOOD GASES ANY COMBINATION: CPT

## 2022-02-14 PROCEDURE — 25000003 PHARM REV CODE 250: Performed by: INTERNAL MEDICINE

## 2022-02-14 PROCEDURE — 97535 SELF CARE MNGMENT TRAINING: CPT

## 2022-02-14 PROCEDURE — 63600175 PHARM REV CODE 636 W HCPCS: Performed by: INTERNAL MEDICINE

## 2022-02-14 PROCEDURE — C1751 CATH, INF, PER/CENT/MIDLINE: HCPCS

## 2022-02-14 PROCEDURE — 11000001 HC ACUTE MED/SURG PRIVATE ROOM

## 2022-02-14 PROCEDURE — 97530 THERAPEUTIC ACTIVITIES: CPT | Mod: CQ

## 2022-02-14 PROCEDURE — 36600 WITHDRAWAL OF ARTERIAL BLOOD: CPT

## 2022-02-14 PROCEDURE — 99900035 HC TECH TIME PER 15 MIN (STAT)

## 2022-02-14 PROCEDURE — 27000923 HC TRIALYSIS CATHETER, ANY SIZE

## 2022-02-14 RX ORDER — PANTOPRAZOLE SODIUM 40 MG/10ML
40 INJECTION, POWDER, LYOPHILIZED, FOR SOLUTION INTRAVENOUS 2 TIMES DAILY
Status: DISCONTINUED | OUTPATIENT
Start: 2022-02-14 | End: 2022-02-16 | Stop reason: HOSPADM

## 2022-02-14 RX ADMIN — ACETAMINOPHEN 650 MG: 325 TABLET ORAL at 03:02

## 2022-02-14 RX ADMIN — VENLAFAXINE HYDROCHLORIDE 150 MG: 75 CAPSULE, EXTENDED RELEASE ORAL at 10:02

## 2022-02-14 RX ADMIN — SODIUM CHLORIDE, PRESERVATIVE FREE 10 ML: 5 INJECTION INTRAVENOUS at 05:02

## 2022-02-14 RX ADMIN — GABAPENTIN 300 MG: 300 CAPSULE ORAL at 08:02

## 2022-02-14 RX ADMIN — GABAPENTIN 300 MG: 300 CAPSULE ORAL at 10:02

## 2022-02-14 RX ADMIN — PRAMIPEXOLE DIHYDROCHLORIDE 0.12 MG: 0.12 TABLET ORAL at 08:02

## 2022-02-14 RX ADMIN — MELATONIN TAB 3 MG 9 MG: 3 TAB at 08:02

## 2022-02-14 RX ADMIN — INSULIN DETEMIR 12 UNITS: 100 INJECTION, SOLUTION SUBCUTANEOUS at 10:02

## 2022-02-14 RX ADMIN — OXYCODONE HYDROCHLORIDE AND ACETAMINOPHEN 500 MG: 500 TABLET ORAL at 10:02

## 2022-02-14 RX ADMIN — OXYCODONE HYDROCHLORIDE AND ACETAMINOPHEN 500 MG: 500 TABLET ORAL at 08:02

## 2022-02-14 RX ADMIN — ATORVASTATIN CALCIUM 10 MG: 10 TABLET, FILM COATED ORAL at 08:02

## 2022-02-14 RX ADMIN — PANTOPRAZOLE SODIUM 40 MG: 40 INJECTION, POWDER, LYOPHILIZED, FOR SOLUTION INTRAVENOUS at 05:02

## 2022-02-14 RX ADMIN — SODIUM CHLORIDE, PRESERVATIVE FREE 10 ML: 5 INJECTION INTRAVENOUS at 01:02

## 2022-02-14 RX ADMIN — CEFTRIAXONE 1 G: 1 INJECTION, SOLUTION INTRAVENOUS at 01:02

## 2022-02-14 RX ADMIN — INSULIN ASPART 2 UNITS: 100 INJECTION, SOLUTION INTRAVENOUS; SUBCUTANEOUS at 05:02

## 2022-02-14 RX ADMIN — GABAPENTIN 300 MG: 300 CAPSULE ORAL at 02:02

## 2022-02-14 RX ADMIN — AMLODIPINE BESYLATE 10 MG: 5 TABLET ORAL at 10:02

## 2022-02-14 NOTE — SUBJECTIVE & OBJECTIVE
Subjective:     Interval History:  No acute events overnight.  Patient denies further bleeding.  Hemoglobin stable.    Review of Systems   Constitutional: Positive for fatigue.   Gastrointestinal: Negative for abdominal distention, abdominal pain, blood in stool, constipation and diarrhea.   Neurological: Positive for weakness, light-headedness and headaches.     Objective:     Vital Signs (Most Recent):  Temp: 97.8 °F (36.6 °C) (02/14/22 1545)  Pulse: 93 (02/14/22 1545)  Resp: 18 (02/14/22 1545)  BP: 129/79 (02/14/22 1545)  SpO2: 99 % (02/14/22 1545) Vital Signs (24h Range):  Temp:  [97.3 °F (36.3 °C)-98.2 °F (36.8 °C)] 97.8 °F (36.6 °C)  Pulse:  [] 93  Resp:  [18-20] 18  SpO2:  [90 %-99 %] 99 %  BP: (125-175)/(61-85) 129/79     Weight: 101.5 kg (223 lb 12.3 oz) (02/10/22 1400)  Body mass index is 42.28 kg/m².      Intake/Output Summary (Last 24 hours) at 2/14/2022 1609  Last data filed at 2/14/2022 0600  Gross per 24 hour   Intake 240 ml   Output 450 ml   Net -210 ml       Lines/Drains/Airways     Peripherally Inserted Central Catheter Line            PICC Triple Lumen 02/08/22 1350 right basilic 6 days          Central Venous Catheter Line            Trialysis (Dialysis) Catheter 02/04/22 1000 left internal jugular 10 days          Drain            Female External Urinary Catheter 02/12/22 1430 2 days                Physical Exam  Vitals and nursing note reviewed.   Constitutional:       Appearance: She is well-developed and well-nourished.   HENT:      Head: Normocephalic and atraumatic.   Eyes:      Extraocular Movements: EOM normal.      Conjunctiva/sclera: Conjunctivae normal.   Pulmonary:      Effort: Pulmonary effort is normal. No respiratory distress.   Musculoskeletal:      Cervical back: Normal range of motion.   Neurological:      Mental Status: She is alert and oriented to person, place, and time.   Psychiatric:         Mood and Affect: Mood and affect normal.         Behavior: Behavior normal.          Thought Content: Thought content normal.         Judgment: Judgment normal.         Significant Labs:  Recent Lab Results       02/14/22  1542   02/14/22  1500   02/14/22  1146   02/14/22  0836   02/14/22  0718        Allens Test   Pass             Baso #               Basophil %               Site   LR             DelSys   Room Air             Differential Method               Eos #               Eosinophil %               Gran # (ANC)               Gran %               Hematocrit               Hemoglobin       7.3         Immature Grans (Abs)               Immature Granulocytes               Lymph #               Lymph %               MCH               MCHC               MCV               Mono #               Mono %               MPV               nRBC               Platelets               POC BE   6             POC HCO3   29.1             POC PCO2   36.0             POC PH   7.516             POC PO2   99             POC SATURATED O2   98             POC TCO2   30             POCT Glucose 204     181     163       RBC               RDW               Sample   ARTERIAL             WBC                                02/14/22  0431   02/14/22  0140   02/13/22  2148   02/13/22  1641        Allens Test             Baso # 0.09             Basophil % 0.5             Site             DelSys             Differential Method Automated             Eos # 0.6             Eosinophil % 3.1             Gran # (ANC) 10.5             Gran % 55.5             Hematocrit 21.6             Hemoglobin 7.2   6.9           Immature Grans (Abs) 1.31  Comment: Mild elevation in immature granulocytes is non specific and   can be seen in a variety of conditions including stress response,   acute inflammation, trauma and pregnancy. Correlation with other   laboratory and clinical findings is essential.               Immature Granulocytes 6.9             Lymph # 4.8             Lymph % 25.6             MCH 31.9             MCHC 33.3              MCV 96             Mono # 1.6             Mono % 8.4             MPV 9.5             nRBC 0             Platelets 309             POC BE             POC HCO3             POC PCO2             POC PH             POC PO2             POC SATURATED O2             POC TCO2             POCT Glucose     137   125       RBC 2.26             RDW 14.6             Sample             WBC 18.90                     Significant Imaging:  Imaging results within the past 24 hours have been reviewed.

## 2022-02-14 NOTE — PLAN OF CARE
Pt progressing towards goals, limited this date by drop in BP upon standing. BP taken in supine at 130/68, , & O2 99% on RA. Pt required Alek for sup>sit with HHA. Pt with no c/o dizziness/lightheaded upon transition to EOB. Pt performed sit>stand with Alek x2 and use of RW. Pt stood ~2 min with good- standing balance to change soiled diaper. Pt reporting she feels weak & returned to sitting. Upon sitting pt reporting increased dizziness then not responding to v/cs. Pt immediately returned to supine, unable to obtain BP & nsg notified. Pt becoming verbally responsive after ~30 seconds in supine & oriented when answering questions. Nsg taking manual BP at 86/62. Pt scooted to HOB & bed placed in trendelenburg. /68 after ~2 min. Bed placed in chair position at end of session & pt educated to sit up in bed for remainder of day as she is able to tolerate.     Problem: Occupational Therapy Goal  Goal: Occupational Therapy Goal  Description: Goals to be met by: 3/12/2022     Patient will increase functional independence with ADLs by performing:    UE Dressing with Modified [place].  LE Dressing with Set-up Assistance.  Grooming while standing with Set-up Assistance.  Toileting from toilet with Supervision for hygiene and clothing management.   Supine to sit with Modified independence.  Step transfer with Supervision & appropriate AD.   Toilet transfer to toilet with Supervision & appropriate AD.  Increased functional strength to WFL for self-care.    Outcome: Ongoing, Progressing

## 2022-02-14 NOTE — ASSESSMENT & PLAN NOTE
Likley related to UTI vs COVID19  CXR was with no pneumonia  Urine with UTI  Blood cultures no growth to date  Lactate 1.1  Trend CBC  Cultures remain negative  IV vanco discontinued. Rocephin completed  WBC trending up. No clear source of infection identified. Possibly due to steroids. Check UA, blood cx, CXR  Suspected UTI. Start ceftriaxone. Urine culture

## 2022-02-14 NOTE — PT/OT/SLP PROGRESS
Occupational Therapy   Treatment    Name: Hannah Norman  MRN: [id number]  Admitting Diagnosis:  Acute on chronic respiratory failure with hypercapnia       Recommendations:     Discharge Recommendations: other (see comments) (TBD pending progress)  Discharge Equipment Recommendations:  none  Barriers to discharge:  Other (Comment) (Pt requires increased assistance; impaired cardiopulmonary response to ax)    Assessment:     Hannah Norman is a 72 y.o. female with a medical diagnosis of Acute on chronic respiratory failure with hypercapnia.  She presents with The primary encounter diagnosis was Sepsis, due to unspecified organism, unspecified whether acute organ dysfunction present. Diagnoses of AMS (altered mental status), Hyperkalemia, Chest pain, Urinary tract infection without hematuria, site unspecified, RAQUEL (acute kidney injury), Acute respiratory failure with hypoxia and hypercarbia, Acute metabolic encephalopathy, Metabolic acidosis, COVID-19, Hypervolemia, unspecified hypervolemia type, Shock, Morbid obesity with BMI of 45.0-49.9, adult, MARY (obstructive sleep apnea), Acute kidney failure, unspecified, Acute renal failure, unspecified acute renal failure type, Acute on chronic respiratory failure with hypercapnia, Tachyarrhythmia, Atrial fibrillation with RVR, Acute hypercapnic respiratory failure, Encephalopathy, metabolic, Hyperglycemia, Obesity hypoventilation syndrome, and Edema of right upper extremity were also pertinent to this visit. Performance deficits affecting function are weakness,impaired functional mobilty,gait instability,impaired endurance,impaired balance,impaired cardiopulmonary response to activity,decreased safety awareness,impaired self care skills,decreased upper extremity function,decreased lower extremity function,impaired skin,edema,decreased ROM,decreased coordination.     Pt progressing towards goals, limited this date by drop in BP upon standing. BP taken in supine at 130/68, , &  O2 99% on RA. Pt required Alek for sup>sit with HHA. Pt with no c/o dizziness/lightheaded upon transition to EOB. Pt performed sit>stand with Alek x2 and use of RW. Pt stood ~2 min with good- standing balance to change soiled diaper. Pt reporting she feels weak & returned to sitting. Upon sitting pt reporting increased dizziness then not responding to v/cs. Pt immediately returned to supine, unable to obtain BP & nsg notified. Pt becoming verbally responsive after ~30 seconds in supine & oriented when answering questions. Nsg taking manual BP at 86/62. Pt scooted to HOB & bed placed in trendelenburg. /68 after ~2 min. Bed placed in chair position at end of session & pt educated to sit up in bed for remainder of day as she is able to tolerate.     Rehab Prognosis:  Good; patient would benefit from acute skilled OT services to address these deficits and reach maximum level of function.       Plan:     Patient to be seen 3 x/week to address the above listed problems via self-care/home management,therapeutic activities,therapeutic exercises  · Plan of Care Expires: 03/12/22  · Plan of Care Reviewed with: patient,son    Subjective     Pain/Comfort:  · Pain Rating 1: 0/10    Objective:     Communicated with: nsg prior to session.  Patient found HOB elevated with PICC line,telemetry,Trialysis,PureWick upon OT entry to room.    General Precautions: Standard, airborne,contact,droplet,fall   Orthopedic Precautions:N/A   Braces: N/A  Respiratory Status: Room air     Occupational Performance:     Bed Mobility:    · Patient completed Rolling/Turning to Left with  contact guard assistance and minimum assistance  · Patient completed Rolling/Turning to Right with contact guard assistance and minimum assistance  · Patient completed Scooting/Bridging with contact guard assistance to scoot to EOB at beginning of session, total assistance and dependent x2 to scoot to HOB with use of draw sheet after drop in BP  · Patient  completed Supine to Sit with minimum assistance & HHA  2/2 decreased trunk control   · Patient completed Sit to Supine with maximal assistance, total assistance and 2 persons     Functional Mobility/Transfers:  · Patient completed Sit <> Stand Transfer with minimum assistance and of 2 persons  with  rolling walker   · Functional Mobility: Pt stood ~2 min with good- standing balance to change soiled diaper, pt verbalized beginning to feel weak so returned to sitting, after returning to sitting pt with R lean and not responding to v/cs. Pt immediately returned to supine    Activities of Daily Living:  · Lower Body Dressing: Pt required MaxA for hygiene & diaper management in static standing with use of RW      [facility] ADL[facility] 17    Treatment & Education:  Pt progressing towards goals, limited this date by drop in BP upon standing.   BP taken in supine at 130/68, , & O2 99% on RA.   Pt required Alek for sup>sit with HHA. Pt with no c/o dizziness/lightheaded upon transition to EOB.   Pt performed sit>stand with Alek x2 and use of RW.   Pt stood ~2 min with good- standing balance to change soiled diaper.   Pt reporting she feels weak & returned to sitting. Upon sitting pt reporting increased dizziness then not responding to v/cs. Pt immediately returned to supine, unable to obtain BP & nsg notified.   Pt becoming verbally responsive after ~30 seconds in supine & oriented when answering questions.   Nsg taking manual BP at 86/62. Pt scooted to HOB & bed placed in trendelenburg. /68 after ~2 min.   Bed placed in chair position at end of session & pt educated to sit up in bed for remainder of day as she is able to tolerate.     Patient left with bed in chair position with all lines intact, call button in reach, bed alarm on, nsg notified and son presentEducation:      GOALS:   Multidisciplinary Problems     Occupational Therapy Goals        Problem: Occupational Therapy Goal    Goal Priority Disciplines  Outcome Interventions   Occupational Therapy Goal     OT, PT/OT Ongoing, Progressing    Description: Goals to be met by: 3/12/2022     Patient will increase functional independence with ADLs by performing:    UE Dressing with Modified [place].  LE Dressing with Set-up Assistance.  Grooming while standing with Set-up Assistance.  Toileting from toilet with Supervision for hygiene and clothing management.   Supine to sit with Modified independence.  Step transfer with Supervision & appropriate AD.   Toilet transfer to toilet with Supervision & appropriate AD.  Increased functional strength to WFL for self-care.                     Time Tracking:     OT Date of Treatment: 02/14/22  OT Start Time: 1222  OT Stop Time: 1300  OT Total Time (min): 38 min cotx with PT    Billable Minutes:Self Care/Home Management 10  Therapeutic Activity 13    OT/TANISHA: OT     TANISHA Visit Number: 0    2/14/2022

## 2022-02-14 NOTE — PLAN OF CARE
SW  completed PRE-ADMISSION SCREENING AND RESIDENT REVIEW  for pt applying for admission to a Medicaid certified nursing facility. Faxed the completed, signed form to [phone], The Level of Care Eligibility Tool (LOCET) was also called in to [phone] in order for the Office of Aging and Adult Services to process admission request.        02/14/22 1441   Post-Acute Status   Post-Acute Authorization Placement   Post-Acute Placement Status Pending state direction/certification

## 2022-02-14 NOTE — ASSESSMENT & PLAN NOTE
Suspected GI bleed. Reports episode of melanotic stool.  Hemoccult positive w/ down trending H/H  Trend H/H  Transfuse to keep Hb>7  GI consulted  H/H has stabilized  Cont to monitor

## 2022-02-14 NOTE — PHYSICIAN QUERY
PT Name: Hannah Norman  MR #: [id number]     DOCUMENTATION CLARIFICATION     CDS: Martia Crane RN, CCDS   Contact Information: [email or web address]    This form is a permanent document in the medical record.     Query Date: February 14, 2022    By submitting this query, we are merely seeking further clarification of documentation.  Please utilize your independent clinical judgment when addressing the question(s) below.    The Medical Record contains the following   Indicators Supporting Clinical Findings Location in Medical Record    x Heart Failure documented  PMH...HFpEF  2/4 Pulmonology, Dr. Jauregui / Dr. Noel     x BNP     2/3/2022    BNP 34       Labs    x  EF/Echo  Echo remarkable for grade II diastolic dysfunction       Echo: The left ventricle is normal in size with hyperdynamic systolic function. The estimated ejection fraction is 75%. There is normal diastolic function. There is normal wall motion.   2/5 Pulmonology, Dr. Thomson / Dr. Webster     2/4 Echocardiogram    x Radiology findings  CXR: Bilateral low lung volumes with vascular crowding and subsegmental atelectasis.  No new focal airspace opacity.  No evidence of large pleural effusion or pneumothorax.  2/4 Radiology    x Subjective/Objective Respiratory Conditions  2 week of progressive...shortness of breath    2/4 Pulmonology, Dr. Jauregui / Dr. Noel     Recent/Current MI      Heart Transplant, LVAD      x Edema, JVD  Some slight B LE edema     Edema bilateral LE    2/3 ED, Dr. Glasgow     2/5 Pulmonology, Dr. Thomson / Dr. Webster    Ascites      x  Diuretics/Meds  Amlodipine 10mg PO Daily     Lasix 40mg, Take 1 tablet by mouth up to two times a day if swelling occurs   Losartan 100mg, Take 1 tablet my mouth once a day  2/12-Present MAR     2/4 Home Medication List    x Other Treatment  Fluid overload: Volume removal with CRRT.   2/4 Pulmonology, Dr. Jauregui / Dr. Noel     x Other  Volume overload       70 yo F with PMHx of  morbid obesity (BMI 45), HTN, HFpEF, HLD, MARY who presented to [facility] on 2/3 for encephalopathy found to be in acute hypercapnic respiratory failure requiring BIPAP. Admission was complicated by anuric RAQUEL complicated by hyperkalemia and acidosis requiring bicarb drip....shock...hypercapnic respiratory failure: acute on chronic...RAQUEL likely ATN...fluid overload...COVID-19 infection  2/5 Pulmonology, Dr. Thomson / Dr. Webster     2/4 Pulmonology, Dr. Jauregui / Dr. Noel      Heart failure is a clinical diagnosis which includes symptomatic fluid retention, elevated intracardiac pressures, and/or the inability of the heart to deliver adequate blood flow.    Heart Failure with reduced Ejection Fraction (HFrEF) or Systolic Heart Failure (loses ability to contract normally, EF is <40%)    Heart Failure with preserved Ejection Fraction (HFpEF) or Diastolic Heart Failure (stiff ventricles, does not relax properly, EF is >50%)     Heart Failure with Combined Systolic and Diastolic Failure (stiff ventricles, does not relax properly and EF is <50%)    Mid-range or mildly reduced ejection fraction (HFmrEF) is classified as systolic heart failure.   Common clues to acute exacerbation:  Rapidly progressive symptoms (w/in 2 weeks of presentation), using IV diuretics, using supplemental O2, pulmonary edema on Xray, new or worsening pleural effusion, +JVD or other signs of volume overload, MI w/in 4 weeks, and/or BNP >500  The clinical guidelines noted are only system guidelines, and do not replace the providers clinical judgment.    Provider, please specify the diagnosis associated with the above clinical findings.    [ x  ]   Acute on Chronic Diastolic Heart Failure (HFpEF) - worsening of CHF signs/symptoms in preexisting CHF   [   ]   Chronic Diastolic Heart Failure (HFpEF) - preexisting and stable   [   ]   Other (please specify): ___________________________________   [  ]   Clinically Undetermined     Please document  in your progress notes daily for the duration of treatment until resolved and include in your discharge summary.    References:  American Heart Association editorial staff. (2017, May). Ejection Fraction Heart Failure Measurement. American Heart Association. https://www.heart.org/en/health-topics/heart-failure/diagnosing-heart-failure/ejection-fraction-heart-failure-measurement#:~:text=Ejection%20fraction%20(EF)%20is%20a,pushed%20out%20with%20each%20heartbeat  FEROZ Arguelles (2020, December 15). Heart failure with preserved ejection fraction: Clinical manifestations and diagnosis. [employer]. https://www.[employer].com/contents/heart-failure-with-preserved-ejection-fraction-clinical-manifestations-and-diagnosis.  ICD-10-CM/PCS Coding Clinic Third Quarter ICD-10, Effective with discharges: September 8, 2020 [facility] [employer] § Heart failure with mid-range or mildly reduced ejection fraction (2020).  Form No. 11476

## 2022-02-14 NOTE — SUBJECTIVE & OBJECTIVE
Interval History:  WBC improving since starting abx for uti. H/H has stabilized with no further reports of dark stool. Pt is asymptomatic, denies cp, sob. Has no complaints.     Review of Systems   Constitutional: Positive for activity change and fatigue. Negative for appetite change, chills, diaphoresis and fever.   HENT: Negative for congestion, postnasal drip, rhinorrhea, sinus pressure, sinus pain and sneezing.    Respiratory: Negative for cough, chest tightness, shortness of breath (resolved), wheezing and stridor.    Cardiovascular: Negative for chest pain, palpitations and leg swelling.   Gastrointestinal: Negative for abdominal distention, abdominal pain, blood in stool, constipation, diarrhea and nausea.   Endocrine: Negative for cold intolerance and heat intolerance.   Genitourinary: Negative for decreased urine volume, dysuria, flank pain and hematuria.   Musculoskeletal: Negative for gait problem.   Neurological: Positive for weakness. Negative for dizziness.   Psychiatric/Behavioral: Negative for agitation, behavioral problems and confusion.     Objective:     Vital Signs (Most Recent):  Temp: 97.5 °F (36.4 °C) (02/14/22 1143)  Pulse: 92 (Simultaneous filing. User may not have seen previous data.) (02/14/22 1143)  Resp: 20 (02/14/22 1143)  BP: 125/74 (02/14/22 1143)  SpO2: 99 % (02/14/22 1143) Vital Signs (24h Range):  Temp:  [97.3 °F (36.3 °C)-98.2 °F (36.8 °C)] 97.5 °F (36.4 °C)  Pulse:  [] 92  Resp:  [18-20] 20  SpO2:  [90 %-99 %] 99 %  BP: (125-175)/(61-85) 125/74     Weight: 101.5 kg (223 lb 12.3 oz)  Body mass index is 42.28 kg/m².    Intake/Output Summary (Last 24 hours) at 2/14/2022 1257  Last data filed at 2/14/2022 0600  Gross per 24 hour   Intake 690 ml   Output 500 ml   Net 190 ml      Physical Exam  Vitals and nursing note reviewed.   Constitutional:       General: She is not in acute distress.     Appearance: She is morbidly obese.      Interventions: She is sedated and restrained.    HENT:      Head: Normocephalic and atraumatic.      Right Ear: External ear normal.      Left Ear: External ear normal.      Nose: Nose normal. No congestion.      Mouth/Throat:      Mouth: Mucous membranes are dry.      Pharynx: Oropharynx is clear.   Eyes:      Extraocular Movements: Extraocular movements intact.      Pupils: Pupils are equal, round, and reactive to light.   Cardiovascular:      Rate and Rhythm: Normal rate and regular rhythm.      Pulses: Normal pulses.      Heart sounds: Normal heart sounds.   Pulmonary:      Effort: Pulmonary effort is normal.      Breath sounds: Normal breath sounds.   Abdominal:      General: Bowel sounds are normal.      Palpations: Abdomen is soft.   Musculoskeletal:         General: Normal range of motion.      Cervical back: Normal range of motion. No rigidity.      Right lower leg: No edema.      Left lower leg: No edema.   Skin:     General: Skin is warm and dry.      Capillary Refill: Capillary refill takes less than 2 seconds.   Neurological:      General: No focal deficit present.      Mental Status: She is alert and oriented to person, place, and time. Mental status is at baseline.      Motor: No weakness.         Significant Labs: All pertinent labs within the past 24 hours have been reviewed.    Significant Imaging: I have reviewed all pertinent imaging results/findings within the past 24 hours.

## 2022-02-14 NOTE — PROGRESS NOTES
[facility]  Gastroenterology  Progress Note    Patient Name: Hannah Norman  MRN: [id number]  Admission Date: 2/3/2022  Hospital Length of Stay: 11 days  Code Status: DNR   Attending Provider: Bernabe King MD  Consulting Provider: Lake Dominique MD  Primary Care Physician: Raffi Garcia MD  Principal Problem: Acute on chronic respiratory failure with hypercapnia      Subjective:     Interval History:  No acute events overnight.  Patient denies further bleeding.  Hemoglobin stable.    Review of Systems   Constitutional: Positive for fatigue.   Gastrointestinal: Negative for abdominal distention, abdominal pain, blood in stool, constipation and diarrhea.   Neurological: Positive for weakness, light-headedness and headaches.     Objective:     Vital Signs (Most Recent):  Temp: 97.8 °F (36.6 °C) (02/14/22 1545)  Pulse: 93 (02/14/22 1545)  Resp: 18 (02/14/22 1545)  BP: 129/79 (02/14/22 1545)  SpO2: 99 % (02/14/22 1545) Vital Signs (24h Range):  Temp:  [97.3 °F (36.3 °C)-98.2 °F (36.8 °C)] 97.8 °F (36.6 °C)  Pulse:  [] 93  Resp:  [18-20] 18  SpO2:  [90 %-99 %] 99 %  BP: (125-175)/(61-85) 129/79     Weight: 101.5 kg (223 lb 12.3 oz) (02/10/22 1400)  Body mass index is 42.28 kg/m².      Intake/Output Summary (Last 24 hours) at 2/14/2022 1609  Last data filed at 2/14/2022 0600  Gross per 24 hour   Intake 240 ml   Output 450 ml   Net -210 ml       Lines/Drains/Airways     Peripherally Inserted Central Catheter Line            PICC Triple Lumen 02/08/22 1350 right basilic 6 days          Central Venous Catheter Line            Trialysis (Dialysis) Catheter 02/04/22 1000 left internal jugular 10 days          Drain            Female External Urinary Catheter 02/12/22 1430 2 days                Physical Exam  Vitals and nursing note reviewed.   Constitutional:       Appearance: She is well-developed and well-nourished.   HENT:      Head: Normocephalic and atraumatic.   Eyes:      Extraocular  Movements: EOM normal.      Conjunctiva/sclera: Conjunctivae normal.   Pulmonary:      Effort: Pulmonary effort is normal. No respiratory distress.   Musculoskeletal:      Cervical back: Normal range of motion.   Neurological:      Mental Status: She is alert and oriented to person, place, and time.   Psychiatric:         Mood and Affect: Mood and affect normal.         Behavior: Behavior normal.         Thought Content: Thought content normal.         Judgment: Judgment normal.         Significant Labs:  Recent Lab Results       02/14/22  1542   02/14/22  1500   02/14/22  1146   02/14/22  0836   02/14/22  0718        Allens Test   Pass             Baso #               Basophil %               Site   LR             Dels   Room Air             Differential Method               Eos #               Eosinophil %               Gran # (ANC)               Gran %               Hematocrit               Hemoglobin       7.3         Immature Grans (Abs)               Immature Granulocytes               Lymph #               Lymph %               MCH               MCHC               MCV               Mono #               Mono %               MPV               nRBC               Platelets               POC BE   6             POC HCO3   29.1             POC PCO2   36.0             POC PH   7.516             POC PO2   99             POC SATURATED O2   98             POC TCO2   30             POCT Glucose 204     181     163       RBC               RDW               Sample   ARTERIAL             WBC                                02/14/22  0431   02/14/22  0140   02/13/22  2148   02/13/22  1641        Allens Test             Baso # 0.09             Basophil % 0.5             Site             DelSys             Differential Method Automated             Eos # 0.6             Eosinophil % 3.1             Gran # (ANC) 10.5             Gran % 55.5             Hematocrit 21.6             Hemoglobin 7.2   6.9           Immature Grans  (Abs) 1.31  Comment: Mild elevation in immature granulocytes is non specific and   can be seen in a variety of conditions including stress response,   acute inflammation, trauma and pregnancy. Correlation with other   laboratory and clinical findings is essential.               Immature Granulocytes 6.9             Lymph # 4.8             Lymph % 25.6             MCH 31.9             MCHC 33.3             MCV 96             Mono # 1.6             Mono % 8.4             MPV 9.5             nRBC 0             Platelets 309             POC BE             POC HCO3             POC PCO2             POC PH             POC PO2             POC SATURATED O2             POC TCO2             POCT Glucose     137   125       RBC 2.26             RDW 14.6             Sample             WBC 18.90                     Significant Imaging:  Imaging results within the past 24 hours have been reviewed.    Assessment/Plan:     Acute blood loss anemia  With reported melena, resolved.  No further bleeding noted.  Hemoglobin stable  Trend hemoglobin  Continue PPI  No plan for intervention at this time.  Monitor clinical status and reassess as needed          Thank you for your consult. I will follow-up with patient. Please contact us if you have any additional questions.    Lake Dominique MD  Gastroenterology  [facility]

## 2022-02-14 NOTE — PROGRESS NOTES
"[facility] - Telemetry  Adult Nutrition  Progress Note    SUMMARY       Recommendations    Recommendation:   1. Encourage intake at meals as tolerated.   2. Add Novasource Renal 1xday.   3. Monitor weight/labs.   4. RD to continue to follow to monitor po intake    Goals:   Pt will tolerate diet with at least 50-75% intake at meals by RD follow up  Nutrition Goal Status: progressing towards goal  Communication of RD Recs: reviewed with RN Madelin)    Assessment and Plan  * Acute on chronic respiratory failure with hypercapnia  Contributing Nutrition Diagnosis  Inadequate energy intake    Related to (etiology):   Intubation/COVID    Signs and Symptoms (as evidenced by):   NPO    Interventions:  Collaboration with other providers    Nutrition Diagnosis Status:   Continues      Malnutrition Assessment  Weight Loss (Malnutrition):  (6% x 3 months)     Reason for Assessment  Reason For Assessment: RD follow-up  Diagnosis:  (acute resp failure)  Relevant Medical History: HTN, chronic osteoarthritis, [place] palsy, sleep apnea, RAQUEL, hysterectomy, B knee surgery, appendectomy  General Information Comments: Pt on Renal Cardiac diet with ~25% intake at recent meals. Shakir 16-skin intact. PICC line. Noted 16lb weight loss x 3 months. Unable to assess NFPE 2/2 pt on COVID isolation  Nutrition Discharge Planning: pt to d/c on Renal Cardiac diet    Nutrition Risk Screen  Nutrition Risk Screen: no indicators present    Nutrition/Diet History  Food Preferences: unable to assess  Spiritual, Cultural Beliefs, Nondenominational Practices, Values that Affect Care: no  Factors Affecting Nutritional Intake: decreased appetite    Anthropometrics  Temp: 97.5 °F (36.4 °C)  Height Method: Estimated  Height: 5' 1" (154.9 cm)  Height (inches): 61 in  Weight Method: Bed Scale  Weight: 101.5 kg (223 lb 12.3 oz)  Weight (lb): 223.77 lb  Ideal Body Weight (IBW), Female: 105 lb  % Ideal Body Weight, Female (lb): 213.11 %  BMI (Calculated): 42.3  BMI Grade: " greater than 40 - morbid obesity  Usual Body Weight (UBW), k.9 kg (10/25)  % Usual Body Weight: 93.4  % Weight Change From Usual Weight: -6.8 %     Lab/Procedures/Meds  Pertinent Labs Reviewed: reviewed  Pertinent Labs Comments: BUN 35H, Glu 128H, Ca 8.5L, Phos 4.6H, ALb 2.7L  Pertinent Medications Reviewed: reviewed  Pertinent Medications Comments: Vit C, rocephin, gabapentin, insulin, melatonin, pantoprazole    Estimated/Assessed Needs  Weight Used For Calorie Calculations: 101.5 kg (223 lb 12.3 oz)  Energy Calorie Requirements (kcal): 1754  Energy Need Method: Traverse-St Jeor (x1.2)  Protein Requirements: 81g (0.8g/kg)  Weight Used For Protein Calculations: 101.5 kg (223 lb 12.3 oz)  Estimated Fluid Requirement Method: RDA Method  RDA Method (mL): 1754  CHO Requirement: 200g    Nutrition Prescription Ordered  Current Diet Order: Renal Cardiac    Evaluation of Received Nutrient/Fluid Intake  I/O: 690/700  Energy Calories Required: not meeting needs  Protein Required: not meeting needs  Fluid Required: not meeting needs  Comments: LBM 2/13  % Intake of Estimated Energy Needs: 25 - 50 %  % Meal Intake: 25 - 50 %    Nutrition Risk  Level of Risk/Frequency of Follow-up:  (2xweekly)     Monitor and Evaluation  Food and Nutrient Intake: energy intake  Food and Nutrient Adminstration: diet order,enteral and parenteral nutrition administration  Physical Activity and Function: nutrition-related ADLs and IADLs  Anthropometric Measurements: weight  Biochemical Data, Medical Tests and Procedures: electrolyte and renal panel  Nutrition-Focused Physical Findings: overall appearance     Nutrition Follow-Up  RD Follow-up?: Yes

## 2022-02-14 NOTE — TELEPHONE ENCOUNTER
----- Message from Jaymie Carrizales sent at 2/14/2022  8:02 AM CST -----  Contact: Eric  ph# [phone]  Pt's  stopped by office wanting Dr Garcia to know pt has been in hospital for 11 days. Was diagnosed with kidney shut down / Covid;  says wife is doing much better; responsive & able to talk if Dr Garcia would like to give her a call on her cell

## 2022-02-14 NOTE — CONSULTS
[facility]    CC: melena    HPI 72 y.o. female presenting with acute, episodic, melena for two days associated with anemia. Patient admitted to the hospital with COVID pneumonia complicated by RAQUEL. Patient noticed black stools about 2 days ago. She was having multiple daily BM at the time. Yesterday her stools were back to normal, she had a normal brown BM this morning. She denies prior GI bleeding. She denies abdominal pain. No excessive NSAID use at home.     Chart reviewed and summarized here.    Past Medical History  RAQUEL   Afib   Chronic back pain   HTN   Sleep apnea     Past Surgical History  Appendectomy   Breast surgery   Bladder suspension   Adenoidectomy     Social History  Former smoker   No alcohol use   No drug use     Family History  Family History   Problem Relation Age of Onset    Diabetes Mother     Tremor Mother     Liver disease Mother     Hypertension Mother     Diabetes Father     Heart disease Father     Hypertension Father     Tremor Son     Kidney disease Son     Diabetes Sister     Diabetes Brother     No Known Problems Daughter     Depression Sister     Kidney disease Son         Reniel failure but corrected    Learning disabilities Son         Slow in some areas    Mental retardation Son         Mild retardation    Tremor Son     Cancer Brother        Review of Systems  General ROS: negative for chills, fever or weight loss  Psychological ROS: negative for hallucination, depression or suicidal ideation  Ophthalmic ROS: negative for blurry vision, photophobia or eye pain  ENT ROS: negative for epistaxis, sore throat or rhinorrhea  Respiratory ROS:+ cough, + shortness of breath, no wheezing  Cardiovascular ROS: no chest pain or dyspnea on exertion  Gastrointestinal ROS: + black stools; no abdominal pain, change in bowel habits  Genito-Urinary ROS: no dysuria, trouble voiding, or hematuria  Musculoskeletal ROS: negative for gait disturbance or muscular weakness  Neurological ROS: no  "syncope or seizures; no ataxia  Dermatological ROS: negative for pruritis, rash and jaundice    Physical Examination  /85 (BP Location: Right arm, Patient Position: Lying)   Pulse 101   Temp 98 °F (36.7 °C) (Oral)   Resp 20   Ht 5' 1" (1.549 m)   Wt 101.5 kg (223 lb 12.3 oz)   SpO2 95%   Breastfeeding No   BMI 42.28 kg/m²   General appearance: alert, cooperative, no distress  HENT: Normocephalic, atraumatic, neck symmetrical, no nasal discharge   Eyes: pale conjunctivae/corneas clear, PERRL, EOM's intact  Lungs: normal work of breathing,  no dullness to percussion bilaterally  Heart: regular rate and rhythm without rub; no displacement of the PMI   Abdomen: soft, non-tender; bowel sounds normoactive; no organomegaly; brown stool in adult pad   Extremities: extremities symmetric; no clubbing, cyanosis, or edema  Integument: Pale skin color, texture, turgor normal; no rashes; hair distrubution normal  Neurologic: Alert and oriented X 3, normal strength  Psychiatric: no pressured speech; normal affect; no evidence of impaired cognition     Labs:  Lab Results   Component Value Date    WBC 25.00 (H) 02/13/2022    HGB 7.3 (L) 02/13/2022    HCT 22.8 (L) 02/13/2022    MCV 95 02/13/2022     02/13/2022       Imaging:  EGD 5/2019 - normal exam   Colonoscopy 11/2017 - diverticulosis in the sigmoid colon, otherwise normal exam       Assessment:   73 y/o F presenting with:  1. Melena and anemia. Likely upper GI source. Melena resolved, now having brown stools.  2. COVID pneumonia, positive 2/3    Plan:   - Continue to monitor for signs of overt GI bleeding   - Tentatively plan for EGD as COVID infection improves, will reassess tomorrow    - Continue IV PPI BID   - daily CBC   - transfuse to goal hgb 7     Patient discussed with Dr. Foster, see attending attestation.     Liseth Kim   [facility]   "

## 2022-02-14 NOTE — ASSESSMENT & PLAN NOTE
Was ordered CPAP qhs per PCP but does not use it  -suspect she has OHS  On CPAP 4 cm H2O at home. Increased to 10 cm H2O on home CPAP device. Will need outpatient sleep study (last one 10 years ago). Check ABG day before discharge - if pCO2 >52, then meets criteria for trilogy. In that situation, patient should go home with trilogy device (settings would be 16/8 21% until can get a sleep study).  -Will check ABG today

## 2022-02-14 NOTE — PLAN OF CARE
"TN met with patient and son via [employer] monitor. We discussed discharge plans. PT/OT recs TBD. I discussed SNF placement with them vs. HH. Patient is agreeable to SNF placement at discharge. She would like to get "stronger" prior to discharge home.     TN met with son and patient at bedside. I provided them with a SNF list as well as I printed a SNF list from [facility]. I provided them with my number to determine preferences. They stated ok to send to facilities and see who responds. TN will continue to follow.    1400--TN sent referrals to various facilities within 25 miles of patient's home.    1641--TN cued into patient's room to see if they reviewed list. She reported to TN her  is on the phone now and is reviewing list. I told her [facility] is also reviewing her information via [employer]. She requested TN send her information to [facility]. TN faxed referral via [employer]. I told her to review [facility] list I printed, because some facilties are not in network.    Patient has been declined by [facility] (out of network), [facility] (out of network), [facility] (out of network), and [facility] (no beds).    ARAVIND Munguia to call in [facility].    1715--[facility].    Patient Contacts    Name Relation Home Work Mobile   Carole Norman Daughter [phone]   Eric Norman Spouse   [phone]          02/14/22 1350   Discharge Reassessment   Assessment Type Discharge Planning Reassessment   Did the patient's condition or plan change since previous assessment? No   Discharge Plan discussed with: Patient;Adult children   Discharge Plan A Skilled Nursing Facility   Discharge Plan B Home with family;Home Health   Why the patient remains in the hospital Requires continued medical care   Post-Acute Status   Post-Acute Authorization Placement   Post-Acute Placement Status Referrals Sent   Discharge Delays None known at this time     Sravani Finch RN  Case " Manager  [phone]

## 2022-02-14 NOTE — ASSESSMENT & PLAN NOTE
With reported melena, resolved.  No further bleeding noted.  Hemoglobin stable  Trend hemoglobin  Continue PPI  No plan for intervention at this time.  Monitor clinical status and reassess as needed

## 2022-02-14 NOTE — PROGRESS NOTES
[facility] Medicine  Progress Note    Patient Name: Hannah Norman  MRN: [id number]  Patient Class: IP- Inpatient   Admission Date: 2/3/2022  Length of Stay: 11 days  Attending Physician: Bernabe King MD  Primary Care Provider: Raffi Garcia MD        Subjective:     Principal Problem:Acute on chronic respiratory failure with hypercapnia        HPI:  Ms. Norman is a 71 year old female with a medical history that includes anxiety disorder, bell's palsy, chronic back pain, chronic osteoarthritis, essential tremor, fibromyalgia, hypertension, GERD, hyperlipidemia, sleep apnea, and mood disorder. She presented to the ED via EMS with spouse who reports patient to be increasingly fatigued and weak. He reports over the past 2 weeks she was diagnosed with COVID19 and was ending their quarantine phase. She has been progressively weaker and unable to ambulate as usual with her walker. Her symptoms worsened over the past 2 days where she was not communicating at all. She is with no cough, vomiting, diarrhea or fevers. Her spouse contributed to her history during this visit. On arrival to the ED she was slightly hypoxic at 93% and hypertensive. Significant labs were with the following: wbc 18.97, Na 131, K 5.6, BUN 69, Cr 7.1, COVID19 +, urine with 3+ leukocytes and many bacteria, pH 7.1 and CO2 65.4. CT head and CXR with no acute findings. She was given neb treatment, Rocephin, Lokelma, reg insulin and NS bolus. She will admit to [facility] hospital medicine service for continued monitoring.           Overview/Hospital Course:  No notes on file    Interval History:  WBC improving since starting abx for uti. H/H has stabilized with no further reports of dark stool. Pt is asymptomatic, denies cp, sob. Has no complaints.     Review of Systems   Constitutional: Positive for activity change and fatigue. Negative for appetite change, chills, diaphoresis and fever.   HENT: Negative for congestion, postnasal  drip, rhinorrhea, sinus pressure, sinus pain and sneezing.    Respiratory: Negative for cough, chest tightness, shortness of breath (resolved), wheezing and stridor.    Cardiovascular: Negative for chest pain, palpitations and leg swelling.   Gastrointestinal: Negative for abdominal distention, abdominal pain, blood in stool, constipation, diarrhea and nausea.   Endocrine: Negative for cold intolerance and heat intolerance.   Genitourinary: Negative for decreased urine volume, dysuria, flank pain and hematuria.   Musculoskeletal: Negative for gait problem.   Neurological: Positive for weakness. Negative for dizziness.   Psychiatric/Behavioral: Negative for agitation, behavioral problems and confusion.     Objective:     Vital Signs (Most Recent):  Temp: 97.5 °F (36.4 °C) (02/14/22 1143)  Pulse: 92 (Simultaneous filing. User may not have seen previous data.) (02/14/22 1143)  Resp: 20 (02/14/22 1143)  BP: 125/74 (02/14/22 1143)  SpO2: 99 % (02/14/22 1143) Vital Signs (24h Range):  Temp:  [97.3 °F (36.3 °C)-98.2 °F (36.8 °C)] 97.5 °F (36.4 °C)  Pulse:  [] 92  Resp:  [18-20] 20  SpO2:  [90 %-99 %] 99 %  BP: (125-175)/(61-85) 125/74     Weight: 101.5 kg (223 lb 12.3 oz)  Body mass index is 42.28 kg/m².    Intake/Output Summary (Last 24 hours) at 2/14/2022 1257  Last data filed at 2/14/2022 0600  Gross per 24 hour   Intake 690 ml   Output 500 ml   Net 190 ml      Physical Exam  Vitals and nursing note reviewed.   Constitutional:       General: She is not in acute distress.     Appearance: She is morbidly obese.      Interventions: She is sedated and restrained.   HENT:      Head: Normocephalic and atraumatic.      Right Ear: External ear normal.      Left Ear: External ear normal.      Nose: Nose normal. No congestion.      Mouth/Throat:      Mouth: Mucous membranes are dry.      Pharynx: Oropharynx is clear.   Eyes:      Extraocular Movements: Extraocular movements intact.      Pupils: Pupils are equal, round, and  reactive to light.   Cardiovascular:      Rate and Rhythm: Normal rate and regular rhythm.      Pulses: Normal pulses.      Heart sounds: Normal heart sounds.   Pulmonary:      Effort: Pulmonary effort is normal.      Breath sounds: Normal breath sounds.   Abdominal:      General: Bowel sounds are normal.      Palpations: Abdomen is soft.   Musculoskeletal:         General: Normal range of motion.      Cervical back: Normal range of motion. No rigidity.      Right lower leg: No edema.      Left lower leg: No edema.   Skin:     General: Skin is warm and dry.      Capillary Refill: Capillary refill takes less than 2 seconds.   Neurological:      General: No focal deficit present.      Mental Status: She is alert and oriented to person, place, and time. Mental status is at baseline.      Motor: No weakness.         Significant Labs: All pertinent labs within the past 24 hours have been reviewed.    Significant Imaging: I have reviewed all pertinent imaging results/findings within the past 24 hours.      Assessment/Plan:      * Acute on chronic respiratory failure with hypercapnia  Elevated CO2 on ABG, currently on BIPAP  Reassess with ABG with worsened CO2, settings adjusted and adjusting face mask for better fit  Hypoxia likely related to COVID19, was 93% on room air on arrival  -suspect that patient's encephalopathy secondary to elevated CO2 vs uremia  -Pulm following  -Required intubation. Successfully extubated 2/8 to bipap  -now on vapotherm during day with bipap qhs  -weaned to room air. Cont bipap qhs due to ge/ohs  -discharge planning. Awaiting pt/ot eval. Will likely benefit from snf vs ipr    Acute blood loss anemia  Suspected GI bleed. Reports episode of melanotic stool.  Hemoccult positive w/ down trending H/H  Trend H/H  Transfuse to keep Hb>7  GI consulted  H/H has stabilized  Cont to monitor      Obesity hypoventilation syndrome  BIPAP QHS      Encephalopathy, metabolic        Acute hypercapnic  respiratory failure        Atrial fibrillation with RVR  - went into afib w/ RVR overnight following episode of bradycardia and vasovagal  - given fluid and will maintain net even today on CRRT  - initiated on amiodarone gtt with improvement in rate control  - amio discontinued    Shock        Hypervolemia        Metabolic acidosis        RAQUEL (acute kidney injury)        Transaminitis  - in setting of shock  - monitor for now; can consider escalation of w/u if persists      Hyperglycemia  Hemoglobin A1C   Date Value Ref Range Status   11/10/2021 6.2 (H) 4.0 - 5.6 % Final     Comment:     ADA Screening Guidelines:  5.7-6.4%  Consistent with prediabetes  >or=6.5%  Consistent with diabetes    High levels of fetal hemoglobin interfere with the HbA1C  assay. Heterozygous hemoglobin variants (HbS, HgC, etc)do  not significantly interfere with this assay.   However, presence of multiple variants may affect accuracy.     04/26/2021 5.9 (H) 4.0 - 5.6 % Final     Comment:     ADA Screening Guidelines:  5.7-6.4%  Consistent with prediabetes  >or=6.5%  Consistent with diabetes    High levels of fetal hemoglobin interfere with the HbA1C  assay. Heterozygous hemoglobin variants (HbS, HgC, etc)do  not significantly interfere with this assay.   However, presence of multiple variants may affect accuracy.     11/10/2020 6.0 (H) 4.0 - 5.6 % Final     Comment:     ADA Screening Guidelines:  5.7-6.4%  Consistent with prediabetes  >or=6.5%  Consistent with diabetes  High levels of fetal hemoglobin interfere with the HbA1C  assay. Heterozygous hemoglobin variants (HbS, HgC, etc)do  not significantly interfere with this assay.   However, presence of multiple variants may affect accuracy.           - LDSSI with accuchecks qachs        Shock, unspecified  -likely distributive due to acidosis and renal failure versus septic shock  -currently requiring Levophed with addition of vasopressin and stress dose steroids   -nephrology consulted for  CRRT  -continue IV antibiotics for possible infectious source, thought less likely  -possibly worsening due to too much volume removal  -COVID therapy as above  -AM cortisol low  -started on hydrocortisone/fludrocortisone  -has been weaned off pressors  -hydrocortisone/fludrocortisone discontinued  -resolved    Hyperphosphatemia  -in the setting of acute renal failure  -on CRRT  -nephrology consulted      Hypocalcemia  -in the setting of acute renal failure  -dialysis; will replace  -nephrology consulted      Acute metabolic encephalopathy  Reported by spouse to be with increased drowsiness and altered mentation. She has been sleeping a lot more than usual and is not alert. Found to be with renal failure, COVID19, and metabolic acidosis    CT head with no acute findings, ammonia level wnl  Will hold all sedating home meds for now  Currently on BIPAP for hypercapnia, which is likely contributing  -renal failure with high anion gap metabolic acidosis and uremia; initiated on dialysis  -pulmonology and nephrology consulted  -resolved    COVID-19  COVID positive; initiated on protocol  CXR with no infiltrate, requiring O2  -doubt that current presentation is due to COVID; will hold remdesivir and dexamethasone  Heparin ppx  inhalers prn  MVI, ascorbic acid, incentive spirometry  statin  supplemental O2, will wean as tolerated  prn tylenol, loperamide  contact/airborne    Acute renal failure with tubular necrosis  Significant RAQUEL with creatinine of 7.1 from normal just 3 months ago, with acidosis, uremia, hyperkalemia, hyperphosphatemia, and hypocalcemia  Renal ultrasound   -Vila catheter placed  Consulted Nephrology  Strict I&O's  -initiated on CRRT 2/4  -kidney function improving  -no longer requiring dialysis        High anion gap metabolic acidosis  With initial pH of 7.188  Trend ABG  Likely related to acute renal failure  -bicarbonate drip started on admission, now discontinued  -initiated on CRRT>HD  -bicarb  tabs  -nephrology following  -resolved. Discontinue bicarb tabs.    UTI (urinary tract infection)  Urinalysis relatively unremarkable, may be asymptomatic bacteriuria but in the setting of shock will continue antibiotics  Blood cultures ngtd  IV Rocephin completed  Repeat UA 2/12 concerning for UTI  Resume ceftriaxone. Check urine cx      Leukocytosis  Likley related to UTI vs COVID19  CXR was with no pneumonia  Urine with UTI  Blood cultures no growth to date  Lactate 1.1  Trend CBC  Cultures remain negative  IV vanco discontinued. Rocephin completed  WBC trending up. No clear source of infection identified. Possibly due to steroids. Check UA, blood cx, CXR  Suspected UTI. Start ceftriaxone. Urine culture    Unspecified mood (affective) disorder  Resume home meds      Morbid obesity with BMI of 45.0-49.9, adult  Body mass index is 42.28 kg/m². Morbid obesity complicates all aspects of disease management from diagnostic modalities to treatment.    MARY (obstructive sleep apnea)  Was ordered CPAP qhs per PCP but does not use it  -suspect she has OHS  On CPAP 4 cm H2O at home. Increased to 10 cm H2O on home CPAP device. Will need outpatient sleep study (last one 10 years ago). Check ABG day before discharge - if pCO2 >52, then meets criteria for trilogy. In that situation, patient should go home with trilogy device (settings would be 16/8 21% until can get a sleep study).  -Will check ABG today    Disorder of lumbar spine  Osteoarthritis    Hold home pain medications for now      Osteoarthritis  See above      Restless leg syndrome  Resume Neurontin and Mirapex      Mixed hyperlipidemia  Patient is chronically on statin.will continue for now once able to take p.o.. Monitor clinically. Last LDL was   Lab Results   Component Value Date    LDLCALC 71.0 11/10/2021            Anxiety disorder  No episodes of anxiety  Monitor    Fibromyalgia  Resume Effexor and Neurontin      Chronic back pain  Resume PRN      Essential  hypertension  - hold home antihypertensives due to shock  - start amlodipine      Mild acid reflux  - protonix ppx        VTE Risk Mitigation (From admission, onward)         Ordered     heparin (porcine) injection 2,800 Units  As needed (PRN)         02/04/22 1911     Place KATERIN hose  Until discontinued         02/03/22 2251     IP VTE HIGH RISK PATIENT  Once         02/03/22 2251     Place sequential compression device  Until discontinued         02/03/22 2251                Discharge Planning   ARNULFO:      Code Status: DNR   Is the patient medically ready for discharge?:     Reason for patient still in hospital (select all that apply): PT / OT recommendations  Discharge Plan A: Home Health                  Bernabe King MD  Department of Hospital Medicine   [facility]

## 2022-02-14 NOTE — ASSESSMENT & PLAN NOTE
Elevated CO2 on ABG, currently on BIPAP  Reassess with ABG with worsened CO2, settings adjusted and adjusting face mask for better fit  Hypoxia likely related to COVID19, was 93% on room air on arrival  -suspect that patient's encephalopathy secondary to elevated CO2 vs uremia  -Pulm following  -Required intubation. Successfully extubated 2/8 to bipap  -now on vapotherm during day with bipap qhs  -weaned to room air. Cont bipap qhs due to ge/ohs  -discharge planning. Awaiting pt/ot eval. Will likely benefit from snf vs ipr

## 2022-02-14 NOTE — PLAN OF CARE
Recommendation:   1. Encourage intake at meals as tolerated.   2. Add Novasource Renal 1xday.   3. Monitor weight/labs.   4. RD to continue to follow to monitor po intake    Goals:   Pt will tolerate diet with at least 50-75% intake at meals by RD follow up  Nutrition Goal Status: progressing towards goal

## 2022-02-15 LAB
ANION GAP SERPL CALC-SCNC: 8 MMOL/L (ref 8–16)
BASOPHILS # BLD AUTO: 0.07 K/UL (ref 0–0.2)
BASOPHILS NFR BLD: 0.5 % (ref 0–1.9)
BLD PROD TYP BPU: NORMAL
BLOOD UNIT EXPIRATION DATE: NORMAL
BLOOD UNIT TYPE CODE: 6200
BLOOD UNIT TYPE: NORMAL
BUN SERPL-MCNC: 19 MG/DL (ref 8–23)
CALCIUM SERPL-MCNC: 8.4 MG/DL (ref 8.7–10.5)
CHLORIDE SERPL-SCNC: 102 MMOL/L (ref 95–110)
CO2 SERPL-SCNC: 29 MMOL/L (ref 23–29)
CODING SYSTEM: NORMAL
CREAT SERPL-MCNC: 1 MG/DL (ref 0.5–1.4)
DIFFERENTIAL METHOD: ABNORMAL
DISPENSE STATUS: NORMAL
EOSINOPHIL # BLD AUTO: 0.5 K/UL (ref 0–0.5)
EOSINOPHIL NFR BLD: 3.1 % (ref 0–8)
ERYTHROCYTE [DISTWIDTH] IN BLOOD BY AUTOMATED COUNT: 14.8 % (ref 11.5–14.5)
EST. GFR  (AFRICAN AMERICAN): >60 ML/MIN/1.73 M^2
EST. GFR  (NON AFRICAN AMERICAN): 56 ML/MIN/1.73 M^2
GLUCOSE SERPL-MCNC: 126 MG/DL (ref 70–110)
HCT VFR BLD AUTO: 21 % (ref 37–48.5)
HGB BLD-MCNC: 6.7 G/DL (ref 12–16)
IMM GRANULOCYTES # BLD AUTO: 0.6 K/UL (ref 0–0.04)
IMM GRANULOCYTES NFR BLD AUTO: 4.1 % (ref 0–0.5)
LYMPHOCYTES # BLD AUTO: 2.7 K/UL (ref 1–4.8)
LYMPHOCYTES NFR BLD: 18.8 % (ref 18–48)
MCH RBC QN AUTO: 30.9 PG (ref 27–31)
MCHC RBC AUTO-ENTMCNC: 31.9 G/DL (ref 32–36)
MCV RBC AUTO: 97 FL (ref 82–98)
MONOCYTES # BLD AUTO: 1.3 K/UL (ref 0.3–1)
MONOCYTES NFR BLD: 8.6 % (ref 4–15)
NEUTROPHILS # BLD AUTO: 9.5 K/UL (ref 1.8–7.7)
NEUTROPHILS NFR BLD: 64.9 % (ref 38–73)
NRBC BLD-RTO: 0 /100 WBC
PLATELET # BLD AUTO: 330 K/UL (ref 150–450)
PMV BLD AUTO: 10 FL (ref 9.2–12.9)
POCT GLUCOSE: 113 MG/DL (ref 70–110)
POCT GLUCOSE: 170 MG/DL (ref 70–110)
POCT GLUCOSE: 171 MG/DL (ref 70–110)
POCT GLUCOSE: 97 MG/DL (ref 70–110)
POTASSIUM SERPL-SCNC: 3.3 MMOL/L (ref 3.5–5.1)
RBC # BLD AUTO: 2.17 M/UL (ref 4–5.4)
SODIUM SERPL-SCNC: 139 MMOL/L (ref 136–145)
TRANS ERYTHROCYTES VOL PATIENT: NORMAL ML
WBC # BLD AUTO: 14.6 K/UL (ref 3.9–12.7)

## 2022-02-15 PROCEDURE — C9113 INJ PANTOPRAZOLE SODIUM, VIA: HCPCS | Performed by: INTERNAL MEDICINE

## 2022-02-15 PROCEDURE — 27000207 HC ISOLATION

## 2022-02-15 PROCEDURE — P9021 RED BLOOD CELLS UNIT: HCPCS | Performed by: NURSE PRACTITIONER

## 2022-02-15 PROCEDURE — 25000003 PHARM REV CODE 250: Performed by: INTERNAL MEDICINE

## 2022-02-15 PROCEDURE — 63600175 PHARM REV CODE 636 W HCPCS: Performed by: INTERNAL MEDICINE

## 2022-02-15 PROCEDURE — 36415 COLL VENOUS BLD VENIPUNCTURE: CPT | Performed by: INTERNAL MEDICINE

## 2022-02-15 PROCEDURE — 80048 BASIC METABOLIC PNL TOTAL CA: CPT | Performed by: HOSPITALIST

## 2022-02-15 PROCEDURE — 11000001 HC ACUTE MED/SURG PRIVATE ROOM

## 2022-02-15 PROCEDURE — 85025 COMPLETE CBC W/AUTO DIFF WBC: CPT | Performed by: INTERNAL MEDICINE

## 2022-02-15 PROCEDURE — 25000003 PHARM REV CODE 250: Performed by: NURSE PRACTITIONER

## 2022-02-15 PROCEDURE — A4216 STERILE WATER/SALINE, 10 ML: HCPCS | Performed by: INTERNAL MEDICINE

## 2022-02-15 PROCEDURE — 25000003 PHARM REV CODE 250: Performed by: STUDENT IN AN ORGANIZED HEALTH CARE EDUCATION/TRAINING PROGRAM

## 2022-02-15 PROCEDURE — 36430 TRANSFUSION BLD/BLD COMPNT: CPT

## 2022-02-15 RX ORDER — HYDROCODONE BITARTRATE AND ACETAMINOPHEN 500; 5 MG/1; MG/1
TABLET ORAL
Status: DISCONTINUED | OUTPATIENT
Start: 2022-02-15 | End: 2022-02-16 | Stop reason: HOSPADM

## 2022-02-15 RX ADMIN — ATORVASTATIN CALCIUM 10 MG: 10 TABLET, FILM COATED ORAL at 09:02

## 2022-02-15 RX ADMIN — SODIUM CHLORIDE, PRESERVATIVE FREE 10 ML: 5 INJECTION INTRAVENOUS at 05:02

## 2022-02-15 RX ADMIN — PANTOPRAZOLE SODIUM 40 MG: 40 INJECTION, POWDER, LYOPHILIZED, FOR SOLUTION INTRAVENOUS at 09:02

## 2022-02-15 RX ADMIN — CEFTRIAXONE 1 G: 1 INJECTION, SOLUTION INTRAVENOUS at 12:02

## 2022-02-15 RX ADMIN — VENLAFAXINE HYDROCHLORIDE 150 MG: 75 CAPSULE, EXTENDED RELEASE ORAL at 08:02

## 2022-02-15 RX ADMIN — INSULIN DETEMIR 12 UNITS: 100 INJECTION, SOLUTION SUBCUTANEOUS at 08:02

## 2022-02-15 RX ADMIN — AMLODIPINE BESYLATE 10 MG: 5 TABLET ORAL at 08:02

## 2022-02-15 RX ADMIN — OXYCODONE HYDROCHLORIDE AND ACETAMINOPHEN 500 MG: 500 TABLET ORAL at 09:02

## 2022-02-15 RX ADMIN — OXYCODONE HYDROCHLORIDE AND ACETAMINOPHEN 500 MG: 500 TABLET ORAL at 08:02

## 2022-02-15 RX ADMIN — GABAPENTIN 300 MG: 300 CAPSULE ORAL at 01:02

## 2022-02-15 RX ADMIN — GABAPENTIN 300 MG: 300 CAPSULE ORAL at 08:02

## 2022-02-15 RX ADMIN — MELATONIN TAB 3 MG 9 MG: 3 TAB at 09:02

## 2022-02-15 RX ADMIN — PRAMIPEXOLE DIHYDROCHLORIDE 0.12 MG: 0.12 TABLET ORAL at 09:02

## 2022-02-15 RX ADMIN — SODIUM CHLORIDE, PRESERVATIVE FREE 10 ML: 5 INJECTION INTRAVENOUS at 12:02

## 2022-02-15 RX ADMIN — PANTOPRAZOLE SODIUM 40 MG: 40 INJECTION, POWDER, LYOPHILIZED, FOR SOLUTION INTRAVENOUS at 08:02

## 2022-02-15 RX ADMIN — GABAPENTIN 300 MG: 300 CAPSULE ORAL at 09:02

## 2022-02-15 NOTE — PT/OT/SLP PROGRESS
Physical Therapy Treatment    Patient Name:  Hannah Norman   MRN:  [id number]    Recommendations:     Discharge Recommendations:   (TBD)   Discharge Equipment Recommendations: none   Barriers to discharge: decreased functional mobility/strength    Assessment:     Hannah Norman is a 72 y.o. female admitted with a medical diagnosis of Acute on chronic respiratory failure with hypercapnia.  She presents with the following impairments/functional limitations:  weakness,impaired endurance,impaired self care skills,impaired functional mobilty,impaired balance,gait instability,decreased coordination,decreased upper extremity function,decreased lower extremity function,decreased safety awareness,decreased ROM,impaired cardiopulmonary response to activity,impaired skin.  Pt would continue to benefit from P.T. To address impairments listed above.  .    Rehab Prognosis: Fair; patient would benefit from acute skilled PT services to address these deficits and reach maximum level of function.    Recent Surgery: * No surgery found *      Plan:     During this hospitalization, patient to be seen 5 x/week to address the identified rehab impairments via gait training,therapeutic activities,therapeutic exercises,neuromuscular re-education and progress toward the following goals:    · Plan of Care Expires:  03/14/22    Subjective       Patient/Family Comments/goals: Pt agreed to tx.  Pain/Comfort:  · Pain Rating 1: 0/10  · Pain Rating Post-Intervention 1: 0/10      Objective:     Communicated with RN prior to session.  Patient found supine with PureWick,telemetry,Trialysis,PICC line upon PT entry to room.     General Precautions: Standard, airborne,contact,droplet,fall   Orthopedic Precautions:N/A   Braces:    Respiratory Status: Room air     Functional Mobility:  · Bed Mobility:     · Rolling Left:  contact guard assistance and minimum assistance  · Rolling Right: contact guard assistance and minimum assistance  · Scooting: CGA to EOB and  Total of 2 up to HOB using drawsheet  · Supine to Sit: minimum assistance  · Sit to Supine: total assistance and of 2 persons  · Transfers:     · Sit to Stand:  minimum assistance and of 2 persons with rolling walker  · Balance: sitting fair/fair+, standing fair-      AM-PAC 6 CLICK MOBILITY  Turning over in bed (including adjusting bedclothes, sheets and blankets)?: 3  Sitting down on and standing up from a chair with arms (e.g., wheelchair, bedside commode, etc.): 2  Moving from lying on back to sitting on the side of the bed?: 3  Moving to and from a bed to a chair (including a wheelchair)?: 2  Need to walk in hospital room?: 2  Climbing 3-5 steps with a railing?: 1  Basic Mobility Total Score: 13       Therapeutic Activities and Exercises:   BP supine: 139/60mmHg, , O2 sats on room air 99 prior to sitting EOB.  Pt stood statically for ~2 mins with Rw and Alek/CGA while being assisted with hygiene needs and doffing and donning clean diaper.  Pt reported feeling weak & returned to sitting. Upon sitting pt reported increased dizziness then not responding to v/cs. Pt immediately returned to supine, unable to obtain BP & nsg notified. Pt became verbally responsive & oriented in supine after ~30 sec and was able to answer questions appropriately. Nsg took a manual BP - 86/62mmHg. Pt scooted to HOB & bed placed in trendelenburg with BP increasing to 142/68 after ~2 min. Bed placed in chair position at end of session & pt educated to sit up in bed for remainder of day as she is able to tolerate.     Patient left supien with HOB elevated with all lines intact, call button in reach, bed alarm on and RN present..    GOALS:   Multidisciplinary Problems     Physical Therapy Goals        Problem: Physical Therapy Goal    Goal Priority Disciplines Outcome Goal Variances Interventions   Physical Therapy Goal     PT, PT/OT Ongoing, Progressing     Description: Goals to be met by: 3/14/2022    Patient will increase  functional independence with mobility by performin. Supine to sit with Contact Guard Assistance  2. Sit to supine with Contact Guard Assistance  3. Rolling to Left and Right with Contact Guard Assistance.  4. Sit to stand transfer with Contact Guard Assistance  5. Bed to chair transfer with Contact Guard Assistance using Rolling Walker  6. Gait  x 50 feet with Contact Guard Assistance using Rolling Walker.   7. Ascend/descend 2 stair with Minimal Assistance                      Time Tracking:     PT Received On: 22  PT Start Time: 1222     PT Stop Time: 1300  PT Total Time (min): 38 min     Billable Minutes: Therapeutic Activity 15  co tx 23 mins       PT/PTA: PTA     PTA Visit Number: 1     2022

## 2022-02-15 NOTE — CONSULTS
Thank you for your consult to [facility]. We have reviewed the patient chart. This patient does meet criteria for [facility] service at this time. Will assume care on 2/15/22 at 7 am.

## 2022-02-15 NOTE — PLAN OF CARE
ARAVIND received 142 certification for pt. ARAVIND sent PASRR and 142 to pt's , ANEUDY Lassiter.       02/14/22 1231   Post-Acute Status   Post-Acute Authorization Placement   Post-Acute Placement Status   (received 142)

## 2022-02-15 NOTE — PT/OT/SLP PROGRESS
Physical Therapy      Patient Name:  Hannah Norman   MRN:  [id number]    1305 - Patient not seen today secondary to low H & H and receiving blood. Will follow-up next tx date.

## 2022-02-15 NOTE — PLAN OF CARE
ARAVIND received call from Hannah with [facility] [phone].    Hannah reported that she will review pt file. Hannah reported that she will call pt and start process.     Hannah returned SW call shortly after. Hannah reported that pt does not want to go [facility] any more. Hannah reported pt expressed she wants [facility]  because it is closer to her home.

## 2022-02-15 NOTE — PLAN OF CARE
Blood Product Patient Consent    I, Vanna Lomeli, have reviewed with the patient for medical decision maker the risks and benefits of receiving blood products in detail.These risks and benefits, as well as alternatives are discussed below, and patient was consented appropriately prior to receiving any blood products.    RISKS-  Risks of receiving blood include but are not limited to; fever, heart failure, incompatibility of blood resulting in a transfusion reaction which can further cause kidney failure or anemia either currently or after future transfusions, transmission of infectious diseases including hepatitis B, hepatitis C, HIV and other infections. It is not possible to test for all transmitted diseases at this time, and in spite of exercise of due care, there is possibility of some ill effects.    Additional risks have been explained to the patient as indicated here none    BENEFITS-  Benefits of blood transfusion can be life saving, and blood transfusion cannot be replaced with other therapies.  Blood transfusion may be needed to replace blood oxygen transfer or to replace clotting factors.    The medical reasoning for blood transfusion has been explained to the patient or healthcare decision maker as indicated here hemoglobin 6.7/anemia    ALTERNATE TREATMENT:  Alternatives to transfusion include donating her own blood, providing your own donors, and blood solids procedures.  Donation of your own blood and donation of donors you select takes much time and cannot be done on an emergency basis.  Blood salvage procedures refer to the collecting blood loss during surgery and returning it to the patient.  This can only be done in limited, well-defined circumstances, and is not available to all patient's for this region.    By entering this consent into the legal medical record, I authorize that the risk, benefits, and alternatives to blood transfusion have been explained to the patient.  I also authorize  that factors bearing on the decision whether to authorize transfusion or blood have been appropriately explained to the patient and the patient and/or medical decision maker has been given appropriate opportunity to select alternative therapies should that be medically appropriate. The patient and/or medical decision maker have been given an appropriate opportunity to ask questions, and all questions that they ask have been answered to the patient's full satisfaction.  I authorize that the patient or medical decision maker consents to such transfusion as indicated above.    Patient representative for whom consent was obtained (if not the patient)- self    Patient for from the treatment is to be given- Hannah Norman    Date and time that consent was obtained-, 2/15/2022, 10:10 AM    Witness who was present at the time of consent- spouse present    Physician or provider who provided consent- Vanna Lomeli

## 2022-02-15 NOTE — RESPIRATORY THERAPY
Pt is using home CPAP device. Pt made aware that she is not supposed to have unless approved by [employer]. Pt still wearing. Pt set up device herself.

## 2022-02-15 NOTE — PT/OT/SLP PROGRESS
Occupational Therapy      Patient Name:  Hannah Norman   MRN:  [id number]    Patient not seen today secondary to Blood transfusion. Will follow-up as able.    2/15/2022

## 2022-02-15 NOTE — PLAN OF CARE
ARAVIND contacted pt via [employer] to discuss dc planning.    Pt reported that she is now agreeable to [facility] because it is closer to her home.     ARAVIND expressed that she will be receiving a call from admissions from [facility].     ------------------------------------    ARAVIND called [place] [phone] from [facility]. ARAVIND expressed that pt is agreeable to facility. [place] reported that she will contact family and submit for auth.     ARAVIND sent all required documents as requested by [place] via [employer].    Pending Auth  Pending pt family signing admission forms.

## 2022-02-15 NOTE — PLAN OF CARE
Problem: Physical Therapy Goal  Goal: Physical Therapy Goal  Description: Goals to be met by: 3/14/2022    Patient will increase functional independence with mobility by performin. Supine to sit with Contact Guard Assistance  2. Sit to supine with Contact Guard Assistance  3. Rolling to Left and Right with Contact Guard Assistance.  4. Sit to stand transfer with Contact Guard Assistance  5. Bed to chair transfer with Contact Guard Assistance using Rolling Walker  6. Gait  x 50 feet with Contact Guard Assistance using Rolling Walker.   7. Ascend/descend 2 stair with Minimal Assistance     Outcome: Ongoing, Progressing   Continue working toward goals.

## 2022-02-15 NOTE — PLAN OF CARE
ARAVIND received call from [place] [phone] from [facility].    [place] reported that she has submitted for auth. [place] reported that pt is not answering phone. ARAVIND provided pts room number to [place]. ARAVIND provided [place] with email to send over admissions documents for pt and pts family to sign.     [place] reported that she will provide SW a call back with a status update.

## 2022-02-15 NOTE — PLAN OF CARE
ARAVIND called [facility] [phone]. Admissions department was unavailable at the time. ARAVIND left voice message for a return call.     ARAVIND will follow up at a later time.  ---------------------------------------------------    ARAVIND called [facility] and spoke with [place] [phone] with admissions. [place] reported that per careport note pt is still unsure if she willing to go to facility. [place] reported that once pt confirms she wants to go to facility she will start full process of reviewing pt for possibly placement.     ARAVIND will follow up at a later time to provide a status update after speaking with pt.     Future Appointments   Date Time Provider Department Center   2/15/2022 11:00 AM Raffi Garcia MD [facility] Med

## 2022-02-15 NOTE — PLAN OF CARE
ARAVIND received call from [place] [phone] from [facility].    Montana reported that pt  came to facility to sign all admissions forms.     [place]ana reported at this time they are pending Auth and dc orders.       ARAVIND will continue to follow pt throughout her transitions of care and assist with any dc needs.

## 2022-02-15 NOTE — PLAN OF CARE
SW spoke with pt via [employer] to discuss dc planning. Pts son was at bedside.     SW expressed that she has reached out to [facility] but admissions were unable to speak at the time. Pt reported that she is not against going to [facility] but would prefer [facility] [place]. Pt reported that she would like to hear an answer from [facility] [place] before making any decisions. SW expressed that she will give [facility] [place] a call back after her morning meeting.     Future Appointments   Date Time Provider Department Center   2/15/2022 11:00 AM Raffi Garcia MD [facility]

## 2022-02-16 ENCOUNTER — TELEPHONE (OUTPATIENT)
Dept: FAMILY MEDICINE | Facility: CLINIC | Age: 72
End: 2022-02-16
Payer: MEDICARE

## 2022-02-16 VITALS
DIASTOLIC BLOOD PRESSURE: 63 MMHG | OXYGEN SATURATION: 96 % | BODY MASS INDEX: 42.24 KG/M2 | SYSTOLIC BLOOD PRESSURE: 137 MMHG | HEART RATE: 97 BPM | RESPIRATION RATE: 18 BRPM | HEIGHT: 61 IN | TEMPERATURE: 99 F | WEIGHT: 223.75 LBS

## 2022-02-16 LAB
ANION GAP SERPL CALC-SCNC: 5 MMOL/L (ref 8–16)
BASOPHILS # BLD AUTO: 0.05 K/UL (ref 0–0.2)
BASOPHILS NFR BLD: 0.5 % (ref 0–1.9)
BUN SERPL-MCNC: 16 MG/DL (ref 8–23)
CALCIUM SERPL-MCNC: 8 MG/DL (ref 8.7–10.5)
CHLORIDE SERPL-SCNC: 104 MMOL/L (ref 95–110)
CO2 SERPL-SCNC: 31 MMOL/L (ref 23–29)
CREAT SERPL-MCNC: 1 MG/DL (ref 0.5–1.4)
DIFFERENTIAL METHOD: ABNORMAL
EOSINOPHIL # BLD AUTO: 0.3 K/UL (ref 0–0.5)
EOSINOPHIL NFR BLD: 2.9 % (ref 0–8)
ERYTHROCYTE [DISTWIDTH] IN BLOOD BY AUTOMATED COUNT: 14.9 % (ref 11.5–14.5)
EST. GFR  (AFRICAN AMERICAN): >60 ML/MIN/1.73 M^2
EST. GFR  (NON AFRICAN AMERICAN): 56 ML/MIN/1.73 M^2
GLUCOSE SERPL-MCNC: 134 MG/DL (ref 70–110)
HCT VFR BLD AUTO: 22.2 % (ref 37–48.5)
HGB BLD-MCNC: 7.2 G/DL (ref 12–16)
IMM GRANULOCYTES # BLD AUTO: 0.16 K/UL (ref 0–0.04)
IMM GRANULOCYTES NFR BLD AUTO: 1.7 % (ref 0–0.5)
LYMPHOCYTES # BLD AUTO: 1.5 K/UL (ref 1–4.8)
LYMPHOCYTES NFR BLD: 16.2 % (ref 18–48)
MCH RBC QN AUTO: 31.6 PG (ref 27–31)
MCHC RBC AUTO-ENTMCNC: 32.4 G/DL (ref 32–36)
MCV RBC AUTO: 97 FL (ref 82–98)
MONOCYTES # BLD AUTO: 1 K/UL (ref 0.3–1)
MONOCYTES NFR BLD: 10.6 % (ref 4–15)
NEUTROPHILS # BLD AUTO: 6.3 K/UL (ref 1.8–7.7)
NEUTROPHILS NFR BLD: 68.1 % (ref 38–73)
NRBC BLD-RTO: 0 /100 WBC
PLATELET # BLD AUTO: 285 K/UL (ref 150–450)
PMV BLD AUTO: 9.6 FL (ref 9.2–12.9)
POCT GLUCOSE: 120 MG/DL (ref 70–110)
POCT GLUCOSE: 124 MG/DL (ref 70–110)
POTASSIUM SERPL-SCNC: 3.2 MMOL/L (ref 3.5–5.1)
RBC # BLD AUTO: 2.28 M/UL (ref 4–5.4)
SODIUM SERPL-SCNC: 140 MMOL/L (ref 136–145)
WBC # BLD AUTO: 9.27 K/UL (ref 3.9–12.7)

## 2022-02-16 PROCEDURE — 25000003 PHARM REV CODE 250: Performed by: NURSE PRACTITIONER

## 2022-02-16 PROCEDURE — 97530 THERAPEUTIC ACTIVITIES: CPT | Mod: CO

## 2022-02-16 PROCEDURE — 97530 THERAPEUTIC ACTIVITIES: CPT | Mod: CQ

## 2022-02-16 PROCEDURE — 97110 THERAPEUTIC EXERCISES: CPT | Mod: CO

## 2022-02-16 PROCEDURE — 85025 COMPLETE CBC W/AUTO DIFF WBC: CPT | Performed by: NURSE PRACTITIONER

## 2022-02-16 PROCEDURE — C9113 INJ PANTOPRAZOLE SODIUM, VIA: HCPCS | Performed by: INTERNAL MEDICINE

## 2022-02-16 PROCEDURE — A4216 STERILE WATER/SALINE, 10 ML: HCPCS | Performed by: INTERNAL MEDICINE

## 2022-02-16 PROCEDURE — 80048 BASIC METABOLIC PNL TOTAL CA: CPT | Performed by: NURSE PRACTITIONER

## 2022-02-16 PROCEDURE — 97110 THERAPEUTIC EXERCISES: CPT | Mod: CQ

## 2022-02-16 PROCEDURE — 63600175 PHARM REV CODE 636 W HCPCS: Performed by: INTERNAL MEDICINE

## 2022-02-16 PROCEDURE — 25000003 PHARM REV CODE 250: Performed by: HOSPITALIST

## 2022-02-16 PROCEDURE — 25000003 PHARM REV CODE 250: Performed by: INTERNAL MEDICINE

## 2022-02-16 PROCEDURE — 97116 GAIT TRAINING THERAPY: CPT | Mod: CQ

## 2022-02-16 PROCEDURE — 94761 N-INVAS EAR/PLS OXIMETRY MLT: CPT

## 2022-02-16 RX ORDER — CLONAZEPAM 0.5 MG/1
TABLET ORAL
Qty: 14 TABLET | Refills: 0 | Status: SHIPPED | OUTPATIENT
Start: 2022-02-16 | End: 2022-03-10 | Stop reason: SDUPTHER

## 2022-02-16 RX ORDER — POTASSIUM CHLORIDE 20 MEQ/1
40 TABLET, EXTENDED RELEASE ORAL ONCE
Status: COMPLETED | OUTPATIENT
Start: 2022-02-16 | End: 2022-02-16

## 2022-02-16 RX ORDER — OXYCODONE AND ACETAMINOPHEN 10; 325 MG/1; MG/1
1 TABLET ORAL EVERY 6 HOURS PRN
Qty: 20 TABLET | Refills: 0 | Status: SHIPPED | OUTPATIENT
Start: 2022-02-16 | End: 2022-03-10 | Stop reason: SDUPTHER

## 2022-02-16 RX ORDER — CYCLOBENZAPRINE HCL 10 MG
10 TABLET ORAL 3 TIMES DAILY PRN
Start: 2022-02-16 | End: 2022-07-14

## 2022-02-16 RX ORDER — PANTOPRAZOLE SODIUM 40 MG/1
40 TABLET, DELAYED RELEASE ORAL 2 TIMES DAILY
Qty: 90 TABLET | Refills: 3
Start: 2022-02-16 | End: 2022-04-11 | Stop reason: SDUPTHER

## 2022-02-16 RX ORDER — FUROSEMIDE 40 MG/1
40 TABLET ORAL DAILY
Qty: 180 TABLET | Refills: 3
Start: 2022-02-16 | End: 2023-03-08

## 2022-02-16 RX ORDER — VENLAFAXINE HYDROCHLORIDE 150 MG/1
CAPSULE, EXTENDED RELEASE ORAL
Qty: 180 CAPSULE | Refills: 3 | Status: SHIPPED | OUTPATIENT
Start: 2022-02-16 | End: 2022-09-09 | Stop reason: SDUPTHER

## 2022-02-16 RX ADMIN — INSULIN DETEMIR 12 UNITS: 100 INJECTION, SOLUTION SUBCUTANEOUS at 08:02

## 2022-02-16 RX ADMIN — PANTOPRAZOLE SODIUM 40 MG: 40 INJECTION, POWDER, LYOPHILIZED, FOR SOLUTION INTRAVENOUS at 08:02

## 2022-02-16 RX ADMIN — GABAPENTIN 300 MG: 300 CAPSULE ORAL at 08:02

## 2022-02-16 RX ADMIN — SODIUM CHLORIDE, PRESERVATIVE FREE 10 ML: 5 INJECTION INTRAVENOUS at 11:02

## 2022-02-16 RX ADMIN — SODIUM CHLORIDE, PRESERVATIVE FREE 10 ML: 5 INJECTION INTRAVENOUS at 05:02

## 2022-02-16 RX ADMIN — GABAPENTIN 300 MG: 300 CAPSULE ORAL at 02:02

## 2022-02-16 RX ADMIN — POTASSIUM CHLORIDE 40 MEQ: 1500 TABLET, EXTENDED RELEASE ORAL at 10:02

## 2022-02-16 RX ADMIN — OXYCODONE HYDROCHLORIDE AND ACETAMINOPHEN 500 MG: 500 TABLET ORAL at 08:02

## 2022-02-16 RX ADMIN — VENLAFAXINE HYDROCHLORIDE 150 MG: 75 CAPSULE, EXTENDED RELEASE ORAL at 08:02

## 2022-02-16 RX ADMIN — SODIUM CHLORIDE, PRESERVATIVE FREE 10 ML: 5 INJECTION INTRAVENOUS at 12:02

## 2022-02-16 RX ADMIN — CEFTRIAXONE 1 G: 1 INJECTION, SOLUTION INTRAVENOUS at 12:02

## 2022-02-16 RX ADMIN — AMLODIPINE BESYLATE 10 MG: 5 TABLET ORAL at 08:02

## 2022-02-16 NOTE — ASSESSMENT & PLAN NOTE
Home antihypertensives initially held due to hypotension requiring vasopressors.  Now hypertensive.  Amlodipine started.

## 2022-02-16 NOTE — PLAN OF CARE
Problem: Physical Therapy Goal  Goal: Physical Therapy Goal  Description: Goals to be met by: 3/14/2022    Patient will increase functional independence with mobility by performin. Supine to sit with Contact Guard Assistance  2. Sit to supine with Contact Guard Assistance  3. Rolling to Left and Right with Contact Guard Assistance.  4. Sit to stand transfer with Contact Guard Assistance  5. Bed to chair transfer with Contact Guard Assistance using Rolling Walker  6. Gait  x 50 feet with Contact Guard Assistance using Rolling Walker.   7. Ascend/descend 2 stair with Minimal Assistance     Outcome: Ongoing, Progressing   Pt continues to work and progress toward all goals. Able to perform 3 trials of sit to stand transfers from EOB with RW requiring max-mod/min A. 1st trial max A and 2nd/3rd trial min/mod A. Able to take ~4-5 turning steps to sit in B/S chair with RW requiring close min/mod A.

## 2022-02-16 NOTE — PLAN OF CARE
Pt will dc to [facility].    SW set up ambulance transportation for 2:30 pm.     SW will continue to follow pt throughout her transitions of care and assist with any dc needs.    SW has notified pt that she will transfer to facility today.   Pt reported that she will bring her CPAP machine to facility.     ARAVIND spoke with Aline [phone]  from [facility]. Aline reported that pt is clear to come to facility. Aline reported that she has received all documents needed.     Report# [phone]  Room# 209    Pending PCP.    Future Appointments   Date Time Provider Department Center   3/10/2022  3:30 PM Lake Dominique MD [facility]        02/16/22 1340   Final Note   Assessment Type Final Discharge Note   Anticipated Discharge Disposition SNF   What phone number can be called within the next 1-3 days to see how you are doing after discharge? [phone]   Hospital Resources/Appts/Education Provided Appointments scheduled by Navigator/Coordinator   Post-Acute Status   Discharge Delays None known at this time

## 2022-02-16 NOTE — ASSESSMENT & PLAN NOTE
Elevated CO2 on ABG, currently on BIPAP  Reassess with ABG with worsened CO2, settings adjusted and adjusting face mask for better fit  Hypoxia likely related to COVID19, was 93% on room air on arrival  -suspect that patient's encephalopathy secondary to elevated CO2 vs uremia  -Pulm following  -Required intubation. Successfully extubated 2/8 to bipap  -now on vapotherm during day with bipap qhs  -weaned to room air. Cont bipap qhs due to ge/ohs  -discharge planning. Awaiting pt/ot eval. Will likely benefit from snf vs ipr-unable to participate with PT today due to anemia.

## 2022-02-16 NOTE — PLAN OF CARE
Problem: Occupational Therapy Goal  Goal: Occupational Therapy Goal  Description: Goals to be met by: 3/12/2022     Patient will increase functional independence with ADLs by performing:    UE Dressing with Modified [place].  LE Dressing with Set-up Assistance.  Grooming while standing with Set-up Assistance.  Toileting from toilet with Supervision for hygiene and clothing management.   Supine to sit with Modified independence.  Step transfer with Supervision & appropriate AD.   Toilet transfer to toilet with Supervision & appropriate AD.  Increased functional strength to WFL for self-care.    Outcome: Ongoing, Progressing   Hannah Norman is a 72 y.o. female with a medical diagnosis of Acute on chronic respiratory failure with hypercapnia.   Performance deficits affecting function are weakness,impaired endurance,impaired self care skills,impaired functional mobilty,gait instability,impaired balance,decreased upper extremity function,decreased lower extremity function,impaired skin,edema,impaired cardiopulmonary response to activity. Pt found in chair, agreeable to therapy.  Pt required CGA/Min A for transfer using RW.  Pt with improved performance and tolerance of therapy. She is progressing well towards goals. Continue OT services to address functional goals, progressing as able.

## 2022-02-16 NOTE — TELEPHONE ENCOUNTER
----- Message from Sophia Baker sent at 2/16/2022  1:54 PM CST -----  Regarding: Hosp f/u appt  Patient is preparing for discharge from [facility] and is requiring a hospital follow up appointment with Dr.St Chapman within 7 days.  If possible, can you please assist with scheduling this patient and message me back?        Thank you,    Sophia Baker  High End Access Navigator/[place] discharge project   [facility]

## 2022-02-16 NOTE — PLAN OF CARE
NURSING HOME ORDERS    02/16/2022  [facility] - TELEMETRY  [address] [place] [address]  Dept: [phone]  Loc: [phone]     Admit to Nursing Home:  Skilled Nursing Facility    Diagnoses:  Active Hospital Problems    Diagnosis  POA    *Acute on chronic respiratory failure with hypercapnia [J96.22]  Yes    Acute blood loss anemia [D62]  No    Obesity hypoventilation syndrome [E66.2]  Yes    Atrial fibrillation with RVR [I48.91]  No    Acute metabolic encephalopathy [G93.41]  Yes    Shock, unspecified [R57.9]  Yes    Hyperglycemia [R73.9]  Yes    Leukocytosis [D72.829]  Yes    UTI (urinary tract infection) [N39.0]  Yes    High anion gap metabolic acidosis [E87.2]  Yes    Acute renal failure with tubular necrosis [N17.0]  Yes    COVID-19 [U07.1]  Yes    Unspecified mood (affective) disorder [F39]  Yes    Morbid obesity with BMI of 45.0-49.9, adult [E66.01, Z68.42]  Not Applicable     Chronic     Current BMI 41.69      MARY (obstructive sleep apnea) [G47.33]  Yes    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]  Yes    Osteoarthritis [M19.90]  Yes    Restless leg syndrome [G25.81]  Yes    Anxiety disorder [F41.9]  Yes    Chronic back pain [M54.9, G89.29]  Yes    Essential hypertension [I10]  Yes    Fibromyalgia [M79.7]  Yes    Mild acid reflux [K21.9]  Yes    Mixed hyperlipidemia [E78.2]  Yes      Resolved Hospital Problems    Diagnosis Date Resolved POA    Hyperkalemia [E87.5] 02/06/2022 Yes       Code Status: DNR    Patient is homebound due to:  Acute on chronic respiratory failure with hypercapnia    Allergies:  Review of patient's allergies indicates:   Allergen Reactions    Hyoscyamine Rash    Msg [glutamic acid] Swelling       Vitals:  Every shift    Diet: regular diet    Activities:   Activity as tolerated    Labs:  Per facility protocol     Nursing Precautions:  Fall    Consults:   PT to evaluate and treat- 5 times a week and OT to evaluate and treat- 5 times a  week       CPAP qhs @ 5             Medications: Discontinue all previous medication orders, if any. See new list below.     Medication List      CHANGE how you take these medications    cyclobenzaprine 10 MG tablet  Commonly known as: FLEXERIL  Take 1 tablet (10 mg total) by mouth 3 (three) times daily as needed for Muscle spasms.  What changed: reasons to take this     furosemide 40 MG tablet  Commonly known as: LASIX  Take 1 tablet (40 mg total) by mouth once daily.  What changed: See the new instructions.     oxyCODONE-acetaminophen  mg per tablet  Commonly known as: PERCOCET  Take 1 tablet by mouth every 8 (eight) hours as needed.  What changed: Another medication with the same name was removed. Continue taking this medication, and follow the directions you see here.     pantoprazole 40 MG tablet  Commonly known as: PROTONIX  Take 1 tablet (40 mg total) by mouth 2 (two) times daily.  What changed: when to take this        CONTINUE taking these medications    acetaminophen 500 MG tablet  Commonly known as: TYLENOL  Take 500 mg by mouth every 6 (six) hours as needed.     albuterol 90 mcg/actuation inhaler  Commonly known as: PROVENTIL/VENTOLIN HFA  Inhale 2 puffs into the lungs every 6 (six) hours as needed for Wheezing or Shortness of Breath. Rescue     clonazePAM 0.5 MG tablet  Commonly known as: KlonoPIN  2  pills PO as needed at bedtime for the RLS     fluticasone propionate 50 mcg/actuation nasal spray  Commonly known as: FLONASE  1 spray (50 mcg total) by Each Nostril route once daily.     gabapentin 600 MG tablet  Commonly known as: NEURONTIN  TAKE 1 TABLET BY MOUTH THREE TIMES A DAY     imipramine 25 MG tablet  Commonly known as: TOFRANIL  TAKE 1 TABLET BY MOUTH EVERY EVENING TO HELP CALM STOMACH     losartan 100 MG tablet  Commonly known as: COZAAR  TAKE 1 TABLET BY MOUTH ONCE A DAY FOR BLOOD PRESSURE     lovastatin 20 MG tablet  Commonly known as: MEVACOR  TAKE 1 TABLET BY MOUTH EVERY EVENING      mometasone 0.1 % ointment  Commonly known as: ELOCON  Apply topically once daily. For 7 days and may repeat.     potassium chloride SA 20 MEQ tablet  Commonly known as: K-DUR,KLOR-CON  TAKE 1 TABLET BY MOUTH ONCE A DAY     pramipexole 0.125 MG tablet  Commonly known as: MIRAPEX  TAKE 2 TABLETS BY MOUTH IN THE LATE AFTERNOON AND TAKE 4 TABLETS BY MOUTH AT BEDTIME     venlafaxine 150 MG Cp24  Commonly known as: EFFEXOR-XR  1 tablet by mouth in morning and 1 tablet by mouth at night        STOP taking these medications    morphine 15 MG 12 hr tablet  Commonly known as: MS CONTIN     promethazine-dextromethorphan 6.25-15 mg/5 mL Syrp  Commonly known as: PROMETHAZINE-DM              Immunizations Administered as of 2/16/2022     Name Date Dose VIS Date Route Exp Date    COVID-19, MRNA, LN-S, PF (Moderna) 3/11/2021 -- -- -- --    Site: Left arm     Lot: [id number]     COVID-19, MRNA, LN-S, PF (Moderna) 2/11/2021 -- -- -- --    Site: Left arm     Lot: [id number]               _________________________________  River Baldwin MD  02/16/2022

## 2022-02-16 NOTE — PT/OT/SLP PROGRESS
"Physical Therapy Treatment    Patient Name:  Hannah Norman   MRN:  [id number]    Recommendations:     Discharge Recommendations:   (TBD (post acute placement))   Discharge Equipment Recommendations: none   Barriers to discharge: None    Assessment:     Hannah Norman is a 72 y.o. female admitted with a medical diagnosis of Acute on chronic respiratory failure with hypercapnia.  She presents with the following impairments/functional limitations:  weakness,impaired endurance,impaired self care skills,impaired functional mobilty,impaired balance,gait instability,decreased coordination,decreased upper extremity function,decreased lower extremity function,decreased safety awareness,decreased ROM,impaired coordination,impaired skin,edema,impaired cardiopulmonary response to activity. Pt able to perform 3 sit to stand transfer trials form EOB with RW requiring max A for 1st trial and min/mod A for 2nd and 3rd trials. Also, took ~4-5 turning steps to sit in B/S chair with RW requiring close min/mod A. Would benefit from continued PT services to increase pt's out of bed therpaeutic activity and exercise.    Rehab Prognosis: Good; patient would benefit from acute skilled PT services to address these deficits and reach maximum level of function.    Recent Surgery: * No surgery found *      Plan:     During this hospitalization, patient to be seen 5 x/week to address the identified rehab impairments via gait training,therapeutic activities,therapeutic exercises,neuromuscular re-education and progress toward the following goals:    · Plan of Care Expires:  03/14/22    Subjective     Chief Complaint: None Expressed  Patient/Family Comments/goals: "My legs feel weak".  Pain/Comfort:  · Pain Rating 1: 0/10  · Pain Rating Post-Intervention 1: 0/10      Objective:     Communicated with nurse prior to session.  Patient found supine with bed alarm,telemetry,Trialysis upon PT entry to room.     General Precautions: Standard, " airborne,contact,droplet,fall   Orthopedic Precautions:N/A   Braces: N/A  Respiratory Status: Room air     Functional Mobility:  · Bed Mobility:     · Rolling Right: contact guard assistance and  Increased time and using bed rail with HOB elevated  · Scooting: stand by assistance and contact guard assistance  · Supine to Sit: contact guard assistance, minimum assistance and  with extra time needed, HOB elevated, and using bed rail  · Transfers:     · Sit to Stand:  minimum assistance, moderate assistance, maximal assistance and  max a for 1st trial, min/mod A for 2nd and 3rd with rolling walker  · Gait:  ~4-5 turning steps with RW requiring close min/mod A      AM-PAC 6 CLICK MOBILITY  Turning over in bed (including adjusting bedclothes, sheets and blankets)?: 3  Sitting down on and standing up from a chair with arms (e.g., wheelchair, bedside commode, etc.): 3  Moving from lying on back to sitting on the side of the bed?: 3  Moving to and from a bed to a chair (including a wheelchair)?: 3  Need to walk in hospital room?: 2  Climbing 3-5 steps with a railing?: 1  Basic Mobility Total Score: 15       Therapeutic Activities and Exercises:   Pt able to perform 3 sit to stand transfer trials with RW requiring max for 1st trial and min/mod A for 2nd and 3rd trial. Also, able to perform ~4-5 turning steps using RW requiring close min/mod A. Decreased clearance of feet from floor. Seated therapeutic exercises 1 x 10 reps BLE's consisting of LAQ's and hip add/abd. 1 x 10 glut isometrics. Pt requires rest breaks between each sit to stand trial as well as all other functional mobility.    Patient left up in chair with all lines intact, call button in reach, chair alarm on,  nurse notified and   present..    GOALS:   Multidisciplinary Problems     Physical Therapy Goals        Problem: Physical Therapy Goal    Goal Priority Disciplines Outcome Goal Variances Interventions   Physical Therapy Goal     PT, PT/OT Ongoing,  Progressing     Description: Goals to be met by: 3/14/2022    Patient will increase functional independence with mobility by performin. Supine to sit with Contact Guard Assistance  2. Sit to supine with Contact Guard Assistance  3. Rolling to Left and Right with Contact Guard Assistance.  4. Sit to stand transfer with Contact Guard Assistance  5. Bed to chair transfer with Contact Guard Assistance using Rolling Walker  6. Gait  x 50 feet with Contact Guard Assistance using Rolling Walker.   7. Ascend/descend 2 stair with Minimal Assistance                      Time Tracking:     PT Received On: 22  PT Start Time: 848     PT Stop Time: 926  PT Total Time (min): 38 min     Billable Minutes: Gait Training  8, Therapeutic Activity  15 and Therapeutic Exercise  15    Treatment Type: Treatment  PT/PTA: PTA     PTA Visit Number: 2     2022

## 2022-02-16 NOTE — ASSESSMENT & PLAN NOTE
Urinalysis relatively unremarkable, may be asymptomatic bacteriuria but in the setting of shock will continue antibiotics  Blood cultures ngtd  IV Rocephin completed  Repeat UA 2/12 concerning for UTI  Resume ceftriaxone. Check urine cx-multiple organisms, none in predominance.

## 2022-02-16 NOTE — ASSESSMENT & PLAN NOTE
Suspected GI bleed. Reports episode of melanotic stool.  Hemoccult positive w/ down trending H/H  Trend H/H  Transfuse to keep Hb>7  GI consulted  H/H has stabilized  Cont to monitor  2/15-hgb 6.9.  Transfused one unit PRBCs.    2/16- resolved; likely upper source; H/H stable; will send out on PPI BID and will have GI follow up

## 2022-02-16 NOTE — HOSPITAL COURSE
Hannah Norman was monitored closely during her hospital stay.  She was admitted with a diagnosis of septic shock and acute respiratory failure, likely secondary to COVID 19 infection.  She was also found to have an RAQUEL with a creatinine of 7.1 upon admission, previously normal.  She initially required bipap due to hypercapnia. She required vasopressors and was initially monitored in ICU.  She was placed on a course of IV remdesivir, as well as IV rocephin for UTI.  Nephrology service was consulted and a trialysis catheter was placed.  She underwent CRRT.  Nutrition was consulted due to her NPO status.  Despite continued bipap, her respiratory status continued to decompensate and she required intubation.  Pulmonology specialty was consulted.  She later went into atrial fibrillation with RVR and was placed on an amiodarone drip with good rate control achieved.  Vent weaning was initiated.  Goals of care conversation was conducted with the patient's family.  It was their position that patient would not want to be reintubated should extubation to bipap be unsuccessful, nor would she want chest compressions in the event she were to code while off the vent.  Her code status was changed to DNR.  Pt was successfully extubated to bipap later in the day.  She remained on vasopressors and amiodarone.  CRRT was continued and she tolerated well.  Her renal function began to improve.

## 2022-02-16 NOTE — ASSESSMENT & PLAN NOTE
[place]ley related to UTI vs COVID19  CXR was with no pneumonia  Urine with UTI  Blood cultures no growth to date  Lactate 1.1  Trend CBC  Cultures remain negative  IV vanco discontinued. Rocephin completed  WBC trending up. No clear source of infection identified. Possibly due to steroids. Check UA, blood cx, CXR  Suspected UTI. Start ceftriaxone. Urine culture    Trending down; continue to monitor

## 2022-02-16 NOTE — PROGRESS NOTES
[facility] Medicine  Telemedicine Progress Note    Patient Name: Hannah Norman  MRN: [id number]  Patient Class: IP- Inpatient   Admission Date: 2/3/2022  Length of Stay: 12 days  Attending Physician: Cortney Lovell MD  Primary Care Provider: Raffi Garcia MD          Subjective:     Principal Problem:Acute on chronic respiratory failure with hypercapnia          HPI:  Ms. Norman is a 71 year old female with a medical history that includes anxiety disorder, bell's palsy, chronic back pain, chronic osteoarthritis, essential tremor, fibromyalgia, hypertension, GERD, hyperlipidemia, sleep apnea, and mood disorder. She presented to the ED via EMS with spouse who reports patient to be increasingly fatigued and weak. He reports over the past 2 weeks she was diagnosed with COVID19 and was ending their quarantine phase. She has been progressively weaker and unable to ambulate as usual with her walker. Her symptoms worsened over the past 2 days where she was not communicating at all. She is with no cough, vomiting, diarrhea or fevers. Her spouse contributed to her history during this visit. On arrival to the ED she was slightly hypoxic at 93% and hypertensive. Significant labs were with the following: wbc 18.97, Na 131, K 5.6, BUN 69, Cr 7.1, COVID19 +, urine with 3+ leukocytes and many bacteria, pH 7.1 and CO2 65.4. CT head and CXR with no acute findings. She was given neb treatment, Rocephin, Lokelma, reg insulin and NS bolus. She will admit to [facility] hospital medicine service for continued monitoring.           Overview/Hospital Course:  Hannah Norman was monitored closely during her hospital stay.  She was admitted with a diagnosis of septic shock and acute respiratory failure, likely secondary to COVID 19 infection.  She was also found to have an RAQUEL with a creatinine of 7.1 upon admission, previously normal.  She initially required bipap due to hypercapnia. She required vasopressors  and was initially monitored in ICU.  She was placed on a course of IV remdesivir, as well as IV rocephin for UTI.  Nephrology service was consulted and a trialysis catheter was placed.  She underwent CRRT.  Nutrition was consulted due to her NPO status.  Despite continued bipap, her respiratory status continued to decompensate and she required intubation.  Pulmonology specialty was consulted.  She later went into atrial fibrillation with RVR and was placed on an amiodarone drip with good rate control achieved.  Vent weaning was initiated.  Goals of care conversation was conducted with the patient's family.  It was their position that patient would not want to be reintubated should extubation to bipap be unsuccessful, nor would she want chest compressions in the event she were to code while off the vent.  Her code status was changed to DNR.  Pt was successfully extubated to bipap later in the day.  She remained on vasopressors and amiodarone.  CRRT was continued and she tolerated well.  Her renal function began to improve.      Interval History: no acute events overnight; however, hemoglobin 6.7 this morning.  One unit PRBCs ordered.  D/w patient and her  and they are both in agreement.    Review of Systems   Constitutional: Positive for activity change and fatigue. Negative for appetite change, chills, diaphoresis and fever.   HENT: Negative for congestion, postnasal drip, rhinorrhea, sinus pressure, sinus pain and sneezing.    Respiratory: Negative for cough, chest tightness, shortness of breath, wheezing and stridor.    Cardiovascular: Negative for chest pain, palpitations and leg swelling.   Gastrointestinal: Negative for abdominal distention, abdominal pain, blood in stool, constipation, diarrhea and nausea.   Endocrine: Negative for cold intolerance and heat intolerance.   Genitourinary: Negative for decreased urine volume, dysuria, flank pain and hematuria.   Musculoskeletal: Negative for gait problem.    Neurological: Positive for weakness. Negative for dizziness.   Psychiatric/Behavioral: Negative for agitation, behavioral problems and confusion.     Objective:     Vital Signs (Most Recent):  Temp: 98.9 °F (37.2 °C) (02/15/22 1709)  Pulse: 101 (02/15/22 1647)  Resp: 20 (02/15/22 1647)  BP: (!) 141/65 (02/15/22 1647)  SpO2: 99 % (02/15/22 1647) Vital Signs (24h Range):  Temp:  [96.9 °F (36.1 °C)-100.1 °F (37.8 °C)] 98.9 °F (37.2 °C)  Pulse:  [] 101  Resp:  [18-20] 20  SpO2:  [91 %-99 %] 99 %  BP: (129-171)/(58-90) 141/65     Weight: 101.5 kg (223 lb 12.3 oz)  Body mass index is 42.28 kg/m².    Intake/Output Summary (Last 24 hours) at 2/15/2022 1922  Last data filed at 2/15/2022 1805  Gross per 24 hour   Intake 680.83 ml   Output 1050 ml   Net -369.17 ml      Physical Exam  Vitals and nursing note reviewed.   Constitutional:       General: She is not in acute distress.     Appearance: She is morbidly obese.      Interventions: She is sedated and restrained.   HENT:      Head: Normocephalic and atraumatic.      Nose: Nose normal. No congestion.   Cardiovascular:      Rate and Rhythm: Normal rate.   Pulmonary:      Effort: Pulmonary effort is normal. No respiratory distress.   Musculoskeletal:         General: Normal range of motion.      Cervical back: Normal range of motion. No rigidity.      Right lower leg: No edema.      Left lower leg: No edema.   Skin:     General: Skin is dry.   Neurological:      General: No focal deficit present.      Mental Status: She is alert and oriented to person, place, and time. Mental status is at baseline.      Motor: No weakness.   Psychiatric:         Mood and Affect: Mood normal.         Behavior: Behavior normal.         Significant Labs:   All pertinent labs within the past 24 hours have been reviewed.  CBC:   Recent Labs   Lab 02/14/22  0431 02/14/22  0836 02/15/22  0433   WBC 18.90*  --  14.60*   HGB 7.2* 7.3* 6.7*   HCT 21.6*  --  21.0*     --  330     CMP:    Recent Labs   Lab 02/15/22  0437      K 3.3*      CO2 29   *   BUN 19   CREATININE 1.0   CALCIUM 8.4*   ANIONGAP 8   EGFRNONAA 56*       Significant Imaging: I have reviewed all pertinent imaging results/findings within the past 24 hours.      Assessment/Plan:      * Acute on chronic respiratory failure with hypercapnia  Elevated CO2 on ABG, currently on BIPAP  Reassess with ABG with worsened CO2, settings adjusted and adjusting face mask for better fit  Hypoxia likely related to COVID19, was 93% on room air on arrival  -suspect that patient's encephalopathy secondary to elevated CO2 vs uremia  -Pulm following  -Required intubation. Successfully extubated 2/8 to bipap  -now on vapotherm during day with bipap qhs  -weaned to room air. Cont bipap qhs due to ge/ohs  -discharge planning. Awaiting pt/ot eval. Will likely benefit from snf vs ipr-unable to participate with PT today due to anemia.    Acute blood loss anemia  Suspected GI bleed. Reports episode of melanotic stool.  Hemoccult positive w/ down trending H/H  Trend H/H  Transfuse to keep Hb>7  GI consulted  H/H has stabilized  Cont to monitor  2/15-hgb 6.9.  Transfused one unit PRBCs.      Obesity hypoventilation syndrome  BIPAP QHS      Encephalopathy, metabolic        Acute hypercapnic respiratory failure        Atrial fibrillation with RVR  - went into afib w/ RVR overnight following episode of bradycardia and vasovagal  - given fluid and will maintain net even today on CRRT  - initiated on amiodarone gtt with improvement in rate control  - amio discontinued    Shock        Hypervolemia        Metabolic acidosis        RAQUEL (acute kidney injury)        Transaminitis  - in setting of shock  - monitor for now; can consider escalation of w/u if persists      Hyperglycemia  Hemoglobin A1C   Date Value Ref Range Status   11/10/2021 6.2 (H) 4.0 - 5.6 % Final     Comment:     ADA Screening Guidelines:  5.7-6.4%  Consistent with  prediabetes  >or=6.5%  Consistent with diabetes    High levels of fetal hemoglobin interfere with the HbA1C  assay. Heterozygous hemoglobin variants (HbS, HgC, etc)do  not significantly interfere with this assay.   However, presence of multiple variants may affect accuracy.     04/26/2021 5.9 (H) 4.0 - 5.6 % Final     Comment:     ADA Screening Guidelines:  5.7-6.4%  Consistent with prediabetes  >or=6.5%  Consistent with diabetes    High levels of fetal hemoglobin interfere with the HbA1C  assay. Heterozygous hemoglobin variants (HbS, HgC, etc)do  not significantly interfere with this assay.   However, presence of multiple variants may affect accuracy.     11/10/2020 6.0 (H) 4.0 - 5.6 % Final     Comment:     ADA Screening Guidelines:  5.7-6.4%  Consistent with prediabetes  >or=6.5%  Consistent with diabetes  High levels of fetal hemoglobin interfere with the HbA1C  assay. Heterozygous hemoglobin variants (HbS, HgC, etc)do  not significantly interfere with this assay.   However, presence of multiple variants may affect accuracy.           - LDSSI with accuchecks qachs        Shock, unspecified  -likely distributive due to acidosis and renal failure versus septic shock  -currently requiring Levophed with addition of vasopressin and stress dose steroids   -nephrology consulted for CRRT  -continue IV antibiotics for possible infectious source, thought less likely  -possibly worsening due to too much volume removal  -COVID therapy as above  -AM cortisol low  -started on hydrocortisone/fludrocortisone  -has been weaned off pressors  -hydrocortisone/fludrocortisone discontinued  -resolved    Hyperphosphatemia  -in the setting of acute renal failure  -on CRRT  -nephrology consulted      Hypocalcemia  -in the setting of acute renal failure  -dialysis; will replace  -nephrology consulted      Acute metabolic encephalopathy  Reported by spouse to be with increased drowsiness and altered mentation. She has been sleeping a lot  more than usual and is not alert. Found to be with renal failure, COVID19, and metabolic acidosis    CT head with no acute findings, ammonia level wnl  Will hold all sedating home meds for now  Currently on BIPAP for hypercapnia, which is likely contributing  -renal failure with high anion gap metabolic acidosis and uremia; initiated on dialysis  -pulmonology and nephrology consulted  -resolved    COVID-19  COVID positive; initiated on protocol  CXR with no infiltrate, requiring O2  -doubt that current presentation is due to COVID; will hold remdesivir and dexamethasone  Heparin ppx  inhalers prn  MVI, ascorbic acid, incentive spirometry  statin  supplemental O2, will wean as tolerated  prn tylenol, loperamide  Contact/airborne    Acute renal failure with tubular necrosis  Significant RAQUEL with creatinine of 7.1 from normal just 3 months ago, with acidosis, uremia, hyperkalemia, hyperphosphatemia, and hypocalcemia  Renal ultrasound   -Vila catheter placed  Consulted Nephrology  Strict I&O's  -initiated on CRRT 2/4  -kidney function improving  -no longer requiring dialysis-stable        High anion gap metabolic acidosis  With initial pH of 7.188  Trend ABG  Likely related to acute renal failure  -bicarbonate drip started on admission, now discontinued  -initiated on CRRT>HD  -bicarb tabs  -nephrology following  -resolved. Discontinue bicarb tabs.    UTI (urinary tract infection)  Urinalysis relatively unremarkable, may be asymptomatic bacteriuria but in the setting of shock will continue antibiotics  Blood cultures ngtd  IV Rocephin completed  Repeat UA 2/12 concerning for UTI  Resume ceftriaxone. Check urine cx-multiple organisms, none in predominance.      Leukocytosis  Likley related to UTI vs COVID19  CXR was with no pneumonia  Urine with UTI  Blood cultures no growth to date  Lactate 1.1  Trend CBC  Cultures remain negative  IV vanco discontinued. Rocephin completed  WBC trending up. No clear source of infection  identified. Possibly due to steroids. Check UA, blood cx, CXR  Suspected UTI. Start ceftriaxone. Urine culture    Trending down; continue to monitor    Unspecified mood (affective) disorder  Resume home meds      Morbid obesity with BMI of 45.0-49.9, adult  Body mass index is 42.28 kg/m². Morbid obesity complicates all aspects of disease management from diagnostic modalities to treatment.    MARY (obstructive sleep apnea)  Was ordered CPAP qhs per PCP but does not use it  -suspect she has OHS  On CPAP 4 cm H2O at home. Increased to 10 cm H2O on home CPAP device. Will need outpatient sleep study (last one 10 years ago). Check ABG day before discharge - if pCO2 >52, then meets criteria for trilogy. In that situation, patient should go home with trilogy device (settings would be 16/8 21% until can get a sleep study).      Disorder of lumbar spine  Chronic; continue chronic gabapentin.      Osteoarthritis  See above      Restless leg syndrome  Resume Neurontin and Mirapex      Mixed hyperlipidemia  Patient is chronically on statin.will continue for now once able to take p.o.. Monitor clinically. Last LDL was   Lab Results   Component Value Date    LDLCALC 71.0 11/10/2021            Anxiety disorder  No episodes of anxiety; continue chronic SNRI.  Monitor    Fibromyalgia  Resume Effexor and Neurontin      Chronic back pain  Resume PRN      Essential hypertension  Home antihypertensives initially held due to hypotension requiring vasopressors.  Now hypertensive.  Amlodipine started.      Mild acid reflux  - protonix ppx        VTE Risk Mitigation (From admission, onward)         Ordered     heparin (porcine) injection 2,800 Units  As needed (PRN)         02/04/22 1911     Place KATERIN hose  Until discontinued         02/03/22 2251     IP VTE HIGH RISK PATIENT  Once         02/03/22 2251     Place sequential compression device  Until discontinued         02/03/22 2251                      I have assessed these finding  virtually using telemed platform.            The attending portion of this evaluation, treatment, and documentation was performed per UZMA Mueller via Telemedicine AudioVisual using the secure [employer] software platform with 2 way audio/video. The provider was located off-site and the patient is located in the hospital. The aforementioned video software was utilized to document the relevant history and physical exam    UZMA Mueller  Department of [facility] Medicine   [facility]

## 2022-02-16 NOTE — NURSING
Attempted to call report to  [facility]- no answer- message left with call back number to give report.

## 2022-02-16 NOTE — SUBJECTIVE & OBJECTIVE
Interval History: no acute events overnight; however, hemoglobin 6.7 this morning.  One unit PRBCs ordered.  D/w patient and her  and they are both in agreement.    Review of Systems   Constitutional: Positive for activity change and fatigue. Negative for appetite change, chills, diaphoresis and fever.   HENT: Negative for congestion, postnasal drip, rhinorrhea, sinus pressure, sinus pain and sneezing.    Respiratory: Negative for cough, chest tightness, shortness of breath, wheezing and stridor.    Cardiovascular: Negative for chest pain, palpitations and leg swelling.   Gastrointestinal: Negative for abdominal distention, abdominal pain, blood in stool, constipation, diarrhea and nausea.   Endocrine: Negative for cold intolerance and heat intolerance.   Genitourinary: Negative for decreased urine volume, dysuria, flank pain and hematuria.   Musculoskeletal: Negative for gait problem.   Neurological: Positive for weakness. Negative for dizziness.   Psychiatric/Behavioral: Negative for agitation, behavioral problems and confusion.     Objective:     Vital Signs (Most Recent):  Temp: 98.9 °F (37.2 °C) (02/15/22 1709)  Pulse: 101 (02/15/22 1647)  Resp: 20 (02/15/22 1647)  BP: (!) 141/65 (02/15/22 1647)  SpO2: 99 % (02/15/22 1647) Vital Signs (24h Range):  Temp:  [96.9 °F (36.1 °C)-100.1 °F (37.8 °C)] 98.9 °F (37.2 °C)  Pulse:  [] 101  Resp:  [18-20] 20  SpO2:  [91 %-99 %] 99 %  BP: (129-171)/(58-90) 141/65     Weight: 101.5 kg (223 lb 12.3 oz)  Body mass index is 42.28 kg/m².    Intake/Output Summary (Last 24 hours) at 2/15/2022 1922  Last data filed at 2/15/2022 1805  Gross per 24 hour   Intake 680.83 ml   Output 1050 ml   Net -369.17 ml      Physical Exam  Vitals and nursing note reviewed.   Constitutional:       General: She is not in acute distress.     Appearance: She is morbidly obese.      Interventions: She is sedated and restrained.   HENT:      Head: Normocephalic and atraumatic.      Nose: Nose  normal. No congestion.   Cardiovascular:      Rate and Rhythm: Normal rate.   Pulmonary:      Effort: Pulmonary effort is normal. No respiratory distress.   Musculoskeletal:         General: Normal range of motion.      Cervical back: Normal range of motion. No rigidity.      Right lower leg: No edema.      Left lower leg: No edema.   Skin:     General: Skin is dry.   Neurological:      General: No focal deficit present.      Mental Status: She is alert and oriented to person, place, and time. Mental status is at baseline.      Motor: No weakness.   Psychiatric:         Mood and Affect: Mood normal.         Behavior: Behavior normal.         Significant Labs:   All pertinent labs within the past 24 hours have been reviewed.  CBC:   Recent Labs   Lab 02/14/22  0431 02/14/22  0836 02/15/22  0433   WBC 18.90*  --  14.60*   HGB 7.2* 7.3* 6.7*   HCT 21.6*  --  21.0*     --  330     CMP:   Recent Labs   Lab 02/15/22  0437      K 3.3*      CO2 29   *   BUN 19   CREATININE 1.0   CALCIUM 8.4*   ANIONGAP 8   EGFRNONAA 56*       Significant Imaging: I have reviewed all pertinent imaging results/findings within the past 24 hours.

## 2022-02-16 NOTE — PLAN OF CARE
ARAVIND received  [place] [phone] from [facility]. [place]. [place] reported that pt is clear to come to [facility] today. [place] reported that auth has been received. [place] reported she is just awaiting DC orders to review.       ARAVIND contacted NP and [facility]

## 2022-02-16 NOTE — PT/OT/SLP PROGRESS
Occupational Therapy   Treatment    Name: Hannah Norman  MRN: [id number]  Admitting Diagnosis:  Acute on chronic respiratory failure with hypercapnia       Recommendations:     Discharge Recommendations: other (see comments) (post acute placement)  Discharge Equipment Recommendations:  none  Barriers to discharge:  Other (Comment) (Increased assistance)    Assessment:     Hannah Norman is a 72 y.o. female with a medical diagnosis of Acute on chronic respiratory failure with hypercapnia.   Performance deficits affecting function are weakness,impaired endurance,impaired self care skills,impaired functional mobilty,gait instability,impaired balance,decreased upper extremity function,decreased lower extremity function,impaired skin,edema,impaired cardiopulmonary response to activity. Pt found in chair, agreeable to therapy.  Pt required CGA/Min A for transfer using RW.  Pt with improved performance and tolerance of therapy. She is progressing well towards goals. Continue OT services to address functional goals, progressing as able.      Rehab Prognosis:  Good; patient would benefit from acute skilled OT services to address these deficits and reach maximum level of function.       Plan:     Patient to be seen 3 x/week to address the above listed problems via self-care/home management,therapeutic activities,therapeutic exercises  · Plan of Care Expires: 03/12/22  · Plan of Care Reviewed with: patient    Subjective     Pain/Comfort:  · Pain Rating 1: 0/10  · Pain Rating Post-Intervention 1: 0/10    Objective:     Communicated with: RN prior to session.  Patient found up in chair with PureWick,Trialysis,telemetry upon OT entry to room.    General Precautions: Standard, airborne,contact,droplet,fall   Orthopedic Precautions:N/A   Braces: N/A  Respiratory Status: Room air     Occupational Performance:     Bed Mobility:    · Patient completed Scooting/Bridging with stand by assistance  · Patient completed Sit to Supine with minimum  assistance for BLE assist    Functional Mobility/Transfers:  · Patient completed Sit <> Stand Transfer with stand by assistance x 2 trials from EOB and minimum assistance from low chair  with  rolling walker and vcs for effective tech and hand placement.   · Patient completed Bed <> Chair Transfer using Stand Pivot technique with contact guard assistance with rolling walker  · Functional Mobility: Pt took 3 steps during transfer with CGA using RW.     Activities of Daily Living:  · Grooming: stand by assistance chair level      AMPAC 6 Click ADL: 18    Treatment & Education:  Pt performed BUE AROM and AAROM for R shld planes 2/2 edema 2 x 10 reps all jts/planes. Pt tolerated well with rest breaks 2/2 muscle fatigue.  Elevated RUE on pillow at end of session.     Patient left HOB elevated with all lines intact, call button in reach, bed alarm on, RN notified and spouse presentEducation:      GOALS:   Multidisciplinary Problems     Occupational Therapy Goals        Problem: Occupational Therapy Goal    Goal Priority Disciplines Outcome Interventions   Occupational Therapy Goal     OT, PT/OT Ongoing, Progressing    Description: Goals to be met by: 3/12/2022     Patient will increase functional independence with ADLs by performing:    UE Dressing with Modified [place].  LE Dressing with Set-up Assistance.  Grooming while standing with Set-up Assistance.  Toileting from toilet with Supervision for hygiene and clothing management.   Supine to sit with Modified independence.  Step transfer with Supervision & appropriate AD.   Toilet transfer to toilet with Supervision & appropriate AD.  Increased functional strength to WFL for self-care.                     Time Tracking:     OT Date of Treatment: 02/16/22  OT Start Time: 1020  OT Stop Time: 1045  OT Total Time (min): 25 min    Billable Minutes:Therapeutic Activity 15  Therapeutic Exercise 10            2/16/2022

## 2022-02-16 NOTE — ASSESSMENT & PLAN NOTE
- went into afib w/ RVR overnight following episode of bradycardia and vasovagal  - given fluid and will maintain net even today on CRRT  - initiated on amiodarone gtt with improvement in rate control  - amio discontinued    2/16- in NSR in setting of vasovagal episode; CHADSVASC 2, however patient has had recent large hematoma to right arm and GI bleed with blood loss requiring transfusions, will hold off on any anti-coagulation until follow up with PCP

## 2022-02-16 NOTE — ASSESSMENT & PLAN NOTE
Suspected GI bleed. Reports episode of melanotic stool.  Hemoccult positive w/ down trending H/H  Trend H/H  Transfuse to keep Hb>7  GI consulted  H/H has stabilized  Cont to monitor  2/15-hgb 6.9.  Transfused one unit PRBCs.

## 2022-02-16 NOTE — PLAN OF CARE
Discharge orders noted. AVS prepared with medication list, importance of medication compliance, follow up appointments, diet, home care instructions, treatment plan, self management, and when to seek medical attention. Detailed clinical reference list attached. Instructed bedside nurse to print AVS and place in discharge packet for accepting facility once all cleared by .

## 2022-02-16 NOTE — DISCHARGE SUMMARY
[facility] Medicine  Discharge Summary      Patient Name: Hannah Norman  MRN: [id number]  Patient Class: IP- Inpatient  Admission Date: 2/3/2022  Hospital Length of Stay: 13 days  Discharge Date and Time: 2/16/22  Attending Physician: River Baldwin MD   Discharging Provider: River Baldwin MD  Primary Care Provider: Raffi Garcia MD      HPI:   Ms. Norman is a 71 year old female with a medical history that includes anxiety disorder, bell's palsy, chronic back pain, chronic osteoarthritis, essential tremor, fibromyalgia, hypertension, GERD, hyperlipidemia, sleep apnea, and mood disorder. She presented to the ED via EMS with spouse who reports patient to be increasingly fatigued and weak. He reports over the past 2 weeks she was diagnosed with COVID19 and was ending their quarantine phase. She has been progressively weaker and unable to ambulate as usual with her walker. Her symptoms worsened over the past 2 days where she was not communicating at all. She is with no cough, vomiting, diarrhea or fevers. Her spouse contributed to her history during this visit. On arrival to the ED she was slightly hypoxic at 93% and hypertensive. Significant labs were with the following: wbc 18.97, Na 131, K 5.6, BUN 69, Cr 7.1, COVID19 +, urine with 3+ leukocytes and many bacteria, pH 7.1 and CO2 65.4. CT head and CXR with no acute findings. She was given neb treatment, Rocephin, Lokelma, reg insulin and NS bolus. She will admit to [facility] hospital medicine service for continued monitoring.           * No surgery found *      Hospital Course:   Hannah Norman was monitored closely during her hospital stay.  She was admitted with a diagnosis of septic shock and acute respiratory failure, likely secondary to COVID 19 infection.  She was also found to have an RAQUEL with a creatinine of 7.1 upon admission, previously normal.  She initially required bipap due to hypercapnia. She required vasopressors and was initially  monitored in ICU.  She was placed on a course of IV remdesivir, as well as IV rocephin for UTI.  Nephrology service was consulted and a trialysis catheter was placed.  She underwent CRRT.  Nutrition was consulted due to her NPO status.  Despite continued bipap, her respiratory status continued to decompensate and she required intubation.  Pulmonology specialty was consulted.  She later went into atrial fibrillation with RVR and was placed on an amiodarone drip with good rate control achieved.  Vent weaning was initiated.  Goals of care conversation was conducted with the patient's family.  It was their position that patient would not want to be reintubated should extubation to bipap be unsuccessful, nor would she want chest compressions in the event she were to code while off the vent.  Her code status was changed to DNR.  Pt was successfully extubated to bipap later in the day.  She remained on vasopressors and amiodarone.  CRRT was continued and she tolerated well.  Her renal function began to improve.       Goals of Care Treatment Preferences:  Code Status: DNR      Consults:   Consults (From admission, onward)        Status Ordering Provider     Inpatient virtual consult to Hospital Medicine  Once        Provider:  (Not yet assigned)    Completed EFREN LEI     Inpatient consult to Gastroenterology-[facility]  Once        Provider:  (Not yet assigned)    Completed EFREN LEI     Inpatient consult to PICC team (JUAREZ)  Once        Provider:  (Not yet assigned)    Completed EFREN LEI     Inpatient consult to Registered Dietitian/Nutritionist  Once        Provider:  (Not yet assigned)    Completed NORM KEEN     Inpatient consult to Pulmonology  Once        Provider:  (Not yet assigned)    Completed JOSY PEARCE     Inpatient consult to [facility] ([facility] Nimkevych)  Once        Provider:  (Not yet assigned)    Completed ISAC DICKSON          * Acute on  chronic respiratory failure with hypercapnia  Elevated CO2 on ABG, currently on BIPAP  Reassess with ABG with worsened CO2, settings adjusted and adjusting face mask for better fit  Hypoxia likely related to COVID19, was 93% on room air on arrival  -suspect that patient's encephalopathy secondary to elevated CO2 vs uremia  -Pulm following  -Required intubation. Successfully extubated 2/8 to bipap  -now on vapotherm during day with bipap qhs  -weaned to room air. Cont bipap qhs due to ge/ohs  -discharge planning. Awaiting pt/ot eval. Will likely benefit from snf vs ipr-unable to participate with PT today due to anemia.    Acute blood loss anemia  Suspected GI bleed. Reports episode of melanotic stool.  Hemoccult positive w/ down trending H/H  Trend H/H  Transfuse to keep Hb>7  GI consulted  H/H has stabilized  Cont to monitor  2/15-hgb 6.9.  Transfused one unit PRBCs.    2/16- resolved; likely upper source; H/H stable; will send out on PPI BID and will have GI follow up       Atrial fibrillation with RVR  - went into afib w/ RVR overnight following episode of bradycardia and vasovagal  - given fluid and will maintain net even today on CRRT  - initiated on amiodarone gtt with improvement in rate control  - amio discontinued    2/16- in NSR in setting of vasovagal episode; CHADSVASC 2, however patient has had recent large hematoma to right arm and GI bleed with blood loss requiring transfusions, will hold off on any anti-coagulation until follow up with PCP    COVID-19  COVID positive; initiated on protocol  CXR with no infiltrate, requiring O2  -doubt that current presentation is due to COVID; will hold remdesivir and dexamethasone  Heparin ppx  inhalers prn  MVI, ascorbic acid, incentive spirometry  statin  supplemental O2, will wean as tolerated  prn tylenol, loperamide  Contact/airborne    Acute renal failure with tubular necrosis  Significant RAQUEL with creatinine of 7.1 from normal just 3 months ago, with  acidosis, uremia, hyperkalemia, hyperphosphatemia, and hypocalcemia  Renal ultrasound   -Vila catheter placed  Consulted Nephrology  Strict I&O's  -initiated on CRRT 2/4  -kidney function improving  -no longer requiring dialysis-stable        High anion gap metabolic acidosis  With initial pH of 7.188  Trend ABG  Likely related to acute renal failure  -bicarbonate drip started on admission, now discontinued  -initiated on CRRT>HD  -bicarb tabs  -nephrology following  -resolved. Discontinue bicarb tabs.    Morbid obesity with BMI of 45.0-49.9, adult  Body mass index is 42.28 kg/m². Morbid obesity complicates all aspects of disease management from diagnostic modalities to treatment.    MARY (obstructive sleep apnea)  Was ordered CPAP qhs per PCP but does not use it  -suspect she has OHS  On CPAP 4 cm H2O at home. Increased to 10 cm H2O on home CPAP device. Will need outpatient sleep study (last one 10 years ago). Check ABG day before discharge - if pCO2 >52, then meets criteria for trilogy. In that situation, patient should go home with trilogy device (settings would be 16/8 21% until can get a sleep study).      Anxiety disorder  No episodes of anxiety; continue chronic SNRI.  Monitor    Fibromyalgia  Resume Effexor and Neurontin      Chronic back pain  Resume PRN      Essential hypertension  Home antihypertensives initially held due to hypotension requiring vasopressors.  Now hypertensive.  Amlodipine started.      Mild acid reflux  - protonix ppx        Final Active Diagnoses:    Diagnosis Date Noted POA    PRINCIPAL PROBLEM:  Acute on chronic respiratory failure with hypercapnia [J96.22] 02/03/2022 Yes    Acute blood loss anemia [D62] 02/13/2022 No    Obesity hypoventilation syndrome [E66.2]  Yes    Atrial fibrillation with RVR [I48.91] 02/07/2022 No    Acute metabolic encephalopathy [G93.41] 02/04/2022 Yes    Shock, unspecified [R57.9] 02/04/2022 Yes    Hyperglycemia [R73.9] 02/04/2022 Yes    Leukocytosis  [D72.829] 02/03/2022 Yes    UTI (urinary tract infection) [N39.0] 02/03/2022 Yes    High anion gap metabolic acidosis [E87.2] 02/03/2022 Yes    Acute renal failure with tubular necrosis [N17.0] 02/03/2022 Yes    COVID-19 [U07.1] 02/03/2022 Yes    Unspecified mood (affective) disorder [F39] 04/06/2021 Yes    Morbid obesity with BMI of 45.0-49.9, adult [E66.01, Z68.42] 08/09/2019 Not Applicable     Chronic    MARY (obstructive sleep apnea) [G47.33]  Yes    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] 05/20/2016 Yes    Osteoarthritis [M19.90] 12/27/2013 Yes    Restless leg syndrome [G25.81] 10/22/2012 Yes    Anxiety disorder [F41.9]  Yes    Chronic back pain [M54.9, G89.29]  Yes    Essential hypertension [I10]  Yes    Fibromyalgia [M79.7]  Yes    Mild acid reflux [K21.9]  Yes    Mixed hyperlipidemia [E78.2]  Yes      Problems Resolved During this Admission:    Diagnosis Date Noted Date Resolved POA    Hyperkalemia [E87.5] 02/03/2022 02/06/2022 Yes       Discharged Condition: good    Disposition: Skilled Nursing Facility    Follow Up:    Patient Instructions:   No discharge procedures on file.    Significant Diagnostic Studies: reviewed     Pending Diagnostic Studies:     None         Medications:  Reconciled Home Medications:      Medication List      CHANGE how you take these medications    cyclobenzaprine 10 MG tablet  Commonly known as: FLEXERIL  Take 1 tablet (10 mg total) by mouth 3 (three) times daily as needed for Muscle spasms.  What changed: reasons to take this     furosemide 40 MG tablet  Commonly known as: LASIX  Take 1 tablet (40 mg total) by mouth once daily.  What changed: See the new instructions.     oxyCODONE-acetaminophen  mg per tablet  Commonly known as: PERCOCET  Take 1 tablet by mouth every 8 (eight) hours as needed.  What changed: Another medication with the same name was removed. Continue taking this medication, and follow the directions you see here.     pantoprazole 40 MG  tablet  Commonly known as: PROTONIX  Take 1 tablet (40 mg total) by mouth 2 (two) times daily.  What changed: when to take this        CONTINUE taking these medications    acetaminophen 500 MG tablet  Commonly known as: TYLENOL  Take 500 mg by mouth every 6 (six) hours as needed.     albuterol 90 mcg/actuation inhaler  Commonly known as: PROVENTIL/VENTOLIN HFA  Inhale 2 puffs into the lungs every 6 (six) hours as needed for Wheezing or Shortness of Breath. Rescue     clonazePAM 0.5 MG tablet  Commonly known as: KlonoPIN  2  pills PO as needed at bedtime for the RLS     fluticasone propionate 50 mcg/actuation nasal spray  Commonly known as: FLONASE  1 spray (50 mcg total) by Each Nostril route once daily.     gabapentin 600 MG tablet  Commonly known as: NEURONTIN  TAKE 1 TABLET BY MOUTH THREE TIMES A DAY     imipramine 25 MG tablet  Commonly known as: TOFRANIL  TAKE 1 TABLET BY MOUTH EVERY EVENING TO HELP CALM STOMACH     losartan 100 MG tablet  Commonly known as: COZAAR  TAKE 1 TABLET BY MOUTH ONCE A DAY FOR BLOOD PRESSURE     lovastatin 20 MG tablet  Commonly known as: MEVACOR  TAKE 1 TABLET BY MOUTH EVERY EVENING     mometasone 0.1 % ointment  Commonly known as: ELOCON  Apply topically once daily. For 7 days and may repeat.     potassium chloride SA 20 MEQ tablet  Commonly known as: K-DUR,KLOR-CON  TAKE 1 TABLET BY MOUTH ONCE A DAY     pramipexole 0.125 MG tablet  Commonly known as: MIRAPEX  TAKE 2 TABLETS BY MOUTH IN THE LATE AFTERNOON AND TAKE 4 TABLETS BY MOUTH AT BEDTIME     venlafaxine 150 MG Cp24  Commonly known as: EFFEXOR-XR  1 tablet by mouth in morning and 1 tablet by mouth at night        STOP taking these medications    morphine 15 MG 12 hr tablet  Commonly known as: MS CONTIN     promethazine-dextromethorphan 6.25-15 mg/5 mL Syrp  Commonly known as: PROMETHAZINE-DM            Indwelling Lines/Drains at time of discharge:   Lines/Drains/Airways     Peripherally Inserted Central Catheter Line             PICC Triple Lumen 02/08/22 1350 right basilic 7 days          Drain            Female External Urinary Catheter 02/12/22 1430 3 days                Time spent on the discharge of patient: 35 minutes         The attending portion of this evaluation, treatment, and documentation was performed per River Baldwin MD via Telemedicine AudioVisual using the secure [employer] software platform with 2 way audio/video. The provider was located off-site and the patient is located in the hospital. The aforementioned video software was utilized to document the relevant history and physical exam    River Baldwin MD  Department of Hospital Medicine  [facility]

## 2022-02-16 NOTE — ASSESSMENT & PLAN NOTE
Significant RAQUEL with creatinine of 7.1 from normal just 3 months ago, with acidosis, uremia, hyperkalemia, hyperphosphatemia, and hypocalcemia  Renal ultrasound   -Vila catheter placed  Consulted Nephrology  Strict I&O's  -initiated on CRRT 2/4  -kidney function improving  -no longer requiring dialysis-stable

## 2022-02-16 NOTE — NURSING
Report given to Umer at [facility]. Pt transported via stretcher and in no distress.   took belongings home.

## 2022-02-18 ENCOUNTER — TELEPHONE (OUTPATIENT)
Dept: SLEEP MEDICINE | Facility: CLINIC | Age: 72
End: 2022-02-18
Payer: MEDICARE

## 2022-02-18 NOTE — TELEPHONE ENCOUNTER
----- Message from Yamilet Olivia sent at 2/18/2022  2:49 PM CST -----  Regarding: call back  Contact: [phone]  Who Called: PT     Patient is calling to talk to nurse in regards to see if they can discuss her medication and also patient has been admitted to the hospital. Please advice

## 2022-02-22 ENCOUNTER — TELEPHONE (OUTPATIENT)
Dept: SLEEP MEDICINE | Facility: CLINIC | Age: 72
End: 2022-02-22
Payer: MEDICARE

## 2022-03-03 ENCOUNTER — TELEPHONE (OUTPATIENT)
Dept: SLEEP MEDICINE | Facility: CLINIC | Age: 72
End: 2022-03-03
Payer: MEDICARE

## 2022-03-03 ENCOUNTER — OFFICE VISIT (OUTPATIENT)
Dept: SLEEP MEDICINE | Facility: CLINIC | Age: 72
End: 2022-03-03
Payer: MEDICARE

## 2022-03-03 DIAGNOSIS — G47.33 OSA (OBSTRUCTIVE SLEEP APNEA): Primary | ICD-10-CM

## 2022-03-03 PROCEDURE — 99214 OFFICE O/P EST MOD 30 MIN: CPT | Mod: 95,,, | Performed by: PSYCHIATRY & NEUROLOGY

## 2022-03-03 PROCEDURE — 1159F PR MEDICATION LIST DOCUMENTED IN MEDICAL RECORD: ICD-10-PCS | Mod: CPTII,95,, | Performed by: PSYCHIATRY & NEUROLOGY

## 2022-03-03 PROCEDURE — 1111F PR DISCHARGE MEDS RECONCILED W/ CURRENT OUTPATIENT MED LIST: ICD-10-PCS | Mod: CPTII,95,, | Performed by: PSYCHIATRY & NEUROLOGY

## 2022-03-03 PROCEDURE — 1159F MED LIST DOCD IN RCRD: CPT | Mod: CPTII,95,, | Performed by: PSYCHIATRY & NEUROLOGY

## 2022-03-03 PROCEDURE — 1111F DSCHRG MED/CURRENT MED MERGE: CPT | Mod: CPTII,95,, | Performed by: PSYCHIATRY & NEUROLOGY

## 2022-03-03 PROCEDURE — 1160F PR REVIEW ALL MEDS BY PRESCRIBER/CLIN PHARMACIST DOCUMENTED: ICD-10-PCS | Mod: CPTII,95,, | Performed by: PSYCHIATRY & NEUROLOGY

## 2022-03-03 PROCEDURE — 99214 PR OFFICE/OUTPT VISIT, EST, LEVL IV, 30-39 MIN: ICD-10-PCS | Mod: 95,,, | Performed by: PSYCHIATRY & NEUROLOGY

## 2022-03-03 PROCEDURE — 1160F RVW MEDS BY RX/DR IN RCRD: CPT | Mod: CPTII,95,, | Performed by: PSYCHIATRY & NEUROLOGY

## 2022-03-03 NOTE — TELEPHONE ENCOUNTER
Staff returned pt's call. She states that she is in a residing in a rehab home at the moment and the wifi for the vv isn't that great. She requested a phone call.

## 2022-03-03 NOTE — PROGRESS NOTES
The patient location is: home  The chief complaint leading to consultation is: sleep disorder  Visit type: audiovisual  Total time spent with patient: 30 min  Each patient to whom he or she provides medical services by telemedicine is:  (1) informed of the relationship between the physician and patient and the respective role of any other health care provider with respect to management of the patient; and (2) notified that he or she may decline to receive medical services by telemedicine and may withdraw from such care at any time.          EPWORTH SLEEPINESS SCALE TOTAL SCORE  12/6/2021 10/12/2020 3/4/2019 11/28/2018 9/19/2018 7/18/2018   Total score 11 9 6 8 9 8       Hannah Norman is a 72 y.o. female seen today for CPAP follow up. Last seen on 12/6/2021    The patient reports residual excessive daytime sleepiness despite complaint use of APAP. ESS today is 11/24.  She has just been discharged from hospital/icu/rehab stay for acute kidney failure.  Recovered well. At the time of her admission she was noted to have dropping oxygen level at night; BPAP was attempted but she did not like it at all (at lest the way it was administered in the ICU).  She has not yet registered her machine for recall.    The most recent data from the machine is as follows with high residual AHI of 6.4:    Patient ID: [id number] Hannah Norman  APAP 10-20 90%13-15 cm H2O  Days with Device Usage 7 days  Days without Device Usage 23 days  Percent Days with Device Usage 23.3%  Cumulative Usage 1 day 4 hrs. 25 mins. 51 secs.  Maximum Usage (1 Day) 5 hrs. 42 mins. 49 secs.  Average Usage (All Days) 56 mins. 51 secs.  Average Usage (Days Used) 4 hrs. 3 mins. 41 secs.  Minimum Usage (1 Day) 50 secs.  Percent of Days with Usage >= 4 Hours 13.3%  Percent of Days with Usage < 4 Hours 86.7%  Date Range  Total Blower Time 1 day 16 hrs. 39 mins. 36 secs.  Average AHI 6.4  Auto-CPAP Summary  Auto-CPAP Mean Pressure 11.1 cmH2O  Auto-CPAP Peak Average Pressure  "15.0 cmH2O  Device Pressure <= 90% of Time 13.5 cmH2O  Average Time in Large Leak Per Day 21 mins. 9 secs.  Opti-Start Off  EZ-Start Disabled  Tube Temperature 3  Humidifier 4  Humidification Mode on Heated Tube Disconnect Adaptive    Used to take Requip. I have changed her on Mirapex. Taking BID.  Medications pertinent to sleep disordersTaking Mirapex 0.125 - 2 pills in the afternoon and 3 pills at night  And Clonazepam. Gabapentin 600 mg TID.    [place] she felt this dose was not enough to cover the symptoms during the first part of the night     PREVIOUS SLEEP STUDIES:     PSG study  In 2/19/18 showed significant MARY with the AHI of 7.9- /hour and SaO2 minimum of 85%.  CPAP titration  - pending      DME: [facility]      REVIEW OF SYSTEMS:   Sleep related symptoms as per HPI    denies weight gain  Reports occasional dyspnea  Reports occasional palpitations  Reports occasional acid reflux   Reports polyuria x 2  Denies  mood diturbance  Denies  anemia  Denies  muscle pain  Denies  Gait imbalance    Otherwise, a balance of 10 systems reviewed is negative.    PHYSICAL EXAM:  There were no vitals taken for this visit.        ASSESSMENT:    1. MARY (obstructive sleep apnea). The patient symptomatically has  excessive daytime sleepiness, snoring,  witnessed breathing pauses, excessive daytime fatigue, gasping for air in sleep and interrupted sleep  with exam findings of "a crowded oral airway and elevated body mass index. The patient has medical co-morbidities of hyperlipidemia, fibromyalgia and nightmares  which can be worsened by MARY. This warrants treatment. Still reports difficulty exhaling against CPAP especially in light of recent new  shortness of breath concern. High residual AHI of 6.4 and residual excessive daytime sleepiness  with ESS 11/24. She was noted to have low SAO2 on PAP during her hospital admission.      2. RLS -Continue Mirapex 0/125 mg - 5 mills a day (does not find it sufficient); Neurontin 600 mg ;  " Clonazepam - 0.5 mg - 1-2 pills at bedtime      Try to stop Xyzal and replace it with Asteline nasal spray.    3. RBD - stable       PLAN:    I will order CPAP and BPAP titration in hope she can tolerate it better; will also add P10 nasal pillows  mask that she saw in my office today (remotely) to her prescription.    Planning to order BPAP machine with P10 mask if/once she qualifies and if she likes BPAP treatment modality better after the sleep study (did not like high BPAP settings in the ICU).  SHE WAS AGAIN  ENCOURAGED TO REGISTER HER CHASE MACHINE WITH THE COMPANY FOR RECALL/REPLACEMENT.    KEEP CLONAZEPAM 0.5 MG AT NIGHT for RBD (DO NOT TAKE SAME TIME AS mORPHINE - THEY SUPPRESS  BREATHING!)  PRAMIPEXOL - - take 3 pills at 8 PM, increase to 3 pillsat bedtime. If that is not effective-will consider Retigotine patches.      More than 25 minutes of this 45 minutes visit was spent in counseling: during our discussion today, we talked about the etiology of  MARY as well as the potential ramifications of untreated sleep apnea, which could include daytime sleepiness, hypertension, heart disease and/or stroke.  We discussed potential treatment options, which could include weight loss, body positioning, continuous positive airway pressure (CPAP), or referral for surgical consideration. Meanwhile, she  is urged to avoid supine sleep, weight gain and alcoholic beverages since all of these can worsen MARY.     Precautions: The patient was advised to abstain from driving should he feel sleepy or drowsy.    Follow up: MD after the sleep study  Thank you for allowing me the opportunity to participate in the care of your patient.    This visit summary will be sent to referring provider via [employer]

## 2022-03-03 NOTE — TELEPHONE ENCOUNTER
----- Message from Sherice Pat sent at 3/3/2022  1:49 PM CST -----  Type:  Needs Medical Advice    Who Called:  pt  Symptoms (please be specific):  would like to get a call back having trouble getting signed in for 130 pm Virtual visit      Would the patient rather a call back or a response via [facility]?  Call   Best Call Back Number:  [phone]  Additional Information:

## 2022-03-07 ENCOUNTER — TELEPHONE (OUTPATIENT)
Dept: FAMILY MEDICINE | Facility: CLINIC | Age: 72
End: 2022-03-07
Payer: MEDICARE

## 2022-03-07 NOTE — TELEPHONE ENCOUNTER
----- Message from Trisha Mcarthur sent at 3/7/2022 12:23 PM CST -----  Type: Requesting to speak with nurse         Who Called: PT  Regarding: Pt needing to be seen for hospital / nursing home check up please advise   Would the patient rather a call back or a response via [facility]? Call back  Best Call Back Number: [phone]  Additional Information: n/a

## 2022-03-08 ENCOUNTER — PATIENT OUTREACH (OUTPATIENT)
Dept: ADMINISTRATIVE | Facility: OTHER | Age: 72
End: 2022-03-08
Payer: MEDICARE

## 2022-03-08 NOTE — PROGRESS NOTES
Health Maintenance Due   Topic Date Due    Shingles Vaccine (1 of 2) Never done    COVID-19 Vaccine (3 - Booster for Moderna series) 08/11/2021    Mammogram  01/27/2022     Updates were requested from care everywhere.  Chart was reviewed for overdue [facility]active [facility] Encounters (VAL) topics (CRS, Breast Cancer Screening, Eye exam)  Health Maintenance has been updated.  LINKS immunization registry triggered.  Immunizations were reconciled.

## 2022-03-10 ENCOUNTER — TELEPHONE (OUTPATIENT)
Dept: FAMILY MEDICINE | Facility: CLINIC | Age: 72
End: 2022-03-10

## 2022-03-10 ENCOUNTER — OFFICE VISIT (OUTPATIENT)
Dept: FAMILY MEDICINE | Facility: CLINIC | Age: 72
End: 2022-03-10
Payer: MEDICARE

## 2022-03-10 ENCOUNTER — PATIENT MESSAGE (OUTPATIENT)
Dept: ADMINISTRATIVE | Facility: OTHER | Age: 72
End: 2022-03-10
Payer: MEDICARE

## 2022-03-10 ENCOUNTER — HOSPITAL ENCOUNTER (OUTPATIENT)
Dept: RADIOLOGY | Facility: HOSPITAL | Age: 72
Discharge: HOME | End: 2022-03-10
Attending: FAMILY MEDICINE
Payer: MEDICARE

## 2022-03-10 VITALS
HEIGHT: 61 IN | SYSTOLIC BLOOD PRESSURE: 122 MMHG | BODY MASS INDEX: 43.43 KG/M2 | WEIGHT: 230 LBS | DIASTOLIC BLOOD PRESSURE: 80 MMHG | OXYGEN SATURATION: 95 % | TEMPERATURE: 98 F | HEART RATE: 105 BPM

## 2022-03-10 DIAGNOSIS — I10 ESSENTIAL HYPERTENSION: Primary | ICD-10-CM

## 2022-03-10 DIAGNOSIS — R09.89 RESPIRATORY CRACKLES AT RIGHT LUNG BASE: ICD-10-CM

## 2022-03-10 DIAGNOSIS — J96.90 RESPIRATORY FAILURE, UNSPECIFIED CHRONICITY, UNSPECIFIED WHETHER WITH HYPOXIA OR HYPERCAPNIA: ICD-10-CM

## 2022-03-10 DIAGNOSIS — G89.29 CHRONIC BILATERAL LOW BACK PAIN WITHOUT SCIATICA: ICD-10-CM

## 2022-03-10 DIAGNOSIS — N17.9 ACUTE RENAL FAILURE, UNSPECIFIED ACUTE RENAL FAILURE TYPE: ICD-10-CM

## 2022-03-10 DIAGNOSIS — I10 ESSENTIAL HYPERTENSION: ICD-10-CM

## 2022-03-10 DIAGNOSIS — M54.50 CHRONIC BILATERAL LOW BACK PAIN WITHOUT SCIATICA: ICD-10-CM

## 2022-03-10 DIAGNOSIS — G25.81 RLS (RESTLESS LEGS SYNDROME): ICD-10-CM

## 2022-03-10 PROCEDURE — 1101F PT FALLS ASSESS-DOCD LE1/YR: CPT | Mod: CPTII,S$GLB,, | Performed by: FAMILY MEDICINE

## 2022-03-10 PROCEDURE — 1125F PR PAIN SEVERITY QUANTIFIED, PAIN PRESENT: ICD-10-PCS | Mod: CPTII,S$GLB,, | Performed by: FAMILY MEDICINE

## 2022-03-10 PROCEDURE — 3074F PR MOST RECENT SYSTOLIC BLOOD PRESSURE < 130 MM HG: ICD-10-PCS | Mod: CPTII,S$GLB,, | Performed by: FAMILY MEDICINE

## 2022-03-10 PROCEDURE — 1125F AMNT PAIN NOTED PAIN PRSNT: CPT | Mod: CPTII,S$GLB,, | Performed by: FAMILY MEDICINE

## 2022-03-10 PROCEDURE — 3008F PR BODY MASS INDEX (BMI) DOCUMENTED: ICD-10-PCS | Mod: CPTII,S$GLB,, | Performed by: FAMILY MEDICINE

## 2022-03-10 PROCEDURE — 3074F SYST BP LT 130 MM HG: CPT | Mod: CPTII,S$GLB,, | Performed by: FAMILY MEDICINE

## 2022-03-10 PROCEDURE — 1101F PR PT FALLS ASSESS DOC 0-1 FALLS W/OUT INJ PAST YR: ICD-10-PCS | Mod: CPTII,S$GLB,, | Performed by: FAMILY MEDICINE

## 2022-03-10 PROCEDURE — 3288F FALL RISK ASSESSMENT DOCD: CPT | Mod: CPTII,S$GLB,, | Performed by: FAMILY MEDICINE

## 2022-03-10 PROCEDURE — 1159F PR MEDICATION LIST DOCUMENTED IN MEDICAL RECORD: ICD-10-PCS | Mod: CPTII,S$GLB,, | Performed by: FAMILY MEDICINE

## 2022-03-10 PROCEDURE — 3288F PR FALLS RISK ASSESSMENT DOCUMENTED: ICD-10-PCS | Mod: CPTII,S$GLB,, | Performed by: FAMILY MEDICINE

## 2022-03-10 PROCEDURE — 3079F DIAST BP 80-89 MM HG: CPT | Mod: CPTII,S$GLB,, | Performed by: FAMILY MEDICINE

## 2022-03-10 PROCEDURE — 3079F PR MOST RECENT DIASTOLIC BLOOD PRESSURE 80-89 MM HG: ICD-10-PCS | Mod: CPTII,S$GLB,, | Performed by: FAMILY MEDICINE

## 2022-03-10 PROCEDURE — 99214 OFFICE O/P EST MOD 30 MIN: CPT | Mod: S$GLB,,, | Performed by: FAMILY MEDICINE

## 2022-03-10 PROCEDURE — 99214 PR OFFICE/OUTPT VISIT, EST, LEVL IV, 30-39 MIN: ICD-10-PCS | Mod: S$GLB,,, | Performed by: FAMILY MEDICINE

## 2022-03-10 PROCEDURE — 1159F MED LIST DOCD IN RCRD: CPT | Mod: CPTII,S$GLB,, | Performed by: FAMILY MEDICINE

## 2022-03-10 PROCEDURE — 71046 X-RAY EXAM CHEST 2 VIEWS: CPT | Mod: TC,FY,PO

## 2022-03-10 PROCEDURE — 3008F BODY MASS INDEX DOCD: CPT | Mod: CPTII,S$GLB,, | Performed by: FAMILY MEDICINE

## 2022-03-10 RX ORDER — OXYCODONE AND ACETAMINOPHEN 10; 325 MG/1; MG/1
1 TABLET ORAL EVERY 6 HOURS PRN
Qty: 20 TABLET | Refills: 0 | Status: SHIPPED | OUTPATIENT
Start: 2022-03-10 | End: 2022-12-06 | Stop reason: SDUPTHER

## 2022-03-10 RX ORDER — CLONAZEPAM 0.5 MG/1
TABLET ORAL
Qty: 14 TABLET | Refills: 0 | Status: SHIPPED | OUTPATIENT
Start: 2022-03-10 | End: 2022-04-11 | Stop reason: SDUPTHER

## 2022-03-10 NOTE — TELEPHONE ENCOUNTER
----- Message from Cleo Mendosa sent at 3/10/2022  2:52 PM CST -----  Type:  Patient Requesting Referral    Who Called:PT  Does the patient already have the specialty appointment scheduled?:NO  If yes, what is the date of that appointment?:  Referral to What Specialty:[place] pain managment  Reason for Referral:  Does the patient want the referral with a specific physician?:yes  Is the specialist an [facility]  Patient Requesting a Response?:yes  Would the patient rather a call back or a response via [facility]? call  Best Call Back Number:[phone]  Additional Information: Carmen
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
## 2022-03-10 NOTE — TELEPHONE ENCOUNTER
Chest x-ray shows no pneumonia.  So what I am hearing is atelectasis just a little bit of your lungs not being fully expanded than the right side.  Keep doing well your doing no changes need to be made.

## 2022-03-11 ENCOUNTER — TELEPHONE (OUTPATIENT)
Dept: FAMILY MEDICINE | Facility: CLINIC | Age: 72
End: 2022-03-11
Payer: MEDICARE

## 2022-03-11 ENCOUNTER — PATIENT OUTREACH (OUTPATIENT)
Dept: ADMINISTRATIVE | Facility: OTHER | Age: 72
End: 2022-03-11
Payer: MEDICARE

## 2022-03-11 DIAGNOSIS — M54.50 CHRONIC BILATERAL LOW BACK PAIN WITHOUT SCIATICA: Primary | ICD-10-CM

## 2022-03-11 DIAGNOSIS — N17.9 ACUTE RENAL FAILURE, UNSPECIFIED ACUTE RENAL FAILURE TYPE: ICD-10-CM

## 2022-03-11 DIAGNOSIS — G89.29 CHRONIC BILATERAL LOW BACK PAIN WITHOUT SCIATICA: Primary | ICD-10-CM

## 2022-03-11 DIAGNOSIS — R29.898 BILATERAL LEG WEAKNESS: ICD-10-CM

## 2022-03-11 DIAGNOSIS — M79.7 FIBROMYALGIA: ICD-10-CM

## 2022-03-11 NOTE — TELEPHONE ENCOUNTER
----- Message from Sherice Pat sent at 3/11/2022  3:50 PM CST -----  Contact: Vinh  .Type:  Needs Medical Advice    Who Called:  Vinh - Physical Therapist  with [facility]   Symptoms (please be specific):  calling to request a Rolator walker for patient       Would the patient rather a call back or a response via [facility]?  Call   Best Call Back Number:  [phone]  Additional Information:

## 2022-03-15 NOTE — PROGRESS NOTES
Pt declined services/resource assistance at this time. Pt explained she is not sure what she may need help with in the future but will contact me if needed at that time. I will f/u with pt in 30 days before closing case.

## 2022-03-15 NOTE — PROGRESS NOTES
CHW - Case Closure    This Community Health Worker spoke to LUIGI Laguerre via in basket today.   Pt/Caregiver reported: pt is not in need of assistance at this time  Pt/Caregiver denied any additional needs at this time and agrees with episode closure at this time.  Provided LUIGI Laguerre with Community Health Worker's contact information and encouraged him/her to contact this Community Health Worker if additional needs arise.

## 2022-03-16 ENCOUNTER — TELEPHONE (OUTPATIENT)
Dept: FAMILY MEDICINE | Facility: CLINIC | Age: 72
End: 2022-03-16
Payer: MEDICARE

## 2022-03-16 RX ORDER — NITROFURANTOIN 25; 75 MG/1; MG/1
100 CAPSULE ORAL 2 TIMES DAILY
Qty: 10 CAPSULE | Refills: 0 | Status: SHIPPED | OUTPATIENT
Start: 2022-03-16 | End: 2022-07-14

## 2022-03-16 NOTE — TELEPHONE ENCOUNTER
----- Message from Sherice Pat sent at 3/16/2022  3:03 PM CDT -----  Type:  Needs Medical Advice    Who Called:  Pt   Symptoms (please be specific):possible UTI   How long has patient had these symptoms:  started today   Pharmacy name and phone #:   [id number] - [place] - [address]   Phone: [phone]  Fax:  [phone]        Would the patient rather a call back or a response via [facility]?  [place]  Best Call Back Number:  [phone]  Additional Information:

## 2022-03-20 ENCOUNTER — PATIENT MESSAGE (OUTPATIENT)
Dept: ADMINISTRATIVE | Facility: OTHER | Age: 72
End: 2022-03-20
Payer: MEDICARE

## 2022-04-05 ENCOUNTER — PATIENT MESSAGE (OUTPATIENT)
Dept: FAMILY MEDICINE | Facility: CLINIC | Age: 72
End: 2022-04-05
Payer: MEDICARE

## 2022-04-05 ENCOUNTER — PATIENT MESSAGE (OUTPATIENT)
Dept: ADMINISTRATIVE | Facility: OTHER | Age: 72
End: 2022-04-05
Payer: MEDICARE

## 2022-04-05 ENCOUNTER — HOSPITAL ENCOUNTER (OUTPATIENT)
Dept: RADIOLOGY | Facility: HOSPITAL | Age: 72
Discharge: HOME | End: 2022-04-05
Attending: ANESTHESIOLOGY
Payer: MEDICARE

## 2022-04-05 DIAGNOSIS — M79.7 FIBROMYALGIA: ICD-10-CM

## 2022-04-05 DIAGNOSIS — G25.81 RESTLESS LEG SYNDROME: ICD-10-CM

## 2022-04-05 DIAGNOSIS — M54.50 LOW BACK PAIN: ICD-10-CM

## 2022-04-05 PROCEDURE — 72114 X-RAY EXAM L-S SPINE BENDING: CPT | Mod: TC,FY,PO

## 2022-04-05 RX ORDER — IPRATROPIUM BROMIDE 21 UG/1
2 SPRAY, METERED NASAL 3 TIMES DAILY
Qty: 30 ML | Refills: 0 | Status: SHIPPED | OUTPATIENT
Start: 2022-04-05 | End: 2022-07-14

## 2022-04-05 RX ORDER — GABAPENTIN 600 MG/1
600 TABLET ORAL 3 TIMES DAILY
Qty: 90 TABLET | Refills: 3 | Status: SHIPPED | OUTPATIENT
Start: 2022-04-05 | End: 2022-08-01

## 2022-04-05 NOTE — TELEPHONE ENCOUNTER
No new care gaps identified.  Powered by [employer]. Reference number: [id number].   4/05/2022 1:06:50 AM CDT

## 2022-04-06 ENCOUNTER — TELEPHONE (OUTPATIENT)
Dept: SLEEP MEDICINE | Facility: CLINIC | Age: 72
End: 2022-04-06
Payer: MEDICARE

## 2022-04-08 ENCOUNTER — LAB VISIT (OUTPATIENT)
Dept: LAB | Facility: HOSPITAL | Age: 72
End: 2022-04-08
Attending: FAMILY MEDICINE
Payer: MEDICARE

## 2022-04-08 DIAGNOSIS — I10 ESSENTIAL HYPERTENSION: ICD-10-CM

## 2022-04-08 LAB
ALBUMIN SERPL BCP-MCNC: 4.6 G/DL (ref 3.5–5.2)
ALP SERPL-CCNC: 80 U/L (ref 38–126)
ALT SERPL W/O P-5'-P-CCNC: 15 U/L (ref 10–44)
ANION GAP SERPL CALC-SCNC: 13 MMOL/L (ref 8–16)
AST SERPL-CCNC: 18 U/L (ref 15–46)
BASOPHILS # BLD AUTO: 0.06 K/UL (ref 0–0.2)
BASOPHILS NFR BLD: 0.9 % (ref 0–1.9)
BILIRUB SERPL-MCNC: 0.4 MG/DL (ref 0.1–1)
CALCIUM SERPL-MCNC: 9.5 MG/DL (ref 8.7–10.5)
CHLORIDE SERPL-SCNC: 99 MMOL/L (ref 95–110)
CO2 SERPL-SCNC: 30 MMOL/L (ref 23–29)
CREAT SERPL-MCNC: 0.98 MG/DL (ref 0.5–1.4)
DIFFERENTIAL METHOD: ABNORMAL
EOSINOPHIL # BLD AUTO: 0.3 K/UL (ref 0–0.5)
EOSINOPHIL NFR BLD: 4.9 % (ref 0–8)
ERYTHROCYTE [DISTWIDTH] IN BLOOD BY AUTOMATED COUNT: 12 % (ref 11.5–14.5)
EST. GFR  (AFRICAN AMERICAN): >60 ML/MIN/1.73 M^2
EST. GFR  (NON AFRICAN AMERICAN): 57.4 ML/MIN/1.73 M^2
GLUCOSE SERPL-MCNC: 174 MG/DL (ref 70–110)
HCT VFR BLD AUTO: 35.8 % (ref 37–48.5)
HGB BLD-MCNC: 11.3 G/DL (ref 12–16)
IMM GRANULOCYTES # BLD AUTO: 0.02 K/UL (ref 0–0.04)
IMM GRANULOCYTES NFR BLD AUTO: 0.3 % (ref 0–0.5)
LYMPHOCYTES # BLD AUTO: 3 K/UL (ref 1–4.8)
LYMPHOCYTES NFR BLD: 43.7 % (ref 18–48)
MCH RBC QN AUTO: 31 PG (ref 27–31)
MCHC RBC AUTO-ENTMCNC: 31.6 G/DL (ref 32–36)
MCV RBC AUTO: 98 FL (ref 82–98)
MONOCYTES # BLD AUTO: 0.6 K/UL (ref 0.3–1)
MONOCYTES NFR BLD: 8.2 % (ref 4–15)
NEUTROPHILS # BLD AUTO: 2.9 K/UL (ref 1.8–7.7)
NEUTROPHILS NFR BLD: 42 % (ref 38–73)
NRBC BLD-RTO: 0 /100 WBC
PLATELET # BLD AUTO: 374 K/UL (ref 150–450)
PMV BLD AUTO: 9.9 FL (ref 9.2–12.9)
POTASSIUM SERPL-SCNC: 4.2 MMOL/L (ref 3.5–5.1)
PROT SERPL-MCNC: 7.3 G/DL (ref 6–8.4)
RBC # BLD AUTO: 3.64 M/UL (ref 4–5.4)
SODIUM SERPL-SCNC: 142 MMOL/L (ref 136–145)
UUN UR-MCNC: 23 MG/DL (ref 7–17)
WBC # BLD AUTO: 6.91 K/UL (ref 3.9–12.7)

## 2022-04-08 PROCEDURE — 80053 COMPREHEN METABOLIC PANEL: CPT | Mod: PO | Performed by: FAMILY MEDICINE

## 2022-04-08 PROCEDURE — 85025 COMPLETE CBC W/AUTO DIFF WBC: CPT | Mod: PO | Performed by: FAMILY MEDICINE

## 2022-04-08 PROCEDURE — 36415 COLL VENOUS BLD VENIPUNCTURE: CPT | Mod: PO | Performed by: FAMILY MEDICINE

## 2022-04-11 ENCOUNTER — OFFICE VISIT (OUTPATIENT)
Dept: FAMILY MEDICINE | Facility: CLINIC | Age: 72
End: 2022-04-11
Payer: MEDICARE

## 2022-04-11 VITALS
TEMPERATURE: 99 F | DIASTOLIC BLOOD PRESSURE: 76 MMHG | HEIGHT: 61 IN | SYSTOLIC BLOOD PRESSURE: 114 MMHG | WEIGHT: 229.81 LBS | OXYGEN SATURATION: 96 % | BODY MASS INDEX: 43.39 KG/M2 | HEART RATE: 89 BPM

## 2022-04-11 DIAGNOSIS — I10 ESSENTIAL HYPERTENSION: Primary | ICD-10-CM

## 2022-04-11 DIAGNOSIS — G25.81 RLS (RESTLESS LEGS SYNDROME): ICD-10-CM

## 2022-04-11 DIAGNOSIS — R73.9 HYPERGLYCEMIA: ICD-10-CM

## 2022-04-11 PROCEDURE — 99214 OFFICE O/P EST MOD 30 MIN: CPT | Mod: S$GLB,,, | Performed by: FAMILY MEDICINE

## 2022-04-11 PROCEDURE — 3288F FALL RISK ASSESSMENT DOCD: CPT | Mod: CPTII,S$GLB,, | Performed by: FAMILY MEDICINE

## 2022-04-11 PROCEDURE — 1101F PR PT FALLS ASSESS DOC 0-1 FALLS W/OUT INJ PAST YR: ICD-10-PCS | Mod: CPTII,S$GLB,, | Performed by: FAMILY MEDICINE

## 2022-04-11 PROCEDURE — 1101F PT FALLS ASSESS-DOCD LE1/YR: CPT | Mod: CPTII,S$GLB,, | Performed by: FAMILY MEDICINE

## 2022-04-11 PROCEDURE — 3074F PR MOST RECENT SYSTOLIC BLOOD PRESSURE < 130 MM HG: ICD-10-PCS | Mod: CPTII,S$GLB,, | Performed by: FAMILY MEDICINE

## 2022-04-11 PROCEDURE — 3008F PR BODY MASS INDEX (BMI) DOCUMENTED: ICD-10-PCS | Mod: CPTII,S$GLB,, | Performed by: FAMILY MEDICINE

## 2022-04-11 PROCEDURE — 1125F PR PAIN SEVERITY QUANTIFIED, PAIN PRESENT: ICD-10-PCS | Mod: CPTII,S$GLB,, | Performed by: FAMILY MEDICINE

## 2022-04-11 PROCEDURE — 3288F PR FALLS RISK ASSESSMENT DOCUMENTED: ICD-10-PCS | Mod: CPTII,S$GLB,, | Performed by: FAMILY MEDICINE

## 2022-04-11 PROCEDURE — 3008F BODY MASS INDEX DOCD: CPT | Mod: CPTII,S$GLB,, | Performed by: FAMILY MEDICINE

## 2022-04-11 PROCEDURE — 3078F PR MOST RECENT DIASTOLIC BLOOD PRESSURE < 80 MM HG: ICD-10-PCS | Mod: CPTII,S$GLB,, | Performed by: FAMILY MEDICINE

## 2022-04-11 PROCEDURE — 99214 PR OFFICE/OUTPT VISIT, EST, LEVL IV, 30-39 MIN: ICD-10-PCS | Mod: S$GLB,,, | Performed by: FAMILY MEDICINE

## 2022-04-11 PROCEDURE — 3078F DIAST BP <80 MM HG: CPT | Mod: CPTII,S$GLB,, | Performed by: FAMILY MEDICINE

## 2022-04-11 PROCEDURE — 1159F MED LIST DOCD IN RCRD: CPT | Mod: CPTII,S$GLB,, | Performed by: FAMILY MEDICINE

## 2022-04-11 PROCEDURE — 1159F PR MEDICATION LIST DOCUMENTED IN MEDICAL RECORD: ICD-10-PCS | Mod: CPTII,S$GLB,, | Performed by: FAMILY MEDICINE

## 2022-04-11 PROCEDURE — 3074F SYST BP LT 130 MM HG: CPT | Mod: CPTII,S$GLB,, | Performed by: FAMILY MEDICINE

## 2022-04-11 PROCEDURE — 1125F AMNT PAIN NOTED PAIN PRSNT: CPT | Mod: CPTII,S$GLB,, | Performed by: FAMILY MEDICINE

## 2022-04-11 RX ORDER — CLONAZEPAM 0.5 MG/1
TABLET ORAL
Qty: 14 TABLET | Refills: 0 | Status: SHIPPED | OUTPATIENT
Start: 2022-04-11 | End: 2022-04-26

## 2022-04-11 RX ORDER — PANTOPRAZOLE SODIUM 40 MG/1
40 TABLET, DELAYED RELEASE ORAL 2 TIMES DAILY
Qty: 90 TABLET | Refills: 3
Start: 2022-04-11 | End: 2022-06-11 | Stop reason: SDUPTHER

## 2022-04-11 NOTE — PLAN OF CARE
POST-OP VISIT    SUBJECTIVE:  The patient returns for evaluation of left knee.  The patient is approximately 1 week out from left knee arthroscopy.  Overall, the patient is doing well, pain controlled.  Current pain medication:  None.    PHYSICAL EXAMINATION:  General:  The patient is alert and oriented x 3.  Extremities:  On physical examination of the left knee, incisions are clean and dry.  Sutures removed and Steri-Strips were applied.  Gentle passive range of motion is well-tolerated, extensor mechanism intact.  Flexion to 120° without difficulty.  Mert stockings in place.  No significant redness, warmth or drainage.  Sensory and motor exams are intact.  Distal pulses are palpable.    DATA:  Surgical findings were reviewed with the patient today.  X-rays are not repeated.    IMPRESSION:  Status post left knee arthroscopy with partial medial meniscectomy and removal of multiple cartilaginous loose bodies.    PLAN:  Images from surgery reviewed on screen.  He will continue to work on home exercises but will contact the office if he would like to attend formal therapy.  Follow-up will be in 4 weeks.  He will wait an additional 2 weeks until returning to swimming.    IDesirae PA-C, have personally examined the patient and discussed treatment plan with Dr. Buddy Palmer.    SIMONA Hanson Katherine Anderson PA-C, am now acting as scribe for Dr. Palmer:    The patient returns, 1 week status post left knee arthroscopy, overall doing well.    Sutures were removed and Steri-Strips were applied.  There is some ecchymosis about the left medial distal thigh.  Extensor mechanism intact.  No signs of deep venous thrombosis or infection.    Diagnosis: Status post left knee arthroscopy with partial medial meniscectomy, and removal of multiple for cartilaginous loose bodies.    Plan:  He will work on home exercises and follow up in the office in 4 weeks for repeat evaluation.  He did not require  Patient on vent with documented settings.  Alarms are set and functioning with adequate volumes.  AMBU bag and mask at bedside. Will continue to monitor.     anything in writing for work.      The documentation recorded by the scribe accurately and completely reflects the service(s) I personally performed and the decisions made by me.      DANYA Palmer MD

## 2022-04-18 NOTE — PROGRESS NOTES
" Patient ID: Hannah Norman is a 72 y.o. female.    Chief Complaint: Follow-up, Dizziness, Fatigue, Sinus Problem, Back Pain, and jittery    HPI      Hannah Norman is a 72 y.o. female complains of sinus congestion chronic in nature.  No fever chills.  No recent exacerbation.  Complains of fatigue and back pain-again chronic no new exacerbations problems.  Hypertension-under control with current medications affect blood pressure a bit low.    Vitals:    04/11/22 1111   BP: 114/76   BP Location: Right arm   Patient Position: Sitting   Pulse: 89   Temp: 98.8 °F (37.1 °C)   TempSrc: Oral   SpO2: 96%   Weight: 104.2 kg (229 lb 13.3 oz)   Height: 5' 1" (1.549 m)            Review of Symptoms      Physical Exam    Constitutional:  Oriented to person, place, and time.appears well-developed and well-nourished.  No distress.      HENT  Head: Normocephalic and atraumatic  Right Ear: External ear normal.   Left Ear: External ear normal.   Nose: External nose normal.   Mouth:  Moist mucus membranes.    Eyes:  Conjunctivae are normal. Right eye exhibits no discharge.  Left eye exhibits no discharge. No scleral icterus.  No periorbital edema    Cardiovascular:  Regular rate and rhythm with normal S1 and S2     Pulmonary/Chest:   Clear to auscultation bilaterally without wheezes, rhonchi or rales      Musculoskeletal:  No edema. No obvious deformity No wasting       Neurological:  Alert and oriented to person, place, and time.   Coordination normal.     Skin:   Skin is warm and dry.  No diaphoresis.   No rash noted.     Psychiatric: Normal mood and affect. Behavior is normal.  Judgment and thought content normal.     Complete Blood Count  Lab Results   Component Value Date    RBC 3.64 (L) 04/08/2022    HGB 11.3 (L) 04/08/2022    HCT 35.8 (L) 04/08/2022    MCV 98 04/08/2022    MCH 31.0 04/08/2022    MCHC 31.6 (L) 04/08/2022    RDW 12.0 04/08/2022     04/08/2022    MPV 9.9 04/08/2022    GRAN 2.9 04/08/2022    GRAN 42.0 04/08/2022 "    LYMPH 3.0 04/08/2022    LYMPH 43.7 04/08/2022    MONO 0.6 04/08/2022    MONO 8.2 04/08/2022    EOS 0.3 04/08/2022    BASO 0.06 04/08/2022    EOSINOPHIL 4.9 04/08/2022    BASOPHIL 0.9 04/08/2022    DIFFMETHOD Automated 04/08/2022       Comprehensive Metabolic Panel  Lab Results   Component Value Date     (H) 04/08/2022    BUN 23 (H) 04/08/2022    CREATININE 0.98 04/08/2022     04/08/2022    K 4.2 04/08/2022    CL 99 04/08/2022    PROT 7.3 04/08/2022    ALBUMIN 4.6 04/08/2022    BILITOT 0.4 04/08/2022    AST 18 04/08/2022    ALKPHOS 80 04/08/2022    CO2 30 (H) 04/08/2022    ALT 15 04/08/2022    ANIONGAP 13 04/08/2022    EGFRNONAA 57.4 (A) 04/08/2022    ESTGFRAFRICA >60.0 04/08/2022       TSH  Lab Results   Component Value Date    TSH 0.775 02/04/2022       Assessment / Plan:      ICD-10-CM ICD-9-CM   1. Essential hypertension  I10 401.9   2. RLS (restless legs syndrome)  G25.81 333.94   3. Hyperglycemia  R73.9 790.29     Essential hypertension  -     Hemoglobin A1C; Standing  -     Comprehensive Metabolic Panel; Future; Expected date: 04/11/2022    RLS (restless legs syndrome)  -     clonazePAM (KLONOPIN) 0.5 MG tablet; 2  pills PO as needed at bedtime for the RLS  Dispense: 14 tablet; Refill: 0    Hyperglycemia  -     Hemoglobin A1C; Standing  -     Comprehensive Metabolic Panel; Future; Expected date: 04/11/2022    Other orders  -     pantoprazole (PROTONIX) 40 MG tablet; Take 1 tablet (40 mg total) by mouth 2 (two) times daily.  Dispense: 90 tablet; Refill: 3

## 2022-04-21 ENCOUNTER — HOSPITAL ENCOUNTER (OUTPATIENT)
Dept: RADIOLOGY | Facility: HOSPITAL | Age: 72
Discharge: HOME | End: 2022-04-21
Attending: FAMILY MEDICINE
Payer: MEDICARE

## 2022-04-21 DIAGNOSIS — Z12.31 ENCOUNTER FOR SCREENING MAMMOGRAM FOR MALIGNANT NEOPLASM OF BREAST: ICD-10-CM

## 2022-04-21 PROCEDURE — 77063 BREAST TOMOSYNTHESIS BI: CPT | Mod: TC,PO

## 2022-04-21 PROCEDURE — 77067 SCR MAMMO BI INCL CAD: CPT | Mod: TC,PO

## 2022-04-22 ENCOUNTER — PATIENT MESSAGE (OUTPATIENT)
Dept: ADMINISTRATIVE | Facility: OTHER | Age: 72
End: 2022-04-22
Payer: MEDICARE

## 2022-04-22 ENCOUNTER — PATIENT MESSAGE (OUTPATIENT)
Dept: FAMILY MEDICINE | Facility: CLINIC | Age: 72
End: 2022-04-22
Payer: MEDICARE

## 2022-04-22 ENCOUNTER — TELEPHONE (OUTPATIENT)
Dept: FAMILY MEDICINE | Facility: CLINIC | Age: 72
End: 2022-04-22

## 2022-04-22 DIAGNOSIS — I10 ESSENTIAL HYPERTENSION: Primary | ICD-10-CM

## 2022-04-25 ENCOUNTER — PATIENT MESSAGE (OUTPATIENT)
Dept: FAMILY MEDICINE | Facility: CLINIC | Age: 72
End: 2022-04-25
Payer: MEDICARE

## 2022-04-26 DIAGNOSIS — G25.81 RLS (RESTLESS LEGS SYNDROME): ICD-10-CM

## 2022-04-26 RX ORDER — CLONAZEPAM 0.5 MG/1
TABLET ORAL
Qty: 14 TABLET | Refills: 0 | Status: SHIPPED | OUTPATIENT
Start: 2022-04-26 | End: 2022-05-13 | Stop reason: SDUPTHER

## 2022-04-26 NOTE — TELEPHONE ENCOUNTER
No new care gaps identified.  Powered by [employer]. Reference number: [id number].   4/26/2022 1:36:44 PM CDT

## 2022-05-02 ENCOUNTER — PATIENT MESSAGE (OUTPATIENT)
Dept: FAMILY MEDICINE | Facility: CLINIC | Age: 72
End: 2022-05-02
Payer: MEDICARE

## 2022-05-02 RX ORDER — CEPHALEXIN 500 MG/1
1000 CAPSULE ORAL EVERY 12 HOURS
Qty: 28 CAPSULE | Refills: 0 | Status: SHIPPED | OUTPATIENT
Start: 2022-05-02 | End: 2022-07-14

## 2022-05-13 DIAGNOSIS — G25.81 RLS (RESTLESS LEGS SYNDROME): ICD-10-CM

## 2022-05-13 RX ORDER — LOVASTATIN 20 MG/1
20 TABLET ORAL NIGHTLY
Qty: 90 TABLET | Refills: 3 | Status: SHIPPED | OUTPATIENT
Start: 2022-05-13 | End: 2023-07-21

## 2022-05-13 NOTE — TELEPHONE ENCOUNTER
----- Message from Jaymie Carrizales sent at 5/13/2022  8:36 AM CDT -----  Regarding: Rx refill  Received refill request via fax from [facility] for  lovastatin (MEVACOR) 20 MG tablet

## 2022-05-14 NOTE — TELEPHONE ENCOUNTER
No new care gaps identified.  [facility] Embedded Care Gaps. Reference number: [id number]. 5/13/2022   9:27:12 PM CDT

## 2022-05-16 RX ORDER — CLONAZEPAM 0.5 MG/1
TABLET ORAL
Qty: 14 TABLET | Refills: 0 | Status: SHIPPED | OUTPATIENT
Start: 2022-05-16 | End: 2022-06-11 | Stop reason: SDUPTHER

## 2022-05-20 ENCOUNTER — OFFICE VISIT (OUTPATIENT)
Dept: FAMILY MEDICINE | Facility: CLINIC | Age: 72
End: 2022-05-20
Payer: MEDICARE

## 2022-05-20 ENCOUNTER — TELEPHONE (OUTPATIENT)
Dept: FAMILY MEDICINE | Facility: CLINIC | Age: 72
End: 2022-05-20
Payer: MEDICARE

## 2022-05-20 VITALS
OXYGEN SATURATION: 95 % | WEIGHT: 232.38 LBS | HEIGHT: 61 IN | DIASTOLIC BLOOD PRESSURE: 72 MMHG | HEART RATE: 94 BPM | BODY MASS INDEX: 43.88 KG/M2 | TEMPERATURE: 99 F | SYSTOLIC BLOOD PRESSURE: 124 MMHG

## 2022-05-20 DIAGNOSIS — R42 DIZZINESS: Primary | ICD-10-CM

## 2022-05-20 PROCEDURE — 3078F PR MOST RECENT DIASTOLIC BLOOD PRESSURE < 80 MM HG: ICD-10-PCS | Mod: CPTII,S$GLB,, | Performed by: FAMILY MEDICINE

## 2022-05-20 PROCEDURE — 1159F MED LIST DOCD IN RCRD: CPT | Mod: CPTII,S$GLB,, | Performed by: FAMILY MEDICINE

## 2022-05-20 PROCEDURE — 1125F PR PAIN SEVERITY QUANTIFIED, PAIN PRESENT: ICD-10-PCS | Mod: CPTII,S$GLB,, | Performed by: FAMILY MEDICINE

## 2022-05-20 PROCEDURE — 3288F FALL RISK ASSESSMENT DOCD: CPT | Mod: CPTII,S$GLB,, | Performed by: FAMILY MEDICINE

## 2022-05-20 PROCEDURE — 1101F PR PT FALLS ASSESS DOC 0-1 FALLS W/OUT INJ PAST YR: ICD-10-PCS | Mod: CPTII,S$GLB,, | Performed by: FAMILY MEDICINE

## 2022-05-20 PROCEDURE — 99213 PR OFFICE/OUTPT VISIT, EST, LEVL III, 20-29 MIN: ICD-10-PCS | Mod: S$GLB,,, | Performed by: FAMILY MEDICINE

## 2022-05-20 PROCEDURE — 3044F HG A1C LEVEL LT 7.0%: CPT | Mod: CPTII,S$GLB,, | Performed by: FAMILY MEDICINE

## 2022-05-20 PROCEDURE — 3008F BODY MASS INDEX DOCD: CPT | Mod: CPTII,S$GLB,, | Performed by: FAMILY MEDICINE

## 2022-05-20 PROCEDURE — 3288F PR FALLS RISK ASSESSMENT DOCUMENTED: ICD-10-PCS | Mod: CPTII,S$GLB,, | Performed by: FAMILY MEDICINE

## 2022-05-20 PROCEDURE — 3074F SYST BP LT 130 MM HG: CPT | Mod: CPTII,S$GLB,, | Performed by: FAMILY MEDICINE

## 2022-05-20 PROCEDURE — 3078F DIAST BP <80 MM HG: CPT | Mod: CPTII,S$GLB,, | Performed by: FAMILY MEDICINE

## 2022-05-20 PROCEDURE — 3074F PR MOST RECENT SYSTOLIC BLOOD PRESSURE < 130 MM HG: ICD-10-PCS | Mod: CPTII,S$GLB,, | Performed by: FAMILY MEDICINE

## 2022-05-20 PROCEDURE — 3008F PR BODY MASS INDEX (BMI) DOCUMENTED: ICD-10-PCS | Mod: CPTII,S$GLB,, | Performed by: FAMILY MEDICINE

## 2022-05-20 PROCEDURE — 3044F PR MOST RECENT HEMOGLOBIN A1C LEVEL <7.0%: ICD-10-PCS | Mod: CPTII,S$GLB,, | Performed by: FAMILY MEDICINE

## 2022-05-20 PROCEDURE — 1101F PT FALLS ASSESS-DOCD LE1/YR: CPT | Mod: CPTII,S$GLB,, | Performed by: FAMILY MEDICINE

## 2022-05-20 PROCEDURE — 99213 OFFICE O/P EST LOW 20 MIN: CPT | Mod: S$GLB,,, | Performed by: FAMILY MEDICINE

## 2022-05-20 PROCEDURE — 1160F PR REVIEW ALL MEDS BY PRESCRIBER/CLIN PHARMACIST DOCUMENTED: ICD-10-PCS | Mod: CPTII,S$GLB,, | Performed by: FAMILY MEDICINE

## 2022-05-20 PROCEDURE — 1160F RVW MEDS BY RX/DR IN RCRD: CPT | Mod: CPTII,S$GLB,, | Performed by: FAMILY MEDICINE

## 2022-05-20 PROCEDURE — 1159F PR MEDICATION LIST DOCUMENTED IN MEDICAL RECORD: ICD-10-PCS | Mod: CPTII,S$GLB,, | Performed by: FAMILY MEDICINE

## 2022-05-20 PROCEDURE — 1125F AMNT PAIN NOTED PAIN PRSNT: CPT | Mod: CPTII,S$GLB,, | Performed by: FAMILY MEDICINE

## 2022-05-20 RX ORDER — MECLIZINE HYDROCHLORIDE 25 MG/1
25 TABLET ORAL 3 TIMES DAILY PRN
Qty: 20 TABLET | Refills: 0 | Status: SHIPPED | OUTPATIENT
Start: 2022-05-20 | End: 2022-07-14
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
## 2022-05-20 NOTE — TELEPHONE ENCOUNTER
----- Message from Cleo Mendosa sent at 5/20/2022  9:04 AM CDT -----  Type:  Same Day Appointment Request    Caller is requesting a same day appointment.  Caller declined first available appointment listed below.    Name of Caller:pt  When is the first available appointment?n/a  Symptoms:pt is having dizziness for few months but pt feels she need to be seen it has gotten worse over the last few days  Best Call Back Number:[phone]  Additional Information:

## 2022-05-24 ENCOUNTER — OFFICE VISIT (OUTPATIENT)
Dept: CARDIOLOGY | Facility: CLINIC | Age: 72
End: 2022-05-24
Payer: MEDICARE

## 2022-05-24 VITALS
OXYGEN SATURATION: 96 % | BODY MASS INDEX: 44.21 KG/M2 | SYSTOLIC BLOOD PRESSURE: 113 MMHG | DIASTOLIC BLOOD PRESSURE: 59 MMHG | WEIGHT: 234 LBS | HEART RATE: 100 BPM

## 2022-05-24 DIAGNOSIS — E78.5 HYPERLIPIDEMIA, UNSPECIFIED HYPERLIPIDEMIA TYPE: ICD-10-CM

## 2022-05-24 DIAGNOSIS — I10 ESSENTIAL HYPERTENSION: ICD-10-CM

## 2022-05-24 DIAGNOSIS — R00.2 PALPITATIONS: Primary | ICD-10-CM

## 2022-05-24 PROCEDURE — 3044F HG A1C LEVEL LT 7.0%: CPT | Mod: CPTII,S$GLB,, | Performed by: INTERNAL MEDICINE

## 2022-05-24 PROCEDURE — 1159F MED LIST DOCD IN RCRD: CPT | Mod: CPTII,S$GLB,, | Performed by: INTERNAL MEDICINE

## 2022-05-24 PROCEDURE — 93000 ELECTROCARDIOGRAM COMPLETE: CPT | Mod: S$GLB,,, | Performed by: INTERNAL MEDICINE

## 2022-05-24 PROCEDURE — 1126F AMNT PAIN NOTED NONE PRSNT: CPT | Mod: CPTII,S$GLB,, | Performed by: INTERNAL MEDICINE

## 2022-05-24 PROCEDURE — 99999 PR PBB SHADOW E&M-EST. PATIENT-LVL III: CPT | Mod: PBBFAC,,, | Performed by: INTERNAL MEDICINE

## 2022-05-24 PROCEDURE — 99214 PR OFFICE/OUTPT VISIT, EST, LEVL IV, 30-39 MIN: ICD-10-PCS | Mod: S$GLB,,, | Performed by: INTERNAL MEDICINE

## 2022-05-24 PROCEDURE — 3008F BODY MASS INDEX DOCD: CPT | Mod: CPTII,S$GLB,, | Performed by: INTERNAL MEDICINE

## 2022-05-24 PROCEDURE — 3044F PR MOST RECENT HEMOGLOBIN A1C LEVEL <7.0%: ICD-10-PCS | Mod: CPTII,S$GLB,, | Performed by: INTERNAL MEDICINE

## 2022-05-24 PROCEDURE — 3074F SYST BP LT 130 MM HG: CPT | Mod: CPTII,S$GLB,, | Performed by: INTERNAL MEDICINE

## 2022-05-24 PROCEDURE — 3074F PR MOST RECENT SYSTOLIC BLOOD PRESSURE < 130 MM HG: ICD-10-PCS | Mod: CPTII,S$GLB,, | Performed by: INTERNAL MEDICINE

## 2022-05-24 PROCEDURE — 3008F PR BODY MASS INDEX (BMI) DOCUMENTED: ICD-10-PCS | Mod: CPTII,S$GLB,, | Performed by: INTERNAL MEDICINE

## 2022-05-24 PROCEDURE — 93000 EKG 12-LEAD: ICD-10-PCS | Mod: S$GLB,,, | Performed by: INTERNAL MEDICINE

## 2022-05-24 PROCEDURE — 99214 OFFICE O/P EST MOD 30 MIN: CPT | Mod: S$GLB,,, | Performed by: INTERNAL MEDICINE

## 2022-05-24 PROCEDURE — 3078F PR MOST RECENT DIASTOLIC BLOOD PRESSURE < 80 MM HG: ICD-10-PCS | Mod: CPTII,S$GLB,, | Performed by: INTERNAL MEDICINE

## 2022-05-24 PROCEDURE — 1126F PR PAIN SEVERITY QUANTIFIED, NO PAIN PRESENT: ICD-10-PCS | Mod: CPTII,S$GLB,, | Performed by: INTERNAL MEDICINE

## 2022-05-24 PROCEDURE — 1160F PR REVIEW ALL MEDS BY PRESCRIBER/CLIN PHARMACIST DOCUMENTED: ICD-10-PCS | Mod: CPTII,S$GLB,, | Performed by: INTERNAL MEDICINE

## 2022-05-24 PROCEDURE — 99999 PR PBB SHADOW E&M-EST. PATIENT-LVL III: ICD-10-PCS | Mod: PBBFAC,,, | Performed by: INTERNAL MEDICINE

## 2022-05-24 PROCEDURE — 1159F PR MEDICATION LIST DOCUMENTED IN MEDICAL RECORD: ICD-10-PCS | Mod: CPTII,S$GLB,, | Performed by: INTERNAL MEDICINE

## 2022-05-24 PROCEDURE — 1160F RVW MEDS BY RX/DR IN RCRD: CPT | Mod: CPTII,S$GLB,, | Performed by: INTERNAL MEDICINE

## 2022-05-24 PROCEDURE — 3078F DIAST BP <80 MM HG: CPT | Mod: CPTII,S$GLB,, | Performed by: INTERNAL MEDICINE

## 2022-05-24 NOTE — PROGRESS NOTES
Cardiology Clinic note    Subjective:   Patient ID:  Hannah Norman is a 72 y.o. female who presents for palpitations and AFIb FUP    HPI:   Palpitations: COVID earlier in the year. Afib when admitted for septic shock Feb 2022. Has been having palpitations daily - may last for few hours or minutes. No syncope or near syncope. Has been persistent since the discharge.    HTN: On medications    HLD: Tolerating statin    SH Tobacco Quit 1980s  FH No premature CAD    Patient Active Problem List    Diagnosis Date Noted    Acute blood loss anemia 02/13/2022    Obesity hypoventilation syndrome     Encephalopathy, metabolic     Atrial fibrillation with RVR 02/07/2022    Acute hypercapnic respiratory failure     Acute metabolic encephalopathy 02/04/2022    Hypocalcemia 02/04/2022    Hyperphosphatemia 02/04/2022    Shock, unspecified 02/04/2022    Hyperglycemia 02/04/2022    Transaminitis 02/04/2022    RAQUEL (acute kidney injury)     Metabolic acidosis     Hypervolemia     Shock     Leukocytosis 02/03/2022    UTI (urinary tract infection) 02/03/2022    High anion gap metabolic acidosis 02/03/2022    Acute renal failure with tubular necrosis 02/03/2022    COVID-19 02/03/2022    Acute on chronic respiratory failure with hypercapnia 02/03/2022    Decreased range of motion of right wrist 12/03/2021    Decreased range of motion of finger of right hand 12/03/2021    Swelling of right hand 12/03/2021    Wrist fracture 11/16/2021    Closed fracture of right wrist 11/01/2021    Sacroiliitis, not elsewhere classified 04/06/2021    Unspecified mood (affective) disorder 04/06/2021    Dysphonia 10/31/2019    Morbid obesity with BMI of 45.0-49.9, adult 08/09/2019     Current BMI 41.69      Tortuous aorta 08/09/2019     Noted on cxr dated 5/8/2015.       Prediabetes 08/09/2019     Hemoglobin A1C 5.7 as noted on most recent a1c dated 1/17/2019.       Laryngopharyngeal reflux (LPR) 02/25/2019    Urge  incontinence 12/07/2018    MARY (obstructive sleep apnea)     Disorder of lumbar spine 05/20/2016    CTS (carpal tunnel syndrome) 11/20/2015    Left-sided Bell's palsy 05/29/2015    Osteoarthritis 12/27/2013    Gait disturbance 12/27/2013    Restless leg syndrome 10/22/2012    Mild acid reflux     Essential hypertension     Essential tremor     Neck pain     Chronic back pain     Fibromyalgia     Anxiety disorder     Mixed hyperlipidemia        Patient's Medications   New Prescriptions    No medications on file   Previous Medications    ACETAMINOPHEN (TYLENOL) 500 MG TABLET    Take 500 mg by mouth every 6 (six) hours as needed.    ALBUTEROL (PROVENTIL/VENTOLIN HFA) 90 MCG/ACTUATION INHALER    Inhale 2 puffs into the lungs every 6 (six) hours as needed for Wheezing or Shortness of Breath. Rescue    CEPHALEXIN (KEFLEX) 500 MG CAPSULE    Take 2 capsules (1,000 mg total) by mouth every 12 (twelve) hours.    CLONAZEPAM (KLONOPIN) 0.5 MG TABLET    TAKE 2 TABLETS BY MOUTH AT BEDTIME AS NEEDED FOR RESTLESS LEG SYNDROME    CYCLOBENZAPRINE (FLEXERIL) 10 MG TABLET    Take 1 tablet (10 mg total) by mouth 3 (three) times daily as needed for Muscle spasms.    FLUTICASONE PROPIONATE (FLONASE) 50 MCG/ACTUATION NASAL SPRAY    1 spray (50 mcg total) by Each Nostril route once daily.    FUROSEMIDE (LASIX) 40 MG TABLET    Take 1 tablet (40 mg total) by mouth once daily.    GABAPENTIN (NEURONTIN) 600 MG TABLET    Take 1 tablet (600 mg total) by mouth 3 (three) times daily.    IMIPRAMINE (TOFRANIL) 25 MG TABLET    TAKE 1 TABLET BY MOUTH EVERY EVENING TO HELP CALM STOMACH    IPRATROPIUM (ATROVENT) 21 MCG (0.03 %) NASAL SPRAY    2 sprays by Nasal route 3 (three) times daily. For nasal congestion and sinus three weeks    LOSARTAN (COZAAR) 100 MG TABLET    TAKE 1 TABLET BY MOUTH ONCE A DAY FOR BLOOD PRESSURE    LOVASTATIN (MEVACOR) 20 MG TABLET    Take 1 tablet (20 mg total) by mouth every evening.    MECLIZINE (ANTIVERT) 25 MG  TABLET    Take 1 tablet (25 mg total) by mouth 3 (three) times daily as needed for Dizziness.    MOMETASONE (ELOCON) 0.1 % OINTMENT    Apply topically once daily. For 7 days and may repeat.    NITROFURANTOIN, MACROCRYSTAL-MONOHYDRATE, (MACROBID) 100 MG CAPSULE    Take 1 capsule (100 mg total) by mouth 2 (two) times daily.    OXYCODONE-ACETAMINOPHEN (PERCOCET)  MG PER TABLET    Take 1 tablet by mouth every 6 (six) hours as needed for Pain.    PANTOPRAZOLE (PROTONIX) 40 MG TABLET    Take 1 tablet (40 mg total) by mouth 2 (two) times daily.    POTASSIUM CHLORIDE SA (K-DUR,KLOR-CON) 20 MEQ TABLET    TAKE 1 TABLET BY MOUTH ONCE A DAY    PRAMIPEXOLE (MIRAPEX) 0.125 MG TABLET    TAKE 2 TABLETS BY MOUTH IN THE LATE AFTERNOON AND TAKE 4 TABLETS BY MOUTH AT BEDTIME    VENLAFAXINE (EFFEXOR-XR) 150 MG CP24    1 tablet by mouth in morning and 1 tablet by mouth at night   Modified Medications    No medications on file   Discontinued Medications    No medications on file        Review of Systems   Constitutional: Negative for fever.   HENT: Negative for nosebleeds.    Cardiovascular: Negative for near-syncope and syncope. Leg swelling: Chronic- no worsening. PND: On CPAP.        As above   Respiratory: Negative for hemoptysis.    Hematologic/Lymphatic: Negative for bleeding problem.   Musculoskeletal: Positive for arthritis.   Gastrointestinal: Negative for hematochezia.   Genitourinary: Negative for hematuria.   Neurological: Negative for seizures.   Allergic/Immunologic: Positive for environmental allergies.         Objective:   Vitals  Vitals:    05/24/22 0942   BP: (!) 113/59   Pulse: 100   SpO2: 96%   Weight: 106.1 kg (234 lb)          Physical Exam  Constitutional:       General: She is not in acute distress.     Appearance: She is well-developed. She is not diaphoretic.   HENT:      Head: Normocephalic.   Neck:      Vascular: No JVD.   Cardiovascular:      Rate and Rhythm: Normal rate and regular rhythm.      Heart  sounds: No murmur heard.    No friction rub. No gallop.   Pulmonary:      Effort: Pulmonary effort is normal. No respiratory distress.      Breath sounds: Normal breath sounds.   Abdominal:      Palpations: Abdomen is soft.      Tenderness: There is abdominal tenderness.   Musculoskeletal:         General: Swelling: Chronic.      Cervical back: Normal range of motion.   Skin:     General: Skin is warm.   Neurological:      Mental Status: She is alert.   Psychiatric:         Mood and Affect: Mood normal.             Assessment:     1. Palpitations    2. Essential hypertension    3. Hyperlipidemia, unspecified hyperlipidemia type        Plan:   Hannah Norman is a 72 y.o. female h/o HTN, HLD    Adm Feb 2022 for septic shock and respiratory distress in the setting of recent COVID infection. RAQUEL. Was on CRRT. Required intubation. Noted Afib with RVR for which amiodarone was started; DC'd prior to discharge, No AC d/t arm hematoma and suspicion of GIB d/t drop in H/H.    - EKG personally reviewed. My interpretation:  5/24/22: Baseline artifact. SR 90s, normal axis. QTc 439  7/6/21: SR 80s, normal axis. Normal EKG  - Echocardiogram Feb 2022  · The left ventricle is normal in size with concentric remodeling and hyperdynamic systolic function.  · The estimated ejection fraction is 75%.  · Normal left ventricular diastolic function.  · With normal right ventricular systolic function.  · There is mild aortic valve stenosis.  · Aortic valve area is 2.88 cm2; peak velocity is 2.0 m/s; mean gradient is 9 mmHg.  · On BiPAP    Palpitations  Holter monitor    Chest pain  PET stress July 2021: No ischemia.  Not with activity; random at rest    Afib  EKG Feb 2022 likely coarse AFib  Likely isolated in the setting of COVID infection  Monitor as above  GI FUP- has not been evaluated yet    Furosemide - as needed for leg swelling    Tortuous Aorta  Noted on CXR 2015    HTN  BP well controlled  Losartan    HLD  Lab Results   Component  Value Date    LDLCALC 71.0 11/10/2021   Statin as above      Continue with current medical plan and lifestyle changes.    Orders Placed This Encounter   Procedures    Holter monitor - 48 hour     Standing Status:   Future     Standing Expiration Date:   5/24/2023     Order Specific Question:   Release to patient     Answer:   Immediate    EKG 12-lead       Follow up as scheduled  Return sooner for concerns or questions. If symptoms persist go to the ED    She expressed verbal understanding and agreed with the plan      Dione Holt MD  Interventional Cardiology  [facility]  Phone: [phone]

## 2022-05-30 ENCOUNTER — HOSPITAL ENCOUNTER (OUTPATIENT)
Dept: CARDIOLOGY | Facility: HOSPITAL | Age: 72
Discharge: HOME | End: 2022-05-30
Attending: INTERNAL MEDICINE
Payer: MEDICARE

## 2022-05-30 DIAGNOSIS — R00.2 PALPITATIONS: ICD-10-CM

## 2022-05-30 PROCEDURE — 93225 XTRNL ECG REC<48 HRS REC: CPT | Mod: PO

## 2022-05-30 PROCEDURE — 93227 XTRNL ECG REC<48 HR R&I: CPT | Mod: ,,, | Performed by: INTERNAL MEDICINE

## 2022-05-30 PROCEDURE — 93227 HOLTER MONITOR - 48 HOUR (CUPID ONLY): ICD-10-PCS | Mod: ,,, | Performed by: INTERNAL MEDICINE

## 2022-06-06 LAB
OHS CV EVENT MONITOR DAY: 0
OHS CV HOLTER LENGTH DECIMAL HOURS: 48
OHS CV HOLTER LENGTH HOURS: 48
OHS CV HOLTER LENGTH MINUTES: 0
OHS CV HOLTER SINUS AVERAGE HR: 89
OHS CV HOLTER SINUS MAX HR: 126
OHS CV HOLTER SINUS MIN HR: 71

## 2022-06-08 NOTE — PROGRESS NOTES
Please call patient re monitor with normal rhythm of heart. Some extra beats noted. Nothing to do for this. No symptoms reported. Can do a monitor she would wear longer if still having symptoms
Dr Simmons

## 2022-06-13 ENCOUNTER — PATIENT MESSAGE (OUTPATIENT)
Dept: CARDIOLOGY | Facility: CLINIC | Age: 72
End: 2022-06-13
Payer: MEDICARE

## 2022-06-27 ENCOUNTER — PES CALL (OUTPATIENT)
Dept: ADMINISTRATIVE | Facility: CLINIC | Age: 72
End: 2022-06-27
Payer: MEDICARE

## 2022-07-12 ENCOUNTER — TELEPHONE (OUTPATIENT)
Dept: ADMINISTRATIVE | Facility: CLINIC | Age: 72
End: 2022-07-12
Payer: MEDICARE

## 2022-07-12 NOTE — TELEPHONE ENCOUNTER
Called pt; informed pt I was calling to confirm her virtual EAWV on 7/14/22 at 11:00am and to see if she needed any help; pt stated she did not need any help and completed e-pre check already ; pt informed to login 15 minutes prior to appt time

## 2022-07-14 ENCOUNTER — OFFICE VISIT (OUTPATIENT)
Dept: FAMILY MEDICINE | Facility: CLINIC | Age: 72
End: 2022-07-14
Payer: MEDICARE

## 2022-07-14 ENCOUNTER — TELEPHONE (OUTPATIENT)
Dept: ADMINISTRATIVE | Facility: CLINIC | Age: 72
End: 2022-07-14
Payer: MEDICARE

## 2022-07-14 VITALS — HEIGHT: 60 IN | BODY MASS INDEX: 45.93 KG/M2 | WEIGHT: 233.94 LBS

## 2022-07-14 DIAGNOSIS — M79.7 FIBROMYALGIA: ICD-10-CM

## 2022-07-14 DIAGNOSIS — M46.1 SACROILIITIS, NOT ELSEWHERE CLASSIFIED: ICD-10-CM

## 2022-07-14 DIAGNOSIS — Z00.00 ENCOUNTER FOR PREVENTIVE HEALTH EXAMINATION: Primary | ICD-10-CM

## 2022-07-14 DIAGNOSIS — I10 ESSENTIAL HYPERTENSION: ICD-10-CM

## 2022-07-14 DIAGNOSIS — E78.2 MIXED HYPERLIPIDEMIA: ICD-10-CM

## 2022-07-14 DIAGNOSIS — N18.30 STAGE 3 CHRONIC KIDNEY DISEASE, UNSPECIFIED WHETHER STAGE 3A OR 3B CKD: ICD-10-CM

## 2022-07-14 DIAGNOSIS — I77.1 TORTUOUS AORTA: ICD-10-CM

## 2022-07-14 DIAGNOSIS — Z99.89 DEPENDENCE ON OTHER ENABLING MACHINES AND DEVICES: ICD-10-CM

## 2022-07-14 DIAGNOSIS — F41.9 ANXIETY DISORDER, UNSPECIFIED TYPE: ICD-10-CM

## 2022-07-14 DIAGNOSIS — R26.9 ABNORMALITY OF GAIT AND MOBILITY: ICD-10-CM

## 2022-07-14 DIAGNOSIS — E66.2 OBESITY HYPOVENTILATION SYNDROME: ICD-10-CM

## 2022-07-14 DIAGNOSIS — E66.01 MORBID OBESITY WITH BMI OF 45.0-49.9, ADULT: Chronic | ICD-10-CM

## 2022-07-14 DIAGNOSIS — G25.81 RESTLESS LEG SYNDROME: ICD-10-CM

## 2022-07-14 DIAGNOSIS — F39 UNSPECIFIED MOOD (AFFECTIVE) DISORDER: ICD-10-CM

## 2022-07-14 PROBLEM — Z87.891 PERSONAL HISTORY OF NICOTINE DEPENDENCE: Status: ACTIVE | Noted: 2022-03-08

## 2022-07-14 PROBLEM — R57.9 SHOCK, UNSPECIFIED: Status: RESOLVED | Noted: 2022-02-04 | Resolved: 2022-07-14

## 2022-07-14 PROBLEM — N39.0 UTI (URINARY TRACT INFECTION): Status: RESOLVED | Noted: 2022-02-03 | Resolved: 2022-07-14

## 2022-07-14 PROBLEM — N17.0 ACUTE RENAL FAILURE WITH TUBULAR NECROSIS: Status: RESOLVED | Noted: 2022-02-03 | Resolved: 2022-07-14

## 2022-07-14 PROBLEM — U07.1 COVID-19: Status: RESOLVED | Noted: 2022-02-03 | Resolved: 2022-07-14

## 2022-07-14 PROBLEM — E87.29 HIGH ANION GAP METABOLIC ACIDOSIS: Status: RESOLVED | Noted: 2022-02-03 | Resolved: 2022-07-14

## 2022-07-14 PROBLEM — D62 ACUTE BLOOD LOSS ANEMIA: Status: RESOLVED | Noted: 2022-02-13 | Resolved: 2022-07-14

## 2022-07-14 PROBLEM — J96.22 ACUTE ON CHRONIC RESPIRATORY FAILURE WITH HYPERCAPNIA: Status: RESOLVED | Noted: 2022-02-03 | Resolved: 2022-07-14

## 2022-07-14 PROBLEM — G93.41 ACUTE METABOLIC ENCEPHALOPATHY: Status: RESOLVED | Noted: 2022-02-04 | Resolved: 2022-07-14

## 2022-07-14 PROBLEM — S62.109A WRIST FRACTURE: Status: RESOLVED | Noted: 2021-11-16 | Resolved: 2022-07-14

## 2022-07-14 PROCEDURE — 1159F PR MEDICATION LIST DOCUMENTED IN MEDICAL RECORD: ICD-10-PCS | Mod: CPTII,95,, | Performed by: NURSE PRACTITIONER

## 2022-07-14 PROCEDURE — 1160F PR REVIEW ALL MEDS BY PRESCRIBER/CLIN PHARMACIST DOCUMENTED: ICD-10-PCS | Mod: CPTII,95,, | Performed by: NURSE PRACTITIONER

## 2022-07-14 PROCEDURE — 3288F PR FALLS RISK ASSESSMENT DOCUMENTED: ICD-10-PCS | Mod: CPTII,95,, | Performed by: NURSE PRACTITIONER

## 2022-07-14 PROCEDURE — 1160F RVW MEDS BY RX/DR IN RCRD: CPT | Mod: CPTII,95,, | Performed by: NURSE PRACTITIONER

## 2022-07-14 PROCEDURE — 1170F PR FUNCTIONAL STATUS ASSESSED: ICD-10-PCS | Mod: CPTII,95,, | Performed by: NURSE PRACTITIONER

## 2022-07-14 PROCEDURE — 1101F PR PT FALLS ASSESS DOC 0-1 FALLS W/OUT INJ PAST YR: ICD-10-PCS | Mod: CPTII,95,, | Performed by: NURSE PRACTITIONER

## 2022-07-14 PROCEDURE — 3008F PR BODY MASS INDEX (BMI) DOCUMENTED: ICD-10-PCS | Mod: CPTII,95,, | Performed by: NURSE PRACTITIONER

## 2022-07-14 PROCEDURE — 1125F AMNT PAIN NOTED PAIN PRSNT: CPT | Mod: CPTII,95,, | Performed by: NURSE PRACTITIONER

## 2022-07-14 PROCEDURE — 1101F PT FALLS ASSESS-DOCD LE1/YR: CPT | Mod: CPTII,95,, | Performed by: NURSE PRACTITIONER

## 2022-07-14 PROCEDURE — 1159F MED LIST DOCD IN RCRD: CPT | Mod: CPTII,95,, | Performed by: NURSE PRACTITIONER

## 2022-07-14 PROCEDURE — 3066F NEPHROPATHY DOC TX: CPT | Mod: CPTII,95,, | Performed by: NURSE PRACTITIONER

## 2022-07-14 PROCEDURE — 1125F PR PAIN SEVERITY QUANTIFIED, PAIN PRESENT: ICD-10-PCS | Mod: CPTII,95,, | Performed by: NURSE PRACTITIONER

## 2022-07-14 PROCEDURE — 3008F BODY MASS INDEX DOCD: CPT | Mod: CPTII,95,, | Performed by: NURSE PRACTITIONER

## 2022-07-14 PROCEDURE — 1170F FXNL STATUS ASSESSED: CPT | Mod: CPTII,95,, | Performed by: NURSE PRACTITIONER

## 2022-07-14 PROCEDURE — 3044F PR MOST RECENT HEMOGLOBIN A1C LEVEL <7.0%: ICD-10-PCS | Mod: CPTII,95,, | Performed by: NURSE PRACTITIONER

## 2022-07-14 PROCEDURE — G0439 PR MEDICARE ANNUAL WELLNESS SUBSEQUENT VISIT: ICD-10-PCS | Mod: 95,,, | Performed by: NURSE PRACTITIONER

## 2022-07-14 PROCEDURE — 3066F PR DOCUMENTATION OF TREATMENT FOR NEPHROPATHY: ICD-10-PCS | Mod: CPTII,95,, | Performed by: NURSE PRACTITIONER

## 2022-07-14 PROCEDURE — 3288F FALL RISK ASSESSMENT DOCD: CPT | Mod: CPTII,95,, | Performed by: NURSE PRACTITIONER

## 2022-07-14 PROCEDURE — G0439 PPPS, SUBSEQ VISIT: HCPCS | Mod: 95,,, | Performed by: NURSE PRACTITIONER

## 2022-07-14 PROCEDURE — 3044F HG A1C LEVEL LT 7.0%: CPT | Mod: CPTII,95,, | Performed by: NURSE PRACTITIONER

## 2022-07-14 NOTE — PROGRESS NOTES
The patient location is: [place]  The chief complaint leading to consultation is: Medicare AWV  Visit type: audiovisual    Face to Face time with patient: 30 minutes  60 minutes of total time spent on the encounter, which includes face to face time and non-face to face time preparing to see the patient (eg, review of tests), Obtaining and/or reviewing separately obtained history, Documenting clinical information in the electronic or other health record, Independently interpreting results (not separately reported) and communicating results to the patient/family/caregiver, or Care coordination (not separately reported).     Each patient to whom he or she provides medical services by telemedicine is:  (1) informed of the relationship between the physician and patient and the respective role of any other health care provider with respect to management of the patient; and (2) notified that he or she may decline to receive medical services by telemedicine and may withdraw from such care at any time.    Hannah Norman presented for a  Medicare AWV and comprehensive Health Risk Assessment today. The following components were reviewed and updated:    · Medical history  · Family History  · Social history  · Allergies and Current Medications  · Health Risk Assessment  · Health Maintenance  · Care Team         ** See Completed Assessments for Annual Wellness Visit within the encounter summary.**         The following assessments were completed:  · Living Situation  · CAGE  · Depression Screening  · Fall Risk Assessment (MACH 10)  · Hearing Assessment(HHI)  · Cognitive Function Screening  · Nutrition Screening  · ADL Screening  · PAQ Screening      Vitals:    07/14/22 1125   Weight: 106.1 kg (233 lb 14.5 oz)   Height: 5' (1.524 m)     Body mass index is 45.68 kg/m².     Physical Exam  Constitutional:       General: She is not in acute distress.     Appearance: Normal appearance. She is well-developed and well-groomed.   HENT:       Head: Normocephalic.   Eyes:      Comments: Wearing glasses   Pulmonary:      Effort: Pulmonary effort is normal. No respiratory distress.   Skin:     Coloration: Skin is not pale.   Neurological:      Mental Status: She is alert and oriented to person, place, and time.   Psychiatric:         Attention and Perception: Attention normal.         Mood and Affect: Mood and affect normal.         Speech: Speech normal.         Behavior: Behavior normal. Behavior is cooperative.     Physical exam limited d/t virtual visit. Patient does not appear to be in any respiratory distress. Able to speak in complete sentences without becoming short of breath.        Diagnoses and health risks identified today and associated recommendations/orders:    1. Encounter for preventive health examination    2. Essential hypertension  Chronic; stable on medication. Follow up with PCP.    3. Tortuous aorta  Chronic; stable. As seen on previous imaging. Follow up with PCP.    4. Mixed hyperlipidemia  Chronic; stable on medication. Follow up with PCP.    5. Stage 3 chronic kidney disease, unspecified whether stage 3a or 3b CKD  Chronic; stable on medication. Follow up with PCP.    6. Sacroiliitis, not elsewhere classified  Chronic; stable on medication. Follow up with PCP.    7. Obesity hypoventilation syndrome  Chronic; stable. Follow up with PCP.    8. Unspecified mood (affective) disorder  Chronic; stable on medication. Follow up with PCP.    9. Anxiety disorder, unspecified type  Chronic; stable on medication. Follow up with PCP.    10. Fibromyalgia  Chronic; stable on medication. Follow up with PCP.    11. Restless leg syndrome  Chronic; stable on medication. Follow up with PCP.    12. Abnormality of gait and mobility  Occasionally uses mobility aid for ambulation, no recent falls in past 6 months. Follow up with PCP.    13. Dependence on other enabling machines and devices  Occasionally uses mobility aid for ambulation, no recent falls  in past 6 months. Follow up with PCP.    14. Morbid obesity with BMI of 45.0-49.9, adult  Continue to eat a low salt/low fat diet and discussed importance of engaging in physical activity at least 5x/week for a minimum of 30 min/day.      Provided Hannah with a 5-10 year written screening schedule and personal prevention plan. Recommendations were developed using the USPSTF age appropriate recommendations. Education, counseling, and referrals were provided as needed. After Visit Summary printed and given to patient which includes a list of additional screenings/tests needed.    Follow up for your next annual wellness visit.    Pauline Celis NP    Advance Care Planning     I offered to discuss advanced care planning, including how to pick a person who would make decisions for you if you were unable to make them for yourself, called a health care power of , and what kind of decisions you might make such as use of life sustaining treatments such as ventilators and tube feeding when faced with a life limiting illness recorded on a living will that they will need to know. (How you want to be cared for as you near the end of your natural life)     X Patient is interested in learning more about how to make advanced directives.  I offered to discuss this with them and instructed that she follow up with her PCP.

## 2022-07-14 NOTE — PATIENT INSTRUCTIONS
Counseling and Referral of Other Preventative  (Italic type indicates deductible and co-insurance are waived)    Patient Name: Hannah Norman  Today's Date: 7/14/2022    Health Maintenance       Date Due Completion Date    COVID-19 Vaccine (3 - Booster for Moderna series) 07/29/2022 (Originally 8/11/2021) 3/11/2021    Shingles Vaccine (1 of 2) 03/10/2023 (Originally 2/13/1999) ---    Influenza Vaccine (1) 09/01/2022 11/29/2021    Colorectal Cancer Screening 11/10/2022 11/10/2017    Override on 1/1/2008: Done    DEXA Scan 12/04/2022 12/4/2019    Mammogram 04/21/2023 4/21/2022    TETANUS VACCINE 08/23/2026 8/23/2016    Lipid Panel 11/10/2026 11/10/2021        No orders of the defined types were placed in this encounter.    The following information is provided to all patients.  This information is to help you find resources for any of the problems found today that may be affecting your health:                Living healthy guide: www.[facility].[place].gov      Understanding Diabetes: www.diabetes.org      Eating healthy: www.cdc.gov/healthyweight      CDC home safety checklist: www.cdc.gov/steadi/patient.html      Agency on Aging: www.goea.[place].[facility]      Alcoholics anonymous (AA): www.aa.org      Physical Activity: www.yari.nih.gov/jo1osyz      Tobacco use: www.quitwithusla.org

## 2022-07-19 ENCOUNTER — PATIENT MESSAGE (OUTPATIENT)
Dept: ADMINISTRATIVE | Facility: OTHER | Age: 72
End: 2022-07-19
Payer: MEDICARE

## 2022-07-25 NOTE — ASSESSMENT & PLAN NOTE
Hannah Norman is a 68 y.o. female s/p TOT sling placement on 10/23/18    - regular diet  - sliv  - gresham removed  - voiding trial  - vaginal packing removed  - ambulate/oob  - restarted home meds  - percocet PRN  - KATERIN/SCDs    Plan for discharge this am   Patient transferred to writer from call center.  She voice her frustration of getting transferred to RN from call enter.  Writer informed patient the proper protocol is for call center to transfer call's to a nurse when experiencing urgent/life threatening signs and/or symptoms. T is needed in order to properly direct patient to the appropriate place of care, rather this for an appointment with provider urgent care or ER.    Patient mentioned she just want to schedule an appointment to be seen this week for a physical.Writer unable to address further triage questions.  Inform NP Marily has no openings this week.  Writer informed appointment can be scheduled with ANAID Kerr re: hand numbness (that is not new), and lower abdominal pain. Inform provider will address acute concerns.      Appointment scheduled  with ANAID Kerr on 7/26/2022 at 9:00 am to address acute concerns.    Thank you,  JAILYN Cuevas

## 2022-07-30 DIAGNOSIS — M79.7 FIBROMYALGIA: ICD-10-CM

## 2022-07-30 DIAGNOSIS — G25.81 RESTLESS LEG SYNDROME: ICD-10-CM

## 2022-07-30 NOTE — TELEPHONE ENCOUNTER
No new care gaps identified.  [facility] Embedded Care Gaps. Reference number: [id number]. 7/30/2022   4:57:58 PM CDT

## 2022-08-01 RX ORDER — GABAPENTIN 600 MG/1
TABLET ORAL
Qty: 90 TABLET | Refills: 3 | Status: SHIPPED | OUTPATIENT
Start: 2022-08-01 | End: 2023-04-10

## 2022-08-11 ENCOUNTER — OFFICE VISIT (OUTPATIENT)
Dept: FAMILY MEDICINE | Facility: CLINIC | Age: 72
End: 2022-08-11
Payer: MEDICARE

## 2022-08-11 VITALS
DIASTOLIC BLOOD PRESSURE: 82 MMHG | TEMPERATURE: 98 F | WEIGHT: 241.94 LBS | HEIGHT: 60 IN | HEART RATE: 85 BPM | BODY MASS INDEX: 47.5 KG/M2 | SYSTOLIC BLOOD PRESSURE: 130 MMHG | OXYGEN SATURATION: 98 %

## 2022-08-11 DIAGNOSIS — N18.30 STAGE 3 CHRONIC KIDNEY DISEASE, UNSPECIFIED WHETHER STAGE 3A OR 3B CKD: ICD-10-CM

## 2022-08-11 DIAGNOSIS — R73.9 HYPERGLYCEMIA: ICD-10-CM

## 2022-08-11 DIAGNOSIS — G25.81 RLS (RESTLESS LEGS SYNDROME): ICD-10-CM

## 2022-08-11 DIAGNOSIS — I10 ESSENTIAL HYPERTENSION: Primary | ICD-10-CM

## 2022-08-11 DIAGNOSIS — R05.9 COUGH: ICD-10-CM

## 2022-08-11 DIAGNOSIS — R06.2 WHEEZING: ICD-10-CM

## 2022-08-11 DIAGNOSIS — E78.2 MIXED HYPERLIPIDEMIA: ICD-10-CM

## 2022-08-11 DIAGNOSIS — J40 BRONCHITIS: ICD-10-CM

## 2022-08-11 PROCEDURE — 1101F PT FALLS ASSESS-DOCD LE1/YR: CPT | Mod: CPTII,S$GLB,, | Performed by: FAMILY MEDICINE

## 2022-08-11 PROCEDURE — 3079F PR MOST RECENT DIASTOLIC BLOOD PRESSURE 80-89 MM HG: ICD-10-PCS | Mod: CPTII,S$GLB,, | Performed by: FAMILY MEDICINE

## 2022-08-11 PROCEDURE — 99214 OFFICE O/P EST MOD 30 MIN: CPT | Mod: S$GLB,,, | Performed by: FAMILY MEDICINE

## 2022-08-11 PROCEDURE — 3066F NEPHROPATHY DOC TX: CPT | Mod: CPTII,S$GLB,, | Performed by: FAMILY MEDICINE

## 2022-08-11 PROCEDURE — 3044F HG A1C LEVEL LT 7.0%: CPT | Mod: CPTII,S$GLB,, | Performed by: FAMILY MEDICINE

## 2022-08-11 PROCEDURE — 3075F PR MOST RECENT SYSTOLIC BLOOD PRESS GE 130-139MM HG: ICD-10-PCS | Mod: CPTII,S$GLB,, | Performed by: FAMILY MEDICINE

## 2022-08-11 PROCEDURE — 3079F DIAST BP 80-89 MM HG: CPT | Mod: CPTII,S$GLB,, | Performed by: FAMILY MEDICINE

## 2022-08-11 PROCEDURE — 3288F FALL RISK ASSESSMENT DOCD: CPT | Mod: CPTII,S$GLB,, | Performed by: FAMILY MEDICINE

## 2022-08-11 PROCEDURE — 3066F PR DOCUMENTATION OF TREATMENT FOR NEPHROPATHY: ICD-10-PCS | Mod: CPTII,S$GLB,, | Performed by: FAMILY MEDICINE

## 2022-08-11 PROCEDURE — 1125F AMNT PAIN NOTED PAIN PRSNT: CPT | Mod: CPTII,S$GLB,, | Performed by: FAMILY MEDICINE

## 2022-08-11 PROCEDURE — 99214 PR OFFICE/OUTPT VISIT, EST, LEVL IV, 30-39 MIN: ICD-10-PCS | Mod: S$GLB,,, | Performed by: FAMILY MEDICINE

## 2022-08-11 PROCEDURE — 3075F SYST BP GE 130 - 139MM HG: CPT | Mod: CPTII,S$GLB,, | Performed by: FAMILY MEDICINE

## 2022-08-11 PROCEDURE — 3008F BODY MASS INDEX DOCD: CPT | Mod: CPTII,S$GLB,, | Performed by: FAMILY MEDICINE

## 2022-08-11 PROCEDURE — 3288F PR FALLS RISK ASSESSMENT DOCUMENTED: ICD-10-PCS | Mod: CPTII,S$GLB,, | Performed by: FAMILY MEDICINE

## 2022-08-11 PROCEDURE — 1101F PR PT FALLS ASSESS DOC 0-1 FALLS W/OUT INJ PAST YR: ICD-10-PCS | Mod: CPTII,S$GLB,, | Performed by: FAMILY MEDICINE

## 2022-08-11 PROCEDURE — 3008F PR BODY MASS INDEX (BMI) DOCUMENTED: ICD-10-PCS | Mod: CPTII,S$GLB,, | Performed by: FAMILY MEDICINE

## 2022-08-11 PROCEDURE — 1125F PR PAIN SEVERITY QUANTIFIED, PAIN PRESENT: ICD-10-PCS | Mod: CPTII,S$GLB,, | Performed by: FAMILY MEDICINE

## 2022-08-11 PROCEDURE — 3044F PR MOST RECENT HEMOGLOBIN A1C LEVEL <7.0%: ICD-10-PCS | Mod: CPTII,S$GLB,, | Performed by: FAMILY MEDICINE

## 2022-08-11 RX ORDER — METRONIDAZOLE 10 MG/G
GEL TOPICAL DAILY
Qty: 60 G | Refills: 1 | Status: SHIPPED | OUTPATIENT
Start: 2022-08-11 | End: 2023-04-13

## 2022-08-11 RX ORDER — ALBUTEROL SULFATE 90 UG/1
2 AEROSOL, METERED RESPIRATORY (INHALATION) EVERY 6 HOURS PRN
Qty: 18 G | Refills: 0 | Status: SHIPPED | OUTPATIENT
Start: 2022-08-11 | End: 2023-01-05 | Stop reason: SDUPTHER

## 2022-08-11 RX ORDER — ACETAMINOPHEN 500 MG
500 TABLET ORAL EVERY 6 HOURS PRN
Status: CANCELLED | OUTPATIENT
Start: 2022-08-11

## 2022-08-11 RX ORDER — CLONAZEPAM 0.5 MG/1
TABLET ORAL
Qty: 14 TABLET | Refills: 0 | Status: SHIPPED | OUTPATIENT
Start: 2022-08-11 | End: 2022-11-30 | Stop reason: SDUPTHER

## 2022-08-11 RX ORDER — PANTOPRAZOLE SODIUM 40 MG/1
40 TABLET, DELAYED RELEASE ORAL 2 TIMES DAILY
Qty: 90 TABLET | Refills: 3
Start: 2022-08-11 | End: 2022-11-30 | Stop reason: SDUPTHER

## 2022-08-16 ENCOUNTER — OFFICE VISIT (OUTPATIENT)
Dept: CARDIOLOGY | Facility: CLINIC | Age: 72
End: 2022-08-16
Payer: MEDICARE

## 2022-08-16 VITALS
WEIGHT: 237.19 LBS | HEIGHT: 60 IN | HEART RATE: 109 BPM | DIASTOLIC BLOOD PRESSURE: 84 MMHG | OXYGEN SATURATION: 95 % | BODY MASS INDEX: 46.57 KG/M2 | SYSTOLIC BLOOD PRESSURE: 136 MMHG

## 2022-08-16 DIAGNOSIS — E78.5 HYPERLIPIDEMIA, UNSPECIFIED HYPERLIPIDEMIA TYPE: ICD-10-CM

## 2022-08-16 DIAGNOSIS — I10 ESSENTIAL HYPERTENSION: ICD-10-CM

## 2022-08-16 DIAGNOSIS — R00.2 PALPITATIONS: Primary | ICD-10-CM

## 2022-08-16 PROCEDURE — 3075F SYST BP GE 130 - 139MM HG: CPT | Mod: CPTII,S$GLB,, | Performed by: INTERNAL MEDICINE

## 2022-08-16 PROCEDURE — 99214 OFFICE O/P EST MOD 30 MIN: CPT | Mod: S$GLB,,, | Performed by: INTERNAL MEDICINE

## 2022-08-16 PROCEDURE — 99999 PR PBB SHADOW E&M-EST. PATIENT-LVL III: ICD-10-PCS | Mod: PBBFAC,,, | Performed by: INTERNAL MEDICINE

## 2022-08-16 PROCEDURE — 99999 PR PBB SHADOW E&M-EST. PATIENT-LVL III: CPT | Mod: PBBFAC,,, | Performed by: INTERNAL MEDICINE

## 2022-08-16 PROCEDURE — 3079F DIAST BP 80-89 MM HG: CPT | Mod: CPTII,S$GLB,, | Performed by: INTERNAL MEDICINE

## 2022-08-16 PROCEDURE — 1160F PR REVIEW ALL MEDS BY PRESCRIBER/CLIN PHARMACIST DOCUMENTED: ICD-10-PCS | Mod: CPTII,S$GLB,, | Performed by: INTERNAL MEDICINE

## 2022-08-16 PROCEDURE — 3044F HG A1C LEVEL LT 7.0%: CPT | Mod: CPTII,S$GLB,, | Performed by: INTERNAL MEDICINE

## 2022-08-16 PROCEDURE — 1126F AMNT PAIN NOTED NONE PRSNT: CPT | Mod: CPTII,S$GLB,, | Performed by: INTERNAL MEDICINE

## 2022-08-16 PROCEDURE — 1160F RVW MEDS BY RX/DR IN RCRD: CPT | Mod: CPTII,S$GLB,, | Performed by: INTERNAL MEDICINE

## 2022-08-16 PROCEDURE — 3066F NEPHROPATHY DOC TX: CPT | Mod: CPTII,S$GLB,, | Performed by: INTERNAL MEDICINE

## 2022-08-16 PROCEDURE — 1126F PR PAIN SEVERITY QUANTIFIED, NO PAIN PRESENT: ICD-10-PCS | Mod: CPTII,S$GLB,, | Performed by: INTERNAL MEDICINE

## 2022-08-16 PROCEDURE — 1159F MED LIST DOCD IN RCRD: CPT | Mod: CPTII,S$GLB,, | Performed by: INTERNAL MEDICINE

## 2022-08-16 PROCEDURE — 3079F PR MOST RECENT DIASTOLIC BLOOD PRESSURE 80-89 MM HG: ICD-10-PCS | Mod: CPTII,S$GLB,, | Performed by: INTERNAL MEDICINE

## 2022-08-16 PROCEDURE — 1101F PT FALLS ASSESS-DOCD LE1/YR: CPT | Mod: CPTII,S$GLB,, | Performed by: INTERNAL MEDICINE

## 2022-08-16 PROCEDURE — 3066F PR DOCUMENTATION OF TREATMENT FOR NEPHROPATHY: ICD-10-PCS | Mod: CPTII,S$GLB,, | Performed by: INTERNAL MEDICINE

## 2022-08-16 PROCEDURE — 3008F BODY MASS INDEX DOCD: CPT | Mod: CPTII,S$GLB,, | Performed by: INTERNAL MEDICINE

## 2022-08-16 PROCEDURE — 99214 PR OFFICE/OUTPT VISIT, EST, LEVL IV, 30-39 MIN: ICD-10-PCS | Mod: S$GLB,,, | Performed by: INTERNAL MEDICINE

## 2022-08-16 PROCEDURE — 1101F PR PT FALLS ASSESS DOC 0-1 FALLS W/OUT INJ PAST YR: ICD-10-PCS | Mod: CPTII,S$GLB,, | Performed by: INTERNAL MEDICINE

## 2022-08-16 PROCEDURE — 3044F PR MOST RECENT HEMOGLOBIN A1C LEVEL <7.0%: ICD-10-PCS | Mod: CPTII,S$GLB,, | Performed by: INTERNAL MEDICINE

## 2022-08-16 PROCEDURE — 3075F PR MOST RECENT SYSTOLIC BLOOD PRESS GE 130-139MM HG: ICD-10-PCS | Mod: CPTII,S$GLB,, | Performed by: INTERNAL MEDICINE

## 2022-08-16 PROCEDURE — 1159F PR MEDICATION LIST DOCUMENTED IN MEDICAL RECORD: ICD-10-PCS | Mod: CPTII,S$GLB,, | Performed by: INTERNAL MEDICINE

## 2022-08-16 PROCEDURE — 3008F PR BODY MASS INDEX (BMI) DOCUMENTED: ICD-10-PCS | Mod: CPTII,S$GLB,, | Performed by: INTERNAL MEDICINE

## 2022-08-16 PROCEDURE — 3288F PR FALLS RISK ASSESSMENT DOCUMENTED: ICD-10-PCS | Mod: CPTII,S$GLB,, | Performed by: INTERNAL MEDICINE

## 2022-08-16 PROCEDURE — 3288F FALL RISK ASSESSMENT DOCD: CPT | Mod: CPTII,S$GLB,, | Performed by: INTERNAL MEDICINE

## 2022-08-16 RX ORDER — LOSARTAN POTASSIUM AND HYDROCHLOROTHIAZIDE 12.5; 1 MG/1; MG/1
1 TABLET ORAL DAILY
COMMUNITY
Start: 2022-08-07 | End: 2022-10-05 | Stop reason: DRUGHIGH

## 2022-08-16 NOTE — PROGRESS NOTES
Cardiology Clinic note    Subjective:   Patient ID:  Hannah Norman is a 72 y.o. female who presents for palpitations and AFIb FUP    HPI:   Palpitations: COVID earlier in the year 2022. Afib when admitted for septic shock Feb 2022. Continues to have palpitations daily - may last for few hours or minutes. No syncope or near syncope. Reports has improved in the last 3 months.    HTN: On medications    HLD: Tolerating statin    SH Tobacco Quit 1980s  FH No premature CAD    Patient Active Problem List    Diagnosis Date Noted    CKD (chronic kidney disease) stage 3, GFR 30-59 ml/min 07/14/2022    Personal history of nicotine dependence 03/08/2022    Obesity hypoventilation syndrome     Encephalopathy, metabolic     Atrial fibrillation with RVR 02/07/2022    Hypocalcemia 02/04/2022    Hyperphosphatemia 02/04/2022    Hyperglycemia 02/04/2022    Transaminitis 02/04/2022    Hypervolemia     Leukocytosis 02/03/2022    Decreased range of motion of right wrist 12/03/2021    Decreased range of motion of finger of right hand 12/03/2021    Swelling of right hand 12/03/2021    Closed fracture of right wrist 11/01/2021    Sacroiliitis, not elsewhere classified 04/06/2021    Unspecified mood (affective) disorder 04/06/2021    Dysphonia 10/31/2019    Morbid obesity with BMI of 45.0-49.9, adult 08/09/2019     Current BMI 41.69      Tortuous aorta 08/09/2019     Noted on cxr dated 5/8/2015.       Prediabetes 08/09/2019     Hemoglobin A1C 5.7 as noted on most recent a1c dated 1/17/2019.       Laryngopharyngeal reflux (LPR) 02/25/2019    Urge incontinence 12/07/2018    MARY (obstructive sleep apnea)     Disorder of lumbar spine 05/20/2016    CTS (carpal tunnel syndrome) 11/20/2015    Osteoarthritis 12/27/2013    Gait disturbance 12/27/2013    Restless leg syndrome 10/22/2012    Mild acid reflux     Essential hypertension     Essential tremor     Fibromyalgia     Anxiety disorder     Mixed hyperlipidemia         Patient's Medications   New Prescriptions    No medications on file   Previous Medications    ACETAMINOPHEN (TYLENOL) 500 MG TABLET    Take 500 mg by mouth every 6 (six) hours as needed.    ALBUTEROL (PROVENTIL/VENTOLIN HFA) 90 MCG/ACTUATION INHALER    Inhale 2 puffs into the lungs every 6 (six) hours as needed for Wheezing or Shortness of Breath. Rescue    CLONAZEPAM (KLONOPIN) 0.5 MG TABLET    TAKE 2 TABLETS BY MOUTH AT BEDTIME AS NEEDED FOR RESTLESS LEG SYNDROME    FLUTICASONE PROPIONATE (FLONASE) 50 MCG/ACTUATION NASAL SPRAY    1 spray (50 mcg total) by Each Nostril route once daily.    FUROSEMIDE (LASIX) 40 MG TABLET    Take 1 tablet (40 mg total) by mouth once daily.    GABAPENTIN (NEURONTIN) 600 MG TABLET    TAKE 1 TABLET BY MOUTH THREE TIMES A DAY    IMIPRAMINE (TOFRANIL) 25 MG TABLET    TAKE 1 TABLET BY MOUTH EVERY EVENING TO HELP CALM STOMACH    LOSARTAN-HYDROCHLOROTHIAZIDE 100-12.5 MG (HYZAAR) 100-12.5 MG TAB    Take 1 tablet by mouth once daily.    LOVASTATIN (MEVACOR) 20 MG TABLET    Take 1 tablet (20 mg total) by mouth every evening.    METRONIDAZOLE 1% (METROGEL) 1 % GEL    Apply topically once daily. To face for rash    OXYCODONE-ACETAMINOPHEN (PERCOCET)  MG PER TABLET    Take 1 tablet by mouth every 6 (six) hours as needed for Pain.    PANTOPRAZOLE (PROTONIX) 40 MG TABLET    Take 1 tablet (40 mg total) by mouth 2 (two) times daily.    POTASSIUM CHLORIDE SA (K-DUR,KLOR-CON) 20 MEQ TABLET    Take 1 tablet (20 mEq total) by mouth once daily.    PRAMIPEXOLE (MIRAPEX) 0.125 MG TABLET    TAKE 2 TABLETS BY MOUTH IN THE LATE AFTERNOON AND TAKE 4 TABLETS BY MOUTH AT BEDTIME    VENLAFAXINE (EFFEXOR-XR) 150 MG CP24    1 tablet by mouth in morning and 1 tablet by mouth at night   Modified Medications    No medications on file   Discontinued Medications    LOSARTAN (COZAAR) 100 MG TABLET    TAKE 1 TABLET BY MOUTH ONCE A DAY FOR BLOOD PRESSURE        Review of Systems   Constitutional: Negative for  fever.   HENT: Negative for nosebleeds.    Cardiovascular: Negative for chest pain, near-syncope and syncope. Leg swelling: Chronic- no worsening. PND: On CPAP.        As above   Respiratory: Negative for hemoptysis.    Hematologic/Lymphatic: Negative for bleeding problem.   Skin: Negative for poor wound healing.   Gastrointestinal: Negative for hematochezia.   Genitourinary: Negative for hematuria.   Neurological: Light-headedness: Does endorse.   Allergic/Immunologic: Negative for persistent infections.         Objective:   Vitals  Vitals:    08/16/22 0934   BP: 136/84   Pulse: 109   SpO2: 95%   Weight: 107.6 kg (237 lb 3.2 oz)   Height: 5' (1.524 m)          Physical Exam  Constitutional:       General: She is not in acute distress.     Appearance: She is well-developed. She is not diaphoretic.   HENT:      Head: Normocephalic.   Neck:      Vascular: No JVD.   Cardiovascular:      Rate and Rhythm: Regular rhythm. Tachycardia present.      Heart sounds: No murmur heard.    No friction rub. No gallop.   Pulmonary:      Effort: Pulmonary effort is normal. No respiratory distress.      Breath sounds: Normal breath sounds.   Abdominal:      Palpations: Abdomen is soft.      Tenderness: There is no abdominal tenderness.   Musculoskeletal:         General: Swelling: Chronic; non-pitting.      Cervical back: Normal range of motion.   Skin:     General: Skin is warm.   Neurological:      Mental Status: She is alert.   Psychiatric:         Mood and Affect: Mood normal.             Assessment:     1. Palpitations    2. Essential hypertension    3. Hyperlipidemia, unspecified hyperlipidemia type        Plan:   Hannah Norman is a 72 y.o. female h/o HTN, HLD    Adm Feb 2022 for septic shock and respiratory distress in the setting of recent COVID infection. RAQUEL. Was on CRRT. Required intubation. Noted Afib with RVR for which amiodarone was started; DC'd prior to discharge. No AC d/t arm hematoma and suspicion of GIB d/t drop in  H/H.    - EKG personally reviewed. My interpretation:  5/24/22: Baseline artifact. SR 90s, normal axis. QTc 439  - Echocardiogram Feb 2022  · The left ventricle is normal in size with concentric remodeling and hyperdynamic systolic function.  · The estimated ejection fraction is 75%.  · Normal left ventricular diastolic function.  · With normal right ventricular systolic function.  · There is mild aortic valve stenosis.  · Aortic valve area is 2.88 cm2; peak velocity is 2.0 m/s; mean gradient is 9 mmHg.  · On BiPAP    Palpitations  Holter monitor May 2022  · Predominant Rhythm Sinus rhythm with heart rates varying between 71 and 126 BPM with an average of 89BPM.  · There were occasional PVCs totalling 614 and averaging 12.79 per hour. There were 19 couplets. There were 3 triplets.  · There were very rare PACs totalling 23 and averaging 0.48 per hour.  · The longest RR interval was 1100 msec.  Diary not returned  Reports had palpitations but misplaced the diary  Continues to have daily symptoms. Will repeat Holter    Chest pain  PET stress July 2021: No ischemia.  Not with activity; random at rest    Afib  EKG Feb 2022 likely coarse AFib  Likely isolated in the setting of COVID infection  Monitor as above  GI FUP- has not been evaluated yet    Furosemide - as needed for leg swelling. PRN (was taking daily- likely component of dehydration)    Tortuous Aorta  Noted on CXR 2015    HTN  BP well controlled  Losartan-HCTZ    HLD  Lab Results   Component Value Date    LDLCALC 71.0 11/10/2021   Statin as above      Continue with current medical plan and lifestyle changes.    Orders Placed This Encounter   Procedures    Holter monitor - 24 hour     Standing Status:   Future     Standing Expiration Date:   8/16/2023     Order Specific Question:   Release to patient     Answer:   Immediate       Follow up as scheduled  Return sooner for concerns or questions. If symptoms persist go to the ED    She expressed verbal  understanding and agreed with the plan      Dione Holt MD  Interventional Cardiology  [facility]  Phone: [phone]

## 2022-08-20 NOTE — PROGRESS NOTES
Patient ID: Hannah Norman is a 72 y.o. female.    Chief Complaint: Follow-up, Shortness of Breath, Abdominal Pain, and Insomnia    HPI      Hannah Norman is a 72 y.o. female here following up for chronic diseases including:  Chronic kidney disease-hypertension lipidemia, restless leg syndrome hyperglycemia intermittent bronchitis longstanding cough.  Currently stable this time with no new problems.    Vitals:    08/11/22 0916   BP: 130/82   BP Location: Left arm   Patient Position: Sitting   Pulse: 85   Temp: 98.2 °F (36.8 °C)   TempSrc: Oral   SpO2: 98%   Weight: 109.8 kg (241 lb 15.3 oz)   Height: 5' (1.524 m)            Review of Symptoms      Physical Exam    Constitutional:  Oriented to person, place, and time.appears well-developed and well-nourished.  No distress.      HENT  Head: Normocephalic and atraumatic  Right Ear: External ear normal.   Left Ear: External ear normal.   Nose: External nose normal.   Mouth:  Moist mucus membranes.    Eyes:  Conjunctivae are normal. Right eye exhibits no discharge.  Left eye exhibits no discharge. No scleral icterus.  No periorbital edema    Cardiovascular:  Regular rate and rhythm with normal S1 and S2     Pulmonary/Chest:   Clear to auscultation bilaterally without wheezes, rhonchi or rales      Musculoskeletal:  No edema. No obvious deformity No wasting       Neurological:  Alert and oriented to person, place, and time.   Coordination normal.     Skin:   Skin is warm and dry.  No diaphoresis.   No rash noted.     Psychiatric: Normal mood and affect. Behavior is normal.  Judgment and thought content normal.     Complete Blood Count  Lab Results   Component Value Date    RBC 3.64 (L) 04/08/2022    HGB 11.3 (L) 04/08/2022    HCT 35.8 (L) 04/08/2022    MCV 98 04/08/2022    MCH 31.0 04/08/2022    MCHC 31.6 (L) 04/08/2022    RDW 12.0 04/08/2022     04/08/2022    MPV 9.9 04/08/2022    GRAN 2.9 04/08/2022    GRAN 42.0 04/08/2022    LYMPH 3.0 04/08/2022    LYMPH 43.7  04/08/2022    MONO 0.6 04/08/2022    MONO 8.2 04/08/2022    EOS 0.3 04/08/2022    BASO 0.06 04/08/2022    EOSINOPHIL 4.9 04/08/2022    BASOPHIL 0.9 04/08/2022    DIFFMETHOD Automated 04/08/2022       Comprehensive Metabolic Panel  Lab Results   Component Value Date     (H) 04/21/2022    BUN 30 (H) 04/21/2022    CREATININE 1.29 04/21/2022     04/21/2022    K 4.0 04/21/2022    CL 99 04/21/2022    PROT 7.3 04/21/2022    ALBUMIN 4.5 04/21/2022    BILITOT 0.2 04/21/2022    AST 20 04/21/2022    ALKPHOS 81 04/21/2022    CO2 29 04/21/2022    ALT 16 04/21/2022    ANIONGAP 14 04/21/2022    EGFRNONAA 41.2 (A) 04/21/2022    ESTGFRAFRICA 47.5 (A) 04/21/2022       TSH  No results found for: TSH    Assessment / Plan:      ICD-10-CM ICD-9-CM   1. Essential hypertension  I10 401.9   2. RLS (restless legs syndrome)  G25.81 333.94   3. Wheezing  R06.2 786.07   4. Cough  R05.9 786.2   5. Bronchitis  J40 490   6. Mixed hyperlipidemia  E78.2 272.2   7. Stage 3 chronic kidney disease, unspecified whether stage 3a or 3b CKD  N18.30 585.3   8. Hyperglycemia  R73.9 790.29     Essential hypertension  -     Comprehensive Metabolic Panel; Future  -     CBC Auto Differential; Future  -     Lipid Panel; Future  -     TSH; Future  -     Hemoglobin A1C; Future    RLS (restless legs syndrome)  -     clonazePAM (KLONOPIN) 0.5 MG tablet; TAKE 2 TABLETS BY MOUTH AT BEDTIME AS NEEDED FOR RESTLESS LEG SYNDROME  Dispense: 14 tablet; Refill: 0  -     Comprehensive Metabolic Panel; Future  -     CBC Auto Differential; Future  -     Lipid Panel; Future  -     TSH; Future  -     Hemoglobin A1C; Future    Wheezing  -     albuterol (PROVENTIL/VENTOLIN HFA) 90 mcg/actuation inhaler; Inhale 2 puffs into the lungs every 6 (six) hours as needed for Wheezing or Shortness of Breath. Rescue  Dispense: 18 g; Refill: 0  -     Comprehensive Metabolic Panel; Future  -     CBC Auto Differential; Future  -     Lipid Panel; Future  -     TSH; Future  -      Hemoglobin A1C; Future    Cough  -     albuterol (PROVENTIL/VENTOLIN HFA) 90 mcg/actuation inhaler; Inhale 2 puffs into the lungs every 6 (six) hours as needed for Wheezing or Shortness of Breath. Rescue  Dispense: 18 g; Refill: 0  -     Comprehensive Metabolic Panel; Future  -     CBC Auto Differential; Future  -     Lipid Panel; Future  -     TSH; Future  -     Hemoglobin A1C; Future    Bronchitis  -     albuterol (PROVENTIL/VENTOLIN HFA) 90 mcg/actuation inhaler; Inhale 2 puffs into the lungs every 6 (six) hours as needed for Wheezing or Shortness of Breath. Rescue  Dispense: 18 g; Refill: 0  -     Comprehensive Metabolic Panel; Future  -     CBC Auto Differential; Future  -     Lipid Panel; Future  -     TSH; Future  -     Hemoglobin A1C; Future    Mixed hyperlipidemia  -     Comprehensive Metabolic Panel; Future  -     CBC Auto Differential; Future  -     Lipid Panel; Future  -     TSH; Future  -     Hemoglobin A1C; Future    Stage 3 chronic kidney disease, unspecified whether stage 3a or 3b CKD  -     Comprehensive Metabolic Panel; Future  -     CBC Auto Differential; Future  -     Lipid Panel; Future  -     TSH; Future  -     Hemoglobin A1C; Future    Hyperglycemia  -     Hemoglobin A1C; Future    Other orders  -     pantoprazole (PROTONIX) 40 MG tablet; Take 1 tablet (40 mg total) by mouth 2 (two) times daily.  Dispense: 90 tablet; Refill: 3  -     metronidazole 1% (METROGEL) 1 % Gel; Apply topically once daily. To face for rash  Dispense: 60 g; Refill: 1

## 2022-08-31 ENCOUNTER — HOSPITAL ENCOUNTER (OUTPATIENT)
Dept: CARDIOLOGY | Facility: HOSPITAL | Age: 72
Discharge: HOME | End: 2022-08-31
Attending: INTERNAL MEDICINE
Payer: MEDICARE

## 2022-08-31 DIAGNOSIS — R00.2 PALPITATIONS: ICD-10-CM

## 2022-08-31 PROCEDURE — 93227 XTRNL ECG REC<48 HR R&I: CPT | Mod: ,,, | Performed by: INTERNAL MEDICINE

## 2022-08-31 PROCEDURE — 93227 HOLTER MONITOR - 24 HOUR (CUPID ONLY): ICD-10-PCS | Mod: ,,, | Performed by: INTERNAL MEDICINE

## 2022-08-31 PROCEDURE — 93226 XTRNL ECG REC<48 HR SCAN A/R: CPT | Mod: PO

## 2022-09-01 LAB
OHS CV EVENT MONITOR DAY: 0
OHS CV HOLTER LENGTH DECIMAL HOURS: 23.98
OHS CV HOLTER LENGTH HOURS: 23
OHS CV HOLTER LENGTH MINUTES: 59
OHS CV HOLTER SINUS AVERAGE HR: 69
OHS CV HOLTER SINUS MAX HR: 119
OHS CV HOLTER SINUS MIN HR: 69

## 2022-09-09 DIAGNOSIS — M79.7 FIBROMYALGIA: ICD-10-CM

## 2022-09-09 NOTE — TELEPHONE ENCOUNTER
----- Message from Charlie Bar sent at 9/9/2022  4:29 PM CDT -----  Contact: pt  Type: Requesting refill         Who Called: PT  Regarding: refill for venlafaxine (EFFEXOR-XR) 150 MG Cp24 180 capsule   Would the patient rather a call back or a response via [facility]? Call back  Best Call Back Number: [phone]  Pharmacy Information: [id number] [place] [address]  Phone: [phone]  Fax:  [phone]

## 2022-09-09 NOTE — TELEPHONE ENCOUNTER
Care Due:                  Date            Visit Type   Department     Provider  --------------------------------------------------------------------------------                                EP -                              PRIMARY      [facility] FAM[facility]  Last Visit: 08-      CARE ([facility])   MEDICINE       Raffi Garcia                              EP -                              PRIMARY      [facility]  Next Visit: 02-      CARE ([facility])   MEDICINE       Raffi Garcia                                                            Last  Test          Frequency    Reason                     Performed    Due Date  --------------------------------------------------------------------------------    Lipid Panel.  12 months..  lovastatin...............  11-   11-    [facility] Embedded Care Gaps. Reference number: [id number]. 9/09/2022   4:33:16 PM CDT

## 2022-09-10 RX ORDER — VENLAFAXINE HYDROCHLORIDE 150 MG/1
150 CAPSULE, EXTENDED RELEASE ORAL 2 TIMES DAILY
Qty: 180 CAPSULE | Refills: 3 | Status: SHIPPED | OUTPATIENT
Start: 2022-09-10 | End: 2022-09-12 | Stop reason: SDUPTHER

## 2022-09-13 ENCOUNTER — HOSPITAL ENCOUNTER (OUTPATIENT)
Dept: RADIOLOGY | Facility: HOSPITAL | Age: 72
Discharge: HOME | End: 2022-09-13
Attending: FAMILY MEDICINE
Payer: MEDICARE

## 2022-09-13 ENCOUNTER — OFFICE VISIT (OUTPATIENT)
Dept: FAMILY MEDICINE | Facility: CLINIC | Age: 72
End: 2022-09-13
Payer: MEDICARE

## 2022-09-13 VITALS
SYSTOLIC BLOOD PRESSURE: 130 MMHG | WEIGHT: 240.06 LBS | TEMPERATURE: 98 F | HEART RATE: 73 BPM | DIASTOLIC BLOOD PRESSURE: 82 MMHG | HEIGHT: 60 IN | BODY MASS INDEX: 47.13 KG/M2 | OXYGEN SATURATION: 95 %

## 2022-09-13 DIAGNOSIS — I10 ESSENTIAL HYPERTENSION: Primary | ICD-10-CM

## 2022-09-13 DIAGNOSIS — Z12.11 SCREENING FOR COLON CANCER: ICD-10-CM

## 2022-09-13 DIAGNOSIS — N18.30 STAGE 3 CHRONIC KIDNEY DISEASE, UNSPECIFIED WHETHER STAGE 3A OR 3B CKD: ICD-10-CM

## 2022-09-13 DIAGNOSIS — I10 ESSENTIAL HYPERTENSION: ICD-10-CM

## 2022-09-13 DIAGNOSIS — Z78.0 POSTMENOPAUSAL: ICD-10-CM

## 2022-09-13 PROCEDURE — 3079F DIAST BP 80-89 MM HG: CPT | Mod: CPTII,S$GLB,, | Performed by: FAMILY MEDICINE

## 2022-09-13 PROCEDURE — 3288F PR FALLS RISK ASSESSMENT DOCUMENTED: ICD-10-PCS | Mod: CPTII,S$GLB,, | Performed by: FAMILY MEDICINE

## 2022-09-13 PROCEDURE — 1125F PR PAIN SEVERITY QUANTIFIED, PAIN PRESENT: ICD-10-PCS | Mod: CPTII,S$GLB,, | Performed by: FAMILY MEDICINE

## 2022-09-13 PROCEDURE — 1159F PR MEDICATION LIST DOCUMENTED IN MEDICAL RECORD: ICD-10-PCS | Mod: CPTII,S$GLB,, | Performed by: FAMILY MEDICINE

## 2022-09-13 PROCEDURE — 1101F PR PT FALLS ASSESS DOC 0-1 FALLS W/OUT INJ PAST YR: ICD-10-PCS | Mod: CPTII,S$GLB,, | Performed by: FAMILY MEDICINE

## 2022-09-13 PROCEDURE — 3008F PR BODY MASS INDEX (BMI) DOCUMENTED: ICD-10-PCS | Mod: CPTII,S$GLB,, | Performed by: FAMILY MEDICINE

## 2022-09-13 PROCEDURE — 1101F PT FALLS ASSESS-DOCD LE1/YR: CPT | Mod: CPTII,S$GLB,, | Performed by: FAMILY MEDICINE

## 2022-09-13 PROCEDURE — 1125F AMNT PAIN NOTED PAIN PRSNT: CPT | Mod: CPTII,S$GLB,, | Performed by: FAMILY MEDICINE

## 2022-09-13 PROCEDURE — 99214 OFFICE O/P EST MOD 30 MIN: CPT | Mod: S$GLB,,, | Performed by: FAMILY MEDICINE

## 2022-09-13 PROCEDURE — 3075F PR MOST RECENT SYSTOLIC BLOOD PRESS GE 130-139MM HG: ICD-10-PCS | Mod: CPTII,S$GLB,, | Performed by: FAMILY MEDICINE

## 2022-09-13 PROCEDURE — 3288F FALL RISK ASSESSMENT DOCD: CPT | Mod: CPTII,S$GLB,, | Performed by: FAMILY MEDICINE

## 2022-09-13 PROCEDURE — 3075F SYST BP GE 130 - 139MM HG: CPT | Mod: CPTII,S$GLB,, | Performed by: FAMILY MEDICINE

## 2022-09-13 PROCEDURE — 3044F HG A1C LEVEL LT 7.0%: CPT | Mod: CPTII,S$GLB,, | Performed by: FAMILY MEDICINE

## 2022-09-13 PROCEDURE — 99214 PR OFFICE/OUTPT VISIT, EST, LEVL IV, 30-39 MIN: ICD-10-PCS | Mod: S$GLB,,, | Performed by: FAMILY MEDICINE

## 2022-09-13 PROCEDURE — 71046 X-RAY EXAM CHEST 2 VIEWS: CPT | Mod: TC,FY,PO

## 2022-09-13 PROCEDURE — 3066F NEPHROPATHY DOC TX: CPT | Mod: CPTII,S$GLB,, | Performed by: FAMILY MEDICINE

## 2022-09-13 PROCEDURE — 3066F PR DOCUMENTATION OF TREATMENT FOR NEPHROPATHY: ICD-10-PCS | Mod: CPTII,S$GLB,, | Performed by: FAMILY MEDICINE

## 2022-09-13 PROCEDURE — 1159F MED LIST DOCD IN RCRD: CPT | Mod: CPTII,S$GLB,, | Performed by: FAMILY MEDICINE

## 2022-09-13 PROCEDURE — 3044F PR MOST RECENT HEMOGLOBIN A1C LEVEL <7.0%: ICD-10-PCS | Mod: CPTII,S$GLB,, | Performed by: FAMILY MEDICINE

## 2022-09-13 PROCEDURE — 3079F PR MOST RECENT DIASTOLIC BLOOD PRESSURE 80-89 MM HG: ICD-10-PCS | Mod: CPTII,S$GLB,, | Performed by: FAMILY MEDICINE

## 2022-09-13 PROCEDURE — 3008F BODY MASS INDEX DOCD: CPT | Mod: CPTII,S$GLB,, | Performed by: FAMILY MEDICINE

## 2022-09-13 RX ORDER — MECLIZINE HYDROCHLORIDE 25 MG/1
25 TABLET ORAL 3 TIMES DAILY PRN
Qty: 30 TABLET | Refills: 1 | Status: SHIPPED | OUTPATIENT
Start: 2022-09-13 | End: 2022-11-30 | Stop reason: SDUPTHER

## 2022-09-14 NOTE — PROGRESS NOTES
Please call patient re monitor with no rhythm abnormalities. Some rare extra heart beats - nothing to do for this. No symptoms were reported to correlate. If you are continuing to have symptoms, we can consider a monitor for a longer duration (4 weeks)

## 2022-09-15 ENCOUNTER — PATIENT MESSAGE (OUTPATIENT)
Dept: CARDIOLOGY | Facility: CLINIC | Age: 72
End: 2022-09-15
Payer: MEDICARE

## 2022-09-26 NOTE — PROGRESS NOTES
Patient ID: Hannah Norman is a 72 y.o. female.    Chief Complaint: Dizziness, Abdominal Pain, Otalgia, and Shortness of Breath    HPI       Hannah Norman is a 72 y.o. female patient complains about not feeling well.  Complains of dizziness sometimes when she stands up.  Complains of ear fullness.    Shortness of breath on exertion however not more than previous.  No PND nor orthopnea.  No increased swelling of her lower extremity.      Review of Symptoms        Physical Exam    Vitals:    09/13/22 1148   BP: 130/82   BP Location: Left arm   Patient Position: Sitting   Pulse: 73   Temp: 98.1 °F (36.7 °C)   TempSrc: Oral   SpO2: 95%   Weight: 108.9 kg (240 lb 1.3 oz)   Height: 5' (1.524 m)        Constitutional:   Oriented to person, place, and time.appears well-developed and well-nourished.   No distress.     HENT:   Head: Normocephalic and atraumatic.     Right Ear: Tympanic membrane, external ear and ear canal normal     Left Ear: Tympanic membrane, external ear and ear canal normal    Nose: External Normal. Normal turbinates, No rhinorrhea     Mouth/Throat: Uvula is midline, oropharynx is clear and moist and mucous membranes are normal.     Neck: supple no anterior cervical adenopathy    Carotid artery:  Bruit    NEG []   POS[]    Eyes:   Conjunctivae are normal. Right eye exhibits no discharge. Left eye exhibits no discharge. No scleral icterus. No periorbital edema     Cardiovascular:  Regular rate and rhythm with normal S1 and S2     Pulmonary/Chest:   Clear to auscultation bilaterally without wheezes, rhonchi or rales    Musculoskeletal:  No edema. No obvious deformity No wasting     Neurological:   Alert and oriented to person, place, and time. Coordination normal.     Skin:   Skin is warm and dry.   No diaphoresis.   No rash noted.    Psychiatric: Normal mood and affect. Behavior is normal. Judgment and thought content normal.       Assessment / Plan:      ICD-10-CM ICD-9-CM   1. Essential hypertension  I10  401.9   2. Screening for colon cancer  Z12.11 V76.51   3. Postmenopausal  Z78.0 V49.81   4. Stage 3 chronic kidney disease, unspecified whether stage 3a or 3b CKD  N18.30 585.3     Essential hypertension  -     X-Ray Chest PA And Lateral; Future; Expected date: 09/13/2022  -     CBC Auto Differential; Future; Expected date: 09/13/2022  -     CBC Auto Differential; Future; Expected date: 09/13/2022  -     NT-PRO BNP, [place]; Future; Expected date: 09/13/2022    Screening for colon cancer  -     Case Request Endoscopy: COLONOSCOPY    Postmenopausal  -     DXA Bone Density Spine And Hip; Future; Expected date: 09/13/2022    Stage 3 chronic kidney disease, unspecified whether stage 3a or 3b CKD  -     X-Ray Chest PA And Lateral; Future; Expected date: 09/13/2022  -     CBC Auto Differential; Future; Expected date: 09/13/2022  -     CBC Auto Differential; Future; Expected date: 09/13/2022  -     NT-PRO BNP, [place]; Future; Expected date: 09/13/2022    Other orders  -     meclizine (ANTIVERT) 25 mg tablet; Take 1 tablet (25 mg total) by mouth 3 (three) times daily as needed for Dizziness.  Dispense: 30 tablet; Refill: 1      Patient with history of having vertigo-use Antivert for this   Shortness of breath-does not appear to be associated with CHF-however check chest x-ray CBC and BNP

## 2022-11-15 ENCOUNTER — PATIENT MESSAGE (OUTPATIENT)
Dept: ADMINISTRATIVE | Facility: OTHER | Age: 72
End: 2022-11-15
Payer: MEDICARE

## 2022-11-28 ENCOUNTER — TELEPHONE (OUTPATIENT)
Dept: FAMILY MEDICINE | Facility: CLINIC | Age: 72
End: 2022-11-28
Payer: MEDICARE

## 2022-11-28 NOTE — TELEPHONE ENCOUNTER
----- Message from Dede Briggs sent at 11/28/2022  1:56 PM CST -----  Regarding: same day  Contact: [phone] (  Type:  Same Day Appointment Request    Caller is requesting a same day appointment.  Caller declined first available appointment listed below.    Name of Caller: self   When is the first available appointment?   Symptoms: sharp pains in her head and it feels cloudy for a couple of days. She feels dizzy.     Best Call Back Number: [phone]   Additional Information:

## 2022-11-30 ENCOUNTER — HOSPITAL ENCOUNTER (OUTPATIENT)
Dept: RADIOLOGY | Facility: HOSPITAL | Age: 72
Discharge: HOME | End: 2022-11-30
Attending: FAMILY MEDICINE
Payer: MEDICARE

## 2022-11-30 ENCOUNTER — OFFICE VISIT (OUTPATIENT)
Dept: FAMILY MEDICINE | Facility: CLINIC | Age: 72
End: 2022-11-30
Payer: MEDICARE

## 2022-11-30 VITALS
HEART RATE: 93 BPM | TEMPERATURE: 98 F | BODY MASS INDEX: 48.31 KG/M2 | WEIGHT: 246.06 LBS | HEIGHT: 60 IN | DIASTOLIC BLOOD PRESSURE: 80 MMHG | SYSTOLIC BLOOD PRESSURE: 134 MMHG | OXYGEN SATURATION: 95 %

## 2022-11-30 DIAGNOSIS — Z12.11 SCREENING FOR COLON CANCER: Primary | ICD-10-CM

## 2022-11-30 DIAGNOSIS — M25.561 ACUTE PAIN OF RIGHT KNEE: ICD-10-CM

## 2022-11-30 DIAGNOSIS — G25.81 RLS (RESTLESS LEGS SYNDROME): ICD-10-CM

## 2022-11-30 DIAGNOSIS — Z23 NEED FOR INFLUENZA VACCINATION: ICD-10-CM

## 2022-11-30 DIAGNOSIS — F41.9 ANXIETY: ICD-10-CM

## 2022-11-30 PROCEDURE — 3066F NEPHROPATHY DOC TX: CPT | Mod: CPTII,S$GLB,, | Performed by: FAMILY MEDICINE

## 2022-11-30 PROCEDURE — 3079F DIAST BP 80-89 MM HG: CPT | Mod: CPTII,S$GLB,, | Performed by: FAMILY MEDICINE

## 2022-11-30 PROCEDURE — 3044F HG A1C LEVEL LT 7.0%: CPT | Mod: CPTII,S$GLB,, | Performed by: FAMILY MEDICINE

## 2022-11-30 PROCEDURE — 1125F PR PAIN SEVERITY QUANTIFIED, PAIN PRESENT: ICD-10-PCS | Mod: CPTII,S$GLB,, | Performed by: FAMILY MEDICINE

## 2022-11-30 PROCEDURE — 3075F SYST BP GE 130 - 139MM HG: CPT | Mod: CPTII,S$GLB,, | Performed by: FAMILY MEDICINE

## 2022-11-30 PROCEDURE — 3008F PR BODY MASS INDEX (BMI) DOCUMENTED: ICD-10-PCS | Mod: CPTII,S$GLB,, | Performed by: FAMILY MEDICINE

## 2022-11-30 PROCEDURE — 90694 VACC AIIV4 NO PRSRV 0.5ML IM: CPT | Mod: S$GLB,,, | Performed by: FAMILY MEDICINE

## 2022-11-30 PROCEDURE — 1101F PT FALLS ASSESS-DOCD LE1/YR: CPT | Mod: CPTII,S$GLB,, | Performed by: FAMILY MEDICINE

## 2022-11-30 PROCEDURE — 1159F MED LIST DOCD IN RCRD: CPT | Mod: CPTII,S$GLB,, | Performed by: FAMILY MEDICINE

## 2022-11-30 PROCEDURE — 99214 PR OFFICE/OUTPT VISIT, EST, LEVL IV, 30-39 MIN: ICD-10-PCS | Mod: S$GLB,,, | Performed by: FAMILY MEDICINE

## 2022-11-30 PROCEDURE — 90694 FLU VACCINE - QUADRIVALENT - ADJUVANTED: ICD-10-PCS | Mod: S$GLB,,, | Performed by: FAMILY MEDICINE

## 2022-11-30 PROCEDURE — 3008F BODY MASS INDEX DOCD: CPT | Mod: CPTII,S$GLB,, | Performed by: FAMILY MEDICINE

## 2022-11-30 PROCEDURE — 1159F PR MEDICATION LIST DOCUMENTED IN MEDICAL RECORD: ICD-10-PCS | Mod: CPTII,S$GLB,, | Performed by: FAMILY MEDICINE

## 2022-11-30 PROCEDURE — 99214 OFFICE O/P EST MOD 30 MIN: CPT | Mod: S$GLB,,, | Performed by: FAMILY MEDICINE

## 2022-11-30 PROCEDURE — 1125F AMNT PAIN NOTED PAIN PRSNT: CPT | Mod: CPTII,S$GLB,, | Performed by: FAMILY MEDICINE

## 2022-11-30 PROCEDURE — 73562 X-RAY EXAM OF KNEE 3: CPT | Mod: TC,FY,PO,RT

## 2022-11-30 PROCEDURE — G0008 ADMIN INFLUENZA VIRUS VAC: HCPCS | Mod: S$GLB,,, | Performed by: FAMILY MEDICINE

## 2022-11-30 PROCEDURE — 3288F PR FALLS RISK ASSESSMENT DOCUMENTED: ICD-10-PCS | Mod: CPTII,S$GLB,, | Performed by: FAMILY MEDICINE

## 2022-11-30 PROCEDURE — 3075F PR MOST RECENT SYSTOLIC BLOOD PRESS GE 130-139MM HG: ICD-10-PCS | Mod: CPTII,S$GLB,, | Performed by: FAMILY MEDICINE

## 2022-11-30 PROCEDURE — 3079F PR MOST RECENT DIASTOLIC BLOOD PRESSURE 80-89 MM HG: ICD-10-PCS | Mod: CPTII,S$GLB,, | Performed by: FAMILY MEDICINE

## 2022-11-30 PROCEDURE — 3288F FALL RISK ASSESSMENT DOCD: CPT | Mod: CPTII,S$GLB,, | Performed by: FAMILY MEDICINE

## 2022-11-30 PROCEDURE — 1101F PR PT FALLS ASSESS DOC 0-1 FALLS W/OUT INJ PAST YR: ICD-10-PCS | Mod: CPTII,S$GLB,, | Performed by: FAMILY MEDICINE

## 2022-11-30 PROCEDURE — G0008 FLU VACCINE - QUADRIVALENT - ADJUVANTED: ICD-10-PCS | Mod: S$GLB,,, | Performed by: FAMILY MEDICINE

## 2022-11-30 PROCEDURE — 3044F PR MOST RECENT HEMOGLOBIN A1C LEVEL <7.0%: ICD-10-PCS | Mod: CPTII,S$GLB,, | Performed by: FAMILY MEDICINE

## 2022-11-30 PROCEDURE — 3066F PR DOCUMENTATION OF TREATMENT FOR NEPHROPATHY: ICD-10-PCS | Mod: CPTII,S$GLB,, | Performed by: FAMILY MEDICINE

## 2022-11-30 RX ORDER — CLONAZEPAM 0.5 MG/1
TABLET ORAL
Qty: 30 TABLET | Refills: 1 | Status: SHIPPED | OUTPATIENT
Start: 2022-11-30 | End: 2023-01-20 | Stop reason: SDUPTHER

## 2022-11-30 RX ORDER — LORATADINE 10 MG/1
10 TABLET ORAL DAILY
Qty: 90 TABLET | Refills: 1 | Status: SHIPPED | OUTPATIENT
Start: 2022-11-30 | End: 2023-09-01

## 2022-11-30 RX ORDER — MECLIZINE HYDROCHLORIDE 25 MG/1
25 TABLET ORAL 3 TIMES DAILY PRN
Qty: 30 TABLET | Refills: 1 | Status: SHIPPED | OUTPATIENT
Start: 2022-11-30

## 2022-11-30 RX ORDER — CLONAZEPAM 0.5 MG/1
TABLET ORAL
Qty: 30 TABLET | Refills: 1 | Status: SHIPPED | OUTPATIENT
Start: 2022-11-30 | End: 2022-11-30 | Stop reason: SDUPTHER

## 2022-11-30 RX ORDER — PANTOPRAZOLE SODIUM 40 MG/1
40 TABLET, DELAYED RELEASE ORAL 2 TIMES DAILY
Qty: 90 TABLET | Refills: 3
Start: 2022-11-30 | End: 2023-04-13 | Stop reason: SDUPTHER

## 2022-11-30 RX ORDER — IMIPRAMINE HYDROCHLORIDE 25 MG/1
TABLET, FILM COATED ORAL
Qty: 90 TABLET | Refills: 3 | Status: SHIPPED | OUTPATIENT
Start: 2022-11-30 | End: 2023-10-19

## 2022-11-30 NOTE — PROGRESS NOTES
Subjective:       Patient ID: Hannah Norman is a 72 y.o. female.    Chief Complaint: Dizziness, Headache, Shortness of Breath, Knee Pain, Back Pain, and Memory Loss (/)    71 y/o female with multiple medical issues, here for FU, Pt also has hx of anxiety, clonazepam does help  States has taken wellbutrin with good result, Don;t know why she was taken off  Also c/o right knee pain, feels like bone on bone  Also c/o right knee pain for over 2 months, has been on some pain medication from [facility] Provider  Has hx of left knee replaced 4 years ago    , States still gets dizzy off and on, denies any irregular heart beat, denies any n/v with dizziness,  Meclizine does help    Review of Systems    Objective:      Physical Exam  Vitals and nursing note reviewed.   Constitutional:       General: She is not in acute distress.     Appearance: Normal appearance. She is well-developed. She is not ill-appearing, toxic-appearing or diaphoretic.   HENT:      Head: Normocephalic and atraumatic.      Jaw: No trismus.      Right Ear: Hearing, tympanic membrane, ear canal and external ear normal.      Left Ear: Hearing, tympanic membrane, ear canal and external ear normal.      Nose: Nose normal. No nasal deformity, mucosal edema or rhinorrhea.      Right Sinus: No maxillary sinus tenderness or frontal sinus tenderness.      Left Sinus: No maxillary sinus tenderness or frontal sinus tenderness.      Mouth/Throat:      Dentition: Normal dentition.      Pharynx: Uvula midline. No posterior oropharyngeal erythema or uvula swelling.   Eyes:      General: Lids are normal. No scleral icterus.        Right eye: No discharge.         Left eye: No discharge.      Conjunctiva/sclera: Conjunctivae normal.      Comments: Sclera clear bilat   Neck:      Trachea: Trachea and phonation normal.   Cardiovascular:      Rate and Rhythm: Normal rate and regular rhythm.      Pulses: Normal pulses.      Heart sounds: Normal heart sounds.   Pulmonary:       Effort: Pulmonary effort is normal. No respiratory distress.      Breath sounds: Normal breath sounds.   Abdominal:      General: Bowel sounds are normal. There is no distension.      Palpations: Abdomen is soft. There is no mass or pulsatile mass.      Tenderness: There is no abdominal tenderness.   Musculoskeletal:         General: No deformity. Normal range of motion.      Cervical back: Full passive range of motion without pain, normal range of motion and neck supple.   Skin:     General: Skin is warm and dry.      Coloration: Skin is not pale.   Neurological:      Mental Status: She is alert and oriented to person, place, and time.      Motor: No abnormal muscle tone.      Coordination: Coordination normal.   Psychiatric:         Speech: Speech normal.         Behavior: Behavior normal. Behavior is cooperative.         Thought Content: Thought content normal.         Judgment: Judgment normal.       Assessment:       1. Screening for colon cancer    2. RLS (restless legs syndrome)    3. Need for influenza vaccination    4. Acute pain of right knee    5. Anxiety          Plan:        Hannah was seen today for dizziness, headache, shortness of breath, knee pain, back pain and memory loss.    Diagnoses and all orders for this visit:    Screening for colon cancer  -     Case Request Endoscopy: COLONOSCOPY    RLS (restless legs syndrome)  -     Discontinue: clonazePAM (KLONOPIN) 0.5 MG tablet; TAKE 2 TABLETS BY MOUTH AT BEDTIME AS NEEDED FOR RESTLESS LEG SYNDROME  -     clonazePAM (KLONOPIN) 0.5 MG tablet; TAKE 2 TABLETS BY MOUTH AT BEDTIME AS NEEDED FOR RESTLESS LEG SYNDROME    Need for influenza vaccination  -     Influenza (FLUAD) - Quadrivalent (Adjuvanted) *Preferred* (65+) (PF)    Acute pain of right knee  -     X-Ray Knee 3 View Right; Future    Anxiety    Other orders  -     meclizine (ANTIVERT) 25 mg tablet; Take 1 tablet (25 mg total) by mouth 3 (three) times daily as needed for Dizziness.  -      pantoprazole (PROTONIX) 40 MG tablet; Take 1 tablet (40 mg total) by mouth 2 (two) times daily.  -     imipramine (TOFRANIL) 25 MG tablet; TAKE 1 TABLET BY MOUTH EVERY EVENING TO HELP CALM STOMACH  -     loratadine (CLARITIN) 10 mg tablet; Take 1 tablet (10 mg total) by mouth once daily.        Pt is already on Effexor for her anxiety, but does not seem like it is helping, Will refer to DR. Brito if he thinks she could switch back on Wellbutrin

## 2022-12-05 ENCOUNTER — HOSPITAL ENCOUNTER (OUTPATIENT)
Dept: RADIOLOGY | Facility: HOSPITAL | Age: 72
Discharge: HOME | End: 2022-12-05
Attending: FAMILY MEDICINE
Payer: MEDICARE

## 2022-12-05 DIAGNOSIS — Z78.0 POSTMENOPAUSAL: ICD-10-CM

## 2022-12-05 PROCEDURE — 77080 DXA BONE DENSITY AXIAL: CPT | Mod: TC,PO

## 2022-12-06 RX ORDER — OXYCODONE AND ACETAMINOPHEN 10; 325 MG/1; MG/1
1 TABLET ORAL EVERY 6 HOURS PRN
Qty: 20 TABLET | Refills: 0 | Status: SHIPPED | OUTPATIENT
Start: 2022-12-06 | End: 2023-03-03

## 2022-12-06 NOTE — TELEPHONE ENCOUNTER
Care Due:                  Date            Visit Type   Department     Provider  --------------------------------------------------------------------------------                                EP -                              PRIMARY      [facility] FAMILY  Last Visit: 09-      CARE ([facility])   MEDICINE       Raffi Garcia                              EP -                              PRIMARY      [facility] FAMILY  Next Visit: 01-      CARE ([facility])   MEDICINE       Raffi Garcia                                                            Last  Test          Frequency    Reason                     Performed    Due Date  --------------------------------------------------------------------------------    Lipid Panel.  12 months..  lovastatin...............  11-   11-    [facility] Embedded Care Gaps. Reference number: [id number]. 12/06/2022   2:32:05 PM CST

## 2022-12-06 NOTE — TELEPHONE ENCOUNTER
----- Message from Brittni Norman sent at 12/6/2022  2:16 PM CST -----  Type:  RX Refill Request    Who Called: Pt   Refill or New Rx:refill   RX Name and Strength:oxyCODONE-acetaminophen (PERCOCET)  mg per tablet  How is the patient currently taking it? (ex. 1XDay):3XDay   Is this a 30 day or 90 day RX:30  Preferred Pharmacy with phone number:[id number] [address]   Phone: [phone]  Fax:  [phone]  Would the patient rather a call back or a response via [facility]? Call back   Best Call Back Number:[phone]  Additional Information:

## 2023-01-05 ENCOUNTER — OFFICE VISIT (OUTPATIENT)
Dept: FAMILY MEDICINE | Facility: CLINIC | Age: 73
End: 2023-01-05
Payer: MEDICARE

## 2023-01-05 VITALS
WEIGHT: 241.5 LBS | SYSTOLIC BLOOD PRESSURE: 132 MMHG | BODY MASS INDEX: 47.41 KG/M2 | HEIGHT: 60 IN | OXYGEN SATURATION: 96 % | DIASTOLIC BLOOD PRESSURE: 72 MMHG | TEMPERATURE: 98 F | HEART RATE: 110 BPM

## 2023-01-05 DIAGNOSIS — N18.30 STAGE 3 CHRONIC KIDNEY DISEASE, UNSPECIFIED WHETHER STAGE 3A OR 3B CKD: ICD-10-CM

## 2023-01-05 DIAGNOSIS — M79.7 FIBROMYALGIA: Primary | ICD-10-CM

## 2023-01-05 DIAGNOSIS — R73.9 HYPERGLYCEMIA: ICD-10-CM

## 2023-01-05 DIAGNOSIS — R06.2 WHEEZING: ICD-10-CM

## 2023-01-05 DIAGNOSIS — G47.33 OSA (OBSTRUCTIVE SLEEP APNEA): ICD-10-CM

## 2023-01-05 DIAGNOSIS — I10 ESSENTIAL HYPERTENSION: ICD-10-CM

## 2023-01-05 DIAGNOSIS — G25.81 RLS (RESTLESS LEGS SYNDROME): ICD-10-CM

## 2023-01-05 DIAGNOSIS — R05.9 COUGH: ICD-10-CM

## 2023-01-05 DIAGNOSIS — J40 BRONCHITIS: ICD-10-CM

## 2023-01-05 DIAGNOSIS — Z12.31 ENCOUNTER FOR SCREENING MAMMOGRAM FOR BREAST CANCER: ICD-10-CM

## 2023-01-05 DIAGNOSIS — Z12.11 ENCOUNTER FOR SCREENING FOR MALIGNANT NEOPLASM OF COLON: ICD-10-CM

## 2023-01-05 PROCEDURE — 1160F RVW MEDS BY RX/DR IN RCRD: CPT | Mod: CPTII,S$GLB,, | Performed by: FAMILY MEDICINE

## 2023-01-05 PROCEDURE — 3288F FALL RISK ASSESSMENT DOCD: CPT | Mod: CPTII,S$GLB,, | Performed by: FAMILY MEDICINE

## 2023-01-05 PROCEDURE — 1101F PR PT FALLS ASSESS DOC 0-1 FALLS W/OUT INJ PAST YR: ICD-10-PCS | Mod: CPTII,S$GLB,, | Performed by: FAMILY MEDICINE

## 2023-01-05 PROCEDURE — 1125F AMNT PAIN NOTED PAIN PRSNT: CPT | Mod: CPTII,S$GLB,, | Performed by: FAMILY MEDICINE

## 2023-01-05 PROCEDURE — 3075F SYST BP GE 130 - 139MM HG: CPT | Mod: CPTII,S$GLB,, | Performed by: FAMILY MEDICINE

## 2023-01-05 PROCEDURE — 1159F MED LIST DOCD IN RCRD: CPT | Mod: CPTII,S$GLB,, | Performed by: FAMILY MEDICINE

## 2023-01-05 PROCEDURE — 1101F PT FALLS ASSESS-DOCD LE1/YR: CPT | Mod: CPTII,S$GLB,, | Performed by: FAMILY MEDICINE

## 2023-01-05 PROCEDURE — 99214 PR OFFICE/OUTPT VISIT, EST, LEVL IV, 30-39 MIN: ICD-10-PCS | Mod: S$GLB,,, | Performed by: FAMILY MEDICINE

## 2023-01-05 PROCEDURE — 3078F DIAST BP <80 MM HG: CPT | Mod: CPTII,S$GLB,, | Performed by: FAMILY MEDICINE

## 2023-01-05 PROCEDURE — 3078F PR MOST RECENT DIASTOLIC BLOOD PRESSURE < 80 MM HG: ICD-10-PCS | Mod: CPTII,S$GLB,, | Performed by: FAMILY MEDICINE

## 2023-01-05 PROCEDURE — 3008F BODY MASS INDEX DOCD: CPT | Mod: CPTII,S$GLB,, | Performed by: FAMILY MEDICINE

## 2023-01-05 PROCEDURE — 3075F PR MOST RECENT SYSTOLIC BLOOD PRESS GE 130-139MM HG: ICD-10-PCS | Mod: CPTII,S$GLB,, | Performed by: FAMILY MEDICINE

## 2023-01-05 PROCEDURE — 3008F PR BODY MASS INDEX (BMI) DOCUMENTED: ICD-10-PCS | Mod: CPTII,S$GLB,, | Performed by: FAMILY MEDICINE

## 2023-01-05 PROCEDURE — 99214 OFFICE O/P EST MOD 30 MIN: CPT | Mod: S$GLB,,, | Performed by: FAMILY MEDICINE

## 2023-01-05 PROCEDURE — 3288F PR FALLS RISK ASSESSMENT DOCUMENTED: ICD-10-PCS | Mod: CPTII,S$GLB,, | Performed by: FAMILY MEDICINE

## 2023-01-05 PROCEDURE — 1159F PR MEDICATION LIST DOCUMENTED IN MEDICAL RECORD: ICD-10-PCS | Mod: CPTII,S$GLB,, | Performed by: FAMILY MEDICINE

## 2023-01-05 PROCEDURE — 1125F PR PAIN SEVERITY QUANTIFIED, PAIN PRESENT: ICD-10-PCS | Mod: CPTII,S$GLB,, | Performed by: FAMILY MEDICINE

## 2023-01-05 PROCEDURE — 1160F PR REVIEW ALL MEDS BY PRESCRIBER/CLIN PHARMACIST DOCUMENTED: ICD-10-PCS | Mod: CPTII,S$GLB,, | Performed by: FAMILY MEDICINE

## 2023-01-05 RX ORDER — ALBUTEROL SULFATE 90 UG/1
2 AEROSOL, METERED RESPIRATORY (INHALATION) EVERY 6 HOURS PRN
Qty: 18 G | Refills: 0 | Status: SHIPPED | OUTPATIENT
Start: 2023-01-05 | End: 2023-03-03

## 2023-01-05 RX ORDER — VENLAFAXINE HYDROCHLORIDE 150 MG/1
150 CAPSULE, EXTENDED RELEASE ORAL 2 TIMES DAILY
Qty: 180 CAPSULE | Refills: 3 | Status: SHIPPED | OUTPATIENT
Start: 2023-01-05 | End: 2024-03-06

## 2023-01-05 NOTE — PROGRESS NOTES
Patient ID: Hannah Norman is a 72 y.o. female.    Chief Complaint: Follow-up (3 mon)    Pt is a 72 female with a PMHx of HTN, RAQUEL, A-fib with RVR and chronic back pain presents to clinic for 3 mo follow-up. Pt reports she has been experiencing headaches for the past year that occurs daily in the evening. Pt reports a squeezing frontal pain and pain behind the eyes. Pt reports relief with tylenol and will notify Dr. Hauser if pain worsens. Pt denies photophobia, dizziness, vomiting, nausea, or vision changes. Pt also reports experiencing episodes of SOB when she walks with her walker and feeling the need to catch her breath when she sits down. Pt reports she is able to breath normally after taking deep breaths after sitting. Denies fever, chills, chest pain, congestion, and sore throat.     Review of Symptoms    Constitutional: Negative.    HENT: Negative.    Eyes: Negative.    Respiratory: Negative.    Cardiovascular: Negative.    Gastrointestinal: Negative.    Endocrine: Negative.    Genitourinary: Negative.    Musculoskeletal: Negative.    Skin: Negative.    Allergic/Immunologic: Negative.    Neurological: Negative.    Hematological: Negative.    Psychiatric/Behavioral: Negative.      Except as above in HPI        Physical  Exam    Vitals:    01/05/23 0929   BP: 132/72   BP Location: Left arm   Patient Position: Sitting   Pulse: 110   Temp: 97.9 °F (36.6 °C)   TempSrc: Oral   SpO2: 96%   Weight: 109.5 kg (241 lb 8.2 oz)   Height: 5' (1.524 m)        Constitutional:  Oriented to person, place, and time. Appears well-developed and well-nourished.     HENT:   Head: Normocephalic and atraumatic.     Right Ear: Tympanic membrane, ear canal and External ear normal     Left Ear: Tympanic membrane, ear canal and External ear normal     Nose: Nose normal. No rhinorrhea or nasal deformity.     Mouth/Throat: Uvula is midline, oropharynx is clear and moist and mucous membranes are normal.     Eyes: Conjunctivae are  normal. Right eye exhibits no discharge. Left eye exhibits no   discharge. No scleral icterus.     Neck:  No JVD present. No tracheal deviation present.      Cardiovascular:  Regular rate and rhythm with normal S1 and S2     Pulmonary/Chest:   Clear to auscultation bilaterally without wheezes, rhonchi or rales      Abdominal: Soft. No distension and no mass. No tenderness. No rebound and no guarding.     Musculoskeletal: Normal range of motion. No edema or tenderness.   No deformity     Lymphadenopathy:  No cervical adenopathy.    Neurological:  Alert and oriented to person, place, and time. Coordination normal.     Skin: Skin is warm and dry. No rash noted.  No bruising    Psychiatric: Normal mood and affect. Speech is normal and behavior is normal. Judgment and thought content normal.       Assessment / Plan:    Headache:  Discussed the use of tylenol as pain relief for headache and pt denies use of any medication.     Obstructive sleep apnea- Patient ID: Hannah Norman is a 72 y.o. female.          ICD-10-CM ICD-9-CM   1. Fibromyalgia  M79.7 729.1   2. Wheezing  R06.2 786.07   3. Cough  R05.9 786.2   4. Bronchitis  J40 490   5. RLS (restless legs syndrome)  G25.81 333.94   6. Essential hypertension  I10 401.9   7. Encounter for screening for malignant neoplasm of colon  Z12.11 V76.51   8. Encounter for screening mammogram for breast cancer  Z12.31 V76.12   9. Stage 3 chronic kidney disease, unspecified whether stage 3a or 3b CKD  N18.30 585.3   10. Hyperglycemia  R73.9 790.29   11. MARY (obstructive sleep apnea)  G47.33 327.23     Fibromyalgia  -     venlafaxine (EFFEXOR-XR) 150 MG Cp24; Take 1 capsule (150 mg total) by mouth 2 (two) times a day. 1 tablet bid  Dispense: 180 capsule; Refill: 3    Wheezing  -     albuterol (PROVENTIL/VENTOLIN HFA) 90 mcg/actuation inhaler; Inhale 2 puffs into the lungs every 6 (six) hours as needed for Wheezing or Shortness of Breath. Rescue  Dispense: 18 g; Refill: 0    Cough  -      albuterol (PROVENTIL/VENTOLIN HFA) 90 mcg/actuation inhaler; Inhale 2 puffs into the lungs every 6 (six) hours as needed for Wheezing or Shortness of Breath. Rescue  Dispense: 18 g; Refill: 0    Bronchitis  -     albuterol (PROVENTIL/VENTOLIN HFA) 90 mcg/actuation inhaler; Inhale 2 puffs into the lungs every 6 (six) hours as needed for Wheezing or Shortness of Breath. Rescue  Dispense: 18 g; Refill: 0    RLS (restless legs syndrome)    Essential hypertension    Encounter for screening for malignant neoplasm of colon  -     Occult Blood Stool, CA Screen; Future; Expected date: 01/05/2023    Encounter for screening mammogram for breast cancer  -     Mammo Digital Screening Bilat w/ Tobias; Future; Expected date: 04/25/2023    Stage 3 chronic kidney disease, unspecified whether stage 3a or 3b CKD    Hyperglycemia    MARY (obstructive sleep apnea)      Request colonoscopy.     Discussed how to stay healthy including: diet and exerise.    Pt s/e/d with Dr. Raffi Petty, [facility]  Family Medicine  10:13 AM  01/08/2023

## 2023-01-11 ENCOUNTER — LAB VISIT (OUTPATIENT)
Dept: LAB | Facility: HOSPITAL | Age: 73
End: 2023-01-11
Attending: FAMILY MEDICINE
Payer: MEDICARE

## 2023-01-11 DIAGNOSIS — I10 ESSENTIAL HYPERTENSION: ICD-10-CM

## 2023-01-11 DIAGNOSIS — R73.9 HYPERGLYCEMIA: ICD-10-CM

## 2023-01-11 LAB
ANION GAP SERPL CALC-SCNC: 12 MMOL/L (ref 8–16)
CALCIUM SERPL-MCNC: 9.1 MG/DL (ref 8.7–10.5)
CHLORIDE SERPL-SCNC: 100 MMOL/L (ref 95–110)
CO2 SERPL-SCNC: 30 MMOL/L (ref 23–29)
CREAT SERPL-MCNC: 1.08 MG/DL (ref 0.5–1.4)
EST. GFR  (NO RACE VARIABLE): 54.2 ML/MIN/1.73 M^2
ESTIMATED AVG GLUCOSE: 131 MG/DL (ref 68–131)
GLUCOSE SERPL-MCNC: 192 MG/DL (ref 70–110)
HBA1C MFR BLD: 6.2 % (ref 4–5.6)
POTASSIUM SERPL-SCNC: 4.2 MMOL/L (ref 3.5–5.1)
SODIUM SERPL-SCNC: 142 MMOL/L (ref 136–145)
UUN UR-MCNC: 26 MG/DL (ref 7–17)

## 2023-01-11 PROCEDURE — 36415 COLL VENOUS BLD VENIPUNCTURE: CPT | Mod: PO | Performed by: FAMILY MEDICINE

## 2023-01-11 PROCEDURE — 80048 BASIC METABOLIC PNL TOTAL CA: CPT | Mod: PO | Performed by: FAMILY MEDICINE

## 2023-01-11 PROCEDURE — 83036 HEMOGLOBIN GLYCOSYLATED A1C: CPT | Mod: PO | Performed by: FAMILY MEDICINE

## 2023-01-20 ENCOUNTER — OFFICE VISIT (OUTPATIENT)
Dept: FAMILY MEDICINE | Facility: CLINIC | Age: 73
End: 2023-01-20
Payer: MEDICARE

## 2023-01-20 VITALS
HEIGHT: 60 IN | SYSTOLIC BLOOD PRESSURE: 130 MMHG | TEMPERATURE: 98 F | WEIGHT: 245.69 LBS | HEART RATE: 99 BPM | DIASTOLIC BLOOD PRESSURE: 72 MMHG | BODY MASS INDEX: 48.23 KG/M2 | OXYGEN SATURATION: 96 %

## 2023-01-20 DIAGNOSIS — J40 BRONCHITIS: ICD-10-CM

## 2023-01-20 DIAGNOSIS — G25.81 RLS (RESTLESS LEGS SYNDROME): ICD-10-CM

## 2023-01-20 DIAGNOSIS — R06.2 WHEEZING: ICD-10-CM

## 2023-01-20 DIAGNOSIS — M79.7 FIBROMYALGIA: Primary | ICD-10-CM

## 2023-01-20 PROCEDURE — 3008F BODY MASS INDEX DOCD: CPT | Mod: CPTII,S$GLB,, | Performed by: FAMILY MEDICINE

## 2023-01-20 PROCEDURE — 3008F PR BODY MASS INDEX (BMI) DOCUMENTED: ICD-10-PCS | Mod: CPTII,S$GLB,, | Performed by: FAMILY MEDICINE

## 2023-01-20 PROCEDURE — 3078F PR MOST RECENT DIASTOLIC BLOOD PRESSURE < 80 MM HG: ICD-10-PCS | Mod: CPTII,S$GLB,, | Performed by: FAMILY MEDICINE

## 2023-01-20 PROCEDURE — 3075F PR MOST RECENT SYSTOLIC BLOOD PRESS GE 130-139MM HG: ICD-10-PCS | Mod: CPTII,S$GLB,, | Performed by: FAMILY MEDICINE

## 2023-01-20 PROCEDURE — 3075F SYST BP GE 130 - 139MM HG: CPT | Mod: CPTII,S$GLB,, | Performed by: FAMILY MEDICINE

## 2023-01-20 PROCEDURE — 3044F HG A1C LEVEL LT 7.0%: CPT | Mod: CPTII,S$GLB,, | Performed by: FAMILY MEDICINE

## 2023-01-20 PROCEDURE — 1101F PT FALLS ASSESS-DOCD LE1/YR: CPT | Mod: CPTII,S$GLB,, | Performed by: FAMILY MEDICINE

## 2023-01-20 PROCEDURE — 1125F PR PAIN SEVERITY QUANTIFIED, PAIN PRESENT: ICD-10-PCS | Mod: CPTII,S$GLB,, | Performed by: FAMILY MEDICINE

## 2023-01-20 PROCEDURE — 99214 PR OFFICE/OUTPT VISIT, EST, LEVL IV, 30-39 MIN: ICD-10-PCS | Mod: S$GLB,,, | Performed by: FAMILY MEDICINE

## 2023-01-20 PROCEDURE — 1101F PR PT FALLS ASSESS DOC 0-1 FALLS W/OUT INJ PAST YR: ICD-10-PCS | Mod: CPTII,S$GLB,, | Performed by: FAMILY MEDICINE

## 2023-01-20 PROCEDURE — 99214 OFFICE O/P EST MOD 30 MIN: CPT | Mod: S$GLB,,, | Performed by: FAMILY MEDICINE

## 2023-01-20 PROCEDURE — 1125F AMNT PAIN NOTED PAIN PRSNT: CPT | Mod: CPTII,S$GLB,, | Performed by: FAMILY MEDICINE

## 2023-01-20 PROCEDURE — 3288F PR FALLS RISK ASSESSMENT DOCUMENTED: ICD-10-PCS | Mod: CPTII,S$GLB,, | Performed by: FAMILY MEDICINE

## 2023-01-20 PROCEDURE — 1159F MED LIST DOCD IN RCRD: CPT | Mod: CPTII,S$GLB,, | Performed by: FAMILY MEDICINE

## 2023-01-20 PROCEDURE — 1159F PR MEDICATION LIST DOCUMENTED IN MEDICAL RECORD: ICD-10-PCS | Mod: CPTII,S$GLB,, | Performed by: FAMILY MEDICINE

## 2023-01-20 PROCEDURE — 3288F FALL RISK ASSESSMENT DOCD: CPT | Mod: CPTII,S$GLB,, | Performed by: FAMILY MEDICINE

## 2023-01-20 PROCEDURE — 3044F PR MOST RECENT HEMOGLOBIN A1C LEVEL <7.0%: ICD-10-PCS | Mod: CPTII,S$GLB,, | Performed by: FAMILY MEDICINE

## 2023-01-20 PROCEDURE — 1160F PR REVIEW ALL MEDS BY PRESCRIBER/CLIN PHARMACIST DOCUMENTED: ICD-10-PCS | Mod: CPTII,S$GLB,, | Performed by: FAMILY MEDICINE

## 2023-01-20 PROCEDURE — 3078F DIAST BP <80 MM HG: CPT | Mod: CPTII,S$GLB,, | Performed by: FAMILY MEDICINE

## 2023-01-20 PROCEDURE — 1160F RVW MEDS BY RX/DR IN RCRD: CPT | Mod: CPTII,S$GLB,, | Performed by: FAMILY MEDICINE

## 2023-01-20 RX ORDER — CLONAZEPAM 0.5 MG/1
TABLET ORAL
Qty: 30 TABLET | Refills: 4 | Status: SHIPPED | OUTPATIENT
Start: 2023-01-20 | End: 2023-04-13 | Stop reason: SDUPTHER

## 2023-01-30 NOTE — PROGRESS NOTES
Patient ID: Hannah Norman is a 72 y.o. female.    Chief Complaint: Cough    HPI       Hannah Norman is a 72 y.o. female patient complains of several history of having a hacking cough.  Mostly nonproductive.  No fever chills.  No nausea vomiting or diarrhea.  Anxiety-continues to desire to use Klonopin which works at night.  Also use Effexor which is beneficial.  Hypertension-stable on current medications.  Lipidemia-uses lovastatin without myalgia.      Review of Symptoms        Physical Exam    Vitals:    01/20/23 1430   BP: 130/72   BP Location: Left arm   Patient Position: Sitting   Pulse: 99   Temp: 97.9 °F (36.6 °C)   TempSrc: Oral   SpO2: 96%   Weight: 111.4 kg (245 lb 11.2 oz)   Height: 5' (1.524 m)        Constitutional:   Oriented to person, place, and time.appears well-developed and well-nourished.   No distress.     HENT:   Head: Normocephalic and atraumatic.     Right Ear: Tympanic membrane, external ear and ear canal normal     Left Ear: Tympanic membrane, external ear and ear canal normal    Nose: External Normal. Normal turbinates, No rhinorrhea     Mouth/Throat: Uvula is midline, oropharynx is clear and moist and mucous membranes are normal.     Neck: supple no anterior cervical adenopathy    Carotid artery:  Bruit    NEG []   POS[]    Eyes:   Conjunctivae are normal. Right eye exhibits no discharge. Left eye exhibits no discharge. No scleral icterus. No periorbital edema     Cardiovascular:  Regular rate and rhythm with normal S1 and S2     Pulmonary/Chest:   Occasional rhonchi wheezes    Musculoskeletal:  No edema. No obvious deformity No wasting     Neurological:   Alert and oriented to person, place, and time. Coordination normal.     Skin:   Skin is warm and dry.   No diaphoresis.   No rash noted.    Psychiatric: Normal mood and affect. Behavior is normal. Judgment and thought content normal.       Assessment / Plan:      ICD-10-CM ICD-9-CM   1. Fibromyalgia  M79.7 729.1   2. RLS (restless legs  syndrome)  G25.81 333.94   3. Wheezing  R06.2 786.07   4. Bronchitis  J40 490     Fibromyalgia    RLS (restless legs syndrome)  -     clonazePAM (KLONOPIN) 0.5 MG tablet; TAKE 2 TABLETS BY MOUTH AT BEDTIME AS NEEDED FOR RESTLESS LEG SYNDROME  Dispense: 30 tablet; Refill: 4    Wheezing    Bronchitis    Patient with bronchitis-use over-the-counter medication also albuterol inhalers/nebulizer

## 2023-02-09 ENCOUNTER — LAB VISIT (OUTPATIENT)
Dept: LAB | Facility: HOSPITAL | Age: 73
End: 2023-02-09
Attending: FAMILY MEDICINE
Payer: MEDICARE

## 2023-02-09 DIAGNOSIS — R05.9 COUGH: ICD-10-CM

## 2023-02-09 DIAGNOSIS — N18.30 STAGE 3 CHRONIC KIDNEY DISEASE, UNSPECIFIED WHETHER STAGE 3A OR 3B CKD: ICD-10-CM

## 2023-02-09 DIAGNOSIS — R73.9 HYPERGLYCEMIA: ICD-10-CM

## 2023-02-09 DIAGNOSIS — J40 BRONCHITIS: ICD-10-CM

## 2023-02-09 DIAGNOSIS — R06.2 WHEEZING: ICD-10-CM

## 2023-02-09 DIAGNOSIS — G25.81 RLS (RESTLESS LEGS SYNDROME): ICD-10-CM

## 2023-02-09 DIAGNOSIS — E78.2 MIXED HYPERLIPIDEMIA: ICD-10-CM

## 2023-02-09 DIAGNOSIS — I10 ESSENTIAL HYPERTENSION: ICD-10-CM

## 2023-02-09 LAB
ALBUMIN SERPL BCP-MCNC: 4.6 G/DL (ref 3.5–5.2)
ALP SERPL-CCNC: 90 U/L (ref 38–126)
ALT SERPL W/O P-5'-P-CCNC: 19 U/L (ref 10–44)
ANION GAP SERPL CALC-SCNC: 10 MMOL/L (ref 8–16)
AST SERPL-CCNC: 22 U/L (ref 15–46)
BASOPHILS # BLD AUTO: 0.05 K/UL (ref 0–0.2)
BASOPHILS NFR BLD: 0.6 % (ref 0–1.9)
BILIRUB SERPL-MCNC: 0.4 MG/DL (ref 0.1–1)
CALCIUM SERPL-MCNC: 9.1 MG/DL (ref 8.7–10.5)
CHLORIDE SERPL-SCNC: 100 MMOL/L (ref 95–110)
CHOLEST SERPL-MCNC: 187 MG/DL (ref 120–199)
CHOLEST/HDLC SERPL: 3.5 {RATIO} (ref 2–5)
CO2 SERPL-SCNC: 31 MMOL/L (ref 23–29)
CREAT SERPL-MCNC: 1.18 MG/DL (ref 0.5–1.4)
DIFFERENTIAL METHOD: NORMAL
EOSINOPHIL # BLD AUTO: 0.3 K/UL (ref 0–0.5)
EOSINOPHIL NFR BLD: 3.8 % (ref 0–8)
ERYTHROCYTE [DISTWIDTH] IN BLOOD BY AUTOMATED COUNT: 12.2 % (ref 11.5–14.5)
EST. GFR  (NO RACE VARIABLE): 48.8 ML/MIN/1.73 M^2
ESTIMATED AVG GLUCOSE: 134 MG/DL (ref 68–131)
GLUCOSE SERPL-MCNC: 159 MG/DL (ref 70–110)
HBA1C MFR BLD: 6.3 % (ref 4–5.6)
HCT VFR BLD AUTO: 41.3 % (ref 37–48.5)
HDLC SERPL-MCNC: 53 MG/DL (ref 40–75)
HDLC SERPL: 28.3 % (ref 20–50)
HGB BLD-MCNC: 13.3 G/DL (ref 12–16)
IMM GRANULOCYTES # BLD AUTO: 0.02 K/UL (ref 0–0.04)
IMM GRANULOCYTES NFR BLD AUTO: 0.2 % (ref 0–0.5)
LDLC SERPL CALC-MCNC: 89 MG/DL (ref 63–159)
LYMPHOCYTES # BLD AUTO: 3.6 K/UL (ref 1–4.8)
LYMPHOCYTES NFR BLD: 42.4 % (ref 18–48)
MCH RBC QN AUTO: 30.8 PG (ref 27–31)
MCHC RBC AUTO-ENTMCNC: 32.2 G/DL (ref 32–36)
MCV RBC AUTO: 96 FL (ref 82–98)
MONOCYTES # BLD AUTO: 0.5 K/UL (ref 0.3–1)
MONOCYTES NFR BLD: 6.1 % (ref 4–15)
NEUTROPHILS # BLD AUTO: 4 K/UL (ref 1.8–7.7)
NEUTROPHILS NFR BLD: 46.9 % (ref 38–73)
NONHDLC SERPL-MCNC: 134 MG/DL
NRBC BLD-RTO: 0 /100 WBC
PLATELET # BLD AUTO: 334 K/UL (ref 150–450)
PMV BLD AUTO: 10 FL (ref 9.2–12.9)
POTASSIUM SERPL-SCNC: 3.7 MMOL/L (ref 3.5–5.1)
PROT SERPL-MCNC: 7.3 G/DL (ref 6–8.4)
RBC # BLD AUTO: 4.32 M/UL (ref 4–5.4)
SODIUM SERPL-SCNC: 141 MMOL/L (ref 136–145)
TRIGL SERPL-MCNC: 225 MG/DL (ref 30–150)
TSH SERPL DL<=0.005 MIU/L-ACNC: 3.76 UIU/ML (ref 0.4–4)
UUN UR-MCNC: 26 MG/DL (ref 7–17)
WBC # BLD AUTO: 8.46 K/UL (ref 3.9–12.7)

## 2023-02-09 PROCEDURE — 36415 COLL VENOUS BLD VENIPUNCTURE: CPT | Mod: PO | Performed by: FAMILY MEDICINE

## 2023-02-09 PROCEDURE — 83036 HEMOGLOBIN GLYCOSYLATED A1C: CPT | Mod: PO | Performed by: FAMILY MEDICINE

## 2023-02-09 PROCEDURE — 80061 LIPID PANEL: CPT | Performed by: FAMILY MEDICINE

## 2023-02-09 PROCEDURE — 80053 COMPREHEN METABOLIC PANEL: CPT | Mod: PO | Performed by: FAMILY MEDICINE

## 2023-02-09 PROCEDURE — 84443 ASSAY THYROID STIM HORMONE: CPT | Mod: PO | Performed by: FAMILY MEDICINE

## 2023-02-09 PROCEDURE — 85025 COMPLETE CBC W/AUTO DIFF WBC: CPT | Mod: PO | Performed by: FAMILY MEDICINE

## 2023-02-13 ENCOUNTER — OFFICE VISIT (OUTPATIENT)
Dept: FAMILY MEDICINE | Facility: CLINIC | Age: 73
End: 2023-02-13
Payer: MEDICARE

## 2023-02-13 DIAGNOSIS — Z12.31 ENCOUNTER FOR SCREENING MAMMOGRAM FOR BREAST CANCER: ICD-10-CM

## 2023-02-13 DIAGNOSIS — R06.2 WHEEZE: ICD-10-CM

## 2023-02-13 DIAGNOSIS — R94.2 ABNORMAL PFTS (PULMONARY FUNCTION TESTS): Primary | ICD-10-CM

## 2023-02-13 DIAGNOSIS — R06.02 SHORT OF BREATH ON EXERTION: ICD-10-CM

## 2023-02-13 PROCEDURE — 3044F HG A1C LEVEL LT 7.0%: CPT | Mod: CPTII,S$GLB,, | Performed by: FAMILY MEDICINE

## 2023-02-13 PROCEDURE — 3044F PR MOST RECENT HEMOGLOBIN A1C LEVEL <7.0%: ICD-10-PCS | Mod: CPTII,S$GLB,, | Performed by: FAMILY MEDICINE

## 2023-02-13 PROCEDURE — 99214 PR OFFICE/OUTPT VISIT, EST, LEVL IV, 30-39 MIN: ICD-10-PCS | Mod: S$GLB,,, | Performed by: FAMILY MEDICINE

## 2023-02-13 PROCEDURE — 99214 OFFICE O/P EST MOD 30 MIN: CPT | Mod: S$GLB,,, | Performed by: FAMILY MEDICINE

## 2023-02-13 RX ORDER — PREDNISONE 20 MG/1
TABLET ORAL
Qty: 5 TABLET | Refills: 0 | Status: SHIPPED | OUTPATIENT
Start: 2023-02-13 | End: 2023-03-03

## 2023-02-13 RX ORDER — FLUTICASONE PROPIONATE AND SALMETEROL 250; 50 UG/1; UG/1
1 POWDER RESPIRATORY (INHALATION) 2 TIMES DAILY
Qty: 1 EACH | Refills: 2 | Status: SHIPPED | OUTPATIENT
Start: 2023-02-13 | End: 2024-02-13

## 2023-02-14 ENCOUNTER — HOSPITAL ENCOUNTER (OUTPATIENT)
Dept: RADIOLOGY | Facility: HOSPITAL | Age: 73
Discharge: HOME | End: 2023-02-14
Attending: FAMILY MEDICINE
Payer: MEDICARE

## 2023-02-14 DIAGNOSIS — R06.2 WHEEZE: ICD-10-CM

## 2023-02-14 DIAGNOSIS — R06.02 SHORT OF BREATH ON EXERTION: ICD-10-CM

## 2023-02-14 DIAGNOSIS — R94.2 ABNORMAL PFTS (PULMONARY FUNCTION TESTS): ICD-10-CM

## 2023-02-14 PROCEDURE — 71046 XR CHEST PA AND LATERAL: ICD-10-PCS | Mod: 26,,, | Performed by: RADIOLOGY

## 2023-02-14 PROCEDURE — 71046 X-RAY EXAM CHEST 2 VIEWS: CPT | Mod: TC,FY,PO

## 2023-02-14 PROCEDURE — 71046 X-RAY EXAM CHEST 2 VIEWS: CPT | Mod: 26,,, | Performed by: RADIOLOGY

## 2023-02-15 ENCOUNTER — TELEPHONE (OUTPATIENT)
Dept: FAMILY MEDICINE | Facility: CLINIC | Age: 73
End: 2023-02-15
Payer: MEDICARE

## 2023-02-19 NOTE — PROGRESS NOTES
Patient ID: Hannah Norman is a 73 y.o. female.    Chief Complaint: Follow-up    HPI      Hannah Norman is a 73 y.o. female here for six-month follow-up.  Currently has anxiety and takes clonazepam one a daily basis as well as Effexor.  Anxiety stable this time.    Reflux-resistant to treatment-Protonix uses 40 milligrams 2 times a day.  Lipidemia-uses Mevacor without side effects such as myalgia.    Chronic lung disease-has not had spirometry in a long time-suggest this to quantify.    Patient complains of shortness of breath which is somewhat new.  Her shortness of breath was complaints she would last time.  Since she does not have any chest pain associated with this or typical angina symptoms I asked her to do in endurance program.  Her endurance has not improved and shortness of breath is improved despite attempts to exercise    There were no vitals filed for this visit.         Review of Symptoms      Physical Exam    Constitutional:  Oriented to person, place, and time.appears well-developed and well-nourished.  No distress.      HENT  Head: Normocephalic and atraumatic  Right Ear: External ear normal.   Left Ear: External ear normal.   Nose: External nose normal.   Mouth:  Moist mucus membranes.    Eyes:  Conjunctivae are normal. Right eye exhibits no discharge.  Left eye exhibits no discharge. No scleral icterus.  No periorbital edema    Cardiovascular:  Regular rate and rhythm with normal S1 and S2     Pulmonary/Chest:   Clear to auscultation bilaterally without wheezes, rhonchi or rales      Musculoskeletal:  No edema. No obvious deformity No wasting       Neurological:  Alert and oriented to person, place, and time.   Coordination normal.     Skin:   Skin is warm and dry.  No diaphoresis.   No rash noted.     Psychiatric: Normal mood and affect. Behavior is normal.  Judgment and thought content normal.     Complete Blood Count  Lab Results   Component Value Date    RBC 4.32 02/09/2023    HGB 13.3 02/09/2023     HCT 41.3 02/09/2023    MCV 96 02/09/2023    MCH 30.8 02/09/2023    MCHC 32.2 02/09/2023    RDW 12.2 02/09/2023     02/09/2023    MPV 10.0 02/09/2023    GRAN 4.0 02/09/2023    GRAN 46.9 02/09/2023    LYMPH 3.6 02/09/2023    LYMPH 42.4 02/09/2023    MONO 0.5 02/09/2023    MONO 6.1 02/09/2023    EOS 0.3 02/09/2023    BASO 0.05 02/09/2023    EOSINOPHIL 3.8 02/09/2023    BASOPHIL 0.6 02/09/2023    DIFFMETHOD Automated 02/09/2023       Comprehensive Metabolic Panel  Lab Results   Component Value Date     (H) 02/09/2023    BUN 26 (H) 02/09/2023    CREATININE 1.18 02/09/2023     02/09/2023    K 3.7 02/09/2023     02/09/2023    PROT 7.3 02/09/2023    ALBUMIN 4.6 02/09/2023    BILITOT 0.4 02/09/2023    AST 22 02/09/2023    ALKPHOS 90 02/09/2023    CO2 31 (H) 02/09/2023    ALT 19 02/09/2023    ANIONGAP 10 02/09/2023       TSH  Lab Results   Component Value Date    TSH 3.760 02/09/2023       Assessment / Plan:      ICD-10-CM ICD-9-CM   1. Abnormal PFTs (pulmonary function tests)  R94.2 794.2   2. Wheeze  R06.2 786.07   3. Short of breath on exertion  R06.02 786.05   4. Encounter for screening mammogram for breast cancer  Z12.31 V76.12     Abnormal PFTs (pulmonary function tests)  -     X-Ray Chest PA And Lateral; Future; Expected date: 02/13/2023  -     Spirometry with/without bronchodilator; Future    Wheeze  -     X-Ray Chest PA And Lateral; Future; Expected date: 02/13/2023  -     Spirometry with/without bronchodilator; Future    Short of breath on exertion  -     X-Ray Chest PA And Lateral; Future; Expected date: 02/13/2023  -     Spirometry with/without bronchodilator; Future    Encounter for screening mammogram for breast cancer  -     Cancel: Mammo Digital Screening Bilat w/ Tobias; Future; Expected date: 08/13/2023  -     Mammo Digital Screening Bilat w/ Tobias; Future; Expected date: 02/13/2024    Other orders  -     fluticasone-salmeterol diskus inhaler 250-50 mcg; Inhale 1 puff into the lungs  2 (two) times daily. Controller  Dispense: 1 each; Refill: 2  -     predniSONE (DELTASONE) 20 MG tablet; One tablet daily by mouth for five days  Dispense: 5 tablet; Refill: 0

## 2023-02-28 ENCOUNTER — TELEPHONE (OUTPATIENT)
Dept: FAMILY MEDICINE | Facility: CLINIC | Age: 73
End: 2023-02-28
Payer: MEDICARE

## 2023-03-03 ENCOUNTER — TELEPHONE (OUTPATIENT)
Dept: FAMILY MEDICINE | Facility: CLINIC | Age: 73
End: 2023-03-03

## 2023-03-03 ENCOUNTER — OFFICE VISIT (OUTPATIENT)
Dept: FAMILY MEDICINE | Facility: CLINIC | Age: 73
End: 2023-03-03
Payer: MEDICARE

## 2023-03-03 VITALS
OXYGEN SATURATION: 96 % | HEART RATE: 98 BPM | TEMPERATURE: 99 F | DIASTOLIC BLOOD PRESSURE: 83 MMHG | SYSTOLIC BLOOD PRESSURE: 121 MMHG | WEIGHT: 248.56 LBS | HEIGHT: 60 IN | BODY MASS INDEX: 48.8 KG/M2

## 2023-03-03 DIAGNOSIS — J30.1 ALLERGIC RHINITIS DUE TO POLLEN, UNSPECIFIED SEASONALITY: Primary | ICD-10-CM

## 2023-03-03 PROCEDURE — 1159F PR MEDICATION LIST DOCUMENTED IN MEDICAL RECORD: ICD-10-PCS | Mod: CPTII,S$GLB,, | Performed by: FAMILY MEDICINE

## 2023-03-03 PROCEDURE — 3044F PR MOST RECENT HEMOGLOBIN A1C LEVEL <7.0%: ICD-10-PCS | Mod: CPTII,S$GLB,, | Performed by: FAMILY MEDICINE

## 2023-03-03 PROCEDURE — 1126F AMNT PAIN NOTED NONE PRSNT: CPT | Mod: CPTII,S$GLB,, | Performed by: FAMILY MEDICINE

## 2023-03-03 PROCEDURE — 3079F DIAST BP 80-89 MM HG: CPT | Mod: CPTII,S$GLB,, | Performed by: FAMILY MEDICINE

## 2023-03-03 PROCEDURE — 3074F SYST BP LT 130 MM HG: CPT | Mod: CPTII,S$GLB,, | Performed by: FAMILY MEDICINE

## 2023-03-03 PROCEDURE — 3074F PR MOST RECENT SYSTOLIC BLOOD PRESSURE < 130 MM HG: ICD-10-PCS | Mod: CPTII,S$GLB,, | Performed by: FAMILY MEDICINE

## 2023-03-03 PROCEDURE — 1126F PR PAIN SEVERITY QUANTIFIED, NO PAIN PRESENT: ICD-10-PCS | Mod: CPTII,S$GLB,, | Performed by: FAMILY MEDICINE

## 2023-03-03 PROCEDURE — 1159F MED LIST DOCD IN RCRD: CPT | Mod: CPTII,S$GLB,, | Performed by: FAMILY MEDICINE

## 2023-03-03 PROCEDURE — 3008F BODY MASS INDEX DOCD: CPT | Mod: CPTII,S$GLB,, | Performed by: FAMILY MEDICINE

## 2023-03-03 PROCEDURE — 3044F HG A1C LEVEL LT 7.0%: CPT | Mod: CPTII,S$GLB,, | Performed by: FAMILY MEDICINE

## 2023-03-03 PROCEDURE — 96372 THER/PROPH/DIAG INJ SC/IM: CPT | Mod: JZ,S$GLB,, | Performed by: FAMILY MEDICINE

## 2023-03-03 PROCEDURE — 96372 PR INJECTION,THERAP/PROPH/DIAG2ST, IM OR SUBCUT: ICD-10-PCS | Mod: JZ,S$GLB,, | Performed by: FAMILY MEDICINE

## 2023-03-03 PROCEDURE — 1101F PT FALLS ASSESS-DOCD LE1/YR: CPT | Mod: CPTII,S$GLB,, | Performed by: FAMILY MEDICINE

## 2023-03-03 PROCEDURE — 99213 PR OFFICE/OUTPT VISIT, EST, LEVL III, 20-29 MIN: ICD-10-PCS | Mod: 25,S$GLB,, | Performed by: FAMILY MEDICINE

## 2023-03-03 PROCEDURE — 3288F PR FALLS RISK ASSESSMENT DOCUMENTED: ICD-10-PCS | Mod: CPTII,S$GLB,, | Performed by: FAMILY MEDICINE

## 2023-03-03 PROCEDURE — 3079F PR MOST RECENT DIASTOLIC BLOOD PRESSURE 80-89 MM HG: ICD-10-PCS | Mod: CPTII,S$GLB,, | Performed by: FAMILY MEDICINE

## 2023-03-03 PROCEDURE — 99213 OFFICE O/P EST LOW 20 MIN: CPT | Mod: 25,S$GLB,, | Performed by: FAMILY MEDICINE

## 2023-03-03 PROCEDURE — 3288F FALL RISK ASSESSMENT DOCD: CPT | Mod: CPTII,S$GLB,, | Performed by: FAMILY MEDICINE

## 2023-03-03 PROCEDURE — 1101F PR PT FALLS ASSESS DOC 0-1 FALLS W/OUT INJ PAST YR: ICD-10-PCS | Mod: CPTII,S$GLB,, | Performed by: FAMILY MEDICINE

## 2023-03-03 PROCEDURE — 3008F PR BODY MASS INDEX (BMI) DOCUMENTED: ICD-10-PCS | Mod: CPTII,S$GLB,, | Performed by: FAMILY MEDICINE

## 2023-03-03 RX ORDER — TRIAMCINOLONE ACETONIDE 40 MG/ML
80 INJECTION, SUSPENSION INTRA-ARTICULAR; INTRAMUSCULAR ONCE
Status: COMPLETED | OUTPATIENT
Start: 2023-03-03 | End: 2023-03-03

## 2023-03-03 RX ADMIN — TRIAMCINOLONE ACETONIDE 80 MG: 40 INJECTION, SUSPENSION INTRA-ARTICULAR; INTRAMUSCULAR at 12:03

## 2023-03-03 NOTE — TELEPHONE ENCOUNTER
Spoke to pt. Pt stated 2 days ago she started feeling stuffed up in her sinuses. She then started to get headaches and a postnasal drip. Scheduled pt to come in today to see Dr. Garcia.

## 2023-03-03 NOTE — TELEPHONE ENCOUNTER
----- Message from Brittni Norman sent at 3/3/2023  9:23 AM CST -----  Type:  Same Day Appointment Request    Caller is requesting a same day appointment.  Caller declined first available appointment listed below.    Name of Caller:Pt   When is the first available appointment?N/A  Symptoms:headaches, breathing is heavy, eyes running , sinus draining   Best Call Back Number:[phone]  Additional Information: wants to be seen today

## 2023-03-06 NOTE — PROGRESS NOTES
Patient ID: Hannah Norman is a 73 y.o. female.    Chief Complaint: flu like symptoms and Shortness of Breath    HPI    Hannah Norman is a 73 y.o. female.  with several day hx of mild to moderate nasal congestion, post nasal drip and non productive cough.  Over the counter meds have not resolved symptoms.    No fever chills nausea vomiting or diarrhea.         Review of Symptoms    Constitutional  No change in activity, No chills/ fever   Resp  Neg hemoptysis, stridor, choking  CVS  Neg chest pain, palpitations    Physical Exam    Vitals:    03/03/23 1123 03/03/23 1143   BP: (!) 126/92 121/83   BP Location: Left arm    Patient Position: Sitting    Pulse: 98    Temp: 99 °F (37.2 °C)    TempSrc: Oral    SpO2: 96%    Weight: 112.8 kg (248 lb 9.1 oz)    Height: 5' (1.524 m)        Constitutional:   Oriented to person, place, and time.appears well-developed and well-nourished.   No distress.     HENT:   Head: Normocephalic and atraumatic.     Right Ear: Tympanic membrane, ear canal and External ear normal      Left Ear: Tympanic membrane, ear canal and External ear normal     Nose: External Normal. Normal turbinates, No rhinorrhea (Unless checked)  [x] Edematous Turbinates   NO Purulent Discharge  NO Evidence of Bleeding   [x] Clear Discharge    Mouth/Throat: Uvula is midline, oropharynx is clear and moist and mucous membranes are normal.     Neck: supple no anterior cervical adenopathy    Eyes:   Conjunctivae are normal. Right eye exhibits no discharge. Left eye exhibits no discharge. No scleral icterus. No periorbital edema     Cardiovascular:  Regular rate and rhythm with normal S1 and S2     Pulmonary/Chest:   Clear to auscultation bilaterally without wheezes, rhonchi or rales    Musculoskeletal:   No edema. No obvious deformity No wasting   Neurological:   Alert and oriented to person, place, and time. Coordination normal.     Skin:   Skin is warm and dry. No rash noted.  No diaphoresis.     Psychiatric: Normal mood  and affect. Behavior is normal. Judgment and thought content normal.       Assessment / Plan:      ICD-10-CM ICD-9-CM   1. Allergic rhinitis due to pollen, unspecified seasonality  J30.1 477.0     Allergic rhinitis due to pollen, unspecified seasonality  -     triamcinolone acetonide injection 80 mg    Continue current other medications injection for allergy

## 2023-03-10 ENCOUNTER — HOSPITAL ENCOUNTER (OUTPATIENT)
Dept: PULMONOLOGY | Facility: HOSPITAL | Age: 73
Discharge: HOME | End: 2023-03-10
Attending: INTERNAL MEDICINE
Payer: MEDICARE

## 2023-03-10 DIAGNOSIS — R06.2 WHEEZE: ICD-10-CM

## 2023-03-10 DIAGNOSIS — R06.02 SHORT OF BREATH ON EXERTION: ICD-10-CM

## 2023-03-10 DIAGNOSIS — R94.2 ABNORMAL PFTS (PULMONARY FUNCTION TESTS): ICD-10-CM

## 2023-03-10 PROCEDURE — 94010 BREATHING CAPACITY TEST: CPT

## 2023-03-10 PROCEDURE — 94060 PR EVAL OF BRONCHOSPASM: ICD-10-PCS | Mod: 26,,, | Performed by: INTERNAL MEDICINE

## 2023-03-10 PROCEDURE — 94060 EVALUATION OF WHEEZING: CPT | Mod: 26,,, | Performed by: INTERNAL MEDICINE

## 2023-03-10 PROCEDURE — 94640 AIRWAY INHALATION TREATMENT: CPT | Mod: 59

## 2023-03-11 LAB
BRPFT: NORMAL
FEF 25 75 CHG: 14.5 %
FEF 25 75 LLN: 0.72
FEF 25 75 POST REF: 74.4 %
FEF 25 75 PRE REF: 64.9 %
FEF 25 75 REF: 1.62
FET100 CHG: -5.5 %
FEV1 CHG: 6.7 %
FEV1 FVC CHG: 0.5 %
FEV1 FVC LLN: 64
FEV1 FVC POST REF: 94.5 %
FEV1 FVC PRE REF: 94 %
FEV1 FVC REF: 78
FEV1 LLN: 1.36
FEV1 POST REF: 86.7 %
FEV1 PRE REF: 81.3 %
FEV1 REF: 1.89
FVC CHG: 6.2 %
FVC LLN: 1.75
FVC POST REF: 91.1 %
FVC PRE REF: 85.8 %
FVC REF: 2.44
PEF CHG: 32 %
PEF LLN: 3.33
PEF POST REF: 125.1 %
PEF PRE REF: 94.7 %
PEF REF: 4.88
POST FEF 25 75: 1.21 L/S (ref 0.72–2.52)
POST FET 100: 7.76 SEC
POST FEV1 FVC: 73.74 % (ref 64.15–91.95)
POST FEV1: 1.64 L (ref 1.36–2.43)
POST FVC: 2.22 L (ref 1.75–3.13)
POST PEF: 6.11 L/S (ref 3.33–6.44)
PRE FEF 25 75: 1.05 L/S (ref 0.72–2.52)
PRE FET 100: 8.21 SEC
PRE FEV1 FVC: 73.37 % (ref 64.15–91.95)
PRE FEV1: 1.54 L (ref 1.36–2.43)
PRE FVC: 2.1 L (ref 1.75–3.13)
PRE PEF: 4.63 L/S (ref 3.33–6.44)

## 2023-03-17 NOTE — TELEPHONE ENCOUNTER
Called pt to confirm 9:30am arrival time for procedure. Gave pt NPO instructions and gave pt opportunity to ask questions. Pt verbalized understanding.     MRSA detected

## 2023-03-22 NOTE — TELEPHONE ENCOUNTER
No new care gaps identified.  [facility] Embedded Care Gaps. Reference number: [id number]. 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   patient EKG showing hyperacute T waves.  We will treat for hyperkalemia. 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   patient EKG showing hyperacute T waves.  We will treat for hyperkalemia and consult nephro for UD.

## 2023-04-04 ENCOUNTER — TELEPHONE (OUTPATIENT)
Dept: FAMILY MEDICINE | Facility: CLINIC | Age: 73
End: 2023-04-04
Payer: MEDICARE

## 2023-04-04 RX ORDER — CEPHALEXIN 500 MG/1
1000 CAPSULE ORAL EVERY 12 HOURS
Qty: 28 CAPSULE | Refills: 0 | Status: SHIPPED | OUTPATIENT
Start: 2023-04-04 | End: 2023-04-13

## 2023-04-04 NOTE — TELEPHONE ENCOUNTER
----- Message from Dede Briggs sent at 4/4/2023  2:28 PM CDT -----  Regarding: advice  Contact: [phone]  Type:  Needs Medical Advice    Who Called:  self   Symptoms (please be specific):  headache, pressure at the bridge of her nose, eyes have thick mucus  How long has patient had these symptoms:   a few days   Pharmacy name and phone #:  [id number] [address]  Would the patient rather a call back or a response via [facility]?  Call   Best Call Back Number:  [phone]  Additional Information:

## 2023-04-11 ENCOUNTER — TELEPHONE (OUTPATIENT)
Dept: FAMILY MEDICINE | Facility: CLINIC | Age: 73
End: 2023-04-11
Payer: MEDICARE

## 2023-04-11 NOTE — TELEPHONE ENCOUNTER
----- Message from Charlie Bar sent at 4/11/2023  4:28 PM CDT -----  Contact: pt  Type:  Sooner Apoointment Request    Caller is requesting a sooner appointment.  Caller declined first available appointment listed below.  Caller will not accept being placed on the waitlist and is requesting a message be sent to doctor.  Name of Caller:pt  When is the first available appointment? N/a  Symptoms:pain and numbness in feet  Would the patient rather a call back or a response via [facility]? call  Best Call Back Number:[phone]  Additional Information:

## 2023-04-13 ENCOUNTER — PATIENT OUTREACH (OUTPATIENT)
Dept: ADMINISTRATIVE | Facility: OTHER | Age: 73
End: 2023-04-13
Payer: MEDICARE

## 2023-04-13 ENCOUNTER — OFFICE VISIT (OUTPATIENT)
Dept: FAMILY MEDICINE | Facility: CLINIC | Age: 73
End: 2023-04-13
Payer: MEDICARE

## 2023-04-13 VITALS
OXYGEN SATURATION: 97 % | TEMPERATURE: 99 F | HEIGHT: 60 IN | HEART RATE: 101 BPM | SYSTOLIC BLOOD PRESSURE: 118 MMHG | DIASTOLIC BLOOD PRESSURE: 76 MMHG | WEIGHT: 241.94 LBS | BODY MASS INDEX: 47.5 KG/M2

## 2023-04-13 DIAGNOSIS — G25.81 RLS (RESTLESS LEGS SYNDROME): ICD-10-CM

## 2023-04-13 DIAGNOSIS — N18.30 STAGE 3 CHRONIC KIDNEY DISEASE, UNSPECIFIED WHETHER STAGE 3A OR 3B CKD: ICD-10-CM

## 2023-04-13 DIAGNOSIS — I10 ESSENTIAL HYPERTENSION: Primary | ICD-10-CM

## 2023-04-13 PROCEDURE — 3074F SYST BP LT 130 MM HG: CPT | Mod: CPTII,S$GLB,, | Performed by: FAMILY MEDICINE

## 2023-04-13 PROCEDURE — 99214 PR OFFICE/OUTPT VISIT, EST, LEVL IV, 30-39 MIN: ICD-10-PCS | Mod: S$GLB,,, | Performed by: FAMILY MEDICINE

## 2023-04-13 PROCEDURE — 3008F BODY MASS INDEX DOCD: CPT | Mod: CPTII,S$GLB,, | Performed by: FAMILY MEDICINE

## 2023-04-13 PROCEDURE — 3044F PR MOST RECENT HEMOGLOBIN A1C LEVEL <7.0%: ICD-10-PCS | Mod: CPTII,S$GLB,, | Performed by: FAMILY MEDICINE

## 2023-04-13 PROCEDURE — 99214 OFFICE O/P EST MOD 30 MIN: CPT | Mod: S$GLB,,, | Performed by: FAMILY MEDICINE

## 2023-04-13 PROCEDURE — 3078F DIAST BP <80 MM HG: CPT | Mod: CPTII,S$GLB,, | Performed by: FAMILY MEDICINE

## 2023-04-13 PROCEDURE — 3288F PR FALLS RISK ASSESSMENT DOCUMENTED: ICD-10-PCS | Mod: CPTII,S$GLB,, | Performed by: FAMILY MEDICINE

## 2023-04-13 PROCEDURE — 1125F AMNT PAIN NOTED PAIN PRSNT: CPT | Mod: CPTII,S$GLB,, | Performed by: FAMILY MEDICINE

## 2023-04-13 PROCEDURE — 3078F PR MOST RECENT DIASTOLIC BLOOD PRESSURE < 80 MM HG: ICD-10-PCS | Mod: CPTII,S$GLB,, | Performed by: FAMILY MEDICINE

## 2023-04-13 PROCEDURE — 1101F PT FALLS ASSESS-DOCD LE1/YR: CPT | Mod: CPTII,S$GLB,, | Performed by: FAMILY MEDICINE

## 2023-04-13 PROCEDURE — 3008F PR BODY MASS INDEX (BMI) DOCUMENTED: ICD-10-PCS | Mod: CPTII,S$GLB,, | Performed by: FAMILY MEDICINE

## 2023-04-13 PROCEDURE — 3288F FALL RISK ASSESSMENT DOCD: CPT | Mod: CPTII,S$GLB,, | Performed by: FAMILY MEDICINE

## 2023-04-13 PROCEDURE — 1101F PR PT FALLS ASSESS DOC 0-1 FALLS W/OUT INJ PAST YR: ICD-10-PCS | Mod: CPTII,S$GLB,, | Performed by: FAMILY MEDICINE

## 2023-04-13 PROCEDURE — 3044F HG A1C LEVEL LT 7.0%: CPT | Mod: CPTII,S$GLB,, | Performed by: FAMILY MEDICINE

## 2023-04-13 PROCEDURE — 3074F PR MOST RECENT SYSTOLIC BLOOD PRESSURE < 130 MM HG: ICD-10-PCS | Mod: CPTII,S$GLB,, | Performed by: FAMILY MEDICINE

## 2023-04-13 PROCEDURE — 1125F PR PAIN SEVERITY QUANTIFIED, PAIN PRESENT: ICD-10-PCS | Mod: CPTII,S$GLB,, | Performed by: FAMILY MEDICINE

## 2023-04-13 PROCEDURE — 1159F PR MEDICATION LIST DOCUMENTED IN MEDICAL RECORD: ICD-10-PCS | Mod: CPTII,S$GLB,, | Performed by: FAMILY MEDICINE

## 2023-04-13 PROCEDURE — 1159F MED LIST DOCD IN RCRD: CPT | Mod: CPTII,S$GLB,, | Performed by: FAMILY MEDICINE

## 2023-04-13 RX ORDER — CLONAZEPAM 0.5 MG/1
TABLET ORAL
Qty: 30 TABLET | Refills: 4 | Status: SHIPPED | OUTPATIENT
Start: 2023-04-13 | End: 2023-10-19

## 2023-04-13 RX ORDER — PANTOPRAZOLE SODIUM 40 MG/1
40 TABLET, DELAYED RELEASE ORAL 2 TIMES DAILY
Qty: 90 TABLET | Refills: 3
Start: 2023-04-13 | End: 2023-12-12

## 2023-04-13 RX ORDER — HYDROCODONE BITARTRATE AND ACETAMINOPHEN 10; 325 MG/1; MG/1
1 TABLET ORAL
COMMUNITY
Start: 2023-03-27 | End: 2023-12-12

## 2023-04-13 NOTE — PROGRESS NOTES
CHW - Initial Contact    This Community Health Worker updated the Social Determinant of Health questionnaire with MRN [id number] in clinic today.    Pt identified barriers of most importance are: Food Insecurity   Referrals to community agencies completed with patient/caregiver consent outside of [facility] include: Yes  Referrals were put through [facility] - No  Support and Services: [employer] [employer]- Meals on Wheels   Other information discussed the patient needs / wants help with: Patient stated she would like assistance with food being delivered to her home. Patient states she has food in the home at this time but needs help with maintaining food throughout the month. Patient was referred to [employer] [employer] for Meals on Wheels and scheduled for SNAP application completion 04/17 at 9 am. Patient stated she does not qualify for the [facility] [employer] Healthy Food + OTC card and is not in need of an emergency food box at this time.   Follow up required: Yes, SNAP application   Follow-up Outreach - Due: 4/17/2023

## 2023-04-17 ENCOUNTER — PATIENT OUTREACH (OUTPATIENT)
Dept: ADMINISTRATIVE | Facility: OTHER | Age: 73
End: 2023-04-17
Payer: MEDICARE

## 2023-04-17 NOTE — PROGRESS NOTES
CHW - Outreach Attempt    Community Health Worker left a voicemail message for 1st follow up attempt to contact MRN [id number] regarding: SNAP application; patient requested to have application completed later today  Community Health Worker to attempt to contact MRN [id number] on: 04/17

## 2023-04-17 NOTE — PROGRESS NOTES
CHW - Follow Up    This Community Health Worker completed a follow up visit with LUIGI [id number] over the phone today.  Pt/Caregiver reported: No updates reported   Community Health Worker provided: SNAP application completed. Patient informed on steps regarding phone interview and coumnet submission. Patient has been encouraged to contact me for any needed assistance. Patient agreed.  Follow up required: Yes, SNAP update   Follow-up Outreach - Due: 4/28/2023

## 2023-04-17 NOTE — PROGRESS NOTES
Patient ID: Hannah Norman is a 73 y.o. female.    Chief Complaint: Foot Pain, foot numbness, and Shortness of Breath    HPI      Hannah Norman is a 73 y.o. female complains of a feeling of numbness of her feet bilaterally.  This happens a lot at night.  No loss of strength and no loss of sensation.  Takes the gabapentin medication but does not find this is helping that feeling of her feet.    Vitals:    04/13/23 1018   BP: 118/76   BP Location: Left arm   Patient Position: Sitting   Pulse: 101   Temp: 98.5 °F (36.9 °C)   TempSrc: Oral   SpO2: 97%   Weight: 109.8 kg (241 lb 15.3 oz)   Height: 5' (1.524 m)            Review of Symptoms      Physical Exam    Constitutional:  Oriented to person, place, and time.appears well-developed and well-nourished.  No distress.      HENT  Head: Normocephalic and atraumatic  Right Ear: External ear normal.   Left Ear: External ear normal.   Nose: External nose normal.   Mouth:  Moist mucus membranes.    Eyes:  Conjunctivae are normal. Right eye exhibits no discharge.  Left eye exhibits no discharge. No scleral icterus.  No periorbital edema    Cardiovascular:  Regular rate and rhythm with normal S1 and S2     Pulmonary/Chest:   Clear to auscultation bilaterally without wheezes, rhonchi or rales      Musculoskeletal:  No edema. No obvious deformity No wasting       Neurological:  Alert and oriented to person, place, and time.   Coordination normal.     Skin:   Skin is warm and dry.  No diaphoresis.   No rash noted.   Normal sensation of feet-enlarged feet due to body habitus mild edema.    Psychiatric: Normal mood and affect. Behavior is normal.  Judgment and thought content normal.     Complete Blood Count  Lab Results   Component Value Date    RBC 4.32 02/09/2023    HGB 13.3 02/09/2023    HCT 41.3 02/09/2023    MCV 96 02/09/2023    MCH 30.8 02/09/2023    MCHC 32.2 02/09/2023    RDW 12.2 02/09/2023     02/09/2023    MPV 10.0 02/09/2023    GRAN 4.0 02/09/2023    GRAN 46.9  02/09/2023    LYMPH 3.6 02/09/2023    LYMPH 42.4 02/09/2023    MONO 0.5 02/09/2023    MONO 6.1 02/09/2023    EOS 0.3 02/09/2023    BASO 0.05 02/09/2023    EOSINOPHIL 3.8 02/09/2023    BASOPHIL 0.6 02/09/2023    DIFFMETHOD Automated 02/09/2023       Comprehensive Metabolic Panel  Lab Results   Component Value Date     (H) 02/09/2023    BUN 26 (H) 02/09/2023    CREATININE 1.18 02/09/2023     02/09/2023    K 3.7 02/09/2023     02/09/2023    PROT 7.3 02/09/2023    ALBUMIN 4.6 02/09/2023    BILITOT 0.4 02/09/2023    AST 22 02/09/2023    ALKPHOS 90 02/09/2023    CO2 31 (H) 02/09/2023    ALT 19 02/09/2023    ANIONGAP 10 02/09/2023       TSH  Lab Results   Component Value Date    TSH 3.760 02/09/2023       Assessment / Plan:      ICD-10-CM ICD-9-CM   1. Essential hypertension  I10 401.9   2. RLS (restless legs syndrome)  G25.81 333.94   3. Stage 3 chronic kidney disease, unspecified whether stage 3a or 3b CKD  N18.30 585.3     Essential hypertension    RLS (restless legs syndrome)  -     clonazePAM (KLONOPIN) 0.5 MG tablet; TAKE 2 TABLETS BY MOUTH AT BEDTIME AS NEEDED FOR RESTLESS LEG SYNDROME  Dispense: 30 tablet; Refill: 4    Stage 3 chronic kidney disease, unspecified whether stage 3a or 3b CKD    Other orders  -     pantoprazole (PROTONIX) 40 MG tablet; Take 1 tablet (40 mg total) by mouth 2 (two) times daily.  Dispense: 90 tablet; Refill: 3    Discussed adjustment of her gabapentin take it more often and or move the dosing toward the nighttime.  Such as 600 milligrams every 4 hours in the evening would basically increase the relative dosing.  Discussed sedation

## 2023-04-28 ENCOUNTER — PATIENT OUTREACH (OUTPATIENT)
Dept: ADMINISTRATIVE | Facility: OTHER | Age: 73
End: 2023-04-28
Payer: MEDICARE

## 2023-04-28 NOTE — PROGRESS NOTES
CHW - Follow Up    This Community Health Worker completed a follow up visit with LUIGI [id number] over the phone today.  Pt/Caregiver reported: Patient stated she missed her phone interview   Community Health Worker provided: Patient was provided the contact number to reschedule.  Follow up required: Yes, SNAP update   Follow-up Outreach - Due: 5/5/2023

## 2023-05-03 ENCOUNTER — PATIENT OUTREACH (OUTPATIENT)
Dept: ADMINISTRATIVE | Facility: OTHER | Age: 73
End: 2023-05-03
Payer: MEDICARE

## 2023-05-03 NOTE — PROGRESS NOTES
CHW - Follow Up    This Community Health Worker completed a follow up visit with LUIGI [id number] over the phone today.  Pt/Caregiver reported: No updates reported   Community Health Worker provided: Patient has been rescheduled with PCP. No additional assistance required.   Follow up required: Yes, SNAP update   Follow-up Outreach - Due: 5/5/2023

## 2023-05-05 ENCOUNTER — PATIENT OUTREACH (OUTPATIENT)
Dept: ADMINISTRATIVE | Facility: OTHER | Age: 73
End: 2023-05-05
Payer: MEDICARE

## 2023-05-05 NOTE — PROGRESS NOTES
CHW - Follow Up    This Community Health Worker completed a follow up visit with LUIGI [id number] over the phone today.  Pt/Caregiver reported: Patient stated she does not remember if she contacted [facility] to reschedule her phone interview, she has not heard anything via phone call or mail regarding her application/determination. Patient was informed if she does hear anything from [facility] within 1 week I will assist her with reapplying for benefits. Patient agreed.    Community Health Worker provided: No resources provided at this time.   Follow up required: Yes, SNAP update   Follow-up Outreach - Due: 5/12/2023

## 2023-05-12 ENCOUNTER — PATIENT OUTREACH (OUTPATIENT)
Dept: ADMINISTRATIVE | Facility: OTHER | Age: 73
End: 2023-05-12
Payer: MEDICARE

## 2023-05-12 NOTE — PROGRESS NOTES
CHW - Case Closure    This Community Health Worker spoke to LUIGI [id number] over the phone today.   Pt/Caregiver reported: Patient stated she will be receiving $38 per month in SNAP benefits. Patient stated she is not in need of any additional assistance at this time but will contact me if anything changes.  Pt/Caregiver denied any additional needs at this time and agrees with episode closure at this time.  Provided LUIGI Laguerre with Community Health Worker's contact information and encouraged him/her to contact this Community Health Worker if additional needs arise.

## 2023-07-23 NOTE — TELEPHONE ENCOUNTER
No care due was identified.  [facility] Embedded Care Due Messages. Reference number: [id number].   7/23/2023 5:05:54 PM CDT

## 2023-07-24 RX ORDER — LOVASTATIN 20 MG/1
TABLET ORAL
Qty: 90 TABLET | Refills: 1 | OUTPATIENT
Start: 2023-07-24

## 2023-07-30 NOTE — TELEPHONE ENCOUNTER
No care due was identified.  [facility] Embedded Care Due Messages. Reference number: [id number].   7/30/2023 4:17:05 PM CDT

## 2023-07-31 RX ORDER — LOVASTATIN 20 MG/1
TABLET ORAL
Qty: 90 TABLET | Refills: 1 | OUTPATIENT
Start: 2023-07-31

## 2023-07-31 NOTE — TELEPHONE ENCOUNTER
Refill Decision Note   Hannah Norman  is requesting a refill authorization.  Brief Assessment and Rationale for Refill:  Quick Discontinue     Medication Therapy Plan:  Receipt confirmed by pharmacy (7/21/2023  5:32 PM CDT)      Comments:     Note composed:3:41 AM 07/31/2023

## 2023-08-10 ENCOUNTER — TELEPHONE (OUTPATIENT)
Dept: FAMILY MEDICINE | Facility: CLINIC | Age: 73
End: 2023-08-10
Payer: MEDICARE

## 2023-08-10 NOTE — TELEPHONE ENCOUNTER
Patient states the dizzy spells started a few months ago she can't remember if she has seen you for it previously. States the dizziness has gotten worse recently.     Denies any recent falls.    Patient also states she has been depressed after losing her granddaughter.     Patient is requesting an appointment

## 2023-08-10 NOTE — TELEPHONE ENCOUNTER
Attempted to reach patient. Left v/m for patient to contact clinic   -----------------------------------------------------------------------------    ----- Message from Dominique Moore sent at 8/9/2023  5:34 PM CDT -----  Contact: pt  Type:  Sooner Apoointment Request    Caller is requesting a sooner appointment.  Caller declined first available appointment listed below.  Caller will not accept being placed on the waitlist and is requesting a message be sent to doctor.  Name of Caller:pt  When is the first available appointment?12/05  Symptoms:dizzy spells   Would the patient rather a call back or a response via [facility]? Call   Best Call Back Number:[phone]  Additional Information:

## 2023-08-15 NOTE — TELEPHONE ENCOUNTER
How about come in the afternoon of September 1st.  If symptoms progress call and we can see you sooner.

## 2023-08-28 DIAGNOSIS — G25.81 RESTLESS LEG SYNDROME: ICD-10-CM

## 2023-08-28 DIAGNOSIS — M79.7 FIBROMYALGIA: ICD-10-CM

## 2023-08-28 RX ORDER — GABAPENTIN 600 MG/1
TABLET ORAL
Qty: 90 TABLET | Refills: 3 | Status: SHIPPED | OUTPATIENT
Start: 2023-08-28 | End: 2023-12-12

## 2023-08-28 NOTE — TELEPHONE ENCOUNTER
No care due was identified.  [facility] Embedded Care Due Messages. Reference number: [id number].   8/28/2023 4:05:56 PM CDT

## 2023-09-01 ENCOUNTER — OFFICE VISIT (OUTPATIENT)
Dept: FAMILY MEDICINE | Facility: CLINIC | Age: 73
End: 2023-09-01
Payer: MEDICARE

## 2023-09-01 VITALS
SYSTOLIC BLOOD PRESSURE: 120 MMHG | BODY MASS INDEX: 47.44 KG/M2 | OXYGEN SATURATION: 96 % | HEART RATE: 96 BPM | TEMPERATURE: 99 F | HEIGHT: 60 IN | WEIGHT: 241.63 LBS | DIASTOLIC BLOOD PRESSURE: 74 MMHG

## 2023-09-01 DIAGNOSIS — J44.9 CHRONIC OBSTRUCTIVE PULMONARY DISEASE, UNSPECIFIED COPD TYPE: ICD-10-CM

## 2023-09-01 DIAGNOSIS — I77.1 TORTUOUS AORTA: ICD-10-CM

## 2023-09-01 DIAGNOSIS — F39 UNSPECIFIED MOOD (AFFECTIVE) DISORDER: ICD-10-CM

## 2023-09-01 DIAGNOSIS — M46.1 SACROILIITIS, NOT ELSEWHERE CLASSIFIED: ICD-10-CM

## 2023-09-01 DIAGNOSIS — R42 DIZZINESS AND GIDDINESS: Primary | ICD-10-CM

## 2023-09-01 DIAGNOSIS — E66.2 OBESITY HYPOVENTILATION SYNDROME: ICD-10-CM

## 2023-09-01 PROBLEM — I48.91 ATRIAL FIBRILLATION WITH RVR: Status: RESOLVED | Noted: 2022-02-07 | Resolved: 2023-09-01

## 2023-09-01 PROCEDURE — 3288F FALL RISK ASSESSMENT DOCD: CPT | Mod: CPTII,S$GLB,, | Performed by: FAMILY MEDICINE

## 2023-09-01 PROCEDURE — 3008F PR BODY MASS INDEX (BMI) DOCUMENTED: ICD-10-PCS | Mod: CPTII,S$GLB,, | Performed by: FAMILY MEDICINE

## 2023-09-01 PROCEDURE — 3288F PR FALLS RISK ASSESSMENT DOCUMENTED: ICD-10-PCS | Mod: CPTII,S$GLB,, | Performed by: FAMILY MEDICINE

## 2023-09-01 PROCEDURE — 3078F PR MOST RECENT DIASTOLIC BLOOD PRESSURE < 80 MM HG: ICD-10-PCS | Mod: CPTII,S$GLB,, | Performed by: FAMILY MEDICINE

## 2023-09-01 PROCEDURE — 1125F PR PAIN SEVERITY QUANTIFIED, PAIN PRESENT: ICD-10-PCS | Mod: CPTII,S$GLB,, | Performed by: FAMILY MEDICINE

## 2023-09-01 PROCEDURE — 3008F BODY MASS INDEX DOCD: CPT | Mod: CPTII,S$GLB,, | Performed by: FAMILY MEDICINE

## 2023-09-01 PROCEDURE — 99214 PR OFFICE/OUTPT VISIT, EST, LEVL IV, 30-39 MIN: ICD-10-PCS | Mod: S$GLB,,, | Performed by: FAMILY MEDICINE

## 2023-09-01 PROCEDURE — 3044F HG A1C LEVEL LT 7.0%: CPT | Mod: CPTII,S$GLB,, | Performed by: FAMILY MEDICINE

## 2023-09-01 PROCEDURE — 99214 OFFICE O/P EST MOD 30 MIN: CPT | Mod: S$GLB,,, | Performed by: FAMILY MEDICINE

## 2023-09-01 PROCEDURE — 1160F PR REVIEW ALL MEDS BY PRESCRIBER/CLIN PHARMACIST DOCUMENTED: ICD-10-PCS | Mod: CPTII,S$GLB,, | Performed by: FAMILY MEDICINE

## 2023-09-01 PROCEDURE — 1160F RVW MEDS BY RX/DR IN RCRD: CPT | Mod: CPTII,S$GLB,, | Performed by: FAMILY MEDICINE

## 2023-09-01 PROCEDURE — 1125F AMNT PAIN NOTED PAIN PRSNT: CPT | Mod: CPTII,S$GLB,, | Performed by: FAMILY MEDICINE

## 2023-09-01 PROCEDURE — 3074F SYST BP LT 130 MM HG: CPT | Mod: CPTII,S$GLB,, | Performed by: FAMILY MEDICINE

## 2023-09-01 PROCEDURE — 1159F MED LIST DOCD IN RCRD: CPT | Mod: CPTII,S$GLB,, | Performed by: FAMILY MEDICINE

## 2023-09-01 PROCEDURE — 3044F PR MOST RECENT HEMOGLOBIN A1C LEVEL <7.0%: ICD-10-PCS | Mod: CPTII,S$GLB,, | Performed by: FAMILY MEDICINE

## 2023-09-01 PROCEDURE — 1159F PR MEDICATION LIST DOCUMENTED IN MEDICAL RECORD: ICD-10-PCS | Mod: CPTII,S$GLB,, | Performed by: FAMILY MEDICINE

## 2023-09-01 PROCEDURE — 1101F PR PT FALLS ASSESS DOC 0-1 FALLS W/OUT INJ PAST YR: ICD-10-PCS | Mod: CPTII,S$GLB,, | Performed by: FAMILY MEDICINE

## 2023-09-01 PROCEDURE — 3074F PR MOST RECENT SYSTOLIC BLOOD PRESSURE < 130 MM HG: ICD-10-PCS | Mod: CPTII,S$GLB,, | Performed by: FAMILY MEDICINE

## 2023-09-01 PROCEDURE — 3078F DIAST BP <80 MM HG: CPT | Mod: CPTII,S$GLB,, | Performed by: FAMILY MEDICINE

## 2023-09-01 PROCEDURE — 1101F PT FALLS ASSESS-DOCD LE1/YR: CPT | Mod: CPTII,S$GLB,, | Performed by: FAMILY MEDICINE

## 2023-09-01 RX ORDER — GABAPENTIN 300 MG/1
300 CAPSULE ORAL 3 TIMES DAILY
Qty: 270 CAPSULE | Refills: 3 | Status: SHIPPED | OUTPATIENT
Start: 2023-09-01 | End: 2024-01-05 | Stop reason: SDUPTHER

## 2023-09-01 NOTE — PATIENT INSTRUCTIONS
Reduce the Neurontin to 300 mg three times a day  slowly wean to two times a day and then once daily for about 5 days then discontinue    Stop Mevacor or lovastatin    I want you off the Neurontin and Mevacor for 3 weeks to see if this is the cause or contributing to your problems.    I also ordered an MRI of your brain    Use your Advair two times each day if the day ends in Y

## 2023-09-04 NOTE — PROGRESS NOTES
Patient ID: Hannah Norman is a 73 y.o. female.    Chief Complaint: Dizziness, Fatigue, Shortness of Breath, and Generalized Body Aches    HPI      Hannah Norman is a 73 y.o. female complains of feeling of dizziness occasionally when she stands up from a seated position.  Also complains of dizziness when she ambulates.  Often use a walker in her home.  Feels that she has weakness bilateral lower extremities.  Also complains of shortness of breath when she walks.  Does not complain of PND orthopnea although to does sleep with the bed slightly elevated but this is not new.    Vitals:    09/01/23 1322   BP: 120/74   BP Location: Left arm   Patient Position: Sitting   Pulse: 96   Temp: 98.5 °F (36.9 °C)   TempSrc: Oral   SpO2: 96%   Weight: 109.6 kg (241 lb 10 oz)   Height: 5' (1.524 m)            Review of Symptoms      Physical Exam    Constitutional:  Oriented to person, place, and time.appears well-developed and well-nourished.  No distress.      HENT  Head: Normocephalic and atraumatic  Right Ear: External ear normal.   Left Ear: External ear normal.   Nose: External nose normal.   Mouth:  Moist mucus membranes.    Eyes:  Conjunctivae are normal. Right eye exhibits no discharge.  Left eye exhibits no discharge. No scleral icterus.  No periorbital edema    Cardiovascular:  Regular rate and rhythm with normal S1 and S2     Pulmonary/Chest:   Clear to auscultation bilaterally without wheezes, rhonchi or rales      Musculoskeletal:  Reduce lower extremity strength bilaterally-4/5 left lower extremity decreased dorsiflexion.       Neurological:  Alert and oriented to person, place, and time.   Coordination normal.     Skin:   Skin is warm and dry.  No diaphoresis.   No rash noted.     Psychiatric: Normal mood and affect. Behavior is normal.  Judgment and thought content normal.     Complete Blood Count  Lab Results   Component Value Date    RBC 4.32 02/09/2023    HGB 13.3 02/09/2023    HCT 41.3 02/09/2023    MCV 96  "02/09/2023    MCH 30.8 02/09/2023    MCHC 32.2 02/09/2023    RDW 12.2 02/09/2023     02/09/2023    MPV 10.0 02/09/2023    GRAN 4.0 02/09/2023    GRAN 46.9 02/09/2023    LYMPH 3.6 02/09/2023    LYMPH 42.4 02/09/2023    MONO 0.5 02/09/2023    MONO 6.1 02/09/2023    EOS 0.3 02/09/2023    BASO 0.05 02/09/2023    EOSINOPHIL 3.8 02/09/2023    BASOPHIL 0.6 02/09/2023    DIFFMETHOD Automated 02/09/2023       Comprehensive Metabolic Panel  No results found for: "GLU", "BUN", "CREATININE", "NA", "K", "CL", "PROT", "ALBUMIN", "BILITOT", "AST", "ALKPHOS", "CO2", "ALT", "ANIONGAP", "EGFRNONAA", "ESTGFRAFRICA"    TSH  No results found for: "TSH"    Assessment / Plan:      ICD-10-CM ICD-9-CM   1. Dizziness and giddiness  R42 780.4   2. Chronic obstructive pulmonary disease, unspecified COPD type  J44.9 496   3. Obesity hypoventilation syndrome  E66.2 278.03   4. Sacroiliitis, not elsewhere classified  M46.1 720.2   5. Unspecified mood (affective) disorder  F39 296.90   6. Tortuous aorta  I77.1 447.1     Dizziness and giddiness  -     MRI Brain Without Contrast; Future; Expected date: 09/01/2023    Chronic obstructive pulmonary disease, unspecified COPD type    Obesity hypoventilation syndrome    Sacroiliitis, not elsewhere classified    Unspecified mood (affective) disorder    Tortuous aorta    Other orders  -     gabapentin (NEURONTIN) 300 MG capsule; Take 1 capsule (300 mg total) by mouth 3 (three) times daily.  Dispense: 270 capsule; Refill: 3    Multiple medical problems including history of encephalopathy prediabetes obstructive sleep apnea overall debility chronic kidney disease.  Multiple medical problems may play a role in this however believe MRI brain is reasonable given current circumstances and more acute onset of symptoms gait disturbance.  "

## 2023-09-06 ENCOUNTER — TELEPHONE (OUTPATIENT)
Dept: FAMILY MEDICINE | Facility: CLINIC | Age: 73
End: 2023-09-06
Payer: MEDICARE

## 2023-09-18 ENCOUNTER — HOSPITAL ENCOUNTER (OUTPATIENT)
Dept: RADIOLOGY | Facility: HOSPITAL | Age: 73
Discharge: HOME | End: 2023-09-18
Attending: FAMILY MEDICINE
Payer: MEDICARE

## 2023-09-18 DIAGNOSIS — R42 DIZZINESS AND GIDDINESS: ICD-10-CM

## 2023-09-19 ENCOUNTER — TELEPHONE (OUTPATIENT)
Dept: FAMILY MEDICINE | Facility: CLINIC | Age: 73
End: 2023-09-19
Payer: MEDICARE

## 2023-10-13 DIAGNOSIS — R06.2 WHEEZING: ICD-10-CM

## 2023-10-13 DIAGNOSIS — R05.9 COUGH: ICD-10-CM

## 2023-10-13 DIAGNOSIS — J40 BRONCHITIS: ICD-10-CM

## 2023-10-13 NOTE — TELEPHONE ENCOUNTER
No care due was identified.  [facility] Embedded Care Due Messages. Reference number: [id number].   10/13/2023 5:28:15 PM CDT

## 2023-10-15 RX ORDER — ALBUTEROL SULFATE 90 UG/1
2 AEROSOL, METERED RESPIRATORY (INHALATION) EVERY 6 HOURS PRN
Qty: 18 G | Refills: 0 | OUTPATIENT
Start: 2023-10-15

## 2023-10-15 NOTE — TELEPHONE ENCOUNTER
Refill Decision Note   Hannah Norman  is requesting a refill authorization.  Brief Assessment and Rationale for Refill:  Quick Discontinue     Medication Therapy Plan:  The original prescription was discontinued on 3/3/2023 by Monica Vaughn MA for the following reason: Patient no longer taking.      Comments:     Note composed:2:58 AM 10/15/2023

## 2023-10-19 ENCOUNTER — OFFICE VISIT (OUTPATIENT)
Dept: FAMILY MEDICINE | Facility: CLINIC | Age: 73
End: 2023-10-19
Payer: MEDICARE

## 2023-10-19 ENCOUNTER — LAB VISIT (OUTPATIENT)
Dept: LAB | Facility: HOSPITAL | Age: 73
End: 2023-10-19
Attending: FAMILY MEDICINE
Payer: MEDICARE

## 2023-10-19 VITALS
DIASTOLIC BLOOD PRESSURE: 86 MMHG | BODY MASS INDEX: 48.17 KG/M2 | SYSTOLIC BLOOD PRESSURE: 124 MMHG | HEIGHT: 60 IN | OXYGEN SATURATION: 96 % | WEIGHT: 245.38 LBS | TEMPERATURE: 98 F | HEART RATE: 88 BPM

## 2023-10-19 DIAGNOSIS — I77.1 TORTUOUS AORTA: ICD-10-CM

## 2023-10-19 DIAGNOSIS — G47.33 OSA (OBSTRUCTIVE SLEEP APNEA): ICD-10-CM

## 2023-10-19 DIAGNOSIS — E78.2 MIXED HYPERLIPIDEMIA: ICD-10-CM

## 2023-10-19 DIAGNOSIS — N18.30 STAGE 3 CHRONIC KIDNEY DISEASE, UNSPECIFIED WHETHER STAGE 3A OR 3B CKD: ICD-10-CM

## 2023-10-19 DIAGNOSIS — I10 ESSENTIAL HYPERTENSION: ICD-10-CM

## 2023-10-19 DIAGNOSIS — R73.9 HYPERGLYCEMIA: ICD-10-CM

## 2023-10-19 DIAGNOSIS — E66.2 OBESITY HYPOVENTILATION SYNDROME: ICD-10-CM

## 2023-10-19 DIAGNOSIS — J44.9 CHRONIC OBSTRUCTIVE PULMONARY DISEASE, UNSPECIFIED COPD TYPE: Primary | ICD-10-CM

## 2023-10-19 DIAGNOSIS — J44.9 CHRONIC OBSTRUCTIVE PULMONARY DISEASE, UNSPECIFIED COPD TYPE: ICD-10-CM

## 2023-10-19 LAB
ALBUMIN SERPL BCP-MCNC: 4.6 G/DL (ref 3.5–5.2)
ALP SERPL-CCNC: 94 U/L (ref 38–126)
ALT SERPL W/O P-5'-P-CCNC: 22 U/L (ref 10–44)
ANION GAP SERPL CALC-SCNC: 16 MMOL/L (ref 8–16)
AST SERPL-CCNC: 26 U/L (ref 15–46)
BASOPHILS # BLD AUTO: 0.04 K/UL (ref 0–0.2)
BASOPHILS NFR BLD: 0.5 % (ref 0–1.9)
BILIRUB SERPL-MCNC: 0.4 MG/DL (ref 0.1–1)
CALCIUM SERPL-MCNC: 9.6 MG/DL (ref 8.7–10.5)
CHLORIDE SERPL-SCNC: 99 MMOL/L (ref 95–110)
CHOLEST SERPL-MCNC: 168 MG/DL (ref 120–199)
CHOLEST/HDLC SERPL: 3.1 {RATIO} (ref 2–5)
CO2 SERPL-SCNC: 28 MMOL/L (ref 23–29)
CREAT SERPL-MCNC: 1 MG/DL (ref 0.5–1.4)
DIFFERENTIAL METHOD: ABNORMAL
EOSINOPHIL # BLD AUTO: 0.2 K/UL (ref 0–0.5)
EOSINOPHIL NFR BLD: 2.8 % (ref 0–8)
ERYTHROCYTE [DISTWIDTH] IN BLOOD BY AUTOMATED COUNT: 12.8 % (ref 11.5–14.5)
EST. GFR  (NO RACE VARIABLE): 59.1 ML/MIN/1.73 M^2
ESTIMATED AVG GLUCOSE: 128 MG/DL (ref 68–131)
GLUCOSE SERPL-MCNC: 130 MG/DL (ref 70–110)
HBA1C MFR BLD: 6.1 % (ref 4–5.6)
HCT VFR BLD AUTO: 43.1 % (ref 37–48.5)
HDLC SERPL-MCNC: 55 MG/DL (ref 40–75)
HDLC SERPL: 32.7 % (ref 20–50)
HGB BLD-MCNC: 13.5 G/DL (ref 12–16)
IMM GRANULOCYTES # BLD AUTO: 0.03 K/UL (ref 0–0.04)
IMM GRANULOCYTES NFR BLD AUTO: 0.4 % (ref 0–0.5)
LDLC SERPL CALC-MCNC: 85 MG/DL (ref 63–159)
LYMPHOCYTES # BLD AUTO: 2.5 K/UL (ref 1–4.8)
LYMPHOCYTES NFR BLD: 33.3 % (ref 18–48)
MCH RBC QN AUTO: 30.8 PG (ref 27–31)
MCHC RBC AUTO-ENTMCNC: 31.3 G/DL (ref 32–36)
MCV RBC AUTO: 98 FL (ref 82–98)
MONOCYTES # BLD AUTO: 0.6 K/UL (ref 0.3–1)
MONOCYTES NFR BLD: 8.1 % (ref 4–15)
NEUTROPHILS # BLD AUTO: 4.1 K/UL (ref 1.8–7.7)
NEUTROPHILS NFR BLD: 54.9 % (ref 38–73)
NONHDLC SERPL-MCNC: 113 MG/DL
NRBC BLD-RTO: 0 /100 WBC
PLATELET # BLD AUTO: 345 K/UL (ref 150–450)
PMV BLD AUTO: 10 FL (ref 9.2–12.9)
POTASSIUM SERPL-SCNC: 3.9 MMOL/L (ref 3.5–5.1)
PROT SERPL-MCNC: 7.7 G/DL (ref 6–8.4)
RBC # BLD AUTO: 4.38 M/UL (ref 4–5.4)
SODIUM SERPL-SCNC: 143 MMOL/L (ref 136–145)
TRIGL SERPL-MCNC: 140 MG/DL (ref 30–150)
TSH SERPL DL<=0.005 MIU/L-ACNC: 1.39 UIU/ML (ref 0.4–4)
UUN UR-MCNC: 21 MG/DL (ref 7–17)
WBC # BLD AUTO: 7.39 K/UL (ref 3.9–12.7)

## 2023-10-19 PROCEDURE — 4010F PR ACE/ARB THEARPY RXD/TAKEN: ICD-10-PCS | Mod: CPTII,S$GLB,, | Performed by: FAMILY MEDICINE

## 2023-10-19 PROCEDURE — 1159F MED LIST DOCD IN RCRD: CPT | Mod: CPTII,S$GLB,, | Performed by: FAMILY MEDICINE

## 2023-10-19 PROCEDURE — 1160F RVW MEDS BY RX/DR IN RCRD: CPT | Mod: CPTII,S$GLB,, | Performed by: FAMILY MEDICINE

## 2023-10-19 PROCEDURE — 1125F AMNT PAIN NOTED PAIN PRSNT: CPT | Mod: CPTII,S$GLB,, | Performed by: FAMILY MEDICINE

## 2023-10-19 PROCEDURE — 1101F PR PT FALLS ASSESS DOC 0-1 FALLS W/OUT INJ PAST YR: ICD-10-PCS | Mod: CPTII,S$GLB,, | Performed by: FAMILY MEDICINE

## 2023-10-19 PROCEDURE — 4010F ACE/ARB THERAPY RXD/TAKEN: CPT | Mod: CPTII,S$GLB,, | Performed by: FAMILY MEDICINE

## 2023-10-19 PROCEDURE — 3074F PR MOST RECENT SYSTOLIC BLOOD PRESSURE < 130 MM HG: ICD-10-PCS | Mod: CPTII,S$GLB,, | Performed by: FAMILY MEDICINE

## 2023-10-19 PROCEDURE — 80061 LIPID PANEL: CPT | Performed by: FAMILY MEDICINE

## 2023-10-19 PROCEDURE — 1159F PR MEDICATION LIST DOCUMENTED IN MEDICAL RECORD: ICD-10-PCS | Mod: CPTII,S$GLB,, | Performed by: FAMILY MEDICINE

## 2023-10-19 PROCEDURE — 84443 ASSAY THYROID STIM HORMONE: CPT | Mod: PN | Performed by: FAMILY MEDICINE

## 2023-10-19 PROCEDURE — 1125F PR PAIN SEVERITY QUANTIFIED, PAIN PRESENT: ICD-10-PCS | Mod: CPTII,S$GLB,, | Performed by: FAMILY MEDICINE

## 2023-10-19 PROCEDURE — 99214 PR OFFICE/OUTPT VISIT, EST, LEVL IV, 30-39 MIN: ICD-10-PCS | Mod: S$GLB,,, | Performed by: FAMILY MEDICINE

## 2023-10-19 PROCEDURE — 85025 COMPLETE CBC W/AUTO DIFF WBC: CPT | Mod: PN | Performed by: FAMILY MEDICINE

## 2023-10-19 PROCEDURE — 3044F PR MOST RECENT HEMOGLOBIN A1C LEVEL <7.0%: ICD-10-PCS | Mod: CPTII,S$GLB,, | Performed by: FAMILY MEDICINE

## 2023-10-19 PROCEDURE — 83036 HEMOGLOBIN GLYCOSYLATED A1C: CPT | Performed by: FAMILY MEDICINE

## 2023-10-19 PROCEDURE — 1160F PR REVIEW ALL MEDS BY PRESCRIBER/CLIN PHARMACIST DOCUMENTED: ICD-10-PCS | Mod: CPTII,S$GLB,, | Performed by: FAMILY MEDICINE

## 2023-10-19 PROCEDURE — 99214 OFFICE O/P EST MOD 30 MIN: CPT | Mod: S$GLB,,, | Performed by: FAMILY MEDICINE

## 2023-10-19 PROCEDURE — 3044F HG A1C LEVEL LT 7.0%: CPT | Mod: CPTII,S$GLB,, | Performed by: FAMILY MEDICINE

## 2023-10-19 PROCEDURE — 3079F DIAST BP 80-89 MM HG: CPT | Mod: CPTII,S$GLB,, | Performed by: FAMILY MEDICINE

## 2023-10-19 PROCEDURE — 36415 COLL VENOUS BLD VENIPUNCTURE: CPT | Mod: PN | Performed by: FAMILY MEDICINE

## 2023-10-19 PROCEDURE — 3079F PR MOST RECENT DIASTOLIC BLOOD PRESSURE 80-89 MM HG: ICD-10-PCS | Mod: CPTII,S$GLB,, | Performed by: FAMILY MEDICINE

## 2023-10-19 PROCEDURE — 3008F PR BODY MASS INDEX (BMI) DOCUMENTED: ICD-10-PCS | Mod: CPTII,S$GLB,, | Performed by: FAMILY MEDICINE

## 2023-10-19 PROCEDURE — 80053 COMPREHEN METABOLIC PANEL: CPT | Mod: PN | Performed by: FAMILY MEDICINE

## 2023-10-19 PROCEDURE — 3288F PR FALLS RISK ASSESSMENT DOCUMENTED: ICD-10-PCS | Mod: CPTII,S$GLB,, | Performed by: FAMILY MEDICINE

## 2023-10-19 PROCEDURE — 3074F SYST BP LT 130 MM HG: CPT | Mod: CPTII,S$GLB,, | Performed by: FAMILY MEDICINE

## 2023-10-19 PROCEDURE — 1101F PT FALLS ASSESS-DOCD LE1/YR: CPT | Mod: CPTII,S$GLB,, | Performed by: FAMILY MEDICINE

## 2023-10-19 PROCEDURE — 3288F FALL RISK ASSESSMENT DOCD: CPT | Mod: CPTII,S$GLB,, | Performed by: FAMILY MEDICINE

## 2023-10-19 PROCEDURE — 3008F BODY MASS INDEX DOCD: CPT | Mod: CPTII,S$GLB,, | Performed by: FAMILY MEDICINE

## 2023-10-19 RX ORDER — LOSARTAN POTASSIUM 50 MG/1
50 TABLET ORAL DAILY
Qty: 90 TABLET | Refills: 3 | Status: SHIPPED | OUTPATIENT
Start: 2023-10-19 | End: 2024-10-18

## 2023-10-19 RX ORDER — MONTELUKAST SODIUM 10 MG/1
10 TABLET ORAL NIGHTLY
Qty: 90 TABLET | Refills: 0 | Status: SHIPPED | OUTPATIENT
Start: 2023-10-19 | End: 2024-01-16

## 2023-10-19 NOTE — PROGRESS NOTES
Patient ID: Hannah Norman is a 73 y.o. female.    Chief Complaint: Follow-up    HPI      Hannah Norman is a 73 y.o. female patient complains continued shortness of breath.  We talked about doing a walking program last time and did not perform such.  Family also says that she is losing memory.  Patient feels somewhat forgetful  Mini-mental status done 28/30.  Sleep apnea-uses CPAP on a nightly basis feels it works.    Allergies-fluctuate.  Hyperglycemia-no problems with hypo or hyperglycemia recently.  Hypertension-well controlled with blood pressure 124/86.  Stage 3 kidney disease-we will continue to follow with blood work.        Vitals:    10/19/23 1123   BP: 124/86   BP Location: Left arm   Patient Position: Sitting   Pulse: 88   Temp: 98.2 °F (36.8 °C)   TempSrc: Oral   SpO2: 96%   Weight: 111.3 kg (245 lb 6 oz)   Height: 5' (1.524 m)            Review of Symptoms      Physical Exam    Constitutional:  Oriented to person, place, and time.appears well-developed and well-nourished.  No distress.      HENT  Head: Normocephalic and atraumatic  Right Ear: External ear normal.   Left Ear: External ear normal.   Nose: External nose normal.   Mouth:  Moist mucus membranes.    Eyes:  Conjunctivae are normal. Right eye exhibits no discharge.  Left eye exhibits no discharge. No scleral icterus.  No periorbital edema    Cardiovascular:  Regular rate and rhythm with normal S1 and S2     Pulmonary/Chest:   Clear to auscultation bilaterally without wheezes, rhonchi or rales      Musculoskeletal:  No edema. No obvious deformity No wasting       Neurological:  Alert and oriented to person, place, and time.   Coordination normal.     Skin:   Skin is warm and dry.  No diaphoresis.   No rash noted.     Psychiatric: Normal mood and affect. Behavior is normal.  Judgment and thought content normal.     Complete Blood Count  Lab Results   Component Value Date    RBC 4.38 10/19/2023    HGB 13.5 10/19/2023    HCT 43.1 10/19/2023    MCV 98  10/19/2023    MCH 30.8 10/19/2023    MCHC 31.3 (L) 10/19/2023    RDW 12.8 10/19/2023     10/19/2023    MPV 10.0 10/19/2023    GRAN 4.1 10/19/2023    GRAN 54.9 10/19/2023    LYMPH 2.5 10/19/2023    LYMPH 33.3 10/19/2023    MONO 0.6 10/19/2023    MONO 8.1 10/19/2023    EOS 0.2 10/19/2023    BASO 0.04 10/19/2023    EOSINOPHIL 2.8 10/19/2023    BASOPHIL 0.5 10/19/2023    DIFFMETHOD Automated 10/19/2023       Comprehensive Metabolic Panel  Lab Results   Component Value Date     (H) 10/19/2023    BUN 21 (H) 10/19/2023    CREATININE 1.00 10/19/2023     10/19/2023    K 3.9 10/19/2023    CL 99 10/19/2023    PROT 7.7 10/19/2023    ALBUMIN 4.6 10/19/2023    BILITOT 0.4 10/19/2023    AST 26 10/19/2023    ALKPHOS 94 10/19/2023    CO2 28 10/19/2023    ALT 22 10/19/2023    ANIONGAP 16 10/19/2023       TSH  Lab Results   Component Value Date    TSH 1.390 10/19/2023       Assessment / Plan:      ICD-10-CM ICD-9-CM   1. Chronic obstructive pulmonary disease, unspecified COPD type  J44.9 496   2. Essential hypertension  I10 401.9   3. Stage 3 chronic kidney disease, unspecified whether stage 3a or 3b CKD  N18.30 585.3   4. Obesity hypoventilation syndrome  E66.2 278.03   5. MARY (obstructive sleep apnea)  G47.33 327.23   6. Mixed hyperlipidemia  E78.2 272.2   7. Hyperglycemia  R73.9 790.29   8. Tortuous aorta  I77.1 447.1     Chronic obstructive pulmonary disease, unspecified COPD type  -     Comprehensive Metabolic Panel; Future  -     CBC Auto Differential; Future  -     Lipid Panel; Future  -     TSH; Future  -     Hemoglobin A1C; Future    Essential hypertension  -     Comprehensive Metabolic Panel; Future  -     CBC Auto Differential; Future  -     Lipid Panel; Future  -     TSH; Future  -     Hemoglobin A1C; Future    Stage 3 chronic kidney disease, unspecified whether stage 3a or 3b CKD  -     Comprehensive Metabolic Panel; Future  -     CBC Auto Differential; Future  -     Lipid Panel; Future  -     TSH;  Future  -     Hemoglobin A1C; Future    Obesity hypoventilation syndrome  -     Comprehensive Metabolic Panel; Future  -     CBC Auto Differential; Future  -     Lipid Panel; Future  -     TSH; Future  -     Hemoglobin A1C; Future    MARY (obstructive sleep apnea)  -     Comprehensive Metabolic Panel; Future  -     CBC Auto Differential; Future  -     Lipid Panel; Future  -     TSH; Future  -     Hemoglobin A1C; Future    Mixed hyperlipidemia  -     Comprehensive Metabolic Panel; Future  -     CBC Auto Differential; Future  -     Lipid Panel; Future  -     TSH; Future  -     Hemoglobin A1C; Future    Hyperglycemia  -     Hemoglobin A1C; Future    Tortuous aorta    Other orders  -     losartan (COZAAR) 50 MG tablet; Take 1 tablet (50 mg total) by mouth once daily.  Dispense: 90 tablet; Refill: 3  -     montelukast (SINGULAIR) 10 mg tablet; Take 1 tablet (10 mg total) by mouth every evening. For sinus congestion prevention  Dispense: 90 tablet; Refill: 0    Discussed walking program with she and son-start off slowly and walk may few small labs within her and then can progress.  Went into details about progressing about 10% every two weeks or so.

## 2023-10-20 ENCOUNTER — HOSPITAL ENCOUNTER (OUTPATIENT)
Dept: RADIOLOGY | Facility: HOSPITAL | Age: 73
Discharge: HOME | End: 2023-10-20
Attending: FAMILY MEDICINE
Payer: MEDICARE

## 2023-10-20 DIAGNOSIS — Z12.31 ENCOUNTER FOR SCREENING MAMMOGRAM FOR BREAST CANCER: ICD-10-CM

## 2023-10-20 PROCEDURE — 77063 BREAST TOMOSYNTHESIS BI: CPT | Mod: 26,,, | Performed by: RADIOLOGY

## 2023-10-20 PROCEDURE — 77067 SCR MAMMO BI INCL CAD: CPT | Mod: 26,,, | Performed by: RADIOLOGY

## 2023-10-20 PROCEDURE — 77067 MAMMO DIGITAL SCREENING BILAT WITH TOMO: ICD-10-PCS | Mod: 26,,, | Performed by: RADIOLOGY

## 2023-10-20 PROCEDURE — 77063 MAMMO DIGITAL SCREENING BILAT WITH TOMO: ICD-10-PCS | Mod: 26,,, | Performed by: RADIOLOGY

## 2023-10-20 PROCEDURE — 77067 SCR MAMMO BI INCL CAD: CPT | Mod: TC,PN

## 2023-12-11 ENCOUNTER — TELEPHONE (OUTPATIENT)
Dept: FAMILY MEDICINE | Facility: CLINIC | Age: 73
End: 2023-12-11
Payer: MEDICARE

## 2023-12-11 NOTE — TELEPHONE ENCOUNTER
----- Message from Winifred Murillo sent at 12/11/2023  8:55 AM CST -----  Needs advice from nurse:      Who Called:pt  Regarding:needs to discuss anxiety and medication/only wants to see Dr Garcia  Would the patient rather a call back or [facility]?  Best Call Back number: [phone]  Additional Info:

## 2023-12-12 ENCOUNTER — OFFICE VISIT (OUTPATIENT)
Dept: FAMILY MEDICINE | Facility: CLINIC | Age: 73
End: 2023-12-12
Payer: MEDICARE

## 2023-12-12 VITALS
HEART RATE: 68 BPM | BODY MASS INDEX: 47.46 KG/M2 | SYSTOLIC BLOOD PRESSURE: 138 MMHG | OXYGEN SATURATION: 90 % | TEMPERATURE: 98 F | DIASTOLIC BLOOD PRESSURE: 74 MMHG | WEIGHT: 241.75 LBS | HEIGHT: 60 IN

## 2023-12-12 DIAGNOSIS — M54.50 CHRONIC BILATERAL LOW BACK PAIN WITHOUT SCIATICA: ICD-10-CM

## 2023-12-12 DIAGNOSIS — M46.1 SACROILIITIS, NOT ELSEWHERE CLASSIFIED: ICD-10-CM

## 2023-12-12 DIAGNOSIS — Z12.11 ENCOUNTER FOR COLORECTAL CANCER SCREENING: ICD-10-CM

## 2023-12-12 DIAGNOSIS — Z23 NEED FOR INFLUENZA VACCINATION: ICD-10-CM

## 2023-12-12 DIAGNOSIS — J44.9 CHRONIC OBSTRUCTIVE PULMONARY DISEASE, UNSPECIFIED COPD TYPE: Primary | ICD-10-CM

## 2023-12-12 DIAGNOSIS — G89.29 CHRONIC BILATERAL LOW BACK PAIN WITHOUT SCIATICA: ICD-10-CM

## 2023-12-12 DIAGNOSIS — Z12.12 ENCOUNTER FOR COLORECTAL CANCER SCREENING: ICD-10-CM

## 2023-12-12 PROCEDURE — 3288F FALL RISK ASSESSMENT DOCD: CPT | Mod: CPTII,S$GLB,, | Performed by: FAMILY MEDICINE

## 2023-12-12 PROCEDURE — 1101F PT FALLS ASSESS-DOCD LE1/YR: CPT | Mod: CPTII,S$GLB,, | Performed by: FAMILY MEDICINE

## 2023-12-12 PROCEDURE — 3044F PR MOST RECENT HEMOGLOBIN A1C LEVEL <7.0%: ICD-10-PCS | Mod: CPTII,S$GLB,, | Performed by: FAMILY MEDICINE

## 2023-12-12 PROCEDURE — G0008 ADMIN INFLUENZA VIRUS VAC: HCPCS | Mod: S$GLB,,, | Performed by: FAMILY MEDICINE

## 2023-12-12 PROCEDURE — 3288F PR FALLS RISK ASSESSMENT DOCUMENTED: ICD-10-PCS | Mod: CPTII,S$GLB,, | Performed by: FAMILY MEDICINE

## 2023-12-12 PROCEDURE — 1159F MED LIST DOCD IN RCRD: CPT | Mod: CPTII,S$GLB,, | Performed by: FAMILY MEDICINE

## 2023-12-12 PROCEDURE — 90694 VACC AIIV4 NO PRSRV 0.5ML IM: CPT | Mod: S$GLB,,, | Performed by: FAMILY MEDICINE

## 2023-12-12 PROCEDURE — G0008 FLU VACCINE - QUADRIVALENT - ADJUVANTED: ICD-10-PCS | Mod: S$GLB,,, | Performed by: FAMILY MEDICINE

## 2023-12-12 PROCEDURE — 1125F AMNT PAIN NOTED PAIN PRSNT: CPT | Mod: CPTII,S$GLB,, | Performed by: FAMILY MEDICINE

## 2023-12-12 PROCEDURE — 1101F PR PT FALLS ASSESS DOC 0-1 FALLS W/OUT INJ PAST YR: ICD-10-PCS | Mod: CPTII,S$GLB,, | Performed by: FAMILY MEDICINE

## 2023-12-12 PROCEDURE — 3008F BODY MASS INDEX DOCD: CPT | Mod: CPTII,S$GLB,, | Performed by: FAMILY MEDICINE

## 2023-12-12 PROCEDURE — 99214 PR OFFICE/OUTPT VISIT, EST, LEVL IV, 30-39 MIN: ICD-10-PCS | Mod: S$GLB,,, | Performed by: FAMILY MEDICINE

## 2023-12-12 PROCEDURE — 4010F PR ACE/ARB THEARPY RXD/TAKEN: ICD-10-PCS | Mod: CPTII,S$GLB,, | Performed by: FAMILY MEDICINE

## 2023-12-12 PROCEDURE — 4010F ACE/ARB THERAPY RXD/TAKEN: CPT | Mod: CPTII,S$GLB,, | Performed by: FAMILY MEDICINE

## 2023-12-12 PROCEDURE — 3078F PR MOST RECENT DIASTOLIC BLOOD PRESSURE < 80 MM HG: ICD-10-PCS | Mod: CPTII,S$GLB,, | Performed by: FAMILY MEDICINE

## 2023-12-12 PROCEDURE — 3044F HG A1C LEVEL LT 7.0%: CPT | Mod: CPTII,S$GLB,, | Performed by: FAMILY MEDICINE

## 2023-12-12 PROCEDURE — 3078F DIAST BP <80 MM HG: CPT | Mod: CPTII,S$GLB,, | Performed by: FAMILY MEDICINE

## 2023-12-12 PROCEDURE — 1125F PR PAIN SEVERITY QUANTIFIED, PAIN PRESENT: ICD-10-PCS | Mod: CPTII,S$GLB,, | Performed by: FAMILY MEDICINE

## 2023-12-12 PROCEDURE — 3075F SYST BP GE 130 - 139MM HG: CPT | Mod: CPTII,S$GLB,, | Performed by: FAMILY MEDICINE

## 2023-12-12 PROCEDURE — 99214 OFFICE O/P EST MOD 30 MIN: CPT | Mod: S$GLB,,, | Performed by: FAMILY MEDICINE

## 2023-12-12 PROCEDURE — 90694 FLU VACCINE - QUADRIVALENT - ADJUVANTED: ICD-10-PCS | Mod: S$GLB,,, | Performed by: FAMILY MEDICINE

## 2023-12-12 PROCEDURE — 1159F PR MEDICATION LIST DOCUMENTED IN MEDICAL RECORD: ICD-10-PCS | Mod: CPTII,S$GLB,, | Performed by: FAMILY MEDICINE

## 2023-12-12 PROCEDURE — 3008F PR BODY MASS INDEX (BMI) DOCUMENTED: ICD-10-PCS | Mod: CPTII,S$GLB,, | Performed by: FAMILY MEDICINE

## 2023-12-12 PROCEDURE — 3075F PR MOST RECENT SYSTOLIC BLOOD PRESS GE 130-139MM HG: ICD-10-PCS | Mod: CPTII,S$GLB,, | Performed by: FAMILY MEDICINE

## 2023-12-12 RX ORDER — HYDROCODONE BITARTRATE AND ACETAMINOPHEN 10; 325 MG/1; MG/1
1 TABLET ORAL EVERY 6 HOURS PRN
Qty: 60 TABLET | Refills: 0 | Status: SHIPPED | OUTPATIENT
Start: 2023-12-12

## 2023-12-12 NOTE — PATIENT INSTRUCTIONS
For ha  warm compress on the back or front of head  Ok to use generic Excedrin for ha    Walk in am -  NO soap opera if NO walking

## 2023-12-14 ENCOUNTER — TELEPHONE (OUTPATIENT)
Dept: GASTROENTEROLOGY | Facility: CLINIC | Age: 73
End: 2023-12-14
Payer: MEDICARE

## 2023-12-14 NOTE — LETTER
December 14, 2023    Hannah Norman  [address] [facility] [address]             [facility] Gastroenter[facility]  [address]  [facility] [address]  Phone: [phone]  Fax: [phone] Dear Ms. Norman:    GOLYTELY/ COLYTE/ NULYTELY Instructions    You are scheduled for a colonoscopy with Dr. Tran on 1/12/2024 at [facility]. Enter through the [facility] Entrance and check in at [facility] Day [facility].  To ensure that your test is accurate and complete, you MUST follow these instructions listed below.  If you have any questions, please call our office at [phone].  Plan on being at the hospital for your procedure for 3-4 hours.    IF YOU HAVE ANY QUESTIONS ABOUT YOUR ARRIVAL TIME YOU CAN CALL [phone].    1.  Follow a CLEAR LIQUID DIET for the entire day before your scheduled colonoscopy.  This means no solid food the entire day starting when you wake.  You may have as much of the clear liquids as you want throughout the day.   CLEAR LIQUID DIET:   NO DAIRY   - You can have:  Coffee with sugar (no creamer), tea, water, soda, apple or white grape juice, chicken or beef broth/bouillon (no meat, noodles, or veggies), popsicles,  Jell-O, lemonade.    2.  MIX GOLYTELY/COLYTE/NULYTELY (all names for same product) WITH ONE (1) GALLON OF WATER.  YOU MAY ADD A FLAVOR PACKET OR YELLOW/GREEN POWDER DRINK MIX TO THIS.  PUT IN REFRIGERATOR.  This is easier to drink if this solution is cold, so you can mix the solution one day ahead of time and place in the refrigerator prior to drinking.  You have to drink the solution within 24 hours of mixing it.  Do NOT put this solution over ice.  It IS ok to drink with a straw.    3. AT 5 PM THE DAY BEFORE YOUR COLONOSCOPY, DRINK ONE (1) 8 OUNCE GLASS OF MIXTURE EVERY 10 MINUTES UNTIL HALF OF THE GALLON IS CONSUMED.  Keep this mixture cold and in refrigerator as much as you can while drinking it.  Place the remaining half of mixture in the refrigerator  when you finish the first half.    4.  The endoscopy department will call you 1 day before your colonoscopy to tell you the exact time to arrive, AND to tell you the exact time to drink the 2nd portion of your prep (which will be FIVE HOURS BEFORE YOUR ARRIVAL TIME).  At this time given to you, DRINK ONE (1) 8 OUNCE GLASS OF MIXTURE EVERY 10 MINUTES UNTIL THE OTHER HALF IS CONSUMED. Keep the mixture cold while you are drinking it. Once this is complete, you may not have ANYTHING else by mouth!      5.  It is ok to take MOST of your REGULAR MEDICATIONS  in the morning of your test with a SIP of water.  THE ONLY MEDS YOU NEED TO HOLD ARE YOUR DIABETES MEDICATIONS,  SOME BLOOD PRESSURE MEDS, AND BLOOD THINNERS IF OK'D BY YOUR DOCTOR.  Do NOT have anything else to eat or drink the morning of your colonoscopy.  It is ok to brush your teeth.    6.  If you are on blood thinners THAT YOU HAVE BEEN INSTRUCTED TO HOLD BY YOUR DOCTOR FOR THIS PROCEDURE, then do NOT take this the morning of your colonoscopy.  Do NOT stop these medications on your own, they must be approved to be held by your doctor.  Your colonoscopy can NOT be done if you are on these medications.  Examples of blood thinners include: Coumadin, Aggrenox, Plavix, Pradaxa, Reapro, Pletal, Xarelto, Ticagrelor, Brilinta, Eliquis, and high dose aspirin (325 mg).  You do not have to stop baby aspirin 81 mg.    7.  IF YOU ARE DIABETIC:  NO INSULIN OR ORAL MEDICATIONS THE MORNING OF THE COLONOSCOPY.  TAKE ONLY HALF THE DOSE OF YOUR INSULIN THE DAY BEFORE THE COLONOSCOPY.  DO NOT TAKE ANY ORAL DIABETIC MEDICATIONS THE DAY BEFORE THE COLONOSCOPY.  IF YOU ARE AN INSULIN DEPENDENT DIABETIC WITH UNSTABLE BLOOD SUGARS, NOTIFY YOUR PRIMARY CARE PHYSICIAN FOR INSTRUCTIONS.    8.  Please DO use your inhalers the morning of your procedure.      If you have any questions or concerns, please don't hesitate to call.    Sincerely,        Kely Pollard MA

## 2023-12-14 NOTE — TELEPHONE ENCOUNTER
Referring Physician: Dr. Raffi Hauser                              Date: 12/14/2023    Reason for Referral: Screening colonoscopy       Family History of:   Colon polyp: no  Relationship/Age of Onset:       Colon cancer: no  Relationship/Age of Onset:       Patient with:   Hemoccults Done:       Iron deficient:   no      On Blood Thinner: no      Valvular heart disease/valve replacement: no      Anemia Present: no      On NSAID: no    On Adipex or phentermine: no     On Ozempic:no     Lung disease: no      Kidney disease:no     Hx of Crohn's or Ulcerative colitis:no     Hx of polyps: no      Hx of colon cancer: no      Previous colon evalations: yes  When: 11/10/2017  Where: [facility]  Pertinent symptoms:           Review of patient's allergies indicates: Hyoscyamine        Patient was scheduled for colonoscopy on  1/12/2024 with Dr. Tran at [facility].       instructions were reviewed with patient.         Prep sent to [facility] on [address]      GOLYTELY/ COLYTE/ NULYTELY Instructions    You are scheduled for a colonoscopy with Dr. Tran on 1/12/2024 at [facility]. Enter through the [facility] Entrance and check in at Same Day Surgery.  To ensure that your test is accurate and complete, you MUST follow these instructions listed below.  If you have any questions, please call our office at [phone].  Plan on being at the hospital for your procedure for 3-4 hours.    IF YOU HAVE ANY QUESTIONS ABOUT YOUR ARRIVAL TIME YOU CAN CALL [phone].    1.  Follow a CLEAR LIQUID DIET for the entire day before your scheduled colonoscopy.  This means no solid food the entire day starting when you wake.  You may have as much of the clear liquids as you want throughout the day.   CLEAR LIQUID DIET:   NO DAIRY   - You can have:  Coffee with sugar (no creamer), tea, water, soda, apple or white grape juice, chicken or beef broth/bouillon (no meat, noodles, or veggies), popsicles,  Jell-O, lemonade.    2.   MIX GOLYTELY/COLYTE/NULYTELY (all names for same product) WITH ONE (1) GALLON OF WATER.  YOU MAY ADD A FLAVOR PACKET OR YELLOW/GREEN POWDER DRINK MIX TO THIS.  PUT IN REFRIGERATOR.  This is easier to drink if this solution is cold, so you can mix the solution one day ahead of time and place in the refrigerator prior to drinking.  You have to drink the solution within 24 hours of mixing it.  Do NOT put this solution over ice.  It IS ok to drink with a straw.    3. AT 5 PM THE DAY BEFORE YOUR COLONOSCOPY, DRINK ONE (1) 8 OUNCE GLASS OF MIXTURE EVERY 10 MINUTES UNTIL HALF OF THE GALLON IS CONSUMED.  Keep this mixture cold and in refrigerator as much as you can while drinking it.  Place the remaining half of mixture in the refrigerator when you finish the first half.    4.  The endoscopy department will call you 1 day before your colonoscopy to tell you the exact time to arrive, AND to tell you the exact time to drink the 2nd portion of your prep (which will be FIVE HOURS BEFORE YOUR ARRIVAL TIME).  At this time given to you, DRINK ONE (1) 8 OUNCE GLASS OF MIXTURE EVERY 10 MINUTES UNTIL THE OTHER HALF IS CONSUMED. Keep the mixture cold while you are drinking it. Once this is complete, you may not have ANYTHING else by mouth!      5.  It is ok to take MOST of your REGULAR MEDICATIONS  in the morning of your test with a SIP of water.  THE ONLY MEDS YOU NEED TO HOLD ARE YOUR DIABETES MEDICATIONS,  SOME BLOOD PRESSURE MEDS, AND BLOOD THINNERS IF OK'D BY YOUR DOCTOR.  Do NOT have anything else to eat or drink the morning of your colonoscopy.  It is ok to brush your teeth.    6.  If you are on blood thinners THAT YOU HAVE BEEN INSTRUCTED TO HOLD BY YOUR DOCTOR FOR THIS PROCEDURE, then do NOT take this the morning of your colonoscopy.  Do NOT stop these medications on your own, they must be approved to be held by your doctor.  Your colonoscopy can NOT be done if you are on these medications.  Examples of blood thinners  include: Coumadin, Aggrenox, Plavix, Pradaxa, Reapro, Pletal, Xarelto, Ticagrelor, Brilinta, Eliquis, and high dose aspirin (325 mg).  You do not have to stop baby aspirin 81 mg.    7.  IF YOU ARE DIABETIC:  NO INSULIN OR ORAL MEDICATIONS THE MORNING OF THE COLONOSCOPY.  TAKE ONLY HALF THE DOSE OF YOUR INSULIN THE DAY BEFORE THE COLONOSCOPY.  DO NOT TAKE ANY ORAL DIABETIC MEDICATIONS THE DAY BEFORE THE COLONOSCOPY.  IF YOU ARE AN INSULIN DEPENDENT DIABETIC WITH UNSTABLE BLOOD SUGARS, NOTIFY YOUR PRIMARY CARE PHYSICIAN FOR INSTRUCTIONS.    8.  Please DO use your inhalers the morning of your procedure.

## 2023-12-15 RX ORDER — POLYETHYLENE GLYCOL 3350, SODIUM SULFATE ANHYDROUS, SODIUM BICARBONATE, SODIUM CHLORIDE, POTASSIUM CHLORIDE 236; 22.74; 6.74; 5.86; 2.97 G/4L; G/4L; G/4L; G/4L; G/4L
4 POWDER, FOR SOLUTION ORAL ONCE
Qty: 1 EACH | Refills: 0 | Status: SHIPPED | OUTPATIENT
Start: 2023-12-15 | End: 2023-12-15

## 2023-12-16 NOTE — PROGRESS NOTES
Patient ID: Hannah Norman is a 73 y.o. female.    Chief Complaint: Anxiety, Back Pain, Headache, Shortness of Breath, and Panic Attack    HPI      Hannah Norman is a 73 y.o. female here following up on anxiety.  Continues take medication reason anxiety which seems to be working fairly well.  Complains of chronic lower back pain.  Sees pain management recently has been discharged from their clinic because of benzodiazepines in her urine.  Patient was given prescriptions for Klonopin on two occasions previously thought she would discuss this with her doctor.    Continues to have shortness of breath with walking.  No PND or orthopnea.  Did not undergo the exercise and attempt to increase her endurance as we discussed on previous visit.  No worsening of symptoms over last several months.    Vitals:    12/12/23 1154   BP: 138/74   BP Location: Left arm   Patient Position: Sitting   Pulse: 68   Temp: 98.2 °F (36.8 °C)   TempSrc: Oral   SpO2: (!) 90%   Weight: 109.6 kg (241 lb 11.8 oz)   Height: 5' (1.524 m)            Review of Symptoms      Physical Exam    Constitutional:  Oriented to person, place, and time.appears well-developed and well-nourished.  No distress.      HENT  Head: Normocephalic and atraumatic  Right Ear: External ear normal.   Left Ear: External ear normal.   Nose: External nose normal.   Mouth:  Moist mucus membranes.    Eyes:  Conjunctivae are normal. Right eye exhibits no discharge.  Left eye exhibits no discharge. No scleral icterus.  No periorbital edema    Cardiovascular:  Regular rate and rhythm with normal S1 and S2     Pulmonary/Chest:   Clear to auscultation bilaterally without wheezes, rhonchi or rales      Musculoskeletal:  No edema. No obvious deformity No wasting       Neurological:  Alert and oriented to person, place, and time.   Coordination normal.     Skin:   Skin is warm and dry.  No diaphoresis.   No rash noted.     Psychiatric: Normal mood and affect. Behavior is normal.  Judgment  and thought content normal.     Complete Blood Count  Lab Results   Component Value Date    RBC 4.38 10/19/2023    HGB 13.5 10/19/2023    HCT 43.1 10/19/2023    MCV 98 10/19/2023    MCH 30.8 10/19/2023    MCHC 31.3 (L) 10/19/2023    RDW 12.8 10/19/2023     10/19/2023    MPV 10.0 10/19/2023    GRAN 4.1 10/19/2023    GRAN 54.9 10/19/2023    LYMPH 2.5 10/19/2023    LYMPH 33.3 10/19/2023    MONO 0.6 10/19/2023    MONO 8.1 10/19/2023    EOS 0.2 10/19/2023    BASO 0.04 10/19/2023    EOSINOPHIL 2.8 10/19/2023    BASOPHIL 0.5 10/19/2023    DIFFMETHOD Automated 10/19/2023       Comprehensive Metabolic Panel  Lab Results   Component Value Date     (H) 10/19/2023    BUN 21 (H) 10/19/2023    CREATININE 1.00 10/19/2023     10/19/2023    K 3.9 10/19/2023    CL 99 10/19/2023    PROT 7.7 10/19/2023    ALBUMIN 4.6 10/19/2023    BILITOT 0.4 10/19/2023    AST 26 10/19/2023    ALKPHOS 94 10/19/2023    CO2 28 10/19/2023    ALT 22 10/19/2023    ANIONGAP 16 10/19/2023       TSH  Lab Results   Component Value Date    TSH 1.390 10/19/2023       Assessment / Plan:      ICD-10-CM ICD-9-CM   1. Chronic obstructive pulmonary disease, unspecified COPD type  J44.9 496   2. Need for influenza vaccination  Z23 V04.81   3. Sacroiliitis, not elsewhere classified  M46.1 720.2   4. Chronic bilateral low back pain without sciatica  M54.50 724.2    G89.29 338.29   5. Encounter for colorectal cancer screening  Z12.11 V76.51    Z12.12 V76.41     Chronic obstructive pulmonary disease, unspecified COPD type    Need for influenza vaccination  -     Influenza (FLUAD) - Quadrivalent (Adjuvanted) *Preferred* (65+) (PF)    Sacroiliitis, not elsewhere classified  -     Ambulatory referral/consult to Pain Clinic; Future; Expected date: 12/19/2023    Chronic bilateral low back pain without sciatica  -     Ambulatory referral/consult to Pain Clinic; Future; Expected date: 12/19/2023    Encounter for colorectal cancer screening  -     Case Request  Endoscopy: COLONOSCOPY    Other orders  -     HYDROcodone-acetaminophen (NORCO)  mg per tablet; Take 1 tablet by mouth every 6 (six) hours as needed for Pain.  Dispense: 60 tablet; Refill: 0    Believe shortness of breath is probably due to lack of endurance debility-suggested walking on a daily basis-continue current medications for high blood pressure    Discussed refilling pain medication for her-suggest new pain management physician.

## 2024-01-05 NOTE — TELEPHONE ENCOUNTER
----- Message from Dede Briggs sent at 1/5/2024  4:01 PM CST -----  Regarding: change dosage of rx  Contact: [phone]  Type:  RX Refill Request    Who Called:  pt   Refill or New Rx: NEW   RX Name and Strength: gabapentin (NEURONTIN) 300 MG capsule  How is the patient currently taking it? (ex. 1XDay): take 2 (600mg) capsules twice a day  Is this a 30 day or 90 day RX: 360 capsules   Preferred Pharmacy with phone number:  [id number] - [place], [place] - [address]  Local or Mail Order: local   Ordering Provider:   Would the patient rather a call back or a response via [facility]?  call  Best Call Back Number:   Additional Information:  [phone]

## 2024-01-06 RX ORDER — IMIPRAMINE HYDROCHLORIDE 25 MG/1
TABLET, FILM COATED ORAL
Qty: 90 TABLET | Refills: 3 | Status: SHIPPED | OUTPATIENT
Start: 2024-01-06 | End: 2024-01-07

## 2024-01-06 RX ORDER — GABAPENTIN 300 MG/1
300 CAPSULE ORAL 3 TIMES DAILY
Qty: 270 CAPSULE | Refills: 3 | Status: SHIPPED | OUTPATIENT
Start: 2024-01-06 | End: 2025-01-05

## 2024-01-07 RX ORDER — IMIPRAMINE HYDROCHLORIDE 25 MG/1
TABLET, FILM COATED ORAL
Qty: 90 TABLET | Refills: 3 | Status: SHIPPED | OUTPATIENT
Start: 2024-01-07

## 2024-01-16 RX ORDER — MONTELUKAST SODIUM 10 MG/1
TABLET ORAL
Qty: 90 TABLET | Refills: 3 | Status: SHIPPED | OUTPATIENT
Start: 2024-01-16

## 2024-01-16 NOTE — TELEPHONE ENCOUNTER
No care due was identified.  Health [facility] Embedded Care Due Messages. Reference number: [id number].   1/16/2024 3:24:31 PM CST

## 2024-01-16 NOTE — TELEPHONE ENCOUNTER
Refill Routing Note   Medication(s) are not appropriate for processing by [facility] for the following reason(s):        New or recently adjusted medication    [facility] action(s):  Defer               Appointments  past 12m or future 3m with PCP    Date Provider   Last Visit   12/12/2023 Raffi Garcia MD   Next Visit   4/22/2024 Raffi Garcia MD   ED visits in past 90 days: 0        Note composed:3:44 PM 01/16/2024

## 2024-01-18 ENCOUNTER — TELEPHONE (OUTPATIENT)
Dept: FAMILY MEDICINE | Facility: CLINIC | Age: 74
End: 2024-01-18
Payer: MEDICARE

## 2024-01-18 NOTE — TELEPHONE ENCOUNTER
Spoke with pt  and he states pt is not going to bathroom to urinate like normal.Spoke with pt and she states she feels fine right now no symptoms of UTI Bladder or Kidney infection no fluid build up so I told her if she does have any symptoms in the furture to please give us a call.Pt agrees and verbalize.AURELIAI.

## 2024-01-18 NOTE — TELEPHONE ENCOUNTER
----- Message from Matt Weeks sent at 1/18/2024  9:43 AM CST -----  Type:  Needs Medical Advice    Who Called: Pt Spouse Lonie  Symptoms (please be specific): Kidney function   How long has patient had these symptoms:  1 week   Pharmacy name and phone #:    Would the patient rather a call back or a response via [facility]? call  Best Call Back Number: [phone]  Additional Information: please call would like to have labs don will like to speak to a nurse regarding urination problem

## 2024-02-07 ENCOUNTER — TELEPHONE (OUTPATIENT)
Dept: FAMILY MEDICINE | Facility: CLINIC | Age: 74
End: 2024-02-07
Payer: MEDICARE

## 2024-02-07 NOTE — TELEPHONE ENCOUNTER
----- Message from Suzy Glasgow sent at 2/7/2024  2:53 PM CST -----  Type:  Sooner Apoointment Request    Caller is requesting a sooner appointment.  Caller declined first available appointment listed below.  Caller will not accept being placed on the waitlist and is requesting a message be sent to doctor.  Name of Caller:pt  When is the first available appointment?feb 21  Symptoms:infection/cold  Would the patient rather a call back or a response via [facility]? Call   Best Call Back Number:[phone]  Additional Information:

## 2024-02-12 NOTE — TELEPHONE ENCOUNTER
No care due was identified.  Health [facility] Embedded Care Due Messages. Reference number: [id number].   2/12/2024 4:00:09 PM CST

## 2024-02-14 RX ORDER — LOVASTATIN 20 MG/1
TABLET ORAL
Qty: 90 TABLET | Refills: 2 | Status: SHIPPED | OUTPATIENT
Start: 2024-02-14

## 2024-02-14 NOTE — TELEPHONE ENCOUNTER
Refill Decision Note   Hannah Emerson  is requesting a refill authorization.    Brief Assessment and Rationale for Refill:   Approve       Medication Therapy Plan:          Comments:     Note composed:2:23 PM 02/14/2024

## 2024-02-17 RX ORDER — LOVASTATIN 20 MG/1
TABLET ORAL
Qty: 90 TABLET | Refills: 2 | OUTPATIENT
Start: 2024-02-17

## 2024-02-17 NOTE — TELEPHONE ENCOUNTER
No care due was identified.  [facility] Embedded Care Due Messages. Reference number: [id number].   2/17/2024 5:39:47 PM CST

## 2024-02-18 NOTE — TELEPHONE ENCOUNTER
Refill Decision Note   Hannah Norman  is requesting a refill authorization.  Brief Assessment and Rationale for Refill:  Quick Discontinue     Medication Therapy Plan: duplicate Receipt confirmed by pharmacy (2/14/2024  2:23 PM CST      Comments:     Note composed:10:40 PM 02/17/2024

## 2024-03-06 DIAGNOSIS — M79.7 FIBROMYALGIA: ICD-10-CM

## 2024-03-06 RX ORDER — VENLAFAXINE HYDROCHLORIDE 150 MG/1
150 CAPSULE, EXTENDED RELEASE ORAL 2 TIMES DAILY
Qty: 180 CAPSULE | Refills: 2 | Status: SHIPPED | OUTPATIENT
Start: 2024-03-06

## 2024-03-06 RX ORDER — POTASSIUM CHLORIDE 20 MEQ/1
20 TABLET, EXTENDED RELEASE ORAL
Qty: 90 TABLET | Refills: 2 | Status: SHIPPED | OUTPATIENT
Start: 2024-03-06

## 2024-03-06 NOTE — TELEPHONE ENCOUNTER
No care due was identified.  [facility] Embedded Care Due Messages. Reference number: [id number].   3/06/2024 3:57:39 PM CST

## 2024-03-07 NOTE — TELEPHONE ENCOUNTER
Refill Decision Note   Hannah Emerson  is requesting a refill authorization.  Brief Assessment and Rationale for Refill:  Approve     Medication Therapy Plan:         Comments:     Note composed:7:33 PM 03/06/2024

## 2024-04-22 ENCOUNTER — OFFICE VISIT (OUTPATIENT)
Dept: FAMILY MEDICINE | Facility: CLINIC | Age: 74
End: 2024-04-22
Payer: MEDICARE

## 2024-04-22 VITALS
SYSTOLIC BLOOD PRESSURE: 124 MMHG | WEIGHT: 229.5 LBS | BODY MASS INDEX: 43.33 KG/M2 | OXYGEN SATURATION: 97 % | HEART RATE: 94 BPM | DIASTOLIC BLOOD PRESSURE: 84 MMHG | HEIGHT: 61 IN | TEMPERATURE: 98 F

## 2024-04-22 DIAGNOSIS — I13.0 HYPERTENSIVE HEART AND CHRONIC KIDNEY DISEASE WITH HEART FAILURE AND STAGE 1 THROUGH STAGE 4 CHRONIC KIDNEY DISEASE, OR UNSPECIFIED CHRONIC KIDNEY DISEASE: Primary | ICD-10-CM

## 2024-04-22 DIAGNOSIS — R73.9 HYPERGLYCEMIA: ICD-10-CM

## 2024-04-22 DIAGNOSIS — E66.2 OBESITY HYPOVENTILATION SYNDROME: ICD-10-CM

## 2024-04-22 DIAGNOSIS — I77.1 TORTUOUS AORTA: ICD-10-CM

## 2024-04-22 DIAGNOSIS — F39 UNSPECIFIED MOOD (AFFECTIVE) DISORDER: ICD-10-CM

## 2024-04-22 DIAGNOSIS — M46.1 SACROILIITIS, NOT ELSEWHERE CLASSIFIED: ICD-10-CM

## 2024-04-22 DIAGNOSIS — J44.9 CHRONIC OBSTRUCTIVE PULMONARY DISEASE, UNSPECIFIED COPD TYPE: ICD-10-CM

## 2024-04-22 DIAGNOSIS — N18.31 CHRONIC KIDNEY DISEASE, STAGE 3A: ICD-10-CM

## 2024-04-22 DIAGNOSIS — I10 ESSENTIAL HYPERTENSION: ICD-10-CM

## 2024-04-22 PROCEDURE — 3074F SYST BP LT 130 MM HG: CPT | Mod: CPTII,S$GLB,, | Performed by: FAMILY MEDICINE

## 2024-04-22 PROCEDURE — 3079F DIAST BP 80-89 MM HG: CPT | Mod: CPTII,S$GLB,, | Performed by: FAMILY MEDICINE

## 2024-04-22 PROCEDURE — 4010F ACE/ARB THERAPY RXD/TAKEN: CPT | Mod: CPTII,S$GLB,, | Performed by: FAMILY MEDICINE

## 2024-04-22 PROCEDURE — 1159F MED LIST DOCD IN RCRD: CPT | Mod: CPTII,S$GLB,, | Performed by: FAMILY MEDICINE

## 2024-04-22 PROCEDURE — 1125F AMNT PAIN NOTED PAIN PRSNT: CPT | Mod: CPTII,S$GLB,, | Performed by: FAMILY MEDICINE

## 2024-04-22 PROCEDURE — 1101F PT FALLS ASSESS-DOCD LE1/YR: CPT | Mod: CPTII,S$GLB,, | Performed by: FAMILY MEDICINE

## 2024-04-22 PROCEDURE — 99214 OFFICE O/P EST MOD 30 MIN: CPT | Mod: S$GLB,,, | Performed by: FAMILY MEDICINE

## 2024-04-22 PROCEDURE — 3288F FALL RISK ASSESSMENT DOCD: CPT | Mod: CPTII,S$GLB,, | Performed by: FAMILY MEDICINE

## 2024-04-22 PROCEDURE — 1160F RVW MEDS BY RX/DR IN RCRD: CPT | Mod: CPTII,S$GLB,, | Performed by: FAMILY MEDICINE

## 2024-04-22 NOTE — PROGRESS NOTES
" Patient ID: Hannah Norman is a 74 y.o. female.    Chief Complaint: Follow-up    HPI      Hannah Norman is a 74 y.o. female here for routine follow-up.  Patient follow-up for chronic obstructive pulmonary disease for which he takes no long-term inhalers only p.r.n. albuterol.  Has to take albuterol three or 4 times a month.  Hypertension well controlled on current medications not having high spikes of blood pressure problems or symptoms.  Anxiety and stress-living with granddaughter and sometimes her child's dad he comes with the house.  Causes a lot of disturbance because the two of them often by-they had to call the police to kick him out sometimes.  This is causing some anxiety and stress as she stays in her room.    Vitals:    04/22/24 1055   BP: 124/84   BP Location: Left arm   Patient Position: Sitting   Pulse: 94   Temp: 97.6 °F (36.4 °C)   TempSrc: Oral   SpO2: 97%   Weight: 104.1 kg (229 lb 8 oz)   Height: 5' 1" (1.549 m)            Review of Symptoms      Physical Exam    Constitutional:  Oriented to person, place, and time.appears well-developed and well-nourished.  No distress.      HENT  Head: Normocephalic and atraumatic  Right Ear: External ear normal.   Left Ear: External ear normal.   Nose: External nose normal.   Mouth:  Moist mucus membranes.    Eyes:  Conjunctivae are normal. Right eye exhibits no discharge.  Left eye exhibits no discharge. No scleral icterus.  No periorbital edema    Cardiovascular:  Regular rate and rhythm with normal S1 and S2     Pulmonary/Chest:   Clear to auscultation bilaterally without wheezes, rhonchi or rales      Musculoskeletal:  No edema. No obvious deformity No wasting       Neurological:  Alert and oriented to person, place, and time.   Coordination normal.     Skin:   Skin is warm and dry.  No diaphoresis.   No rash noted.     Psychiatric: Normal mood and affect. Behavior is normal.  Judgment and thought content normal.     Complete Blood Count  Lab Results " "  Component Value Date    RBC 4.38 10/19/2023    HGB 13.5 10/19/2023    HCT 43.1 10/19/2023    MCV 98 10/19/2023    MCH 30.8 10/19/2023    MCHC 31.3 (L) 10/19/2023    RDW 12.8 10/19/2023     10/19/2023    MPV 10.0 10/19/2023    GRAN 4.1 10/19/2023    GRAN 54.9 10/19/2023    LYMPH 2.5 10/19/2023    LYMPH 33.3 10/19/2023    MONO 0.6 10/19/2023    MONO 8.1 10/19/2023    EOS 0.2 10/19/2023    BASO 0.04 10/19/2023    EOSINOPHIL 2.8 10/19/2023    BASOPHIL 0.5 10/19/2023    DIFFMETHOD Automated 10/19/2023       Comprehensive Metabolic Panel  No results found for: "GLU", "BUN", "CREATININE", "NA", "K", "CL", "PROT", "ALBUMIN", "BILITOT", "AST", "ALKPHOS", "CO2", "ALT", "ANIONGAP", "EGFRNONAA", "ESTGFRAFRICA"    TSH  No results found for: "TSH"    Assessment / plan     Discussed getting outside active getting dressed not staying in her night clothes  She has had a few pound weight loss-continue .  Tortuous aorta-nothing to do at this time   Mood disorder-stable   Lower back pain-stable  Hypertension continue current medications no changes  "

## 2024-05-13 ENCOUNTER — CLINICAL SUPPORT (OUTPATIENT)
Dept: ENDOSCOPY | Facility: HOSPITAL | Age: 74
End: 2024-05-13
Attending: FAMILY MEDICINE
Payer: MEDICARE

## 2024-05-13 ENCOUNTER — TELEPHONE (OUTPATIENT)
Dept: ENDOSCOPY | Facility: HOSPITAL | Age: 74
End: 2024-05-13

## 2024-05-13 DIAGNOSIS — Z12.11 COLON CANCER SCREENING: ICD-10-CM

## 2024-05-13 DIAGNOSIS — Z12.11 ENCOUNTER FOR COLORECTAL CANCER SCREENING: Primary | ICD-10-CM

## 2024-05-13 DIAGNOSIS — Z12.12 ENCOUNTER FOR COLORECTAL CANCER SCREENING: Primary | ICD-10-CM

## 2024-05-13 NOTE — TELEPHONE ENCOUNTER
Spoke to pt in regards to colonoscopy and pt states she would like to do a cologuard test instead of having a colonoscopy. Explained to pt she would need to discuss this with her PCP. Pt verb understanding.

## 2024-06-01 LAB — NONINV COLON CA DNA+OCC BLD SCRN STL QL: NEGATIVE

## 2024-08-15 ENCOUNTER — OFFICE VISIT (OUTPATIENT)
Dept: FAMILY MEDICINE | Facility: CLINIC | Age: 74
End: 2024-08-15
Payer: MEDICARE

## 2024-08-15 ENCOUNTER — TELEPHONE (OUTPATIENT)
Dept: FAMILY MEDICINE | Facility: CLINIC | Age: 74
End: 2024-08-15

## 2024-08-15 VITALS
BODY MASS INDEX: 42.37 KG/M2 | HEIGHT: 61 IN | HEART RATE: 95 BPM | SYSTOLIC BLOOD PRESSURE: 126 MMHG | WEIGHT: 224.44 LBS | DIASTOLIC BLOOD PRESSURE: 84 MMHG | TEMPERATURE: 98 F | OXYGEN SATURATION: 99 %

## 2024-08-15 DIAGNOSIS — I10 ESSENTIAL HYPERTENSION: ICD-10-CM

## 2024-08-15 DIAGNOSIS — I13.0 HYPERTENSIVE HEART AND CHRONIC KIDNEY DISEASE WITH HEART FAILURE AND STAGE 1 THROUGH STAGE 4 CHRONIC KIDNEY DISEASE, OR UNSPECIFIED CHRONIC KIDNEY DISEASE: Primary | ICD-10-CM

## 2024-08-15 DIAGNOSIS — R06.02 SHORTNESS OF BREATH: ICD-10-CM

## 2024-08-15 DIAGNOSIS — M79.7 FIBROMYALGIA: ICD-10-CM

## 2024-08-15 DIAGNOSIS — J44.9 CHRONIC OBSTRUCTIVE PULMONARY DISEASE, UNSPECIFIED COPD TYPE: ICD-10-CM

## 2024-08-15 DIAGNOSIS — I50.33 ACUTE ON CHRONIC DIASTOLIC (CONGESTIVE) HEART FAILURE: ICD-10-CM

## 2024-08-15 PROCEDURE — 3079F DIAST BP 80-89 MM HG: CPT | Mod: CPTII,S$GLB,, | Performed by: FAMILY MEDICINE

## 2024-08-15 PROCEDURE — 99214 OFFICE O/P EST MOD 30 MIN: CPT | Mod: S$GLB,,, | Performed by: FAMILY MEDICINE

## 2024-08-15 PROCEDURE — 3288F FALL RISK ASSESSMENT DOCD: CPT | Mod: CPTII,S$GLB,, | Performed by: FAMILY MEDICINE

## 2024-08-15 PROCEDURE — 1159F MED LIST DOCD IN RCRD: CPT | Mod: CPTII,S$GLB,, | Performed by: FAMILY MEDICINE

## 2024-08-15 PROCEDURE — 4010F ACE/ARB THERAPY RXD/TAKEN: CPT | Mod: CPTII,S$GLB,, | Performed by: FAMILY MEDICINE

## 2024-08-15 PROCEDURE — 3074F SYST BP LT 130 MM HG: CPT | Mod: CPTII,S$GLB,, | Performed by: FAMILY MEDICINE

## 2024-08-15 PROCEDURE — 1125F AMNT PAIN NOTED PAIN PRSNT: CPT | Mod: CPTII,S$GLB,, | Performed by: FAMILY MEDICINE

## 2024-08-15 PROCEDURE — 1101F PT FALLS ASSESS-DOCD LE1/YR: CPT | Mod: CPTII,S$GLB,, | Performed by: FAMILY MEDICINE

## 2024-08-15 RX ORDER — OXYCODONE AND ACETAMINOPHEN 7.5; 325 MG/1; MG/1
1 TABLET ORAL EVERY 4 HOURS PRN
COMMUNITY

## 2024-08-15 NOTE — PROGRESS NOTES
" Patient ID: Hannah Norman is a 74 y.o. female.    Chief Complaint: Shortness of Breath    HPI      Hannah Norman is a 74 y.o. female history of having hypertensive heart disease, obstructive sleep apnea, essential tremor, chronic kidney disease, lower back pain, and hyperglycemia.  Complains of continued shortness of breath.  Recently seen by cardiologist believe of breath is due to her weight and deconditioning.    Vitals:    08/15/24 0904   BP: 126/84   BP Location: Right arm   Patient Position: Sitting   Pulse: 95   Temp: 97.7 °F (36.5 °C)   TempSrc: Oral   SpO2: 99%   Weight: 101.8 kg (224 lb 6.9 oz)   Height: 5' 1" (1.549 m)            Review of Symptoms      Physical Exam    Constitutional:  Oriented to person, place, and time.appears well-developed and well-nourished.  No distress.      HENT  Head: Normocephalic and atraumatic  Right Ear: External ear normal.   Left Ear: External ear normal.   Nose: External nose normal.   Mouth:  Moist mucus membranes.    Eyes:  Conjunctivae are normal. Right eye exhibits no discharge.  Left eye exhibits no discharge. No scleral icterus.  No periorbital edema    Cardiovascular:  Regular rate and rhythm with normal S1 and S2     Pulmonary/Chest:   Clear to auscultation bilaterally without wheezes, rhonchi or rales      Musculoskeletal:  No edema. No obvious deformity No wasting       Neurological:  Alert and oriented to person, place, and time.   Coordination normal.     Skin:   Skin is warm and dry.  No diaphoresis.   No rash noted.     Psychiatric: Normal mood and affect. Behavior is normal.  Judgment and thought content normal.     Complete Blood Count  Lab Results   Component Value Date    RBC 4.38 10/19/2023    HGB 13.5 10/19/2023    HCT 43.1 10/19/2023    MCV 98 10/19/2023    MCH 30.8 10/19/2023    MCHC 31.3 (L) 10/19/2023    RDW 12.8 10/19/2023     10/19/2023    MPV 10.0 10/19/2023    GRAN 4.1 10/19/2023    GRAN 54.9 10/19/2023    LYMPH 2.5 10/19/2023    LYMPH " "33.3 10/19/2023    MONO 0.6 10/19/2023    MONO 8.1 10/19/2023    EOS 0.2 10/19/2023    BASO 0.04 10/19/2023    EOSINOPHIL 2.8 10/19/2023    BASOPHIL 0.5 10/19/2023    DIFFMETHOD Automated 10/19/2023       Comprehensive Metabolic Panel  No results found for: "GLU", "BUN", "CREATININE", "NA", "K", "CL", "PROT", "ALBUMIN", "BILITOT", "AST", "ALKPHOS", "CO2", "ALT", "ANIONGAP", "EGFRNONAA", "ESTGFRAFRICA"    TSH  No results found for: "TSH"    Assessment / Plan:      ICD-10-CM ICD-9-CM   1. Hypertensive heart and chronic kidney disease with heart failure and stage 1 through stage 4 chronic kidney disease, or unspecified chronic kidney disease  I13.0 404.91     428.9     585.9   2. Essential hypertension  I10 401.9   3. Acute on chronic diastolic (congestive) heart failure  I50.33 428.33     428.0   4. Chronic obstructive pulmonary disease, unspecified COPD type  J44.9 496   5. Fibromyalgia  M79.7 729.1   6. Shortness of breath  R06.02 786.05     Hypertensive heart and chronic kidney disease with heart failure and stage 1 through stage 4 chronic kidney disease, or unspecified chronic kidney disease  -     Echo  -     Urinalysis; Future    Essential hypertension  -     Echo  -     Urinalysis; Future    Acute on chronic diastolic (congestive) heart failure    Chronic obstructive pulmonary disease, unspecified COPD type    Fibromyalgia    Shortness of breath    Pulmonary disease-seems to be stable heart disease seem to be stable-believe shortness of breath is due to physical deconditioning.  Suggested again a walking program to see if she can increase her endurance.  Described walking up and down her whole ways in her home or outside.  "

## 2024-08-19 ENCOUNTER — TELEPHONE (OUTPATIENT)
Dept: FAMILY MEDICINE | Facility: CLINIC | Age: 74
End: 2024-08-19
Payer: MEDICARE

## 2024-08-19 NOTE — TELEPHONE ENCOUNTER
Spoke with Judy at [facility] to obtain fax number for echocardiogram scheduling. Order has been faxed to [phone]. Fax confirmation has been received.

## 2024-08-20 ENCOUNTER — TELEPHONE (OUTPATIENT)
Dept: FAMILY MEDICINE | Facility: CLINIC | Age: 74
End: 2024-08-20
Payer: MEDICARE

## 2024-08-20 DIAGNOSIS — I13.0 HYPERTENSIVE HEART AND CHRONIC KIDNEY DISEASE WITH HEART FAILURE AND STAGE 1 THROUGH STAGE 4 CHRONIC KIDNEY DISEASE, OR UNSPECIFIED CHRONIC KIDNEY DISEASE: Primary | ICD-10-CM

## 2024-08-20 DIAGNOSIS — I10 ESSENTIAL HYPERTENSION: ICD-10-CM

## 2024-08-20 NOTE — TELEPHONE ENCOUNTER
----- Message from Luci Clancy sent at 8/20/2024 11:12 AM CDT -----  Type:  Orders    Who Called: [facility] scheduling   Does the patient know what this is regarding?: echo orders, needs to know if stress echo or transthoracic  Would the patient rather a call back or a response via [facility]? Call   Best Call Back Number: [phone]   Additional Information:

## 2024-08-22 ENCOUNTER — TELEPHONE (OUTPATIENT)
Dept: FAMILY MEDICINE | Facility: CLINIC | Age: 74
End: 2024-08-22
Payer: MEDICARE

## 2024-08-22 DIAGNOSIS — R06.02 SHORTNESS OF BREATH: ICD-10-CM

## 2024-08-22 DIAGNOSIS — I10 ESSENTIAL HYPERTENSION: ICD-10-CM

## 2024-08-22 DIAGNOSIS — I13.0 HYPERTENSIVE HEART AND CHRONIC KIDNEY DISEASE WITH HEART FAILURE AND STAGE 1 THROUGH STAGE 4 CHRONIC KIDNEY DISEASE, OR UNSPECIFIED CHRONIC KIDNEY DISEASE: Primary | ICD-10-CM

## 2024-08-22 NOTE — TELEPHONE ENCOUNTER
I have spoken to Arlin from  Centralized Scheduling she is needing a new order for the echo stating what kind of echo, where and the diagnosis code. Send back to staff so this can be faxed.     I have spoken to pt and canceled the echo for [facility] for today.     ----- Message from Stacey Scott sent at 8/22/2024 10:37 AM CDT -----  Type: Call    Who Called:Arlin/[place] [facility]  Does the patient know what this is regarding?:Orders  Would the patient rather a call back or a response via [facility]? call  Best Call Back Number:[phone]  fax [phone]  Additional Information: Order does not specify what kind of ECHO the pt is having.  Please send an updated order with the type of ECHO.

## 2024-08-30 ENCOUNTER — TELEPHONE (OUTPATIENT)
Dept: FAMILY MEDICINE | Facility: CLINIC | Age: 74
End: 2024-08-30
Payer: MEDICARE

## 2024-08-30 NOTE — TELEPHONE ENCOUNTER
----- Message from Yi Augustin sent at 8/30/2024  3:13 PM CDT -----  Dr. Raffi Garcia /     Unfortunately, Mrs. Hannah Norman has failed to meet basic participation requirements for the HTN/DM Digital Medicine Program and will be discharged from the program. We have tried to contact him/her on multiple occasions without success. Unfortunately, she is not a good candidate for digital medicine monitoring.     Thank you for your support of Digital Medicine! Please contact me if you have any questions or concerns.    Sincerely,  Yi Augustin

## 2024-11-13 NOTE — TELEPHONE ENCOUNTER
Care Due:                  Date            Visit Type   Department     Provider  --------------------------------------------------------------------------------                                SAME DAY -                              ESTABLISHED   [facility]  Last Visit: 08-      PATIENT      MEDICINE       Raffi Garcia                              EP -                              PRIMARY      [facility]  Next Visit: 12-      CARE ([facility])   MEDICINE       Raffi Garcia                                                            Last  Test          Frequency    Reason                     Performed    Due Date  --------------------------------------------------------------------------------    CMP.........  12 months..  losartan, lovastatin,      10-   10-                             potassium, venlafaxine...    Lipid Panel.  12 months..  lovastatin...............  10-   10-    Health [facility] Embedded Care Due Messages. Reference number: [id number].   11/13/2024 5:53:49 PM CST

## 2024-11-14 RX ORDER — LOVASTATIN 20 MG/1
TABLET ORAL
Qty: 90 TABLET | Refills: 0 | OUTPATIENT
Start: 2024-11-14

## 2024-11-14 RX ORDER — LOVASTATIN 20 MG/1
TABLET ORAL
Qty: 90 TABLET | Refills: 0 | Status: SHIPPED | OUTPATIENT
Start: 2024-11-14

## 2024-11-14 NOTE — TELEPHONE ENCOUNTER
No care due was identified.  Health [facility] Embedded Care Due Messages. Reference number: [id number].   11/14/2024 3:56:52 PM CST

## 2024-11-14 NOTE — TELEPHONE ENCOUNTER
Refill Routing Note   Medication(s) are not appropriate for processing by [facility] for the following reason(s):        Required labs outdated    [facility] action(s):  Defer      Medication Therapy Plan: FOVS, FLOS      Appointments  past 12m or future 3m with PCP    Date Provider   Last Visit   8/15/2024 Raffi Garcia MD   Next Visit   12/17/2024 Raffi Garcia MD   ED visits in past 90 days: 0        Note composed:6:41 PM 11/13/2024

## 2024-11-14 NOTE — TELEPHONE ENCOUNTER
Refill Decision Note   Hnanah Norman  is requesting a refill authorization.  Brief Assessment and Rationale for Refill:  Quick Discontinue     Medication Therapy Plan:  E-Prescribing Status: Receipt confirmed by [facility] 11/14/24      Comments:     Note composed:5:45 PM 11/14/2024

## 2024-12-11 DIAGNOSIS — N18.30 CKD (CHRONIC KIDNEY DISEASE) STAGE 3, GFR 30-59 ML/MIN: ICD-10-CM

## 2024-12-17 ENCOUNTER — OFFICE VISIT (OUTPATIENT)
Dept: FAMILY MEDICINE | Facility: CLINIC | Age: 74
End: 2024-12-17
Payer: MEDICARE

## 2024-12-17 VITALS
OXYGEN SATURATION: 97 % | SYSTOLIC BLOOD PRESSURE: 124 MMHG | BODY MASS INDEX: 41.42 KG/M2 | HEIGHT: 61 IN | DIASTOLIC BLOOD PRESSURE: 78 MMHG | HEART RATE: 99 BPM | TEMPERATURE: 98 F | WEIGHT: 219.38 LBS

## 2024-12-17 DIAGNOSIS — I10 ESSENTIAL HYPERTENSION: ICD-10-CM

## 2024-12-17 DIAGNOSIS — Z23 FLU VACCINE NEED: ICD-10-CM

## 2024-12-17 DIAGNOSIS — I13.0 HYPERTENSIVE HEART AND CHRONIC KIDNEY DISEASE WITH HEART FAILURE AND STAGE 1 THROUGH STAGE 4 CHRONIC KIDNEY DISEASE, OR UNSPECIFIED CHRONIC KIDNEY DISEASE: Primary | ICD-10-CM

## 2024-12-17 PROCEDURE — 3078F DIAST BP <80 MM HG: CPT | Mod: CPTII,S$GLB,, | Performed by: FAMILY MEDICINE

## 2024-12-17 PROCEDURE — 99214 OFFICE O/P EST MOD 30 MIN: CPT | Mod: S$GLB,,, | Performed by: FAMILY MEDICINE

## 2024-12-17 PROCEDURE — 3074F SYST BP LT 130 MM HG: CPT | Mod: CPTII,S$GLB,, | Performed by: FAMILY MEDICINE

## 2024-12-17 PROCEDURE — 1125F AMNT PAIN NOTED PAIN PRSNT: CPT | Mod: CPTII,S$GLB,, | Performed by: FAMILY MEDICINE

## 2024-12-17 PROCEDURE — 90653 IIV ADJUVANT VACCINE IM: CPT | Mod: S$GLB,,, | Performed by: FAMILY MEDICINE

## 2024-12-17 PROCEDURE — 1160F RVW MEDS BY RX/DR IN RCRD: CPT | Mod: CPTII,S$GLB,, | Performed by: FAMILY MEDICINE

## 2024-12-17 PROCEDURE — G0008 ADMIN INFLUENZA VIRUS VAC: HCPCS | Mod: S$GLB,,, | Performed by: FAMILY MEDICINE

## 2024-12-17 PROCEDURE — 1159F MED LIST DOCD IN RCRD: CPT | Mod: CPTII,S$GLB,, | Performed by: FAMILY MEDICINE

## 2024-12-17 RX ORDER — MONTELUKAST SODIUM 10 MG/1
TABLET ORAL
Qty: 90 TABLET | Refills: 3 | Status: SHIPPED | OUTPATIENT
Start: 2024-12-17

## 2024-12-18 ENCOUNTER — HOSPITAL ENCOUNTER (OUTPATIENT)
Dept: RADIOLOGY | Facility: HOSPITAL | Age: 74
Discharge: HOME | End: 2024-12-18
Attending: FAMILY MEDICINE
Payer: MEDICARE

## 2024-12-18 DIAGNOSIS — I13.0 HYPERTENSIVE HEART AND CHRONIC KIDNEY DISEASE WITH HEART FAILURE AND STAGE 1 THROUGH STAGE 4 CHRONIC KIDNEY DISEASE, OR UNSPECIFIED CHRONIC KIDNEY DISEASE: ICD-10-CM

## 2024-12-18 DIAGNOSIS — I10 ESSENTIAL HYPERTENSION: ICD-10-CM

## 2024-12-18 DIAGNOSIS — N18.30 CKD (CHRONIC KIDNEY DISEASE) STAGE 3, GFR 30-59 ML/MIN: ICD-10-CM

## 2024-12-18 PROCEDURE — 71046 X-RAY EXAM CHEST 2 VIEWS: CPT | Mod: TC,FY,PN

## 2024-12-18 PROCEDURE — 71046 X-RAY EXAM CHEST 2 VIEWS: CPT | Mod: 26,,, | Performed by: RADIOLOGY

## 2025-01-02 NOTE — PROGRESS NOTES
" Patient ID: Hannah Norman is a 74 y.o. female.    Chief Complaint: Follow-up    HPI    Hannah Norman is a 74 y.o. female     History of Present Illness    CHIEF COMPLAINT:  Patient presents today due to recent falls and concerns about lung infection.    FALLS AND MOBILITY:  She reports multiple falls in the past week, including one incident when her shoe caught on a step. She experiences loss of balance. Despite having assistive devices including a wheelchair, walker, and cane available, she does not utilize them.    RESPIRATORY:  She reports a cold sensation in chest with deep breathing and experiences shortness of breath. She uses a CPAP machine at home. She discontinued Montelukast due to side effects, though specific effects are not recalled.    CARDIOVASCULAR:  She has an upcoming cardiology appointment this month. An echocardiogram was performed approximately one month ago.         Vitals:    12/17/24 1435   BP: 124/78   BP Location: Left arm   Patient Position: Sitting   Pulse: 99   Temp: 98.1 °F (36.7 °C)   TempSrc: Oral   SpO2: 97%   Weight: 99.5 kg (219 lb 5.7 oz)   Height: 5' 1" (1.549 m)            Review of Symptoms      Physical Exam    Constitutional:  Oriented to person, place, and time.appears well-developed and well-nourished.  No distress.      HENT  Head: Normocephalic and atraumatic  Right Ear: External ear normal.   Left Ear: External ear normal.   Nose: External nose normal.   Mouth:  Moist mucus membranes.    Eyes:  Conjunctivae are normal. Right eye exhibits no discharge.  Left eye exhibits no discharge. No scleral icterus.  No periorbital edema    Cardiovascular:  Regular rate and rhythm with normal S1 and S2     Pulmonary/Chest:   Clear to auscultation bilaterally without wheezes, rhonchi or rales      Musculoskeletal:  No edema. No obvious deformity No wasting       Neurological:  Alert and oriented to person, place, and time.   Coordination normal.     Skin:   Skin is warm and dry.  No " diaphoresis.   No rash noted.     Psychiatric: Normal mood and affect. Behavior is normal.  Judgment and thought content normal.     Physical Exam              Complete Blood Count  Lab Results   Component Value Date    RBC 4.72 12/18/2024    HGB 14.9 12/18/2024    HCT 45.8 12/18/2024    MCV 97 12/18/2024    MCH 31.6 (H) 12/18/2024    MCHC 32.5 12/18/2024    RDW 12.6 12/18/2024     12/18/2024    MPV 10.1 12/18/2024    GRAN 5.4 12/18/2024    GRAN 53.0 12/18/2024    LYMPH 3.6 12/18/2024    LYMPH 35.1 12/18/2024    MONO 0.8 12/18/2024    MONO 7.9 12/18/2024    EOS 0.3 12/18/2024    BASO 0.06 12/18/2024    EOSINOPHIL 2.8 12/18/2024    BASOPHIL 0.6 12/18/2024    DIFFMETHOD Automated 12/18/2024       Comprehensive Metabolic Panel  Lab Results   Component Value Date     (H) 12/18/2024    BUN 20 (H) 12/18/2024    CREATININE 1.06 12/18/2024     12/18/2024    K 4.7 12/18/2024     12/18/2024    PROT 7.4 12/18/2024    ALBUMIN 4.6 12/18/2024    BILITOT 0.5 12/18/2024    AST 31 12/18/2024    ALKPHOS 96 12/18/2024    CO2 27 12/18/2024    ALT 20 12/18/2024    ANIONGAP 11 12/18/2024       TSH  Lab Results   Component Value Date    TSH 3.340 12/18/2024       Assessment / Plan:      ICD-10-CM ICD-9-CM   1. Hypertensive heart and chronic kidney disease with heart failure and stage 1 through stage 4 chronic kidney disease, or unspecified chronic kidney disease  I13.0 404.91     428.9     585.9   2. Flu vaccine need  Z23 V04.81   3. Essential hypertension  I10 401.9     Hypertensive heart and chronic kidney disease with heart failure and stage 1 through stage 4 chronic kidney disease, or unspecified chronic kidney disease  -     X-Ray Chest PA And Lateral; Future; Expected date: 12/17/2024    Flu vaccine need  -     influenza (adjuvanted) (Fluad) 45 mcg/0.5 mL IM vaccine (> or = 66 yo) 0.5 mL    Essential hypertension  -     X-Ray Chest PA And Lateral; Future; Expected date: 12/17/2024    Other orders  -      montelukast (SINGULAIR) 10 mg tablet; TAKE 1 TABLET BY MOUTH EVERY EVENING FOR SINUS CONGESTION PREVENTION  Dispense: 90 tablet; Refill: 3        Assessment & Plan    - Shortness of breath likely due to physical deconditioning rather than cardiac or pulmonary etiology  - Recent cardiology evaluation by Dr. Brown showed adequate heart function  - Restarted Montelukast to address possible sinus congestion contribution to symptoms    ASTHMA AND SINUSITIS:  - Started Montelukast (Singulair) for sinus congestion and asthma management.  - Continued albuterol inhaler, use prior to walking program.  - Explained importance of nasal breathing to warm inhaled air.  - Discussed low risk of serious side effects with Montelukast compared to common medications like ibuprofen.  - Patient to use albuterol inhaler before walking to optimize lung function.  - Chest XR ordered.    MOBILITY AND FALL RISK:  - Patient to implement progressive walking program: start with 10 laps up and down hallway 2 times daily, increase by 1 lap every 2 weeks as tolerated.    FOLLOW-UP AND TESTS:  - Laboratory tests ordered.  - Follow up after completing labs this week.  - Office staff will contact patient to schedule lab work.         This note was generated with the assistance of ambient listening technology. Verbal consent was obtained by the patient and accompanying visitor(s) for the recording of patient appointment to facilitate this note. I attest to having reviewed and edited the generated note for accuracy, though some syntax or spelling errors may persist. Please contact the author of this note for any clarification.

## 2025-01-20 NOTE — TELEPHONE ENCOUNTER
No care due was identified.  [facility] Embedded Care Due Messages. Reference number: [id number].   1/20/2025 2:29:01 PM CST

## 2025-01-21 RX ORDER — POTASSIUM CHLORIDE 20 MEQ/1
20 TABLET, EXTENDED RELEASE ORAL
Qty: 90 TABLET | Refills: 3 | Status: SHIPPED | OUTPATIENT
Start: 2025-01-21

## 2025-01-21 NOTE — TELEPHONE ENCOUNTER
Refill Decision Note   Hannah Norman  is requesting a refill authorization.  Brief Assessment and Rationale for Refill:  Approve     Medication Therapy Plan:       Medication Reconciliation Completed: No   Comments:     No Care Gaps recommended.     Note composed:3:08 PM 01/21/2025

## 2025-02-03 NOTE — TELEPHONE ENCOUNTER
No care due was identified.  [facility] Embedded Care Due Messages. Reference number: [id number].   2/03/2025 9:07:33 AM CST

## 2025-02-04 RX ORDER — LOVASTATIN 20 MG/1
TABLET ORAL
Qty: 90 TABLET | Refills: 3 | Status: SHIPPED | OUTPATIENT
Start: 2025-02-04 | End: 2025-02-11

## 2025-02-04 NOTE — TELEPHONE ENCOUNTER
Refill Decision Note   Hannah Emerson  is requesting a refill authorization.  Brief Assessment and Rationale for Refill:  Approve     Medication Therapy Plan:         Comments:     Note composed:9:09 AM 02/04/2025

## 2025-02-08 NOTE — TELEPHONE ENCOUNTER
No care due was identified.  [facility] Embedded Care Due Messages. Reference number: [id number].   2/08/2025 5:06:32 PM CST

## 2025-02-09 RX ORDER — LOVASTATIN 20 MG/1
TABLET ORAL
Qty: 90 TABLET | Refills: 3 | OUTPATIENT
Start: 2025-02-09

## 2025-02-10 NOTE — TELEPHONE ENCOUNTER
Refill Decision Note   Hannah Norman  is requesting a refill authorization.  Brief Assessment and Rationale for Refill:  Quick Discontinue     Medication Therapy Plan:  Receipt confirmed by pharmacy (2/4/2025  9:09 AM CST)      Comments:     Note composed:8:03 PM 02/09/2025

## 2025-02-11 RX ORDER — LOVASTATIN 20 MG/1
TABLET ORAL
Qty: 90 TABLET | Refills: 3 | Status: SHIPPED | OUTPATIENT
Start: 2025-02-11

## 2025-02-12 NOTE — TELEPHONE ENCOUNTER
No care due was identified.  [facility] Embedded Care Due Messages. Reference number: [id number].   2/11/2025 6:00:39 PM CST

## 2025-02-12 NOTE — TELEPHONE ENCOUNTER
Refill Decision Note   Hannah Emerson  is requesting a refill authorization.  Brief Assessment and Rationale for Refill:  Approve     Medication Therapy Plan:        Comments:     Note composed:6:42 PM 02/11/2025

## 2025-03-18 ENCOUNTER — OFFICE VISIT (OUTPATIENT)
Dept: FAMILY MEDICINE | Facility: CLINIC | Age: 75
End: 2025-03-18
Payer: MEDICARE

## 2025-03-18 VITALS
HEART RATE: 109 BPM | SYSTOLIC BLOOD PRESSURE: 130 MMHG | WEIGHT: 222 LBS | DIASTOLIC BLOOD PRESSURE: 80 MMHG | BODY MASS INDEX: 41.91 KG/M2 | TEMPERATURE: 99 F | HEIGHT: 61 IN | OXYGEN SATURATION: 96 %

## 2025-03-18 DIAGNOSIS — N18.31 CHRONIC KIDNEY DISEASE, STAGE 3A: ICD-10-CM

## 2025-03-18 DIAGNOSIS — E66.2 OBESITY HYPOVENTILATION SYNDROME: ICD-10-CM

## 2025-03-18 DIAGNOSIS — I13.0 HYPERTENSIVE HEART AND CHRONIC KIDNEY DISEASE WITH HEART FAILURE AND STAGE 1 THROUGH STAGE 4 CHRONIC KIDNEY DISEASE, OR UNSPECIFIED CHRONIC KIDNEY DISEASE: ICD-10-CM

## 2025-03-18 DIAGNOSIS — J44.9 CHRONIC OBSTRUCTIVE PULMONARY DISEASE, UNSPECIFIED COPD TYPE: ICD-10-CM

## 2025-03-18 DIAGNOSIS — J31.0 RHINITIS, UNSPECIFIED TYPE: Primary | ICD-10-CM

## 2025-03-18 RX ORDER — IMIPRAMINE HYDROCHLORIDE 25 MG/1
TABLET, FILM COATED ORAL
Qty: 90 TABLET | Refills: 3 | Status: SHIPPED | OUTPATIENT
Start: 2025-03-18

## 2025-03-18 RX ORDER — GABAPENTIN 300 MG/1
300 CAPSULE ORAL 3 TIMES DAILY
COMMUNITY
Start: 2025-01-28 | End: 2025-03-18

## 2025-03-18 RX ORDER — MONTELUKAST SODIUM 10 MG/1
TABLET ORAL
Qty: 90 TABLET | Refills: 3 | Status: SHIPPED | OUTPATIENT
Start: 2025-03-18

## 2025-03-18 RX ORDER — OXYCODONE AND ACETAMINOPHEN 10; 325 MG/1; MG/1
1 TABLET ORAL
COMMUNITY
Start: 2025-03-05

## 2025-03-18 RX ORDER — TRIAMCINOLONE ACETONIDE 40 MG/ML
80 INJECTION, SUSPENSION INTRA-ARTICULAR; INTRAMUSCULAR ONCE
Status: COMPLETED | OUTPATIENT
Start: 2025-03-18 | End: 2025-03-18

## 2025-03-18 RX ORDER — IPRATROPIUM BROMIDE 21 UG/1
2 SPRAY, METERED NASAL 3 TIMES DAILY
Qty: 30 ML | Refills: 0 | Status: SHIPPED | OUTPATIENT
Start: 2025-03-18

## 2025-03-18 RX ORDER — AZITHROMYCIN 250 MG/1
TABLET, FILM COATED ORAL
Qty: 6 TABLET | Refills: 0 | Status: SHIPPED | OUTPATIENT
Start: 2025-03-18 | End: 2025-03-23

## 2025-03-18 RX ORDER — IMIPRAMINE HYDROCHLORIDE 25 MG/1
TABLET, FILM COATED ORAL
Qty: 90 TABLET | Refills: 3 | Status: SHIPPED | OUTPATIENT
Start: 2025-03-18 | End: 2025-03-18 | Stop reason: SDUPTHER

## 2025-03-18 RX ORDER — MONTELUKAST SODIUM 10 MG/1
TABLET ORAL
Qty: 90 TABLET | Refills: 3 | Status: SHIPPED | OUTPATIENT
Start: 2025-03-18 | End: 2025-03-18 | Stop reason: SDUPTHER

## 2025-03-18 RX ORDER — PREDNISONE 10 MG/1
10 TABLET ORAL DAILY
Qty: 5 TABLET | Refills: 0 | Status: SHIPPED | OUTPATIENT
Start: 2025-03-18

## 2025-03-18 RX ADMIN — TRIAMCINOLONE ACETONIDE 80 MG: 40 INJECTION, SUSPENSION INTRA-ARTICULAR; INTRAMUSCULAR at 09:03

## 2025-03-21 ENCOUNTER — NURSE TRIAGE (OUTPATIENT)
Dept: ADMINISTRATIVE | Facility: CLINIC | Age: 75
End: 2025-03-21
Payer: MEDICARE

## 2025-03-22 NOTE — TELEPHONE ENCOUNTER
Caller states she was seen and diagnosis x 3 days ago. Caller states she feels worse, c/o wheezing, and has moderate SOB that worsen with ambulation. Pt c/o BP of 131/53 HR 95.  Pt advised ED per protocol and verbalized understanding.   Reason for Disposition   MODERATE difficulty breathing (e.g., speaks in phrases, SOB even at rest, pulse 100-120)    Additional Information   Negative: SEVERE difficulty breathing (e.g., struggling for each breath, speaks in single words)   Negative: Sounds like a life-threatening emergency to the triager    Protocols used: Infection on Antibiotic Follow-up Call-A-

## 2025-03-23 NOTE — PROGRESS NOTES
" Patient ID: Hannah Norman is a 75 y.o. female.    Chief Complaint: Fatigue, Nausea, Nasal Congestion, Fever, Dizziness, and Generalized Body Aches    HPI       Hannah Norman is a 75 y.o. female    History of Present Illness    CHIEF COMPLAINT:  Patient presents today for weakness and cold symptoms.    HISTORY OF PRESENT ILLNESS:  She reports symptoms of an acute illness that started a few days ago including global weakness, nasal congestion, cough, nausea without vomiting, and body aches. She experienced diarrhea a few days ago. She reports having had a maximum fever of 101.1°F. She experiences wheezing at night, particularly with deep breaths.    MEDICAL HISTORY:  She has a history of lung disease. Three weeks ago, she was diagnosed with influenza and possible pneumonia.    MEDICATIONS:  She is taking Tylenol 2 tablets every 4-6 hours for symptom management.           Review of Symptoms        Physical Exam    Vitals:    03/18/25 0917   BP: 130/80   BP Location: Right arm   Patient Position: Sitting   Pulse: 109   Temp: 98.9 °F (37.2 °C)   TempSrc: Oral   SpO2: 96%   Weight: 100.7 kg (222 lb)   Height: 5' 1" (1.549 m)        Constitutional:   Oriented to person, place, and time.appears well-developed and well-nourished.   No distress.     HENT:   Head: Normocephalic and atraumatic.     Right Ear: Tympanic membrane, external ear and ear canal normal     Left Ear: Tympanic membrane, external ear and ear canal normal    Nose: External Normal.  Inflamed nasal turbinates clear rhinorrhea     Mouth/Throat: Uvula is midline, oropharynx is clear and moist and mucous membranes are normal.     Neck: supple no anterior cervical adenopathy    Carotid artery:  Bruit    NEG []   POS[]    Eyes:   Injected conjunctiva    . Right eye exhibits no discharge. Left eye exhibits no discharge. No scleral icterus. No periorbital edema     Cardiovascular:  Regular rate and rhythm with normal S1 and S2     Pulmonary/Chest:   Clear to " auscultation bilaterally without wheezes, rhonchi or rales    Musculoskeletal:  No edema. No obvious deformity No wasting     Neurological:   Alert and oriented to person, place, and time. Coordination normal.     Skin:   Skin is warm and dry.   No diaphoresis.   No rash noted.    Psychiatric: Normal mood and affect. Behavior is normal. Judgment and thought content normal.     Physical Exam    Lungs: All normal.         Assessment / Plan:      ICD-10-CM ICD-9-CM   1. Rhinitis, unspecified type  J31.0 472.0   2. Chronic kidney disease, stage 3a  N18.31 585.3   3. Obesity hypoventilation syndrome  E66.2 278.03   4. Hypertensive heart and chronic kidney disease with heart failure and stage 1 through stage 4 chronic kidney disease, or unspecified chronic kidney disease  I13.0 404.91     428.9     585.9   5. Chronic obstructive pulmonary disease, unspecified COPD type  J44.9 496     Rhinitis, unspecified type  -     triamcinolone acetonide injection 80 mg    Chronic kidney disease, stage 3a    Obesity hypoventilation syndrome    Hypertensive heart and chronic kidney disease with heart failure and stage 1 through stage 4 chronic kidney disease, or unspecified chronic kidney disease    Chronic obstructive pulmonary disease, unspecified COPD type  -     triamcinolone acetonide injection 80 mg    Other orders  -     Discontinue: imipramine (TOFRANIL) 25 MG tablet; TAKE 1 TABLET BY MOUTH EVERY EVENING TO HELP CALM STOMACH  Dispense: 90 tablet; Refill: 3  -     Discontinue: montelukast (SINGULAIR) 10 mg tablet; TAKE 1 TABLET BY MOUTH EVERY EVENING FOR SINUS CONGESTION PREVENTION  Dispense: 90 tablet; Refill: 3  -     predniSONE (DELTASONE) 10 MG tablet; Take 1 tablet (10 mg total) by mouth once daily. Start 3/19/25  Dispense: 5 tablet; Refill: 0  -     azithromycin (Z-GINNY) 250 MG tablet; Take 2 tablets by mouth on day 1; Take 1 tablet by mouth on days 2-5  Dispense: 6 tablet; Refill: 0  -     imipramine (TOFRANIL) 25 MG  tablet; TAKE 1 TABLET BY MOUTH EVERY EVENING TO HELP CALM STOMACH  Dispense: 90 tablet; Refill: 3  -     montelukast (SINGULAIR) 10 mg tablet; TAKE 1 TABLET BY MOUTH EVERY EVENING FOR SINUS CONGESTION PREVENTION  Dispense: 90 tablet; Refill: 3  -     ipratropium (ATROVENT) 21 mcg (0.03 %) nasal spray; 2 sprays by Each Nostril route 3 (three) times daily.  Dispense: 30 mL; Refill: 0        Assessment & Plan    - Assessed symptoms as likely viral illness, possibly flu or another respiratory virus.  - Considered history of lung disease and current wheezing in treatment plan.  - Decided against flu testing due to timing of symptom onset.  - Opted for supportive care approach with focus on managing respiratory symptoms.  - Determined steroid treatment necessary to address wheezing and reduce inflammation.  - Chose to prescribe antibiotic as precautionary measure given respiratory history.    CHRONIC OBSTRUCTIVE PULMONARY DISEASE, UNSPECIFIED COPD TYPE:  - Evaluated patient's lungs, which sound good despite reported wheezing and difficulty breathing, especially at night.  - Administered intramuscular injection to open airways.  - Prescribed oral prednisone 10 mg daily for 5 days, starting the day after the office visit, and azithromycin antibiotic for potential bacterial infection.    VIRAL INFECTION:  - Assessed that the patient has a viral illness, possibly influenza or coronavirus.  - Explained that treatment focuses on symptom management rather than cure.  - Decided not to test for influenza due to the timing of symptom onset.    RESPIRATORY DISORDER:  - Patient reports nasal congestion and cough, with symptoms starting a few days ago.  - Explained that steroid treatment aims to reduce inflammation and open airways, addressing the root cause of cough rather than just suppressing it.  - Recommend using nasal spray for congestion.    WHEEZING:  - Patient reports wheezing, especially at night when taking deep  breaths.    FEVER:  - Patient had a temperature of 101.1°F on Sunday night.  - Advised to continue Tylenol as needed, 2 tablets every 4-6 hours for fever management.    NAUSEA:  - Patient reports feeling nausea but not vomiting.  - Offered medication for nausea, which the patient declined.    DIARRHEA:  - Patient reports having had diarrhea a few days ago.    WEAKNESS:  - Patient reports feeling globally weak as the first symptom of the current illness, along with body aches.  - Symptoms started a few days ago.         This note was generated with the assistance of ambient listening technology. Verbal consent was obtained by the patient and accompanying visitor(s) for the recording of patient appointment to facilitate this note. I attest to having reviewed and edited the generated note for accuracy, though some syntax or spelling errors may persist. Please contact the author of this note for any clarification.

## 2025-03-26 ENCOUNTER — OFFICE VISIT (OUTPATIENT)
Dept: FAMILY MEDICINE | Facility: CLINIC | Age: 75
End: 2025-03-26
Payer: MEDICARE

## 2025-03-26 VITALS
BODY MASS INDEX: 41.09 KG/M2 | DIASTOLIC BLOOD PRESSURE: 86 MMHG | HEART RATE: 92 BPM | SYSTOLIC BLOOD PRESSURE: 132 MMHG | OXYGEN SATURATION: 97 % | WEIGHT: 217.63 LBS | TEMPERATURE: 98 F | HEIGHT: 61 IN

## 2025-03-26 DIAGNOSIS — J44.9 CHRONIC OBSTRUCTIVE PULMONARY DISEASE, UNSPECIFIED COPD TYPE: Primary | ICD-10-CM

## 2025-03-26 DIAGNOSIS — I10 ESSENTIAL HYPERTENSION: ICD-10-CM

## 2025-03-26 DIAGNOSIS — G47.33 OSA (OBSTRUCTIVE SLEEP APNEA): ICD-10-CM

## 2025-03-26 PROCEDURE — 1100F PTFALLS ASSESS-DOCD GE2>/YR: CPT | Mod: CPTII,S$GLB,, | Performed by: FAMILY MEDICINE

## 2025-03-26 PROCEDURE — 1159F MED LIST DOCD IN RCRD: CPT | Mod: CPTII,S$GLB,, | Performed by: FAMILY MEDICINE

## 2025-03-26 PROCEDURE — 3288F FALL RISK ASSESSMENT DOCD: CPT | Mod: CPTII,S$GLB,, | Performed by: FAMILY MEDICINE

## 2025-03-26 PROCEDURE — 1160F RVW MEDS BY RX/DR IN RCRD: CPT | Mod: CPTII,S$GLB,, | Performed by: FAMILY MEDICINE

## 2025-03-26 PROCEDURE — 3075F SYST BP GE 130 - 139MM HG: CPT | Mod: CPTII,S$GLB,, | Performed by: FAMILY MEDICINE

## 2025-03-26 PROCEDURE — 1126F AMNT PAIN NOTED NONE PRSNT: CPT | Mod: CPTII,S$GLB,, | Performed by: FAMILY MEDICINE

## 2025-03-26 PROCEDURE — 99214 OFFICE O/P EST MOD 30 MIN: CPT | Mod: S$GLB,,, | Performed by: FAMILY MEDICINE

## 2025-03-26 PROCEDURE — 3079F DIAST BP 80-89 MM HG: CPT | Mod: CPTII,S$GLB,, | Performed by: FAMILY MEDICINE

## 2025-03-26 RX ORDER — IPRATROPIUM BROMIDE AND ALBUTEROL SULFATE 2.5; .5 MG/3ML; MG/3ML
3 SOLUTION RESPIRATORY (INHALATION) EVERY 6 HOURS PRN
Qty: 60 EACH | Refills: 0 | Status: SHIPPED | OUTPATIENT
Start: 2025-03-26 | End: 2026-03-26

## 2025-03-27 ENCOUNTER — TELEPHONE (OUTPATIENT)
Dept: FAMILY MEDICINE | Facility: CLINIC | Age: 75
End: 2025-03-27
Payer: MEDICARE

## 2025-03-30 NOTE — PROGRESS NOTES
" Patient ID: Hannah Norman is a 75 y.o. female.    Chief Complaint: Follow-up    HPI    Hannah Norman is a 75 y.o. female     History of Present Illness    CHIEF COMPLAINT:  Patient presents today for follow up of respiratory symptoms after calling the office reporting shortness of breath.    RESPIRATORY SYMPTOMS:  She reports constant coughing, wheezing, and nasal congestion, with symptoms worsening at night. She takes breathing treatments 3 times daily and uses a rescue inhaler. Previous treatments with steroid tablets, antibiotics, and injectable medication were not effective. She believes she contracted the illness from her great-grandchild who resides with her.    SLEEP APNEA:  She uses a nasal CPAP mask for sleep apnea but reports possible ineffectiveness. Her partner observes shallow breathing during sleep with periods where she appears to not inhale air from the machine. She acknowledges mouth-breathing during sleep and denies using a chin strap. She previously attempted using a full face mask but did not tolerate it well.         Vitals:    03/26/25 1434 03/26/25 1528   BP: (!) 138/90 132/86   BP Location: Right arm    Patient Position: Sitting    Pulse: 92    Temp: 97.8 °F (36.6 °C)    TempSrc: Oral    SpO2: 97%    Weight: 98.7 kg (217 lb 9.5 oz)    Height: 5' 1" (1.549 m)             Review of Symptoms      Physical Exam    Constitutional:  Oriented to person, place, and time.appears well-developed and well-nourished.  No distress.    Audible nasal congestion  HENT  Head: Normocephalic and atraumatic  Right Ear: External ear normal.   Left Ear: External ear normal.   Nose: External nose normal.   Mouth:  Moist mucus membranes.    Eyes:  Conjunctivae are normal. Right eye exhibits no discharge.  Left eye exhibits no discharge. No scleral icterus.  No periorbital edema    Cardiovascular:  Regular rate and rhythm with normal S1 and S2     Pulmonary/Chest:   Clear to auscultation bilaterally faint wheezes " without rhonchi or rales        Musculoskeletal:  No edema. No obvious deformity No wasting       Neurological:  Alert and oriented to person, place, and time.   Coordination normal.     Skin:   Skin is warm and dry.  No diaphoresis.   No rash noted.     Psychiatric: Normal mood and affect. Behavior is normal.  Judgment and thought content normal.     Physical Exam    Throat: Pharynx appears normal.  Lungs: All normal.         Complete Blood Count  Lab Results   Component Value Date    RBC 4.72 12/18/2024    HGB 14.9 12/18/2024    HCT 45.8 12/18/2024    MCV 97 12/18/2024    MCH 31.6 (H) 12/18/2024    MCHC 32.5 12/18/2024    RDW 12.6 12/18/2024     12/18/2024    MPV 10.1 12/18/2024    GRAN 5.4 12/18/2024    GRAN 53.0 12/18/2024    LYMPH 3.6 12/18/2024    LYMPH 35.1 12/18/2024    MONO 0.8 12/18/2024    MONO 7.9 12/18/2024    EOS 0.3 12/18/2024    BASO 0.06 12/18/2024    EOSINOPHIL 2.8 12/18/2024    BASOPHIL 0.6 12/18/2024    DIFFMETHOD Automated 12/18/2024       Comprehensive Metabolic Panel  Lab Results   Component Value Date     (H) 12/18/2024    BUN 20 (H) 12/18/2024    CREATININE 1.06 12/18/2024     12/18/2024    K 4.7 12/18/2024     12/18/2024    PROT 7.4 12/18/2024    ALBUMIN 4.6 12/18/2024    BILITOT 0.5 12/18/2024    AST 31 12/18/2024    ALKPHOS 96 12/18/2024    CO2 27 12/18/2024    ALT 20 12/18/2024    ANIONGAP 11 12/18/2024       TSH  Lab Results   Component Value Date    TSH 3.340 12/18/2024       Assessment / Plan:      ICD-10-CM ICD-9-CM   1. Chronic obstructive pulmonary disease, unspecified COPD type  J44.9 496   2. Essential hypertension  I10 401.9   3. MARY (obstructive sleep apnea)  G47.33 327.23     Chronic obstructive pulmonary disease, unspecified COPD type  -     albuterol-ipratropium (DUO-NEB) 2.5 mg-0.5 mg/3 mL nebulizer solution; Take 3 mLs by nebulization every 6 (six) hours as needed for Wheezing. Rescue  Dispense: 60 each; Refill: 0  -     CPAP/BIPAP  SUPPLIES    Essential hypertension    MARY (obstructive sleep apnea)  Comments:  Needs chinstrap has nasal pillows sounds like air leaking from her mouth.        Assessment & Plan    - Presenting with respiratory symptoms including wheezing, coughing, and nasal congestion, likely due to viral infection, possibly RSV.  - Lungs sound better than expected given symptoms, indicating significant upper airway involvement.  - Previous treatments including steroids, antibiotics, and nasal spray have been ineffective.  - Decided against further steroid or antibiotic treatment due to lack of efficacy in current illness.  - Anticipate gradual improvement over next 3-5 days, with full recovery potentially taking a few weeks.  - Low suspicion for development of pneumonia based on current presentation.  - Discussed that testing for specific viral causes at this point would not .    OBSTRUCTIVE SLEEP APNEA:  - Ordered a chin strap for CPAP machine to prevent mouth opening during sleep.  - Evaluated the current CPAP setup, which includes a nasal mask without a chin strap.  - Noted that the patient opens mouth during sleep, potentially affecting treatment efficacy.  - Recommend trying a chin strap or considering a full face mask to improve CPAP efficacy.  - Mentioned a BiPAP machine as a potential alternative.  - Consider referral to sleep medicine for a potential sleep study to evaluate the effectiveness of the current CPAP setup and potentially adjust treatment.  - Instructed the patient to report if partner continues to observe shallow breathing or only air coming out of the CPAP machine.    ACUTE UPPER RESPIRATORY INFECTION:  - Assessed the condition as a viral infection, possibly RSV.  - Educated the patient on the likely viral nature of illness and expected course, including potential for fluctuating symptoms but overall gradual improvement.  - Examined throat and lungs, noting that lungs don't sound as severe  as expected given the symptoms.  - Observed nasal congestion and suspected bronchitis based on symptoms.  - Anticipate full recovery in approximately 2 weeks.    COUGH:  - Noted that the patient reports coughing constantly, with symptoms exacerbating at night.  - Prescribed Duoneb medication for use with nebulizer machine instead of inhaler for potentially better lung delivery and to help reduce coughing.    SHORTNESS OF BREATH:  - Addressed the patient's reported shortness of breath.  - Prescribed Duoneb medication for use with nebulizer machine instead of inhaler for potentially better lung delivery and to alleviate shortness of breath.    WHEEZING:  - Noted that the patient reports constant wheezing, with symptoms worsening at night.  - Prescribed Duoneb medication for use with nebulizer machine instead of inhaler for potentially better lung delivery and to help with breathing and wheezing.    NASAL CONGESTION:  - Observed nasal congestion during exam.  - Recommend continuing use of previously prescribed nasal spray for nasal congestion.  - Recommend continuing use of nasal spray.    FOLLOW-UP:  - Advised to follow up in 3-5 days if symptoms worsen or fail to improve as expected.         This note was generated with the assistance of ambient listening technology. Verbal consent was obtained by the patient and accompanying visitor(s) for the recording of patient appointment to facilitate this note. I attest to having reviewed and edited the generated note for accuracy, though some syntax or spelling errors may persist. Please contact the author of this note for any clarification.

## 2025-06-27 NOTE — PLAN OF CARE
DISCHARGE INSTRUCTIONS GIVEN,VERBALIZED UNDERSTANDING, VSS, IV REMOVED WITH TIP INTACT. PREPARING PATIENT FOR DISCHARGE HOME.  AT BEDSIDE ASSISTING HER TO GET DRESSED.     Failed outpatient conservative measures  Electing to undergo surgical procedure as stated above

## 2025-08-27 ENCOUNTER — HOSPITAL ENCOUNTER (INPATIENT)
Facility: HOSPITAL | Age: 75
LOS: 2 days | Discharge: HOME HEALTH | DRG: 178 | End: 2025-08-30
Attending: EMERGENCY MEDICINE | Admitting: STUDENT IN AN ORGANIZED HEALTH CARE EDUCATION/TRAINING PROGRAM
Payer: MEDICARE

## 2025-08-27 ENCOUNTER — TELEPHONE (OUTPATIENT)
Dept: FAMILY MEDICINE | Facility: CLINIC | Age: 75
End: 2025-08-27
Payer: MEDICARE

## 2025-08-27 DIAGNOSIS — U07.1 COVID: ICD-10-CM

## 2025-08-27 DIAGNOSIS — R53.83 FATIGUE, UNSPECIFIED TYPE: ICD-10-CM

## 2025-08-27 DIAGNOSIS — R55 SYNCOPE: Primary | ICD-10-CM

## 2025-08-27 DIAGNOSIS — U07.1 COVID-19 VIRUS DETECTED: ICD-10-CM

## 2025-08-27 DIAGNOSIS — R07.9 CHEST PAIN: ICD-10-CM

## 2025-08-27 LAB
ABSOLUTE EOSINOPHIL (OHS): 0.04 K/UL
ABSOLUTE MONOCYTE (OHS): 0.82 K/UL (ref 0.3–1)
ABSOLUTE NEUTROPHIL COUNT (OHS): 4.74 K/UL (ref 1.8–7.7)
ALBUMIN SERPL BCP-MCNC: 4 G/DL (ref 3.5–5.2)
ALP SERPL-CCNC: 84 UNIT/L (ref 38–126)
ALT SERPL W/O P-5'-P-CCNC: 20 UNIT/L (ref 10–44)
ANION GAP (OHS): 9 MMOL/L (ref 8–16)
AST SERPL-CCNC: 23 UNIT/L (ref 15–46)
BASOPHILS # BLD AUTO: 0.02 K/UL
BASOPHILS NFR BLD AUTO: 0.3 %
BILIRUB SERPL-MCNC: 0.3 MG/DL (ref 0.1–1)
BUN SERPL-MCNC: 21 MG/DL (ref 7–17)
CALCIUM SERPL-MCNC: 8.6 MG/DL (ref 8.7–10.5)
CHLORIDE SERPL-SCNC: 99 MMOL/L (ref 95–110)
CO2 SERPL-SCNC: 26 MMOL/L (ref 23–29)
CREAT SERPL-MCNC: 1.2 MG/DL (ref 0.5–1.4)
ERYTHROCYTE [DISTWIDTH] IN BLOOD BY AUTOMATED COUNT: 12.1 % (ref 11.5–14.5)
GFR SERPLBLD CREATININE-BSD FMLA CKD-EPI: 47 ML/MIN/1.73/M2
GLUCOSE SERPL-MCNC: 143 MG/DL (ref 70–110)
HCT VFR BLD AUTO: 44.4 % (ref 37–48.5)
HGB BLD-MCNC: 14.9 GM/DL (ref 12–16)
IMM GRANULOCYTES # BLD AUTO: 0.02 K/UL (ref 0–0.04)
IMM GRANULOCYTES NFR BLD AUTO: 0.3 % (ref 0–0.5)
LYMPHOCYTES # BLD AUTO: 2.23 K/UL (ref 1–4.8)
MAGNESIUM SERPL-MCNC: 1.9 MG/DL (ref 1.6–2.6)
MCH RBC QN AUTO: 31.2 PG (ref 27–31)
MCHC RBC AUTO-ENTMCNC: 33.6 G/DL (ref 32–36)
MCV RBC AUTO: 93 FL (ref 82–98)
NUCLEATED RBC (/100WBC) (OHS): 0 /100 WBC
PLATELET # BLD AUTO: 284 K/UL (ref 150–450)
PMV BLD AUTO: 10 FL (ref 9.2–12.9)
POTASSIUM SERPL-SCNC: 3.8 MMOL/L (ref 3.5–5.1)
PROT SERPL-MCNC: 7 GM/DL (ref 6–8.4)
RBC # BLD AUTO: 4.78 M/UL (ref 4–5.4)
RELATIVE EOSINOPHIL (OHS): 0.5 %
RELATIVE LYMPHOCYTE (OHS): 28.3 % (ref 18–48)
RELATIVE MONOCYTE (OHS): 10.4 % (ref 4–15)
RELATIVE NEUTROPHIL (OHS): 60.2 % (ref 38–73)
SARS-COV-2 RDRP RESP QL NAA+PROBE: POSITIVE
SODIUM SERPL-SCNC: 134 MMOL/L (ref 136–145)
TROPONIN I SERPL DL<=0.01 NG/ML-MCNC: <0.012 NG/ML (ref 0–0.03)
WBC # BLD AUTO: 7.87 K/UL (ref 3.9–12.7)

## 2025-08-27 PROCEDURE — G0378 HOSPITAL OBSERVATION PER HR: HCPCS | Mod: ER

## 2025-08-27 PROCEDURE — 84484 ASSAY OF TROPONIN QUANT: CPT | Mod: ER | Performed by: PHYSICIAN ASSISTANT

## 2025-08-27 PROCEDURE — 93010 ELECTROCARDIOGRAM REPORT: CPT | Mod: ,,, | Performed by: INTERNAL MEDICINE

## 2025-08-27 PROCEDURE — 83735 ASSAY OF MAGNESIUM: CPT | Mod: ER | Performed by: PHYSICIAN ASSISTANT

## 2025-08-27 PROCEDURE — 99900035 HC TECH TIME PER 15 MIN (STAT): Mod: ER

## 2025-08-27 PROCEDURE — U0002 COVID-19 LAB TEST NON-CDC: HCPCS | Mod: ER | Performed by: PHYSICIAN ASSISTANT

## 2025-08-27 PROCEDURE — 85025 COMPLETE CBC W/AUTO DIFF WBC: CPT | Mod: ER | Performed by: PHYSICIAN ASSISTANT

## 2025-08-27 PROCEDURE — 94761 N-INVAS EAR/PLS OXIMETRY MLT: CPT | Mod: ER

## 2025-08-27 PROCEDURE — 80053 COMPREHEN METABOLIC PANEL: CPT | Mod: ER | Performed by: PHYSICIAN ASSISTANT

## 2025-08-27 PROCEDURE — 93005 ELECTROCARDIOGRAM TRACING: CPT | Mod: ER

## 2025-08-28 ENCOUNTER — TELEPHONE (OUTPATIENT)
Dept: FAMILY MEDICINE | Facility: CLINIC | Age: 75
End: 2025-08-28
Payer: MEDICARE

## 2025-08-28 PROBLEM — E66.9 OBESITY: Status: ACTIVE | Noted: 2025-08-28

## 2025-08-28 PROBLEM — R55 SYNCOPE: Status: ACTIVE | Noted: 2025-08-28

## 2025-08-28 LAB
ABSOLUTE EOSINOPHIL (OHS): 0.06 K/UL
ABSOLUTE MONOCYTE (OHS): 0.71 K/UL (ref 0.3–1)
ABSOLUTE NEUTROPHIL COUNT (OHS): 3.57 K/UL (ref 1.8–7.7)
ANION GAP (OHS): 10 MMOL/L (ref 8–16)
BASOPHILS # BLD AUTO: 0.02 K/UL
BASOPHILS NFR BLD AUTO: 0.3 %
BUN SERPL-MCNC: 21 MG/DL (ref 8–23)
CALCIUM SERPL-MCNC: 9 MG/DL (ref 8.7–10.5)
CHLORIDE SERPL-SCNC: 104 MMOL/L (ref 95–110)
CO2 SERPL-SCNC: 24 MMOL/L (ref 23–29)
CREAT SERPL-MCNC: 1 MG/DL (ref 0.5–1.4)
CRP SERPL-MCNC: 43.91 MG/L
ERYTHROCYTE [DISTWIDTH] IN BLOOD BY AUTOMATED COUNT: 12.5 % (ref 11.5–14.5)
FERRITIN SERPL-MCNC: 146.9 NG/ML (ref 20–300)
GFR SERPLBLD CREATININE-BSD FMLA CKD-EPI: 59 ML/MIN/1.73/M2
GLUCOSE SERPL-MCNC: 128 MG/DL (ref 70–110)
HCT VFR BLD AUTO: 45.8 % (ref 37–48.5)
HGB BLD-MCNC: 14.7 GM/DL (ref 12–16)
IMM GRANULOCYTES # BLD AUTO: 0.03 K/UL (ref 0–0.04)
IMM GRANULOCYTES NFR BLD AUTO: 0.5 % (ref 0–0.5)
INR PPP: 1 (ref 0.8–1.2)
LYMPHOCYTES # BLD AUTO: 2.07 K/UL (ref 1–4.8)
MCH RBC QN AUTO: 30.4 PG (ref 27–31)
MCHC RBC AUTO-ENTMCNC: 32.1 G/DL (ref 32–36)
MCV RBC AUTO: 95 FL (ref 82–98)
NUCLEATED RBC (/100WBC) (OHS): 0 /100 WBC
OHS QRS DURATION: 74 MS
OHS QTC CALCULATION: 435 MS
PLATELET # BLD AUTO: 258 K/UL (ref 150–450)
PMV BLD AUTO: 10.5 FL (ref 9.2–12.9)
POTASSIUM SERPL-SCNC: 4.4 MMOL/L (ref 3.5–5.1)
PROTHROMBIN TIME: 11.6 SECONDS (ref 9–12.5)
RBC # BLD AUTO: 4.83 M/UL (ref 4–5.4)
RELATIVE EOSINOPHIL (OHS): 0.9 %
RELATIVE LYMPHOCYTE (OHS): 32 % (ref 18–48)
RELATIVE MONOCYTE (OHS): 11 % (ref 4–15)
RELATIVE NEUTROPHIL (OHS): 55.3 % (ref 38–73)
SODIUM SERPL-SCNC: 138 MMOL/L (ref 136–145)
TROPONIN I SERPL HS-MCNC: <3 NG/L
WBC # BLD AUTO: 6.46 K/UL (ref 3.9–12.7)

## 2025-08-28 PROCEDURE — 63600175 PHARM REV CODE 636 W HCPCS: Mod: JZ,TB | Performed by: FAMILY MEDICINE

## 2025-08-28 PROCEDURE — 27000207 HC ISOLATION

## 2025-08-28 PROCEDURE — 85025 COMPLETE CBC W/AUTO DIFF WBC: CPT | Performed by: FAMILY MEDICINE

## 2025-08-28 PROCEDURE — 84484 ASSAY OF TROPONIN QUANT: CPT | Performed by: FAMILY MEDICINE

## 2025-08-28 PROCEDURE — 86141 C-REACTIVE PROTEIN HS: CPT | Performed by: FAMILY MEDICINE

## 2025-08-28 PROCEDURE — 82728 ASSAY OF FERRITIN: CPT | Performed by: FAMILY MEDICINE

## 2025-08-28 PROCEDURE — XW033E5 INTRODUCTION OF REMDESIVIR ANTI-INFECTIVE INTO PERIPHERAL VEIN, PERCUTANEOUS APPROACH, NEW TECHNOLOGY GROUP 5: ICD-10-PCS | Performed by: FAMILY MEDICINE

## 2025-08-28 PROCEDURE — 25500020 PHARM REV CODE 255: Performed by: INTERNAL MEDICINE

## 2025-08-28 PROCEDURE — 25000003 PHARM REV CODE 250

## 2025-08-28 PROCEDURE — 25000003 PHARM REV CODE 250: Performed by: STUDENT IN AN ORGANIZED HEALTH CARE EDUCATION/TRAINING PROGRAM

## 2025-08-28 PROCEDURE — 99900035 HC TECH TIME PER 15 MIN (STAT)

## 2025-08-28 PROCEDURE — 36415 COLL VENOUS BLD VENIPUNCTURE: CPT | Performed by: FAMILY MEDICINE

## 2025-08-28 PROCEDURE — 80048 BASIC METABOLIC PNL TOTAL CA: CPT | Performed by: FAMILY MEDICINE

## 2025-08-28 PROCEDURE — 85610 PROTHROMBIN TIME: CPT | Performed by: FAMILY MEDICINE

## 2025-08-28 PROCEDURE — 25000003 PHARM REV CODE 250: Performed by: FAMILY MEDICINE

## 2025-08-28 PROCEDURE — 94761 N-INVAS EAR/PLS OXIMETRY MLT: CPT

## 2025-08-28 PROCEDURE — 11000001 HC ACUTE MED/SURG PRIVATE ROOM

## 2025-08-28 RX ORDER — SODIUM CHLORIDE 9 MG/ML
INJECTION, SOLUTION INTRAVENOUS ONCE
Status: COMPLETED | OUTPATIENT
Start: 2025-08-28 | End: 2025-08-28

## 2025-08-28 RX ORDER — NALOXONE HCL 0.4 MG/ML
0.02 VIAL (ML) INJECTION
Status: DISCONTINUED | OUTPATIENT
Start: 2025-08-28 | End: 2025-08-30 | Stop reason: HOSPADM

## 2025-08-28 RX ORDER — MUPIROCIN 20 MG/G
OINTMENT TOPICAL 2 TIMES DAILY
Status: DISCONTINUED | OUTPATIENT
Start: 2025-08-28 | End: 2025-08-30 | Stop reason: HOSPADM

## 2025-08-28 RX ORDER — IBUPROFEN 200 MG
24 TABLET ORAL
Status: DISCONTINUED | OUTPATIENT
Start: 2025-08-28 | End: 2025-08-30 | Stop reason: HOSPADM

## 2025-08-28 RX ORDER — GLUCAGON 1 MG
1 KIT INJECTION
Status: DISCONTINUED | OUTPATIENT
Start: 2025-08-28 | End: 2025-08-30 | Stop reason: HOSPADM

## 2025-08-28 RX ORDER — ONDANSETRON HYDROCHLORIDE 2 MG/ML
4 INJECTION, SOLUTION INTRAVENOUS EVERY 8 HOURS PRN
Status: DISCONTINUED | OUTPATIENT
Start: 2025-08-28 | End: 2025-08-30

## 2025-08-28 RX ORDER — LOSARTAN POTASSIUM 50 MG/1
50 TABLET ORAL DAILY
Status: DISCONTINUED | OUTPATIENT
Start: 2025-08-28 | End: 2025-08-29

## 2025-08-28 RX ORDER — VENLAFAXINE HYDROCHLORIDE 75 MG/1
150 CAPSULE, EXTENDED RELEASE ORAL 2 TIMES DAILY
Status: DISCONTINUED | OUTPATIENT
Start: 2025-08-28 | End: 2025-08-30 | Stop reason: HOSPADM

## 2025-08-28 RX ORDER — IBUPROFEN 200 MG
16 TABLET ORAL
Status: DISCONTINUED | OUTPATIENT
Start: 2025-08-28 | End: 2025-08-30 | Stop reason: HOSPADM

## 2025-08-28 RX ORDER — ATORVASTATIN CALCIUM 40 MG/1
40 TABLET, FILM COATED ORAL DAILY
Status: DISCONTINUED | OUTPATIENT
Start: 2025-08-28 | End: 2025-08-30 | Stop reason: HOSPADM

## 2025-08-28 RX ORDER — ACETAMINOPHEN 325 MG/1
650 TABLET ORAL EVERY 6 HOURS PRN
Status: DISCONTINUED | OUTPATIENT
Start: 2025-08-28 | End: 2025-08-30 | Stop reason: HOSPADM

## 2025-08-28 RX ORDER — SODIUM CHLORIDE 0.9 % (FLUSH) 0.9 %
10 SYRINGE (ML) INJECTION EVERY 12 HOURS PRN
Status: DISCONTINUED | OUTPATIENT
Start: 2025-08-28 | End: 2025-08-30 | Stop reason: HOSPADM

## 2025-08-28 RX ORDER — IPRATROPIUM BROMIDE AND ALBUTEROL SULFATE 2.5; .5 MG/3ML; MG/3ML
3 SOLUTION RESPIRATORY (INHALATION) EVERY 4 HOURS PRN
Status: DISCONTINUED | OUTPATIENT
Start: 2025-08-28 | End: 2025-08-30 | Stop reason: HOSPADM

## 2025-08-28 RX ADMIN — VENLAFAXINE HYDROCHLORIDE 150 MG: 75 CAPSULE, EXTENDED RELEASE ORAL at 10:08

## 2025-08-28 RX ADMIN — ATORVASTATIN CALCIUM 40 MG: 40 TABLET, FILM COATED ORAL at 08:08

## 2025-08-28 RX ADMIN — LOSARTAN POTASSIUM 50 MG: 50 TABLET, FILM COATED ORAL at 08:08

## 2025-08-28 RX ADMIN — REMDESIVIR 200 MG: 100 INJECTION, POWDER, LYOPHILIZED, FOR SOLUTION INTRAVENOUS at 04:08

## 2025-08-28 RX ADMIN — HUMAN ALBUMIN MICROSPHERES AND PERFLUTREN 0.11 MG: 10; .22 INJECTION, SOLUTION INTRAVENOUS at 11:08

## 2025-08-28 RX ADMIN — SODIUM CHLORIDE: 9 INJECTION, SOLUTION INTRAVENOUS at 04:08

## 2025-08-28 RX ADMIN — MUPIROCIN: 20 OINTMENT TOPICAL at 10:08

## 2025-08-29 LAB
ANION GAP (OHS): 7 MMOL/L (ref 8–16)
AORTIC SIZE INDEX (SOV): 1.4 CM/M2
AORTIC SIZE INDEX: 1.4 CM/M2
APICAL FOUR CHAMBER EJECTION FRACTION: 57 %
APICAL TWO CHAMBER EJECTION FRACTION: 68 %
ASCENDING AORTA: 2.9 CM
AV INDEX (PROSTH): 0.85
AV MEAN GRADIENT: 6 MMHG
AV PEAK GRADIENT: 10 MMHG
AV VALVE AREA BY VELOCITY RATIO: 2.3 CM²
AV VALVE AREA: 2.4 CM²
AV VELOCITY RATIO: 0.81
BSA FOR ECHO PROCEDURE: 2.29 M2
BUN SERPL-MCNC: 16 MG/DL (ref 8–23)
CALCIUM SERPL-MCNC: 8.8 MG/DL (ref 8.7–10.5)
CHLORIDE SERPL-SCNC: 107 MMOL/L (ref 95–110)
CO2 SERPL-SCNC: 25 MMOL/L (ref 23–29)
CREAT SERPL-MCNC: 0.8 MG/DL (ref 0.5–1.4)
CV ECHO LV RWT: 0.58 CM
DOP CALC AO PEAK VEL: 1.6 M/S
DOP CALC AO VTI: 29.7 CM
DOP CALC LVOT AREA: 2.8 CM2
DOP CALC LVOT DIAMETER: 1.9 CM
DOP CALC LVOT PEAK VEL: 1.3 M/S
DOP CALC MV VTI: 24.4 CM
DOP CALCLVOT PEAK VEL VTI: 25.2 CM
E WAVE DECELERATION TIME: 189 MSEC
E/A RATIO: 0.68
E/E' RATIO: 8 M/S
ECHO LV POSTERIOR WALL: 0.9 CM (ref 0.6–1.1)
FRACTIONAL SHORTENING: 32.3 % (ref 28–44)
GFR SERPLBLD CREATININE-BSD FMLA CKD-EPI: >60 ML/MIN/1.73/M2
GLUCOSE SERPL-MCNC: 150 MG/DL (ref 70–110)
HR MV ECHO: 92 BPM
INTERVENTRICULAR SEPTUM: 1.3 CM (ref 0.6–1.1)
IVC DIAMETER: 1.12 CM
LEFT ATRIUM AREA SYSTOLIC (APICAL 2 CHAMBER): 12.34 CM2
LEFT ATRIUM AREA SYSTOLIC (APICAL 4 CHAMBER): 8.6 CM2
LEFT ATRIUM VOLUME INDEX MOD: 11 ML/M2
LEFT ATRIUM VOLUME MOD: 23 ML
LEFT INTERNAL DIMENSION IN SYSTOLE: 2.1 CM (ref 2.1–4)
LEFT VENTRICLE DIASTOLIC VOLUME INDEX: 17.76 ML/M2
LEFT VENTRICLE DIASTOLIC VOLUME: 38 ML
LEFT VENTRICLE END DIASTOLIC VOLUME APICAL 2 CHAMBER: 51.34 ML
LEFT VENTRICLE END DIASTOLIC VOLUME APICAL 4 CHAMBER INDEX BSA: 25.18 ML/M2
LEFT VENTRICLE END DIASTOLIC VOLUME APICAL 4 CHAMBER: 53.88 ML
LEFT VENTRICLE END SYSTOLIC VOLUME APICAL 2 CHAMBER: 25.38 ML
LEFT VENTRICLE END SYSTOLIC VOLUME APICAL 4 CHAMBER: 15.75 ML
LEFT VENTRICLE MASS INDEX: 46.6 G/M2
LEFT VENTRICLE SYSTOLIC VOLUME INDEX: 6.5 ML/M2
LEFT VENTRICLE SYSTOLIC VOLUME: 14 ML
LEFT VENTRICULAR INTERNAL DIMENSION IN DIASTOLE: 3.1 CM (ref 3.5–6)
LEFT VENTRICULAR MASS: 99.7 G
LV LATERAL E/E' RATIO: 6.5 M/S
LV SEPTAL E/E' RATIO: 11.8 M/S
LVED V (TEICH): 38.48 ML
LVES V (TEICH): 14.42 ML
LVOT MG: 4.45 MMHG
LVOT MV: 1.02 CM/S
Lab: 1.9 CM/M
Lab: 2 CM/M
MV A" WAVE DURATION": 140.82 MSEC
MV MEAN GRADIENT: 3 MMHG
MV PEAK A VEL: 1.04 M/S
MV PEAK E VEL: 0.71 M/S
MV PEAK GRADIENT: 6 MMHG
MV STENOSIS PRESSURE HALF TIME: 54.84 MS
MV VALVE AREA BY CONTINUITY EQUATION: 2.93 CM2
MV VALVE AREA P 1/2 METHOD: 4.01 CM2
OHS CV CPX PATIENT HEIGHT IN: 61
OHS CV RV/LV RATIO: 0.87 CM
OHS LV EJECTION FRACTION SIMPSONS BIPLANE MOD: 63 %
PISA TR MAX VEL: 2.2 M/S
POTASSIUM SERPL-SCNC: 5.1 MMOL/L (ref 3.5–5.1)
PULM VEIN S/D RATIO: 1.59
PV MV: 0.7 M/S
PV PEAK D VEL: 0.29 M/S
PV PEAK GRADIENT: 4 MMHG
PV PEAK S VEL: 0.46 M/S
PV PEAK VELOCITY: 1.03 M/S
RA PRESSURE ESTIMATED: 3 MMHG
RA VOL SYS: 10.63 ML
RIGHT ATRIAL AREA: 6.7 CM2
RIGHT ATRIUM END SYSTOLIC VOLUME APICAL 4 CHAMBER INDEX BSA: 5 ML/M2
RIGHT ATRIUM VOLUME AREA LENGTH APICAL 4 CHAMBER: 10 ML
RIGHT VENTRICLE DIASTOLIC BASEL DIMENSION: 2.7 CM
RV TB RVSP: 5 MMHG
RV TISSUE DOPPLER FREE WALL SYSTOLIC VELOCITY 1 (APICAL 4 CHAMBER VIEW): 12.54 CM/S
SINUS: 3.1 CM
SODIUM SERPL-SCNC: 139 MMOL/L (ref 136–145)
STJ: 2.8 CM
TDI LATERAL: 0.11 M/S
TDI SEPTAL: 0.06 M/S
TDI: 0.09 M/S
TR MAX PG: 19 MMHG
TRICUSPID ANNULAR PLANE SYSTOLIC EXCURSION: 1.4 CM
TV REST PULMONARY ARTERY PRESSURE: 22 MMHG
Z-SCORE OF LEFT VENTRICULAR DIMENSION IN END DIASTOLE: -7.97
Z-SCORE OF LEFT VENTRICULAR DIMENSION IN END SYSTOLE: -5.47

## 2025-08-29 PROCEDURE — 94761 N-INVAS EAR/PLS OXIMETRY MLT: CPT

## 2025-08-29 PROCEDURE — 36415 COLL VENOUS BLD VENIPUNCTURE: CPT | Performed by: FAMILY MEDICINE

## 2025-08-29 PROCEDURE — 25000003 PHARM REV CODE 250: Performed by: FAMILY MEDICINE

## 2025-08-29 PROCEDURE — 97530 THERAPEUTIC ACTIVITIES: CPT

## 2025-08-29 PROCEDURE — 63600175 PHARM REV CODE 636 W HCPCS: Performed by: STUDENT IN AN ORGANIZED HEALTH CARE EDUCATION/TRAINING PROGRAM

## 2025-08-29 PROCEDURE — 27000207 HC ISOLATION

## 2025-08-29 PROCEDURE — 63600175 PHARM REV CODE 636 W HCPCS: Performed by: FAMILY MEDICINE

## 2025-08-29 PROCEDURE — 11000001 HC ACUTE MED/SURG PRIVATE ROOM

## 2025-08-29 PROCEDURE — 99900035 HC TECH TIME PER 15 MIN (STAT)

## 2025-08-29 PROCEDURE — 82310 ASSAY OF CALCIUM: CPT | Performed by: FAMILY MEDICINE

## 2025-08-29 PROCEDURE — 25000003 PHARM REV CODE 250

## 2025-08-29 PROCEDURE — 97162 PT EVAL MOD COMPLEX 30 MIN: CPT

## 2025-08-29 RX ORDER — LOSARTAN POTASSIUM 50 MG/1
50 TABLET ORAL DAILY
Status: DISCONTINUED | OUTPATIENT
Start: 2025-08-30 | End: 2025-08-30 | Stop reason: HOSPADM

## 2025-08-29 RX ORDER — PREDNISONE 10 MG/1
10 TABLET ORAL DAILY
Qty: 5 TABLET | Refills: 0 | Status: SHIPPED | OUTPATIENT
Start: 2025-08-29

## 2025-08-29 RX ORDER — ENOXAPARIN SODIUM 100 MG/ML
40 INJECTION SUBCUTANEOUS EVERY 24 HOURS
Status: DISCONTINUED | OUTPATIENT
Start: 2025-08-29 | End: 2025-08-30 | Stop reason: HOSPADM

## 2025-08-29 RX ADMIN — MUPIROCIN: 20 OINTMENT TOPICAL at 08:08

## 2025-08-29 RX ADMIN — REMDESIVIR 100 MG: 100 INJECTION, POWDER, LYOPHILIZED, FOR SOLUTION INTRAVENOUS at 09:08

## 2025-08-29 RX ADMIN — ONDANSETRON 4 MG: 2 INJECTION INTRAMUSCULAR; INTRAVENOUS at 08:08

## 2025-08-29 RX ADMIN — ATORVASTATIN CALCIUM 40 MG: 40 TABLET, FILM COATED ORAL at 09:08

## 2025-08-29 RX ADMIN — ENOXAPARIN SODIUM 40 MG: 40 INJECTION SUBCUTANEOUS at 05:08

## 2025-08-29 RX ADMIN — VENLAFAXINE HYDROCHLORIDE 150 MG: 75 CAPSULE, EXTENDED RELEASE ORAL at 08:08

## 2025-08-29 RX ADMIN — MUPIROCIN: 20 OINTMENT TOPICAL at 09:08

## 2025-08-29 RX ADMIN — VENLAFAXINE HYDROCHLORIDE 150 MG: 75 CAPSULE, EXTENDED RELEASE ORAL at 09:08

## 2025-08-30 VITALS
BODY MASS INDEX: 50.61 KG/M2 | DIASTOLIC BLOOD PRESSURE: 84 MMHG | WEIGHT: 268.06 LBS | TEMPERATURE: 98 F | HEIGHT: 61 IN | OXYGEN SATURATION: 94 % | SYSTOLIC BLOOD PRESSURE: 177 MMHG | HEART RATE: 91 BPM | RESPIRATION RATE: 17 BRPM

## 2025-08-30 LAB
ANION GAP (OHS): 8 MMOL/L (ref 8–16)
BUN SERPL-MCNC: 15 MG/DL (ref 8–23)
CALCIUM SERPL-MCNC: 8.5 MG/DL (ref 8.7–10.5)
CHLORIDE SERPL-SCNC: 105 MMOL/L (ref 95–110)
CO2 SERPL-SCNC: 25 MMOL/L (ref 23–29)
CREAT SERPL-MCNC: 0.7 MG/DL (ref 0.5–1.4)
GFR SERPLBLD CREATININE-BSD FMLA CKD-EPI: >60 ML/MIN/1.73/M2
GLUCOSE SERPL-MCNC: 109 MG/DL (ref 70–110)
HOLD SPECIMEN: NORMAL
POTASSIUM SERPL-SCNC: 3.9 MMOL/L (ref 3.5–5.1)
SODIUM SERPL-SCNC: 138 MMOL/L (ref 136–145)

## 2025-08-30 PROCEDURE — 25000003 PHARM REV CODE 250: Performed by: STUDENT IN AN ORGANIZED HEALTH CARE EDUCATION/TRAINING PROGRAM

## 2025-08-30 PROCEDURE — 25000003 PHARM REV CODE 250: Performed by: FAMILY MEDICINE

## 2025-08-30 PROCEDURE — 63600175 PHARM REV CODE 636 W HCPCS: Mod: JZ,TB | Performed by: FAMILY MEDICINE

## 2025-08-30 PROCEDURE — 99900035 HC TECH TIME PER 15 MIN (STAT)

## 2025-08-30 PROCEDURE — 36415 COLL VENOUS BLD VENIPUNCTURE: CPT | Performed by: FAMILY MEDICINE

## 2025-08-30 PROCEDURE — 80048 BASIC METABOLIC PNL TOTAL CA: CPT | Performed by: FAMILY MEDICINE

## 2025-08-30 PROCEDURE — 25000003 PHARM REV CODE 250

## 2025-08-30 RX ORDER — ONDANSETRON 8 MG/1
8 TABLET, ORALLY DISINTEGRATING ORAL ONCE
Status: DISCONTINUED | OUTPATIENT
Start: 2025-08-30 | End: 2025-08-30 | Stop reason: HOSPADM

## 2025-08-30 RX ADMIN — VENLAFAXINE HYDROCHLORIDE 150 MG: 75 CAPSULE, EXTENDED RELEASE ORAL at 09:08

## 2025-08-30 RX ADMIN — REMDESIVIR 100 MG: 100 INJECTION, POWDER, LYOPHILIZED, FOR SOLUTION INTRAVENOUS at 10:08

## 2025-08-30 RX ADMIN — LOSARTAN POTASSIUM 50 MG: 50 TABLET, FILM COATED ORAL at 09:08

## 2025-08-30 RX ADMIN — MUPIROCIN: 20 OINTMENT TOPICAL at 09:08

## 2025-08-30 RX ADMIN — SODIUM CHLORIDE 500 ML: 9 INJECTION, SOLUTION INTRAVENOUS at 11:08

## 2025-08-30 RX ADMIN — ATORVASTATIN CALCIUM 40 MG: 40 TABLET, FILM COATED ORAL at 09:08

## (undated) DEVICE — PAD PREP 50/CA

## (undated) DEVICE — SUT 2-0 VICRYL / SH (J417)

## (undated) DEVICE — TOURNIQUET SB QC DP 24X4IN

## (undated) DEVICE — PACK LAPSCP/PELVSCPY III TIBRN

## (undated) DEVICE — PACK BASIC

## (undated) DEVICE — PACK PERI/GYN OPTIMA

## (undated) DEVICE — TRAY MINOR GEN SURG

## (undated) DEVICE — SPONGE DERMACEA GAUZE 4X4

## (undated) DEVICE — SUT 0 VICRYL / UR6 (J603)

## (undated) DEVICE — ELECTRODE REM PLYHSV RETURN 9

## (undated) DEVICE — SEE MEDLINE ITEM 157173

## (undated) DEVICE — COVER OVERHEAD SURG LT BLUE

## (undated) DEVICE — INSTRUMENT SUCTION FRAZIER 12F

## (undated) DEVICE — SET IRR URLGY 2LINE UNIV SPIKE

## (undated) DEVICE — GUIDEWIRE ORTHO .054 X 6 IN
Type: IMPLANTABLE DEVICE | Site: WRIST | Status: NON-FUNCTIONAL
Removed: 2021-11-16

## (undated) DEVICE — SEE MEDLINE ITEM 154981

## (undated) DEVICE — SEE MEDLINE ITEM 157181

## (undated) DEVICE — SPLINT PLASTER F.S. 3INX15IN

## (undated) DEVICE — SEE L#120831

## (undated) DEVICE — BLADE SCALP OPHTL BEVEL STR

## (undated) DEVICE — STOCKINET 4INX48

## (undated) DEVICE — SEE MEDLINE ITEM 156955

## (undated) DEVICE — SEE MEDLINE ITEM 157117

## (undated) DEVICE — TOWEL OR DISP STRL BLUE 4/PK

## (undated) DEVICE — SOL IRR WATER STRL 3000 ML

## (undated) DEVICE — PAD CAST 2 IN X 4YDS STERILE

## (undated) DEVICE — DRILL QUICK RELEASE 2.8MM 5IN

## (undated) DEVICE — SYR 10CC LUER LOCK

## (undated) DEVICE — BANDAGE ESMARK ELASTIC ST 4X9

## (undated) DEVICE — DRESSING XEROFORM FOIL PK 1X8

## (undated) DEVICE — BLADE SURG #15 CARBON STEEL

## (undated) DEVICE — BANDAGE MATRIX HK LOOP 3IN 5YD

## (undated) DEVICE — SEE MEDLINE ITEM 157116

## (undated) DEVICE — ALCOHOL 70% ISOP W/GREEN 16OZ

## (undated) DEVICE — ADHESIVE MASTISOL VIAL 48/BX

## (undated) DEVICE — BIT DRILL QUICK RELEASE 2.0MM

## (undated) DEVICE — BANDAGE ADHESIVE

## (undated) DEVICE — DRAPE MINI C-ARM 54 X 64

## (undated) DEVICE — SUT 3-0 VICRYL / RB-1

## (undated) DEVICE — SEE MEDLINE ITEM 146313

## (undated) DEVICE — PLATE BONE DST VOLAR RAD STND
Type: IMPLANTABLE DEVICE | Site: WRIST | Status: NON-FUNCTIONAL
Removed: 2021-11-16

## (undated) DEVICE — LUBRICANT SURGILUBE 2 OZ

## (undated) DEVICE — TRAY FOLEY 16FR INFECTION CONT

## (undated) DEVICE — APPLICATOR CHLORAPREP ORN 26ML

## (undated) DEVICE — GAUZE SPONGE 4X4 12PLY

## (undated) DEVICE — ADHESIVE DERMABOND ADVANCED

## (undated) DEVICE — PAD CAST SPECIALIST STRL 3

## (undated) DEVICE — CLIPPER BLADE MOD 4406 (CAREF)

## (undated) DEVICE — SEE MEDLINE ITEM 152622

## (undated) DEVICE — BIT DRILL QUICK RELEASE 2.8MM

## (undated) DEVICE — DRAPE STERI INSTRUMENT 1018

## (undated) DEVICE — GOWN SURGICAL X-LARGE

## (undated) DEVICE — SOL NS 1000CC

## (undated) DEVICE — MANIFOLD 4 PORT

## (undated) DEVICE — GLOVE SURG BIOGEL LATEX SZ 7.5

## (undated) DEVICE — RETRACTOR STERILE PLASTIC G2